# Patient Record
Sex: FEMALE | Race: WHITE | NOT HISPANIC OR LATINO
[De-identification: names, ages, dates, MRNs, and addresses within clinical notes are randomized per-mention and may not be internally consistent; named-entity substitution may affect disease eponyms.]

---

## 2020-02-20 ENCOUNTER — TRANSCRIPTION ENCOUNTER (OUTPATIENT)
Age: 65
End: 2020-02-20

## 2020-09-10 ENCOUNTER — APPOINTMENT (OUTPATIENT)
Dept: ORTHOPEDIC SURGERY | Facility: CLINIC | Age: 65
End: 2020-09-10
Payer: COMMERCIAL

## 2020-09-10 VITALS
WEIGHT: 118 LBS | TEMPERATURE: 97.9 F | SYSTOLIC BLOOD PRESSURE: 166 MMHG | DIASTOLIC BLOOD PRESSURE: 90 MMHG | HEART RATE: 85 BPM | BODY MASS INDEX: 21.99 KG/M2 | HEIGHT: 61.5 IN

## 2020-09-10 DIAGNOSIS — Z87.898 PERSONAL HISTORY OF OTHER SPECIFIED CONDITIONS: ICD-10-CM

## 2020-09-10 DIAGNOSIS — Z87.09 PERSONAL HISTORY OF OTHER DISEASES OF THE RESPIRATORY SYSTEM: ICD-10-CM

## 2020-09-10 DIAGNOSIS — Z82.62 FAMILY HISTORY OF OSTEOPOROSIS: ICD-10-CM

## 2020-09-10 DIAGNOSIS — Z78.9 OTHER SPECIFIED HEALTH STATUS: ICD-10-CM

## 2020-09-10 DIAGNOSIS — M75.41 IMPINGEMENT SYNDROME OF RIGHT SHOULDER: ICD-10-CM

## 2020-09-10 DIAGNOSIS — Z80.9 FAMILY HISTORY OF MALIGNANT NEOPLASM, UNSPECIFIED: ICD-10-CM

## 2020-09-10 PROCEDURE — 73030 X-RAY EXAM OF SHOULDER: CPT | Mod: RT

## 2020-09-10 PROCEDURE — 99204 OFFICE O/P NEW MOD 45 MIN: CPT

## 2020-09-10 RX ORDER — OXCARBAZEPINE 600 MG/1
TABLET, FILM COATED ORAL
Refills: 0 | Status: ACTIVE | COMMUNITY

## 2020-09-10 NOTE — HISTORY OF PRESENT ILLNESS
[de-identified] : Patient is here for right shoulder pain that began about 4 months ago. She hit her arm on an object but does not remember the exact injury. She did not seek medical attention as it was during the height of the COVID-19 crisis. The pain is intermittent. She states that there is some radiation towards her hands. She is also experiencing tingling in her 4th and 5th digits. She works on a computer.  Pain is worse in the morning. Denies prior injury. \par \par The patient's past medical history, past surgical history, medications and allergies were reviewed by me today and documented accordingly. In addition, the patient's family and social history, which were noncontributory to this visit, were reviewed also. Intake form was reviewed. The patient has no family history of arthritis.

## 2020-09-10 NOTE — PHYSICAL EXAM
[de-identified] : Constitutional: Well-nourished, well-developed, No acute distress\par Respiratory:  Good respiratory effort, no SOB\par Lymphatic: No regional lymphadenopathy, no lymphedema\par Psychiatric: Pleasant and normal affect, alert and oriented x3\par Skin: Clean dry and intact B/L UE/LE\par Musculoskeletal: normal except where as noted in regional exam\par \par \par Left Shoulder:\par APPEARANCE: no marked deformities, no swelling or malalignment\par POSITIVE TENDERNESS: none\par NONTENDER: supraspinatus, infraspinatus, teres minor, LH biceps, anterior and posterior capsule, AC joint\par ROM: full & painless, no scapular winging or dyskinesia present\par RESISTIVE TESTING: painless 5/5 resisted flex/ext, empty can/ER/IR, horizontal abd/add \par SPECIAL TESTS: neg Drop Arm, neg Empty Can, neg Lizama/Neers, neg Venegas's, neg Speeds, neg Apprehension, neg cross arm adduction, neg apley's scratch test\par Vasc: 2+ radial pulse\par Neuro: AIN, PIN, Ulnar nerve intact to motor, DTRs 2+/4 biceps, triceps, brachioradialis\par Sensation: Intact to light touch throughout\par B/L Elbows:  No asymmetry, malalignment, or swelling, Full ROM, 5/5 strength in flexion/ext, pronation/supination, Joints stable\par B/L Wrist and Hand:  No asymmetry, malalignment, or swelling, Full ROM, 5/5 strength in wrist and long finger flexion/ext, radial/ulnar deviation, Joints stable\par \par Right Shoulder:\par APPEARANCE: no marked deformities, no swelling or malalignment\par POSITIVE TENDERNESS: supraspinatus, long head biceps tendon\par NONTENDER:  infraspinatus, teres minor. biceps. anterior and posterior capsule. AC joint. \par ROM: full with mild painful arc past 60 degrees, no scapular winging or dyskinesia present\par RESISTIVE TESTING: MMT 4+/5 ER, Flexion and Empty can, 5/5 IR. painless 5/5 resisted ext, horizontal abd/add \par SPECIAL TESTS: + Lizama and Neers, mildly + cross arm adduction, + Speeds, neg Venegas's, neg Drop Arm, neg Apprehension. neg apley's scratch test\par Vasc: 2+ radial pulse\par Neuro: AIN, PIN, Ulnar nerve intact to motor, DTRs 2+/4 biceps, triceps, brachioradialis\par Sensation: Intact to light touch throughout  [de-identified] : The following radiographs were ordered and read by me during this patient's visit. I reviewed each radiograph in detail with the patient and discussed the findings as highlighted below. \par \par 3 views of the right shoulder were obtained today that show no fracture, or dislocation. There are no degenerative changes seen. There is no malalignment. No obvious osseous abnormality. Otherwise unremarkable.\par

## 2020-09-10 NOTE — DISCUSSION/SUMMARY
[de-identified] : Discussed findings of today's exam and possible causes of patient's pain.  Educated patient on their most probable diagnosis of right shoulder impingement.  Reviewed possible courses of treatment, and we collaboratively decided best course of treatment at this time will include conservative management.  Patient will be started on a course of physical therapy to restore normal range of motion and strength as tolerated.  Patient may continue take oral NSAIDs as needed for pain.  We discussed the role of a cortisone injection, patient would like to defer at this time.  Follow up as needed.  Patient appreciates and agrees with current plan.\par \par This note was generated using dragon medical dictation software.  A reasonable effort has been made for proofreading its contents, but typos may still remain.  If there are any questions or points of clarification needed please notify my office.

## 2022-06-23 ENCOUNTER — APPOINTMENT (OUTPATIENT)
Dept: ORTHOPEDIC SURGERY | Facility: CLINIC | Age: 67
End: 2022-06-23
Payer: COMMERCIAL

## 2022-06-23 VITALS — BODY MASS INDEX: 21.52 KG/M2 | WEIGHT: 114 LBS | HEIGHT: 61 IN

## 2022-06-23 VITALS — DIASTOLIC BLOOD PRESSURE: 64 MMHG | SYSTOLIC BLOOD PRESSURE: 158 MMHG | HEART RATE: 114 BPM

## 2022-06-23 DIAGNOSIS — M47.26 OTHER SPONDYLOSIS WITH RADICULOPATHY, LUMBAR REGION: ICD-10-CM

## 2022-06-23 DIAGNOSIS — M54.50 LOW BACK PAIN, UNSPECIFIED: ICD-10-CM

## 2022-06-23 PROCEDURE — 99214 OFFICE O/P EST MOD 30 MIN: CPT

## 2022-06-23 PROCEDURE — 72100 X-RAY EXAM L-S SPINE 2/3 VWS: CPT

## 2022-06-23 RX ORDER — ONDANSETRON 8 MG/1
8 TABLET ORAL
Qty: 30 | Refills: 0 | Status: ACTIVE | COMMUNITY
Start: 2022-01-20

## 2022-06-23 RX ORDER — PANCRELIPASE LIPASE, PANCRELIPASE PROTEASE, PANCRELIPASE AMYLASE 10000; 32000; 42000 [USP'U]/1; [USP'U]/1; [USP'U]/1
10000-32000 CAPSULE, DELAYED RELEASE ORAL
Qty: 90 | Refills: 0 | Status: ACTIVE | COMMUNITY
Start: 2022-05-15

## 2022-06-23 RX ORDER — ESCITALOPRAM OXALATE 20 MG/1
20 TABLET ORAL
Qty: 90 | Refills: 0 | Status: ACTIVE | COMMUNITY
Start: 2022-03-10

## 2022-06-23 NOTE — PHYSICAL EXAM
[de-identified] : Constitutional: Well-nourished, well-developed, No acute distress\par Respiratory:  Good respiratory effort, no SOB\par Lymphatic: No regional lymphadenopathy, no lymphedema\par Psychiatric: Pleasant and normal affect, alert and oriented x3\par Musculoskeletal: normal except where as noted in regional exam\par \par Lumbar Spine Exam\par \par APPEARANCE: Mild levoscoliosis malalignment, + loss of lordotic curvature of the lumbosacral spine. + poor posture\par POSITIVE TENDERNESS: + IL/SI/ST ligs, + TTP of b/l SI joints, + b/l lower lumbar tenderness and spasm noted in erector spinae and quadratus lumborum\par NONTENDER: no bony midline tenderness.\par ROM: Mildly limited in all directions, mildly painful at end range of flexion and extension\par RESISTIVE TESTING: painful 4/5 resisted flex/ext, sidebending b/l, and rotation\par SPECIAL TESTS: neg SLR b/l, neg TOMA b/l, neg Trendelenburg b/l \par PULSES: 2+ DP/PT pulses\par NEURO:  L1 - S2 intact to sensation and motor, DTRs 2+/4 patella and achilles\par \par \par  [de-identified] : The following radiographs were ordered and read by me during this patient's visit. I reviewed each radiograph in detail with the patient and discussed the findings as highlighted below. \par \par 2 views of the lumbar spine were obtained today that show degenerative changes and evidence of moderate to severe multilevel osteoarthritis .  No fracture, or dislocation seen at this time. There is mild levoscoliosis malalignment. No other obvious osseous abnormality. Otherwise unremarkable.

## 2022-06-23 NOTE — HISTORY OF PRESENT ILLNESS
[de-identified] : Patient is here for LBP. this has been a chronic issue but she feels it has worsened recently. There was no injury. She feels the pain radiate down her legs.  She used to work out frequently but had to stop due to cancer treatments. She recently started exercising recently doing a Pilates type exercise. She has used ice and heat to treat it. She did some Pt with her instructor. She takes Aleve/Advil for pain relief. Denies N/T/Prior injury/bowel or bladder dysfunction/saddle anesthesia.

## 2022-06-23 NOTE — DISCUSSION/SUMMARY
[de-identified] : Discussed findings of today's exam and possible causes of patient's pain.  Educated patient on their most probable diagnosis of chronic intermittent low back pain with intermittent radiculopathy with recent atraumatic exacerbation due to underlying moderate to early severe osteoarthritis and levoscoliosis.  Reviewed possible courses of treatment, and we collaboratively decided best course of treatment at this time will include conservative management.  Patient was educated that she has longstanding arthritis and scoliosis, it has recently become symptomatic.  Patient was highly active and physically fit prior to her recent diagnosis of pancreatic cancer.  She was undergoing extensive medical treatments for this issue and had a significant decline in her ability to perform her preferred physical activities, it is this change in her medical condition and lifestyle that caused her chronic lumbar osteoarthritis and scoliosis to become symptomatic.  She is having mild intermittent bilateral lower extremity radiculopathy, however she does not have any significant motor weakness or sensory deficits, not yet a candidate for epidural steroid injections, recommend deferral of MRI at this time.  Patient will be started on a course of physical therapy to restore normal range of motion and strength as tolerated.  Patient started on a course of oral NSAIDs, prescription given for Naprosyn (We discussed all possible side effects of this medication).  If patient does not have improvement with these measures then we may consider MRI and subsequent physiatry consultation at that time.  I had a lengthy discussion today regarding the patient's activity modification and how we need to rebuild her core strength and exercise tolerance over time, she needs to be patient with her ability to regain core strength as it takes much longer to regain strength, whereas it does not take much time at all to lose muscle strength and have it atrophied due to inactivity from her medical condition.  Follow up as needed.  Patient appreciates and agrees with current plan.\par \par \par This note was generated using dragon medical dictation software.  A reasonable effort has been made for proofreading its contents, but typos may still remain.  If there are any questions or points of clarification needed please notify my office.\par

## 2022-07-11 ENCOUNTER — INPATIENT (INPATIENT)
Facility: HOSPITAL | Age: 67
LOS: 2 days | Discharge: HOME | DRG: 379 | End: 2022-07-14
Attending: STUDENT IN AN ORGANIZED HEALTH CARE EDUCATION/TRAINING PROGRAM | Admitting: HOSPITALIST
Payer: COMMERCIAL

## 2022-07-11 VITALS
RESPIRATION RATE: 18 BRPM | SYSTOLIC BLOOD PRESSURE: 127 MMHG | HEIGHT: 61 IN | TEMPERATURE: 98 F | WEIGHT: 111.99 LBS | OXYGEN SATURATION: 97 % | HEART RATE: 86 BPM | DIASTOLIC BLOOD PRESSURE: 67 MMHG

## 2022-07-11 LAB
ALBUMIN SERPL ELPH-MCNC: 4.1 G/DL — SIGNIFICANT CHANGE UP (ref 3.3–5)
ALP SERPL-CCNC: 181 U/L — HIGH (ref 40–120)
ALT FLD-CCNC: 29 U/L — SIGNIFICANT CHANGE UP (ref 10–45)
ANION GAP SERPL CALC-SCNC: 12 MMOL/L — SIGNIFICANT CHANGE UP (ref 5–17)
APTT BLD: 25.3 SEC — LOW (ref 27.5–35.5)
AST SERPL-CCNC: 24 U/L — SIGNIFICANT CHANGE UP (ref 10–40)
BASOPHILS # BLD AUTO: 0.09 K/UL — SIGNIFICANT CHANGE UP (ref 0–0.2)
BASOPHILS NFR BLD AUTO: 1 % — SIGNIFICANT CHANGE UP (ref 0–2)
BILIRUB SERPL-MCNC: 0.1 MG/DL — LOW (ref 0.2–1.2)
BLD GP AB SCN SERPL QL: NEGATIVE — SIGNIFICANT CHANGE UP
BUN SERPL-MCNC: 16 MG/DL — SIGNIFICANT CHANGE UP (ref 7–23)
CALCIUM SERPL-MCNC: 8.9 MG/DL — SIGNIFICANT CHANGE UP (ref 8.4–10.5)
CHLORIDE SERPL-SCNC: 102 MMOL/L — SIGNIFICANT CHANGE UP (ref 96–108)
CO2 SERPL-SCNC: 20 MMOL/L — LOW (ref 22–31)
CREAT SERPL-MCNC: 0.72 MG/DL — SIGNIFICANT CHANGE UP (ref 0.5–1.3)
EGFR: 92 ML/MIN/1.73M2 — SIGNIFICANT CHANGE UP
EOSINOPHIL # BLD AUTO: 0.26 K/UL — SIGNIFICANT CHANGE UP (ref 0–0.5)
EOSINOPHIL NFR BLD AUTO: 3 % — SIGNIFICANT CHANGE UP (ref 0–6)
GLUCOSE SERPL-MCNC: 109 MG/DL — HIGH (ref 70–99)
HCT VFR BLD CALC: 15.2 % — CRITICAL LOW (ref 34.5–45)
HGB BLD-MCNC: 4.8 G/DL — CRITICAL LOW (ref 11.5–15.5)
INR BLD: 1.02 RATIO — SIGNIFICANT CHANGE UP (ref 0.88–1.16)
LYMPHOCYTES # BLD AUTO: 1.63 K/UL — SIGNIFICANT CHANGE UP (ref 1–3.3)
LYMPHOCYTES # BLD AUTO: 19 % — SIGNIFICANT CHANGE UP (ref 13–44)
MCHC RBC-ENTMCNC: 25 PG — LOW (ref 27–34)
MCHC RBC-ENTMCNC: 31.6 GM/DL — LOW (ref 32–36)
MCV RBC AUTO: 79.2 FL — LOW (ref 80–100)
MONOCYTES # BLD AUTO: 1.03 K/UL — HIGH (ref 0–0.9)
MONOCYTES NFR BLD AUTO: 12 % — SIGNIFICANT CHANGE UP (ref 2–14)
NEUTROPHILS # BLD AUTO: 5.59 K/UL — SIGNIFICANT CHANGE UP (ref 1.8–7.4)
NEUTROPHILS NFR BLD AUTO: 65 % — SIGNIFICANT CHANGE UP (ref 43–77)
PLATELET # BLD AUTO: 390 K/UL — SIGNIFICANT CHANGE UP (ref 150–400)
POTASSIUM SERPL-MCNC: 4.7 MMOL/L — SIGNIFICANT CHANGE UP (ref 3.5–5.3)
POTASSIUM SERPL-SCNC: 4.7 MMOL/L — SIGNIFICANT CHANGE UP (ref 3.5–5.3)
PROT SERPL-MCNC: 7 G/DL — SIGNIFICANT CHANGE UP (ref 6–8.3)
PROTHROM AB SERPL-ACNC: 11.8 SEC — SIGNIFICANT CHANGE UP (ref 10.5–13.4)
RBC # BLD: 1.92 M/UL — LOW (ref 3.8–5.2)
RBC # FLD: 18.6 % — HIGH (ref 10.3–14.5)
RH IG SCN BLD-IMP: POSITIVE — SIGNIFICANT CHANGE UP
SODIUM SERPL-SCNC: 134 MMOL/L — LOW (ref 135–145)
WBC # BLD: 8.6 K/UL — SIGNIFICANT CHANGE UP (ref 3.8–10.5)
WBC # FLD AUTO: 8.6 K/UL — SIGNIFICANT CHANGE UP (ref 3.8–10.5)

## 2022-07-11 PROCEDURE — 99291 CRITICAL CARE FIRST HOUR: CPT

## 2022-07-11 NOTE — ED PROVIDER NOTE - RAPID ASSESSMENT
67 y/o F w/ PMHx pancreatic CA, presents to the ED c/o anemia. Pt had outside blood work done showing low hemoglobin of 4 and presents to ED for further evaluation. Denies any vomiting, blood in stool, blood in urine, vaginal bleeding, bleeding, bruising, fever, chills, difficulty breathing, SOB, lightheadedness, dizziness, LOC or any other acute complaints. Nontoxic. Appears somewhat pale.     Reina LI ([place]) have documented this rapid assessment note under the dictation of Oleksandr Kendrick) which has been reviewed and affirmed to be accurate. Patient was seen as a QDOC patient. The patient will be seen and further worked up in the main emergency department and their care will be completed by the main emergency department team along with a thorough physical exam. Receiving team will follow up on labs, analgesia, any clinical imaging, reassess and disposition as clinically indicated, all decisions regarding the progression of care will be made at their discretion. 65 y/o F w/ PMHx pancreatic CA, presents to the ED c/o anemia. Pt had outside blood work done showing low hemoglobin of 4 and presents to ED for further evaluation. Denies any vomiting, blood in stool, blood in urine, vaginal bleeding, bleeding, bruising, fever, chills, difficulty breathing, SOB, lightheadedness, dizziness, LOC or any other acute complaints. Nontoxic. Appears somewhat pale.     Attending Note --     I saw the patient waiting area via televideo connection; a brief history was taken and a thorough physical exam was not performed as there is no physical exam room available.  The patient will be seen and further worked up in the main emergency department and their care will be completed by the main emergency department team.  I was not involved in this patient's care during the QDOC process, and unless otherwise noted in the ED provider note, was not involved in their care during their ED course.    The scribe's documentation has been prepared under my direction and personally reviewed by me in its entirety. I confirm that the note above accurately reflects all work, treatment, procedures, and medical decision making performed by me (Dr. Kendrick).  --[facility]     Reina LI (Scribe) have documented this rapid assessment note under the dictation of Oleksandr Kendrick) which has been reviewed and affirmed to be accurate. Patient was seen as a QDOC patient. The patient will be seen and further worked up in the main emergency department and their care will be completed by the main emergency department team along with a thorough physical exam. Receiving team will follow up on labs, analgesia, any clinical imaging, reassess and disposition as clinically indicated, all decisions regarding the progression of care will be made at their discretion.

## 2022-07-11 NOTE — ED PROVIDER NOTE - OBJECTIVE STATEMENT
65yo F pmh Pancreatic Ca presenting to [facility] ED for anemia on outpatient labs showing hgb of ~4. Pt reports over past couple of months has been having worsening lower/mid back pain which is constant, but improves with NSAIDs which pt says has been using heavily. Denies abd pain, back pain with no LE weakness or numbness, no incontinence. Denies any bleeding in stool, urine or from coughing/vomiting. ROS otherwise negative.

## 2022-07-11 NOTE — ED PROVIDER NOTE - ATTENDING CONTRIBUTION TO CARE
Pt w panc CA here w low HB on routine outpt labs - pt endorses some fatigue and sob only w significant exertion otherwise asymptomatic, particularly denies abd pain, n/v, bloody or dark stools. no hx of GIB or anemia prior to this. pt does admit to taking lots of nsaids recently for chronic back pain. on exam, pt with normal vitals, NAD, HD stable, pale, but otherwise bening exam, soft nontender abd. labs today confirm Hb 4.8 - will get blood transfusion, get hemoccult, admit for further w/u of anemia of unclear etiology.

## 2022-07-11 NOTE — ED PROVIDER NOTE - CLINICAL SUMMARY MEDICAL DECISION MAKING FREE TEXT BOX
65yo F w 65yo F w pancreatic cancer, some fatigue and SOB over past couple of weeks, low h/h on outpatient labs. Well appearing with unremarkable physical exam, hemoglobin 4.4 from q.doc. No melena on exam. Will need transfusion, likely to be admitted for anemia work up

## 2022-07-11 NOTE — ED PROVIDER NOTE - PHYSICAL EXAMINATION
GENERAL: non-toxic appearing, alert, in NAD  HEENT: atraumatic, normocephalic, Vision grossly intact, no conjunctivitis or discharge, hearing grossly intact,  no nasal discharge, epistaxis   CARDIAC: RRR, normal S1S2,  no appreciable murmurs, no cyanosis, cap refill < 2 seconds  PULM: normal work of breathing, oxygen saturation on RA wnl, CTAB, no crackles, rales, rhonchi, or wheezing  GI: abdomen nondistended, soft, nontender, no guarding or rebound tenderness, no palpable masses : no gross melena, normal rectal tone   NEURO: awake and alert, follows commands, normal speech, PERRLA, EOMI, no focal motor or sensory deficits  MSK: spine appears normal, no midline tenderness or step offs, no joint swelling or erythema, ranging all extremities with no appreciable loss of ROM  EXT: no peripheral edema, calf tenderness, redness or swelling  SKIN: warm, dry, and intact, no rashes  PSYCH: anxious, appropriate mood and affect

## 2022-07-12 DIAGNOSIS — Z90.410 ACQUIRED TOTAL ABSENCE OF PANCREAS: Chronic | ICD-10-CM

## 2022-07-12 DIAGNOSIS — D64.9 ANEMIA, UNSPECIFIED: ICD-10-CM

## 2022-07-12 DIAGNOSIS — E03.9 HYPOTHYROIDISM, UNSPECIFIED: ICD-10-CM

## 2022-07-12 DIAGNOSIS — I10 ESSENTIAL (PRIMARY) HYPERTENSION: ICD-10-CM

## 2022-07-12 DIAGNOSIS — C25.9 MALIGNANT NEOPLASM OF PANCREAS, UNSPECIFIED: ICD-10-CM

## 2022-07-12 DIAGNOSIS — J45.998 OTHER ASTHMA: ICD-10-CM

## 2022-07-12 LAB
ALBUMIN SERPL ELPH-MCNC: 3.8 G/DL — SIGNIFICANT CHANGE UP (ref 3.3–5)
ALP SERPL-CCNC: 173 U/L — HIGH (ref 40–120)
ALT FLD-CCNC: 23 U/L — SIGNIFICANT CHANGE UP (ref 10–45)
ANION GAP SERPL CALC-SCNC: 13 MMOL/L — SIGNIFICANT CHANGE UP (ref 5–17)
AST SERPL-CCNC: 33 U/L — SIGNIFICANT CHANGE UP (ref 10–40)
BASE EXCESS BLDV CALC-SCNC: -4.2 MMOL/L — LOW (ref -2–2)
BILIRUB SERPL-MCNC: 1 MG/DL — SIGNIFICANT CHANGE UP (ref 0.2–1.2)
BUN SERPL-MCNC: 12 MG/DL — SIGNIFICANT CHANGE UP (ref 7–23)
CA-I SERPL-SCNC: 1.26 MMOL/L — SIGNIFICANT CHANGE UP (ref 1.15–1.33)
CALCIUM SERPL-MCNC: 8.7 MG/DL — SIGNIFICANT CHANGE UP (ref 8.4–10.5)
CHLORIDE BLDV-SCNC: 104 MMOL/L — SIGNIFICANT CHANGE UP (ref 96–108)
CHLORIDE SERPL-SCNC: 103 MMOL/L — SIGNIFICANT CHANGE UP (ref 96–108)
CO2 BLDV-SCNC: 22 MMOL/L — SIGNIFICANT CHANGE UP (ref 22–26)
CO2 SERPL-SCNC: 19 MMOL/L — LOW (ref 22–31)
CREAT SERPL-MCNC: 0.63 MG/DL — SIGNIFICANT CHANGE UP (ref 0.5–1.3)
EGFR: 98 ML/MIN/1.73M2 — SIGNIFICANT CHANGE UP
FERRITIN SERPL-MCNC: 7 NG/ML — LOW (ref 15–150)
FOLATE SERPL-MCNC: 12.5 NG/ML — SIGNIFICANT CHANGE UP
GAS PNL BLDV: 133 MMOL/L — LOW (ref 136–145)
GAS PNL BLDV: SIGNIFICANT CHANGE UP
GAS PNL BLDV: SIGNIFICANT CHANGE UP
GLUCOSE BLDV-MCNC: 123 MG/DL — HIGH (ref 70–99)
GLUCOSE SERPL-MCNC: 101 MG/DL — HIGH (ref 70–99)
HCO3 BLDV-SCNC: 21 MMOL/L — LOW (ref 22–29)
HCT VFR BLD CALC: 15.7 % — CRITICAL LOW (ref 34.5–45)
HCT VFR BLD CALC: 23.1 % — LOW (ref 34.5–45)
HCT VFR BLDA CALC: 15 % — CRITICAL LOW (ref 34.5–46.5)
HGB BLD CALC-MCNC: 5 G/DL — CRITICAL LOW (ref 11.7–16.1)
HGB BLD-MCNC: 4.8 G/DL — CRITICAL LOW (ref 11.5–15.5)
HGB BLD-MCNC: 7.6 G/DL — LOW (ref 11.5–15.5)
HOROWITZ INDEX BLDV+IHG-RTO: SIGNIFICANT CHANGE UP
IGA FLD-MCNC: 288 MG/DL — SIGNIFICANT CHANGE UP (ref 84–499)
IGG FLD-MCNC: 928 MG/DL — SIGNIFICANT CHANGE UP (ref 610–1660)
IGM SERPL-MCNC: 24 MG/DL — LOW (ref 35–242)
IRON SATN MFR SERPL: 142 UG/DL — SIGNIFICANT CHANGE UP (ref 30–160)
IRON SATN MFR SERPL: 31 % — SIGNIFICANT CHANGE UP (ref 14–50)
KAPPA LC SER QL IFE: 4.04 MG/DL — HIGH (ref 0.33–1.94)
KAPPA LC SER QL IFE: 4.14 MG/DL — HIGH (ref 0.33–1.94)
KAPPA/LAMBDA FREE LIGHT CHAIN RATIO, SERUM: 1.69 RATIO — HIGH (ref 0.26–1.65)
KAPPA/LAMBDA FREE LIGHT CHAIN RATIO, SERUM: 1.74 RATIO — HIGH (ref 0.26–1.65)
LACTATE BLDV-MCNC: 1.4 MMOL/L — SIGNIFICANT CHANGE UP (ref 0.7–2)
LAMBDA LC SER QL IFE: 2.38 MG/DL — SIGNIFICANT CHANGE UP (ref 0.57–2.63)
LAMBDA LC SER QL IFE: 2.39 MG/DL — SIGNIFICANT CHANGE UP (ref 0.57–2.63)
LDH SERPL L TO P-CCNC: 239 U/L — SIGNIFICANT CHANGE UP (ref 50–242)
MAGNESIUM SERPL-MCNC: 2.1 MG/DL — SIGNIFICANT CHANGE UP (ref 1.6–2.6)
MCHC RBC-ENTMCNC: 27.3 PG — SIGNIFICANT CHANGE UP (ref 27–34)
MCHC RBC-ENTMCNC: 32.9 GM/DL — SIGNIFICANT CHANGE UP (ref 32–36)
MCV RBC AUTO: 83.1 FL — SIGNIFICANT CHANGE UP (ref 80–100)
NRBC # BLD: 1 /100 WBCS — HIGH (ref 0–0)
OTHER CELLS CSF MANUAL: 3.7 ML/DL — LOW (ref 18–22)
PCO2 BLDV: 38 MMHG — LOW (ref 39–42)
PH BLDV: 7.35 — SIGNIFICANT CHANGE UP (ref 7.32–7.43)
PHOSPHATE SERPL-MCNC: 3.3 MG/DL — SIGNIFICANT CHANGE UP (ref 2.5–4.5)
PLATELET # BLD AUTO: 322 K/UL — SIGNIFICANT CHANGE UP (ref 150–400)
PO2 BLDV: 33 MMHG — SIGNIFICANT CHANGE UP (ref 25–45)
POTASSIUM BLDV-SCNC: 4.2 MMOL/L — SIGNIFICANT CHANGE UP (ref 3.5–5.1)
POTASSIUM SERPL-MCNC: 4.3 MMOL/L — SIGNIFICANT CHANGE UP (ref 3.5–5.3)
POTASSIUM SERPL-SCNC: 4.3 MMOL/L — SIGNIFICANT CHANGE UP (ref 3.5–5.3)
PROT SERPL-MCNC: 6.5 G/DL — SIGNIFICANT CHANGE UP (ref 6–8.3)
PROT SERPL-MCNC: 6.5 G/DL — SIGNIFICANT CHANGE UP (ref 6–8.3)
PROT SERPL-MCNC: 6.8 G/DL — SIGNIFICANT CHANGE UP (ref 6–8.3)
RBC # BLD: 2.78 M/UL — LOW (ref 3.8–5.2)
RBC # FLD: 17 % — HIGH (ref 10.3–14.5)
SAO2 % BLDV: 48.9 % — LOW (ref 67–88)
SARS-COV-2 RNA SPEC QL NAA+PROBE: SIGNIFICANT CHANGE UP
SODIUM SERPL-SCNC: 135 MMOL/L — SIGNIFICANT CHANGE UP (ref 135–145)
TIBC SERPL-MCNC: 458 UG/DL — HIGH (ref 220–430)
UIBC SERPL-MCNC: 317 UG/DL — SIGNIFICANT CHANGE UP (ref 110–370)
VIT B12 SERPL-MCNC: 548 PG/ML — SIGNIFICANT CHANGE UP (ref 232–1245)
WBC # BLD: 7.37 K/UL — SIGNIFICANT CHANGE UP (ref 3.8–10.5)
WBC # FLD AUTO: 7.37 K/UL — SIGNIFICANT CHANGE UP (ref 3.8–10.5)

## 2022-07-12 PROCEDURE — 99254 IP/OBS CNSLTJ NEW/EST MOD 60: CPT

## 2022-07-12 PROCEDURE — 99223 1ST HOSP IP/OBS HIGH 75: CPT | Mod: GC

## 2022-07-12 PROCEDURE — 74177 CT ABD & PELVIS W/CONTRAST: CPT | Mod: 26

## 2022-07-12 RX ORDER — PANTOPRAZOLE SODIUM 20 MG/1
40 TABLET, DELAYED RELEASE ORAL
Refills: 0 | Status: DISCONTINUED | OUTPATIENT
Start: 2022-07-12 | End: 2022-07-14

## 2022-07-12 RX ORDER — MONTELUKAST 4 MG/1
10 TABLET, CHEWABLE ORAL DAILY
Refills: 0 | Status: DISCONTINUED | OUTPATIENT
Start: 2022-07-12 | End: 2022-07-14

## 2022-07-12 RX ORDER — LOSARTAN POTASSIUM 100 MG/1
100 TABLET, FILM COATED ORAL DAILY
Refills: 0 | Status: DISCONTINUED | OUTPATIENT
Start: 2022-07-12 | End: 2022-07-12

## 2022-07-12 RX ORDER — LOSARTAN POTASSIUM 100 MG/1
100 TABLET, FILM COATED ORAL DAILY
Refills: 0 | Status: DISCONTINUED | OUTPATIENT
Start: 2022-07-12 | End: 2022-07-14

## 2022-07-12 RX ORDER — ESCITALOPRAM OXALATE 10 MG/1
20 TABLET, FILM COATED ORAL DAILY
Refills: 0 | Status: DISCONTINUED | OUTPATIENT
Start: 2022-07-12 | End: 2022-07-14

## 2022-07-12 RX ORDER — OXCARBAZEPINE 300 MG/1
300 TABLET, FILM COATED ORAL
Refills: 0 | Status: DISCONTINUED | OUTPATIENT
Start: 2022-07-12 | End: 2022-07-14

## 2022-07-12 RX ORDER — PANTOPRAZOLE SODIUM 20 MG/1
80 TABLET, DELAYED RELEASE ORAL ONCE
Refills: 0 | Status: COMPLETED | OUTPATIENT
Start: 2022-07-12 | End: 2022-07-12

## 2022-07-12 RX ORDER — ACETAMINOPHEN 500 MG
650 TABLET ORAL ONCE
Refills: 0 | Status: COMPLETED | OUTPATIENT
Start: 2022-07-12 | End: 2022-07-12

## 2022-07-12 RX ORDER — DIPHENHYDRAMINE HCL 50 MG
50 CAPSULE ORAL ONCE
Refills: 0 | Status: COMPLETED | OUTPATIENT
Start: 2022-07-12 | End: 2022-07-12

## 2022-07-12 RX ORDER — DULOXETINE HYDROCHLORIDE 30 MG/1
30 CAPSULE, DELAYED RELEASE ORAL DAILY
Refills: 0 | Status: DISCONTINUED | OUTPATIENT
Start: 2022-07-12 | End: 2022-07-14

## 2022-07-12 RX ORDER — SODIUM CHLORIDE 9 MG/ML
1000 INJECTION INTRAMUSCULAR; INTRAVENOUS; SUBCUTANEOUS
Refills: 0 | Status: DISCONTINUED | OUTPATIENT
Start: 2022-07-12 | End: 2022-07-13

## 2022-07-12 RX ORDER — LEVOTHYROXINE SODIUM 125 MCG
100 TABLET ORAL DAILY
Refills: 0 | Status: DISCONTINUED | OUTPATIENT
Start: 2022-07-12 | End: 2022-07-14

## 2022-07-12 RX ADMIN — Medication 650 MILLIGRAM(S): at 03:30

## 2022-07-12 RX ADMIN — Medication 50 MILLIGRAM(S): at 03:31

## 2022-07-12 RX ADMIN — Medication 650 MILLIGRAM(S): at 07:05

## 2022-07-12 RX ADMIN — PANTOPRAZOLE SODIUM 80 MILLIGRAM(S): 20 TABLET, DELAYED RELEASE ORAL at 12:56

## 2022-07-12 RX ADMIN — PANTOPRAZOLE SODIUM 40 MILLIGRAM(S): 20 TABLET, DELAYED RELEASE ORAL at 21:59

## 2022-07-12 RX ADMIN — MONTELUKAST 10 MILLIGRAM(S): 4 TABLET, CHEWABLE ORAL at 18:44

## 2022-07-12 RX ADMIN — LOSARTAN POTASSIUM 100 MILLIGRAM(S): 100 TABLET, FILM COATED ORAL at 18:44

## 2022-07-12 RX ADMIN — ESCITALOPRAM OXALATE 20 MILLIGRAM(S): 10 TABLET, FILM COATED ORAL at 18:44

## 2022-07-12 RX ADMIN — SODIUM CHLORIDE 60 MILLILITER(S): 9 INJECTION INTRAMUSCULAR; INTRAVENOUS; SUBCUTANEOUS at 12:56

## 2022-07-12 RX ADMIN — DULOXETINE HYDROCHLORIDE 30 MILLIGRAM(S): 30 CAPSULE, DELAYED RELEASE ORAL at 18:44

## 2022-07-12 NOTE — CONSULT NOTE ADULT - SUBJECTIVE AND OBJECTIVE BOX
Reason for consult: Ca Pancreas    HPI:  Pt w panc CA here w low HB on routine outpt labs - pt endorses some fatigue and sob only w significant exertion otherwise asymptomatic, particularly denies abd pain, n/v, bloody or dark stools. no hx of GIB or anemia prior to this. pt does admit to taking lots of nsaids recently for chronic back pain. on exam, pt with normal vitals, NAD, HD stable, pale, but otherwise bening exam, soft nontender abd. labs today confirm Hb 4.8 - will get blood transfusion, get hemoccult, admit for further w/u of anemia of unclear etiology.    Hematology/Oncology called to see patient who is under care by Dr. 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 Last CBC noted at [facility] [place] showed a normal H/H.      PAST MEDICAL & SURGICAL HISTORY:  Pancreatic cancer          FAMILY HISTORY:      Alochol: Denied  Smoking: Nonsmoker  Drug Use: Denied  Marital Status:         Allergies    Dilantin (Unknown)    Intolerances        MEDICATIONS  (STANDING):    MEDICATIONS  (PRN):      ROS  No fever, sweats, chills  Fatigue  No epistaxis, HA, sore throat  No CP, SOB, cough, sputum  No n/v/d, abd pain, melena, hematochezia  No edema  No rash  No anxiety  No back pain, joint pain  No bleeding, bruising  No dysuria, hematuria    T(C): 36.2 (22 @ 04:00), Max: 36.9 (22 @ 19:14)  HR: 99 (22 @ 04:00) (86 - 99)  BP: 146/69 (22 @ 04:00) (127/67 - 172/88)  RR: 16 (22 @ 04:00) (16 - 19)  SpO2: 98% (22 @ 04:00) (97% - 100%)  Wt(kg): --    PE  NAD  Awake, alert  Anicteric, MMM  RRR  CTAB  Abd soft, NT, ND  No c/c/e  No rash grossly                            4.8    x     )-----------( x        ( 2022 01:20 )             15.7       07-11    134<L>  |  102  |  16  ----------------------------<  109<H>  4.7   |  20<L>  |  0.72    Ca    8.9      2022 20:18    TPro  7.0  /  Alb  4.1  /  TBili  0.1<L>  /  DBili  x   /  AST  24  /  ALT  29  /  AlkPhos  181<H>      [facility]   DEPARTMENT OF RADIOLOGY   [facility] [address] [address] [phone]     COMPUTED TOMOGRAPHY   Report of Consultation     Name: DANUTA CALDERON Acc No: [id number]   MRN: [id number] Date of Service: 2022   : 1955 Sex: F Pt Loc: [facility]   Ordered by: FUAD LOO MD Date of Report: 2022 09:14 AM   Procedure: CT CH/ABD/PEL W/ CON Account: [id number]   PRID #: [id number]     FINAL REPORT   3/18/2022 CT of chest, abdomen, and pelvis   CLINICAL STATEMENT: Pancreatic cancer   TECHNIQUE:   * IV contrast: with contrast   * Oral Contrast: None   * Acquisition: Axial CT images of chest, abdomen and pelvis   * Postprocessing: routine     RADIATION DOSE (DLP): 759 mGy-cm   COMPARISON: December 10, 2021.   CORRELATION: None.   FINDINGS:   LUNGS/AIRWAYS: Slightly increased right   upper lobe subpleural fissural groundglass subsegmental infiltrate   measuring 2.5 x 2.2 compared to 1.3 x 1.1 cm. Slightly change right middle   lobe subpleural infiltrate and clustered nodules with probable decreased   attenuation and unchanged size. Unchanged bibasilar atelectasis or   scarring. Unchanged punctate left lower lobe nodule.     PLEURA/PERICARDIUM: No effusion.   MEDIASTINUM/THORACIC NODES: No adenopathy.   HEPATOBILIARY: Increased heterogeneous attenuation of the liver with focal   wedge-shaped areas of low attenuation; for example, wedge-shaped   low-attenuation area in the third segment of the left hepatic lobe is new.   In the right hepatic lobe segment 6 focal wedge-shaped low-attenuation is   present. A more focal area of low-attenuation in the right hepatic lobe is   new measuring 1.2 x 0.9 cm. Findings likely represent focal fat deposition   or perfusion abnormality. Additional unchanged few subcentimeter   low-attenuation lesions, too small to characterize. No biliary ductal   dilatation. Gallbladder is unremarkable.     SPLEEN: Splenectomy.   PANCREAS: Distal pancreatectomy. Unremarkable remnant   pancreas. No main duct dilation.     ADRENAL GLANDS: Unremarkable.   KIDNEYS: Multiple subcentimeter bilateral   cortical hypodense lesions, too small to characterize. No hydronephrosis.     ABDOMINOPELVIC   NODES: Unchanged few mildly prominent   retroperitoneal nodes, for example 0.9 x 0.6 cm lower aortocaval node.     PELVIC ORGANS: Unremarkable.   PERITONEUM/   MESENTERY/BOWEL: Unremarkable.     BONES/SOFT TISSUES: No suspicious osseous lesion.   OTHER: Right chest wall power Port tip at   the superior cavoatrial junction.     IMPRESSION:   Since December 10, 2021,   1. Increased right upper lung subsegmental infiltrate and slightly   decreased attenuation and unchanged size of right middle lobe subsegmental   infiltrate probably infectious/inflammatory. Follow-up exam is recommended.   2. Increased heterogeneous attenuation of the liver with wedge-shaped   areas of low attenuation and more focal areas of low attenuation probably   focal fat deposition or perfusion abnormality or less likely malignancy.   MRI liver recommended for further evaluation.     FINAL REPORT    Reason for consult: Ca Pancreas    HPI:  Pt w panc CA here w low HB on routine outpt labs - pt endorses some fatigue and sob only w significant exertion otherwise asymptomatic, particularly denies abd pain, n/v, bloody or dark stools. no hx of GIB or anemia prior to this. pt does admit to taking lots of nsaids recently for chronic back pain. on exam, pt with normal vitals, NAD, HD stable, pale, but otherwise bening exam, soft nontender abd. labs today confirm Hb 4.8 - will get blood transfusion, get hemoccult, admit for further w/u of anemia of unclear etiology.    Hematology/Oncology called to see patient who is under care by Dr. 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 Last CBC noted at [facility] [place] showed a normal H/H.      PAST MEDICAL & SURGICAL HISTORY:  Pancreatic cancer          FAMILY HISTORY:      Alochol: Denied  Smoking: Nonsmoker  Drug Use: Denied  Marital Status:         Allergies    Dilantin (Unknown)    Intolerances        MEDICATIONS  (STANDING):    MEDICATIONS  (PRN):      ROS  No fever, sweats, chills  Fatigue  No epistaxis, HA, sore throat  No CP, SOB, cough, sputum  No n/v/d, abd pain, melena, hematochezia  No edema  No rash  No anxiety  No back pain, joint pain  No bleeding, bruising  No dysuria, hematuria    T(C): 36.2 (22 @ 04:00), Max: 36.9 (22 @ 19:14)  HR: 99 (22 @ 04:00) (86 - 99)  BP: 146/69 (22 @ 04:00) (127/67 - 172/88)  RR: 16 (22 @ 04:00) (16 - 19)  SpO2: 98% (22 @ 04:00) (97% - 100%)  Wt(kg): --    PE  NAD  Awake, alert  Anicteric, MMM  RRR  CTAB  Abd soft, NT, ND  No c/c/e  No rash grossly                            4.8    x     )-----------( x        ( 2022 01:20 )             15.7       07-11    134<L>  |  102  |  16  ----------------------------<  109<H>  4.7   |  20<L>  |  0.72    Ca    8.9      2022 20:18    TPro  7.0  /  Alb  4.1  /  TBili  0.1<L>  /  DBili  x   /  AST  24  /  ALT  29  /  AlkPhos  181<H>      [facility]   DEPARTMENT OF RADIOLOGY   [facility] [address] [address] [phone]     COMPUTED TOMOGRAPHY   Report of Consultation     Name: DANUTA CALDERON Acc No: [id number]   MRN: [id number] Date of Service: 2022   : 1955 Sex: F Pt Loc: [facility]   Ordered by: FUAD LOO MD Date of Report: 2022 09:14 AM   Procedure: CT CH/ABD/PEL W/ CON Account: [id number]   PRID #: [id number]     FINAL REPORT   3/18/2022 CT of chest, abdomen, and pelvis   CLINICAL STATEMENT: Pancreatic cancer   TECHNIQUE:   * IV contrast: with contrast   * Oral Contrast: None   * Acquisition: Axial CT images of chest, abdomen and pelvis   * Postprocessing: routine     RADIATION DOSE (DLP): 759 mGy-cm   COMPARISON: December 10, 2021.   CORRELATION: None.   FINDINGS:   LUNGS/AIRWAYS: Slightly increased right   upper lobe subpleural fissural groundglass subsegmental infiltrate   measuring 2.5 x 2.2 compared to 1.3 x 1.1 cm. Slightly change right middle   lobe subpleural infiltrate and clustered nodules with probable decreased   attenuation and unchanged size. Unchanged bibasilar atelectasis or   scarring. Unchanged punctate left lower lobe nodule.     PLEURA/PERICARDIUM: No effusion.   MEDIASTINUM/THORACIC NODES: No adenopathy.   HEPATOBILIARY: Increased heterogeneous attenuation of the liver with focal   wedge-shaped areas of low attenuation; for example, wedge-shaped   low-attenuation area in the third segment of the left hepatic lobe is new.   In the right hepatic lobe segment 6 focal wedge-shaped low-attenuation is   present. A more focal area of low-attenuation in the right hepatic lobe is   new measuring 1.2 x 0.9 cm. Findings likely represent focal fat deposition   or perfusion abnormality. Additional unchanged few subcentimeter   low-attenuation lesions, too small to characterize. No biliary ductal   dilatation. Gallbladder is unremarkable.     SPLEEN: Splenectomy.   PANCREAS: Distal pancreatectomy. Unremarkable remnant   pancreas. No main duct dilation.     ADRENAL GLANDS: Unremarkable.   KIDNEYS: Multiple subcentimeter bilateral   cortical hypodense lesions, too small to characterize. No hydronephrosis.     ABDOMINOPELVIC   NODES: Unchanged few mildly prominent   retroperitoneal nodes, for example 0.9 x 0.6 cm lower aortocaval node.     PELVIC ORGANS: Unremarkable.   PERITONEUM/   MESENTERY/BOWEL: Unremarkable.     BONES/SOFT TISSUES: No suspicious osseous lesion.   OTHER: Right chest wall power Port tip at   the superior cavoatrial junction.     IMPRESSION:   Since December 10, 2021,   1. Increased right upper lung subsegmental infiltrate and slightly   decreased attenuation and unchanged size of right middle lobe subsegmental   infiltrate probably infectious/inflammatory. Follow-up exam is recommended.   2. Increased heterogeneous attenuation of the liver with wedge-shaped   areas of low attenuation and more focal areas of low attenuation probably   focal fat deposition or perfusion abnormality or less likely malignancy.   MRI liver recommended for further evaluation.     FINAL REPORT       ACC: [id number] EXAM:  CT ABDOMEN AND PELVIS IC                          PROCEDURE DATE:  2022          INTERPRETATION:  CLINICAL INFORMATION: Pancreatic adenocarcinoma status   post distal pancreatectomy, splenectomy and chemotherapy/radiation   therapy presenting with low hemoglobin on outpatient labs and worsening   back pain    COMPARISON: None.    CONTRAST/COMPLICATIONS:  IV Contrast: Omnipaque 350  90 cc administered   0 cc discarded  Oral Contrast: NONE  Complications: None reported at time of study completion    PROCEDURE:  CT of the Abdomen and Pelvis was performed.  Arterial and Portal Venous phases were acquired.  Sagittal and coronal reformats were performed.    FINDINGS:  LOWER CHEST: Aortic and coronary aortic calcifications. Hypoattenuating   blood in the left ventricle consistent with anemia.    LIVER: Within normal limits.  BILE DUCTS: Normal caliber.  GALLBLADDER: Cholelithiasis.  SPLEEN: Splenectomy.  PANCREAS: Distal pancreatectomy.  ADRENALS: Within normal limits.  KIDNEYS/URETERS: Within normal limits.    BLADDER: Within normal limits.  REPRODUCTIVE ORGANS: Uterus and adnexa within normal limits.    BOWEL: No bowel obstruction. Appendectomy.  PERITONEUM: No ascites.  VESSELS: Atherosclerotic changes.  RETROPERITONEUM/LYMPH NODES: No lymphadenopathy. No hematoma.  ABDOMINAL WALL: Within normal limits.  BONES: Degenerative changes. L2 on L3 retrolisthesis with severe L2-L3   disc space narrowing.    IMPRESSION:  Status post distal pancreatectomy and splenectomy.    No acute intra-abdominal or pelvic pathology.    --- End of Report ---

## 2022-07-12 NOTE — ED ADULT NURSE REASSESSMENT NOTE - NS ED NURSE REASSESS COMMENT FT1
report received from DELL Baker in pink. Pt remains in the ED. Resting comfortably in bed. A&Ox3. Breathing spontaneously and unlabored. NAD. VSS. 2nd unit of blood started. Pt safety maintained. Will continue to monitor. Awaiting dispo.

## 2022-07-12 NOTE — H&P ADULT - NSHPPHYSICALEXAM_GEN_ALL_CORE
Physical Exam:    General: well-nourished; NAD  Skin: warm and dry, no rashes  Head: NC/AT  Eyes: PERRLA; EOM intact; conjunctiva clear  Respiratory: no chest wall deformity, CTAB w/good aeration  Cardiovascular: RRR, S1/S2 normal; No m/r/g  Abdominal: soft; normoactive bowel sounds; no HSM; no masses  Extremities: full ROM, no tenderness, no edema  Vascular: UE/LE pulses 2+ bilat  Neurological: alert, oriented, no gross deficits Vital Signs Last 24 Hrs  T(C): 37 (12 Jul 2022 11:24), Max: 37.1 (12 Jul 2022 07:28)  T(F): 98.6 (12 Jul 2022 11:24), Max: 98.8 (12 Jul 2022 07:28)  HR: 87 (12 Jul 2022 11:24) (82 - 99)  BP: 144/88 (12 Jul 2022 11:24) (127/67 - 172/88)  BP(mean): 105 (12 Jul 2022 00:54) (105 - 105)  RR: 18 (12 Jul 2022 11:24) (16 - 20)  SpO2: 99% (12 Jul 2022 11:24) (97% - 100%)    Parameters below as of 12 Jul 2022 10:05  Patient On (Oxygen Delivery Method): room air        Physical Exam:    General: well-nourished; NAD  Skin: warm and dry, no rashes  Head: NC/AT  Eyes: PERRLA; EOM intact; conjunctiva clear  Respiratory: no chest wall deformity, CTAB w/good aeration  Cardiovascular: RRR, S1/S2 normal; No m/r/g  Abdominal: soft; normoactive bowel sounds; no HSM; no masses  Extremities: full ROM, no tenderness, no edema  Vascular: UE/LE pulses 2+ bilat  Neurological: alert, oriented, no gross deficits

## 2022-07-12 NOTE — H&P ADULT - ASSESSMENT
65 yo female hx of pancreatic cancer (s/p distal pancreatectomy and splenectomy 6/2021), HTN, hypothyroidism, seizure disorder, presenting for anemia w/ hgb of 4.8 in the setting of chronic NSAID use concerning for occult GI bleed.

## 2022-07-12 NOTE — H&P ADULT - PROBLEM SELECTOR PLAN 3
Hx of hypothyroidism on synthroid  - C/w synthroid 100 mg qd Hx of pancreatic cancer s/p pancreatectomy and splenectomy 6/2021 followed by adjuvant FOLFIRINOX 8/2021-1/2022 and adjuvant capecitabine with RT completed 3/2022.  -Ongoing care after discharge per heme/onc

## 2022-07-12 NOTE — CONSULT NOTE ADULT - ATTENDING COMMENTS
Agree with above. Patient with recent heavy NSAID use,  has distal pancreatectomy and presented with severe anemia without overt bleeding. Discussed with patient endoscopic evaluation which would to be complete, including EGD and colonoscopy. She is hesitant about colonoscopy as she was told her colon was "twisty." Explained to her the risks/benefits of endoscopic evaluation which including but are not limited to inherent small risks of bleeding, infection, perforation. Offered EGD and colonoscopy at the same time, vs EGD first and then colonoscopy if no lesions were found in the UGI tract. She is agreeable to the latter. Will plan for EGD tomorrow. Keep NPO after midnight, stop NSAIDs, start PPI. Monitor H/H, transfuse as needed.

## 2022-07-12 NOTE — H&P ADULT - NSHPSOCIALHISTORY_GEN_ALL_CORE
, lives with . Drinks 1-2 glass of wine every few months. Denies hx of tobacco use. Reports use of occasional marijuana via vaporizer.

## 2022-07-12 NOTE — H&P ADULT - HISTORY OF PRESENT ILLNESS
HPI:    65 yo female hx of pancreatic cancer (s/p distal pancreatectomy and splenectomy 6/2021), HTN, hypothyroidism, seizure disorder, presenting for anemia. States that she underwent routine lab screenings for prior pancreatic cancer which revealed hgb of 4.8. Outpt providers recommended pt come to ED for anemia. Since finishing pancreatic cancer treatment pt indorsing increase in exercise frequency and intensity. Reports injuring her back and has experience 1-2 months of mid lower back pain that radiates down her b/l hips and thighs. Endorses taking increasing quantity of NSAIDs due to pain. Over past month pt endorses experiencing minor fatigue and SOB with excersion but is only able to walk a few steps 2/2 to back/leg pain so is unsure how symptoms are effecting exercise tolerance. Also endorses palpitations with ambulation. Denies any dizziness, light handedness hematemesis hemoptysis, hematuria, hematochezia, melena, or abdominal pain. Denies chronic alcohol use and any dietary restrictions. Also denies any fevers/chills, cough, dysuria, or chest pain.    ED course: Vitals on admission: temp 36.9, HR 86, /67, RR 18, SpO2 97% on room air. Labs confirmed Hgb 4.8. Received 2 units PRBCs with response; repeat hgb 7.6

## 2022-07-12 NOTE — CONSULT NOTE ADULT - SUBJECTIVE AND OBJECTIVE BOX
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     Endorsing SOB, fatigue. Denies melena, hematochezia, hematemesis, abd pain, n/v/d/f/c. (+) NSAID use for back pain.     Allergies:  Dilantin (Unknown)      Home Medications:    Hospital Medications:  pantoprazole  Injectable 80 milliGRAM(s) IV Push once  pantoprazole  Injectable 40 milliGRAM(s) IV Push two times a day  sodium chloride 0.9%. 1000 milliLiter(s) IV Continuous <Continuous>      PMHX/PSHX:  Pancreatic cancer        Family history:      Denies family history of colon cancer/polyps, stomach cancer/polyps, pancreatic cancer/masses, liver cancer/disease, ovarian cancer and endometrial cancer.    Social History:     Tob: Denies  EtOH: Denies  Illicit Drugs: Denies    ROS:     General:  No wt loss, fevers, chills, night sweats, fatigue  Eyes:  Good vision, no reported pain  ENT:  No sore throat, pain, runny nose, dysphagia  CV:  No pain, palpitations, hypo/hypertension  Pulm:  No dyspnea, cough, tachypnea, wheezing  GI:  No pain, No nausea, No vomiting, No diarrhea, No constipation, No weight loss, No fever, No pruritis, No rectal bleeding, No tarry stools, No dysphagia,  :  No pain, bleeding, incontinence, nocturia  Muscle:  No pain, weakness  Neuro:  No weakness, tingling, memory problems  Psych:  No fatigue, insomnia, mood problems, depression  Endocrine:  No polyuria, polydipsia, cold/heat intolerance  Heme:  No petechiae, ecchymosis, easy bruisability  Skin:  No rash, tattoos, scars, edema    PHYSICAL EXAM:     GENERAL:  No acute distress  HEENT:  Normocephalic/atraumatic, no scleral icterus  CHEST:  Clear to auscultation bilaterally, no wheezes/rales/ronchi, no accessory muscle use  HEART:  Regular rate and rhythm, no murmurs/rubs/gallops  ABDOMEN:  Soft, non-tender, non-distended, normoactive bowel sounds,  no masses, no hepato-splenomegaly, no signs of chronic liver disease  EXTREMITIES: No cyanosis, clubbing, or edema  SKIN:  No rash/erythema/ecchymoses/petechiae/wounds/abscess/warm/dry  NEURO:  Alert and oriented x 3, no asterixis    Vital Signs:  Vital Signs Last 24 Hrs  T(C): 37 (12 Jul 2022 11:24), Max: 37.1 (12 Jul 2022 07:28)  T(F): 98.6 (12 Jul 2022 11:24), Max: 98.8 (12 Jul 2022 07:28)  HR: 87 (12 Jul 2022 11:24) (82 - 99)  BP: 144/88 (12 Jul 2022 11:24) (127/67 - 172/88)  BP(mean): 105 (12 Jul 2022 00:54) (105 - 105)  RR: 18 (12 Jul 2022 11:24) (16 - 20)  SpO2: 99% (12 Jul 2022 11:24) (97% - 100%)    Parameters below as of 12 Jul 2022 10:05  Patient On (Oxygen Delivery Method): room air      Daily Height in cm: 154.94 (11 Jul 2022 19:14)    Daily     LABS:                        7.6    7.37  )-----------( 322      ( 12 Jul 2022 12:08 )             23.1     Mean Cell Volume: 83.1 fl (07-12-22 @ 12:08)    07-11    134<L>  |  102  |  16  ----------------------------<  109<H>  4.7   |  20<L>  |  0.72    Ca    8.9      11 Jul 2022 20:18    TPro  7.0  /  Alb  4.1  /  TBili  0.1<L>  /  DBili  x   /  AST  24  /  ALT  29  /  AlkPhos  181<H>  07-11    LIVER FUNCTIONS - ( 11 Jul 2022 20:18 )  Alb: 4.1 g/dL / Pro: 7.0 g/dL / ALK PHOS: 181 U/L / ALT: 29 U/L / AST: 24 U/L / GGT: x           PT/INR - ( 11 Jul 2022 20:18 )   PT: 11.8 sec;   INR: 1.02 ratio         PTT - ( 11 Jul 2022 20:18 )  PTT:25.3 sec                            7.6    7.37  )-----------( 322      ( 12 Jul 2022 12:08 )             23.1                         4.8    x     )-----------( x        ( 12 Jul 2022 01:20 )             15.7                         4.8    8.60  )-----------( 390      ( 11 Jul 2022 20:18 )             15.2       Imaging:           Chief Complaint:  Patient is a 66y old  Female who presents with a chief complaint of     HPI: 66F Hx stage IIB PDA C S/P distal pancreatectomy and splenectomy on 6/24/2021 followed by adjuvant FOLFIRINOX 8/2021-1/2022 and adjuvant capecitabine with RT completed 3/2022 now presenting with anemia w/ Hb 4.8.     Endorsing SOB, fatigue. Denies melena, hematochezia, hematemesis, abd pain, n/v/d/f/c. (+) NSAID use for back pain (reporting taking ibuprofen/aleve 3 tabs daily x 1 month).     Pt reports having multiple colonoscopies in the past (2/2 father w/ hx of CRC) -- was told on her last colon she had a "twisty" colon; thus her internist ordered her for a virtual CT colonography on as her last screening.     Allergies:  Dilantin (Unknown)      Home Medications:    Hospital Medications:  pantoprazole  Injectable 80 milliGRAM(s) IV Push once  pantoprazole  Injectable 40 milliGRAM(s) IV Push two times a day  sodium chloride 0.9%. 1000 milliLiter(s) IV Continuous <Continuous>      PMHX/PSHX:  Pancreatic cancer        Family history:      Denies family history of colon cancer/polyps, stomach cancer/polyps, pancreatic cancer/masses, liver cancer/disease, ovarian cancer and endometrial cancer.    Social History:     Tob: Denies  EtOH: Denies  Illicit Drugs: Denies    ROS:     General:  No wt loss, fevers, chills, night sweats, fatigue  Eyes:  Good vision, no reported pain  ENT:  No sore throat, pain, runny nose, dysphagia  CV:  No pain, palpitations, hypo/hypertension  Pulm:  No dyspnea, cough, tachypnea, wheezing  GI:  see above  :  No pain, bleeding, incontinence, nocturia  Muscle:  No pain, weakness  Neuro:  No weakness, tingling, memory problems  Psych:  No fatigue, insomnia, mood problems, depression  Endocrine:  No polyuria, polydipsia, cold/heat intolerance  Heme:  No petechiae, ecchymosis, easy bruisability  Skin:  No rash, tattoos, scars, edema    PHYSICAL EXAM:     GENERAL:  No acute distress  HEENT:  Normocephalic/atraumatic, no scleral icterus  CHEST:  no accessory muscle use  HEART:  Regular rate and rhythm, no murmurs/rubs/gallops  ABDOMEN:  Soft, non-tender, non-distended  EXTREMITIES: No cyanosis, clubbing, or edema  SKIN:  No rash/warm/dry  NEURO:  Alert and oriented x 3    Vital Signs:  Vital Signs Last 24 Hrs  T(C): 37 (12 Jul 2022 11:24), Max: 37.1 (12 Jul 2022 07:28)  T(F): 98.6 (12 Jul 2022 11:24), Max: 98.8 (12 Jul 2022 07:28)  HR: 87 (12 Jul 2022 11:24) (82 - 99)  BP: 144/88 (12 Jul 2022 11:24) (127/67 - 172/88)  BP(mean): 105 (12 Jul 2022 00:54) (105 - 105)  RR: 18 (12 Jul 2022 11:24) (16 - 20)  SpO2: 99% (12 Jul 2022 11:24) (97% - 100%)    Parameters below as of 12 Jul 2022 10:05  Patient On (Oxygen Delivery Method): room air      Daily Height in cm: 154.94 (11 Jul 2022 19:14)    Daily     LABS:                        7.6    7.37  )-----------( 322      ( 12 Jul 2022 12:08 )             23.1     Mean Cell Volume: 83.1 fl (07-12-22 @ 12:08)    07-11    134<L>  |  102  |  16  ----------------------------<  109<H>  4.7   |  20<L>  |  0.72    Ca    8.9      11 Jul 2022 20:18    TPro  7.0  /  Alb  4.1  /  TBili  0.1<L>  /  DBili  x   /  AST  24  /  ALT  29  /  AlkPhos  181<H>  07-11    LIVER FUNCTIONS - ( 11 Jul 2022 20:18 )  Alb: 4.1 g/dL / Pro: 7.0 g/dL / ALK PHOS: 181 U/L / ALT: 29 U/L / AST: 24 U/L / GGT: x           PT/INR - ( 11 Jul 2022 20:18 )   PT: 11.8 sec;   INR: 1.02 ratio         PTT - ( 11 Jul 2022 20:18 )  PTT:25.3 sec                            7.6    7.37  )-----------( 322      ( 12 Jul 2022 12:08 )             23.1                         4.8    x     )-----------( x        ( 12 Jul 2022 01:20 )             15.7                         4.8    8.60  )-----------( 390      ( 11 Jul 2022 20:18 )             15.2       Imaging:           Chief Complaint:  Patient is a 66y old  Female who presents with a chief complaint of     HPI: 66F Hx stage IIB PDA C S/P distal pancreatectomy and splenectomy on 6/24/2021 followed by adjuvant FOLFIRINOX 8/2021-1/2022 and adjuvant capecitabine with RT completed 3/2022 now presenting with anemia w/ Hb 4.8.     Endorsing SOB, fatigue. Denies melena, hematochezia, hematemesis, abd pain, n/v/d/f/c. (+) NSAID use for back pain (reporting taking ibuprofen/aleve 3 tabs daily x 1 month).     Pt reports having multiple colonoscopies in the past (2/2 father w/ hx of CRC) -- was told on her last colon she had a "twisty" colon; thus her internist ordered her for a virtual CT colonography on as her last screening 1 year ago, no colonic lesions were seen.      Allergies:  Dilantin (Unknown)      Home Medications:    Hospital Medications:  pantoprazole  Injectable 80 milliGRAM(s) IV Push once  pantoprazole  Injectable 40 milliGRAM(s) IV Push two times a day  sodium chloride 0.9%. 1000 milliLiter(s) IV Continuous <Continuous>      PMHX/PSHX:  Pancreatic cancer        Family history:      Denies family history of colon cancer/polyps, stomach cancer/polyps, pancreatic cancer/masses, liver cancer/disease, ovarian cancer and endometrial cancer.    Social History:     Tob: Denies  EtOH: Denies  Illicit Drugs: Denies    ROS:     General:  No wt loss, fevers, chills, night sweats, fatigue  Eyes:  Good vision, no reported pain  ENT:  No sore throat, pain, runny nose, dysphagia  CV:  No pain, palpitations, hypo/hypertension  Pulm:  No dyspnea, cough, tachypnea, wheezing  GI:  see above  :  No pain, bleeding, incontinence, nocturia  Muscle:  No pain, weakness  Neuro:  No weakness, tingling, memory problems  Psych:  No fatigue, insomnia, mood problems, depression  Endocrine:  No polyuria, polydipsia, cold/heat intolerance  Heme:  No petechiae, ecchymosis, easy bruisability  Skin:  No rash, tattoos, scars, edema    PHYSICAL EXAM:     GENERAL:  No acute distress  HEENT:  Normocephalic/atraumatic, no scleral icterus  CHEST:  no accessory muscle use  HEART:  Regular rate and rhythm, no murmurs/rubs/gallops  ABDOMEN:  Soft, non-tender, non-distended  EXTREMITIES: No cyanosis, clubbing, or edema  SKIN:  No rash/warm/dry  NEURO:  Alert and oriented x 3  PSYCH: normal affect    Vital Signs:  Vital Signs Last 24 Hrs  T(C): 37 (12 Jul 2022 11:24), Max: 37.1 (12 Jul 2022 07:28)  T(F): 98.6 (12 Jul 2022 11:24), Max: 98.8 (12 Jul 2022 07:28)  HR: 87 (12 Jul 2022 11:24) (82 - 99)  BP: 144/88 (12 Jul 2022 11:24) (127/67 - 172/88)  BP(mean): 105 (12 Jul 2022 00:54) (105 - 105)  RR: 18 (12 Jul 2022 11:24) (16 - 20)  SpO2: 99% (12 Jul 2022 11:24) (97% - 100%)    Parameters below as of 12 Jul 2022 10:05  Patient On (Oxygen Delivery Method): room air      Daily Height in cm: 154.94 (11 Jul 2022 19:14)    Daily     LABS:                        7.6    7.37  )-----------( 322      ( 12 Jul 2022 12:08 )             23.1     Mean Cell Volume: 83.1 fl (07-12-22 @ 12:08)    07-11    134<L>  |  102  |  16  ----------------------------<  109<H>  4.7   |  20<L>  |  0.72    Ca    8.9      11 Jul 2022 20:18    TPro  7.0  /  Alb  4.1  /  TBili  0.1<L>  /  DBili  x   /  AST  24  /  ALT  29  /  AlkPhos  181<H>  07-11    LIVER FUNCTIONS - ( 11 Jul 2022 20:18 )  Alb: 4.1 g/dL / Pro: 7.0 g/dL / ALK PHOS: 181 U/L / ALT: 29 U/L / AST: 24 U/L / GGT: x           PT/INR - ( 11 Jul 2022 20:18 )   PT: 11.8 sec;   INR: 1.02 ratio         PTT - ( 11 Jul 2022 20:18 )  PTT:25.3 sec                            7.6    7.37  )-----------( 322      ( 12 Jul 2022 12:08 )             23.1                         4.8    x     )-----------( x        ( 12 Jul 2022 01:20 )             15.7                         4.8    8.60  )-----------( 390      ( 11 Jul 2022 20:18 )             15.2       Imaging:    < from: CT Abdomen and Pelvis w/ IV Cont (07.12.22 @ 09:55) >    FINDINGS:  LOWER CHEST: Aortic and coronary aortic calcifications. Hypoattenuating   blood in the left ventricle consistent with anemia.    LIVER: Within normal limits.  BILE DUCTS: Normal caliber.  GALLBLADDER: Cholelithiasis.  SPLEEN: Splenectomy.  PANCREAS: Distal pancreatectomy.  ADRENALS: Within normal limits.  KIDNEYS/URETERS: Within normal limits.    BLADDER: Within normal limits.  REPRODUCTIVE ORGANS: Uterus and adnexa within normal limits.    BOWEL: No bowel obstruction. Appendectomy.  PERITONEUM: No ascites.  VESSELS: Atherosclerotic changes.  RETROPERITONEUM/LYMPH NODES: No lymphadenopathy. No hematoma.  ABDOMINAL WALL: Within normal limits.  BONES: Degenerative changes. L2 on L3 retrolisthesis with severe L2-L3   disc space narrowing.    IMPRESSION:  Status post distal pancreatectomy and splenectomy.    No acute intra-abdominal or pelvic pathology.    < end of copied text >

## 2022-07-12 NOTE — CONSULT NOTE ADULT - ASSESSMENT
Pt w panc CA here w low HB on routine outpt labs - pt endorses some fatigue and sob only w significant exertion otherwise asymptomatic, particularly denies abd pain, n/v, bloody or dark stools. no hx of GIB or anemia prior to this. pt does admit to taking lots of nsaids recently for chronic back pain. on exam, pt with normal vitals, NAD, HD stable, pale, but otherwise bening exam, soft nontender abd. labs today confirm Hb 4.8 - will get blood transfusion, get hemoccult, admit for further w/u of anemia of unclear etiology.    Hematology/Oncology called to see patient who is under care by Dr. 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 Last CBC noted at [facility] showed a normal H/H.    Ca Pancreas  --Under care by Dr. Selena Bess of [facility]  --Completed adjuvant therapy 3/2022  --Ongoing care after discharge    Anemia  --Acute onset  --Will order anemia labs - iron, folate, B12, haptoglobin, LDH, FOBT, myeloma studies,  --CT abdomen/pelvis ordered  --Please transfuse for Hgb <7.0 gram    We will continue to follow patient and coordinate with [facility]    Tito Enciso PA-C  Hematology/Oncology  [facility]   [phone] (office)  [phone] (alt office)  Evenings and weekends please call MD on call or office   Pt w panc CA here w low HB on routine outpt labs - pt endorses some fatigue and sob only w significant exertion otherwise asymptomatic, particularly denies abd pain, n/v, bloody or dark stools. no hx of GIB or anemia prior to this. pt does admit to taking lots of nsaids recently for chronic back pain. on exam, pt with normal vitals, NAD, HD stable, pale, but otherwise bening exam, soft nontender abd. labs today confirm Hb 4.8 - will get blood transfusion, get hemoccult, admit for further w/u of anemia of unclear etiology.    Hematology/Oncology called to see patient who is under care by Dr. 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 Last CBC noted at [facility] showed a normal H/H.    Ca Pancreas  --Under care by Dr. Selena Bess of [facility]  --Completed adjuvant therapy 3/2022  --Ongoing care after discharge    Anemia  --Acute onset  --Patient has been taking large amounts of NSAIDS secondary to myalgias  --Will order anemia labs - iron, folate, B12, haptoglobin, LDH, FOBT, myeloma studies,  --CT abdomen/pelvis - no recurrence  --GI has been consulted  --Please transfuse for Hgb <7.0 gram    After discharge we recommend patient follow with benign Hematology - may see Dr. Michi Cruz of [facility] or any of his colleagues who work out of the [facility] office    We will continue to follow patient and coordinate with [facility]    Tiot Enciso PA-C  Hematology/Oncology  [facility]   [phone] (office)  [phone] (alt office)  Evenings and weekends please call MD on call or office

## 2022-07-12 NOTE — H&P ADULT - ATTENDING COMMENTS
Patient with pancreatic cancer is presumed remission presenting with low hemoglobin of 4s. Apparently has been taking NSAIDS for one month for back pain daily. Last Hg in 4/2022 was 10-11. Patient denies abdominal pain, nausea, vomiting and GI alarm symptoms such as early satiety. Stool color and consistently unchanged and brown. Denies BRBPR. Denies other blood loss complaint. Mildly symptomatic from this possibly, but per patient she figured she was out of shape.    Oncology consulted and recommended hemolytic work up, iron studies, SPEP, light chains and LENA--though clinically concerned for acute blood loss anemia. Likely has been going on for 2-4 weeks.    Labs personally reviewed     House GI consulted. Maintain CLD. Possible EGD/Colonoscopy. Can do CBC daily for now.

## 2022-07-12 NOTE — H&P ADULT - NSHPLABSRESULTS_GEN_ALL_CORE
LABS:  cret                        7.6    7.37  )-----------( 322      ( 12 Jul 2022 12:08 )             23.1     07-12    135  |  103  |  12  ----------------------------<  101<H>  4.3   |  19<L>  |  0.63    Ca    8.7      12 Jul 2022 12:08  Phos  3.3     07-12  Mg     2.1     07-12    TPro  6.8  /  Alb  3.8  /  TBili  1.0  /  DBili  x   /  AST  33  /  ALT  23  /  AlkPhos  173<H>  07-12    PT/INR - ( 11 Jul 2022 20:18 )   PT: 11.8 sec;   INR: 1.02 ratio      Imaging:    ACC: [id number] EXAM:  CT ABDOMEN AND PELVIS IC                          PROCEDURE DATE:  07/12/2022      IMPRESSION:  Status post distal pancreatectomy and splenectomy.    No acute intra-abdominal or pelvic pathology.    --- End of Report ---       PTT - ( 11 Jul 2022 20:18 )  PTT:25.3 sec

## 2022-07-12 NOTE — H&P ADULT - PROBLEM SELECTOR PLAN 4
Pt w/ Hx of seasonal athma  - C/w montelukast 10 mg qd Hx of hypothyroidism on synthroid  - C/w synthroid 100 mg qd

## 2022-07-12 NOTE — H&P ADULT - NSHPREVIEWOFSYSTEMS_GEN_ALL_CORE
REVIEW OF SYSTEMS:    Constitutional: no fevers, no appetite change; fatigue  Skin: no rashes  Head: no trauma  Eyes: no change in vision, no discharge  ENT: no ear pain, no nasal congestion, no sore throat  Neck: no pain or stiffness  Respiratory: no cough, wheezing; exertional SOB  Cardiovascular: no CP or edema; palpitations with ambulation  Gastrointestinal: no abd pain, no N/V, no diarrhea/constipation, no bloody stools  Genitourinary: no dysuria, hematuria  Neurological: no numbness or weakness, no HA  Musculoskeletal: no myalgias or arthralgias  Heme: no bruising

## 2022-07-12 NOTE — ED ADULT NURSE REASSESSMENT NOTE - NS ED NURSE REASSESS COMMENT FT1
MD Whiteside to RN premedicate patient prior to PRBC transfusion. RN awaiting medication orders, then to release blood from blood bank.

## 2022-07-12 NOTE — H&P ADULT - PROBLEM SELECTOR PLAN 1
Pt found to have microcytic anemia at outpatient clinic with Hgb of 4.8. Denies any hx of significant alcohol use or dietary restrictions. Most likely JERSON 2/2 chronic GI bleed in setting of chronic NSAID use for MSK pain. Less likely anemia of chronic diease given pts hx of malignancy.  - Received 2 units PRBCs in ED with increase in hgb to 7.6.   - Iron, Folate, b12, haptoglobin, LDH, and FOBT per Heme/onc recs  - GI onboard; f/u recs Pt found to have microcytic anemia at outpatient clinic with Hgb of 4.8. Denies any hx of significant alcohol use or dietary restrictions. Most likely JERSON 2/2 chronic GI bleed in setting of chronic NSAID use for MSK pain. Less likely anemia of chronic diease given pts hx of malignancy.  - Received 2 units PRBCs in ED with increase in hgb to 7.6.   - Iron, Folate, b12, haptoglobin, LDH, FOBT, SPEP per Heme/onc recs  - Maintain active type and screen; transfuse Hgb < 7.0   - Montor Hgb with q24h CBC  - Start pantoprazol 40mg qd for suspected UGIB   - GI onboard; f/u recs Pt found to have microcytic anemia at outpatient clinic with Hgb of 4.8. Denies any hx of significant alcohol use or dietary restrictions. Most likely JERSON 2/2 chronic GI bleed in setting of chronic NSAID use for MSK pain. Less likely anemia of chronic diease given pts hx of malignancy.  - Received 2 units PRBCs in ED with increase in hgb to 7.6.   - Iron, Folate, b12, haptoglobin, LDH, FOBT, SPEP per Heme/onc recs  - Maintain active type and screen; transfuse Hgb < 7.0   - Montor Hgb with q24h CBC  - Start pantoprazole 40mg BID for suspected UGIB   - GI onboard; f/u recs

## 2022-07-12 NOTE — ED ADULT NURSE NOTE - OBJECTIVE STATEMENT
65 y/o female PMHx Pancreatic cancer arrives to [facility] ED by car from home with  with c/o abnormal labs. Pt states had anemia and hgb 4 outpatient blood work, sent to ED for further eval. Pt endorses worsening constant lower/mid back pain which improves with NSAIDs, patient heavily using ibuprofen for pain management. Patient Qdoc-ed in triage, hgb 4.8. Patient denies blood in stool or vaginal bleeding. Patient is A&Ox4. Respirations spontaneous and unlabored. Denies SOB, dyspnea, cough, chest pain, palpitations. No abdominal pain, soft NT/ND. No n/v/d. Denies urinary symptoms. Denies fever/chills. No sick contacts. Skin is warm/dry and pale. Ambulates independently at baseline.

## 2022-07-12 NOTE — CONSULT NOTE ADULT - ASSESSMENT
Impression:    66F Hx stage IIB PDA C S/P distal pancreatectomy and splenectomy on 6/24/2021 followed by adjuvant FOLFIRINOX 8/2021-1/2022 and adjuvant capecitabine with RT completed 3/2022 now presenting with anemia w/ Hb 4.8.     Impression:   #Anemia: pw Hb 4.8 (last CBC wnl in May 2022) without overt GI bleeding now s/p 2U pRBCs --> Hb in 7s. (+) Recent heavy NSAID use. HD stable. Will r/o UGI bleeding sources; PUD, erosive esophagitis, erosive gastropathy, angioectasias, malignancy, unlikely EV/GV. Pt hesitant to undergo colonoscopy at this time.     Recommendations:   - PPI gtt  - NPO after MN  - Plan for EGD 7/13  - Trend CBC, transfuse Hb < 7  - Active T&S  - Avoid NSAIDs      Thank you for involving us in the care of this patient, please reach out if any further questions.     Donovan Olsen MD  Gastroenterology/Hepatology Fellow, PGY5    Available on [employer] Teams  [phone] ([facility])  [id number] ([facility])  Please contact on call fellow weekdays after 5pm-7am and weekends: [phone]

## 2022-07-13 ENCOUNTER — TRANSCRIPTION ENCOUNTER (OUTPATIENT)
Age: 67
End: 2022-07-13

## 2022-07-13 DIAGNOSIS — Z29.9 ENCOUNTER FOR PROPHYLACTIC MEASURES, UNSPECIFIED: ICD-10-CM

## 2022-07-13 LAB
ALBUMIN SERPL ELPH-MCNC: 3.6 G/DL — SIGNIFICANT CHANGE UP (ref 3.3–5)
ALP SERPL-CCNC: 165 U/L — HIGH (ref 40–120)
ALT FLD-CCNC: 21 U/L — SIGNIFICANT CHANGE UP (ref 10–45)
ANION GAP SERPL CALC-SCNC: 9 MMOL/L — SIGNIFICANT CHANGE UP (ref 5–17)
AST SERPL-CCNC: 24 U/L — SIGNIFICANT CHANGE UP (ref 10–40)
BILIRUB SERPL-MCNC: 0.4 MG/DL — SIGNIFICANT CHANGE UP (ref 0.2–1.2)
BUN SERPL-MCNC: 9 MG/DL — SIGNIFICANT CHANGE UP (ref 7–23)
CALCIUM SERPL-MCNC: 8.6 MG/DL — SIGNIFICANT CHANGE UP (ref 8.4–10.5)
CHLORIDE SERPL-SCNC: 103 MMOL/L — SIGNIFICANT CHANGE UP (ref 96–108)
CO2 SERPL-SCNC: 22 MMOL/L — SIGNIFICANT CHANGE UP (ref 22–31)
CREAT SERPL-MCNC: 0.55 MG/DL — SIGNIFICANT CHANGE UP (ref 0.5–1.3)
EGFR: 101 ML/MIN/1.73M2 — SIGNIFICANT CHANGE UP
GLUCOSE SERPL-MCNC: 108 MG/DL — HIGH (ref 70–99)
HAPTOGLOB SERPL-MCNC: 145 MG/DL — SIGNIFICANT CHANGE UP (ref 34–200)
HCT VFR BLD CALC: 23.8 % — LOW (ref 34.5–45)
HCV AB S/CO SERPL IA: 0.22 S/CO — SIGNIFICANT CHANGE UP (ref 0–0.99)
HCV AB SERPL-IMP: SIGNIFICANT CHANGE UP
HGB BLD-MCNC: 7.8 G/DL — LOW (ref 11.5–15.5)
MAGNESIUM SERPL-MCNC: 2.2 MG/DL — SIGNIFICANT CHANGE UP (ref 1.6–2.6)
MCHC RBC-ENTMCNC: 27.7 PG — SIGNIFICANT CHANGE UP (ref 27–34)
MCHC RBC-ENTMCNC: 32.8 GM/DL — SIGNIFICANT CHANGE UP (ref 32–36)
MCV RBC AUTO: 84.4 FL — SIGNIFICANT CHANGE UP (ref 80–100)
NRBC # BLD: 2 /100 WBCS — HIGH (ref 0–0)
PHOSPHATE SERPL-MCNC: 2.8 MG/DL — SIGNIFICANT CHANGE UP (ref 2.5–4.5)
PLATELET # BLD AUTO: 350 K/UL — SIGNIFICANT CHANGE UP (ref 150–400)
POTASSIUM SERPL-MCNC: 3.7 MMOL/L — SIGNIFICANT CHANGE UP (ref 3.5–5.3)
POTASSIUM SERPL-SCNC: 3.7 MMOL/L — SIGNIFICANT CHANGE UP (ref 3.5–5.3)
PROT SERPL-MCNC: 6.7 G/DL — SIGNIFICANT CHANGE UP (ref 6–8.3)
RBC # BLD: 2.82 M/UL — LOW (ref 3.8–5.2)
RBC # FLD: 17 % — HIGH (ref 10.3–14.5)
SODIUM SERPL-SCNC: 134 MMOL/L — LOW (ref 135–145)
WBC # BLD: 7.76 K/UL — SIGNIFICANT CHANGE UP (ref 3.8–10.5)
WBC # FLD AUTO: 7.76 K/UL — SIGNIFICANT CHANGE UP (ref 3.8–10.5)

## 2022-07-13 PROCEDURE — 43255 EGD CONTROL BLEEDING ANY: CPT | Mod: GC

## 2022-07-13 PROCEDURE — 99233 SBSQ HOSP IP/OBS HIGH 50: CPT | Mod: GC,25

## 2022-07-13 DEVICE — NET RETRV ROT ROTH 2.5MMX230CM: Type: IMPLANTABLE DEVICE | Status: FUNCTIONAL

## 2022-07-13 DEVICE — CATH CHANNEL RFA ENDOSCOPSIC: Type: IMPLANTABLE DEVICE | Status: FUNCTIONAL

## 2022-07-13 DEVICE — CATH ABLATION HALO 90DGR: Type: IMPLANTABLE DEVICE | Status: FUNCTIONAL

## 2022-07-13 RX ORDER — IRON SUCROSE 20 MG/ML
200 INJECTION, SOLUTION INTRAVENOUS EVERY 24 HOURS
Refills: 0 | Status: DISCONTINUED | OUTPATIENT
Start: 2022-07-13 | End: 2022-07-14

## 2022-07-13 RX ORDER — SODIUM CHLORIDE 9 MG/ML
500 INJECTION INTRAMUSCULAR; INTRAVENOUS; SUBCUTANEOUS
Refills: 0 | Status: COMPLETED | OUTPATIENT
Start: 2022-07-13 | End: 2022-07-13

## 2022-07-13 RX ADMIN — PANTOPRAZOLE SODIUM 40 MILLIGRAM(S): 20 TABLET, DELAYED RELEASE ORAL at 05:24

## 2022-07-13 RX ADMIN — SODIUM CHLORIDE 30 MILLILITER(S): 9 INJECTION INTRAMUSCULAR; INTRAVENOUS; SUBCUTANEOUS at 14:22

## 2022-07-13 RX ADMIN — LOSARTAN POTASSIUM 100 MILLIGRAM(S): 100 TABLET, FILM COATED ORAL at 05:25

## 2022-07-13 RX ADMIN — PANTOPRAZOLE SODIUM 40 MILLIGRAM(S): 20 TABLET, DELAYED RELEASE ORAL at 19:38

## 2022-07-13 RX ADMIN — OXCARBAZEPINE 300 MILLIGRAM(S): 300 TABLET, FILM COATED ORAL at 01:56

## 2022-07-13 RX ADMIN — SODIUM CHLORIDE 60 MILLILITER(S): 9 INJECTION INTRAMUSCULAR; INTRAVENOUS; SUBCUTANEOUS at 04:30

## 2022-07-13 RX ADMIN — OXCARBAZEPINE 300 MILLIGRAM(S): 300 TABLET, FILM COATED ORAL at 20:51

## 2022-07-13 RX ADMIN — Medication 100 MICROGRAM(S): at 05:24

## 2022-07-13 RX ADMIN — DULOXETINE HYDROCHLORIDE 30 MILLIGRAM(S): 30 CAPSULE, DELAYED RELEASE ORAL at 19:24

## 2022-07-13 RX ADMIN — ESCITALOPRAM OXALATE 20 MILLIGRAM(S): 10 TABLET, FILM COATED ORAL at 19:24

## 2022-07-13 RX ADMIN — MONTELUKAST 10 MILLIGRAM(S): 4 TABLET, CHEWABLE ORAL at 17:03

## 2022-07-13 RX ADMIN — IRON SUCROSE 110 MILLIGRAM(S): 20 INJECTION, SOLUTION INTRAVENOUS at 17:03

## 2022-07-13 NOTE — DISCHARGE NOTE PROVIDER - NSDCCAREPROVSEEN_GEN_ALL_CORE_FT
[facility] Team 2 Medicine  Dr. Shanks, Moises Jerome, Bryanna Mack Dr.

## 2022-07-13 NOTE — DISCHARGE NOTE PROVIDER - NSDCMRMEDTOKEN_GEN_ALL_CORE_FT
DULoxetine: 30 milligram(s) orally once a day  escitalopram 20 mg oral tablet: 1 tab(s) orally once a day  Fosamax:   losartan 100 mg oral tablet: 1 tab(s) orally once a day  montelukast 10 mg oral tablet: 1 tab(s) orally once a day  Synthroid 100 mcg (0.1 mg) oral tablet: 1 tab(s) orally once a day  Trileptal 300 mg oral tablet: 1 tab(s) orally 2 times a day   DULoxetine: 30 milligram(s) orally once a day  escitalopram 20 mg oral tablet: 1 tab(s) orally once a day  ferrous sulfate 325 mg (65 mg elemental iron) oral delayed release tablet: 1 tab(s) orally every other day   Fosamax:   losartan 100 mg oral tablet: 1 tab(s) orally once a day  montelukast 10 mg oral tablet: 1 tab(s) orally once a day  pantoprazole 40 mg oral delayed release tablet: 1 tab(s) orally once a day  sucralfate 1 g oral tablet: 1 tab(s) orally every 6 hours  Synthroid 100 mcg (0.1 mg) oral tablet: 1 tab(s) orally once a day  Trileptal 300 mg oral tablet: 1 tab(s) orally 2 times a day

## 2022-07-13 NOTE — PROGRESS NOTE ADULT - ASSESSMENT
67 yo female hx of pancreatic cancer (s/p distal pancreatectomy and splenectomy 6/2021), HTN, hypothyroidism, seizure disorder, presenting for anemia w/ hgb of 4.8 in the setting of chronic NSAID use concerning for occult GI bleed. 65 yo female hx of pancreatic cancer (s/p distal pancreatectomy and splenectomy 6/2021), HTN, hypothyroidism, seizure disorder, presenting for anemia w/ hgb of 4.8 in the setting of chronic NSAID use w/ labs consistent with JERSON most likely 2/2 occult GI bleed

## 2022-07-13 NOTE — PRE-ANESTHESIA EVALUATION ADULT - NSANTHPMHFT_GEN_ALL_CORE
65 yo female hx of pancreatic cancer (s/p distal pancreatectomy and splenectomy 6/2021), HTN, hypothyroidism, seizure disorder, presenting for anemia w/ hgb of 4.8 in the setting of chronic NSAID use w/ labs consistent with JERSON most likely 2/2 occult GI bleed  h/o sz 23 yrs ago

## 2022-07-13 NOTE — PROGRESS NOTE ADULT - SUBJECTIVE AND OBJECTIVE BOX
Bryanna Hernández MD   Internal Medicine, PGY 1  Contact via TEAMS.     SUBJECTIVE / OVERNIGHT EVENTS:  - Pt seen and examined at bedside  - KRYSTAL    MEDICATIONS  (STANDING):  DULoxetine 30 milliGRAM(s) Oral daily  escitalopram 20 milliGRAM(s) Oral daily  levothyroxine 100 MICROGram(s) Oral daily  losartan 100 milliGRAM(s) Oral daily  montelukast 10 milliGRAM(s) Oral daily  OXcarbazepine 300 milliGRAM(s) Oral two times a day  pantoprazole  Injectable 40 milliGRAM(s) IV Push two times a day  sodium chloride 0.9%. 1000 milliLiter(s) (60 mL/Hr) IV Continuous <Continuous>    MEDICATIONS  (PRN):          PHYSICAL EXAM:  Vital Signs Last 24 Hrs  T(C): 36.7 (13 Jul 2022 05:14), Max: 37.1 (12 Jul 2022 07:28)  T(F): 98.1 (13 Jul 2022 05:14), Max: 98.8 (12 Jul 2022 07:28)  HR: 98 (13 Jul 2022 05:14) (74 - 98)  BP: 110/67 (13 Jul 2022 05:14) (110/67 - 152/82)  BP(mean): --  RR: 20 (13 Jul 2022 05:14) (18 - 20)  SpO2: 99% (13 Jul 2022 05:14) (99% - 100%)    Parameters below as of 13 Jul 2022 05:14  Patient On (Oxygen Delivery Method): room air        CAPILLARY BLOOD GLUCOSE        I&O's Summary      CONSTITUTIONAL: NAD, well-developed  RESPIRATORY: Normal respiratory effort; lungs are clear to auscultation bilaterally  CARDIOVASCULAR: Regular rate and rhythm, normal S1 and S2, no murmur/rub/gallop; No lower extremity edema; Peripheral pulses are 2+ bilaterally  ABDOMEN: Nontender to palpation, normoactive bowel sounds, no rebound/guarding; No hepatosplenomegaly  MUSCLOSKELETAL: no clubbing or cyanosis of digits; no joint swelling or tenderness to palpation  PSYCH: A+O to person, place, and time; affect appropriate    LABS:                        7.6    7.37  )-----------( 322      ( 12 Jul 2022 12:08 )             23.1     07-12    135  |  103  |  12  ----------------------------<  101<H>  4.3   |  19<L>  |  0.63    Ca    8.7      12 Jul 2022 12:08  Phos  3.3     07-12  Mg     2.1     07-12    TPro  6.5  /  Alb  x   /  TBili  x   /  DBili  x   /  AST  x   /  ALT  x   /  AlkPhos  x   07-12    PT/INR - ( 11 Jul 2022 20:18 )   PT: 11.8 sec;   INR: 1.02 ratio         PTT - ( 11 Jul 2022 20:18 )  PTT:25.3 sec            IMAGING:    [X] All pertinent imaging reviewed by me Bryanna Hernández MD   Internal Medicine, PGY 1  Contact via TEAMS.     SUBJECTIVE / OVERNIGHT EVENTS:  - Pt seen and examined at bedside. Remains fatigued with little improvement post transfusions Denies any SOB, palpitations, dizziness, light handedness epigastric pain, hematemesis hematuria, hematochezia, or melena. No acute events overnight     MEDICATIONS  (STANDING):  DULoxetine 30 milliGRAM(s) Oral daily  escitalopram 20 milliGRAM(s) Oral daily  levothyroxine 100 MICROGram(s) Oral daily  losartan 100 milliGRAM(s) Oral daily  montelukast 10 milliGRAM(s) Oral daily  OXcarbazepine 300 milliGRAM(s) Oral two times a day  pantoprazole  Injectable 40 milliGRAM(s) IV Push two times a day  sodium chloride 0.9%. 1000 milliLiter(s) (60 mL/Hr) IV Continuous <Continuous>    MEDICATIONS  (PRN):          PHYSICAL EXAM:  Vital Signs Last 24 Hrs  T(C): 36.7 (13 Jul 2022 05:14), Max: 37.1 (12 Jul 2022 07:28)  T(F): 98.1 (13 Jul 2022 05:14), Max: 98.8 (12 Jul 2022 07:28)  HR: 98 (13 Jul 2022 05:14) (74 - 98)  BP: 110/67 (13 Jul 2022 05:14) (110/67 - 152/82)  BP(mean): --  RR: 20 (13 Jul 2022 05:14) (18 - 20)  SpO2: 99% (13 Jul 2022 05:14) (99% - 100%)    Parameters below as of 13 Jul 2022 05:14  Patient On (Oxygen Delivery Method): room air        CAPILLARY BLOOD GLUCOSE        I&O's Summary      CONSTITUTIONAL: NAD, well-developed  RESPIRATORY: Normal respiratory effort; lungs are clear to auscultation bilaterally  CARDIOVASCULAR: Regular rate and rhythm, normal S1 and S2, no murmur/rub/gallop; No lower extremity edema; Peripheral pulses are 2+ bilaterally  ABDOMEN: Nontender to palpation, normoactive bowel sounds, no rebound/guarding; No hepatosplenomegaly  MUSCLOSKELETAL: no clubbing or cyanosis of digits; no joint swelling or tenderness to palpation  PSYCH: A+O to person, place, and time; affect appropriate    LABS:                        7.6    7.37  )-----------( 322      ( 12 Jul 2022 12:08 )             23.1     07-12    135  |  103  |  12  ----------------------------<  101<H>  4.3   |  19<L>  |  0.63    Ca    8.7      12 Jul 2022 12:08  Phos  3.3     07-12  Mg     2.1     07-12    TPro  6.5  /  Alb  x   /  TBili  x   /  DBili  x   /  AST  x   /  ALT  x   /  AlkPhos  x   07-12    PT/INR - ( 11 Jul 2022 20:18 )   PT: 11.8 sec;   INR: 1.02 ratio         PTT - ( 11 Jul 2022 20:18 )  PTT:25.3 sec            IMAGING:    [X] All pertinent imaging reviewed by me

## 2022-07-13 NOTE — DISCHARGE NOTE PROVIDER - PROVIDER TOKENS
PROVIDER:[TOKEN:[[id number]],FOLLOWUP:[1 week],ESTABLISHEDPATIENT:[T]],PROVIDER:[TOKEN:[[id number]],FOLLOWUP:[1 week]]

## 2022-07-13 NOTE — DISCHARGE NOTE PROVIDER - HOSPITAL COURSE
Patient is a 66 year-old female with history of pancreatic cancer (s/p distal pancreatectomy and splenectomy 6/2021), HTN, hypothyroidism, and seizure disorder presenting for anemia. Patient was undergoing routine lab-work screening for her prior pancreatic cancer when she was found to have a Hgb 4.8. Outpatient provider recommended patient to come to the ED for anemia. Patient endorses increased exercise frequency and intensity as well as increased NSAID usage for mid lower back pain that radiated down her b/l hips and thighs for the past 1-2 months. Over the past month, patient endorses minor fatigue and SOB with exercises as well as palpitations with ambulations.     Patient received 2 units of pRBCs during her hospitalization with improvement of her Hgb stabilized in the range of 7s. Patient also found to be severely iron deficient and was started on iron sucrose per the hematology/oncology team. Patient underwent EGD which showed __________. After her procedure, patient's diet was advanced as tolerated and she was able to return to her regular diet.     Patient to be discharged on iron tablets and expressed to follow-up with her PCP, the benign hematology team at the [facility] as well as her primary outpatient oncologist.     Patient is stable and medically cleared for discharge. Patient is a 66 year-old female with history of pancreatic cancer (s/p distal pancreatectomy and splenectomy 6/2021), HTN, hypothyroidism, and seizure disorder presenting for anemia. Patient was undergoing routine lab-work screening for her prior pancreatic cancer when she was found to have a Hgb 4.8. Outpatient provider recommended patient to come to the ED for anemia. Patient endorses increased exercise frequency and intensity as well as increased NSAID usage for mid lower back pain that radiated down her b/l hips and thighs for the past 1-2 months. Over the past month, patient endorses minor fatigue and SOB with exercises as well as palpitations with ambulations.     Patient received 2 units of pRBCs during her hospitalization with improvement of her Hgb stabilized in the range of 7s. Patient also found to be severely iron deficient and was started on iron sucrose per the hematology/oncology team. Patient underwent EGD which showed radiation gastritis and duodenitis. After her procedure, patient's diet was advanced as tolerated and she was able to return to her regular diet.     Patient to be discharged on iron tablets and expressed to follow-up with her PCP, the benign hematology team at the [facility] as well as her primary outpatient oncologist.     Patient is stable and medically cleared for discharge.

## 2022-07-13 NOTE — PROGRESS NOTE ADULT - PROBLEM SELECTOR PLAN 2
History of HTN managed with losartan  - /88 (7/12)  - Trend BP  - C/w Losartan 100 mg BID History of HTN managed with losartan  - /75 (7/13)  - Trend BP  - C/w Losartan 100 mg BID

## 2022-07-13 NOTE — PROGRESS NOTE ADULT - ASSESSMENT
Pt w panc CA here w low HB on routine outpt labs - pt endorses some fatigue and sob only w significant exertion otherwise asymptomatic, particularly denies abd pain, n/v, bloody or dark stools. no hx of GIB or anemia prior to this. pt does admit to taking lots of nsaids recently for chronic back pain. on exam, pt with normal vitals, NAD, HD stable, pale, but otherwise bening exam, soft nontender abd. labs today confirm Hb 4.8 - will get blood transfusion, get hemoccult, admit for further w/u of anemia of unclear etiology.    Hematology/Oncology called to see patient who is under care by Dr. 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 Last CBC noted at [facility] showed a normal H/H.    Ca Pancreas  --Under care by Dr. Selena Bess of [facility]  --Completed adjuvant therapy 3/2022  --No evidence of recurrent disease on CT abdomen/pelvis  --Ongoing care after discharge    Anemia  --Acute onset  --Patient has been taking large amounts of NSAIDS secondary to myalgias  --Anemia labs - iron, folate, B12, haptoglobin, LDH, FOBT, myeloma studies, done  --Found to be profoundly iron deficient  --GI has been consulted - pending EGD today 7/13/2022  --Have ordered Venofer 200 mg IV daily X 3 days  --Please transfuse for Hgb <7.0 gram    After discharge we recommend patient follow with benign Hematology - may see Dr[facility] Mich[facility] of [facility] or any of his colleagues who work out of the [facility] office    We will continue to follow patient and coordinate with [facility]    Tito Enciso PA-C  Hematology/Oncology  [facility]   [phone] (office)  [phone] (alt office)  Evenings and weekends please call MD on call or office

## 2022-07-13 NOTE — DISCHARGE NOTE PROVIDER - NSDCFUADDAPPT_GEN_ALL_CORE_FT
Please follow-up with your primary care provider within 1 week of hospital discharge for further lab-work, monitoring, medication-adjustments, and management as needed for your chronic health conditions.     Please also follow-up with the benign hematology team at the [facility] within 1-2 weeks after hospital discharge for further management of your anemia. You may call the office for Dr. Michi Cruz or set up an appointment with any of his associates who work out of the [place] office. His number has been provided in the packet.     Please continue to follow-up routinely with your oncologist, Dr. Selena Bess, for management of your pancreatic cancer. Please follow-up with your primary care provider within 1 week of hospital discharge for further lab-work, monitoring, medication-adjustments, and management as needed for your chronic health conditions.     Please also follow-up with the benign hematology team at the [facility] within 1-2 weeks after hospital discharge for further management of your anemia. They should be calling you to help schedule an appointment. However, if you do not receive a call after a week, you may call the office for Dr. Michi Cruz or set up an appointment with any of his associates who work out of the [place] office. His number has been provided in the packet.     Please continue to follow-up routinely with your oncologist, Dr. Selena Bess, for management of your pancreatic cancer.

## 2022-07-13 NOTE — PROGRESS NOTE ADULT - PROBLEM SELECTOR PLAN 1
Pt found to have microcytic anemia at outpatient clinic with Hgb of 4.8. Denies any hx of significant alcohol use or dietary restrictions. Most likely JERSON 2/2 chronic GI bleed in setting of chronic NSAID use for MSK pain. Less likely anemia of chronic diease given pts hx of malignancy.  - Received 2 units PRBCs in ED with increase in hgb to 7.6.   - Iron, Folate, b12, haptoglobin, LDH, FOBT, SPEP per Heme/onc recs  - Maintain active type and screen; transfuse Hgb < 7.0   - Montor Hgb with q24h CBC  - Start pantoprazole 40mg BID for suspected UGIB   - GI onboard; endoscopy today; f/u findings and recs Pt found to have microcytic anemia at outpatient clinic with Hgb of 4.8. Denies any hx of significant alcohol use or dietary restrictions. Most likely JERSON 2/2 chronic GI bleed in setting of chronic NSAID use for MSK pain. Less likely anemia of chronic diease given pts hx of malignancy.  - Received 2 units PRBCs in ED with increase in hgb to 7.6.   - Iron (142), Folate (12.5), b12 (548), haptoglobin (147), LDH (239) wnl  - TIBC elevated (458), ferritin low (7) consistent with JERSON  - start iron 200mg qday  - Maintain active type and screen; transfuse Hgb < 7.0   - Montor Hgb with q24h CBC  - Start pantoprazole 40mg BID for suspected UGIB   - Aviod NSAIDs  - GI onboard; endoscopy today; f/u findings and recs

## 2022-07-13 NOTE — PATIENT PROFILE ADULT - FALL HARM RISK - UNIVERSAL INTERVENTIONS
Bed in lowest position, wheels locked, appropriate side rails in place/Call bell, personal items and telephone in reach/Instruct patient to call for assistance before getting out of bed or chair/Non-slip footwear when patient is out of bed/[place] to call system/Physically safe environment - no spills, clutter or unnecessary equipment/Purposeful Proactive Rounding/Room/bathroom lighting operational, light cord in reach

## 2022-07-13 NOTE — DISCHARGE NOTE PROVIDER - NSDCFUSCHEDAPPT_GEN_ALL_CORE_FT
Gonzalo Mcguire  [facility]  ORTHOSURG [address] [facility]  Scheduled Appointment: 07/14/2022    Nay Gomes  [facility]  INTMED [address] [facility]  Scheduled Appointment: 09/14/2022     [facility]  INTMED [address] [place] [address]  Scheduled Appointment: 09/14/2022

## 2022-07-13 NOTE — PRE-OP CHECKLIST - PATIENT PROBLEMS/NEEDS
[facility] Medicine  Progress Note    Patient Name: Howard Segura  MRN: [id number]  Patient Class: IP- Inpatient   Admission Date: 12/22/2019  Length of Stay: 1 days  Attending Physician: Brooklyn Holguin MD  Primary Care Provider: Bassam Lopez MD        Subjective:     Principal Problem:Closed fracture of neck of right femur        HPI:  Mr. Segura is a 58 y/o male, PMH of GERD, HTN, HLD, prior renal stone, who slipped and fell from the top of a flight of steps from a standing position striking his back several times and landing on his buttocks and right leg. There was deformity of the leg after the fall and severe pain. EMS transported the patient to [place] ER, where imaging showed a closed right hip surgical neck fracture. EKG showed A. Fib. without RVR, which is a new finding in this patient. Has been on metoprolol x age 30 2/2 PACs. Not anticoagulated. Upon arrival to [facility] the patient reports right hip pain and spasms of the right leg. He describes the hip pain as constant and dull and rates it 3/10. The patient states rest improves the pain and movement exacerbates it. He reports the leg spasms have started since his leg was placed in a traction splint. He states they are intermittent but progressively becoming more painful. He rates the spasms 8/10. He reports no aggravating or alleviating factors. He denies hitting his head or any loss of consciousness. He denies any SOB, chest pain, numbness, tingling, headache, fever, chills, dizziness, or headache. His workup at [place] ED revealed mild anemia, otherwise no lab abnormalities. Case d/w Ortho, who will consult. Patient transferred to [facility] and will be admitted under hospital medicine.    Overview/Hospital Course:  No notes on file    Interval History:  Status post right hip open reduction internal fixation surgery- POD # 1.  Patient reports adequate pain control.  Patient denies any chest pain or  shortness of breath.  T-max 100.1 degree F. Atrial fibrillation is rate controlled.    Review of Systems   Constitutional: Positive for activity change. Negative for appetite change, chills and fever.   Respiratory: Negative for cough, shortness of breath and wheezing.    Cardiovascular: Negative for chest pain and palpitations.   Gastrointestinal: Negative for abdominal distention, abdominal pain, constipation, diarrhea, nausea and vomiting.   Genitourinary: Negative for difficulty urinating, dysuria and urgency.   Musculoskeletal: Positive for arthralgias, back pain and myalgias. Negative for joint swelling and neck pain.        C/o muscle spasms in right leg   Skin: Negative for rash and wound.   Neurological: Negative for dizziness, syncope, weakness, numbness and headaches.   Hematological: Does not bruise/bleed easily.   Psychiatric/Behavioral: Negative for agitation, confusion and decreased concentration.   All other systems reviewed and are negative.    Objective:     Vital Signs (Most Recent):  Temp: 97.6 °F (36.4 °C) (12/23/19 0743)  Pulse: 90 (12/23/19 0743)  Resp: 16 (12/23/19 0743)  BP: 111/79 (12/23/19 0743)  SpO2: 96 % (12/23/19 0743) Vital Signs (24h Range):  Temp:  [97.6 °F (36.4 °C)-100.1 °F (37.8 °C)] 97.6 °F (36.4 °C)  Pulse:  [] 90  Resp:  [11-22] 16  SpO2:  [90 %-99 %] 96 %  BP: (102-170)/() 111/79     Weight: 113.4 kg (250 lb)  Body mass index is 35.87 kg/m².    Intake/Output Summary (Last 24 hours) at 12/23/2019 0750  Last data filed at 12/23/2019 0606  Gross per 24 hour   Intake 2654.58 ml   Output 1050 ml   Net 1604.58 ml      Physical Exam   Constitutional: He is oriented to person, place, and time. Vital signs are normal. He appears well-developed and well-nourished.  Non-toxic appearance. He does not have a sickly appearance. He does not appear ill. He appears distressed.   grimacing   HENT:   Head: Normocephalic and atraumatic.   Right Ear: External ear normal.   Left Ear:  External ear normal.   Eyes: Pupils are equal, round, and reactive to light. Conjunctivae and EOM are normal. No scleral icterus.   Neck: Normal range of motion. Neck supple. No JVD present. No tracheal deviation present.   Cardiovascular: Normal heart sounds and intact distal pulses. An irregularly irregular rhythm present. Tachycardia present. Exam reveals no gallop and no friction rub.   No murmur heard.  Irregular   Pulmonary/Chest: Effort normal and breath sounds normal. No stridor. No respiratory distress. He has no wheezes. He has no rales. He exhibits no tenderness.   Abdominal: Soft. Bowel sounds are normal. He exhibits no distension and no mass. There is no tenderness. There is no guarding.   Musculoskeletal: Normal range of motion. He exhibits edema and tenderness. He exhibits no deformity.   Right hip surgical dressing clean dry and intact.   Neurological: He is alert and oriented to person, place, and time. No cranial nerve deficit or sensory deficit. He exhibits normal muscle tone. Coordination normal.   Skin: Skin is warm and dry. Capillary refill takes less than 2 seconds. DP and PT pulses intact bilaterally. He is not diaphoretic.   Psychiatric: He has a normal mood and affect. His behavior is normal. Judgment and thought content normal.   Nursing note and vitals reviewed.      Significant Labs:   BMP:   Recent Labs   Lab 12/23/19  0517   *      K 3.6      CO2 23   BUN 14   CREATININE 0.9   CALCIUM 8.0*   MG 1.7     CBC:   Recent Labs   Lab 12/22/19  0005 12/22/19  0540 12/23/19  0517   WBC 11.70 9.37 6.98   HGB 13.8* 12.5* 11.0*   HCT 38.6* 35.7* 31.8*    146* 128*       Significant Imaging:  Right hip x-ray:External brace is in place.  Redemonstration of a mildly displaced, comminuted proximal right femur intertrochanteric fracture with subtrochanteric extension and involvement of the greater and lesser trochanters, similar as compared to prior.  No femoroacetabular  dislocation.    ECHO: Pending.      Assessment/Plan:      * Closed fracture of neck of right femur  Status post right hip open reduction internal fixation - POD # 1.  Continue to follow Orthopedic recommendations.  Needs aggressive incentive spirometry.  Follow hemoglobin and hematocrit closely.  Pain control with PO narcotics and antiemetics as needed.  Physical therapy as per Orthopedics protocol with fall precautions.        Hypophosphatemia  Replete phosphorus.  Follow daily phosphorus level.      Fracture of femoral neck, right  As above      Paroxysmal atrial fibrillation  Reviewed EKG from [place] showing atrial fibrillation w/o RVR, rate in the 80s.  Follow ECHO and cardiology recommendations.  [id number] Score: 1  Start Xarelto 10 mg daily as per recommendations by Dr. Segal once approved by Dr. Andrews.  I had a long discussion with patient and his wife, we discussed risks and benefits related to use of long-term anticoagulation including high risk for bleeding tendency.  We reviewed fall precautions.  Patient is in agreement with use of Xarelto.      GERD without esophagitis  Chronic. Continue PPI.    Mixed dyslipidemia  Chronic. Continue home statin.   Reviewed previous lipid panel:   Ref. Range 10/30/2019 07:02   Cholesterol Latest Ref Range: 120 - 199 mg/dL 134   HDL Latest Ref Range: 40 - 75 mg/dL 32 (L)   Hdl/Cholesterol Ratio Latest Ref Range: 20.0 - 50.0 % 23.9   LDL Cholesterol External Latest Ref Range: 63.0 - 159.0 mg/dL 57.0 (L)   Non-HDL Cholesterol Latest Units: mg/dL 102   Total Cholesterol/HDL Ratio Latest Ref Range: 2.0 - 5.0  4.2   Triglycerides Latest Ref Range: 30 - 150 mg/dL 225 (H)       Former smoker  Encouraged continued cessation.      Obesity with body mass index 30 or greater  Body mass index is 35.87 kg/m². Morbid obesity complicates all aspects of disease management from diagnostic modalities to treatment. Weight loss encouraged and health benefits explained to  patient.            Benign essential hypertension  Chronic, controlled.  Latest blood pressure and vitals reviewed-   Temp:  [97.6 °F (36.4 °C)-100.1 °F (37.8 °C)]   Pulse:  []   Resp:  [11-22]   BP: (102-170)/()   SpO2:  [90 %-99 %] .   Home meds for hypertension were reviewed and noted below. Will hold anti-hypertensives for now and monitor bp closely.  Hypertension Medications             amlodipine-atorvastatin (CADUET) 5-20 mg per tablet Take 1 tablet by mouth once daily.    metoprolol tartrate (LOPRESSOR) 100 MG tablet Take 1 tablet (100 mg total) by mouth 2 (two) times daily.    triamterene-hydrochlorothiazide 37.5-25 mg (MAXZIDE-25) 37.5-25 mg per tablet Take 1 tablet by mouth once daily.        Will utilize p.r.n. blood pressure medication only if patient's blood pressure greater than  180/110 and he develops symptoms such as worsening chest pain or shortness of breath.      Time spent in care of the patient, counseling and coordination of care (Greater than 50% spent in direct face to face contact): 35 min.      VTE Risk Mitigation (From admission, onward)         Ordered     rivaroxaban tablet 10 mg  With dinner      12/23/19 1033     enoxaparin injection 40 mg  Daily      12/22/19 1943     IP VTE HIGH RISK PATIENT  Once      12/22/19 1943     Place sequential compression device  Until discontinued      12/22/19 1943     Place sequential compression device  Until discontinued      12/22/19 0311     Reason for no Mechanical VTE Prophylaxis  Once     Question:  Reasons:  Answer:  Physician Provided (leave comment)  Comment:  pending surgery in the am    12/22/19 0311                      Brooklyn Holguin MD  Department of Hospital Medicine   [facility]   Patient expressed no known problems or needs

## 2022-07-13 NOTE — DISCHARGE NOTE PROVIDER - DISCHARGE SERVICE FOR PATIENT
accepted on the discharge service for the patient. I have reviewed and made amendments to the documentation where necessary. accepted

## 2022-07-13 NOTE — DISCHARGE NOTE PROVIDER - CARE PROVIDER_API CALL
Nay Gomes)  Medicine  [facility]  [address], [address]  [place] [address]  Phone: [phone]  Fax: [phone]  Established Patient  Follow Up Time: 1 week    Michi Cruz)  Hematology; Internal Medicine; Medical Oncology  [address], [address]  [place] [address]  Phone: [phone]  Fax: [phone]  Follow Up Time: 1 week

## 2022-07-13 NOTE — PRE PROCEDURE NOTE - PRE PROCEDURE EVALUATION
Attending Physician:     Dr. Rob                       Procedure:   EGD    Indication for Procedure:    Anemia  ________________________________________________________  PAST MEDICAL & SURGICAL HISTORY:    Pancreatic cancer  Hypertension  Hypothyroid  Seasonal asthma    History of pancreatectomy    ALLERGIES:  Dilantin (Unknown)    HOME MEDICATIONS:  DULoxetine: 30 milligram(s) orally once a day  escitalopram 20 mg oral tablet: 1 tab(s) orally once a day  losartan 100 mg oral tablet: 1 tab(s) orally once a day  montelukast 10 mg oral tablet: 1 tab(s) orally once a day  Synthroid 100 mcg (0.1 mg) oral tablet: 1 tab(s) orally once a day  Trileptal 300 mg oral tablet: 1 tab(s) orally 2 times a day    AICD/PPM: [ ] yes   [X ] no    PERTINENT LAB DATA:                        7.8    7.76  )-----------( 350      ( 13 Jul 2022 07:42 )             23.8     07-13    134<L>  |  103  |  9   ----------------------------<  108<H>  3.7   |  22  |  0.55    Ca    8.6      13 Jul 2022 07:42  Phos  2.8     07-13  Mg     2.2     07-13  TPro  6.7  /  Alb  3.6  /  TBili  0.4  /  DBili  x   /  AST  24  /  ALT  21  /  AlkPhos  165<H>  07-13  PT/INR - ( 11 Jul 2022 20:18 )   PT: 11.8 sec;   INR: 1.02 ratio    PTT - ( 11 Jul 2022 20:18 )  PTT:25.3 sec    PHYSICAL EXAMINATION:    Height (cm): 154.9  Weight (kg): 50.8  BMI (kg/m2): 21.2  BSA (m2): 1.48T(C): 36.7  HR: 91  BP: 163/79  RR: 18  SpO2: 98%    Constitutional: NAD    Neck:  No JVD  Respiratory: CTAB/L  Cardiovascular: S1 and S2  Gastrointestinal: BS+, soft, NT/ND  Extremities: No peripheral edema  Neurological: A/O x 4      COMMENTS:    The patient is a suitable candidate for the planned procedure unless box checked [ ]  No, explain:

## 2022-07-13 NOTE — PRE-ANESTHESIA EVALUATION ADULT - NSANTHOSAYNRD_GEN_A_CORE
No. JORY screening performed.  STOP BANG Legend: 0-2 = LOW Risk; 3-4 = INTERMEDIATE Risk; 5-8 = HIGH Risk

## 2022-07-13 NOTE — PROGRESS NOTE ADULT - SUBJECTIVE AND OBJECTIVE BOX
Patient is a 66y old  Female who presents with a chief complaint of Anemia (13 Jul 2022 07:17)    Patient seen this morning. Feeling better S/P PRBC transfusions. Pending EGD today. Found to be profoundly iron deficient.    MEDICATIONS  (STANDING):  DULoxetine 30 milliGRAM(s) Oral daily  escitalopram 20 milliGRAM(s) Oral daily  iron sucrose IVPB 200 milliGRAM(s) IV Intermittent every 24 hours  levothyroxine 100 MICROGram(s) Oral daily  losartan 100 milliGRAM(s) Oral daily  montelukast 10 milliGRAM(s) Oral daily  OXcarbazepine 300 milliGRAM(s) Oral two times a day  pantoprazole  Injectable 40 milliGRAM(s) IV Push two times a day  sodium chloride 0.9%. 1000 milliLiter(s) (60 mL/Hr) IV Continuous <Continuous>    MEDICATIONS  (PRN):      Vital Signs Last 24 Hrs  T(C): 36.7 (13 Jul 2022 13:38), Max: 36.8 (13 Jul 2022 09:38)  T(F): 98.1 (13 Jul 2022 11:25), Max: 98.2 (13 Jul 2022 09:38)  HR: 91 (13 Jul 2022 13:38) (74 - 98)  BP: 163/79 (13 Jul 2022 13:38) (110/67 - 163/79)  BP(mean): --  RR: 18 (13 Jul 2022 13:38) (18 - 20)  SpO2: 98% (13 Jul 2022 13:38) (97% - 100%)    Parameters below as of 13 Jul 2022 11:25  Patient On (Oxygen Delivery Method): room air        PE  NAD  Awake, alert  Anicteric, MMM  No c/c/e  No rash grossly                            7.8    7.76  )-----------( 350      ( 13 Jul 2022 07:42 )             23.8       07-13    134<L>  |  103  |  9   ----------------------------<  108<H>  3.7   |  22  |  0.55    Ca    8.6      13 Jul 2022 07:42  Phos  2.8     07-13  Mg     2.2     07-13    TPro  6.7  /  Alb  3.6  /  TBili  0.4  /  DBili  x   /  AST  24  /  ALT  21  /  AlkPhos  165<H>  07-13

## 2022-07-13 NOTE — DISCHARGE NOTE PROVIDER - NSDCCPCAREPLAN_GEN_ALL_CORE_FT
PRINCIPAL DISCHARGE DIAGNOSIS  Diagnosis: Symptomatic anemia  Assessment and Plan of Treatment:        PRINCIPAL DISCHARGE DIAGNOSIS  Diagnosis: Symptomatic anemia  Assessment and Plan of Treatment: You presented to the hospital with symptomatic anemia, found to have a Hgb level of around 4. This was likely due to a GI Bleed that may have been exacerbated by NSAIDs usage. Our gastroenterology specialists performed an upper endoscopy which found radiation-induced gastritis which likely was the site of the bleed. They performed a radiofrequency ablation procedure to help cauterize and prevent further bleeds. You were also found to have iron deficiency anemia and given a course of IV iron during your hospitalization. Please continue taking your medications as prescribed. You will be prescribed sulcrafate and pantoprazole to help prevent further gastric bleeds and ferrous sulfate to help replete your iron stores. This area will likely continue to have some slow oozing bleeding. As such, please follow closely with your primary care provider as well as the hematologists to monitor your Hgb levels and iron levels as well as give transfusions as needed.   If you continue to have symptoms or notice blood in your stools or have further blood loss anemia, please see your doctor or go to the nearest ED for further evaluation. You may need a repeat endoscopy if found to have significant acute anemia again.   Please follow-up with your primary care provider within 1 week of hospital discharge for further lab-work, monitoring, medication-adjustments, and management as needed for your chronic health conditions.   Please also follow-up with the benign hematology [facility] team.      SECONDARY DISCHARGE DIAGNOSES  Diagnosis: IV infiltration  Assessment and Plan of Treatment: You had some swelling and areas of bruising where IVs were placed. You can apply warm compresses to help reduce the symptoms. If you have any fevers or chills or notice significant pain/warmth in those areas, please go to the nearest ED for further evaluation as this may signal an infection.

## 2022-07-13 NOTE — DISCHARGE NOTE PROVIDER - NSDCCPTREATMENT_GEN_ALL_CORE_FT
PRINCIPAL PROCEDURE  Procedure: Upper endoscopy  Findings and Treatment: [facility]  ______________________________________________________________________  Patient Name: Luisa Arce                      MRN: [id number]  Room: Endoscopy Room 4                           Gender: Female  Attending MD: Pavel Rob MD   Time: 7/13/2022  ______________________________________________________________________     Procedure:           Upper GI endoscopy  Indications:         Unexplained iron deficiency anemia  Providers:           Pavel Rob MD, Ferdinand Mir (Fellow), Donovan Olsen MD (Fellow)  Medicines:           Monitored Anesthesia Care  Complications:       No immediate complications.  Findings:  A small hiatal hernia was present.  The examined esophagus was normal.  Moderate inflammation characterized by erythema consistent with angioectasia from radiation induced injury was found in the gastric antrum. Focal radiofrequency ablation of the stomach antrum was performed. Endoscopic visualization identified an ablation site. Gastric antrum mucosa was targeted. The endoscope with the channel ablation catheter was positioned under direct visualization so that the catheter was placed in contact with the surface of the gastric mucosa. The ablation zone was cleaned of coagulative debris. The ablation catheter was removed through the endoscope working channel. Diffuse moderate inflammation characterized by erythema was found in the duodenal bulb, first and second portion of the duodenum, also consistent with radiation induced injury. No active bleeding was seen.                                                                                                        Impression:           - Small hiatal hernia.   - Normal esophagus.   - Radiation induced gastritis. Treated with radiofrequency ablation.   - Radiation induced duodenitis.   - No specimens collected.   - Inflammation and erythema in the stomach and duodenum most likely secondary to radiation induced injury. This likely the cause of patient's anemia.

## 2022-07-14 ENCOUNTER — TRANSCRIPTION ENCOUNTER (OUTPATIENT)
Age: 67
End: 2022-07-14

## 2022-07-14 ENCOUNTER — APPOINTMENT (OUTPATIENT)
Dept: ORTHOPEDIC SURGERY | Facility: CLINIC | Age: 67
End: 2022-07-14

## 2022-07-14 VITALS
RESPIRATION RATE: 16 BRPM | TEMPERATURE: 97 F | OXYGEN SATURATION: 99 % | SYSTOLIC BLOOD PRESSURE: 114 MMHG | DIASTOLIC BLOOD PRESSURE: 68 MMHG | HEART RATE: 69 BPM

## 2022-07-14 LAB
% ALBUMIN: 51.8 % — SIGNIFICANT CHANGE UP
% ALPHA 1: 5.4 % — SIGNIFICANT CHANGE UP
% ALPHA 2: 15.6 % — SIGNIFICANT CHANGE UP
% BETA: 13.5 % — SIGNIFICANT CHANGE UP
% GAMMA: 13.7 % — SIGNIFICANT CHANGE UP
A1C WITH ESTIMATED AVERAGE GLUCOSE RESULT: 6 % — HIGH (ref 4–5.6)
ALBUMIN SERPL ELPH-MCNC: 3.4 G/DL — LOW (ref 3.6–5.5)
ALBUMIN SERPL ELPH-MCNC: 4.1 G/DL — SIGNIFICANT CHANGE UP (ref 3.3–5)
ALBUMIN/GLOB SERPL ELPH: 1.1 RATIO — SIGNIFICANT CHANGE UP
ALP SERPL-CCNC: 181 U/L — HIGH (ref 40–120)
ALPHA1 GLOB SERPL ELPH-MCNC: 0.4 G/DL — SIGNIFICANT CHANGE UP (ref 0.1–0.4)
ALPHA2 GLOB SERPL ELPH-MCNC: 1 G/DL — SIGNIFICANT CHANGE UP (ref 0.5–1)
ALT FLD-CCNC: 22 U/L — SIGNIFICANT CHANGE UP (ref 10–45)
ANION GAP SERPL CALC-SCNC: 16 MMOL/L — SIGNIFICANT CHANGE UP (ref 5–17)
AST SERPL-CCNC: 25 U/L — SIGNIFICANT CHANGE UP (ref 10–40)
B-GLOBULIN SERPL ELPH-MCNC: 0.9 G/DL — SIGNIFICANT CHANGE UP (ref 0.5–1)
BILIRUB SERPL-MCNC: 0.2 MG/DL — SIGNIFICANT CHANGE UP (ref 0.2–1.2)
BUN SERPL-MCNC: 7 MG/DL — SIGNIFICANT CHANGE UP (ref 7–23)
CALCIUM SERPL-MCNC: 8.8 MG/DL — SIGNIFICANT CHANGE UP (ref 8.4–10.5)
CHLORIDE SERPL-SCNC: 98 MMOL/L — SIGNIFICANT CHANGE UP (ref 96–108)
CO2 SERPL-SCNC: 20 MMOL/L — LOW (ref 22–31)
CREAT SERPL-MCNC: 0.49 MG/DL — LOW (ref 0.5–1.3)
EGFR: 104 ML/MIN/1.73M2 — SIGNIFICANT CHANGE UP
ESTIMATED AVERAGE GLUCOSE: 126 MG/DL — HIGH (ref 68–114)
GAMMA GLOBULIN: 0.9 G/DL — SIGNIFICANT CHANGE UP (ref 0.6–1.6)
GLUCOSE SERPL-MCNC: 140 MG/DL — HIGH (ref 70–99)
HCT VFR BLD CALC: 27.2 % — LOW (ref 34.5–45)
HGB BLD-MCNC: 8.7 G/DL — LOW (ref 11.5–15.5)
INTERPRETATION SERPL IFE-IMP: SIGNIFICANT CHANGE UP
MAGNESIUM SERPL-MCNC: 2.1 MG/DL — SIGNIFICANT CHANGE UP (ref 1.6–2.6)
MCHC RBC-ENTMCNC: 27.7 PG — SIGNIFICANT CHANGE UP (ref 27–34)
MCHC RBC-ENTMCNC: 32 GM/DL — SIGNIFICANT CHANGE UP (ref 32–36)
MCV RBC AUTO: 86.6 FL — SIGNIFICANT CHANGE UP (ref 80–100)
NRBC # BLD: 1 /100 WBCS — HIGH (ref 0–0)
PHOSPHATE SERPL-MCNC: 2.9 MG/DL — SIGNIFICANT CHANGE UP (ref 2.5–4.5)
PLATELET # BLD AUTO: 375 K/UL — SIGNIFICANT CHANGE UP (ref 150–400)
POTASSIUM SERPL-MCNC: 3.5 MMOL/L — SIGNIFICANT CHANGE UP (ref 3.5–5.3)
POTASSIUM SERPL-SCNC: 3.5 MMOL/L — SIGNIFICANT CHANGE UP (ref 3.5–5.3)
PROT PATTERN SERPL ELPH-IMP: SIGNIFICANT CHANGE UP
PROT SERPL-MCNC: 7.3 G/DL — SIGNIFICANT CHANGE UP (ref 6–8.3)
RBC # BLD: 3.14 M/UL — LOW (ref 3.8–5.2)
RBC # FLD: 17.8 % — HIGH (ref 10.3–14.5)
SODIUM SERPL-SCNC: 134 MMOL/L — LOW (ref 135–145)
WBC # BLD: 14.14 K/UL — HIGH (ref 3.8–10.5)
WBC # FLD AUTO: 14.14 K/UL — HIGH (ref 3.8–10.5)

## 2022-07-14 PROCEDURE — 83036 HEMOGLOBIN GLYCOSYLATED A1C: CPT

## 2022-07-14 PROCEDURE — 83615 LACTATE (LD) (LDH) ENZYME: CPT

## 2022-07-14 PROCEDURE — C1886: CPT

## 2022-07-14 PROCEDURE — U0005: CPT

## 2022-07-14 PROCEDURE — U0003: CPT

## 2022-07-14 PROCEDURE — 86850 RBC ANTIBODY SCREEN: CPT

## 2022-07-14 PROCEDURE — 84100 ASSAY OF PHOSPHORUS: CPT

## 2022-07-14 PROCEDURE — 86923 COMPATIBILITY TEST ELECTRIC: CPT

## 2022-07-14 PROCEDURE — 84132 ASSAY OF SERUM POTASSIUM: CPT

## 2022-07-14 PROCEDURE — 82728 ASSAY OF FERRITIN: CPT

## 2022-07-14 PROCEDURE — 74177 CT ABD & PELVIS W/CONTRAST: CPT | Mod: MA

## 2022-07-14 PROCEDURE — 85027 COMPLETE CBC AUTOMATED: CPT

## 2022-07-14 PROCEDURE — 36415 COLL VENOUS BLD VENIPUNCTURE: CPT

## 2022-07-14 PROCEDURE — 83550 IRON BINDING TEST: CPT

## 2022-07-14 PROCEDURE — 82435 ASSAY OF BLOOD CHLORIDE: CPT

## 2022-07-14 PROCEDURE — 85014 HEMATOCRIT: CPT

## 2022-07-14 PROCEDURE — 36430 TRANSFUSION BLD/BLD COMPNT: CPT

## 2022-07-14 PROCEDURE — 82803 BLOOD GASES ANY COMBINATION: CPT

## 2022-07-14 PROCEDURE — 83540 ASSAY OF IRON: CPT

## 2022-07-14 PROCEDURE — 84295 ASSAY OF SERUM SODIUM: CPT

## 2022-07-14 PROCEDURE — 83735 ASSAY OF MAGNESIUM: CPT

## 2022-07-14 PROCEDURE — 82784 ASSAY IGA/IGD/IGG/IGM EACH: CPT

## 2022-07-14 PROCEDURE — 82947 ASSAY GLUCOSE BLOOD QUANT: CPT

## 2022-07-14 PROCEDURE — 82746 ASSAY OF FOLIC ACID SERUM: CPT

## 2022-07-14 PROCEDURE — 83521 IG LIGHT CHAINS FREE EACH: CPT

## 2022-07-14 PROCEDURE — 86901 BLOOD TYPING SEROLOGIC RH(D): CPT

## 2022-07-14 PROCEDURE — P9016: CPT

## 2022-07-14 PROCEDURE — 96372 THER/PROPH/DIAG INJ SC/IM: CPT

## 2022-07-14 PROCEDURE — 99239 HOSP IP/OBS DSCHRG MGMT >30: CPT | Mod: GC

## 2022-07-14 PROCEDURE — 86900 BLOOD TYPING SEROLOGIC ABO: CPT

## 2022-07-14 PROCEDURE — 84165 PROTEIN E-PHORESIS SERUM: CPT

## 2022-07-14 PROCEDURE — 80053 COMPREHEN METABOLIC PANEL: CPT

## 2022-07-14 PROCEDURE — 99285 EMERGENCY DEPT VISIT HI MDM: CPT | Mod: 25

## 2022-07-14 PROCEDURE — 82330 ASSAY OF CALCIUM: CPT

## 2022-07-14 PROCEDURE — 82607 VITAMIN B-12: CPT

## 2022-07-14 PROCEDURE — 86334 IMMUNOFIX E-PHORESIS SERUM: CPT

## 2022-07-14 PROCEDURE — 99232 SBSQ HOSP IP/OBS MODERATE 35: CPT | Mod: GC

## 2022-07-14 PROCEDURE — 83010 ASSAY OF HAPTOGLOBIN QUANT: CPT

## 2022-07-14 PROCEDURE — 84155 ASSAY OF PROTEIN SERUM: CPT

## 2022-07-14 PROCEDURE — 83605 ASSAY OF LACTIC ACID: CPT

## 2022-07-14 PROCEDURE — 86803 HEPATITIS C AB TEST: CPT

## 2022-07-14 PROCEDURE — 85018 HEMOGLOBIN: CPT

## 2022-07-14 RX ORDER — ONDANSETRON 8 MG/1
4 TABLET, FILM COATED ORAL EVERY 6 HOURS
Refills: 0 | Status: DISCONTINUED | OUTPATIENT
Start: 2022-07-14 | End: 2022-07-14

## 2022-07-14 RX ORDER — ONDANSETRON 8 MG/1
4 TABLET, FILM COATED ORAL ONCE
Refills: 0 | Status: COMPLETED | OUTPATIENT
Start: 2022-07-14 | End: 2022-07-14

## 2022-07-14 RX ORDER — PANTOPRAZOLE SODIUM 20 MG/1
40 TABLET, DELAYED RELEASE ORAL
Refills: 0 | Status: DISCONTINUED | OUTPATIENT
Start: 2022-07-14 | End: 2022-07-14

## 2022-07-14 RX ORDER — FERROUS SULFATE 325(65) MG
1 TABLET ORAL
Qty: 15 | Refills: 0
Start: 2022-07-14 | End: 2022-08-12

## 2022-07-14 RX ORDER — PANTOPRAZOLE SODIUM 20 MG/1
1 TABLET, DELAYED RELEASE ORAL
Qty: 30 | Refills: 0
Start: 2022-07-14 | End: 2022-08-12

## 2022-07-14 RX ORDER — SUCRALFATE 1 G
1 TABLET ORAL EVERY 6 HOURS
Refills: 0 | Status: DISCONTINUED | OUTPATIENT
Start: 2022-07-14 | End: 2022-07-14

## 2022-07-14 RX ORDER — SUCRALFATE 1 G
1 TABLET ORAL
Qty: 120 | Refills: 0
Start: 2022-07-14 | End: 2022-08-12

## 2022-07-14 RX ORDER — FAMOTIDINE 10 MG/ML
20 INJECTION INTRAVENOUS ONCE
Refills: 0 | Status: COMPLETED | OUTPATIENT
Start: 2022-07-14 | End: 2022-07-14

## 2022-07-14 RX ADMIN — IRON SUCROSE 110 MILLIGRAM(S): 20 INJECTION, SOLUTION INTRAVENOUS at 15:38

## 2022-07-14 RX ADMIN — LOSARTAN POTASSIUM 100 MILLIGRAM(S): 100 TABLET, FILM COATED ORAL at 05:20

## 2022-07-14 RX ADMIN — DULOXETINE HYDROCHLORIDE 30 MILLIGRAM(S): 30 CAPSULE, DELAYED RELEASE ORAL at 12:55

## 2022-07-14 RX ADMIN — FAMOTIDINE 20 MILLIGRAM(S): 10 INJECTION INTRAVENOUS at 08:40

## 2022-07-14 RX ADMIN — MONTELUKAST 10 MILLIGRAM(S): 4 TABLET, CHEWABLE ORAL at 12:55

## 2022-07-14 RX ADMIN — ESCITALOPRAM OXALATE 20 MILLIGRAM(S): 10 TABLET, FILM COATED ORAL at 15:38

## 2022-07-14 RX ADMIN — PANTOPRAZOLE SODIUM 40 MILLIGRAM(S): 20 TABLET, DELAYED RELEASE ORAL at 05:20

## 2022-07-14 RX ADMIN — ONDANSETRON 4 MILLIGRAM(S): 8 TABLET, FILM COATED ORAL at 11:45

## 2022-07-14 RX ADMIN — Medication 100 MICROGRAM(S): at 05:20

## 2022-07-14 RX ADMIN — ONDANSETRON 4 MILLIGRAM(S): 8 TABLET, FILM COATED ORAL at 02:12

## 2022-07-14 RX ADMIN — PANTOPRAZOLE SODIUM 40 MILLIGRAM(S): 20 TABLET, DELAYED RELEASE ORAL at 08:40

## 2022-07-14 RX ADMIN — OXCARBAZEPINE 300 MILLIGRAM(S): 300 TABLET, FILM COATED ORAL at 05:20

## 2022-07-14 NOTE — PROGRESS NOTE ADULT - PROBLEM SELECTOR PLAN 3
Hx of pancreatic cancer s/p pancreatectomy and splenectomy 6/2021 followed by adjuvant FOLFIRINOX 8/2021-1/2022 and adjuvant capecitabine with RT completed 3/2022.  -Ongoing care after discharge per heme/onc
Hx of pancreatic cancer s/p pancreatectomy and splenectomy 6/2021 followed by adjuvant FOLFIRINOX 8/2021-1/2022 and adjuvant capecitabine with RT completed 3/2022.  -Ongoing care after discharge per heme/onc

## 2022-07-14 NOTE — PROGRESS NOTE ADULT - PROBLEM SELECTOR PROBLEM 2
-EKG with chronic abnormalities in inferior leads that have been present since initial admission  -No acute changes  
Hypertension
Hypertension

## 2022-07-14 NOTE — PROGRESS NOTE ADULT - SUBJECTIVE AND OBJECTIVE BOX
Chief Complaint:  Patient is a 66y old  Female who presents with a chief complaint of Anemia (14 Jul 2022 07:36)      Interval Events:   NAEON, afebrile HD stable  Having heartburn  Mild throat soreness yesterday which improved  Denies melena, hematochezia, abd pain, n/v/d/f/c  EGD (7/13/22) showed radiation gastritis/duodenitis to bulb s/p RFA    Allergies:  Dilantin (Unknown)        Hospital Medications:  DULoxetine 30 milliGRAM(s) Oral daily  escitalopram 20 milliGRAM(s) Oral daily  iron sucrose IVPB 200 milliGRAM(s) IV Intermittent every 24 hours  levothyroxine 100 MICROGram(s) Oral daily  losartan 100 milliGRAM(s) Oral daily  montelukast 10 milliGRAM(s) Oral daily  OXcarbazepine 300 milliGRAM(s) Oral two times a day      PMHX/PSHX:  Pancreatic cancer    Hypertension    Hypothyroid    Seasonal asthma    History of pancreatectomy        Family history:      ROS:     General:  No wt loss, fevers, chills, night sweats, fatigue,   Eyes:  Good vision, no reported pain  ENT:  No sore throat, pain, runny nose, dysphagia  CV:  No pain, palpitations, hypo/hypertension  Pulm:  No dyspnea, cough, tachypnea, wheezing  GI:  see above  :  No pain, bleeding, incontinence, nocturia  Muscle:  No pain, weakness  Neuro:  No weakness, tingling, memory problems  Psych:  No fatigue, insomnia, mood problems, depression  Endocrine:  No polyuria, polydipsia, cold/heat intolerance  Heme:  No petechiae, ecchymosis, easy bruisability  Skin:  No rash, tattoos, scars, edema      PHYSICAL EXAM:   Vital Signs:  Vital Signs Last 24 Hrs  T(C): 36.8 (14 Jul 2022 00:13), Max: 36.8 (13 Jul 2022 09:38)  T(F): 98.3 (14 Jul 2022 00:13), Max: 98.3 (14 Jul 2022 00:13)  HR: 90 (14 Jul 2022 00:13) (83 - 91)  BP: 166/86 (14 Jul 2022 00:13) (129/75 - 166/86)  BP(mean): --  RR: 18 (14 Jul 2022 00:13) (13 - 19)  SpO2: 98% (14 Jul 2022 00:13) (96% - 98%)    Parameters below as of 14 Jul 2022 00:13  Patient On (Oxygen Delivery Method): room air      Daily Height in cm: 154.94 (13 Jul 2022 13:38)    Daily     GENERAL:  No acute distress  HEENT:  Normocephalic/atraumtic,  no scleral icterus  CHEST:  Clear to auscultation bilaterally, no wheezes/rales/ronchi, no accessory muscle use  HEART:  Regular rate and rhythm, no murmurs/rubs/gallops  ABDOMEN:  Soft, non-tender, non-distended  EXTREMITIES:  No cyanosis, clubbing, or edema  SKIN:  No rash/warm/dry  NEURO:  Alert and oriented x 3        LABS:                        7.8    7.76  )-----------( 350      ( 13 Jul 2022 07:42 )             23.8       07-13    134<L>  |  103  |  9   ----------------------------<  108<H>  3.7   |  22  |  0.55    Ca    8.6      13 Jul 2022 07:42  Phos  2.8     07-13  Mg     2.2     07-13    TPro  6.7  /  Alb  3.6  /  TBili  0.4  /  DBili  x   /  AST  24  /  ALT  21  /  AlkPhos  165<H>  07-13    LIVER FUNCTIONS - ( 13 Jul 2022 07:42 )  Alb: 3.6 g/dL / Pro: 6.7 g/dL / ALK PHOS: 165 U/L / ALT: 21 U/L / AST: 24 U/L / GGT: x                                       7.8    7.76  )-----------( 350      ( 13 Jul 2022 07:42 )             23.8                         7.6    7.37  )-----------( 322      ( 12 Jul 2022 12:08 )             23.1                         4.8    x     )-----------( x        ( 12 Jul 2022 01:20 )             15.7                         4.8    8.60  )-----------( 390      ( 11 Jul 2022 20:18 )             15.2       Imaging:

## 2022-07-14 NOTE — PROGRESS NOTE ADULT - PROBLEM SELECTOR PLAN 1
Pt found to have microcytic anemia at outpatient clinic with Hgb of 4.8. Denies any hx of significant alcohol use or dietary restrictions. Most likely JERSON 2/2 chronic GI bleed in setting of chronic NSAID use for MSK pain. Less likely anemia of chronic diease given pts hx of malignancy.  - Received 2 units PRBCs in ED with increase in hgb to 7.6.   - Iron (142), Folate (12.5), b12 (548), haptoglobin (147), LDH (239) wnl  - TIBC elevated (458), ferritin low (7) consistent with JERSON  - start iron 200mg qday  - Maintain active type and screen; transfuse Hgb < 7.0   - Montor Hgb with q24h CBC  - Start pantoprazole 40mg BID for suspected UGIB   - Aviod NSAIDs  - GI onboard; endoscopy today; f/u findings and recs Pt found to have microcytic anemia at outpatient clinic with Hgb of 4.8. Denies any hx of significant alcohol use or dietary restrictions. Most likely JERSON 2/2 chronic GI bleed in setting of chronic NSAID use for MSK pain. Less likely anemia of chronic diease given pts hx of malignancy.  - Received 2 units PRBCs in ED with increase in hgb to 7.6.   - Iron (142), Folate (12.5), b12 (548), haptoglobin (147), LDH (239) wnl  - TIBC elevated (458), ferritin low (7) consistent with JERSON  - start iron 200mg qday  - Maintain active type and screen; transfuse Hgb < 7.0   - Montor Hgb with q24h CBC  - Start pantoprazole 40mg BID for suspected UGIB   - Aviod NSAIDs  - GI onboard; endoscopy today; f/u findings and recs  - EGD (7/13) showed radiation gastritis/duodenitis (to 1st portion of duodenum, normal 2nd portion) s/p RFA tx  - Will be discharged on oral iron supplmentation  - Pt will f/u w/ outpt heme to monitor iron status and provide IV iron supplementation if needed Pt found to have microcytic anemia at outpatient clinic with Hgb of 4.8. Denies any hx of significant alcohol use or dietary restrictions. Most likely JERSON 2/2 chronic GI bleed in setting of chronic NSAID use for MSK pain. Less likely anemia of chronic diease given pts hx of malignancy, found to have radiation gastritis during upper endoscopy.  - Received 2 units PRBCs in ED with increase in hgb to 7.6.   - Iron (142), Folate (12.5), b12 (548), haptoglobin (147), LDH (239) wnl  - TIBC elevated (458), ferritin low (7) consistent with JERSON  - start iron 200mg qday  - Maintain active type and screen; transfuse Hgb < 7.0   - Montor Hgb with q24h CBC  - Start pantoprazole 40mg BID for suspected UGIB   - Aviod NSAIDs  - GI recs appreciated  - EGD (7/13) showed radiation gastritis/duodenitis (to 1st portion of duodenum, normal 2nd portion) s/p RFA tx  - Will be discharged on oral iron supplementation  - Pt will f/u w/ outpt heme to monitor iron status and provide IV iron supplementation if needed

## 2022-07-14 NOTE — PROGRESS NOTE ADULT - ATTENDING COMMENTS
Agree with above. Patient's anemia is likely slow blood loss from radiation induced injury to stomach and duodenum, as seen on EGD. No active bleeding seen, the gastric radiation gastritis was treated with RFA. Would continue PPI and Carafate. If h/H stable, would monitor for now, hopefully the areas injured mucosa will heal overtime. If anemia worsens/recurs, then would have to consider repeat treatment endoscopically.
Patient seen and examined at bedside. No acute events overnight. Hemoglobin stable and today was 8.7. No signs of blood loss in stool. EGD performed yesterday showing radiation gastritis/duodenitis s/p RFA. Patient likely anemic from this and this process has likely been going on for >1 month. Patient s/p Venofer 200 mg x2 as well as pRBC x2 which itself has iron. Will discharge on PO iron, but wonder if there will be absorption issues. She will follow up with [facility] for monitoring and possibly future iron infusions. She can consider outpatient colonoscopy for screening purposes.     Discharge Time: 30 minutes
Patient seen and examined at bedside. No acute events overnight. No n/v/epigastric pain/melena/BRPBR. Hg stable and responded appropriately to 2 units pRBC. Patient with elevated TIBC and low ferritin consistent with JERSON. Patient ordered for Venofer 300 mg daily x3 days and two units of pRBC from yesterday also with iron. If patient ready for discharge will d/c on oral iron with outpatient follow up. Patient for EGD today. Depending on results may require colonoscopy inpatient. If she does not require colonoscopy inpatient I have impressed upon her the importance of screening colonoscopy given JERSON as an outpatient.

## 2022-07-14 NOTE — PROGRESS NOTE ADULT - PROBLEM SELECTOR PLAN 5
Pt w/ Hx of seasonal athma  - C/w montelukast 10 mg qd
Pt w/ Hx of seasonal athma  - C/w montelukast 10 mg qd

## 2022-07-14 NOTE — PROGRESS NOTE ADULT - SUBJECTIVE AND OBJECTIVE BOX
--------------------------------------------------------------  Ludin Jerome MD  PGY-2, Internal Medicine  Pager #: [phone], [phone]  After 7 PM: Night Float Pager  --------------------------------------------------------------    Patient is a 66y old  Female who presents with a chief complaint of Anemia (13 Jul 2022 18:18)    OVERNIGHT / INTERVAL EVENTS:    SUBJECTIVE: Patient seen and examined bedside.     Denies fevers/chills, headaches/dizziness, nausea/vomiting, chest pain, SOB, abdominal pain.     MEDICATIONS  (STANDING):  DULoxetine 30 milliGRAM(s) Oral daily  escitalopram 20 milliGRAM(s) Oral daily  iron sucrose IVPB 200 milliGRAM(s) IV Intermittent every 24 hours  levothyroxine 100 MICROGram(s) Oral daily  losartan 100 milliGRAM(s) Oral daily  montelukast 10 milliGRAM(s) Oral daily  OXcarbazepine 300 milliGRAM(s) Oral two times a day  pantoprazole  Injectable 40 milliGRAM(s) IV Push two times a day    MEDICATIONS  (PRN):      CAPILLARY BLOOD GLUCOSE        I&O's Summary    13 Jul 2022 07:01  -  14 Jul 2022 07:00  --------------------------------------------------------  IN: 100 mL / OUT: 0 mL / NET: 100 mL        PHYSICAL EXAM:  Vital Signs Last 24 Hrs  T(C): 36.8 (14 Jul 2022 00:13), Max: 36.8 (13 Jul 2022 09:38)  T(F): 98.3 (14 Jul 2022 00:13), Max: 98.3 (14 Jul 2022 00:13)  HR: 90 (14 Jul 2022 00:13) (83 - 91)  BP: 166/86 (14 Jul 2022 00:13) (129/75 - 166/86)  BP(mean): --  RR: 18 (14 Jul 2022 00:13) (13 - 19)  SpO2: 98% (14 Jul 2022 00:13) (96% - 98%)    Parameters below as of 14 Jul 2022 00:13  Patient On (Oxygen Delivery Method): room air        GENERAL: No acute distress  EYES: EOMI, PERRLA; conjunctiva and sclera clear  ENMT: Moist oral mucosa  NECK: Supple, no palpable masses  RESPIRATORY: Lungs clear to ascultation b/l; unlabored respirations  CARDIOVASCULAR: Regular rate and rhythm, normal S1 and S2, no murmurs/rubs/gallops  ABDOMEN: Soft, normal bowel sounds, nondistended, nontender  MUSCULOSKELETAL: No joint swelling or tenderness to palpation  PSYCH: Affect appropriate  NEUROLOGY: AAOx3, CNs grossly intact  SKIN: No rashes; no palpable lesions    LABS:                        7.8    7.76  )-----------( 350      ( 13 Jul 2022 07:42 )             23.8     07-13    134<L>  |  103  |  9   ----------------------------<  108<H>  3.7   |  22  |  0.55    Ca    8.6      13 Jul 2022 07:42  Phos  2.8     07-13  Mg     2.2     07-13    TPro  6.7  /  Alb  3.6  /  TBili  0.4  /  DBili  x   /  AST  24  /  ALT  21  /  AlkPhos  165<H>  07-13                INTERVAL RADIOLOGY/IMAGING STUDIES:     --------------------------------------------------------------  Ludin Jerome MD  PGY-2, Internal Medicine  Pager #: [phone], [phone]  After 7 PM: Night Float Pager  --------------------------------------------------------------    Patient is a 66y old  Female who presents with a chief complaint of Anemia (13 Jul 2022 18:18)    OVERNIGHT / INTERVAL EVENTS:    SUBJECTIVE: Patient seen and examined bedside.     Endorsing minor fatigue. Denies fevers/chills, headaches/dizziness, nausea/vomiting, chest pain, SOB, abdominal pain. No acute events overnight.    MEDICATIONS  (STANDING):  DULoxetine 30 milliGRAM(s) Oral daily  escitalopram 20 milliGRAM(s) Oral daily  iron sucrose IVPB 200 milliGRAM(s) IV Intermittent every 24 hours  levothyroxine 100 MICROGram(s) Oral daily  losartan 100 milliGRAM(s) Oral daily  montelukast 10 milliGRAM(s) Oral daily  OXcarbazepine 300 milliGRAM(s) Oral two times a day  pantoprazole  Injectable 40 milliGRAM(s) IV Push two times a day    MEDICATIONS  (PRN):      CAPILLARY BLOOD GLUCOSE        I&O's Summary    13 Jul 2022 07:01  -  14 Jul 2022 07:00  --------------------------------------------------------  IN: 100 mL / OUT: 0 mL / NET: 100 mL        PHYSICAL EXAM:  Vital Signs Last 24 Hrs  T(C): 36.8 (14 Jul 2022 00:13), Max: 36.8 (13 Jul 2022 09:38)  T(F): 98.3 (14 Jul 2022 00:13), Max: 98.3 (14 Jul 2022 00:13)  HR: 90 (14 Jul 2022 00:13) (83 - 91)  BP: 166/86 (14 Jul 2022 00:13) (129/75 - 166/86)  BP(mean): --  RR: 18 (14 Jul 2022 00:13) (13 - 19)  SpO2: 98% (14 Jul 2022 00:13) (96% - 98%)    Parameters below as of 14 Jul 2022 00:13  Patient On (Oxygen Delivery Method): room air        GENERAL: No acute distress  EYES: EOMI, PERRLA; conjunctiva and sclera clear  ENMT: Moist oral mucosa  RESPIRATORY: Lungs clear to ascultation b/l; unlabored respirations  CARDIOVASCULAR: Regular rate and rhythm, normal S1 and S2, no murmurs/rubs/gallops  ABDOMEN: Soft, normal bowel sounds, nondistended, nontender  MUSCULOSKELETAL: No joint swelling or tenderness to palpation  PSYCH: Affect appropriate  NEUROLOGY: AAOx3, CNs grossly intact    LABS:                        7.8    7.76  )-----------( 350      ( 13 Jul 2022 07:42 )             23.8     07-13    134<L>  |  103  |  9   ----------------------------<  108<H>  3.7   |  22  |  0.55    Ca    8.6      13 Jul 2022 07:42  Phos  2.8     07-13  Mg     2.2     07-13    TPro  6.7  /  Alb  3.6  /  TBili  0.4  /  DBili  x   /  AST  24  /  ALT  21  /  AlkPhos  165<H>  07-13                INTERVAL RADIOLOGY/IMAGING STUDIES:    EGD (7/13) showed radiation gastritis/duodenitis (to 1st portion of duodenum, normal 2nd portion) s/p RFA tx --------------------------------------------------------------  Ludin Jerome MD  PGY-2, Internal Medicine  Pager #: [phone], [phone]  After 7 PM: Night Float Pager  --------------------------------------------------------------    Patient is a 66y old  Female who presents with a chief complaint of Anemia (13 Jul 2022 18:18)    OVERNIGHT / INTERVAL EVENTS: No acute overnight events.     SUBJECTIVE: Patient seen and examined bedside. Patient endorses some minor fatigue and some mild nausea overnight but otherwise without acute new complaints.     Denies fevers/chills, headaches/dizziness, nausea/vomiting, chest pain, SOB, abdominal pain.     MEDICATIONS  (STANDING):  DULoxetine 30 milliGRAM(s) Oral daily  escitalopram 20 milliGRAM(s) Oral daily  iron sucrose IVPB 200 milliGRAM(s) IV Intermittent every 24 hours  levothyroxine 100 MICROGram(s) Oral daily  losartan 100 milliGRAM(s) Oral daily  montelukast 10 milliGRAM(s) Oral daily  OXcarbazepine 300 milliGRAM(s) Oral two times a day  pantoprazole  Injectable 40 milliGRAM(s) IV Push two times a day    MEDICATIONS  (PRN):      CAPILLARY BLOOD GLUCOSE        I&O's Summary    13 Jul 2022 07:01  -  14 Jul 2022 07:00  --------------------------------------------------------  IN: 100 mL / OUT: 0 mL / NET: 100 mL        PHYSICAL EXAM:  Vital Signs Last 24 Hrs  T(C): 36.8 (14 Jul 2022 00:13), Max: 36.8 (13 Jul 2022 09:38)  T(F): 98.3 (14 Jul 2022 00:13), Max: 98.3 (14 Jul 2022 00:13)  HR: 90 (14 Jul 2022 00:13) (83 - 91)  BP: 166/86 (14 Jul 2022 00:13) (129/75 - 166/86)  RR: 18 (14 Jul 2022 00:13) (13 - 19)  SpO2: 98% (14 Jul 2022 00:13) (96% - 98%)    Parameters below as of 14 Jul 2022 00:13  Patient On (Oxygen Delivery Method): room air        GENERAL: No acute distress  EYES: EOMI, PERRLA; conjunctiva and sclera clear  ENMT: Moist oral mucosa  RESPIRATORY: Lungs clear to ascultation b/l; unlabored respirations  CARDIOVASCULAR: Regular rate and rhythm, normal S1 and S2, no murmurs/rubs/gallops  ABDOMEN: Soft, normal bowel sounds, nondistended, nontender  MUSCULOSKELETAL: No joint swelling or tenderness to palpation  PSYCH: Affect appropriate  NEUROLOGY: AAOx3, CNs grossly intact    LABS:                        7.8    7.76  )-----------( 350      ( 13 Jul 2022 07:42 )             23.8     07-13    134<L>  |  103  |  9   ----------------------------<  108<H>  3.7   |  22  |  0.55    Ca    8.6      13 Jul 2022 07:42  Phos  2.8     07-13  Mg     2.2     07-13    TPro  6.7  /  Alb  3.6  /  TBili  0.4  /  DBili  x   /  AST  24  /  ALT  21  /  AlkPhos  165<H>  07-13                INTERVAL RADIOLOGY/IMAGING STUDIES:    EGD (7/13) showed radiation gastritis/duodenitis (to 1st portion of duodenum, normal 2nd portion) s/p RFA tx

## 2022-07-14 NOTE — PROGRESS NOTE ADULT - PROBLEM SELECTOR PLAN 2
History of HTN managed with losartan  - /75 (7/13)  - Trend BP  - C/w Losartan 100 mg BID History of HTN managed with losartan  - /76 (7/14)  - Trend BP  - C/w Losartan 100 mg BID

## 2022-07-14 NOTE — PROGRESS NOTE ADULT - ASSESSMENT
67 yo female hx of pancreatic cancer (s/p distal pancreatectomy and splenectomy 6/2021), HTN, hypothyroidism, seizure disorder, presenting for anemia w/ hgb of 4.8 in the setting of chronic NSAID use w/ labs consistent with JERSON most likely 2/2 occult GI bleed 65 yo female hx of pancreatic cancer (s/p distal pancreatectomy and splenectomy 6/2021), HTN, hypothyroidism, seizure disorder, presenting for anemia w/ hgb of 4.8 in the setting of chronic NSAID use w/ labs consistent with JERSON most likely 2/2 occult GI bleed. Found to have radiation gastritis/duodenitis on EGD.

## 2022-07-14 NOTE — DISCHARGE NOTE NURSING/CASE MANAGEMENT/SOCIAL WORK - NSDCPEFALRISK_GEN_ALL_CORE
For information on Fall & Injury Prevention, visit: [email or web address] OR  [email or web address] OR  https://www.cdc.gov/steadi/patient.html

## 2022-07-14 NOTE — PROGRESS NOTE ADULT - SUBJECTIVE AND OBJECTIVE BOX
Patient is a 66y old  Female who presents with a chief complaint of Anemia (14 Jul 2022 07:59)    Patient seen this morning. Feeling better. S/P Venofer #1 yesterday - awaiting dose #2 today    MEDICATIONS  (STANDING):  DULoxetine 30 milliGRAM(s) Oral daily  escitalopram 20 milliGRAM(s) Oral daily  iron sucrose IVPB 200 milliGRAM(s) IV Intermittent every 24 hours  levothyroxine 100 MICROGram(s) Oral daily  losartan 100 milliGRAM(s) Oral daily  montelukast 10 milliGRAM(s) Oral daily  OXcarbazepine 300 milliGRAM(s) Oral two times a day  pantoprazole    Tablet 40 milliGRAM(s) Oral before breakfast    MEDICATIONS  (PRN):  ondansetron    Tablet 4 milliGRAM(s) Oral every 6 hours PRN Nausea      Vital Signs Last 24 Hrs  T(C): 36.8 (14 Jul 2022 08:49), Max: 36.8 (14 Jul 2022 00:13)  T(F): 98.2 (14 Jul 2022 08:49), Max: 98.3 (14 Jul 2022 00:13)  HR: 91 (14 Jul 2022 08:49) (83 - 91)  BP: 159/96 (14 Jul 2022 08:49) (135/80 - 166/86)  BP(mean): --  RR: 18 (14 Jul 2022 08:49) (13 - 19)  SpO2: 98% (14 Jul 2022 08:49) (96% - 98%)    Parameters below as of 14 Jul 2022 08:49  Patient On (Oxygen Delivery Method): room air        PE  NAD  Awake, alert  Anicteric, MMM  No c/c/e  No rash grossly                            8.7    14.14 )-----------( 375      ( 14 Jul 2022 07:42 )             27.2       07-14    134<L>  |  98  |  7   ----------------------------<  140<H>  3.5   |  20<L>  |  0.49<L>    Ca    8.8      14 Jul 2022 07:44  Phos  2.9     07-14  Mg     2.1     07-14    TPro  7.3  /  Alb  4.1  /  TBili  0.2  /  DBili  x   /  AST  25  /  ALT  22  /  AlkPhos  181<H>  07-14

## 2022-07-14 NOTE — DISCHARGE NOTE NURSING/CASE MANAGEMENT/SOCIAL WORK - NSDCFUADDAPPT_GEN_ALL_CORE_FT
Please follow-up with your primary care provider within 1 week of hospital discharge for further lab-work, monitoring, medication-adjustments, and management as needed for your chronic health conditions.     Please also follow-up with the benign hematology team at the [facility] within 1-2 weeks after hospital discharge for further management of your anemia. They should be calling you to help schedule an appointment. However, if you do not receive a call after a week, you may call the office for Dr. Michi Cruz or set up an appointment with any of his associates who work out of the [place] office. His number has been provided in the packet.     Please continue to follow-up routinely with your oncologist, Dr. Selena Bess, for management of your pancreatic cancer.

## 2022-07-14 NOTE — PROGRESS NOTE ADULT - ASSESSMENT
Pt w panc CA here w low HB on routine outpt labs - pt endorses some fatigue and sob only w significant exertion otherwise asymptomatic, particularly denies abd pain, n/v, bloody or dark stools. no hx of GIB or anemia prior to this. pt does admit to taking lots of nsaids recently for chronic back pain. on exam, pt with normal vitals, NAD, HD stable, pale, but otherwise bening exam, soft nontender abd. labs today confirm Hb 4.8 - will get blood transfusion, get hemoccult, admit for further w/u of anemia of unclear etiology.    Hematology/Oncology called to see patient who is under care by Dr. 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 Last CBC noted at [facility] showed a normal H/H.    Ca Pancreas  --Under care by Dr. Selena Bess of [facility]  --Completed adjuvant therapy 3/2022  --No evidence of recurrent disease on CT abdomen/pelvis  --Ongoing care after discharge    Anemia  --Acute onset  --Patient has been taking large amounts of NSAIDS secondary to myalgias  --Anemia labs - iron, folate, B12, haptoglobin, LDH, FOBT, myeloma studies, done  --Found to be profoundly iron deficient  --S/P EGD - radiation gastritis found  --Have ordered Venofer 200 mg IV daily X 3 days - if patient is otherwise stable for discharge prior to completing all 3 days of iron will complete IV iron as outpatient  --Please transfuse for Hgb <7.0 gram    After discharge we recommend patient follow with benign Hematology - [facility] or any of his colleagues who work out of the [facility] office    We will continue to follow patient and coordinate with [facility]    Tito Enciso PA-C  Hematology/Oncology  [facility]   [phone] (office)  [phone] (alt office)  Evenings and weekends please call MD on call or office

## 2022-07-14 NOTE — DISCHARGE NOTE NURSING/CASE MANAGEMENT/SOCIAL WORK - PATIENT PORTAL LINK FT
You can access the FollowMyHealth Patient Portal offered by [facility] by registering at the following website: [email or web address]. By joining [employer]’s FollowMyHealth portal, you will also be able to view your health information using other applications (apps) compatible with our system.

## 2022-07-14 NOTE — PROGRESS NOTE ADULT - PROBLEM SELECTOR PLAN 4
Hx of hypothyroidism on synthroid  - C/w synthroid 100 mg qd
Hx of hypothyroidism on synthroid  - C/w synthroid 100 mg qd

## 2022-07-14 NOTE — PROGRESS NOTE ADULT - NS ATTEND AMEND GEN_ALL_CORE FT
65 y/o female with history of pancreatic cancer admitted with anemia.    #Pancreatic Cancer, s/p adjuvant treatment  #Iron Deficiency Anemia  #Radiation Gastritis    Continue IV iron. Can further replete outpatient.  Other anemia workup unremarkable.  Monitor CBC and transfuse as needed.    [facility] [place].

## 2022-07-14 NOTE — PROGRESS NOTE ADULT - ASSESSMENT
66F Hx stage IIB PDA C S/P distal pancreatectomy and splenectomy on 6/24/2021 followed by adjuvant FOLFIRINOX 8/2021-1/2022 and adjuvant capecitabine with RT completed 3/2022, GERD now presenting with anemia w/ Hb 4.8, found to have radiation gastritis now s/p RFA    Impression:   #Acute blood loss anemia likely 2/2 radiation gastritis: pw Hb 4.8 (last CBC wnl in May 2022) without overt GI bleeding s/p 2U pRBCs --> Hb in 7s. EGD (7/13) showed radiation gastritis/duodenitis (to 1st portion of duodenum, normal 2nd portion) s/p RFA tx.     #GERD: on daily PPI    Recommendations:   - Please discuss with heme/onc re: IV iron considering pt will continue to have radiation gastritis w/ occult blood loss   - Restart PPI daily  - Can give pepcid 20mg x 1 dose for relief of reflux  - Trend CBC, transfuse Hb < 7  - Active T&S  - Avoid NSAIDs  - If on-going acute blood loss anemia in the future, at that time would repeat EGD; no further endoscopic intervention at this time      Thank you for involving us in the care of this patient, please reach out if any further questions.     Donovan Olsen MD  Gastroenterology/Hepatology Fellow, PGY5    Available on [employer] Teams  [phone] ([facility])  [id number] ([facility])  Please contact on call fellow weekdays after 5pm-7am and weekends: [phone] 66F Hx stage IIB PDA C S/P distal pancreatectomy and splenectomy on 6/24/2021 followed by adjuvant FOLFIRINOX 8/2021-1/2022 and adjuvant capecitabine with RT completed 3/2022, GERD now presenting with anemia w/ Hb 4.8, found to have radiation gastritis now s/p RFA    Impression:   #Blood loss anemia likely 2/2 radiation gastritis: pw Hb 4.8 (last CBC wnl in May 2022) without overt GI bleeding s/p 2U pRBCs --> Hb in 7s. EGD (7/13) showed radiation gastritis/duodenitis (to 1st portion of duodenum, normal 2nd portion) s/p RFA tx.     #GERD: on daily PPI    Recommendations:   - Please discuss with heme/onc re: IV iron considering pt will continue to have radiation gastritis w/ occult blood loss   - Restart PPI daily  - Carafate 1g liquid q6 hours  - Can give pepcid 20mg x 1 dose for relief of reflux  - Trend CBC, transfuse Hb < 7  - Active T&S  - Avoid NSAIDs  - If on-going acute blood loss anemia in the future, at that time would repeat EGD; no further endoscopic intervention at this time      Thank you for involving us in the care of this patient, please reach out if any further questions.     Donovan Olsen MD  Gastroenterology/Hepatology Fellow, PGY5    Available on [employer] Teams  [phone] ([facility])  [id number] ([facility])  Please contact on call fellow weekdays after 5pm-7am and weekends: [phone]

## 2022-07-26 ENCOUNTER — INPATIENT (INPATIENT)
Facility: HOSPITAL | Age: 67
LOS: 3 days | Discharge: HOME | DRG: 378 | End: 2022-07-30
Attending: STUDENT IN AN ORGANIZED HEALTH CARE EDUCATION/TRAINING PROGRAM | Admitting: INTERNAL MEDICINE
Payer: COMMERCIAL

## 2022-07-26 VITALS
SYSTOLIC BLOOD PRESSURE: 98 MMHG | DIASTOLIC BLOOD PRESSURE: 61 MMHG | WEIGHT: 156.09 LBS | HEIGHT: 61 IN | HEART RATE: 85 BPM | TEMPERATURE: 98 F | OXYGEN SATURATION: 95 %

## 2022-07-26 DIAGNOSIS — J45.998 OTHER ASTHMA: ICD-10-CM

## 2022-07-26 DIAGNOSIS — E03.9 HYPOTHYROIDISM, UNSPECIFIED: ICD-10-CM

## 2022-07-26 DIAGNOSIS — L03.90 CELLULITIS, UNSPECIFIED: ICD-10-CM

## 2022-07-26 DIAGNOSIS — G40.909 EPILEPSY, UNSPECIFIED, NOT INTRACTABLE, WITHOUT STATUS EPILEPTICUS: ICD-10-CM

## 2022-07-26 DIAGNOSIS — K92.2 GASTROINTESTINAL HEMORRHAGE, UNSPECIFIED: ICD-10-CM

## 2022-07-26 DIAGNOSIS — R79.1 ABNORMAL COAGULATION PROFILE: ICD-10-CM

## 2022-07-26 DIAGNOSIS — Z29.9 ENCOUNTER FOR PROPHYLACTIC MEASURES, UNSPECIFIED: ICD-10-CM

## 2022-07-26 DIAGNOSIS — C25.9 MALIGNANT NEOPLASM OF PANCREAS, UNSPECIFIED: ICD-10-CM

## 2022-07-26 DIAGNOSIS — Z90.410 ACQUIRED TOTAL ABSENCE OF PANCREAS: Chronic | ICD-10-CM

## 2022-07-26 DIAGNOSIS — I10 ESSENTIAL (PRIMARY) HYPERTENSION: ICD-10-CM

## 2022-07-26 DIAGNOSIS — D64.9 ANEMIA, UNSPECIFIED: ICD-10-CM

## 2022-07-26 LAB
ALBUMIN SERPL ELPH-MCNC: 4.1 G/DL — SIGNIFICANT CHANGE UP (ref 3.3–5)
ALP SERPL-CCNC: 144 U/L — HIGH (ref 40–120)
ALT FLD-CCNC: 30 U/L — SIGNIFICANT CHANGE UP (ref 10–45)
ANION GAP SERPL CALC-SCNC: 13 MMOL/L — SIGNIFICANT CHANGE UP (ref 5–17)
APTT BLD: 25.9 SEC — LOW (ref 27.5–35.5)
APTT BLD: >200 SEC — CRITICAL HIGH (ref 27.5–35.5)
AST SERPL-CCNC: 30 U/L — SIGNIFICANT CHANGE UP (ref 10–40)
BASE EXCESS BLDV CALC-SCNC: -5.2 MMOL/L — LOW (ref -2–2)
BILIRUB SERPL-MCNC: <0.1 MG/DL — LOW (ref 0.2–1.2)
BLD GP AB SCN SERPL QL: NEGATIVE — SIGNIFICANT CHANGE UP
BLOOD GAS VENOUS - CREATININE: SIGNIFICANT CHANGE UP MG/DL (ref 0.5–1.3)
BUN SERPL-MCNC: 29 MG/DL — HIGH (ref 7–23)
CA-I SERPL-SCNC: 1.28 MMOL/L — SIGNIFICANT CHANGE UP (ref 1.15–1.33)
CALCIUM SERPL-MCNC: 9.1 MG/DL — SIGNIFICANT CHANGE UP (ref 8.4–10.5)
CHLORIDE BLDV-SCNC: 102 MMOL/L — SIGNIFICANT CHANGE UP (ref 96–108)
CHLORIDE SERPL-SCNC: 101 MMOL/L — SIGNIFICANT CHANGE UP (ref 96–108)
CO2 BLDV-SCNC: 22 MMOL/L — SIGNIFICANT CHANGE UP (ref 22–26)
CO2 SERPL-SCNC: 18 MMOL/L — LOW (ref 22–31)
CREAT SERPL-MCNC: 0.89 MG/DL — SIGNIFICANT CHANGE UP (ref 0.5–1.3)
EGFR: 71 ML/MIN/1.73M2 — SIGNIFICANT CHANGE UP
GAS PNL BLDV: 133 MMOL/L — LOW (ref 136–145)
GAS PNL BLDV: SIGNIFICANT CHANGE UP
GAS PNL BLDV: SIGNIFICANT CHANGE UP
GLUCOSE BLDV-MCNC: 117 MG/DL — HIGH (ref 70–99)
GLUCOSE SERPL-MCNC: 121 MG/DL — HIGH (ref 70–99)
HCO3 BLDV-SCNC: 21 MMOL/L — LOW (ref 22–29)
HCT VFR BLD CALC: 22 % — LOW (ref 34.5–45)
HCT VFR BLD CALC: 24.3 % — LOW (ref 34.5–45)
HCT VFR BLDA CALC: 21 % — CRITICAL LOW (ref 34.5–46.5)
HGB BLD CALC-MCNC: 7 G/DL — CRITICAL LOW (ref 11.7–16.1)
HGB BLD-MCNC: 6.9 G/DL — CRITICAL LOW (ref 11.5–15.5)
HGB BLD-MCNC: 7.9 G/DL — LOW (ref 11.5–15.5)
INR BLD: 0.97 RATIO — SIGNIFICANT CHANGE UP (ref 0.88–1.16)
INR BLD: 8.11 RATIO — CRITICAL HIGH (ref 0.88–1.16)
LACTATE BLDV-MCNC: 2.1 MMOL/L — HIGH (ref 0.7–2)
MCHC RBC-ENTMCNC: 28.3 PG — SIGNIFICANT CHANGE UP (ref 27–34)
MCHC RBC-ENTMCNC: 28.6 PG — SIGNIFICANT CHANGE UP (ref 27–34)
MCHC RBC-ENTMCNC: 31.4 GM/DL — LOW (ref 32–36)
MCHC RBC-ENTMCNC: 32.5 GM/DL — SIGNIFICANT CHANGE UP (ref 32–36)
MCV RBC AUTO: 87.1 FL — SIGNIFICANT CHANGE UP (ref 80–100)
MCV RBC AUTO: 91.3 FL — SIGNIFICANT CHANGE UP (ref 80–100)
NRBC # BLD: 0 /100 WBCS — SIGNIFICANT CHANGE UP (ref 0–0)
NRBC # BLD: 0 /100 WBCS — SIGNIFICANT CHANGE UP (ref 0–0)
OB PNL STL: POSITIVE
PCO2 BLDV: 41 MMHG — SIGNIFICANT CHANGE UP (ref 39–42)
PH BLDV: 7.31 — LOW (ref 7.32–7.43)
PLATELET # BLD AUTO: 336 K/UL — SIGNIFICANT CHANGE UP (ref 150–400)
PLATELET # BLD AUTO: 386 K/UL — SIGNIFICANT CHANGE UP (ref 150–400)
PO2 BLDV: 26 MMHG — SIGNIFICANT CHANGE UP (ref 25–45)
POTASSIUM BLDV-SCNC: 4.7 MMOL/L — SIGNIFICANT CHANGE UP (ref 3.5–5.1)
POTASSIUM SERPL-MCNC: 4.9 MMOL/L — SIGNIFICANT CHANGE UP (ref 3.5–5.3)
POTASSIUM SERPL-SCNC: 4.9 MMOL/L — SIGNIFICANT CHANGE UP (ref 3.5–5.3)
PROT SERPL-MCNC: 6.9 G/DL — SIGNIFICANT CHANGE UP (ref 6–8.3)
PROTHROM AB SERPL-ACNC: 11.1 SEC — SIGNIFICANT CHANGE UP (ref 10.5–13.4)
PROTHROM AB SERPL-ACNC: 95.2 SEC — HIGH (ref 10.5–13.4)
RBC # BLD: 2.41 M/UL — LOW (ref 3.8–5.2)
RBC # BLD: 2.79 M/UL — LOW (ref 3.8–5.2)
RBC # FLD: 22.1 % — HIGH (ref 10.3–14.5)
RBC # FLD: 23.1 % — HIGH (ref 10.3–14.5)
RH IG SCN BLD-IMP: POSITIVE — SIGNIFICANT CHANGE UP
SAO2 % BLDV: 29.7 % — LOW (ref 67–88)
SARS-COV-2 RNA SPEC QL NAA+PROBE: SIGNIFICANT CHANGE UP
SODIUM SERPL-SCNC: 132 MMOL/L — LOW (ref 135–145)
WBC # BLD: 6.72 K/UL — SIGNIFICANT CHANGE UP (ref 3.8–10.5)
WBC # BLD: 7.72 K/UL — SIGNIFICANT CHANGE UP (ref 3.8–10.5)
WBC # FLD AUTO: 6.72 K/UL — SIGNIFICANT CHANGE UP (ref 3.8–10.5)
WBC # FLD AUTO: 7.72 K/UL — SIGNIFICANT CHANGE UP (ref 3.8–10.5)

## 2022-07-26 PROCEDURE — 93010 ELECTROCARDIOGRAM REPORT: CPT

## 2022-07-26 PROCEDURE — 99232 SBSQ HOSP IP/OBS MODERATE 35: CPT | Mod: GC

## 2022-07-26 PROCEDURE — 99223 1ST HOSP IP/OBS HIGH 75: CPT

## 2022-07-26 PROCEDURE — 99285 EMERGENCY DEPT VISIT HI MDM: CPT | Mod: 25

## 2022-07-26 RX ORDER — OXCARBAZEPINE 300 MG/1
300 TABLET, FILM COATED ORAL
Refills: 0 | Status: DISCONTINUED | OUTPATIENT
Start: 2022-07-26 | End: 2022-07-30

## 2022-07-26 RX ORDER — ESCITALOPRAM OXALATE 10 MG/1
20 TABLET, FILM COATED ORAL DAILY
Refills: 0 | Status: DISCONTINUED | OUTPATIENT
Start: 2022-07-26 | End: 2022-07-30

## 2022-07-26 RX ORDER — MONTELUKAST 4 MG/1
10 TABLET, CHEWABLE ORAL DAILY
Refills: 0 | Status: DISCONTINUED | OUTPATIENT
Start: 2022-07-26 | End: 2022-07-30

## 2022-07-26 RX ORDER — DULOXETINE HYDROCHLORIDE 30 MG/1
30 CAPSULE, DELAYED RELEASE ORAL DAILY
Refills: 0 | Status: DISCONTINUED | OUTPATIENT
Start: 2022-07-26 | End: 2022-07-30

## 2022-07-26 RX ORDER — PANTOPRAZOLE SODIUM 20 MG/1
40 TABLET, DELAYED RELEASE ORAL
Refills: 0 | Status: DISCONTINUED | OUTPATIENT
Start: 2022-07-26 | End: 2022-07-26

## 2022-07-26 RX ORDER — SOD SULF/SODIUM/NAHCO3/KCL/PEG
2000 SOLUTION, RECONSTITUTED, ORAL ORAL ONCE
Refills: 0 | Status: COMPLETED | OUTPATIENT
Start: 2022-07-26 | End: 2022-07-27

## 2022-07-26 RX ORDER — PANTOPRAZOLE SODIUM 20 MG/1
40 TABLET, DELAYED RELEASE ORAL
Refills: 0 | Status: DISCONTINUED | OUTPATIENT
Start: 2022-07-26 | End: 2022-07-30

## 2022-07-26 RX ORDER — LEVOTHYROXINE SODIUM 125 MCG
100 TABLET ORAL DAILY
Refills: 0 | Status: DISCONTINUED | OUTPATIENT
Start: 2022-07-26 | End: 2022-07-30

## 2022-07-26 RX ORDER — PHYTONADIONE (VIT K1) 5 MG
10 TABLET ORAL ONCE
Refills: 0 | Status: DISCONTINUED | OUTPATIENT
Start: 2022-07-26 | End: 2022-07-26

## 2022-07-26 RX ORDER — FERROUS SULFATE 325(65) MG
325 TABLET ORAL DAILY
Refills: 0 | Status: DISCONTINUED | OUTPATIENT
Start: 2022-07-26 | End: 2022-07-30

## 2022-07-26 RX ORDER — SODIUM CHLORIDE 9 MG/ML
1000 INJECTION INTRAMUSCULAR; INTRAVENOUS; SUBCUTANEOUS
Refills: 0 | Status: DISCONTINUED | OUTPATIENT
Start: 2022-07-26 | End: 2022-07-28

## 2022-07-26 RX ORDER — AMOXICILLIN 250 MG/5ML
875 SUSPENSION, RECONSTITUTED, ORAL (ML) ORAL
Refills: 0 | Status: COMPLETED | OUTPATIENT
Start: 2022-07-26 | End: 2022-07-30

## 2022-07-26 RX ORDER — LOSARTAN POTASSIUM 100 MG/1
100 TABLET, FILM COATED ORAL DAILY
Refills: 0 | Status: DISCONTINUED | OUTPATIENT
Start: 2022-07-26 | End: 2022-07-30

## 2022-07-26 RX ORDER — ALENDRONATE SODIUM 70 MG/1
0 TABLET ORAL
Qty: 0 | Refills: 0 | DISCHARGE

## 2022-07-26 RX ORDER — SUCRALFATE 1 G
1 TABLET ORAL EVERY 6 HOURS
Refills: 0 | Status: DISCONTINUED | OUTPATIENT
Start: 2022-07-26 | End: 2022-07-30

## 2022-07-26 RX ADMIN — Medication 875 MILLIGRAM(S): at 18:12

## 2022-07-26 RX ADMIN — Medication 1 GRAM(S): at 18:11

## 2022-07-26 RX ADMIN — ESCITALOPRAM OXALATE 20 MILLIGRAM(S): 10 TABLET, FILM COATED ORAL at 22:45

## 2022-07-26 RX ADMIN — OXCARBAZEPINE 300 MILLIGRAM(S): 300 TABLET, FILM COATED ORAL at 20:57

## 2022-07-26 RX ADMIN — Medication 1 GRAM(S): at 22:45

## 2022-07-26 RX ADMIN — PANTOPRAZOLE SODIUM 40 MILLIGRAM(S): 20 TABLET, DELAYED RELEASE ORAL at 18:12

## 2022-07-26 RX ADMIN — SODIUM CHLORIDE 60 MILLILITER(S): 9 INJECTION INTRAMUSCULAR; INTRAVENOUS; SUBCUTANEOUS at 22:51

## 2022-07-26 NOTE — H&P ADULT - PROBLEM SELECTOR PLAN 2
Unclear if lab error vs real. Also with abnormal elevated PTT  -if repeat elevated, concern decreased vit K iso recent abx which exacerbate underlying propensity for GIB, and will give IV vitamin k *1  -if repeat wnl, will ctm repeat wnl, 2/2 to lab vs collection error

## 2022-07-26 NOTE — H&P ADULT - PROBLEM SELECTOR PLAN 3
s/p distal pancreatectomy + splenectomy with chemo and RT last 3/2022. Appears in remission  -f/u with oncology outpatient  -currently not taking pancreatic enzymes since last admission, readdress in am if would like to restart (currently overwhelmed by number of medications)

## 2022-07-26 NOTE — ED PROVIDER NOTE - ATTENDING CONTRIBUTION TO CARE
RGUJRAL 65yo f hx listed presents with anemia. States she weas recently admitted for GI bleed and had blood transfusion. Since discharge pt w recurrent fatigue and SOB w exertion. Denies any abdominal pain, BRBPR, melena. No chest pain, palp. No AC.   On exam, Patient is awake,alert,oriented x 3. Patient is well appearing and in no acute distress. Patient's chest is clear to ausculation, +s1s2. Abdomen is soft nd/nt +BS. Extremity with no swelling or calf tenderness.   Check labs, type and screen, hemeoccult to eval for symptoms.

## 2022-07-26 NOTE — ED ADULT NURSE NOTE - OBJECTIVE STATEMENT
Pt presented to ed sent from PMD for abnormal lab result. pt reports that she was told to come to ED by her doctor due to decrease in hemoglobin level from 7.9 to 7.1. pt reports SOB on exertion, but denies nausea, vomiting, fever, chills, chest pain, headache, dizziness. Will continue to monitor.

## 2022-07-26 NOTE — ED ADULT NURSE REASSESSMENT NOTE - NS ED NURSE REASSESS COMMENT FT1
1510 (Product) given. Consent in chart. Risks and benefits explained to patient. Patient verbalized understanding of risks and benefits. Patient aware of possible side effects. Vital signs stable. Second RN at bedside for confirmation.

## 2022-07-26 NOTE — ED PROVIDER NOTE - CLINICAL SUMMARY MEDICAL DECISION MAKING FREE TEXT BOX
66 year old female with history of pancreatic cancer (s/p distal pancreatectomy and splenectomy 6/2021), s/p RT and chemo, HTN, hypothyroidism, and seizure disorder, recent admission for anemia requiring 2u PRBC and upper endoscopy showing radiation duodenitis/gastritis, sent in by hematologist for low hgb. Well appearing here with reassuring vitals, exam benign. Will rpt labs and transfuse pRBC as indicated, dispo pending but possible admission for GI eval

## 2022-07-26 NOTE — CONSULT NOTE ADULT - ATTENDING COMMENTS
Patient known to GI service, admitted previously for JERSON and now readmitted for same.   EGD on recent admission significant for gastritis/duodenitis likely 2/2 radiation and s/p RFA.   No current overt signs of bleeding.     agree w prbc transfusion   trend h/h, piv x 2  ok to start clear liquid diet   plan for egd/colonoscopy tomorrow   GI to follow, please call with questions.

## 2022-07-26 NOTE — CONSULT NOTE ADULT - SUBJECTIVE AND OBJECTIVE BOX
Reason for consult: Anemia, history of pancreatic cancer    CC: Symptomatic anemia    HPI: 65 y/o F with h/o pancreatic cancer s/p distal pancreatectomy and splenectomy followed by adjuvant FOLFIRINOX, Capecitabine and RT completed 3/2022 with recent presentation for anemia 2/2 to radiation gastritis/duodenitis s/p RFA presenting with anemia. After last presentation, patient felt well for a week after leaving the hospital. 1 week later began noticing SOB with a few steps as well as increasing fatigue. No new dark stools/brbpr. Presented to outpatient hematologists office who took new labs, found to have hgb 7.1, told to present to ED. Denies new medications/vitamins other than what she received from this hospital. Denies recent NSAID use. Denies etoh use. Of note, since last presentation, found to have LUE cellulitis at previous IV site, started on doxy + augmentin 1 week ago.     In ED afebrile. Labs notable for Hgb 6.9, wbc wnl. FOBT +.  INR 8.11, PTT>200. Ordered for 1 unit prbc, GI consulted.    (26 Jul 2022 14:32)    PAST MEDICAL & SURGICAL HISTORY:  Pancreatic cancer  Hypertension  Hypothyroid  Seasonal asthma  History of pancreatectomy    FAMILY HISTORY:  No pertinent family history in first degree relatives    Alcohol Denied  Smoking: Nonsmoker  Drug Use: Denied  Marital Status:     Allergies  Dilantin (Unknown)    Intolerances    MEDICATIONS  (STANDING):  DULoxetine 30 milliGRAM(s) Oral daily  escitalopram 20 milliGRAM(s) Oral daily  ferrous    sulfate 325 milliGRAM(s) Oral daily  levothyroxine 100 MICROGram(s) Oral daily  losartan 100 milliGRAM(s) Oral daily  montelukast 10 milliGRAM(s) Oral daily  OXcarbazepine 300 milliGRAM(s) Oral two times a day  pantoprazole    Tablet 40 milliGRAM(s) Oral two times a day  sucralfate 1 Gram(s) Oral every 6 hours    MEDICATIONS  (PRN):    ROS:   Negative other than noted    PHYSICAL EXAM:   No acute distress  Alert, oriented  Not dyspneic at rest  Appears comfortable                          6.9    7.72  )-----------( 386      ( 26 Jul 2022 12:20 )             22.0       07-26    132<L>  |  101  |  29<H>  ----------------------------<  121<H>  4.9   |  18<L>  |  0.89    Ca    9.1      26 Jul 2022 12:20    TPro  6.9  /  Alb  4.1  /  TBili  <0.1<L>  /  DBili  x   /  AST  30  /  ALT  30  /  AlkPhos  144<H>  07-26  
Chief Complaint:  Patient is a 66y old  Female who presents with a chief complaint of     HPI: 66F Hx stage IIB PDA C S/P distal pancreatectomy and splenectomy on 6/24/2021 followed by adjuvant FOLFIRINOX 8/2021-1/2022 and adjuvant capecitabine with RT completed 3/2022, GERD recent admission for anemia 7/2022 found to have radiation gastritis/duodenitis s/p RFA. Now presenting again for anemia w/ Hb 6.9.     Denies melena, hematochezia, hematemesis, abd pain, n/v/d/f/c. Prior NSAID use, currently denies NSAID use.    During last admission (7/12-7/14) patient p/w Hb 4.8 found to have JERSON. Declined colonoscopy at the time. Underwent EGD (7/13) showing radiation gastritis/duodenitis; radiation gastritis treated with RFA.      Pt reports having multiple colonoscopies in the past (2/2 father w/ hx of CRC) -- was told on her last colon she had a "twisty" colon; thus her internist ordered her for a virtual CT colonography on as her last screening 1 year ago, no colonic lesions were seen.    Allergies:  Dilantin (Unknown)      Home Medications:    Hospital Medications:      PMHX/PSHX:  Pancreatic cancer    Hypertension    Hypothyroid    Seasonal asthma    History of pancreatectomy        Family history:      Denies family history of colon cancer/polyps, stomach cancer/polyps, pancreatic cancer/masses, liver cancer/disease, ovarian cancer and endometrial cancer.    Social History:     Tob: Denies  EtOH: Denies  Illicit Drugs: Denies    ROS:     General:  No wt loss, fevers, chills, night sweats, fatigue  Eyes:  Good vision, no reported pain  ENT:  No sore throat, pain, runny nose, dysphagia  CV:  No pain, palpitations, hypo/hypertension  Pulm:  No dyspnea, cough, tachypnea, wheezing  GI: see above  :  No pain, bleeding, incontinence, nocturia  Muscle:  No pain, weakness  Neuro:  No weakness, tingling, memory problems  Psych:  No fatigue, insomnia, mood problems, depression  Endocrine:  No polyuria, polydipsia, cold/heat intolerance  Heme:  No petechiae, ecchymosis, easy bruisability  Skin:  No rash, tattoos, scars, edema    PHYSICAL EXAM:     GENERAL:  No acute distress  HEENT:  Normocephalic/atraumatic, no scleral icterus  CHEST:  no accessory muscle use  HEART:  Regular rate and rhythm, no murmurs/rubs/gallops  ABDOMEN:  Soft, non-tender, non-distended  EXTREMITIES: No cyanosis, clubbing, or edema  SKIN:  No rash/warm/dry  NEURO:  Alert and oriented x 3    Vital Signs:  Vital Signs Last 24 Hrs  T(C): 36.8 (26 Jul 2022 12:36), Max: 36.8 (26 Jul 2022 12:36)  T(F): 98.2 (26 Jul 2022 12:36), Max: 98.2 (26 Jul 2022 12:36)  HR: 97 (26 Jul 2022 12:36) (85 - 97)  BP: 116/66 (26 Jul 2022 12:36) (98/61 - 116/66)  BP(mean): --  RR: 18 (26 Jul 2022 12:36) (18 - 18)  SpO2: 96% (26 Jul 2022 12:36) (95% - 96%)    Parameters below as of 26 Jul 2022 12:36  Patient On (Oxygen Delivery Method): room air      Daily Height in cm: 154.94 (26 Jul 2022 10:46)    Daily     LABS:                        6.9    7.72  )-----------( 386      ( 26 Jul 2022 12:20 )             22.0     Mean Cell Volume: 91.3 fl (07-26-22 @ 12:20)    07-26    132<L>  |  101  |  29<H>  ----------------------------<  121<H>  4.9   |  18<L>  |  0.89    Ca    9.1      26 Jul 2022 12:20    TPro  6.9  /  Alb  4.1  /  TBili  <0.1<L>  /  DBili  x   /  AST  30  /  ALT  30  /  AlkPhos  144<H>  07-26    LIVER FUNCTIONS - ( 26 Jul 2022 12:20 )  Alb: 4.1 g/dL / Pro: 6.9 g/dL / ALK PHOS: 144 U/L / ALT: 30 U/L / AST: 30 U/L / GGT: x           PT/INR - ( 26 Jul 2022 12:20 )   PT: 95.2 sec;   INR: 8.11 ratio         PTT - ( 26 Jul 2022 12:20 )  PTT:>200.0 sec                            6.9    7.72  )-----------( 386      ( 26 Jul 2022 12:20 )             22.0       Imaging:

## 2022-07-26 NOTE — CONSULT NOTE ADULT - ASSESSMENT
Ms. Moran is a 66 year old woman with history of pancreatic cancer managed at [facility] referred to the ED for symptomatic anemia.    # Anemia  ­ was recently hospitalized and underwent EGD  ­ acute anemia thought to be due to radiation induced gastritis/duodenitis s/p radiofrequency ablation  ­ seen outpatient, but found to have hemoglobin decrease from 7.9 to 7.1 (7/25/22) over 1 week  ­ developed symptomatic anemia, HGB 6.9 here and FOBT positive  - recommend transfusion  ­ suspect persistent GI bleeding, would benefit from repeat EGD, possibly video capsule endoscopy and colonoscopy    # Cellulitis of R forearm  ­ precipitated by IV placement while inpatient  - improving, continue management per primary  ­ may need I&D if abscess develops    # History of pancreatic cancer  - low suspicion for recurrence, but will continue to monitor  - continue regular follow up with [facility] upon discharge

## 2022-07-26 NOTE — H&P ADULT - HISTORY OF PRESENT ILLNESS
CC: 65 y/o F presenting with symptomatic anemia    HPI: 65 y/o F with h/o pancreatic cancer s/p distal pancreatectomy and splenectomy followed by adjuvant FOLFIRINOX, Capecitabine and RT completed 3/2022 with recent presentation for anemia 2/2 to radiation gastritis/duodenitis s/p RFA presenting with anemia. After last presentation, patient felt well for a week after leaving the hospital. 1 week later began noticing SOB with a few steps as well as increasing fatigue. No new dark stools/brbpr. Presented to outpatient hematologists office who took new labs, found to have hgb 7.1, told to present to ED. Denies new medications/vitamins other than what she received from this hospital. Denies recent NSAID use. Denies etoh use. Of note, since last presentation, found to have LUE cellulitis at previous IV site, started on doxy + augmentin 1 week ago.     In ED afeb hds. Labs notable for Hgb 6.9, wbc wnl. FOBT +.  INR 8.11, PTT>200. Ordered for 1 unit prbc, GI consulted.

## 2022-07-26 NOTE — H&P ADULT - ASSESSMENT
65 y/o F with h/o HTN, HLD, remote seizure disorder, pancreatic cancer s/p distal pancreatectomy and splenectomy followed by adjuvant FOLFIRINOX, Capecitabine and RT completed 3/2022 with recent presentation for anemia 2/2 to radiation gastritis/duodenitis s/p RFA presenting with symptomatic anemia c/f GIB.

## 2022-07-26 NOTE — ED PROVIDER NOTE - OBJECTIVE STATEMENT
66 year old female with history of pancreatic cancer (s/p distal pancreatectomy and splenectomy 6/2021), s/p RT and chemo, HTN, hypothyroidism, and seizure disorder, recent admission for anemia requiring 2u PRBC and upper endoscopy showing radiation duodenitis/gastritis, sent in by hematologist for low hgb. Patient states having SOB and calf pain yesterday, had blood drawn then told today to come to ED for eval as hgb 7.1, denies chest pain, syncope, N/V, melena/hematochezia, hematuria, fever, chills.

## 2022-07-26 NOTE — ED ADULT NURSE NOTE - NSIMPLEMENTINTERV_GEN_ALL_ED
Implemented All Fall Risk Interventions:  [place] to call system. Call bell, personal items and telephone within reach. Instruct patient to call for assistance. Room bathroom lighting operational. Non-slip footwear when patient is off stretcher. Physically safe environment: no spills, clutter or unnecessary equipment. Stretcher in lowest position, wheels locked, appropriate side rails in place. Provide visual cue, wrist band, yellow gown, etc. Monitor gait and stability. Monitor for mental status changes and reorient to person, place, and time. Review medications for side effects contributing to fall risk. Reinforce activity limits and safety measures with patient and family.

## 2022-07-26 NOTE — CONSULT NOTE ADULT - ASSESSMENT
66F Hx stage IIB PDA C S/P distal pancreatectomy and splenectomy on 6/24/2021 followed by adjuvant FOLFIRINOX 8/2021-1/2022 and adjuvant capecitabine with RT completed 3/2022, GERD recent admission for anemia 7/2022 found to have radiation gastritis/duodenitis s/p RFA. Now presenting again for anemia w/ Hb 6.9.     Impression:   #JERSON, Hx radiation gastritis: presenting for second admission for anemia w/ Hb 6.9 (Hb last 8.7 on d/c). Anemia labs c/w JERSON. Recent EGD (7/13/22) showed radiation gastritis/duodenitis s/p RFA + declined colonoscopy last admission. Ddx likely acute blood loss anemia 2/2 radiation gastritis/duodenitis vs may be LGI sources of acute blood loss anemia (bleeding polyps, diverticulosis, hemorrhoids, angioectasias, malignancy).     #GERD: on daily PPI      Recommendations:   - Trend CBC, transfuse Hb < 7  - Active T&S  - PPI BID  - Plan for EGD/colonoscopy for JERSON 7/27  - Clear liquid diet 7/26  - Prep: 4L golytely + dulcolax  - NPO after MN (except for prep)  - Avoid NSAIDs  - IV iron per heme/onc          Thank you for involving us in the care of this patient, please reach out if any further questions.     Donovan Olsen MD  Gastroenterology/Hepatology Fellow, PGY5    Available on Microsoft Teams  [phone] ([facility])  [id number] ([facility])  Please contact on call fellow weekdays after 5pm-7am and weekends: [phone]   66F Hx stage IIB PDA C S/P distal pancreatectomy and splenectomy on 6/24/2021 followed by adjuvant FOLFIRINOX 8/2021-1/2022 and adjuvant capecitabine with RT completed 3/2022, GERD recent admission for anemia 7/2022 found to have radiation gastritis/duodenitis s/p RFA. Now presenting again for anemia w/ Hb 6.9.     Impression:   #JERSON, Hx radiation gastritis: presenting for second admission for anemia w/ Hb 6.9 (Hb last 8.7 on d/c). Anemia labs c/w JERSON. Recent EGD (7/13/22) showed radiation gastritis/duodenitis s/p RFA + declined colonoscopy last admission. Ddx likely acute blood loss anemia 2/2 radiation gastritis/duodenitis vs may be LGI sources of acute blood loss anemia (bleeding polyps, diverticulosis, hemorrhoids, angioectasias, malignancy).     #GERD: on daily PPI      Recommendations:   - Trend CBC, transfuse Hb < 7  - Active T&S  - PPI BID  - Plan for EGD/colonoscopy for JERSON 7/27  - Clear liquid diet 7/26  - Prep: moviprep + dulcolax  - NPO after MN (except for prep)  - Avoid NSAIDs  - IV iron per heme/onc          Thank you for involving us in the care of this patient, please reach out if any further questions.     Donovan Olsen MD  Gastroenterology/Hepatology Fellow, PGY5    Available on Microsoft Teams  [phone] ([facility])  [id number] ([facility]  Please contact on call fellow weekdays after 5pm-7am and weekends: [phone]

## 2022-07-26 NOTE — ED ADULT NURSE NOTE - COVID-19 RESULT
SUBJECTIVE:  Dennys Escudero is 39 year old  who is being treated for ADHD. No side effects with Vyvanse    Are your symptoms controlled?   YES  Are you satisfied with your current medication dosage? YES  Are you taking your medication regularly?YES-sometimes takes weekends off  Are you experiencing any insomnia? No  Are you experiencing any jitteriness? No  Are you experiencing any loss of appetite or weight loss? No  Are you experiencing any other side effects? No        ASSESSMENT:  ADHD- control is good    PLAN:  Continue  Current dose- refills for six months   .  Patient has been informed that I will refill her medications again in three months and then she will need to transfer care  to a new provider.     More than 50% of visit spent in counseling.  15 minute visit  
NEGATIVE

## 2022-07-26 NOTE — H&P ADULT - PROBLEM SELECTOR PLAN 1
recent admission for GIB 2/2 to radiation gastritis/duodenitis s/p RFA. Concern similar etiology. Currently HDS  -transfuse 1 unit prbc  -GI consulted for possible scope (EGD vs [place] vs both), appreciate recs  -continue ppi and sucralfate  -If repeat INR elevated, will give vitamin K  -active T&S, maintain PIVs   -trend CBC recent admission for GIB 2/2 to radiation gastritis/duodenitis s/p RFA. Concern similar etiology. Currently HDS  -transfuse 1 unit prbc  -GI consulted for possible scope (EGD vs [place] vs both), appreciate recs  -continue sucralfate, PPI BID  -If repeat INR elevated, will give vitamin K  -active T&S, maintain PIVs   -trend CBC recent admission for GIB 2/2 to radiation gastritis/duodenitis s/p RFA. Concern similar etiology. Currently HDS  -transfuse 1 unit prbc  -GI consulted for possible scope (EGD vs [place] vs both), appreciate recs  -continue sucralfate, PPI BID  -active T&S, maintain PIVs   -trend CBC

## 2022-07-26 NOTE — H&P ADULT - PROBLEM SELECTOR PLAN 4
-Losartan 100mg of HERMINIO, much improved on doxy + augmentin for 8 days  -appears thrombophlebitis at this time, warm compresses  -holding doxy as concern increased gastric irritation  -holding augmentin as has received 7 day course of HERMINIO, much improved on doxy + amoxicillin for 8 days  -appears thrombophlebitis at this time, warm compresses  -holding doxy as concern increased gastric irritation  -continue amoxicillin for 4 more days, consider deescalation if continues to improve

## 2022-07-26 NOTE — ED PROVIDER NOTE - PHYSICAL EXAMINATION
Gen - NAD; well-appearing; A+Ox3   HEENT - NCAT, EOMI  Neck - supple  Resp - CTAB, no increased WOB  CV -  RRR, no m/r/g  Abd - soft, NT, ND; no guarding or rebound  Back - no midline, paraspinous, or CVA tenderness  Extrem - no LE edema/erythema/tenderness  Neuro - no focal motor or sensation deficits  Skin - warm, well perfused  Rectal (chaperone RN [place]) - no external/internal hemorrhoids, no gross blood, +brown stool

## 2022-07-27 LAB
ALBUMIN SERPL ELPH-MCNC: 3.8 G/DL — SIGNIFICANT CHANGE UP (ref 3.3–5)
ALP SERPL-CCNC: 130 U/L — HIGH (ref 40–120)
ALT FLD-CCNC: 24 U/L — SIGNIFICANT CHANGE UP (ref 10–45)
ANION GAP SERPL CALC-SCNC: 12 MMOL/L — SIGNIFICANT CHANGE UP (ref 5–17)
APTT BLD: 24.5 SEC — LOW (ref 27.5–35.5)
AST SERPL-CCNC: 32 U/L — SIGNIFICANT CHANGE UP (ref 10–40)
BASOPHILS # BLD AUTO: 0 K/UL — SIGNIFICANT CHANGE UP (ref 0–0.2)
BASOPHILS NFR BLD AUTO: 0 % — SIGNIFICANT CHANGE UP (ref 0–2)
BILIRUB SERPL-MCNC: 0.3 MG/DL — SIGNIFICANT CHANGE UP (ref 0.2–1.2)
BUN SERPL-MCNC: 19 MG/DL — SIGNIFICANT CHANGE UP (ref 7–23)
BURR CELLS BLD QL SMEAR: SLIGHT — SIGNIFICANT CHANGE UP
CALCIUM SERPL-MCNC: 8.9 MG/DL — SIGNIFICANT CHANGE UP (ref 8.4–10.5)
CHLORIDE SERPL-SCNC: 101 MMOL/L — SIGNIFICANT CHANGE UP (ref 96–108)
CO2 SERPL-SCNC: 21 MMOL/L — LOW (ref 22–31)
CREAT SERPL-MCNC: 0.72 MG/DL — SIGNIFICANT CHANGE UP (ref 0.5–1.3)
EGFR: 92 ML/MIN/1.73M2 — SIGNIFICANT CHANGE UP
ELLIPTOCYTES BLD QL SMEAR: SLIGHT — SIGNIFICANT CHANGE UP
EOSINOPHIL # BLD AUTO: 0.06 K/UL — SIGNIFICANT CHANGE UP (ref 0–0.5)
EOSINOPHIL NFR BLD AUTO: 0.9 % — SIGNIFICANT CHANGE UP (ref 0–6)
FERRITIN SERPL-MCNC: 171 NG/ML — HIGH (ref 15–150)
GLUCOSE SERPL-MCNC: 100 MG/DL — HIGH (ref 70–99)
HCT VFR BLD CALC: 24.3 % — LOW (ref 34.5–45)
HCT VFR BLD CALC: 24.4 % — LOW (ref 34.5–45)
HGB BLD-MCNC: 7.9 G/DL — LOW (ref 11.5–15.5)
HGB BLD-MCNC: 8.1 G/DL — LOW (ref 11.5–15.5)
HOWELL-JOLLY BOD BLD QL SMEAR: PRESENT — SIGNIFICANT CHANGE UP
HYPOCHROMIA BLD QL: SLIGHT — SIGNIFICANT CHANGE UP
INR BLD: 1 RATIO — SIGNIFICANT CHANGE UP (ref 0.88–1.16)
IRON SATN MFR SERPL: 145 UG/DL — SIGNIFICANT CHANGE UP (ref 30–160)
IRON SATN MFR SERPL: 40 % — SIGNIFICANT CHANGE UP (ref 14–50)
LYMPHOCYTES # BLD AUTO: 1.13 K/UL — SIGNIFICANT CHANGE UP (ref 1–3.3)
LYMPHOCYTES # BLD AUTO: 17.5 % — SIGNIFICANT CHANGE UP (ref 13–44)
MACROCYTES BLD QL: SLIGHT — SIGNIFICANT CHANGE UP
MANUAL SMEAR VERIFICATION: SIGNIFICANT CHANGE UP
MCHC RBC-ENTMCNC: 28.7 PG — SIGNIFICANT CHANGE UP (ref 27–34)
MCHC RBC-ENTMCNC: 29.7 PG — SIGNIFICANT CHANGE UP (ref 27–34)
MCHC RBC-ENTMCNC: 32.5 GM/DL — SIGNIFICANT CHANGE UP (ref 32–36)
MCHC RBC-ENTMCNC: 33.2 GM/DL — SIGNIFICANT CHANGE UP (ref 32–36)
MCV RBC AUTO: 88.4 FL — SIGNIFICANT CHANGE UP (ref 80–100)
MCV RBC AUTO: 89.4 FL — SIGNIFICANT CHANGE UP (ref 80–100)
MONOCYTES # BLD AUTO: 0.73 K/UL — SIGNIFICANT CHANGE UP (ref 0–0.9)
MONOCYTES NFR BLD AUTO: 11.4 % — SIGNIFICANT CHANGE UP (ref 2–14)
NEUTROPHILS # BLD AUTO: 4.52 K/UL — SIGNIFICANT CHANGE UP (ref 1.8–7.4)
NEUTROPHILS NFR BLD AUTO: 69.3 % — SIGNIFICANT CHANGE UP (ref 43–77)
NEUTS BAND # BLD: 0.9 % — SIGNIFICANT CHANGE UP (ref 0–8)
NRBC # BLD: 0 /100 WBCS — SIGNIFICANT CHANGE UP (ref 0–0)
PAPPENHEIMER BOD BLD QL SMEAR: PRESENT — SIGNIFICANT CHANGE UP
PLAT MORPH BLD: NORMAL — SIGNIFICANT CHANGE UP
PLATELET # BLD AUTO: 326 K/UL — SIGNIFICANT CHANGE UP (ref 150–400)
PLATELET # BLD AUTO: 345 K/UL — SIGNIFICANT CHANGE UP (ref 150–400)
POIKILOCYTOSIS BLD QL AUTO: SIGNIFICANT CHANGE UP
POLYCHROMASIA BLD QL SMEAR: SLIGHT — SIGNIFICANT CHANGE UP
POTASSIUM SERPL-MCNC: 5 MMOL/L — SIGNIFICANT CHANGE UP (ref 3.5–5.3)
POTASSIUM SERPL-SCNC: 5 MMOL/L — SIGNIFICANT CHANGE UP (ref 3.5–5.3)
PROT SERPL-MCNC: 6.4 G/DL — SIGNIFICANT CHANGE UP (ref 6–8.3)
PROTHROM AB SERPL-ACNC: 11.6 SEC — SIGNIFICANT CHANGE UP (ref 10.5–13.4)
RBC # BLD: 2.73 M/UL — LOW (ref 3.8–5.2)
RBC # BLD: 2.75 M/UL — LOW (ref 3.8–5.2)
RBC # FLD: 21.8 % — HIGH (ref 10.3–14.5)
RBC # FLD: 22.7 % — HIGH (ref 10.3–14.5)
RBC BLD AUTO: ABNORMAL
SCHISTOCYTES BLD QL AUTO: SIGNIFICANT CHANGE UP
SODIUM SERPL-SCNC: 134 MMOL/L — LOW (ref 135–145)
TIBC SERPL-MCNC: 365 UG/DL — SIGNIFICANT CHANGE UP (ref 220–430)
UIBC SERPL-MCNC: 219 UG/DL — SIGNIFICANT CHANGE UP (ref 110–370)
WBC # BLD: 6.44 K/UL — SIGNIFICANT CHANGE UP (ref 3.8–10.5)
WBC # BLD: 6.51 K/UL — SIGNIFICANT CHANGE UP (ref 3.8–10.5)
WBC # FLD AUTO: 6.44 K/UL — SIGNIFICANT CHANGE UP (ref 3.8–10.5)
WBC # FLD AUTO: 6.51 K/UL — SIGNIFICANT CHANGE UP (ref 3.8–10.5)

## 2022-07-27 PROCEDURE — 99232 SBSQ HOSP IP/OBS MODERATE 35: CPT

## 2022-07-27 PROCEDURE — 99232 SBSQ HOSP IP/OBS MODERATE 35: CPT | Mod: GC

## 2022-07-27 RX ORDER — SOD SULF/SODIUM/NAHCO3/KCL/PEG
4000 SOLUTION, RECONSTITUTED, ORAL ORAL ONCE
Refills: 0 | Status: COMPLETED | OUTPATIENT
Start: 2022-07-27 | End: 2022-07-27

## 2022-07-27 RX ADMIN — Medication 4000 MILLILITER(S): at 21:00

## 2022-07-27 RX ADMIN — Medication 875 MILLIGRAM(S): at 19:53

## 2022-07-27 RX ADMIN — LOSARTAN POTASSIUM 100 MILLIGRAM(S): 100 TABLET, FILM COATED ORAL at 05:54

## 2022-07-27 RX ADMIN — OXCARBAZEPINE 300 MILLIGRAM(S): 300 TABLET, FILM COATED ORAL at 05:54

## 2022-07-27 RX ADMIN — OXCARBAZEPINE 300 MILLIGRAM(S): 300 TABLET, FILM COATED ORAL at 19:53

## 2022-07-27 RX ADMIN — Medication 2000 MILLILITER(S): at 16:33

## 2022-07-27 RX ADMIN — Medication 325 MILLIGRAM(S): at 12:00

## 2022-07-27 RX ADMIN — Medication 100 MICROGRAM(S): at 05:54

## 2022-07-27 RX ADMIN — MONTELUKAST 10 MILLIGRAM(S): 4 TABLET, CHEWABLE ORAL at 12:32

## 2022-07-27 RX ADMIN — Medication 10 MILLIGRAM(S): at 22:05

## 2022-07-27 RX ADMIN — PANTOPRAZOLE SODIUM 40 MILLIGRAM(S): 20 TABLET, DELAYED RELEASE ORAL at 05:54

## 2022-07-27 RX ADMIN — DULOXETINE HYDROCHLORIDE 30 MILLIGRAM(S): 30 CAPSULE, DELAYED RELEASE ORAL at 11:59

## 2022-07-27 RX ADMIN — Medication 1 GRAM(S): at 05:55

## 2022-07-27 RX ADMIN — Medication 1 GRAM(S): at 12:32

## 2022-07-27 RX ADMIN — Medication 1 GRAM(S): at 19:52

## 2022-07-27 RX ADMIN — Medication 875 MILLIGRAM(S): at 06:04

## 2022-07-27 RX ADMIN — PANTOPRAZOLE SODIUM 40 MILLIGRAM(S): 20 TABLET, DELAYED RELEASE ORAL at 19:53

## 2022-07-27 RX ADMIN — ESCITALOPRAM OXALATE 20 MILLIGRAM(S): 10 TABLET, FILM COATED ORAL at 11:59

## 2022-07-27 NOTE — PROGRESS NOTE ADULT - ATTENDING COMMENTS
Patient known to GI service, admitted previously for JERSON and now readmitted for same.   EGD on recent admission significant for gastritis/duodenitis likely 2/2 radiation and s/p RFA.   No current overt signs of bleeding.     Was planned for egd/colonoscopy today but patient deferred prep until she is able to prep in a room.   Will plan for tomorrow.   trend h/h, piv x 2  monitor bowel movements  GI to follow, please call with questions.

## 2022-07-27 NOTE — PROGRESS NOTE ADULT - ASSESSMENT
66F Hx stage IIB PDA C S/P distal pancreatectomy and splenectomy on 6/24/2021 followed by adjuvant FOLFIRINOX 8/2021-1/2022 and adjuvant capecitabine with RT completed 3/2022, GERD recent admission for anemia 7/2022 found to have radiation gastritis/duodenitis s/p RFA. Now presenting again for anemia w/ Hb 6.9.     Impression:   #JERSON, Hx radiation gastritis: presenting for second admission for anemia w/ Hb 6.9 (Hb last 8.7 on d/c). Anemia labs c/w JERSON. Recent EGD (7/13/22) showed radiation gastritis/duodenitis s/p RFA + declined colonoscopy last admission. Ddx likely acute blood loss anemia 2/2 radiation gastritis/duodenitis vs may be LGI sources of acute blood loss anemia (bleeding polyps, diverticulosis, hemorrhoids, angioectasias, malignancy).     #GERD: on daily PPI      Recommendations:   - Trend CBC, transfuse Hb < 7  - Active T&S  - PPI BID  - Plan for EGD/colonoscopy for JERSON 7/28  - Clear liquid diet 7/27  - Prep: moviprep 2L + dulcolax  - NPO after MN (except for prep)  - Avoid NSAIDs  - IV iron per heme/onc          Thank you for involving us in the care of this patient, please reach out if any further questions.     Donovan Olsen MD  Gastroenterology/Hepatology Fellow, PGY6    Available on Microsoft Teams  [phone] ([facility])  [phone] ([facility])  Please contact on call fellow weekdays after 5pm-7am and weekends: [phone]   66F Hx stage IIB PDA C S/P distal pancreatectomy and splenectomy on 6/24/2021 followed by adjuvant FOLFIRINOX 8/2021-1/2022 and adjuvant capecitabine with RT completed 3/2022, GERD recent admission for anemia 7/2022 found to have radiation gastritis/duodenitis s/p RFA. Now presenting again for anemia w/ Hb 6.9.     Impression:   #JERSON, Hx radiation gastritis: presenting for second admission for anemia w/ Hb 6.9 (Hb last 8.7 on d/c) now s/p 1U --> Hb 8s. Anemia labs c/w JERSON. Recent EGD (7/13/22) showed radiation gastritis/duodenitis s/p RFA + declined colonoscopy last admission. Ddx likely acute blood loss anemia 2/2 radiation gastritis/duodenitis vs may be LGI sources of acute blood loss anemia (bleeding polyps, diverticulosis, hemorrhoids, angioectasias, malignancy).     #GERD: on daily PPI      Recommendations:   - Trend CBC, transfuse Hb < 7  - Active T&S  - PPI BID  - Plan for EGD/colonoscopy for JERSON 7/28 (if patient able to obtain room as opposed to remaining in ER)  - Clear liquid diet 7/27  - Prep: moviprep 2L + dulcolax 7/27   - NPO after MN (except for prep)  - Avoid NSAIDs  - IV iron per heme/onc (patient likely to have on-going occult blood loss 2/2 radiation gastritis)        Thank you for involving us in the care of this patient, please reach out if any further questions.     Donovan Olsne MD  Gastroenterology/Hepatology Fellow, PGY6    Available on Microsoft Teams  [phone] ([facility])  [id number] ([facility])  Please contact on call fellow weekdays after 5pm-7am and weekends: [phone]

## 2022-07-27 NOTE — PATIENT PROFILE ADULT - DO YOU NEED ADDITIONAL SERVICES TO MANAGE ANY OF THESE MEDICAL CONDITIONS AT HOME?
Subjective:      Burton Montenegro is a 61 y.o. female who presents today for   Chief Complaint   Patient presents with    Hypertension     Hypertension- compliant with meds and denies side effects  Needs refills on antihypertensives, states she is compliant and denies side effects  She ran out of her meds about a week ago    Vit d def- takes daily    Hyperlipidemia- not taking crestor    Chronic bronchitis- no recent flares    Tobacco abuse- not currently interested in quitting    Pulmonary nodules needs referral to plmonary    Wants to wait on labs until she has a physical    Patient Active Problem List    Diagnosis Date Noted    Essential hypertension 09/24/2017    Bronchitis 09/24/2017    Tobacco abuse 09/24/2017    Depression 09/24/2017    Thyromegaly 09/24/2017     Current Outpatient Medications   Medication Sig Dispense Refill    amLODIPine-benazepril (LOTREL) 10-40 mg per capsule TAKE 1 CAPSULE BY MOUTH EVERY DAY 90 Cap 0    hydroCHLOROthiazide (HYDRODIURIL) 25 mg tablet TAKE 1 TABLET BY MOUTH EVERY DAY 90 Tab 0    cholecalciferol, vitamin D3, 50,000 unit tab Take 50,000 Units by mouth every seven (7) days. Indications: Vitamin D Deficiency 8 Tab 0    rosuvastatin (CRESTOR) 10 mg tablet Take 1 Tab by mouth nightly. Indications: hyperlipidemia 30 Tab 3     No Known Allergies  Past Medical History:   Diagnosis Date    Hypertension      History reviewed. No pertinent surgical history. Family History   Problem Relation Age of Onset    Diabetes Mother      Social History     Tobacco Use    Smoking status: Current Every Day Smoker     Packs/day: 1.00    Smokeless tobacco: Never Used   Substance Use Topics    Alcohol use: Yes        Review of Systems    A comprehensive review of systems was negative except for that written in the HPI.      Objective:     Visit Vitals  /85   Pulse 62   Temp 98 °F (36.7 °C) (Oral)   Resp 18   Ht 5' 7\" (1.702 m)   Wt 181 lb (82.1 kg)   SpO2 100%   BMI 28.35 kg/m² General:  Alert, cooperative, no distress, appears stated age. Head:  Normocephalic, without obvious abnormality, atraumatic. Eyes:  Conjunctivae/corneas clear. PERRL, EOMs intact. Fundi benign. Ears:  Normal TMs and external ear canals both ears. Nose: Nares normal. Septum midline. Mucosa normal. No drainage or sinus tenderness. Throat: Lips, mucosa, and tongue normal. Teeth and gums normal.   Neck: Supple, symmetrical, trachea midline, no adenopathy, thyroid: no enlargement/tenderness/nodules, no carotid bruit and no JVD. Back:   Symmetric, no curvature. ROM normal. No CVA tenderness. Lungs:   Clear to auscultation bilaterally. Chest wall:  No tenderness or deformity. Heart:  Regular rate and rhythm, S1, S2 normal, no murmur, click, rub or gallop. Abdomen:   Soft, non-tender. Bowel sounds normal. No masses,  No organomegaly. Extremities: Extremities normal, atraumatic, no cyanosis or edema. Pulses: 2+ and symmetric all extremities. Skin: Skin color, texture, turgor normal. No rashes or lesions. Lymph nodes: Cervical, supraclavicular, and axillary nodes normal.   Neurologic: CNII-XII intact. Normal strength, sensation and reflexes throughout. Assessment/Plan:       ICD-10-CM ICD-9-CM    1. Essential hypertension  I10 401.9 Refill lotrel and hctz   2. Tobacco abuse counseling  Z71.6 V65.42 Patient counseled again today. Not interested in quitting at this time     305.1    3. Hyperlipidemia- start crestor 10 mg po daily  Low fat, low cholesterol diet       Advised her to call back or return to office if symptoms worsen/change/persist.  Discussed expected course/resolution/complications of diagnosis in detail with patient. Medication risks/benefits/costs/interactions/alternatives discussed with patient. She was given an after visit summary which includes diagnoses, current medications, & vitals. She expressed understanding with the diagnosis and plan. no

## 2022-07-27 NOTE — PROGRESS NOTE ADULT - ASSESSMENT
67 y/o F with h/o HTN, HLD, remote seizure disorder, pancreatic cancer s/p distal pancreatectomy and splenectomy followed by adjuvant FOLFIRINOX, Capecitabine and RT completed 3/2022 with recent presentation for anemia 2/2 to radiation gastritis/duodenitis s/p RFA presenting with symptomatic anemia c/f GIB.

## 2022-07-27 NOTE — PROVIDER CONTACT NOTE (MEDICATION) - ASSESSMENT
Pt A&Ox4 states does not want to take bowel prep while in ED, would prefer to wait till she gets a room. Pt is aware that this may delay colonoscopy, and accepts the risks.

## 2022-07-27 NOTE — PROGRESS NOTE ADULT - ASSESSMENT
Ms. Moran is a 66 year old woman with history of pancreatic cancer managed at [facility] referred to the ED for symptomatic anemia.    # Anemia  ­ was recently hospitalized and underwent EGD  ­ acute anemia thought to be due to radiation induced gastritis/duodenitis s/p radiofrequency ablation  ­ seen outpatient, but found to have hemoglobin decrease from 7.9 to 7.1 (7/25/22) over 1 week  ­ developed symptomatic anemia, HGB 6.9 here and FOBT positive  - S/P 1 unit PRBCs  - Started on FeSO4 325 mg PO daily. Iron studies have improved  ­ Patient seen by GI - pending EGD, colonoscopy    # Cellulitis of R forearm  ­ precipitated by IV placement while inpatient  - improving, continue management per primary  ­ may need I&D if abscess develops    # History of pancreatic cancer  - low suspicion for recurrence, but will continue to monitor  - continue regular follow up with [facility] Dr. Selena Bess upon discharge    After discharge patient will F/U with Dr. Bess at [facility] for Ca Pancreas and Dr. Michi Cruz of [facility] for iron deficiency anemia    We will continue to follow patient and coordinate with [facility].    Tito Enciso PA-C  Hematology/Oncology  [facility]   [phone] (office)  [phone] (alt office)  Evenings and weekends please call MD on call or office

## 2022-07-28 ENCOUNTER — RESULT REVIEW (OUTPATIENT)
Age: 67
End: 2022-07-28

## 2022-07-28 LAB
ALBUMIN SERPL ELPH-MCNC: 3.4 G/DL — SIGNIFICANT CHANGE UP (ref 3.3–5)
ALP SERPL-CCNC: 122 U/L — HIGH (ref 40–120)
ALT FLD-CCNC: 21 U/L — SIGNIFICANT CHANGE UP (ref 10–45)
ANION GAP SERPL CALC-SCNC: 12 MMOL/L — SIGNIFICANT CHANGE UP (ref 5–17)
AST SERPL-CCNC: 25 U/L — SIGNIFICANT CHANGE UP (ref 10–40)
BASOPHILS # BLD AUTO: 0.05 K/UL — SIGNIFICANT CHANGE UP (ref 0–0.2)
BASOPHILS NFR BLD AUTO: 0.9 % — SIGNIFICANT CHANGE UP (ref 0–2)
BILIRUB SERPL-MCNC: 0.2 MG/DL — SIGNIFICANT CHANGE UP (ref 0.2–1.2)
BUN SERPL-MCNC: 12 MG/DL — SIGNIFICANT CHANGE UP (ref 7–23)
CALCIUM SERPL-MCNC: 8.4 MG/DL — SIGNIFICANT CHANGE UP (ref 8.4–10.5)
CHLORIDE SERPL-SCNC: 103 MMOL/L — SIGNIFICANT CHANGE UP (ref 96–108)
CO2 SERPL-SCNC: 19 MMOL/L — LOW (ref 22–31)
CREAT SERPL-MCNC: 0.54 MG/DL — SIGNIFICANT CHANGE UP (ref 0.5–1.3)
EGFR: 101 ML/MIN/1.73M2 — SIGNIFICANT CHANGE UP
EOSINOPHIL # BLD AUTO: 0.11 K/UL — SIGNIFICANT CHANGE UP (ref 0–0.5)
EOSINOPHIL NFR BLD AUTO: 2 % — SIGNIFICANT CHANGE UP (ref 0–6)
GLUCOSE SERPL-MCNC: 91 MG/DL — SIGNIFICANT CHANGE UP (ref 70–99)
HCT VFR BLD CALC: 25.3 % — LOW (ref 34.5–45)
HGB BLD-MCNC: 8.1 G/DL — LOW (ref 11.5–15.5)
IMM GRANULOCYTES NFR BLD AUTO: 0.7 % — SIGNIFICANT CHANGE UP (ref 0–1.5)
LYMPHOCYTES # BLD AUTO: 1.28 K/UL — SIGNIFICANT CHANGE UP (ref 1–3.3)
LYMPHOCYTES # BLD AUTO: 23.6 % — SIGNIFICANT CHANGE UP (ref 13–44)
MCHC RBC-ENTMCNC: 30 PG — SIGNIFICANT CHANGE UP (ref 27–34)
MCHC RBC-ENTMCNC: 32 GM/DL — SIGNIFICANT CHANGE UP (ref 32–36)
MCV RBC AUTO: 93.7 FL — SIGNIFICANT CHANGE UP (ref 80–100)
MONOCYTES # BLD AUTO: 0.75 K/UL — SIGNIFICANT CHANGE UP (ref 0–0.9)
MONOCYTES NFR BLD AUTO: 13.8 % — SIGNIFICANT CHANGE UP (ref 2–14)
NEUTROPHILS # BLD AUTO: 3.19 K/UL — SIGNIFICANT CHANGE UP (ref 1.8–7.4)
NEUTROPHILS NFR BLD AUTO: 59 % — SIGNIFICANT CHANGE UP (ref 43–77)
NRBC # BLD: 0 /100 WBCS — SIGNIFICANT CHANGE UP (ref 0–0)
PLATELET # BLD AUTO: 260 K/UL — SIGNIFICANT CHANGE UP (ref 150–400)
POTASSIUM SERPL-MCNC: 3.9 MMOL/L — SIGNIFICANT CHANGE UP (ref 3.5–5.3)
POTASSIUM SERPL-SCNC: 3.9 MMOL/L — SIGNIFICANT CHANGE UP (ref 3.5–5.3)
PROT SERPL-MCNC: 6.1 G/DL — SIGNIFICANT CHANGE UP (ref 6–8.3)
RBC # BLD: 2.7 M/UL — LOW (ref 3.8–5.2)
RBC # FLD: 23.5 % — HIGH (ref 10.3–14.5)
SODIUM SERPL-SCNC: 134 MMOL/L — LOW (ref 135–145)
WBC # BLD: 5.42 K/UL — SIGNIFICANT CHANGE UP (ref 3.8–10.5)
WBC # FLD AUTO: 5.42 K/UL — SIGNIFICANT CHANGE UP (ref 3.8–10.5)

## 2022-07-28 PROCEDURE — 88305 TISSUE EXAM BY PATHOLOGIST: CPT | Mod: 26

## 2022-07-28 PROCEDURE — 45378 DIAGNOSTIC COLONOSCOPY: CPT | Mod: GC

## 2022-07-28 PROCEDURE — 43255 EGD CONTROL BLEEDING ANY: CPT | Mod: GC

## 2022-07-28 PROCEDURE — 99232 SBSQ HOSP IP/OBS MODERATE 35: CPT | Mod: 25

## 2022-07-28 PROCEDURE — 88312 SPECIAL STAINS GROUP 1: CPT | Mod: 26

## 2022-07-28 DEVICE — CATH CHANNEL RFA ENDOSCOPSIC: Type: IMPLANTABLE DEVICE | Status: FUNCTIONAL

## 2022-07-28 DEVICE — NET RETRV ROT ROTH 2.5MMX230CM: Type: IMPLANTABLE DEVICE | Status: FUNCTIONAL

## 2022-07-28 RX ORDER — SOD SULF/SODIUM/NAHCO3/KCL/PEG
1 SOLUTION, RECONSTITUTED, ORAL ORAL ONCE
Refills: 0 | Status: COMPLETED | OUTPATIENT
Start: 2022-07-28 | End: 2022-07-28

## 2022-07-28 RX ORDER — IPRATROPIUM/ALBUTEROL SULFATE 18-103MCG
3 AEROSOL WITH ADAPTER (GRAM) INHALATION ONCE
Refills: 0 | Status: DISCONTINUED | OUTPATIENT
Start: 2022-07-28 | End: 2022-07-30

## 2022-07-28 RX ORDER — SODIUM CHLORIDE 9 MG/ML
500 INJECTION INTRAMUSCULAR; INTRAVENOUS; SUBCUTANEOUS
Refills: 0 | Status: DISCONTINUED | OUTPATIENT
Start: 2022-07-28 | End: 2022-07-30

## 2022-07-28 RX ORDER — LIDOCAINE HCL 20 MG/ML
4 VIAL (ML) INJECTION ONCE
Refills: 0 | Status: DISCONTINUED | OUTPATIENT
Start: 2022-07-28 | End: 2022-07-30

## 2022-07-28 RX ADMIN — PANTOPRAZOLE SODIUM 40 MILLIGRAM(S): 20 TABLET, DELAYED RELEASE ORAL at 17:24

## 2022-07-28 RX ADMIN — Medication 1 LITER(S): at 09:14

## 2022-07-28 RX ADMIN — Medication 1 GRAM(S): at 12:32

## 2022-07-28 RX ADMIN — Medication 875 MILLIGRAM(S): at 05:43

## 2022-07-28 RX ADMIN — OXCARBAZEPINE 300 MILLIGRAM(S): 300 TABLET, FILM COATED ORAL at 17:24

## 2022-07-28 RX ADMIN — Medication 325 MILLIGRAM(S): at 17:24

## 2022-07-28 RX ADMIN — LOSARTAN POTASSIUM 100 MILLIGRAM(S): 100 TABLET, FILM COATED ORAL at 05:44

## 2022-07-28 RX ADMIN — DULOXETINE HYDROCHLORIDE 30 MILLIGRAM(S): 30 CAPSULE, DELAYED RELEASE ORAL at 12:32

## 2022-07-28 RX ADMIN — PANTOPRAZOLE SODIUM 40 MILLIGRAM(S): 20 TABLET, DELAYED RELEASE ORAL at 05:44

## 2022-07-28 RX ADMIN — Medication 1 GRAM(S): at 17:25

## 2022-07-28 RX ADMIN — Medication 1 GRAM(S): at 05:43

## 2022-07-28 RX ADMIN — MONTELUKAST 10 MILLIGRAM(S): 4 TABLET, CHEWABLE ORAL at 12:32

## 2022-07-28 RX ADMIN — SODIUM CHLORIDE 60 MILLILITER(S): 9 INJECTION INTRAMUSCULAR; INTRAVENOUS; SUBCUTANEOUS at 06:39

## 2022-07-28 RX ADMIN — OXCARBAZEPINE 300 MILLIGRAM(S): 300 TABLET, FILM COATED ORAL at 05:44

## 2022-07-28 RX ADMIN — ESCITALOPRAM OXALATE 20 MILLIGRAM(S): 10 TABLET, FILM COATED ORAL at 12:32

## 2022-07-28 RX ADMIN — Medication 875 MILLIGRAM(S): at 17:24

## 2022-07-28 RX ADMIN — Medication 100 MICROGRAM(S): at 05:44

## 2022-07-28 NOTE — PROGRESS NOTE ADULT - ASSESSMENT
Ms. Moran is a 66 year old woman with history of pancreatic cancer managed at [facility] referred to the ED for symptomatic anemia.    # Anemia  ­ was recently hospitalized and underwent EGD  ­ acute anemia thought to be due to radiation induced gastritis/duodenitis s/p radiofrequency ablation  ­ seen outpatient, but found to have hemoglobin decrease from 7.9 to 7.1 (7/25/22) over 1 week  ­ developed symptomatic anemia, HGB 6.9 here and FOBT positive  - S/P 1 unit PRBCs  - Started on FeSO4 325 mg PO daily. Iron studies have improved  ­ Patient seen by GI - pending EGD, colonoscopy today 7/28/2022    # Cellulitis of R forearm  ­ precipitated by IV placement while inpatient  - improving, continue management per primary  ­ may need  I& D if abscess develops    # History of pancreatic cancer  - low suspicion for recurrence, but will continue to monitor  - continue regular [place] [facility] [place] Selena Bess upon discharge    After discharge patient will F/U [place]. Bess [place] [facility] [place] and Dr. Michi Cruz of [facility] for iron deficiency anemia    We will continue to follow patient and coordinate with [facility].    Tito Enciso PA-C  Hematology/Oncology  [facility]   [phone] (office)  [phone] (alt office)  Evenings and weekends please call MD on call or office

## 2022-07-28 NOTE — PRE PROCEDURE NOTE - PRE PROCEDURE EVALUATION
Attending Physician:    Dr. Meyers                        Procedure:   EGD-Colonoscopy    Indication for Procedure:   Anemia  ________________________________________________________  PAST MEDICAL & SURGICAL HISTORY:    Pancreatic cancer  Hypertension  Hypothyroid  Seasonal asthma    History of pancreatectomy    ALLERGIES:  Dilantin (Unknown)    HOME MEDICATIONS:  Breo Ellipta:   DULoxetine: 30 milligram(s) orally once a day  escitalopram 20 mg oral tablet: 1 tab(s) orally once a day  Fosamax 70 mg oral tablet: 1 tab(s) orally once a week SUNDAY  losartan 100 mg oral tablet: 1 tab(s) orally once a day  montelukast 10 mg oral tablet: 1 tab(s) orally once a day  Synthroid 100 mcg (0.1 mg) oral tablet: 1 tab(s) orally once a day  Trileptal 300 mg oral tablet: 1 tab(s) orally 2 times a day    AICD/PPM: [ ] yes   [X ] no    PERTINENT LAB DATA:                        8.1    5.42  )-----------( 260      ( 28 Jul 2022 07:10 )             25.3     07-28    134<L>  |  103  |  12  ----------------------------<  91  3.9   |  19<L>  |  0.54    Ca    8.4      28 Jul 2022 07:09  TPro  6.1  /  Alb  3.4  /  TBili  0.2  /  DBili  x   /  AST  25  /  ALT  21  /  AlkPhos  122<H>  07-28  PT/INR - ( 27 Jul 2022 07:33 )   PT: 11.6 sec;   INR: 1.00 ratio    PTT - ( 27 Jul 2022 07:33 )  PTT:24.5 sec    PHYSICAL EXAMINATION:    Height (cm): 156.2  Weight (kg): 48.7  BMI (kg/m2): 20  BSA (m2): 1.46T(C): 36.7  HR: 91  BP: 112/70  RR: 16  SpO2: 97%    Constitutional: NAD    Neck:  No JVD  Respiratory: CTAB/L  Cardiovascular: S1 and S2  Gastrointestinal: BS+, soft, NT/ND  Extremities: No peripheral edema  Neurological: A/O x 4    COMMENTS:    The patient is a suitable candidate for the planned procedure unless box checked [ ]  No, explain:

## 2022-07-28 NOTE — PROGRESS NOTE ADULT - ASSESSMENT
67 y/o F with h/o HTN, HLD, remote seizure disorder, pancreatic cancer s/p distal pancreatectomy and splenectomy followed by adjuvant FOLFIRINOX, Capecitabine and RT completed 3/2022 with recent presentation for anemia 2/2 to radiation gastritis/duodenitis s/p RFA presenting with symptomatic anemia c/f GIB, s/p 1u PRBC with improved and stable Hb, now pending EGD/colonoscopy with GI.

## 2022-07-29 LAB
ANION GAP SERPL CALC-SCNC: 10 MMOL/L — SIGNIFICANT CHANGE UP (ref 5–17)
BLD GP AB SCN SERPL QL: NEGATIVE — SIGNIFICANT CHANGE UP
BUN SERPL-MCNC: 8 MG/DL — SIGNIFICANT CHANGE UP (ref 7–23)
CALCIUM SERPL-MCNC: 8.3 MG/DL — LOW (ref 8.4–10.5)
CHLORIDE SERPL-SCNC: 103 MMOL/L — SIGNIFICANT CHANGE UP (ref 96–108)
CO2 SERPL-SCNC: 22 MMOL/L — SIGNIFICANT CHANGE UP (ref 22–31)
CREAT SERPL-MCNC: 0.59 MG/DL — SIGNIFICANT CHANGE UP (ref 0.5–1.3)
EGFR: 99 ML/MIN/1.73M2 — SIGNIFICANT CHANGE UP
GLUCOSE SERPL-MCNC: 142 MG/DL — HIGH (ref 70–99)
HCT VFR BLD CALC: 22.6 % — LOW (ref 34.5–45)
HCT VFR BLD CALC: 23.3 % — LOW (ref 34.5–45)
HCT VFR BLD CALC: 26 % — LOW (ref 34.5–45)
HGB BLD-MCNC: 7.3 G/DL — LOW (ref 11.5–15.5)
HGB BLD-MCNC: 7.5 G/DL — LOW (ref 11.5–15.5)
HGB BLD-MCNC: 8.5 G/DL — LOW (ref 11.5–15.5)
MCHC RBC-ENTMCNC: 29.1 PG — SIGNIFICANT CHANGE UP (ref 27–34)
MCHC RBC-ENTMCNC: 29.2 PG — SIGNIFICANT CHANGE UP (ref 27–34)
MCHC RBC-ENTMCNC: 29.3 PG — SIGNIFICANT CHANGE UP (ref 27–34)
MCHC RBC-ENTMCNC: 32.2 GM/DL — SIGNIFICANT CHANGE UP (ref 32–36)
MCHC RBC-ENTMCNC: 32.3 GM/DL — SIGNIFICANT CHANGE UP (ref 32–36)
MCHC RBC-ENTMCNC: 32.7 GM/DL — SIGNIFICANT CHANGE UP (ref 32–36)
MCV RBC AUTO: 89.7 FL — SIGNIFICANT CHANGE UP (ref 80–100)
MCV RBC AUTO: 90 FL — SIGNIFICANT CHANGE UP (ref 80–100)
MCV RBC AUTO: 90.7 FL — SIGNIFICANT CHANGE UP (ref 80–100)
NRBC # BLD: 0 /100 WBCS — SIGNIFICANT CHANGE UP (ref 0–0)
PLATELET # BLD AUTO: 272 K/UL — SIGNIFICANT CHANGE UP (ref 150–400)
PLATELET # BLD AUTO: 302 K/UL — SIGNIFICANT CHANGE UP (ref 150–400)
PLATELET # BLD AUTO: 320 K/UL — SIGNIFICANT CHANGE UP (ref 150–400)
POTASSIUM SERPL-MCNC: 3.6 MMOL/L — SIGNIFICANT CHANGE UP (ref 3.5–5.3)
POTASSIUM SERPL-SCNC: 3.6 MMOL/L — SIGNIFICANT CHANGE UP (ref 3.5–5.3)
RBC # BLD: 2.51 M/UL — LOW (ref 3.8–5.2)
RBC # BLD: 2.57 M/UL — LOW (ref 3.8–5.2)
RBC # BLD: 2.9 M/UL — LOW (ref 3.8–5.2)
RBC # FLD: 20.7 % — HIGH (ref 10.3–14.5)
RBC # FLD: 22.2 % — HIGH (ref 10.3–14.5)
RBC # FLD: 22.2 % — HIGH (ref 10.3–14.5)
RH IG SCN BLD-IMP: POSITIVE — SIGNIFICANT CHANGE UP
SODIUM SERPL-SCNC: 135 MMOL/L — SIGNIFICANT CHANGE UP (ref 135–145)
WBC # BLD: 10.23 K/UL — SIGNIFICANT CHANGE UP (ref 3.8–10.5)
WBC # BLD: 8.2 K/UL — SIGNIFICANT CHANGE UP (ref 3.8–10.5)
WBC # BLD: 8.84 K/UL — SIGNIFICANT CHANGE UP (ref 3.8–10.5)
WBC # FLD AUTO: 10.23 K/UL — SIGNIFICANT CHANGE UP (ref 3.8–10.5)
WBC # FLD AUTO: 8.2 K/UL — SIGNIFICANT CHANGE UP (ref 3.8–10.5)
WBC # FLD AUTO: 8.84 K/UL — SIGNIFICANT CHANGE UP (ref 3.8–10.5)

## 2022-07-29 PROCEDURE — 99232 SBSQ HOSP IP/OBS MODERATE 35: CPT | Mod: GC

## 2022-07-29 PROCEDURE — 99232 SBSQ HOSP IP/OBS MODERATE 35: CPT

## 2022-07-29 RX ADMIN — Medication 1 GRAM(S): at 18:18

## 2022-07-29 RX ADMIN — Medication 875 MILLIGRAM(S): at 06:11

## 2022-07-29 RX ADMIN — Medication 325 MILLIGRAM(S): at 11:51

## 2022-07-29 RX ADMIN — OXCARBAZEPINE 300 MILLIGRAM(S): 300 TABLET, FILM COATED ORAL at 06:11

## 2022-07-29 RX ADMIN — LOSARTAN POTASSIUM 100 MILLIGRAM(S): 100 TABLET, FILM COATED ORAL at 06:14

## 2022-07-29 RX ADMIN — PANTOPRAZOLE SODIUM 40 MILLIGRAM(S): 20 TABLET, DELAYED RELEASE ORAL at 06:11

## 2022-07-29 RX ADMIN — DULOXETINE HYDROCHLORIDE 30 MILLIGRAM(S): 30 CAPSULE, DELAYED RELEASE ORAL at 11:51

## 2022-07-29 RX ADMIN — Medication 100 MICROGRAM(S): at 06:12

## 2022-07-29 RX ADMIN — PANTOPRAZOLE SODIUM 40 MILLIGRAM(S): 20 TABLET, DELAYED RELEASE ORAL at 17:42

## 2022-07-29 RX ADMIN — Medication 1 GRAM(S): at 11:52

## 2022-07-29 RX ADMIN — Medication 1 GRAM(S): at 06:12

## 2022-07-29 RX ADMIN — ESCITALOPRAM OXALATE 20 MILLIGRAM(S): 10 TABLET, FILM COATED ORAL at 11:51

## 2022-07-29 RX ADMIN — OXCARBAZEPINE 300 MILLIGRAM(S): 300 TABLET, FILM COATED ORAL at 17:42

## 2022-07-29 RX ADMIN — Medication 875 MILLIGRAM(S): at 17:42

## 2022-07-29 RX ADMIN — MONTELUKAST 10 MILLIGRAM(S): 4 TABLET, CHEWABLE ORAL at 11:51

## 2022-07-29 RX ADMIN — Medication 1 GRAM(S): at 00:19

## 2022-07-29 NOTE — PROGRESS NOTE ADULT - ATTENDING COMMENTS
Patient known to GI service, admitted previously for JERSON and now readmitted for same.   EGD on recent admission significant for gastritis/duodenitis likely 2/2 radiation and s/p RFA.     s/p EGD yesterday again showing gastritis/duodenitis 2/2 gastritis, s/p RFA.   s/p colonoscopy, exam aborted due to poor prep.   No signs of overt GI bleeding currently.   Hgb 7.5.     - montior bowel movmeents  - trend h/h   - consider prbc transfusion today for hgb 7.5   - will likely have some level of continued slow ooze/bleeding from gastritis/duodenitis and may require further treatment in the future   - continue iron supplementation  - continue ppi   - diet as tolerated   - outpatient GI followup    GI To sign off, please call with questions

## 2022-07-29 NOTE — PROGRESS NOTE ADULT - ASSESSMENT
67 y/o F with h/o HTN, HLD, remote seizure disorder, pancreatic cancer s/p distal pancreatectomy and splenectomy followed by adjuvant FOLFIRINOX, Capecitabine and RT completed 3/2022 with recent presentation for anemia 2/2 to radiation gastritis/duodenitis s/p RFA presenting with symptomatic anemia c/f GIB, s/p 1u PRBC with improved and stable Hb, s/p EGD noted radiation gastritis/duodenitis, insufficient  prep colonoscopy aborted and need outpatient repeat colonoscopy

## 2022-07-29 NOTE — PROGRESS NOTE ADULT - ASSESSMENT
Ms. Moran is a 66 year old woman with history of pancreatic cancer managed at [facility] referred to the ED for symptomatic anemia.    # Anemia  ­ was recently hospitalized and underwent EGD  ­ acute anemia thought to be due to radiation induced gastritis/duodenitis s/p radiofrequency ablation  ­ seen outpatient, but found to have hemoglobin decrease from 7.9 to 7.1 (7/25/22) over 1 week  ­ developed symptomatic anemia, HGB 6.9 here and FOBT positive  - S/P 1 unit PRBCs  - Started on FeSO4 325 mg PO daily. Iron studies have improved  - Please transfuse PRBC's for Hgb <7.5 grams    UGI bleed  ­ S/P EGD which showed white plaques (concern for  candidiasis) and RT gastritis  - S/P RFA  - Colonoscopy was a poor prep - patient will have it repeated after discharge    # Cellulitis of R forearm  ­ precipitated by IV placement while inpatient  - On Amoxicillin  - improving, continue management per primary    # History of pancreatic cancer  - low suspicion for recurrence, but will continue to monitor  - continue regular follow [place] [facility] [place] Selena Bess upon discharge    After discharge patient will F/U [place]. Bess [place] [facility] [place] (has appt 8/1)  and Dr. Michi Cruz of [facility] for iron deficiency anemia    We will continue to follow patient and coordinate with [facility].    Tito Enciso PA-C  Hematology/Oncology  [facility]   [phone] (office)  [phone] (alt office)  Evenings and weekends please call MD on call or office

## 2022-07-29 NOTE — PROGRESS NOTE ADULT - ASSESSMENT
66F Hx stage IIB PDA C S/P distal pancreatectomy and splenectomy on 6/24/2021 followed by adjuvant FOLFIRINOX 8/2021-1/2022 and adjuvant capecitabine with RT completed 3/2022, GERD recent admission for anemia 7/2022 found to have radiation gastritis/duodenitis s/p RFA. Now presenting again for anemia w/ Hb 6.9, repeat EGD with radiation gastritis/duodenitis s/p RFA.     Impression:   #JERSON, Hx radiation gastritis: presenting for second admission for anemia w/ Hb 6.9 (Hb last 8.7 on d/c) now s/p 1U --> Hb 8s. Anemia labs c/w JERSON. Recent EGD (7/13/22) showed radiation gastritis/duodenitis s/p RFA + declined colonoscopy last admission. EGD (7/28/22) showing radiation gastritis/duodenitis s/p RFA. Colon (7/28/22): aborted 2/2 poor prep    #GERD: on daily PPI      Recommendations:   - Repeat CBC --> if stable today no GI contraindications to d/c  - Consider 1U pRBCs today for Hb 7.5  - Trend CBC, transfuse Hb < 7  - Active T&S  - D/c on daily PPI (currently BID)  - IV iron per heme/onc (patient likely to have on-going occult blood loss 2/2 radiation gastritis)  - Plan for colonoscopy for JERSON as an outpatient (f/u GI faculty practice -- [phone])      Thank you for involving us in the care of this patient, please reach out if any further questions.     Donovan Olsen MD  Gastroenterology/Hepatology Fellow, PGY6    Available on Microsoft Teams  [phone] ([facility])  [phone] ([facility])  Please contact on call fellow weekdays after 5pm-7am and weekends: [phone]

## 2022-07-29 NOTE — PROGRESS NOTE ADULT - NS ATTEND AMEND GEN_ALL_CORE FT
67 y/o female with history of pancreatic cancer, admitted with anemia.    - Pending EGD/colonoscopy today.   - Monitor CBC and transfuse to maintain hemoglobin above 7.  - Noted prior EGD finding of radiation-induced gastritis and duodenitis.   - On oral iron.
agree with above
65 y/o female with history of pancreatic cancer, admitted with anemia.    - Pending EGD/colonoscopy today.   - Monitor CBC and transfuse to maintain hemoglobin above 7.  - Noted prior EGD finding of radiation-induced gastritis and duodenitis.   - On oral iron.

## 2022-07-30 ENCOUNTER — TRANSCRIPTION ENCOUNTER (OUTPATIENT)
Age: 67
End: 2022-07-30

## 2022-07-30 VITALS
RESPIRATION RATE: 18 BRPM | DIASTOLIC BLOOD PRESSURE: 73 MMHG | SYSTOLIC BLOOD PRESSURE: 135 MMHG | HEART RATE: 83 BPM | OXYGEN SATURATION: 98 % | TEMPERATURE: 99 F

## 2022-07-30 LAB
HCT VFR BLD CALC: 27.5 % — LOW (ref 34.5–45)
HGB BLD-MCNC: 8.8 G/DL — LOW (ref 11.5–15.5)
MCHC RBC-ENTMCNC: 28.6 PG — SIGNIFICANT CHANGE UP (ref 27–34)
MCHC RBC-ENTMCNC: 32 GM/DL — SIGNIFICANT CHANGE UP (ref 32–36)
MCV RBC AUTO: 89.3 FL — SIGNIFICANT CHANGE UP (ref 80–100)
NRBC # BLD: 0 /100 WBCS — SIGNIFICANT CHANGE UP (ref 0–0)
PLATELET # BLD AUTO: 288 K/UL — SIGNIFICANT CHANGE UP (ref 150–400)
RBC # BLD: 3.08 M/UL — LOW (ref 3.8–5.2)
RBC # FLD: 21.1 % — HIGH (ref 10.3–14.5)
WBC # BLD: 6.59 K/UL — SIGNIFICANT CHANGE UP (ref 3.8–10.5)
WBC # FLD AUTO: 6.59 K/UL — SIGNIFICANT CHANGE UP (ref 3.8–10.5)

## 2022-07-30 PROCEDURE — 82330 ASSAY OF CALCIUM: CPT

## 2022-07-30 PROCEDURE — 88305 TISSUE EXAM BY PATHOLOGIST: CPT

## 2022-07-30 PROCEDURE — 86901 BLOOD TYPING SEROLOGIC RH(D): CPT

## 2022-07-30 PROCEDURE — 86850 RBC ANTIBODY SCREEN: CPT

## 2022-07-30 PROCEDURE — 99285 EMERGENCY DEPT VISIT HI MDM: CPT | Mod: 25

## 2022-07-30 PROCEDURE — 83540 ASSAY OF IRON: CPT

## 2022-07-30 PROCEDURE — 85018 HEMOGLOBIN: CPT

## 2022-07-30 PROCEDURE — 83605 ASSAY OF LACTIC ACID: CPT

## 2022-07-30 PROCEDURE — 82728 ASSAY OF FERRITIN: CPT

## 2022-07-30 PROCEDURE — 82947 ASSAY GLUCOSE BLOOD QUANT: CPT

## 2022-07-30 PROCEDURE — 85027 COMPLETE CBC AUTOMATED: CPT

## 2022-07-30 PROCEDURE — 80048 BASIC METABOLIC PNL TOTAL CA: CPT

## 2022-07-30 PROCEDURE — 82565 ASSAY OF CREATININE: CPT

## 2022-07-30 PROCEDURE — U0003: CPT

## 2022-07-30 PROCEDURE — 84295 ASSAY OF SERUM SODIUM: CPT

## 2022-07-30 PROCEDURE — 86900 BLOOD TYPING SEROLOGIC ABO: CPT

## 2022-07-30 PROCEDURE — 85610 PROTHROMBIN TIME: CPT

## 2022-07-30 PROCEDURE — U0005: CPT

## 2022-07-30 PROCEDURE — 83550 IRON BINDING TEST: CPT

## 2022-07-30 PROCEDURE — 85014 HEMATOCRIT: CPT

## 2022-07-30 PROCEDURE — 86923 COMPATIBILITY TEST ELECTRIC: CPT

## 2022-07-30 PROCEDURE — 36430 TRANSFUSION BLD/BLD COMPNT: CPT

## 2022-07-30 PROCEDURE — C1886: CPT

## 2022-07-30 PROCEDURE — 85025 COMPLETE CBC W/AUTO DIFF WBC: CPT

## 2022-07-30 PROCEDURE — 99239 HOSP IP/OBS DSCHRG MGMT >30: CPT

## 2022-07-30 PROCEDURE — 82435 ASSAY OF BLOOD CHLORIDE: CPT

## 2022-07-30 PROCEDURE — 82803 BLOOD GASES ANY COMBINATION: CPT

## 2022-07-30 PROCEDURE — P9016: CPT

## 2022-07-30 PROCEDURE — 82272 OCCULT BLD FECES 1-3 TESTS: CPT

## 2022-07-30 PROCEDURE — 85730 THROMBOPLASTIN TIME PARTIAL: CPT

## 2022-07-30 PROCEDURE — 84132 ASSAY OF SERUM POTASSIUM: CPT

## 2022-07-30 PROCEDURE — 88312 SPECIAL STAINS GROUP 1: CPT

## 2022-07-30 PROCEDURE — 36415 COLL VENOUS BLD VENIPUNCTURE: CPT

## 2022-07-30 PROCEDURE — 80053 COMPREHEN METABOLIC PANEL: CPT

## 2022-07-30 RX ORDER — AMOXICILLIN 250 MG/5ML
1 SUSPENSION, RECONSTITUTED, ORAL (ML) ORAL
Qty: 7 | Refills: 0
Start: 2022-07-30

## 2022-07-30 RX ADMIN — LOSARTAN POTASSIUM 100 MILLIGRAM(S): 100 TABLET, FILM COATED ORAL at 05:51

## 2022-07-30 RX ADMIN — Medication 875 MILLIGRAM(S): at 05:50

## 2022-07-30 RX ADMIN — MONTELUKAST 10 MILLIGRAM(S): 4 TABLET, CHEWABLE ORAL at 11:59

## 2022-07-30 RX ADMIN — ESCITALOPRAM OXALATE 20 MILLIGRAM(S): 10 TABLET, FILM COATED ORAL at 11:59

## 2022-07-30 RX ADMIN — Medication 1 GRAM(S): at 00:48

## 2022-07-30 RX ADMIN — DULOXETINE HYDROCHLORIDE 30 MILLIGRAM(S): 30 CAPSULE, DELAYED RELEASE ORAL at 11:58

## 2022-07-30 RX ADMIN — Medication 100 MICROGRAM(S): at 05:54

## 2022-07-30 RX ADMIN — OXCARBAZEPINE 300 MILLIGRAM(S): 300 TABLET, FILM COATED ORAL at 05:51

## 2022-07-30 RX ADMIN — Medication 1 GRAM(S): at 11:58

## 2022-07-30 RX ADMIN — PANTOPRAZOLE SODIUM 40 MILLIGRAM(S): 20 TABLET, DELAYED RELEASE ORAL at 05:51

## 2022-07-30 RX ADMIN — Medication 1 GRAM(S): at 05:50

## 2022-07-30 RX ADMIN — Medication 325 MILLIGRAM(S): at 11:59

## 2022-07-30 NOTE — PROGRESS NOTE ADULT - PROBLEM SELECTOR PLAN 1
recent admission for GIB 2/2 to radiation gastritis/duodenitis s/p RFA. Concern similar etiology. Currently HDS  -s/p 1unit PRBC with appropriate response, Hb remains stable since yesterday   -GI following - plan for EGD/colon today, will f/u results and further recs after   -continue sucralfate, PPI BID  -active T&S, maintain PIVs   -trend CBC
recent admission for GIB 2/2 to radiation gastritis/duodenitis s/p RFA. Concern similar etiology. Currently HDS  -s/p 1 pack RBC 7/26; Hgb 7.5--> 7.3 7/29, per Gi rec, transfuse 1 pRBC , will f/u post transfusion CBC   -GI following  -continue sucralfate, PPI BID  - s/p EGD: - Multiple white plaques in the middle third of the esophagus and in the                        lower third of the esophagus. Biopsied to r/o candidiasis                       - Radiation gastritis s/p RFA                       - Radiation duodenitis seen up to 2nd portion of duodenum               - Radiation gastritis/duodenitis likely source of acute blood loss anemia  - due to insufficient  prep colonoscopy aborted and need outpatient repeat colonoscopy   -trend CBC
recent admission for GIB 2/2 to radiation gastritis/duodenitis s/p RFA. Concern similar etiology. Currently HDS  -s/p 1unit PRBC with appropriate response, Hb 8.1 this AM   -GI following - pt did not prep overnight, so EGD/colon deferred today; will plan for EGD/colon tmrw 7/27; c/w CLD today, NPO at [facility]   -continue sucralfate, PPI BID  -active T&S, maintain PIVs   -trend CBC
recent admission for GIB 2/2 to radiation gastritis/duodenitis s/p RFA. Concern similar etiology. Currently HDS  -s/p 1 pack RBC 7/26; Hgb 7.5--> 7.3 7/29, per Gi rec, transfuse 1 pRBC  > 8.5 > 8.8  -GI following  -continue sucralfate, PPI BID  - s/p EGD: - Multiple white plaques in the middle third of the esophagus and in the                        lower third of the esophagus. Biopsied to r/o candidiasis                       - Radiation gastritis s/p RFA                       - Radiation duodenitis seen up to 2nd portion of duodenum               - Radiation gastritis/duodenitis likely source of acute blood loss anemia  - due to insufficient  prep colonoscopy aborted and need outpatient repeat colonoscopy   -H&H stable

## 2022-07-30 NOTE — DISCHARGE NOTE PROVIDER - HOSPITAL COURSE
65 y/o F with h/o HTN, HLD, remote seizure disorder, pancreatic cancer s/p distal pancreatectomy and splenectomy followed by adjuvant FOLFIRINOX, Capecitabine and RT completed 3/2022 with recent presentation for anemia 2/2 to radiation gastritis/duodenitis s/p RFA presenting with symptomatic anemia c/f GIB, s/p 1u PRBC with improved and stable Hb, s/p EGD noted radiation gastritis/duodenitis, insufficient  prep colonoscopy aborted and need outpatient repeat colonoscopy      Problem/Plan - 1:  ·  Problem: GIB (gastrointestinal bleeding).   ·  Plan: recent admission for GIB 2/2 to radiation gastritis/duodenitis s/p RFA. Concern similar etiology. Currently HDS  -s/p 1 pack RBC 7/26; Hgb 7.5--> 7.3 7/29, per Gi rec, transfuse 1 pRBC , will f/u post transfusion CBC   -GI following  -continue sucralfate, PPI BID  - s/p EGD: - Multiple white plaques in the middle third of the esophagus and in the                        lower third of the esophagus. Biopsied to r/o candidiasis                       - Radiation gastritis s/p RFA                       - Radiation duodenitis seen up to 2nd portion of duodenum               - Radiation gastritis/duodenitis likely source of acute blood loss anemia  - due to insufficient  prep colonoscopy aborted and need outpatient repeat colonoscopy   -CBC stable and dcp w/ outpatient follow up      Problem/Plan - 2:  ·  Problem: Pancreatic cancer.   ·  Plan: s/p distal pancreatectomy + splenectomy with chemo and RT last 3/2022. Appears in remission  -f/u with oncology outpatient  -currently not taking pancreatic enzymes since last admission.     Problem/Plan - 3:  ·  Problem: Cellulitis.   ·  Plan: of LUE, much improved on doxy + amoxicillin;   -appears thrombophlebitis at this time, warm compresses  -holding doxy as concern increased gastric irritation  -continue amoxicillin to complete course (7/26-   8/2) for 8 days.     Problem/Plan - 4:  ·  Problem: Hypertension.   ·  Plan: -Losartan 100mg.     Problem/Plan - 5:  ·  Problem: Seizure disorder.   ·  Plan: -continue oxcabazepine.     Problem/Plan - 6:  ·  Problem: Hypothyroid.   ·  Plan: -Synthroid 100mcg.     Problem/Plan - 7:  ·  Problem: Seasonal asthma.   ·  Plan: -continue montelukast.    Pt is medically cleared by attending to [facility] home w/ outpatient follow up.

## 2022-07-30 NOTE — DISCHARGE NOTE PROVIDER - NSDCFUSCHEDAPPT_GEN_ALL_CORE_FT
[facility]  INTMED [address] [place] [address]  Scheduled Appointment: 09/14/2022

## 2022-07-30 NOTE — DISCHARGE NOTE PROVIDER - CARE PROVIDER_API CALL
Martín Meyers)  Internal Medicine  [address] [place] [address]  Phone: [phone]  Fax: [phone]  Follow Up Time: 1 week    Michi Cruz)  Hematology; Internal Medicine; Medical Oncology  [address] [facility], [address]  [place] [address]  Phone: [phone]  Fax: [phone]  Follow Up Time: Routine    O'Hernandez,   Phone: (   )    -  Fax: (   )    -  Scheduled Appointment: 08/01/2022    [facility])  Medicine  Gen [address], [address]  [place] [address]  Phone: [phone]  Fax: [phone]  Follow Up Time: 2 weeks

## 2022-07-30 NOTE — PROGRESS NOTE ADULT - PROBLEM SELECTOR PLAN 8
SCDs      Dispo:  Pt largely clinically stable for d/c. Pt to f/u PCP, GI, Onco, outpatient  Continue amoxicillin to complete course (7/26- 8/2) for 8 days SCDs      Dispo:  Pt largely clinically stable for d/c. Pt to f/u PCP, GI, Onco, outpatient  Continue amoxicillin to complete course (7/26- 8/2) for 8 days    >30min spent on d/c planning.

## 2022-07-30 NOTE — DISCHARGE NOTE PROVIDER - PROVIDER TOKENS
PROVIDER:[TOKEN:[[id number]],FOLLOWUP:[1 week]],PROVIDER:[TOKEN:[[id number]],FOLLOWUP:[Routine]],FREE:[LAST:[O'Hernandez],PHONE:[(   )    -],FAX:[(   )    -],SCHEDULEDAPPT:[08/01/2022]],PROVIDER:[TOKEN:[[id number]],FOLLOWUP:[2 weeks]]

## 2022-07-30 NOTE — PROGRESS NOTE ADULT - PROVIDER SPECIALTY LIST ADULT
Gastroenterology
Heme/Onc
Heme/Onc
Gastroenterology
Heme/Onc
Hospitalist

## 2022-07-30 NOTE — PROGRESS NOTE ADULT - PROBLEM SELECTOR PLAN 2
s/p distal pancreatectomy + splenectomy with chemo and RT last 3/2022. Appears in remission  -f/u with oncology outpatient  -currently not taking pancreatic enzymes since last admission
s/p distal pancreatectomy + splenectomy with chemo and RT last 3/2022. Appears in remission  -f/u with oncology outpatient  -currently not taking pancreatic enzymes since last admission, readdress in am if would like to restart (currently overwhelmed by number of medications)

## 2022-07-30 NOTE — DISCHARGE NOTE PROVIDER - NSDCCPCAREPLAN_GEN_ALL_CORE_FT
PRINCIPAL DISCHARGE DIAGNOSIS  Diagnosis: GIB (gastrointestinal bleeding)  Assessment and Plan of Treatment: - EGD on recent admission significant for gastritis/duodenitis likely 2/2 radiation and s/p Radiofrequency ablation  - Had EGD on 7/28 again showing gastritis/duodenitis 2/2 gastritis, s/p RFA.   - Had colonoscopy, exam aborted due to poor prep.   - No signs of overt GI bleeding currently.   - Received transfusion   - Hgb stable today with 8.8   - montior bowel movmeents  - will likely have some level of continued slow ooze/bleeding from gastritis/duodenitis and may require further treatment in the future   - continue iron supplementation  - continue ppi   - diet as tolerated   - outpatient GI followup and heme/onc follow up         SECONDARY DISCHARGE DIAGNOSES  Diagnosis: Pancreatic cancer  Assessment and Plan of Treatment: -  oncology follow up as outpatient    Diagnosis: Hypothyroid  Assessment and Plan of Treatment: -Synthroid 100mcg.      Diagnosis: Cellulitis  Assessment and Plan of Treatment: -continue amoxicillin to complete course (7/26- 8/2) for 8 days.    Diagnosis: Hypertension  Assessment and Plan of Treatment: -Losartan 100mg  Low salt diet  Activity as tolerated.  Take all medication as prescribed.  Follow up with your medical doctor for routine blood pressure monitoring at your next visit.  Notify your doctor if you have any of the following symptoms:   Dizziness, Lightheadedness, Blurry vision, Headache, Chest pain, Shortness of breath      Diagnosis: Seizure disorder  Assessment and Plan of Treatment: -continue oxcabazepine.

## 2022-07-30 NOTE — DISCHARGE NOTE NURSING/CASE MANAGEMENT/SOCIAL WORK - PATIENT PORTAL LINK FT
You can access the FollowMyHealth Patient Portal offered by [facility] by registering at the following website: [email or web address]. By joining [facility]’s FollowMyHealth portal, you will also be able to view your health information using other applications (apps) compatible with our system.

## 2022-07-30 NOTE — PROGRESS NOTE ADULT - PROBLEM SELECTOR PLAN 3
of RUBENE, much improved on doxy + amoxicillin;   -appears thrombophlebitis at this time, warm compresses  -holding doxy as concern increased gastric irritation  -continue amoxicillin to complete course (7/26-   8/2) for 8 days
of HERMINIO, much improved on doxy + amoxicillin for 8 days  -appears thrombophlebitis at this time, warm compresses  -holding doxy as concern increased gastric irritation  -continue amoxicillin to complete course
of HERMINIO, much improved on doxy + amoxicillin for 8 days  -appears thrombophlebitis at this time, warm compresses  -holding doxy as concern increased gastric irritation  -continue amoxicillin for 4 more days, consider deescalation if continues to improve
of RUBENE, much improved on doxy + amoxicillin;   -appears thrombophlebitis at this time, warm compresses  -holding doxy as concern increased gastric irritation  -continue amoxicillin to complete course (7/26-   8/2) for 8 days

## 2022-07-30 NOTE — DISCHARGE NOTE PROVIDER - NSDCMRMEDTOKEN_GEN_ALL_CORE_FT
amoxicillin 875 mg oral tablet: 1 tab(s) orally 2 times a day till 8/2  Breo Ellipta:   DULoxetine: 30 milligram(s) orally once a day  escitalopram 20 mg oral tablet: 1 tab(s) orally once a day  ferrous sulfate 325 mg (65 mg elemental iron) oral delayed release tablet: 1 tab(s) orally every other day   Fosamax 70 mg oral tablet: 1 tab(s) orally once a week SUNDAY  losartan 100 mg oral tablet: 1 tab(s) orally once a day  montelukast 10 mg oral tablet: 1 tab(s) orally once a day  pantoprazole 40 mg oral delayed release tablet: 1 tab(s) orally once a day  sucralfate 1 g oral tablet: 1 tab(s) orally every 6 hours  Synthroid 100 mcg (0.1 mg) oral tablet: 1 tab(s) orally once a day  Trileptal 300 mg oral tablet: 1 tab(s) orally 2 times a day

## 2022-07-30 NOTE — DISCHARGE NOTE NURSING/CASE MANAGEMENT/SOCIAL WORK - NSDCPEFALRISK_GEN_ALL_CORE
For information on Fall & Injury Prevention, visit: [email or web address] OR  [email or web address] OR  https://www.cdc.gov/steadi/patient.html

## 2022-07-30 NOTE — PROGRESS NOTE ADULT - SUBJECTIVE AND OBJECTIVE BOX
PROGRESS NOTE:     Patient is a 66y old  Female who presents with a chief complaint of Anemia (29 Jul 2022 13:37)      SUBJECTIVE / OVERNIGHT EVENTS:  No acute events overnight. Patient seen and evaluated at bedside. Patient is anxious and tearful, denies chest pain/shortness of breath/abdominal pain, she denies any overt bleeding though is very worried about her clinical status. Emotional support provided.     ADDITIONAL REVIEW OF SYSTEMS:    MEDICATIONS  (STANDING):  albuterol/ipratropium for Nebulization. 3 milliLiter(s) Nebulizer once  amoxicillin 875 milliGRAM(s) Oral two times a day  DULoxetine 30 milliGRAM(s) Oral daily  escitalopram 20 milliGRAM(s) Oral daily  ferrous    sulfate 325 milliGRAM(s) Oral daily  levothyroxine 100 MICROGram(s) Oral daily  lidocaine 4% Injection for Nebulization 4 milliLiter(s) Nebulizer once  losartan 100 milliGRAM(s) Oral daily  montelukast 10 milliGRAM(s) Oral daily  OXcarbazepine 300 milliGRAM(s) Oral two times a day  pantoprazole    Tablet 40 milliGRAM(s) Oral two times a day  sodium chloride 0.9%. 500 milliLiter(s) (30 mL/Hr) IV Continuous <Continuous>  sucralfate 1 Gram(s) Oral every 6 hours    MEDICATIONS  (PRN):      CAPILLARY BLOOD GLUCOSE        I&O's Summary    29 Jul 2022 07:01  -  29 Jul 2022 15:41  --------------------------------------------------------  IN: 480 mL / OUT: 0 mL / NET: 480 mL        PHYSICAL EXAM:  Vital Signs Last 24 Hrs  T(C): 36.9 (29 Jul 2022 15:00), Max: 36.9 (29 Jul 2022 15:00)  T(F): 98.4 (29 Jul 2022 15:00), Max: 98.4 (29 Jul 2022 15:00)  HR: 90 (29 Jul 2022 15:00) (82 - 90)  BP: 131/74 (29 Jul 2022 15:00) (115/65 - 146/77)  BP(mean): --  RR: 18 (29 Jul 2022 15:00) (14 - 18)  SpO2: 100% (29 Jul 2022 15:00) (95% - 100%)    Parameters below as of 29 Jul 2022 15:00  Patient On (Oxygen Delivery Method): room air        CONSTITUTIONAL: NAD, well-developed  RESPIRATORY: Normal respiratory effort; lungs are clear to auscultation bilaterally  CARDIOVASCULAR: Regular rate and rhythm, normal S1 and S2, no murmur/rub/gallop; No lower extremity edema  ABDOMEN: Nontender to palpation, normoactive bowel sounds, no rebound/guarding.  MUSCLOSKELETAL: no clubbing or cyanosis of digits; no joint swelling or tenderness to palpation  PSYCH: A+O to person, place, and time; sad anxious affect   LABS:                        7.3    8.84  )-----------( 302      ( 29 Jul 2022 13:10 )             22.6     07-29    135  |  103  |  8   ----------------------------<  142<H>  3.6   |  22  |  0.59    Ca    8.3<L>      29 Jul 2022 06:53    TPro  6.1  /  Alb  3.4  /  TBili  0.2  /  DBili  x   /  AST  25  /  ALT  21  /  AlkPhos  122<H>  07-28                RADIOLOGY & ADDITIONAL TESTS:    < from: Upper Endoscopy (07.28.22 @ 14:38) >  Impression:          - Multiple white plaques in the middle third of the esophagus and in the                        lower third of the esophagus. Biopsied to r/o candidiasis                       - Radiation gastritis s/p RFA                       - Radiation duodenitis seen up to 2nd portion of duodenum               - Radiation gastritis/duodenitis likely source of acute blood loss anemia  Recommendation:      - IV iron therapy for radiation gastritis/duodenitis (to f/u with heme/onc)                       - Trend CBC, transfuse hb < 7          - Active T&S                       - Follow up path from esophageal biopsies --> treat if (+) candidasis                       - Perform colonoscopy today               < end of copied text >  < from: Colonoscopy (07.28.22 @ 14:29) >  Impression:          - Preparation of the colon was inadequate.                       - Stool in the recto-sigmoid colon.                       - No specimens collected.    < end of copied text >    Results Reviewed: Yes  Imaging Personally Reviewed:Yes  Electrocardiogram Personally Reviewed:    COORDINATION OF CARE:  Care Discussed with Consultants/Other Providers [Y]: Gastroenterology   Prior or Outpatient Records Reviewed [Y/N]:  
Patient is a 66y old  Female who presents with a chief complaint of Anemia (26 Jul 2022 15:43)    Patient seen this morning. No new complaints. Deferred taking prep for colonoscopy as she is in hallway which is not conducive to the requirements of prepping (no close bathroom). S/P 1 unit PRBC's yesterday.    MEDICATIONS  (STANDING):  amoxicillin 875 milliGRAM(s) Oral two times a day  bisacodyl 10 milliGRAM(s) Oral at bedtime  DULoxetine 30 milliGRAM(s) Oral daily  escitalopram 20 milliGRAM(s) Oral daily  ferrous    sulfate 325 milliGRAM(s) Oral daily  levothyroxine 100 MICROGram(s) Oral daily  losartan 100 milliGRAM(s) Oral daily  montelukast 10 milliGRAM(s) Oral daily  OXcarbazepine 300 milliGRAM(s) Oral two times a day  pantoprazole    Tablet 40 milliGRAM(s) Oral two times a day  polyethylene glycol/electrolyte Solution 2000 milliLiter(s) Oral once  sodium chloride 0.9%. 1000 milliLiter(s) (60 mL/Hr) IV Continuous <Continuous>  sucralfate 1 Gram(s) Oral every 6 hours    MEDICATIONS  (PRN):      Vital Signs Last 24 Hrs  T(C): 36.7 (27 Jul 2022 05:00), Max: 36.9 (26 Jul 2022 15:20)  T(F): 98.1 (27 Jul 2022 05:00), Max: 98.4 (26 Jul 2022 15:20)  HR: 78 (27 Jul 2022 05:00) (78 - 97)  BP: 125/68 (27 Jul 2022 05:00) (116/66 - 155/75)  BP(mean): 82 (26 Jul 2022 14:32) (82 - 82)  RR: 18 (27 Jul 2022 05:00) (17 - 18)  SpO2: 97% (27 Jul 2022 05:00) (96% - 97%)    Parameters below as of 27 Jul 2022 05:00  Patient On (Oxygen Delivery Method): room air        PE  NAD  Awake, alert  Anicteric, MMM  No c/c/e  No rash grossly                            8.1    6.44  )-----------( 345      ( 27 Jul 2022 07:33 )             24.4       07-27    134<L>  |  101  |  19  ----------------------------<  100<H>  5.0   |  21<L>  |  0.72    Ca    8.9      27 Jul 2022 07:14    TPro  6.4  /  Alb  3.8  /  TBili  0.3  /  DBili  x   /  AST  32  /  ALT  24  /  AlkPhos  130<H>  07-27      
  Chief Complaint:  Patient is a 66y old  Female who presents with a chief complaint of Anemia (27 Jul 2022 11:29)      Interval Events:   NAEON, afebrile, HD stable  Hb 8.1 / 7.9 <-- 1U <-- 6.9   Denies melena, hematochezia, hematemesis, abd pain, n/v/d/f/c   Prefers to do EGD/colon once she's in a room and can prep there    Allergies:  Dilantin (Unknown)        Hospital Medications:  amoxicillin 875 milliGRAM(s) Oral two times a day  bisacodyl 10 milliGRAM(s) Oral at bedtime  DULoxetine 30 milliGRAM(s) Oral daily  escitalopram 20 milliGRAM(s) Oral daily  ferrous    sulfate 325 milliGRAM(s) Oral daily  levothyroxine 100 MICROGram(s) Oral daily  losartan 100 milliGRAM(s) Oral daily  montelukast 10 milliGRAM(s) Oral daily  OXcarbazepine 300 milliGRAM(s) Oral two times a day  pantoprazole    Tablet 40 milliGRAM(s) Oral two times a day  polyethylene glycol/electrolyte Solution 2000 milliLiter(s) Oral once  sodium chloride 0.9%. 1000 milliLiter(s) IV Continuous <Continuous>  sucralfate 1 Gram(s) Oral every 6 hours      PMHX/PSHX:  Pancreatic cancer    Hypertension    Hypothyroid    Seasonal asthma    History of pancreatectomy        Family history:  No pertinent family history in first degree relatives        ROS:     General:  No wt loss, fevers, chills, night sweats, fatigue,   Eyes:  Good vision, no reported pain  ENT:  No sore throat, pain, runny nose, dysphagia  CV:  No pain, palpitations, hypo/hypertension  Pulm:  No dyspnea, cough, tachypnea, wheezing  GI: see above  :  No pain, bleeding, incontinence, nocturia  Muscle:  No pain, weakness  Neuro:  No weakness, tingling, memory problems  Psych:  No fatigue, insomnia, mood problems, depression  Endocrine:  No polyuria, polydipsia, cold/heat intolerance  Heme:  No petechiae, ecchymosis, easy bruisability  Skin:  No rash, tattoos, scars, edema      PHYSICAL EXAM:   Vital Signs:  Vital Signs Last 24 Hrs  T(C): 36.7 (27 Jul 2022 05:00), Max: 36.9 (26 Jul 2022 15:20)  T(F): 98.1 (27 Jul 2022 05:00), Max: 98.4 (26 Jul 2022 15:20)  HR: 78 (27 Jul 2022 05:00) (78 - 97)  BP: 125/68 (27 Jul 2022 05:00) (116/66 - 155/75)  BP(mean): 82 (26 Jul 2022 14:32) (82 - 82)  RR: 18 (27 Jul 2022 05:00) (17 - 18)  SpO2: 97% (27 Jul 2022 05:00) (96% - 97%)    Parameters below as of 27 Jul 2022 05:00  Patient On (Oxygen Delivery Method): room air      Daily     Daily     GENERAL:  No acute distress  HEENT:  Normocephalic/atraumatic, no scleral icterus  CHEST:  no accessory muscle use  HEART:  Regular rate and rhythm, no murmurs/rubs/gallops  ABDOMEN:  Soft, non-tender, non-distended  EXTREMITIES: No cyanosis, clubbing, or edema  SKIN:  No rash/warm/dry  NEURO:  Alert and oriented x 3      LABS:                        8.1    6.44  )-----------( 345      ( 27 Jul 2022 07:33 )             24.4     Mean Cell Volume: 89.4 fl (07-27-22 @ 07:33)    07-27    134<L>  |  101  |  19  ----------------------------<  100<H>  5.0   |  21<L>  |  0.72    Ca    8.9      27 Jul 2022 07:14    TPro  6.4  /  Alb  3.8  /  TBili  0.3  /  DBili  x   /  AST  32  /  ALT  24  /  AlkPhos  130<H>  07-27    LIVER FUNCTIONS - ( 27 Jul 2022 07:14 )  Alb: 3.8 g/dL / Pro: 6.4 g/dL / ALK PHOS: 130 U/L / ALT: 24 U/L / AST: 32 U/L / GGT: x           PT/INR - ( 27 Jul 2022 07:33 )   PT: 11.6 sec;   INR: 1.00 ratio         PTT - ( 27 Jul 2022 07:33 )  PTT:24.5 sec                            8.1    6.44  )-----------( 345      ( 27 Jul 2022 07:33 )             24.4                         7.9    6.72  )-----------( 336      ( 26 Jul 2022 21:41 )             24.3                         6.9    7.72  )-----------( 386      ( 26 Jul 2022 12:20 )             22.0       Imaging:          
[facility] Division of Hospital Medicine  Heidy Mccarthy MD  Available via MS Teams      SUBJECTIVE / OVERNIGHT EVENTS:  Admitted yesterday for anemia c/f GIB, Hb 6.9, s/p 1u PRB, Hb improved 7.9-->8.1; FOBT +. No acute events overnight. States HEMPHILL is improved denies any CP, lightheadedness, palpitations; endorsing mild b/l LE cramping/shooting pain.     ADDITIONAL REVIEW OF SYSTEMS:  14 point ROS negative except as noted above     MEDICATIONS  (STANDING):  amoxicillin 875 milliGRAM(s) Oral two times a day  bisacodyl 10 milliGRAM(s) Oral at bedtime  DULoxetine 30 milliGRAM(s) Oral daily  escitalopram 20 milliGRAM(s) Oral daily  ferrous    sulfate 325 milliGRAM(s) Oral daily  levothyroxine 100 MICROGram(s) Oral daily  losartan 100 milliGRAM(s) Oral daily  montelukast 10 milliGRAM(s) Oral daily  OXcarbazepine 300 milliGRAM(s) Oral two times a day  pantoprazole    Tablet 40 milliGRAM(s) Oral two times a day  polyethylene glycol/electrolyte Solution 2000 milliLiter(s) Oral once  sodium chloride 0.9%. 1000 milliLiter(s) (60 mL/Hr) IV Continuous <Continuous>  sucralfate 1 Gram(s) Oral every 6 hours    MEDICATIONS  (PRN):      I&O's Summary      PHYSICAL EXAM:  Vital Signs Last 24 Hrs  T(C): 36.7 (27 Jul 2022 05:00), Max: 36.9 (26 Jul 2022 15:20)  T(F): 98.1 (27 Jul 2022 05:00), Max: 98.4 (26 Jul 2022 15:20)  HR: 78 (27 Jul 2022 05:00) (78 - 97)  BP: 125/68 (27 Jul 2022 05:00) (116/66 - 155/75)  BP(mean): 82 (26 Jul 2022 14:32) (82 - 82)  RR: 18 (27 Jul 2022 05:00) (17 - 18)  SpO2: 97% (27 Jul 2022 05:00) (96% - 97%)    Parameters below as of 27 Jul 2022 05:00  Patient On (Oxygen Delivery Method): room air      PHYSICAL EXAM:  GENERAL: NAD, well-developed  HEAD:  Atraumatic, normocephalic  EYES: EOMI, conjunctiva and sclera clear  NECK: Supple, no JVD  CHEST/LUNG: Clear to auscultation bilaterally; no wheezing or rales  HEART: Regular rate and rhythm; no murmurs, no LE edema   ABDOMEN: Soft, nontender, nondistended; bowel sounds present  EXTREMITIES:  2+ Peripheral Pulses, no clubbing, cyanosis; LUE with minimal erythema at prior IV site   PSYCH: AAOx3, calm affect, not anxious  NEUROLOGY: non-focal, moving all extremities equally, sensation grossly intact   SKIN: No rashes or lesions  MUSCULOSKELETAL: no back pain, no joint swelling     LABS:                        8.1    6.44  )-----------( 345      ( 27 Jul 2022 07:33 )             24.4     07-27    134<L>  |  101  |  19  ----------------------------<  100<H>  5.0   |  21<L>  |  0.72    Ca    8.9      27 Jul 2022 07:14    TPro  6.4  /  Alb  3.8  /  TBili  0.3  /  DBili  x   /  AST  32  /  ALT  24  /  AlkPhos  130<H>  07-27    PT/INR - ( 27 Jul 2022 07:33 )   PT: 11.6 sec;   INR: 1.00 ratio         PTT - ( 27 Jul 2022 07:33 )  PTT:24.5 sec          COVID-19 PCR: NotDetec (26 Jul 2022 13:55)  COVID-19 PCR: NotDetec (12 Jul 2022 08:28)      RADIOLOGY & ADDITIONAL TESTS:  New Imaging Personally Reviewed Today:  New Electrocardiogram Personally Reviewed Today:  Other Results Reviewed Today:   Prior or Outpatient Records Reviewed Today with Summary:    COORDINATION OF CARE:  Consultant Communication and Details of Discussion (where applicable):  d/w GI fellow Dr Man: will defer EGD today, plan for EGD/colon tomorrow 7/28    
  Chief Complaint:  Patient is a 66y old  Female who presents with a chief complaint of Anemia (27 Jul 2022 11:29)      Interval Events:   NAEON, afebrile, HD stable  Hb 7.5  No overt GI bleeding, denies melena, hematochezia, hematemesis, abd pain, n/v/d/f/c     EGD (7/29/22): radiation gastritis/duodenitis s/p RFA     Colonoscopy (7/29/22): aborted 2/2 poor prep        Allergies:  Dilantin (Unknown)        Hospital Medications:  amoxicillin 875 milliGRAM(s) Oral two times a day  bisacodyl 10 milliGRAM(s) Oral at bedtime  DULoxetine 30 milliGRAM(s) Oral daily  escitalopram 20 milliGRAM(s) Oral daily  ferrous    sulfate 325 milliGRAM(s) Oral daily  levothyroxine 100 MICROGram(s) Oral daily  losartan 100 milliGRAM(s) Oral daily  montelukast 10 milliGRAM(s) Oral daily  OXcarbazepine 300 milliGRAM(s) Oral two times a day  pantoprazole    Tablet 40 milliGRAM(s) Oral two times a day  polyethylene glycol/electrolyte Solution 2000 milliLiter(s) Oral once  sodium chloride 0.9%. 1000 milliLiter(s) IV Continuous <Continuous>  sucralfate 1 Gram(s) Oral every 6 hours      PMHX/PSHX:  Pancreatic cancer    Hypertension    Hypothyroid    Seasonal asthma    History of pancreatectomy        Family history:  No pertinent family history in first degree relatives        ROS:     General:  No wt loss, fevers, chills, night sweats, fatigue,   Eyes:  Good vision, no reported pain  ENT:  No sore throat, pain, runny nose, dysphagia  CV:  No pain, palpitations, hypo/hypertension  Pulm:  No dyspnea, cough, tachypnea, wheezing  GI: see above  :  No pain, bleeding, incontinence, nocturia  Muscle:  No pain, weakness  Neuro:  No weakness, tingling, memory problems  Psych:  No fatigue, insomnia, mood problems, depression  Endocrine:  No polyuria, polydipsia, cold/heat intolerance  Heme:  No petechiae, ecchymosis, easy bruisability  Skin:  No rash, tattoos, scars, edema      PHYSICAL EXAM:   Vital Signs Last 24 Hrs  T(C): 36.6 (29 Jul 2022 11:10), Max: 36.8 (28 Jul 2022 14:00)  T(F): 97.8 (29 Jul 2022 11:10), Max: 98.2 (28 Jul 2022 14:00)  HR: 86 (29 Jul 2022 11:10) (70 - 90)  BP: 145/80 (29 Jul 2022 11:10) (115/65 - 146/77)  BP(mean): --  RR: 16 (29 Jul 2022 11:10) (11 - 18)  SpO2: 98% (29 Jul 2022 11:10) (95% - 98%)    Parameters below as of 29 Jul 2022 11:10  Patient On (Oxygen Delivery Method): room air      GENERAL:  No acute distress  HEENT:  Normocephalic/atraumatic, no scleral icterus  CHEST:  no accessory muscle use  HEART:  Regular rate and rhythm, no murmurs/rubs/gallops  ABDOMEN:  Soft, non-tender, non-distended  EXTREMITIES: No cyanosis, clubbing, or edema  SKIN:  No rash/warm/dry  NEURO:  Alert and oriented x 3    LABS:                        7.5    10.23 )-----------( 320      ( 29 Jul 2022 06:54 )             23.3     07-29    135  |  103  |  8   ----------------------------<  142<H>  3.6   |  22  |  0.59    Ca    8.3<L>      29 Jul 2022 06:53    TPro  6.1  /  Alb  3.4  /  TBili  0.2  /  DBili  x   /  AST  25  /  ALT  21  /  AlkPhos  122<H>  07-28    LIVER FUNCTIONS - ( 28 Jul 2022 07:09 )  Alb: 3.4 g/dL / Pro: 6.1 g/dL / ALK PHOS: 122 U/L / ALT: 21 U/L / AST: 25 U/L / GGT: x                             Imaging:          
Patient is a 66y old  Female who presents with a chief complaint of Anemia (27 Jul 2022 12:14)      MEDICATIONS  (STANDING):  amoxicillin 875 milliGRAM(s) Oral two times a day  DULoxetine 30 milliGRAM(s) Oral daily  escitalopram 20 milliGRAM(s) Oral daily  ferrous    sulfate 325 milliGRAM(s) Oral daily  levothyroxine 100 MICROGram(s) Oral daily  losartan 100 milliGRAM(s) Oral daily  montelukast 10 milliGRAM(s) Oral daily  OXcarbazepine 300 milliGRAM(s) Oral two times a day  pantoprazole    Tablet 40 milliGRAM(s) Oral two times a day  sucralfate 1 Gram(s) Oral every 6 hours    MEDICATIONS  (PRN):      ROS  No fever, sweats, chills  No epistaxis, HA, sore throat  No CP, SOB, cough, sputum  No n/v/d, abd pain, melena, hematochezia  No edema  No rash  No anxiety  No back pain, joint pain  No bleeding, bruising  No dysuria, hematuria    Vital Signs Last 24 Hrs  T(C): 36.7 (28 Jul 2022 11:06), Max: 37.1 (27 Jul 2022 12:26)  T(F): 98.1 (28 Jul 2022 11:06), Max: 98.8 (27 Jul 2022 12:26)  HR: 91 (28 Jul 2022 11:06) (85 - 100)  BP: 112/70 (28 Jul 2022 11:06) (112/70 - 155/71)  BP(mean): --  RR: 16 (28 Jul 2022 11:06) (16 - 18)  SpO2: 97% (28 Jul 2022 11:06) (97% - 98%)    Parameters below as of 28 Jul 2022 11:06  Patient On (Oxygen Delivery Method): room air        PE  NAD  Awake, alert  Anicteric, MMM  RRR  CTAB  Abd soft, NT, ND  No c/c/e  No rash grossly                            8.1    5.42  )-----------( 260      ( 28 Jul 2022 07:10 )             25.3       07-28    134<L>  |  103  |  12  ----------------------------<  91  3.9   |  19<L>  |  0.54    Ca    8.4      28 Jul 2022 07:09    TPro  6.1  /  Alb  3.4  /  TBili  0.2  /  DBili  x   /  AST  25  /  ALT  21  /  AlkPhos  122<H>  07-28      
Patient is a 66y old  Female who presents with a chief complaint of Anemia (29 Jul 2022 12:22)    Patient seen this morning. No new complaints. Hoping for discharge after receiving a PRBC transfusion today.    MEDICATIONS  (STANDING):  albuterol/ipratropium for Nebulization. 3 milliLiter(s) Nebulizer once  amoxicillin 875 milliGRAM(s) Oral two times a day  DULoxetine 30 milliGRAM(s) Oral daily  escitalopram 20 milliGRAM(s) Oral daily  ferrous    sulfate 325 milliGRAM(s) Oral daily  levothyroxine 100 MICROGram(s) Oral daily  lidocaine 4% Injection for Nebulization 4 milliLiter(s) Nebulizer once  losartan 100 milliGRAM(s) Oral daily  montelukast 10 milliGRAM(s) Oral daily  OXcarbazepine 300 milliGRAM(s) Oral two times a day  pantoprazole    Tablet 40 milliGRAM(s) Oral two times a day  sodium chloride 0.9%. 500 milliLiter(s) (30 mL/Hr) IV Continuous <Continuous>  sucralfate 1 Gram(s) Oral every 6 hours    MEDICATIONS  (PRN):    Vital Signs Last 24 Hrs  T(C): 36.6 (29 Jul 2022 11:10), Max: 36.8 (28 Jul 2022 14:00)  T(F): 97.8 (29 Jul 2022 11:10), Max: 98.2 (28 Jul 2022 14:00)  HR: 86 (29 Jul 2022 11:10) (70 - 90)  BP: 145/80 (29 Jul 2022 11:10) (115/65 - 146/77)  BP(mean): --  RR: 16 (29 Jul 2022 11:10) (11 - 18)  SpO2: 98% (29 Jul 2022 11:10) (95% - 98%)    Parameters below as of 29 Jul 2022 11:10  Patient On (Oxygen Delivery Method): room air        PE  NAD  Awake, alert  Anicteric, MMM  No c/c/e  No rash grossly                            7.3    8.84  )-----------( 302      ( 29 Jul 2022 13:10 )             22.6       07-29    135  |  103  |  8   ----------------------------<  142<H>  3.6   |  22  |  0.59    Ca    8.3<L>      29 Jul 2022 06:53    TPro  6.1  /  Alb  3.4  /  TBili  0.2  /  DBili  x   /  AST  25  /  ALT  21  /  AlkPhos  122<H>  07-28      [facility]  ____________________________________________________________________________________________________  Patient Name: Luisa Arce                      MRN: [id number]  Account Number: [id number]                     YOB: 1955  Room: Endoscopy Room 2                           Gender: Female  Attending MD: Martín Meyers ,                      Procedure Date No Time: 7/28/2022  ____________________________________________________________________________________________________     Procedure:           Upper GI endoscopy  Indications:         Iron deficiency anemia  Providers:           Donovan Turpin MD (Fellow)  Medicines:           Monitored Anesthesia Care  Complications:       No immediate complications.  ____________________________________________________________________________________________________  Procedure:           Pre-Anesthesia Assessment:                       - Prior to the procedure, a History and Physical was performed, and patient                        medications and allergies were reviewed. The patient is competent. The risks                        and benefits of the procedure and the sedation options and risks were                        discussed with the patient. All questions were answered and informed consent                        was obtained. Patient identification and proposed procedure were verified by                        the physician, the nurse, the anesthesiologist and the anesthetist in the                        pre-procedure area in the procedure room. Mental Status Examination: alert                        and oriented. Airway Examination: normal oropharyngeal airway and neck                        mobility. Respiratory Examination: clear to auscultation. CV Examination:                        normal. Prophylactic Antibiotics: The patient does not require prophylactic                        antibiotics. Prior Anticoagulants: The patient has taken no previous                        anticoagulant or antiplatelet agents. ASA Grade Assessment: III - A patient                        with severe systemic disease. After reviewing the risks and benefits, the                        patient was deemed in satisfactory condition to undergo the procedure. The                        anesthesia plan was to use monitored anesthesia care (MAC). Immediately prior                        to administration of medications, the patient was re-assessed for adequacy to                        receive sedatives. The heart rate, respiratory rate, oxygen saturations,                        blood pressure, adequacy of pulmonary ventilation, and response to care were                        monitored throughout the procedure. The physical status of the patient was                        re-assessed after the procedure.                       After obtaining informed consent, the endoscope was passed under direct                        vision. Throughout the procedure, the patient's blood pressure, pulse, and                        oxygen saturations were monitored continuously. The Endoscope was introduced                        through the mouth, and advanced to the third part of duodenum. The upper GI                        endoscopy was accomplished without difficulty. The patient tolerated the                        procedure well.                                                                                                        Findings:       Multiple white plaques (could be displaced with irrigation) were found in the middle third of        the esophagus and in the lower third of the esophagus. Biopsies were taken with a cold        forceps for histology to evaluate for candidiasis.       The exam of the esophagus was otherwise normal.       Patchy moderate inflammation characterized by erythema concerning for radiation gastritis was        found at the incisura and greater curve of body (previous RFA treated area in antrum        improved). Coagulation for bleeding prevention using RFA x 16 ablations was successful.       The exam of the stomach was otherwise normal.       Patchy mild inflammation characterized by erythema and friability was found in the duodenal        bulb and second portion of duodenum, normal third portion of duodenum.       The exam of the duodenum was otherwise normal.                                                                                                        Impression:          - Multiple white plaques in the middle third of the esophagus and in the                        lower third of the esophagus. Biopsied to r/o candidiasis                       - Radiation gastritis s/p RFA                       - Radiation duodenitis seen up to 2nd portion of duodenum                       - Radiation gastritis/duodenitis likely source of acute blood loss anemia  Recommendation:      - IV iron therapy for radiation gastritis/duodenitis (to f/u with heme/onc)                       - Trend CBC, transfuse hb < 7                       - Active T&S      [facility]  ____________________________________________________________________________________________________  Patient Name: Luisa Arce                      MRN: [id number]  Account Number: [id number]                     YOB: 1955  Room: Endoscopy Room 2                           Gender: Female  Attending MD: Martín Meyers ,                      Procedure Date No Time: 7/28/2022  ____________________________________________________________________________________________________     Procedure:           Colonoscopy  Indications:         Iron deficiency anemia  Providers:           Donovan Turpin MD (Fellow)  Medicines:           Monitored Anesthesia Care  Complications:       No immediate complications.  ____________________________________________________________________________________________________  Procedure:           Pre-Anesthesia Assessment:                       - Prior to the procedure, a History and Physical was performed, and patient                        medications and allergies were reviewed. The patient is competent. The risks                        and benefits of the procedure and the sedation options and risks were                        discussed with the patient. All questions were answered and informed consent                        was obtained. Patient identification and proposed procedure were verified by                        the physician, the nurse, the anesthesiologist and the anesthetist in the                        pre-procedure area in the procedure room in the endoscopy suite. Mental                        Status Examination: alert and oriented. Airway Examination: normal                        oropharyngeal airway and neck mobility. Respiratory Examination: clear to                        auscultation. CV Examination: normal. Prophylactic Antibiotics: The patient                        does not require prophylactic antibiotics. Prior Anticoagulants: The patient                        has taken no previous anticoagulant or antiplatelet agents. ASA Grade                        Assessment: III - A patient with severe systemic disease. After reviewing the                        risks and benefits, the patient was deemed in satisfactory condition to                        undergo the procedure. The anesthesia plan was to use monitored anesthesia                        care (MAC). Immediately prior to administration of medications, the patient                        was re-assessed for adequacy to receive sedatives. The heart rate,                        respiratory rate, oxygen saturations, blood pressure, adequacy of pulmonary                        ventilation, and response to care were monitored throughout the procedure.                        The physical status of the patient was re-assessed after the procedure.                       After I obtained informed consent, the scope was passed under direct vision.                        Throughout the procedure, the patient's blood pressure, pulse, and oxygen                        saturations were monitored continuously. The Colonoscope was introduced                        through the anus with the intention of advancing to the splenic flexure. The                        scope was advanced to the sigmoid colon before the procedure was aborted.                        Medications were given. The colonoscopy was performed with ease. The patient                        tolerated the procedure well. The quality of the bowel preparation was                        evaluated using the BBPS ([place] Bowel Preparation Scale) with scores of:                        Left Colon = 0 (unprepared, mucosa not seen due to solid stool that cannot be                        cleared or unseen proximal colon segment in a colonoscopy aborted due to                        inadequate bowel prep). The total BBPS score equals 0. The quality of the                        bowel preparation was inadequate.                                                                                                        Findings:       The perianal and digital rectal examinations were normal.       Copious quantities of brown liquid semi-liquid stool was found in the recto-sigmoid colon,        interfering with visualization.       The mucosa was normal in the rectum/limited exam of sigmoid colon. Procedure intentionally        aborted 2/2 limited visualization.                                                                                                        Impression:          - Preparation of the colon was inadequate.                       - Stool in the recto-sigmoid colon.                       - No specimens collected.  Recommendation:      - Return patient to hospital [facility] for ongoing care.                       - Prefers to pursue repeat colonoscopy as an outpatient (this colonoscopy was                        not adequate to visualize sources of anemia)                       - F/u endoscopy recs                                                                                                        Attending Participation:       I was present and participated during the entire procedure, including non-key portions.                                                                                                            
[facility] Division of Hospital Medicine  Heidy Mccarthy MD  Available via MS Teams    SUBJECTIVE / OVERNIGHT EVENTS:  Pt felt nauseous last night with the prep so did not complete at that time; currently taking prep this AM. States she slept well and feels overall well. Denies any lightheadedness, CP, SOB, n/v, +abd cramping with prep and BMs; no urinary symptoms, no LE pain. Rest of ROS negative.     ADDITIONAL REVIEW OF SYSTEMS:  14 point ROS negative except as noted above     MEDICATIONS  (STANDING):  amoxicillin 875 milliGRAM(s) Oral two times a day  DULoxetine 30 milliGRAM(s) Oral daily  escitalopram 20 milliGRAM(s) Oral daily  ferrous    sulfate 325 milliGRAM(s) Oral daily  levothyroxine 100 MICROGram(s) Oral daily  losartan 100 milliGRAM(s) Oral daily  montelukast 10 milliGRAM(s) Oral daily  OXcarbazepine 300 milliGRAM(s) Oral two times a day  pantoprazole    Tablet 40 milliGRAM(s) Oral two times a day  sucralfate 1 Gram(s) Oral every 6 hours    MEDICATIONS  (PRN):      I&O's Summary      PHYSICAL EXAM:  Vital Signs Last 24 Hrs  T(C): 36.7 (28 Jul 2022 11:06), Max: 36.7 (28 Jul 2022 11:06)  T(F): 98.1 (28 Jul 2022 11:06), Max: 98.1 (28 Jul 2022 11:06)  HR: 91 (28 Jul 2022 11:06) (87 - 100)  BP: 112/70 (28 Jul 2022 11:06) (112/70 - 155/71)  BP(mean): --  RR: 16 (28 Jul 2022 11:06) (16 - 18)  SpO2: 97% (28 Jul 2022 11:06) (97% - 98%)    Parameters below as of 28 Jul 2022 11:06  Patient On (Oxygen Delivery Method): room air      PHYSICAL EXAM:  GENERAL: NAD, well-developed  HEAD:  Atraumatic, normocephalic  EYES: EOMI, conjunctiva and sclera clear  NECK: Supple, no JVD  CHEST/LUNG: Clear to auscultation bilaterally; no wheezing or rales  HEART: Regular rate and rhythm; no murmurs, no LE edema   ABDOMEN: Soft, nontender, nondistended; +hyperactive bowel sounds present  EXTREMITIES:  2+ Peripheral Pulses, no clubbing, cyanosis, mild LUE erythema   PSYCH: AAOx3, calm affect, not anxious  NEUROLOGY: non-focal, moving all extremities equally, sensation grossly intact   SKIN: No rashes or lesions  MUSCULOSKELETAL: no back pain, moving all extremities    LABS:                        8.1    5.42  )-----------( 260      ( 28 Jul 2022 07:10 )             25.3     07-28    134<L>  |  103  |  12  ----------------------------<  91  3.9   |  19<L>  |  0.54    Ca    8.4      28 Jul 2022 07:09    TPro  6.1  /  Alb  3.4  /  TBili  0.2  /  DBili  x   /  AST  25  /  ALT  21  /  AlkPhos  122<H>  07-28    PT/INR - ( 27 Jul 2022 07:33 )   PT: 11.6 sec;   INR: 1.00 ratio         PTT - ( 27 Jul 2022 07:33 )  PTT:24.5 sec          COVID-19 PCR: NotDetec (26 Jul 2022 13:55)  COVID-19 PCR: NotDetec (12 Jul 2022 08:28)      RADIOLOGY & ADDITIONAL TESTS:  New Imaging Personally Reviewed Today:  New Electrocardiogram Personally Reviewed Today:  Other Results Reviewed Today:   Prior or Outpatient Records Reviewed Today with Summary:    COORDINATION OF CARE:  Consultant Communication and Details of Discussion (where applicable):    
PROGRESS NOTE:     Patient is a 66y old  Female who presents with a chief complaint of Anemia (29 Jul 2022 13:37)      SUBJECTIVE / OVERNIGHT EVENTS:  Examined at bedside NAD. No events reported. Feels fine and reports she's eager to go home       ADDITIONAL REVIEW OF SYSTEMS:    MEDICATIONS  (STANDING):  albuterol/ipratropium for Nebulization. 3 milliLiter(s) Nebulizer once  DULoxetine 30 milliGRAM(s) Oral daily  escitalopram 20 milliGRAM(s) Oral daily  ferrous    sulfate 325 milliGRAM(s) Oral daily  levothyroxine 100 MICROGram(s) Oral daily  lidocaine 4% Injection for Nebulization 4 milliLiter(s) Nebulizer once  losartan 100 milliGRAM(s) Oral daily  montelukast 10 milliGRAM(s) Oral daily  OXcarbazepine 300 milliGRAM(s) Oral two times a day  pantoprazole    Tablet 40 milliGRAM(s) Oral two times a day  sodium chloride 0.9%. 500 milliLiter(s) (30 mL/Hr) IV Continuous <Continuous>  sucralfate 1 Gram(s) Oral every 6 hours    MEDICATIONS  (PRN):        CAPILLARY BLOOD GLUCOSE    I&O's Summary    29 Jul 2022 07:01  -  30 Jul 2022 07:00  --------------------------------------------------------  IN: 480 mL / OUT: 0 mL / NET: 480 mL        PHYSICAL EXAM:  Vital Signs Last 24 Hrs  T(C): 37 (30 Jul 2022 15:54), Max: 37.2 (29 Jul 2022 18:18)  T(F): 98.6 (30 Jul 2022 15:54), Max: 98.9 (29 Jul 2022 18:18)  HR: 83 (30 Jul 2022 15:54) (80 - 95)  BP: 135/73 (30 Jul 2022 15:54) (102/66 - 145/79)  BP(mean): --  RR: 18 (30 Jul 2022 15:54) (18 - 18)  SpO2: 98% (30 Jul 2022 15:54) (97% - 99%)    Parameters below as of 30 Jul 2022 15:54  Patient On (Oxygen Delivery Method): room air        CONSTITUTIONAL: NAD, well-developed  RESPIRATORY: Normal respiratory effort; lungs are clear to auscultation bilaterally  CARDIOVASCULAR: Regular rate and rhythm, normal S1 and S2, no murmur/rub/gallop; No lower extremity edema  ABDOMEN: Nontender to palpation, normoactive bowel sounds, no rebound/guarding.  MUSCLOSKELETAL: no clubbing or cyanosis of digits; no joint swelling or tenderness to palpation  PSYCH: A+O to person, place, and time; sad anxious affect       LABS:                                              8.8    6.59  )-----------( 288      ( 30 Jul 2022 06:35 )             27.5     07-29    135  |  103  |  8   ----------------------------<  142<H>  3.6   |  22  |  0.59    Ca    8.3<L>      29 Jul 2022 06:53              RADIOLOGY & ADDITIONAL TESTS:    < from: Upper Endoscopy (07.28.22 @ 14:38) >  Impression:          - Multiple white plaques in the middle third of the esophagus and in the                        lower third of the esophagus. Biopsied to r/o candidiasis                       - Radiation gastritis s/p RFA                       - Radiation duodenitis seen up to 2nd portion of duodenum               - Radiation gastritis/duodenitis likely source of acute blood loss anemia  Recommendation:      - IV iron therapy for radiation gastritis/duodenitis (to f/u with heme/onc)                       - Trend CBC, transfuse hb < 7          - Active T&S                       - Follow up path from esophageal biopsies --> treat if (+) candidasis                       - Perform colonoscopy today               < end of copied text >  < from: Colonoscopy (07.28.22 @ 14:29) >  Impression:          - Preparation of the colon was inadequate.                       - Stool in the recto-sigmoid colon.                       - No specimens collected.    < end of copied text >    Results Reviewed: Yes  Imaging Personally Reviewed:Yes  Electrocardiogram Personally Reviewed:    COORDINATION OF CARE:  Care Discussed with Consultants/Other Providers [Y]: Gastroenterology   Prior or Outpatient Records Reviewed [Y/N]:

## 2022-08-02 LAB — SURGICAL PATHOLOGY STUDY: SIGNIFICANT CHANGE UP

## 2022-09-06 ENCOUNTER — TRANSCRIPTION ENCOUNTER (OUTPATIENT)
Age: 67
End: 2022-09-06

## 2022-09-07 ENCOUNTER — EMERGENCY (EMERGENCY)
Facility: HOSPITAL | Age: 67
LOS: 1 days | Discharge: HOME | End: 2022-09-07
Attending: EMERGENCY MEDICINE | Admitting: EMERGENCY MEDICINE
Payer: COMMERCIAL

## 2022-09-07 VITALS
SYSTOLIC BLOOD PRESSURE: 138 MMHG | WEIGHT: 110.01 LBS | HEART RATE: 87 BPM | OXYGEN SATURATION: 99 % | HEIGHT: 61.5 IN | DIASTOLIC BLOOD PRESSURE: 80 MMHG | TEMPERATURE: 99 F | RESPIRATION RATE: 15 BRPM

## 2022-09-07 VITALS
RESPIRATION RATE: 16 BRPM | DIASTOLIC BLOOD PRESSURE: 84 MMHG | HEART RATE: 84 BPM | OXYGEN SATURATION: 99 % | SYSTOLIC BLOOD PRESSURE: 166 MMHG

## 2022-09-07 DIAGNOSIS — Z90.410 ACQUIRED TOTAL ABSENCE OF PANCREAS: Chronic | ICD-10-CM

## 2022-09-07 PROCEDURE — 71100 X-RAY EXAM RIBS UNI 2 VIEWS: CPT

## 2022-09-07 PROCEDURE — 13132 CMPLX RPR F/C/C/M/N/AX/G/H/F: CPT

## 2022-09-07 PROCEDURE — 99285 EMERGENCY DEPT VISIT HI MDM: CPT | Mod: 25

## 2022-09-07 PROCEDURE — 71100 X-RAY EXAM RIBS UNI 2 VIEWS: CPT | Mod: 26

## 2022-09-07 PROCEDURE — 71046 X-RAY EXAM CHEST 2 VIEWS: CPT

## 2022-09-07 PROCEDURE — 71046 X-RAY EXAM CHEST 2 VIEWS: CPT | Mod: 26

## 2022-09-07 PROCEDURE — 99284 EMERGENCY DEPT VISIT MOD MDM: CPT

## 2022-09-07 RX ORDER — TETANUS TOXOID, REDUCED DIPHTHERIA TOXOID AND ACELLULAR PERTUSSIS VACCINE, ADSORBED 5; 2.5; 8; 8; 2.5 [IU]/.5ML; [IU]/.5ML; UG/.5ML; UG/.5ML; UG/.5ML
0.5 SUSPENSION INTRAMUSCULAR ONCE
Refills: 0 | Status: COMPLETED | OUTPATIENT
Start: 2022-09-07 | End: 2022-09-07

## 2022-09-07 NOTE — ED PROVIDER NOTE - CLINICAL SUMMARY MEDICAL DECISION MAKING FREE TEXT BOX
Patient is a 66-year-old female status post trip and full striking chin on the ground patient denies any blood thinners, easy bleeding and is denying loss of consciousness, neck pain, or numbness and weakness of the arms and legs.  Patient is complaining of right lower rib pain and chin laceration which she called Dr. Fontaine to meet her in the ER for.  Patient on exam with C-spine nontender, head normocephalic/atraumatic.  Laceration on chin already sutured by Dr. Fontaine with hemostasis achieved.  Abdomen is nontender chest x-ray reveals no pneumothorax unknown rib fracture.  Will discharge the patient to follow-up with Dr. Fontaine in 1 week, wound care as per Dr. Fontaine.  Use incentive spirometer for chest wall pain and return for fever chills lethargy nausea vomiting numbness or weakness

## 2022-09-07 NOTE — ED PROVIDER NOTE - CARE PROVIDER_API CALL
Ludin Fontaine)  Plastic Surgery  [address] [place] [address]  Phone: [phone]  Fax: [phone]  Follow Up Time: 4-6 Days    Mario Alberto Robledo)  Kehinde Carrera  Physicians  [address] [place] [address]  Phone: [phone]  Fax: [phone]  Follow Up Time: 1-3 Days

## 2022-09-07 NOTE — ED PROVIDER NOTE - CARE PLAN
Principal Discharge DX:	Fall  Secondary Diagnosis:	Chin laceration  Secondary Diagnosis:	Rib contusion   1

## 2022-09-07 NOTE — ED PROVIDER NOTE - CROS ED CONS ALL NEG
Ericka Wasserman is a 66 year old female presenting for   Chief Complaint   Patient presents with   • Follow-up     6 month f/u     Denies Latex allergy or sensitivity.    Medication verified and med list updated  Refills needed today: No    Health Maintenance Due   Topic Date Due   • Pneumococcal Vaccine 65+ (1 - PCV) Never done   • Shingles Vaccine (1 of 2) Never done   • Medicare Advantage- Medicare Wellness Visit  01/01/2022   • COVID-19 Vaccine (3 - Booster for Moderna series) 04/27/2022   • Depression Screening  11/15/2022       Patient is due for the topics as listed above and wishes to proceed with them. Orders placed for Immunization(s) COVID-19.      Unaddressed Risk Adjusted HCC Categories and Diagnoses  HCC 22 - Morbid Obesity  - Unaddressed Dx:Obesity, Morbid (Cms/Hcc)  HCC 84 - Cardio-Respiratory Failure and Shock  - Unaddressed Dx:Acute Hypoxemic Respiratory Failure Due To Covid-19 (Cms/Hcc)   - Fibrosis of Lung and Other Chronic Lung Disorders  - Unaddressed Dx:Pulmonary Fibrosis, Postinflammatory (Cms/Hcc)      Last lab results:   No results found for: HGBA1C  Cholesterol (mg/dL)   Date Value   11/23/2021 224 (H)     HDL (mg/dL)   Date Value   11/23/2021 54     Triglycerides (mg/dL)   Date Value   11/23/2021 82     LDL (mg/dL)   Date Value   11/23/2021 154 (H)     No results found for: URMIC, UCR, MALBCR  No results found for: IFOB              Depression Screening:  Recent Review Flowsheet Data     Date 5/16/2022    Adult PHQ 2 Score 0    Adult PHQ 2 Interpretation No further screening needed    Little interest or pleasure in activity? Not at all    Feeling down, depressed or hopeless? Not at all           Advance Directives:  on file     negative...

## 2022-09-07 NOTE — ED PROVIDER NOTE - PATIENT PORTAL LINK FT
You can access the FollowMyHealth Patient Portal offered by [facility] by registering at the following website: [email or web address]. By joining [facility]’s FollowMyHealth portal, you will also be able to view your health information using other applications (apps) compatible with our system.

## 2022-09-07 NOTE — ED PROVIDER NOTE - PROVIDER TOKENS
PROVIDER:[TOKEN:[[id number]],FOLLOWUP:[4-6 Days]],PROVIDER:[TOKEN:[[id number]],FOLLOWUP:[1-3 Days]]

## 2022-09-07 NOTE — ED ADULT NURSE NOTE - NSIMPLEMENTINTERV_GEN_ALL_ED
Implemented All Universal Safety Interventions:  [place] to call system. Call bell, personal items and telephone within reach. Instruct patient to call for assistance. Room bathroom lighting operational. Non-slip footwear when patient is off stretcher. Physically safe environment: no spills, clutter or unnecessary equipment. Stretcher in lowest position, wheels locked, appropriate side rails in place.

## 2022-09-07 NOTE — ED ADULT NURSE NOTE - OBJECTIVE STATEMENT
Patient states she tripped and fell hitting her chin on a wooden floor, c/o laceration, Dr. Fontaine is here to suture wound Patient states she tripped and fell hitting her chin on a wooden floor, c/o laceration, Dr. Fontaine is here to suture wound.  Patient states she did not feel dizzy, it was a mechanical fall, and she denies LOC.

## 2022-09-07 NOTE — ED PROVIDER NOTE - NS ED ATTENDING STATEMENT MOD
This was a shared visit with the ANA MARIA. I reviewed and verified the documentation and independently performed the documented:

## 2022-09-07 NOTE — ED PROVIDER NOTE - OBJECTIVE STATEMENT
[[id number]] 65 y/o female without reported PMHX presents today due to trip and fall. pt reports laceration to chin and expecting to see plastics [facility]. Pt reports mild pain to right ribs, aching, non-radiating, and currently 5/10. pt denies headache, LOC, blood thinning medication, numbness/weakness, cp, sob, or any other complaints.

## 2022-09-07 NOTE — ED PROVIDER NOTE - NSFOLLOWUPINSTRUCTIONS_ED_ALL_ED_FT
1) Follow-up with Orthopedics, See referred doctor. Call today/next business day for close, prompt follow-up.  2) Return to Emergency room for any worsening or persistent pain, weakness, numbness, fever, color change to extremity, or any other concerning symptoms.  3) Take Tylenol as needed for pain   4) You can consider discussing with your doctor if physical therapy or further imaging as an MRI may be beneficial.   6) Follow up with your plastics doctor for suture removal.     Laceration    WHAT YOU NEED TO KNOW:    A laceration is an injury to the skin and the soft tissue underneath it. Lacerations can happen anywhere on the body.    DISCHARGE INSTRUCTIONS:    Seek care immediately if:   •You have heavy bleeding or bleeding that does not stop after 10 minutes of holding firm, direct pressure over the wound.      •Your wound opens up.      Call your doctor if:   •You have a fever or chills.      •Your laceration is red, warm, or swollen.      •You have red streaks on your skin coming from your wound.      •You have white or yellow drainage from the wound that smells bad.      •You have pain that gets worse, even after treatment.      •You have questions or concerns about your condition or care.      Medicines: You may need any of the following:   •Prescription pain medicine may be given. Ask your healthcare provider how to take this medicine safely. Some prescription pain medicines contain acetaminophen. Do not take other medicines that contain acetaminophen without talking to your healthcare provider. Too much acetaminophen may cause liver damage. Prescription pain medicine may cause constipation. Ask your healthcare provider how to prevent or treat constipation.       •Antibiotics help treat or prevent a bacterial infection.      •Take your medicine as directed. Contact your healthcare provider if you think your medicine is not helping or if you have side effects. Tell your provider if you are allergic to any medicine. Keep a list of the medicines, vitamins, and herbs you take. Include the amounts, and when and why you take them. Bring the list or the pill bottles to follow-up visits. Carry your medicine list with you in case of an emergency.      Care for your wound as directed:   •Do not get your wound wet until your healthcare provider says it is okay. Do not soak your wound in water. Do not go swimming until your healthcare provider says it is okay. Carefully wash the wound with soap and water. Gently pat the area dry or allow it to air dry.      •Change your bandages when they get wet, dirty, or after washing. Apply new, clean bandages as directed. Do not apply elastic bandages or tape too tight. Do not put powders or lotions over your incision.      •Apply antibiotic ointment as directed. Your healthcare provider may give you antibiotic ointment to put over your wound if you have stitches. If you have strips of tape over your incision, let them dry up and fall off on their own. If they do not fall off within 14 days, gently remove them. If you have glue over your wound, do not remove or pick at it. If your glue comes off, do not replace it with glue that you have at home.      •Check your wound every day for signs of infection, such as swelling, redness, or pus.      Self-care:   •Apply ice on your wound for 15 to 20 minutes every hour or as directed. Use an ice pack, or put crushed ice in a plastic bag. Cover it with a towel. Ice helps prevent tissue damage and decreases swelling and pain.      •Use a splint as directed. A splint will decrease movement and stress on your wound. It may help it heal faster. A splint may be used for lacerations over joints or areas of your body that bend. Ask your healthcare provider how to apply and remove a splint.      •Decrease scarring of your wound by applying ointments as directed. Do not apply ointments until your healthcare provider says it is okay. You may need to wait until your wound is healed. Ask which ointment to buy and how often to use it. After your wound is healed, use sunscreen over the area when you are out in the sun. You should do this for at least 6 months to 1 year after your injury.      Follow up with your doctor as directed: You will need to return in 3 to 14 days to have stitches or staples removed. Write down your questions so you remember to ask them during your visits.       © Copyright [employer] 2022

## 2022-09-14 ENCOUNTER — APPOINTMENT (OUTPATIENT)
Dept: INTERNAL MEDICINE | Facility: CLINIC | Age: 67
End: 2022-09-14

## 2022-09-14 VITALS
OXYGEN SATURATION: 99 % | SYSTOLIC BLOOD PRESSURE: 152 MMHG | HEIGHT: 61 IN | HEART RATE: 94 BPM | DIASTOLIC BLOOD PRESSURE: 80 MMHG | BODY MASS INDEX: 20.58 KG/M2 | WEIGHT: 109 LBS

## 2022-09-14 DIAGNOSIS — I10 ESSENTIAL (PRIMARY) HYPERTENSION: ICD-10-CM

## 2022-09-14 DIAGNOSIS — T66.XXXA OTHER ESOPHAGITIS WITHOUT BLEEDING: ICD-10-CM

## 2022-09-14 DIAGNOSIS — Z00.00 ENCOUNTER FOR GENERAL ADULT MEDICAL EXAMINATION W/OUT ABNORMAL FINDINGS: ICD-10-CM

## 2022-09-14 DIAGNOSIS — Z80.0 FAMILY HISTORY OF MALIGNANT NEOPLASM OF DIGESTIVE ORGANS: ICD-10-CM

## 2022-09-14 DIAGNOSIS — E03.9 HYPOTHYROIDISM, UNSPECIFIED: ICD-10-CM

## 2022-09-14 DIAGNOSIS — M81.0 AGE-RELATED OSTEOPOROSIS W/OUT CURRENT PATHOLOGICAL FRACTURE: ICD-10-CM

## 2022-09-14 DIAGNOSIS — Z86.59 PERSONAL HISTORY OF OTHER MENTAL AND BEHAVIORAL DISORDERS: ICD-10-CM

## 2022-09-14 DIAGNOSIS — D62 ACUTE POSTHEMORRHAGIC ANEMIA: ICD-10-CM

## 2022-09-14 DIAGNOSIS — K20.80 OTHER ESOPHAGITIS WITHOUT BLEEDING: ICD-10-CM

## 2022-09-14 PROCEDURE — 99387 INIT PM E/M NEW PAT 65+ YRS: CPT

## 2022-09-14 PROCEDURE — G0444 DEPRESSION SCREEN ANNUAL: CPT | Mod: 59

## 2022-09-14 NOTE — HEALTH RISK ASSESSMENT
[Good] : ~his/her~  mood as  good [Never] : Never [2 - 4 times a month (2 pts)] : 2-4 times a month (2 points) [1 or 2 (0 pts)] : 1 or 2 (0 points) [Never (0 pts)] : Never (0 points) [Yes] : In the past 12 months have you used drugs other than those required for medical reasons? Yes [0] : 1) Little interest or pleasure doing things: Not at all (0) [2] : 2) Feeling down, depressed, or hopeless for more than half of the days (2) [With Family] : lives with family [Retired] : retired [] :  [Sexually Active] : sexually active [Feels Safe at Home] : Feels safe at home [Fully functional (bathing, dressing, toileting, transferring, walking, feeding)] : Fully functional (bathing, dressing, toileting, transferring, walking, feeding) [Fully functional (using the telephone, shopping, preparing meals, housekeeping, doing laundry, using] : Fully functional and needs no help or supervision to perform IADLs (using the telephone, shopping, preparing meals, housekeeping, doing laundry, using transportation, managing medications and managing finances) [1/2 of Days or More (2)] : 4.) Feeling tired or having little energy? Half the days or more [Several Days (1)] : 6.) Feeling bad about yourself, or that you are a failure, or have let yourself or your family down? Several days [Not at All (0)] : 8.) Moving or speaking so slowly that other people could have noticed, or the opposite, moving or speaking faster than usual? Not at all [Mild] : severity of depression is mild [Not at all] : How difficult have these problems made it for you to do your work, take care of things at home, or get along with people? Not at all [PHQ-9 Positive] : PHQ-9 Positive [I have developed a follow-up plan documented below in the note.] : I have developed a follow-up plan documented below in the note. [Patient reported mammogram was normal] : Patient reported mammogram was normal [Patient reported colonoscopy was abnormal] : Patient reported colonoscopy was abnormal [HIV Test offered] : HIV Test offered [Hepatitis C test offered] : Hepatitis C test offered [None] : None [Audit-CScore] : 2 [de-identified] : tries to eat healthy foods [[id number]] : 2 [[id number]] : 5 [High Risk Behavior] : no high risk behavior [Reports changes in hearing] : Reports no changes in hearing [Reports changes in vision] : Reports no changes in vision [Reports changes in dental health] : Reports no changes in dental health [MammogramDate] : 07/2022 [ColonoscopyDate] : 05/2021 [de-identified] :  [FreeTextEntry2] :   [de-identified] : using reading glasses.  [de-identified] : in need of dental implants, has appt next week.

## 2022-09-14 NOTE — PHYSICAL EXAM
[No Acute Distress] : no acute distress [Well Nourished] : well nourished [Well Developed] : well developed [Well-Appearing] : well-appearing [Normal Sclera/Conjunctiva] : normal sclera/conjunctiva [PERRL] : pupils equal round and reactive to light [EOMI] : extraocular movements intact [Normal Outer Ear/Nose] : the outer ears and nose were normal in appearance [Normal Oropharynx] : the oropharynx was normal [Normal TMs] : both tympanic membranes were normal [No JVD] : no jugular venous distention [No Lymphadenopathy] : no lymphadenopathy [Supple] : supple [Thyroid Normal, No Nodules] : the thyroid was normal and there were no nodules present [No Respiratory Distress] : no respiratory distress  [No Accessory Muscle Use] : no accessory muscle use [Clear to Auscultation] : lungs were clear to auscultation bilaterally [Normal Rate] : normal rate  [Regular Rhythm] : with a regular rhythm [Normal S1, S2] : normal S1 and S2 [No Murmur] : no murmur heard [No Varicosities] : no varicosities [Pedal Pulses Present] : the pedal pulses are present [No Edema] : there was no peripheral edema [No Palpable Aorta] : no palpable aorta [No Extremity Clubbing/Cyanosis] : no extremity clubbing/cyanosis [Normal Appearance] : normal in appearance [No Nipple Discharge] : no nipple discharge [No Axillary Lymphadenopathy] : no axillary lymphadenopathy [Soft] : abdomen soft [Non Tender] : non-tender [Non-distended] : non-distended [No Masses] : no abdominal mass palpated [No HSM] : no HSM [Normal Posterior Cervical Nodes] : no posterior cervical lymphadenopathy [Normal Anterior Cervical Nodes] : no anterior cervical lymphadenopathy [No CVA Tenderness] : no CVA  tenderness [No Spinal Tenderness] : no spinal tenderness [No Joint Swelling] : no joint swelling [Grossly Normal Strength/Tone] : grossly normal strength/tone [No Rash] : no rash [Coordination Grossly Intact] : coordination grossly intact [No Focal Deficits] : no focal deficits [Normal Gait] : normal gait [Normal Affect] : the affect was normal [Normal Insight/Judgement] : insight and judgment were intact [de-identified] : port right side of chest [de-identified] : no evidence cellulitis

## 2022-09-14 NOTE — ASSESSMENT
[FreeTextEntry1] : 66F with hypothyroidism, hypertension, history of pancreatic cancer s/p surgery/chemo/RT, recently hospitalized for acute blood loss anemia 2/2 UGIB 2/2 radiation gastritis treated with pRBC, PPI and cellulitis/thrombophlebitis tx with amoxicillin and doxycycline presents for new patient visit. \par \par 1. Anemia 2/2 GIB\par -needs outpatient colonoscopy with Dr. Resendiz but she is following with dr at [facility] so will f/u there for repeat endoscopy as well\par -repeating CBC today\par \par 2. Pancreatic cancer - following with [facility] completed treatment now just surveillance \par \par 3. Hypertension\par -losartan 100 mg daily  - BP acceptable\par has cuff at home, encouraged her to check, gets white coat HTN. \par \par 3. Hypothyroidism\par -synthroid 100 mcg daily repeat TFT today\par \par 4. HCM\par -mammogram - [facility] 07/2022 normal\par -dexa - over 2 years ago, new referral today\par -HIV/HCV screening\par -colonoscopy - CT colonography 05/2021. dx pancreatic cancer then. \par -tdap - 09/13/2022\par -shingrix - had first shot with Dr. Eliot Taylor. \par Address: [address], [place] [address]\par Phone: [phone]\par -pneumovax - completed both with Dr. Sanchez. \par -flu vaccine - going to wait bc feeling unwell. \par -COVID vaccine - completed. and booster x 2. \par

## 2022-09-14 NOTE — HISTORY OF PRESENT ILLNESS
[FreeTextEntry1] : Visit to establish care with new primary care doctor\par  [de-identified] : 66F presents for new patient visit\par \par PMH: asthma, seizure, hx of pancreatic cancer s/p pancreatectomy and splenectomy, FOLIFRINOX, RT. chronic low back pain. Recent hospitalization at [facility] - for anemia c/b GIB, Tx with 1 unit pRBC, EGD showed radiation gastritis/duodenitis. Colonoscopy aborted due to insufficient prep and plan to schedule outpatient. Started on sucralfate, PPI BID. Pt also note to have LUE cellulitis tx with doxy and amoxicillin seemed c/w thrombophlebitis and abx were continued for 8 days. HTN on losartan 100 mg daily. On oxcarbazepine for seizure d/o. On synthroid 100 mcg for hypothyroidism. Seasonal asthma on monteleukast .\par \par Discharge Medications	\par amoxicillin 875 mg oral tablet: 1 tab(s) orally 2 times a day till  \par Breo Ellipta: \par DULoxetine: 30 milligram(s) orally once a day \par escitalopram 20 mg oral tablet: 1 tab(s) orally once a day \par ferrous sulfate 325 mg (65 mg elemental iron) oral delayed release tablet: 1 \par tab(s) orally every other day \par Fosamax 70 mg oral tablet: 1 tab(s) orally once a week  \par losartan 100 mg oral tablet: 1 tab(s) orally once a day \par montelukast 10 mg oral tablet: 1 tab(s) orally once a day \par pantoprazole 40 mg oral delayed release tablet: 1 tab(s) orally once a day \par sucralfate 1 g oral tablet: 1 tab(s) orally every 6 hours \par Synthroid 100 mcg (0.1 mg) oral tablet: 1 tab(s) orally once a day \par Trileptal 300 mg oral tablet: 1 tab(s) orally 2 times a day \par , \par Oncologist:\par Michi Cruz)  - [place] [facility] \par Hematology; Internal Medicine; Medical Oncology \par [address], [address] \[place] [address] \par Phone: [phone] \par Fax: [phone] \par \par GI:\par Martín Meyers) \par [address] [place] [address] \[place] [address] \par Phone: [phone] \par Fax: [phone] \par Follow Up Time: 1 week \par \par PMH: repots was stage 2 and has not spread. going for 3 month ct scans. most recent hgb 8.3 was 4.6 when was hospitalized. pt fell last week, has stitches on chin, bruised some ribs. she had called Dr. Lee - stone for wound care. She met a doctor at [facility] and he did the stitches. He works with [facility]. Had XR ribs at [facility]. Reports felt funny on Saturday when she woke up then room was spinning and was dx with vertigo. she had a headache as well, vomited a few times. Then she felt better and stayed home over the weekend. HA resolved yesterday. Still feeling tired. She called her oncologist and they gave her IV fluids yesterday and they they called and told her her sodium was 124 and the recommended she return. The repeated blood test and it improved to 129. Going tomorrow to have blood drawn again. Appetite improving and gained 10 lbs over the past year. Feels she does not drink enough water. Now has [place] onc[place] [facility] [place] might repeat endoscopy and colonoscopy. mild asthma only in humidity. \par PSH: appendectomy, pancreatectomy and splenectomy. had gyn surgery - fertility surgery on fallopian tubes\par FH: mother  of pancreatic cancer. stage 4. mother's cousin also had it. her siblings had screening that was negative. pt is negative for BRCA gene. Dr. Elizondo had recommended clinical trial ([facility] - they told her she is not eligible because she does not have specific genetic requirement so she has a lower chance of recurrence. \par SH: see below\par \par Medications: losartan 100 mg daily, synthroid 100 mcg. lexapro 20, duloxetine 30 mg once daily. foxamax 70 mg daily. breo ellipta/monteleukast. ferrous sulfate 325 mg daily. pantoprazole 40 mg daily, sucralfate. trileptal 300 mg BID. Following with Dr. Moriah Harley ([facility]) was going to start weaning off but when dx with cancer on hold. \par duloxetine - was started for muscle pain. \par Allergies: dilantin \par

## 2022-09-15 DIAGNOSIS — R74.01 ELEVATION OF LEVELS OF LIVER TRANSAMINASE LEVELS: ICD-10-CM

## 2022-09-15 LAB
25(OH)D3 SERPL-MCNC: 26.6 NG/ML
ALBUMIN SERPL ELPH-MCNC: 4.6 G/DL
ALP BLD-CCNC: 160 U/L
ALT SERPL-CCNC: 49 U/L
ANION GAP SERPL CALC-SCNC: 11 MMOL/L
AST SERPL-CCNC: 42 U/L
BASOPHILS # BLD AUTO: 0.06 K/UL
BASOPHILS NFR BLD AUTO: 1 %
BILIRUB SERPL-MCNC: <0.2 MG/DL
BUN SERPL-MCNC: 12 MG/DL
CALCIUM SERPL-MCNC: 9.7 MG/DL
CHLORIDE SERPL-SCNC: 94 MMOL/L
CHOLEST SERPL-MCNC: 224 MG/DL
CO2 SERPL-SCNC: 25 MMOL/L
CREAT SERPL-MCNC: 0.65 MG/DL
EGFR: 97 ML/MIN/1.73M2
EOSINOPHIL # BLD AUTO: 0.08 K/UL
EOSINOPHIL NFR BLD AUTO: 1.3 %
ESTIMATED AVERAGE GLUCOSE: 91 MG/DL
GLUCOSE SERPL-MCNC: 94 MG/DL
HBA1C MFR BLD HPLC: 4.8 %
HCT VFR BLD CALC: 26 %
HCV AB SER QL: NONREACTIVE
HCV S/CO RATIO: 0.11 S/CO
HDLC SERPL-MCNC: 72 MG/DL
HGB BLD-MCNC: 8.3 G/DL
HIV1+2 AB SPEC QL IA.RAPID: NONREACTIVE
IMM GRANULOCYTES NFR BLD AUTO: 0.3 %
LDLC SERPL CALC-MCNC: 136 MG/DL
LYMPHOCYTES # BLD AUTO: 1.3 K/UL
LYMPHOCYTES NFR BLD AUTO: 21.5 %
MAN DIFF?: NORMAL
MCHC RBC-ENTMCNC: 30.7 PG
MCHC RBC-ENTMCNC: 31.9 GM/DL
MCV RBC AUTO: 96.3 FL
MONOCYTES # BLD AUTO: 0.73 K/UL
MONOCYTES NFR BLD AUTO: 12.1 %
NEUTROPHILS # BLD AUTO: 3.86 K/UL
NEUTROPHILS NFR BLD AUTO: 63.8 %
NONHDLC SERPL-MCNC: 152 MG/DL
PLATELET # BLD AUTO: 350 K/UL
POTASSIUM SERPL-SCNC: 4.3 MMOL/L
PROT SERPL-MCNC: 6.6 G/DL
RBC # BLD: 2.7 M/UL
RBC # FLD: 16.8 %
SODIUM SERPL-SCNC: 129 MMOL/L
T4 FREE SERPL-MCNC: 1.1 NG/DL
TRIGL SERPL-MCNC: 78 MG/DL
TSH SERPL-ACNC: 1.94 UIU/ML
WBC # FLD AUTO: 6.05 K/UL

## 2022-09-15 RX ORDER — PROCHLORPERAZINE MALEATE 10 MG/1
10 TABLET ORAL
Qty: 30 | Refills: 0 | Status: COMPLETED | COMMUNITY
Start: 2022-01-20 | End: 2022-09-15

## 2022-09-15 RX ORDER — CAPECITABINE 500 MG/1
500 TABLET ORAL
Qty: 20 | Refills: 0 | Status: COMPLETED | COMMUNITY
Start: 2022-03-04 | End: 2022-09-15

## 2022-09-15 RX ORDER — DULOXETINE HYDROCHLORIDE 30 MG/1
30 CAPSULE, DELAYED RELEASE PELLETS ORAL
Qty: 90 | Refills: 0 | Status: COMPLETED | COMMUNITY
Start: 2022-06-14 | End: 2022-09-15

## 2022-09-15 RX ORDER — PROCHLORPERAZINE 25 MG/1
25 SUPPOSITORY RECTAL
Qty: 10 | Refills: 0 | Status: COMPLETED | COMMUNITY
Start: 2022-01-25 | End: 2022-09-15

## 2022-09-15 RX ORDER — GRANISETRON 3.1 MG/24H
3.1 PATCH TRANSDERMAL
Qty: 4 | Refills: 0 | Status: COMPLETED | COMMUNITY
Start: 2021-12-27 | End: 2022-09-15

## 2022-09-15 RX ORDER — ESCITALOPRAM OXALATE 10 MG/1
10 TABLET ORAL
Qty: 30 | Refills: 0 | Status: COMPLETED | COMMUNITY
Start: 2022-02-17 | End: 2022-09-15

## 2022-10-25 ENCOUNTER — NON-APPOINTMENT (OUTPATIENT)
Age: 67
End: 2022-10-25

## 2022-10-26 ENCOUNTER — TRANSCRIPTION ENCOUNTER (OUTPATIENT)
Age: 67
End: 2022-10-26

## 2022-10-27 ENCOUNTER — OUTPATIENT (OUTPATIENT)
Dept: OUTPATIENT SERVICES | Facility: HOSPITAL | Age: 67
LOS: 1 days | End: 2022-10-27

## 2022-10-27 ENCOUNTER — APPOINTMENT (OUTPATIENT)
Dept: DISASTER EMERGENCY | Facility: HOSPITAL | Age: 67
End: 2022-10-27

## 2022-10-27 VITALS
DIASTOLIC BLOOD PRESSURE: 72 MMHG | RESPIRATION RATE: 15 BRPM | SYSTOLIC BLOOD PRESSURE: 111 MMHG | OXYGEN SATURATION: 97 % | HEART RATE: 98 BPM

## 2022-10-27 VITALS
TEMPERATURE: 98 F | WEIGHT: 110.01 LBS | HEIGHT: 61 IN | DIASTOLIC BLOOD PRESSURE: 78 MMHG | OXYGEN SATURATION: 98 % | SYSTOLIC BLOOD PRESSURE: 159 MMHG | HEART RATE: 99 BPM | RESPIRATION RATE: 16 BRPM

## 2022-10-27 DIAGNOSIS — Z90.410 ACQUIRED TOTAL ABSENCE OF PANCREAS: Chronic | ICD-10-CM

## 2022-10-27 DIAGNOSIS — U07.1 COVID-19: ICD-10-CM

## 2022-10-27 RX ORDER — BEBTELOVIMAB 87.5 MG/ML
175 INJECTION, SOLUTION INTRAVENOUS ONCE
Refills: 0 | Status: COMPLETED | OUTPATIENT
Start: 2022-10-27 | End: 2022-10-27

## 2022-10-27 RX ADMIN — BEBTELOVIMAB 175 MILLIGRAM(S): 87.5 INJECTION, SOLUTION INTRAVENOUS at 11:30

## 2022-10-27 NOTE — CHART NOTE - NSCHARTNOTEFT_GEN_A_CORE
CC: Monoclonal Antibody Administration/COVID 19 Positive    History: Patient presents for administration of monoclonal antibody  Patient has been screened and was deemed to be a candidate.    Exposure: at a Wedding    Symptoms/ Criteria: Sore throat, weakness, Headache, runny nose, cough    Inclusion Criteria: age>65, hypothyroidism, Pancreatic cancer s/p resection, HTN, Remote hx of Seizures, seasonal Asthma    Date of Positive Test: verified by clinical call center     Date of symptom onset: 10/25    Vaccine status: Fully vaccinated with Moderna    PMHx:  No pertinent family history in first degree relatives    Infection due to severe acute respiratory syndrome coronavirus 2 (SARS-CoV-2)    Pancreatic cancer    Hypertension    Hypothyroid    Seasonal asthma    History of pancreatectomy    T(C): 36.8 (10-27-22 @ 11:16), Max: 36.8 (10-27-22 @ 11:16)  HR: 98 (10-27-22 @ 11:45) (98 - 99)  BP: 111/72 (10-27-22 @ 11:45) (111/72 - 159/78)  RR: 15 (10-27-22 @ 11:45) (15 - 16)  SpO2: 97% (10-27-22 @ 11:45) (97% - 98%)      PE:   Appearance: NAD	  HEENT:   Normal oral mucosa.   Cardiovascular: +s1, s2  Respiratory: Lungs clear to auscultation	  Gastrointestinal:  Soft, Non-tender. No guarding   Skin: warm and dry  Neurologic: Non-focal  Extremities: Normal range of motion.     ASSESSMENT:  Pt is a 67y year old Female with PMHx of hypothyroidism, Pancreatic cancer s/p resection, HTN, Remote hx of Seizures, seasonal Asthma,  Covid +  referred to the infusion center for Monoclonal antibody administration  (Bebtelovimab).      PLAN:  - Administration procedure explained to patient   - Consent for monoclonal antibody administration obtained   - Risk & benefits discussed/all questions answered  - Administer Bebtelovimab per protocol  - will observe patient for one hour post administration  and then if stable discharge home with outpt follow up as planned by PMD.    - Patient tolerated treatment well denies complaints of chest pain/SOB/dizziness/ palpitations  - VSS for discharge home  - D/C instructions given/ fact sheet included.  - Patient to follow-up with PCP as needed.

## 2022-12-02 RX ORDER — ALENDRONATE SODIUM 70 MG/1
70 TABLET ORAL
Qty: 1 | Refills: 3 | Status: ACTIVE | COMMUNITY
Start: 2022-04-21 | End: 1900-01-01

## 2023-02-01 ENCOUNTER — NON-APPOINTMENT (OUTPATIENT)
Age: 68
End: 2023-02-01

## 2023-02-24 ENCOUNTER — RX RENEWAL (OUTPATIENT)
Age: 68
End: 2023-02-24

## 2023-02-24 RX ORDER — DULOXETINE HYDROCHLORIDE 20 MG/1
20 CAPSULE, DELAYED RELEASE PELLETS ORAL
Qty: 90 | Refills: 1 | Status: ACTIVE | COMMUNITY
Start: 2022-09-14 | End: 1900-01-01

## 2023-03-12 ENCOUNTER — RX RENEWAL (OUTPATIENT)
Age: 68
End: 2023-03-12

## 2023-03-20 ENCOUNTER — APPOINTMENT (OUTPATIENT)
Dept: INTERNAL MEDICINE | Facility: CLINIC | Age: 68
End: 2023-03-20
Payer: COMMERCIAL

## 2023-03-20 VITALS
WEIGHT: 110 LBS | OXYGEN SATURATION: 94 % | HEIGHT: 61 IN | HEART RATE: 88 BPM | DIASTOLIC BLOOD PRESSURE: 70 MMHG | SYSTOLIC BLOOD PRESSURE: 120 MMHG | BODY MASS INDEX: 20.77 KG/M2

## 2023-03-20 DIAGNOSIS — B37.81 CANDIDAL ESOPHAGITIS: ICD-10-CM

## 2023-03-20 DIAGNOSIS — E78.00 PURE HYPERCHOLESTEROLEMIA, UNSPECIFIED: ICD-10-CM

## 2023-03-20 PROCEDURE — 99213 OFFICE O/P EST LOW 20 MIN: CPT

## 2023-03-20 RX ORDER — FLUTICASONE FUROATE AND VILANTEROL TRIFENATATE 100; 25 UG/1; UG/1
100-25 POWDER RESPIRATORY (INHALATION)
Qty: 60 | Refills: 0 | Status: COMPLETED | COMMUNITY
Start: 2022-05-30 | End: 2023-03-20

## 2023-03-20 RX ORDER — SUCRALFATE 1 G/1
1 TABLET ORAL
Qty: 360 | Refills: 0 | Status: COMPLETED | COMMUNITY
Start: 2022-10-10

## 2023-03-20 RX ORDER — OXYCODONE 5 MG/1
5 TABLET ORAL
Qty: 12 | Refills: 0 | Status: COMPLETED | COMMUNITY
Start: 2023-01-31

## 2023-03-20 RX ORDER — TRAMADOL HYDROCHLORIDE 50 MG/1
50 TABLET, COATED ORAL
Qty: 9 | Refills: 0 | Status: COMPLETED | COMMUNITY
Start: 2023-01-31

## 2023-03-20 RX ORDER — ACETAMINOPHEN AND CODEINE PHOSPHATE 300; 15 MG/1; MG/1
300-15 TABLET ORAL
Qty: 10 | Refills: 0 | Status: COMPLETED | COMMUNITY
Start: 2022-11-14

## 2023-03-20 RX ORDER — FOLIC ACID 1 MG/1
1 TABLET ORAL
Qty: 90 | Refills: 0 | Status: ACTIVE | COMMUNITY
Start: 2023-03-02

## 2023-03-20 RX ORDER — FLUCONAZOLE 100 MG/1
100 TABLET ORAL
Qty: 15 | Refills: 0 | Status: COMPLETED | COMMUNITY
Start: 2022-10-13 | End: 2023-03-20

## 2023-03-20 RX ORDER — MIRTAZAPINE 30 MG/1
30 TABLET, FILM COATED ORAL
Qty: 90 | Refills: 0 | Status: COMPLETED | COMMUNITY
Start: 2021-11-08 | End: 2023-03-20

## 2023-03-20 RX ORDER — OXYCODONE HYDROCHLORIDE 5 MG/1
5 CAPSULE ORAL
Qty: 10 | Refills: 0 | Status: COMPLETED | COMMUNITY
Start: 2022-11-10

## 2023-03-20 RX ORDER — MONTELUKAST 10 MG/1
10 TABLET, FILM COATED ORAL
Qty: 90 | Refills: 0 | Status: COMPLETED | COMMUNITY
Start: 2022-01-03 | End: 2023-03-20

## 2023-03-20 RX ORDER — CHLORHEXIDINE GLUCONATE, 0.12% ORAL RINSE 1.2 MG/ML
0.12 SOLUTION DENTAL
Qty: 473 | Refills: 0 | Status: COMPLETED | COMMUNITY
Start: 2022-12-30

## 2023-03-20 RX ORDER — METOCLOPRAMIDE 10 MG/1
10 TABLET ORAL
Qty: 42 | Refills: 0 | Status: COMPLETED | COMMUNITY
Start: 2022-03-02 | End: 2023-03-20

## 2023-03-20 RX ORDER — LEVOTHYROXINE SODIUM 137 UG/1
TABLET ORAL
Refills: 0 | Status: COMPLETED | COMMUNITY
End: 2023-03-20

## 2023-03-20 RX ORDER — ROSUVASTATIN CALCIUM 5 MG/1
5 TABLET, FILM COATED ORAL
Qty: 90 | Refills: 3 | Status: COMPLETED | COMMUNITY
Start: 2022-09-15 | End: 2023-03-20

## 2023-03-20 RX ORDER — DIAZEPAM 5 MG/1
5 TABLET ORAL
Qty: 30 | Refills: 0 | Status: COMPLETED | COMMUNITY
Start: 2023-03-15

## 2023-03-20 RX ORDER — AMOXICILLIN 500 MG/1
500 CAPSULE ORAL
Qty: 24 | Refills: 0 | Status: COMPLETED | COMMUNITY
Start: 2022-12-30

## 2023-03-20 RX ORDER — LOSARTAN POTASSIUM 100 MG/1
TABLET, FILM COATED ORAL
Refills: 0 | Status: COMPLETED | COMMUNITY
End: 2023-03-20

## 2023-03-20 RX ORDER — PANTOPRAZOLE 40 MG/1
40 TABLET, DELAYED RELEASE ORAL
Qty: 180 | Refills: 0 | Status: ACTIVE | COMMUNITY
Start: 2023-01-23

## 2023-03-20 NOTE — ASSESSMENT
[FreeTextEntry1] : 67F with hypothyroidism, hypertension, history of pancreatic cancer s/p surgery/chemo/RT, radiation gastritis (still on PPI) presents for f/u\par \par 1. Pancreatic cancer - [facility] treatment now just surveillance \par \par 3. Hypertension\par -losartan 100 mg daily - BP controlled\par -aha/acc recs for statin discussed, pt has medication at home and is thinking about it\par \par 3. Hypothyroidism\par -synthroid 100 mcg daily \par \par 4. HCM\par -mamm[facility] - [facility] 07/2022 normal\par -dexa - pt is on alendronate \par -colonoscopy - CT colonography 05/2021. dx pancreatic cancer then. \par -tdap - 09/13/2022\par -shingrix - reports completed with pmd\par -pneumovax - completed both with Dr. Sanchez. \par -COVID vaccine - completed. and booster x 2. \par  \par

## 2023-03-20 NOTE — HISTORY OF PRESENT ILLNESS
[FreeTextEntry1] : follow up [de-identified] : 67F with hypothyroidism, hypertension, history of pancreatic cancer s/p surgery/chemo/RT, recently hospitalized for acute blood loss anemia 2/2 UGIB 2/2 radiation gastritis treated with pRBC, PPI  presents for f/u after recent hospitalization patient reports that she had additional work-up for thickening that was nonspecific and pancreatic tissue.  Biopsy results are pending.  She is either going to be enrolled in a new clinical trial where she would be treated with 3 medications or she is going to restart chemotherapy with 2 medications.  She is following with [facility].  She otherwise feels well and follows with a psychiatrist at [facility] as well.  She also reports a good support system of family and friends.  Denies having any pain or physical symptoms currently.  Reports that she did not start the cholesterol medication but is still thinking about it only because she did not want to add another medication but will continue to consider it as pancreatic work-up continues

## 2023-11-02 ENCOUNTER — INPATIENT (INPATIENT)
Facility: HOSPITAL | Age: 68
LOS: 8 days | Discharge: HOME | DRG: 871 | End: 2023-11-11
Attending: INTERNAL MEDICINE | Admitting: INTERNAL MEDICINE
Payer: COMMERCIAL

## 2023-11-02 VITALS
SYSTOLIC BLOOD PRESSURE: 118 MMHG | WEIGHT: 102.96 LBS | OXYGEN SATURATION: 98 % | HEIGHT: 61 IN | HEART RATE: 99 BPM | TEMPERATURE: 98 F | DIASTOLIC BLOOD PRESSURE: 74 MMHG | RESPIRATION RATE: 20 BRPM

## 2023-11-02 DIAGNOSIS — N17.9 ACUTE KIDNEY FAILURE, UNSPECIFIED: ICD-10-CM

## 2023-11-02 DIAGNOSIS — Z90.410 ACQUIRED TOTAL ABSENCE OF PANCREAS: Chronic | ICD-10-CM

## 2023-11-02 DIAGNOSIS — Z29.9 ENCOUNTER FOR PROPHYLACTIC MEASURES, UNSPECIFIED: ICD-10-CM

## 2023-11-02 DIAGNOSIS — A41.9 SEPSIS, UNSPECIFIED ORGANISM: ICD-10-CM

## 2023-11-02 DIAGNOSIS — C25.9 MALIGNANT NEOPLASM OF PANCREAS, UNSPECIFIED: ICD-10-CM

## 2023-11-02 DIAGNOSIS — E87.1 HYPO-OSMOLALITY AND HYPONATREMIA: ICD-10-CM

## 2023-11-02 DIAGNOSIS — N12 TUBULO-INTERSTITIAL NEPHRITIS, NOT SPECIFIED AS ACUTE OR CHRONIC: ICD-10-CM

## 2023-11-02 DIAGNOSIS — E03.9 HYPOTHYROIDISM, UNSPECIFIED: ICD-10-CM

## 2023-11-02 DIAGNOSIS — I10 ESSENTIAL (PRIMARY) HYPERTENSION: ICD-10-CM

## 2023-11-02 LAB
ALBUMIN SERPL ELPH-MCNC: 2.9 G/DL — LOW (ref 3.3–5)
ALBUMIN SERPL ELPH-MCNC: 2.9 G/DL — LOW (ref 3.3–5)
ALP SERPL-CCNC: 129 U/L — HIGH (ref 40–120)
ALP SERPL-CCNC: 129 U/L — HIGH (ref 40–120)
ALT FLD-CCNC: 63 U/L — HIGH (ref 10–45)
ALT FLD-CCNC: 63 U/L — HIGH (ref 10–45)
ANION GAP SERPL CALC-SCNC: 15 MMOL/L — SIGNIFICANT CHANGE UP (ref 5–17)
ANION GAP SERPL CALC-SCNC: 15 MMOL/L — SIGNIFICANT CHANGE UP (ref 5–17)
APPEARANCE UR: ABNORMAL
APPEARANCE UR: ABNORMAL
AST SERPL-CCNC: 36 U/L — SIGNIFICANT CHANGE UP (ref 10–40)
AST SERPL-CCNC: 36 U/L — SIGNIFICANT CHANGE UP (ref 10–40)
BACTERIA # UR AUTO: ABNORMAL /HPF
BACTERIA # UR AUTO: ABNORMAL /HPF
BASE EXCESS BLDV CALC-SCNC: -7.1 MMOL/L — LOW (ref -2–3)
BASE EXCESS BLDV CALC-SCNC: -7.1 MMOL/L — LOW (ref -2–3)
BILIRUB SERPL-MCNC: 1 MG/DL — SIGNIFICANT CHANGE UP (ref 0.2–1.2)
BILIRUB SERPL-MCNC: 1 MG/DL — SIGNIFICANT CHANGE UP (ref 0.2–1.2)
BILIRUB UR-MCNC: NEGATIVE — SIGNIFICANT CHANGE UP
BILIRUB UR-MCNC: NEGATIVE — SIGNIFICANT CHANGE UP
BUN SERPL-MCNC: 35 MG/DL — HIGH (ref 7–23)
BUN SERPL-MCNC: 35 MG/DL — HIGH (ref 7–23)
CALCIUM SERPL-MCNC: 7.5 MG/DL — LOW (ref 8.4–10.5)
CALCIUM SERPL-MCNC: 7.5 MG/DL — LOW (ref 8.4–10.5)
CAST: 2 /LPF — SIGNIFICANT CHANGE UP (ref 0–4)
CAST: 2 /LPF — SIGNIFICANT CHANGE UP (ref 0–4)
CHLORIDE SERPL-SCNC: 97 MMOL/L — SIGNIFICANT CHANGE UP (ref 96–108)
CHLORIDE SERPL-SCNC: 97 MMOL/L — SIGNIFICANT CHANGE UP (ref 96–108)
CO2 BLDV-SCNC: 18 MMOL/L — LOW (ref 22–26)
CO2 BLDV-SCNC: 18 MMOL/L — LOW (ref 22–26)
CO2 SERPL-SCNC: 13 MMOL/L — LOW (ref 22–31)
CO2 SERPL-SCNC: 13 MMOL/L — LOW (ref 22–31)
COLOR SPEC: YELLOW — SIGNIFICANT CHANGE UP
COLOR SPEC: YELLOW — SIGNIFICANT CHANGE UP
CREAT ?TM UR-MCNC: 41 MG/DL — SIGNIFICANT CHANGE UP
CREAT ?TM UR-MCNC: 41 MG/DL — SIGNIFICANT CHANGE UP
CREAT SERPL-MCNC: 1.66 MG/DL — HIGH (ref 0.5–1.3)
CREAT SERPL-MCNC: 1.66 MG/DL — HIGH (ref 0.5–1.3)
DIFF PNL FLD: ABNORMAL
DIFF PNL FLD: ABNORMAL
EGFR: 33 ML/MIN/1.73M2 — LOW
EGFR: 33 ML/MIN/1.73M2 — LOW
FLUAV AG NPH QL: SIGNIFICANT CHANGE UP
FLUAV AG NPH QL: SIGNIFICANT CHANGE UP
FLUBV AG NPH QL: SIGNIFICANT CHANGE UP
FLUBV AG NPH QL: SIGNIFICANT CHANGE UP
GLUCOSE SERPL-MCNC: 130 MG/DL — HIGH (ref 70–99)
GLUCOSE SERPL-MCNC: 130 MG/DL — HIGH (ref 70–99)
GLUCOSE UR QL: NEGATIVE MG/DL — SIGNIFICANT CHANGE UP
GLUCOSE UR QL: NEGATIVE MG/DL — SIGNIFICANT CHANGE UP
HCO3 BLDV-SCNC: 17 MMOL/L — LOW (ref 22–29)
HCO3 BLDV-SCNC: 17 MMOL/L — LOW (ref 22–29)
HCT VFR BLD CALC: 21.5 % — LOW (ref 34.5–45)
HCT VFR BLD CALC: 21.5 % — LOW (ref 34.5–45)
HGB BLD-MCNC: 7.7 G/DL — LOW (ref 11.5–15.5)
HGB BLD-MCNC: 7.7 G/DL — LOW (ref 11.5–15.5)
KETONES UR-MCNC: NEGATIVE MG/DL — SIGNIFICANT CHANGE UP
KETONES UR-MCNC: NEGATIVE MG/DL — SIGNIFICANT CHANGE UP
LEUKOCYTE ESTERASE UR-ACNC: ABNORMAL
LEUKOCYTE ESTERASE UR-ACNC: ABNORMAL
MAGNESIUM SERPL-MCNC: 1.5 MG/DL — LOW (ref 1.6–2.6)
MAGNESIUM SERPL-MCNC: 1.5 MG/DL — LOW (ref 1.6–2.6)
MAGNESIUM SERPL-MCNC: 1.6 MG/DL — SIGNIFICANT CHANGE UP (ref 1.6–2.6)
MAGNESIUM SERPL-MCNC: 1.6 MG/DL — SIGNIFICANT CHANGE UP (ref 1.6–2.6)
MCHC RBC-ENTMCNC: 33.6 PG — SIGNIFICANT CHANGE UP (ref 27–34)
MCHC RBC-ENTMCNC: 33.6 PG — SIGNIFICANT CHANGE UP (ref 27–34)
MCHC RBC-ENTMCNC: 35.8 GM/DL — SIGNIFICANT CHANGE UP (ref 32–36)
MCHC RBC-ENTMCNC: 35.8 GM/DL — SIGNIFICANT CHANGE UP (ref 32–36)
MCV RBC AUTO: 93.9 FL — SIGNIFICANT CHANGE UP (ref 80–100)
MCV RBC AUTO: 93.9 FL — SIGNIFICANT CHANGE UP (ref 80–100)
NITRITE UR-MCNC: NEGATIVE — SIGNIFICANT CHANGE UP
NITRITE UR-MCNC: NEGATIVE — SIGNIFICANT CHANGE UP
NRBC # BLD: 0 /100 WBCS — SIGNIFICANT CHANGE UP (ref 0–0)
NRBC # BLD: 0 /100 WBCS — SIGNIFICANT CHANGE UP (ref 0–0)
OSMOLALITY SERPL: 276 MOSMOL/KG — LOW (ref 280–301)
OSMOLALITY SERPL: 276 MOSMOL/KG — LOW (ref 280–301)
OSMOLALITY UR: 295 MOS/KG — LOW (ref 300–900)
OSMOLALITY UR: 295 MOS/KG — LOW (ref 300–900)
PCO2 BLDV: 28 MMHG — LOW (ref 39–42)
PCO2 BLDV: 28 MMHG — LOW (ref 39–42)
PH BLDV: 7.38 — SIGNIFICANT CHANGE UP (ref 7.32–7.43)
PH BLDV: 7.38 — SIGNIFICANT CHANGE UP (ref 7.32–7.43)
PH UR: 5.5 — SIGNIFICANT CHANGE UP (ref 5–8)
PH UR: 5.5 — SIGNIFICANT CHANGE UP (ref 5–8)
PHOSPHATE SERPL-MCNC: 2.8 MG/DL — SIGNIFICANT CHANGE UP (ref 2.5–4.5)
PHOSPHATE SERPL-MCNC: 2.8 MG/DL — SIGNIFICANT CHANGE UP (ref 2.5–4.5)
PHOSPHATE SERPL-MCNC: 4.2 MG/DL — SIGNIFICANT CHANGE UP (ref 2.5–4.5)
PHOSPHATE SERPL-MCNC: 4.2 MG/DL — SIGNIFICANT CHANGE UP (ref 2.5–4.5)
PLATELET # BLD AUTO: 91 K/UL — LOW (ref 150–400)
PLATELET # BLD AUTO: 91 K/UL — LOW (ref 150–400)
PO2 BLDV: 46 MMHG — HIGH (ref 25–45)
PO2 BLDV: 46 MMHG — HIGH (ref 25–45)
POTASSIUM SERPL-MCNC: 4.1 MMOL/L — SIGNIFICANT CHANGE UP (ref 3.5–5.3)
POTASSIUM SERPL-MCNC: 4.1 MMOL/L — SIGNIFICANT CHANGE UP (ref 3.5–5.3)
POTASSIUM SERPL-SCNC: 4.1 MMOL/L — SIGNIFICANT CHANGE UP (ref 3.5–5.3)
POTASSIUM SERPL-SCNC: 4.1 MMOL/L — SIGNIFICANT CHANGE UP (ref 3.5–5.3)
POTASSIUM UR-SCNC: 22 MMOL/L — SIGNIFICANT CHANGE UP
POTASSIUM UR-SCNC: 22 MMOL/L — SIGNIFICANT CHANGE UP
PROT ?TM UR-MCNC: 94 MG/DL — HIGH (ref 0–12)
PROT ?TM UR-MCNC: 94 MG/DL — HIGH (ref 0–12)
PROT SERPL-MCNC: 5.8 G/DL — LOW (ref 6–8.3)
PROT SERPL-MCNC: 5.8 G/DL — LOW (ref 6–8.3)
PROT UR-MCNC: 100 MG/DL
PROT UR-MCNC: 100 MG/DL
PROT/CREAT UR-RTO: 2.3 RATIO — HIGH (ref 0–0.2)
PROT/CREAT UR-RTO: 2.3 RATIO — HIGH (ref 0–0.2)
RBC # BLD: 2.29 M/UL — LOW (ref 3.8–5.2)
RBC # BLD: 2.29 M/UL — LOW (ref 3.8–5.2)
RBC # FLD: 16 % — HIGH (ref 10.3–14.5)
RBC # FLD: 16 % — HIGH (ref 10.3–14.5)
RBC CASTS # UR COMP ASSIST: 3 /HPF — SIGNIFICANT CHANGE UP (ref 0–4)
RBC CASTS # UR COMP ASSIST: 3 /HPF — SIGNIFICANT CHANGE UP (ref 0–4)
REVIEW: SIGNIFICANT CHANGE UP
REVIEW: SIGNIFICANT CHANGE UP
RSV RNA NPH QL NAA+NON-PROBE: SIGNIFICANT CHANGE UP
RSV RNA NPH QL NAA+NON-PROBE: SIGNIFICANT CHANGE UP
SAO2 % BLDV: 73.1 % — SIGNIFICANT CHANGE UP (ref 67–88)
SAO2 % BLDV: 73.1 % — SIGNIFICANT CHANGE UP (ref 67–88)
SARS-COV-2 RNA SPEC QL NAA+PROBE: SIGNIFICANT CHANGE UP
SARS-COV-2 RNA SPEC QL NAA+PROBE: SIGNIFICANT CHANGE UP
SODIUM SERPL-SCNC: 125 MMOL/L — LOW (ref 135–145)
SODIUM SERPL-SCNC: 125 MMOL/L — LOW (ref 135–145)
SODIUM UR-SCNC: 38 MMOL/L — SIGNIFICANT CHANGE UP
SODIUM UR-SCNC: 38 MMOL/L — SIGNIFICANT CHANGE UP
SP GR SPEC: 1.03 — SIGNIFICANT CHANGE UP (ref 1–1.03)
SP GR SPEC: 1.03 — SIGNIFICANT CHANGE UP (ref 1–1.03)
SQUAMOUS # UR AUTO: 1 /HPF — SIGNIFICANT CHANGE UP (ref 0–5)
SQUAMOUS # UR AUTO: 1 /HPF — SIGNIFICANT CHANGE UP (ref 0–5)
UROBILINOGEN FLD QL: 1 MG/DL — SIGNIFICANT CHANGE UP (ref 0.2–1)
UROBILINOGEN FLD QL: 1 MG/DL — SIGNIFICANT CHANGE UP (ref 0.2–1)
WBC # BLD: 8.71 K/UL — SIGNIFICANT CHANGE UP (ref 3.8–10.5)
WBC # BLD: 8.71 K/UL — SIGNIFICANT CHANGE UP (ref 3.8–10.5)
WBC # FLD AUTO: 8.71 K/UL — SIGNIFICANT CHANGE UP (ref 3.8–10.5)
WBC # FLD AUTO: 8.71 K/UL — SIGNIFICANT CHANGE UP (ref 3.8–10.5)
WBC UR QL: 278 /HPF — HIGH (ref 0–5)
WBC UR QL: 278 /HPF — HIGH (ref 0–5)

## 2023-11-02 PROCEDURE — 99223 1ST HOSP IP/OBS HIGH 75: CPT | Mod: GC

## 2023-11-02 PROCEDURE — 99285 EMERGENCY DEPT VISIT HI MDM: CPT

## 2023-11-02 RX ORDER — PIPERACILLIN AND TAZOBACTAM 4; .5 G/20ML; G/20ML
3.38 INJECTION, POWDER, LYOPHILIZED, FOR SOLUTION INTRAVENOUS EVERY 8 HOURS
Refills: 0 | Status: DISCONTINUED | OUTPATIENT
Start: 2023-11-02 | End: 2023-11-02

## 2023-11-02 RX ORDER — POTASSIUM CHLORIDE 20 MEQ
40 PACKET (EA) ORAL ONCE
Refills: 0 | Status: COMPLETED | OUTPATIENT
Start: 2023-11-02 | End: 2023-11-02

## 2023-11-02 RX ORDER — ESCITALOPRAM OXALATE 10 MG/1
20 TABLET, FILM COATED ORAL DAILY
Refills: 0 | Status: DISCONTINUED | OUTPATIENT
Start: 2023-11-02 | End: 2023-11-11

## 2023-11-02 RX ORDER — SODIUM CHLORIDE 9 MG/ML
1000 INJECTION INTRAMUSCULAR; INTRAVENOUS; SUBCUTANEOUS ONCE
Refills: 0 | Status: COMPLETED | OUTPATIENT
Start: 2023-11-02 | End: 2023-11-02

## 2023-11-02 RX ORDER — LIPASE/PROTEASE/AMYLASE 16-48-48K
1 CAPSULE,DELAYED RELEASE (ENTERIC COATED) ORAL
Refills: 0 | Status: DISCONTINUED | OUTPATIENT
Start: 2023-11-02 | End: 2023-11-11

## 2023-11-02 RX ORDER — ACETAMINOPHEN 500 MG
700 TABLET ORAL ONCE
Refills: 0 | Status: COMPLETED | OUTPATIENT
Start: 2023-11-02 | End: 2023-11-02

## 2023-11-02 RX ORDER — HEPARIN SODIUM 5000 [USP'U]/ML
5000 INJECTION INTRAVENOUS; SUBCUTANEOUS EVERY 8 HOURS
Refills: 0 | Status: DISCONTINUED | OUTPATIENT
Start: 2023-11-02 | End: 2023-11-05

## 2023-11-02 RX ORDER — MAGNESIUM SULFATE 500 MG/ML
2 VIAL (ML) INJECTION ONCE
Refills: 0 | Status: COMPLETED | OUTPATIENT
Start: 2023-11-02 | End: 2023-11-03

## 2023-11-02 RX ORDER — PIPERACILLIN AND TAZOBACTAM 4; .5 G/20ML; G/20ML
3.38 INJECTION, POWDER, LYOPHILIZED, FOR SOLUTION INTRAVENOUS ONCE
Refills: 0 | Status: COMPLETED | OUTPATIENT
Start: 2023-11-02 | End: 2023-11-02

## 2023-11-02 RX ORDER — DULOXETINE HYDROCHLORIDE 30 MG/1
30 CAPSULE, DELAYED RELEASE ORAL
Qty: 0 | Refills: 0 | DISCHARGE

## 2023-11-02 RX ORDER — MONTELUKAST 4 MG/1
1 TABLET, CHEWABLE ORAL
Qty: 0 | Refills: 0 | DISCHARGE

## 2023-11-02 RX ORDER — FOLIC ACID 0.8 MG
1 TABLET ORAL DAILY
Refills: 0 | Status: DISCONTINUED | OUTPATIENT
Start: 2023-11-02 | End: 2023-11-11

## 2023-11-02 RX ORDER — PIPERACILLIN AND TAZOBACTAM 4; .5 G/20ML; G/20ML
3.38 INJECTION, POWDER, LYOPHILIZED, FOR SOLUTION INTRAVENOUS ONCE
Refills: 0 | Status: COMPLETED | OUTPATIENT
Start: 2023-11-02 | End: 2023-11-03

## 2023-11-02 RX ORDER — PANTOPRAZOLE SODIUM 20 MG/1
40 TABLET, DELAYED RELEASE ORAL
Refills: 0 | Status: DISCONTINUED | OUTPATIENT
Start: 2023-11-02 | End: 2023-11-07

## 2023-11-02 RX ORDER — FLUTICASONE FUROATE AND VILANTEROL TRIFENATATE 100; 25 UG/1; UG/1
0 POWDER RESPIRATORY (INHALATION)
Qty: 0 | Refills: 0 | DISCHARGE

## 2023-11-02 RX ORDER — ACETAMINOPHEN 500 MG
650 TABLET ORAL EVERY 6 HOURS
Refills: 0 | Status: DISCONTINUED | OUTPATIENT
Start: 2023-11-02 | End: 2023-11-11

## 2023-11-02 RX ORDER — SODIUM CHLORIDE 9 MG/ML
1000 INJECTION, SOLUTION INTRAVENOUS
Refills: 0 | Status: DISCONTINUED | OUTPATIENT
Start: 2023-11-02 | End: 2023-11-03

## 2023-11-02 RX ORDER — PIPERACILLIN AND TAZOBACTAM 4; .5 G/20ML; G/20ML
3.38 INJECTION, POWDER, LYOPHILIZED, FOR SOLUTION INTRAVENOUS EVERY 8 HOURS
Refills: 0 | Status: DISCONTINUED | OUTPATIENT
Start: 2023-11-03 | End: 2023-11-03

## 2023-11-02 RX ORDER — POTASSIUM CHLORIDE 20 MEQ
10 PACKET (EA) ORAL
Refills: 0 | Status: COMPLETED | OUTPATIENT
Start: 2023-11-02 | End: 2023-11-03

## 2023-11-02 RX ORDER — OXCARBAZEPINE 300 MG/1
300 TABLET, FILM COATED ORAL
Refills: 0 | Status: DISCONTINUED | OUTPATIENT
Start: 2023-11-02 | End: 2023-11-11

## 2023-11-02 RX ORDER — LEVOTHYROXINE SODIUM 125 MCG
100 TABLET ORAL DAILY
Refills: 0 | Status: DISCONTINUED | OUTPATIENT
Start: 2023-11-02 | End: 2023-11-08

## 2023-11-02 RX ORDER — VANCOMYCIN HCL 1 G
1000 VIAL (EA) INTRAVENOUS ONCE
Refills: 0 | Status: COMPLETED | OUTPATIENT
Start: 2023-11-02 | End: 2023-11-02

## 2023-11-02 RX ORDER — FERROUS SULFATE 325(65) MG
325 TABLET ORAL DAILY
Refills: 0 | Status: DISCONTINUED | OUTPATIENT
Start: 2023-11-02 | End: 2023-11-11

## 2023-11-02 RX ADMIN — SODIUM CHLORIDE 1000 MILLILITER(S): 9 INJECTION INTRAMUSCULAR; INTRAVENOUS; SUBCUTANEOUS at 14:44

## 2023-11-02 RX ADMIN — Medication 280 MILLIGRAM(S): at 20:02

## 2023-11-02 RX ADMIN — Medication 40 MILLIEQUIVALENT(S): at 20:09

## 2023-11-02 RX ADMIN — Medication 100 MILLIEQUIVALENT(S): at 20:09

## 2023-11-02 RX ADMIN — PIPERACILLIN AND TAZOBACTAM 200 GRAM(S): 4; .5 INJECTION, POWDER, LYOPHILIZED, FOR SOLUTION INTRAVENOUS at 20:02

## 2023-11-02 RX ADMIN — SODIUM CHLORIDE 50 MILLILITER(S): 9 INJECTION, SOLUTION INTRAVENOUS at 20:04

## 2023-11-02 RX ADMIN — Medication 250 MILLIGRAM(S): at 14:44

## 2023-11-02 RX ADMIN — Medication 100 MILLIEQUIVALENT(S): at 22:23

## 2023-11-02 NOTE — ED PROVIDER NOTE - OBJECTIVE STATEMENT
68-year-old female with past medical history of pancreatic cancer currently on chemotherapy, last chemo on Monday to emergency department with fever, weakness, fatigue, body aches, low back pain 2 days.  Patient was seen at [facility] earlier today, had blood work and a CT chest abdomen pelvis performed.  CT concerning for pyelonephritis.  Normal white blood cell count.  Hyponatremic to 125.  Given 1 L IV fluid, started on Zosyn first dose estimated to be between 10 and 11 AM.  Transferred to [facility] for further management and continued IV antibiotics.  Denies headache, vision change, chest pain, shortness of breath, abdominal pain, nausea, vomiting, diarrhea, extremity edema, rash.

## 2023-11-02 NOTE — H&P ADULT - PROBLEM SELECTOR PLAN 2
Pyelo with CVA tenderness, recent urinary urgency, likely ascending infection iso immunosuppression from chemo.  - on zosyn for pyelonephritis. Continue at this time

## 2023-11-02 NOTE — H&P ADULT - NSHPLABSRESULTS_GEN_ALL_CORE
Personally reviewed labs, imaging, ekg         11-02    125<L>  |  97  |  35<H>  ----------------------------<  130<H>  4.1   |  13<L>  |  1.66<H>    Ca    7.5<L>      02 Nov 2023 14:54  Phos  4.2     11-02  Mg     1.6     11-02    TPro  5.8<L>  /  Alb  2.9<L>  /  TBili  1.0  /  DBili  x   /  AST  36  /  ALT  63<H>  /  AlkPhos  129<H>  11-02              Urinalysis Basic - ( 02 Nov 2023 14:54 )    Color: x / Appearance: x / SG: x / pH: x  Gluc: 130 mg/dL / Ketone: x  / Bili: x / Urobili: x   Blood: x / Protein: x / Nitrite: x   Leuk Esterase: x / RBC: x / WBC x   Sq Epi: x / Non Sq Epi: x / Bacteria: x            Lactate Trend            CAPILLARY BLOOD GLUCOSE                Personal interpretation EKG:

## 2023-11-02 NOTE — ED ADULT NURSE NOTE - OBJECTIVE STATEMENT
Pt is a 68y F, AxO3, PMH pancreatic cancer on chemotherapy last chemo Monday, presents to ED with fevers. Pt states she has had fevers for 2 days, max 102 at home. Went to [facility] today for fluids and eval, CT showed possible pyelonephritis, was referred to ED to be admitted. Endorsing general myalgia, mild headache, and chills. Pt has accessed port from [facility] this morning 11/2. On assessment, breathing is spontaneous and unlabored, skin is warm dry and intact, abd is nontender. Denies SOB, chest pain, diarrhea,  symptoms, dizziness. Speaking full sentences, maintained on cardia monitor and pulse ox, VSS, family at bedside, no acute distress noted at this time.

## 2023-11-02 NOTE — H&P ADULT - ASSESSMENT
68F hx of HTN pancreatic cancer on chemo (Gemcitabine/abraxane last dose on 10/30) with port in R chest wall here for sepsis 2/2 pyelonephritis complicated by MARY ELLEN and hyponatremia.

## 2023-11-02 NOTE — H&P ADULT - NSHPPHYSICALEXAM_GEN_ALL_CORE
Vital Signs Last 24 Hrs  T(C): 37.8 (02 Nov 2023 15:20), Max: 37.8 (02 Nov 2023 15:20)  T(F): 100 (02 Nov 2023 15:20), Max: 100 (02 Nov 2023 15:20)  HR: 110 (02 Nov 2023 15:20) (99 - 114)  BP: 155/97 (02 Nov 2023 15:20) (118/74 - 163/93)  BP(mean): --  RR: 18 (02 Nov 2023 15:20) (18 - 20)  SpO2: 97% (02 Nov 2023 15:20) (95% - 98%)    Parameters below as of 02 Nov 2023 15:20  Patient On (Oxygen Delivery Method): room air    CONSTITUTIONAL: Well groomed, no apparent distress  EYES: EOMI, No conjunctival or scleral injection, non-icteric  ENMT: Oral mucosa with moist membranes.   RESP: No respiratory distress, no use of accessory muscles; CTA b/l, no WRR  CV: RRR, +S1S2, no MRG; no peripheral edema  GI: Soft, NT, ND  SKIN: No rashes or ulcers noted  BACK: L CVA tenderness, no R CVA tenderness  NEURO: awake and alert

## 2023-11-02 NOTE — H&P ADULT - PROBLEM SELECTOR PLAN 4
Likely iso sepsis and hypotension as seen at OSH. Likely prerenal etiology leading to ATN, now increased from baseline (0,6)  - Now s/p 2L total. Trend BMP  - With NAGMA. Unclear etiology of NAGMA, no diarrhea, possible RTA iso low potassium at OSH (3). F/u urine pH

## 2023-11-02 NOTE — H&P ADULT - HISTORY OF PRESENT ILLNESS
Patient is a 68 year old F with hypertension, hypothyroidism, and  pancreatic cancer, currently on chemotherapy (last session 10/30) who presents to the emergency department for fever, weakness, fatigue, body aches, and lower back pain for 2 days. [place] [facility] [place]ology and was found to be febrile to Tmax 102F, reportedly was hypotensive and tachycardic, and was found to have evidence of pyelonephritis on CT abdomen/pelvis. The patient received one dose of Zosyn around 10-11 AM on 11/2 and transferred to [facility] for further management.    In the NS ED, VS notable for , /74 (though reportedly hypotensive per ED note), afebrile, SpO2 100% on room air . Patient received 1L NS, given vancomycin given presence of chemo port. Labs notable for Na 125, HCO3 13, BUN 35, Cr 1.66, , AST 63.  Patient is a 68 year old F with hypertension, hypothyroidism, and  pancreatic cancer, currently on chemotherapy (last session 10/30) who presents to the emergency department for fever, weakness, fatigue, body aches, and lower back pain for 2 days after 2 weeks of urinary urgency prior to symptom onset. These symptoms began on Mon 10/30 after receiving last chemo dose. [place] [facility] [place] by  with this symptomatology and was found to be febrile to Tmax 102F, reportedly was hypotensive and tachycardic, and was found to have evidence of pyelonephritis on CT abdomen/pelvis. The patient received one dose of Zosyn around 10-11 AM on 11/2 and transferred to [facility] for further management.    Denies nausea/vomiting, diarrhea, SOB, chest pain. Denies dysuria, polyuria. Has had poor PO intake and fatigue.    In the NS ED, VS notable for , /74 (though reportedly hypotensive per ED note), afebrile, SpO2 100% on room air . Patient received 1L NS, given vancomycin given presence of chemo port. Labs notable for Na 125, HCO3 13, BUN 35, Cr 1.66, , AST 63.

## 2023-11-02 NOTE — CHART NOTE - NSCHARTNOTEFT_GEN_A_CORE
PDI	Current Rx	Drug Type	Rx Written	Rx Dispensed	Drug	Quantity	Days Supply	Prescriber Name	Prescriber JUAN M #	Payment Method	Dispenser  A	N	B	03/15/2023	03/16/2023	diazepam 5 mg tablet	30	30	Nathan Loyd	[id number]	Insurance	[facility] #[id number]  A	N	O	01/31/2023	01/31/2023	oxycodone hcl (ir) 5 mg tablet	12	3	Kasandra Arisa RN MS ANP	[id number]	[facility]  A	N	O	01/31/2023	01/31/2023	tramadol hcl 50 mg tablet	9	3	Kasandra Arias RN MS ANP	[id number]	[facility]  A	N	O	11/14/2022	11/14/2022	acetaminophen-cod #2 tablet	10	2	Leslee Rogel DDS	[id number]	[facility] #[id number]  A	N	O	11/10/2022	11/11/2022	oxycodone hcl (ir) 5 mg cap	10	10	Aaliyah Bowser	[id number]	[facility] #[id number]

## 2023-11-02 NOTE — ED PROVIDER NOTE - CLINICAL SUMMARY MEDICAL DECISION MAKING FREE TEXT BOX
Richard 68-year-old female history of pancreatic CA on chemo presents status post chemoinfusion Monday followed by persistent nausea vomiting fever chills Tmax 102 seen at [facility] this a.m. with hypotension labs with no neutropenia but worsening anemia worsening MARY ELLEN CT concerning for Rodney given liter of fluid with resolution of hypotension and dose of IV Zosyn and sent for admission patient has port so will increase coverage to add vancomycin additional fluid resuscitation although normotensive and this time mild tachycardia abdomen soft nontender no indication for repeat imaging at this time

## 2023-11-02 NOTE — ED PROVIDER NOTE - PHYSICAL EXAMINATION
Gen: cachectic appearing   HEENT: mucous membranes dry  CV: tachycardic +S1/S2, no M/R/G, 2+ radial pulses b/l  Resp: CTAB, no W/R/R, no accessory muscle use, no increased work of breathing  GI: Abdomen soft non-distended, NTTP  MSK:  no LE edema. Generalized lower back pain para spinally b/l.   Neuro: A&Ox4, following commands, moving all four extremities spontaneously  Psych: appropriate mood

## 2023-11-02 NOTE — H&P ADULT - PROBLEM SELECTOR PLAN 3
Will f/u serum osm, urine studies. Likely hypovolemic hyponatremia given poor PO intake. Receiving 2L NS at this time; however unclear if there is superimposed SIADH iso pain/illness.  - F/u urine studies/serum osm. Hold on maintenance fluids. Repeat BMP after 1L to assess response.

## 2023-11-02 NOTE — ED ADULT NURSE NOTE - CAS TRG GEN SKIN CONDITION
-- [facility] #[id number] [facility] [address] AT [facility] [address]    Order pended, routed to clinician for review.     Warm/Dry

## 2023-11-02 NOTE — H&P ADULT - PROBLEM SELECTOR PLAN 1
Fever, tachycardia, with MARY ELLEN/hypotension. CT A/P outside records showing evidence of pyelonephritis in the upper poles. Still intermittently febrile. S/p 1 dose of zosyn, vanc. Immunosuppression from active chemo.  - Continue zosyn for pyelonephritis  - Received 2L NS, however given hyponatremia will need serum osm, urine lytes prior to continuing with maintenance fluids. Possible SIADH iso pain and illness, but possibly appropriate adh release iso poor PO intake. Will need to f/u labs

## 2023-11-02 NOTE — H&P ADULT - ATTENDING COMMENTS
Seen, examined the patient in ED yesterday around 5p with house staff    This is s 68F with h/o pancreatic cancer since 2021, on chemotherapy (last session 10/30) presented to [facility] [place] with fever, weakness, fatigue, body aches, and lower back pain for 2 days after 2 weeks of urinary urgency. Her SBP was in 80s and she was given 1L IVF with NS, IV zosyn and send to ED at  [facility]. Outpatient CT abd/pelvis showed evidence of Pyelonephritis.    In ED patient was found to have sepsis, 1L NS, 1gm IV vancomycin was given. CXR- clear, RVP neg, Na 125, Scr 1.66, WBC outpatient was normal.    1. Sepsis due to acute pyelonephritis  2. Hyponatremia, likely 2/2 prerenal ATN from dehydration  3. MARY ELLEN due to ATN  4. Pancreatic cancer since 2021, on chemotherapy  5. Anemia and chronic thrombocytopenia due to chemo and Ca pancreas   6. HTN    - Blood and urine c/s taken in ED, Will repeat BMP, CBC w diff. BP has been stable after IVF 2L NS  - c/w IV zosyn, vancomycin by level, will f/u c/s, antipyretic, analgesics prn  - IV hydration w NS depending on BP & Na  - Hold Losartan, HCTZ, Amlodipine  - c/w PPI and other home meds  - DVT ppx

## 2023-11-03 LAB
ACANTHOCYTES BLD QL SMEAR: SLIGHT — SIGNIFICANT CHANGE UP
ACANTHOCYTES BLD QL SMEAR: SLIGHT — SIGNIFICANT CHANGE UP
ALBUMIN SERPL ELPH-MCNC: 2.6 G/DL — LOW (ref 3.3–5)
ALBUMIN SERPL ELPH-MCNC: 2.6 G/DL — LOW (ref 3.3–5)
ALP SERPL-CCNC: 115 U/L — SIGNIFICANT CHANGE UP (ref 40–120)
ALP SERPL-CCNC: 115 U/L — SIGNIFICANT CHANGE UP (ref 40–120)
ALT FLD-CCNC: 45 U/L — SIGNIFICANT CHANGE UP (ref 10–45)
ALT FLD-CCNC: 45 U/L — SIGNIFICANT CHANGE UP (ref 10–45)
ANION GAP SERPL CALC-SCNC: 15 MMOL/L — SIGNIFICANT CHANGE UP (ref 5–17)
ANION GAP SERPL CALC-SCNC: 15 MMOL/L — SIGNIFICANT CHANGE UP (ref 5–17)
ANISOCYTOSIS BLD QL: SLIGHT — SIGNIFICANT CHANGE UP
ANISOCYTOSIS BLD QL: SLIGHT — SIGNIFICANT CHANGE UP
AST SERPL-CCNC: 21 U/L — SIGNIFICANT CHANGE UP (ref 10–40)
AST SERPL-CCNC: 21 U/L — SIGNIFICANT CHANGE UP (ref 10–40)
BASOPHILS # BLD AUTO: 0 K/UL — SIGNIFICANT CHANGE UP (ref 0–0.2)
BASOPHILS # BLD AUTO: 0 K/UL — SIGNIFICANT CHANGE UP (ref 0–0.2)
BASOPHILS NFR BLD AUTO: 0 % — SIGNIFICANT CHANGE UP (ref 0–2)
BASOPHILS NFR BLD AUTO: 0 % — SIGNIFICANT CHANGE UP (ref 0–2)
BILIRUB SERPL-MCNC: 0.9 MG/DL — SIGNIFICANT CHANGE UP (ref 0.2–1.2)
BILIRUB SERPL-MCNC: 0.9 MG/DL — SIGNIFICANT CHANGE UP (ref 0.2–1.2)
BUN SERPL-MCNC: 32 MG/DL — HIGH (ref 7–23)
BUN SERPL-MCNC: 32 MG/DL — HIGH (ref 7–23)
BURR CELLS BLD QL SMEAR: PRESENT — SIGNIFICANT CHANGE UP
BURR CELLS BLD QL SMEAR: PRESENT — SIGNIFICANT CHANGE UP
BURR CELLS BLD QL SMEAR: SLIGHT — SIGNIFICANT CHANGE UP
BURR CELLS BLD QL SMEAR: SLIGHT — SIGNIFICANT CHANGE UP
CALCIUM SERPL-MCNC: 7.1 MG/DL — LOW (ref 8.4–10.5)
CALCIUM SERPL-MCNC: 7.1 MG/DL — LOW (ref 8.4–10.5)
CHLORIDE SERPL-SCNC: 100 MMOL/L — SIGNIFICANT CHANGE UP (ref 96–108)
CHLORIDE SERPL-SCNC: 100 MMOL/L — SIGNIFICANT CHANGE UP (ref 96–108)
CO2 SERPL-SCNC: 14 MMOL/L — LOW (ref 22–31)
CO2 SERPL-SCNC: 14 MMOL/L — LOW (ref 22–31)
CREAT SERPL-MCNC: 1.37 MG/DL — HIGH (ref 0.5–1.3)
CREAT SERPL-MCNC: 1.37 MG/DL — HIGH (ref 0.5–1.3)
E COLI DNA BLD POS QL NAA+NON-PROBE: SIGNIFICANT CHANGE UP
E COLI DNA BLD POS QL NAA+NON-PROBE: SIGNIFICANT CHANGE UP
EGFR: 42 ML/MIN/1.73M2 — LOW
EGFR: 42 ML/MIN/1.73M2 — LOW
EOSINOPHIL # BLD AUTO: 0 K/UL — SIGNIFICANT CHANGE UP (ref 0–0.5)
EOSINOPHIL # BLD AUTO: 0 K/UL — SIGNIFICANT CHANGE UP (ref 0–0.5)
EOSINOPHIL NFR BLD AUTO: 0 % — SIGNIFICANT CHANGE UP (ref 0–6)
EOSINOPHIL NFR BLD AUTO: 0 % — SIGNIFICANT CHANGE UP (ref 0–6)
GIANT PLATELETS BLD QL SMEAR: PRESENT — SIGNIFICANT CHANGE UP
GIANT PLATELETS BLD QL SMEAR: PRESENT — SIGNIFICANT CHANGE UP
GLUCOSE SERPL-MCNC: 119 MG/DL — HIGH (ref 70–99)
GLUCOSE SERPL-MCNC: 119 MG/DL — HIGH (ref 70–99)
GRAM STN FLD: ABNORMAL
HCT VFR BLD CALC: 21 % — CRITICAL LOW (ref 34.5–45)
HCT VFR BLD CALC: 21 % — CRITICAL LOW (ref 34.5–45)
HGB BLD-MCNC: 7.4 G/DL — LOW (ref 11.5–15.5)
HGB BLD-MCNC: 7.4 G/DL — LOW (ref 11.5–15.5)
HOWELL-JOLLY BOD BLD QL SMEAR: PRESENT — SIGNIFICANT CHANGE UP
HOWELL-JOLLY BOD BLD QL SMEAR: PRESENT — SIGNIFICANT CHANGE UP
HYPOCHROMIA BLD QL: SLIGHT — SIGNIFICANT CHANGE UP
HYPOCHROMIA BLD QL: SLIGHT — SIGNIFICANT CHANGE UP
LYMPHOCYTES # BLD AUTO: 0.42 K/UL — LOW (ref 1–3.3)
LYMPHOCYTES # BLD AUTO: 0.42 K/UL — LOW (ref 1–3.3)
LYMPHOCYTES # BLD AUTO: 4.4 % — LOW (ref 13–44)
LYMPHOCYTES # BLD AUTO: 4.4 % — LOW (ref 13–44)
MACROCYTES BLD QL: SLIGHT — SIGNIFICANT CHANGE UP
MACROCYTES BLD QL: SLIGHT — SIGNIFICANT CHANGE UP
MAGNESIUM SERPL-MCNC: 2.4 MG/DL — SIGNIFICANT CHANGE UP (ref 1.6–2.6)
MAGNESIUM SERPL-MCNC: 2.4 MG/DL — SIGNIFICANT CHANGE UP (ref 1.6–2.6)
MANUAL SMEAR VERIFICATION: SIGNIFICANT CHANGE UP
MANUAL SMEAR VERIFICATION: SIGNIFICANT CHANGE UP
MCHC RBC-ENTMCNC: 32.9 PG — SIGNIFICANT CHANGE UP (ref 27–34)
MCHC RBC-ENTMCNC: 32.9 PG — SIGNIFICANT CHANGE UP (ref 27–34)
MCHC RBC-ENTMCNC: 35.2 GM/DL — SIGNIFICANT CHANGE UP (ref 32–36)
MCHC RBC-ENTMCNC: 35.2 GM/DL — SIGNIFICANT CHANGE UP (ref 32–36)
MCV RBC AUTO: 93.3 FL — SIGNIFICANT CHANGE UP (ref 80–100)
MCV RBC AUTO: 93.3 FL — SIGNIFICANT CHANGE UP (ref 80–100)
METHOD TYPE: SIGNIFICANT CHANGE UP
METHOD TYPE: SIGNIFICANT CHANGE UP
MICROCYTES BLD QL: SLIGHT — SIGNIFICANT CHANGE UP
MICROCYTES BLD QL: SLIGHT — SIGNIFICANT CHANGE UP
MONOCYTES # BLD AUTO: 0 K/UL — SIGNIFICANT CHANGE UP (ref 0–0.9)
MONOCYTES # BLD AUTO: 0 K/UL — SIGNIFICANT CHANGE UP (ref 0–0.9)
MONOCYTES NFR BLD AUTO: 0 % — LOW (ref 2–14)
MONOCYTES NFR BLD AUTO: 0 % — LOW (ref 2–14)
NEUTROPHILS # BLD AUTO: 9.22 K/UL — HIGH (ref 1.8–7.4)
NEUTROPHILS # BLD AUTO: 9.22 K/UL — HIGH (ref 1.8–7.4)
NEUTROPHILS NFR BLD AUTO: 95.6 % — HIGH (ref 43–77)
NEUTROPHILS NFR BLD AUTO: 95.6 % — HIGH (ref 43–77)
PHOSPHATE SERPL-MCNC: 2.6 MG/DL — SIGNIFICANT CHANGE UP (ref 2.5–4.5)
PHOSPHATE SERPL-MCNC: 2.6 MG/DL — SIGNIFICANT CHANGE UP (ref 2.5–4.5)
PLAT MORPH BLD: NORMAL — SIGNIFICANT CHANGE UP
PLAT MORPH BLD: NORMAL — SIGNIFICANT CHANGE UP
PLATELET # BLD AUTO: 76 K/UL — LOW (ref 150–400)
PLATELET # BLD AUTO: 76 K/UL — LOW (ref 150–400)
POIKILOCYTOSIS BLD QL AUTO: SIGNIFICANT CHANGE UP
POIKILOCYTOSIS BLD QL AUTO: SIGNIFICANT CHANGE UP
POTASSIUM SERPL-MCNC: 3.9 MMOL/L — SIGNIFICANT CHANGE UP (ref 3.5–5.3)
POTASSIUM SERPL-MCNC: 3.9 MMOL/L — SIGNIFICANT CHANGE UP (ref 3.5–5.3)
POTASSIUM SERPL-SCNC: 3.9 MMOL/L — SIGNIFICANT CHANGE UP (ref 3.5–5.3)
POTASSIUM SERPL-SCNC: 3.9 MMOL/L — SIGNIFICANT CHANGE UP (ref 3.5–5.3)
PROT SERPL-MCNC: 5.4 G/DL — LOW (ref 6–8.3)
PROT SERPL-MCNC: 5.4 G/DL — LOW (ref 6–8.3)
RBC # BLD: 2.25 M/UL — LOW (ref 3.8–5.2)
RBC # BLD: 2.25 M/UL — LOW (ref 3.8–5.2)
RBC # FLD: 15.9 % — HIGH (ref 10.3–14.5)
RBC # FLD: 15.9 % — HIGH (ref 10.3–14.5)
RBC BLD AUTO: ABNORMAL
RBC BLD AUTO: ABNORMAL
SCHISTOCYTES BLD QL AUTO: SLIGHT — SIGNIFICANT CHANGE UP
SCHISTOCYTES BLD QL AUTO: SLIGHT — SIGNIFICANT CHANGE UP
SODIUM SERPL-SCNC: 129 MMOL/L — LOW (ref 135–145)
SODIUM SERPL-SCNC: 129 MMOL/L — LOW (ref 135–145)
SPECIMEN SOURCE: SIGNIFICANT CHANGE UP
SPHEROCYTES BLD QL SMEAR: SLIGHT — SIGNIFICANT CHANGE UP
SPHEROCYTES BLD QL SMEAR: SLIGHT — SIGNIFICANT CHANGE UP
TARGETS BLD QL SMEAR: SLIGHT — SIGNIFICANT CHANGE UP
TARGETS BLD QL SMEAR: SLIGHT — SIGNIFICANT CHANGE UP
TOXIC GRANULES BLD QL SMEAR: PRESENT — SIGNIFICANT CHANGE UP
TOXIC GRANULES BLD QL SMEAR: PRESENT — SIGNIFICANT CHANGE UP
UUN UR-MCNC: 330 MG/DL — SIGNIFICANT CHANGE UP
UUN UR-MCNC: 330 MG/DL — SIGNIFICANT CHANGE UP
WBC # BLD: 9.64 K/UL — SIGNIFICANT CHANGE UP (ref 3.8–10.5)
WBC # BLD: 9.64 K/UL — SIGNIFICANT CHANGE UP (ref 3.8–10.5)
WBC # FLD AUTO: 9.64 K/UL — SIGNIFICANT CHANGE UP (ref 3.8–10.5)
WBC # FLD AUTO: 9.64 K/UL — SIGNIFICANT CHANGE UP (ref 3.8–10.5)

## 2023-11-03 PROCEDURE — 99233 SBSQ HOSP IP/OBS HIGH 50: CPT | Mod: GC

## 2023-11-03 PROCEDURE — 71045 X-RAY EXAM CHEST 1 VIEW: CPT | Mod: 26

## 2023-11-03 PROCEDURE — 99254 IP/OBS CNSLTJ NEW/EST MOD 60: CPT

## 2023-11-03 RX ORDER — SODIUM CHLORIDE 9 MG/ML
1000 INJECTION, SOLUTION INTRAVENOUS
Refills: 0 | Status: DISCONTINUED | OUTPATIENT
Start: 2023-11-03 | End: 2023-11-04

## 2023-11-03 RX ORDER — CEFTRIAXONE 500 MG/1
1000 INJECTION, POWDER, FOR SOLUTION INTRAMUSCULAR; INTRAVENOUS EVERY 24 HOURS
Refills: 0 | Status: DISCONTINUED | OUTPATIENT
Start: 2023-11-03 | End: 2023-11-06

## 2023-11-03 RX ORDER — CHLORHEXIDINE GLUCONATE 213 G/1000ML
1 SOLUTION TOPICAL DAILY
Refills: 0 | Status: DISCONTINUED | OUTPATIENT
Start: 2023-11-03 | End: 2023-11-11

## 2023-11-03 RX ADMIN — Medication 1 CAPSULE(S): at 17:39

## 2023-11-03 RX ADMIN — Medication 1 CAPSULE(S): at 12:52

## 2023-11-03 RX ADMIN — PIPERACILLIN AND TAZOBACTAM 25 GRAM(S): 4; .5 INJECTION, POWDER, LYOPHILIZED, FOR SOLUTION INTRAVENOUS at 03:14

## 2023-11-03 RX ADMIN — Medication 325 MILLIGRAM(S): at 12:52

## 2023-11-03 RX ADMIN — SODIUM CHLORIDE 70 MILLILITER(S): 9 INJECTION, SOLUTION INTRAVENOUS at 13:33

## 2023-11-03 RX ADMIN — Medication 100 MILLIEQUIVALENT(S): at 00:52

## 2023-11-03 RX ADMIN — PIPERACILLIN AND TAZOBACTAM 25 GRAM(S): 4; .5 INJECTION, POWDER, LYOPHILIZED, FOR SOLUTION INTRAVENOUS at 13:33

## 2023-11-03 RX ADMIN — Medication 25 GRAM(S): at 03:15

## 2023-11-03 RX ADMIN — OXCARBAZEPINE 300 MILLIGRAM(S): 300 TABLET, FILM COATED ORAL at 06:13

## 2023-11-03 RX ADMIN — Medication 1 MILLIGRAM(S): at 12:52

## 2023-11-03 RX ADMIN — Medication 650 MILLIGRAM(S): at 16:57

## 2023-11-03 RX ADMIN — CEFTRIAXONE 100 MILLIGRAM(S): 500 INJECTION, POWDER, FOR SOLUTION INTRAMUSCULAR; INTRAVENOUS at 16:57

## 2023-11-03 RX ADMIN — Medication 1 CAPSULE(S): at 09:39

## 2023-11-03 RX ADMIN — CHLORHEXIDINE GLUCONATE 1 APPLICATION(S): 213 SOLUTION TOPICAL at 17:00

## 2023-11-03 RX ADMIN — HEPARIN SODIUM 5000 UNIT(S): 5000 INJECTION INTRAVENOUS; SUBCUTANEOUS at 13:33

## 2023-11-03 RX ADMIN — HEPARIN SODIUM 5000 UNIT(S): 5000 INJECTION INTRAVENOUS; SUBCUTANEOUS at 21:42

## 2023-11-03 RX ADMIN — ESCITALOPRAM OXALATE 20 MILLIGRAM(S): 10 TABLET, FILM COATED ORAL at 12:52

## 2023-11-03 RX ADMIN — Medication 100 MICROGRAM(S): at 06:13

## 2023-11-03 RX ADMIN — OXCARBAZEPINE 300 MILLIGRAM(S): 300 TABLET, FILM COATED ORAL at 17:39

## 2023-11-03 RX ADMIN — PIPERACILLIN AND TAZOBACTAM 25 GRAM(S): 4; .5 INJECTION, POWDER, LYOPHILIZED, FOR SOLUTION INTRAVENOUS at 06:14

## 2023-11-03 RX ADMIN — HEPARIN SODIUM 5000 UNIT(S): 5000 INJECTION INTRAVENOUS; SUBCUTANEOUS at 06:14

## 2023-11-03 RX ADMIN — Medication 650 MILLIGRAM(S): at 17:00

## 2023-11-03 RX ADMIN — PANTOPRAZOLE SODIUM 40 MILLIGRAM(S): 20 TABLET, DELAYED RELEASE ORAL at 06:14

## 2023-11-03 NOTE — CONSULT NOTE ADULT - NS ATTEND AMEND GEN_ALL_CORE FT
Patient with recurrent pancreatic cancer on treatment with gem/abraxane, followed by Dr. Bess.  She is admitted with pyelonephritis/urosepsis.   Her blood cultures are positive for E. coli.  Continue treatment with rocephin, ID following.

## 2023-11-03 NOTE — PROGRESS NOTE ADULT - ATTENDING COMMENTS
This is a 68F with h/o pancreatic cancer since 2021, on chemotherapy (last session 10/30) [place] [facility] [place], weakness, fatigue, body aches, and lower back pain for 2 days after 2 weeks of urinary urgency. Her SBP was in 80s and she was given 1L IVF with NS, IV zosyn and send to ED at  [facility]. Outpatient CT abd/pelvis showed evidence of Pyelonephritis.    In ED patient was found to have sepsis, 1L NS, 1gm IV vancomycin was given. CXR- clear, RVP neg, Na 125, Scr 1.66, WBC outpatient was normal.    1. Gm neg septicemia due to acute pyelonephritis  2. Acute on chronic hyponatremia, likely 2/2 prerenal/dehydration  3. MARY ELLEN due to ATN  4. Pancreatic cancer since 2021, on chemotherapy  5. Anemia and chronic thrombocytopenia due to chemo/Sepsis   6. HTN    - Tmax 101-103F, afebrile since am, feels weeks, c/o some dysuria. -124, mild tachy, normal O2 sat >95% RA  - Labs reviewed- WBC wnl, blood c/s gram neg rods, Na 129, Scr 1.3, Hb 7.4, Plt 76  - c/w IV zosyn, d/c IV vancomycin. will repeat blood c/s tomorrow, Chemo port look healthy  - IV hydration, Ensure, regular diet as tolerated, antipyretic, analgesics prn  - ID consult called  - Appreciated Onc consult. 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 may resume Amlodipine/Losartan at low dose if BP remains stable tomorrow  - c/w PPI and other home meds  - OOB to chair, PT eval, DVT ppx .  ** spoke to sister at bedside

## 2023-11-03 NOTE — PHYSICAL THERAPY INITIAL EVALUATION ADULT - NSPTDMEREC_GEN_A_CORE
Pt will require rolling walker for discharge due to diagnosis of pancreatic cancer & pyelonephritis, to help complete mobility related ADLs./rolling walker

## 2023-11-03 NOTE — CONSULT NOTE ADULT - ASSESSMENT
Patient is a 68 year old F with hypertension, hypothyroidism, and  pancreatic cancer, currently on chemotherapy (last session 10/30) who presents to the emergency department for fever, weakness, fatigue, body aches, and lower back pain for 2 days after 2 weeks of urinary urgency prior to symptom onset.    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 (resected 1/20223)  --s/p RT 3/2022  --currently on systemic treatment w/ Gemcitabine/Nab-Paclitaxel LD 10/30  --no plan for treatment while inpatient and/or rehab  --ongoing care after discharge    sepsis  --2/2 pyelonephritis, noted on outpatient CT a/p done 11/2 (see above)  --cultures pending  --on IV abx, ID consulted    Hyponatremia  --chronic, Na 128 at baseline  --may benefit from renal consult    anemia/thrombocytopenia  --2/2 malignancy and treatment  --Hgb 8-9, platelets 170s at baseline  --will check for nutritional deficiencies  --check daily CBC      will follow,  Bailey Herrera NP  Hematology/ Oncology  [facility]  [phone] (office)  [phone] (alt office)  Evenings and weekends please call MD on call or office   Patient is a 68 year old F with hypertension, hypothyroidism, and  pancreatic cancer, currently on chemotherapy (last session 10/30) who presents to the emergency department for fever, weakness, fatigue, body aches, and lower back pain for 2 days after 2 weeks of urinary urgency prior to symptom onset.    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 (resected 1/20223)  --s/p RT 3/2022  --currently on systemic treatment w/ Gemcitabine/Nab-Paclitaxel LD 10/30  --no plan for treatment while inpatient and/or rehab  --ongoing care after discharge    sepsis  --2/2 pyelonephritis, noted on outpatient CT a/p done 11/2 (see above)  --outpatient peripheral cultures collected 11/2 noted w/ GNR  --cultures pending  --on IV abx, ID consulted    Hyponatremia  --chronic, Na 128 at baseline  --may benefit from renal consult    anemia/thrombocytopenia  --2/2 malignancy and treatment  --Hgb 8-9, platelets 170s at baseline  --will check for nutritional deficiencies  --check daily CBC      will follow,  Bailey Herrera NP  Hematology/ Oncology  [facility]  [phone] (office)  [phone] (alt office)  Evenings and weekends please call MD on call or office   Patient is a 68 year old F with hypertension, hypothyroidism, and  pancreatic cancer, currently on chemotherapy (last session 10/30) who presents to the emergency department for fever, weakness, fatigue, body aches, and lower back pain for 2 days after 2 weeks of urinary urgency prior to symptom onset.    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 (resected 1/20223)  --s/p RT 3/2022  --currently on systemic treatment w/ Gemcitabine/Nab-Paclitaxel LD 10/30  --no plan for treatment while inpatient and/or rehab  --ongoing care after discharge    sepsis  --2/2 pyelonephritis, noted on outpatient CT a/p done 11/2 (see above)  --outpatient peripheral cultures collected 11/2 noted w/ GNR  --cultures collected inpatient pending  --on IV abx, ID consulted    Hyponatremia  --chronic, Na 128 at baseline  --may benefit from renal consult    anemia/thrombocytopenia  --2/2 malignancy and treatment  --Hgb 8-9, platelets 170s at baseline  --on PO folic acid, and iron  --check daily CBC    will follow,    Bailey Herrera NP  Hematology/ Oncology  [facility]  [phone] (office)  [phone] (alt office)  Evenings and weekends please call MD on call or office

## 2023-11-03 NOTE — SBIRT NOTE ADULT - NSSBIRTSIXOCC_GEN_A_CORE
For prostate enlargement symptoms  - can use OTC saw palmetto daily to slow/reverse enlargement.  
Never

## 2023-11-03 NOTE — PROGRESS NOTE ADULT - PROBLEM SELECTOR PLAN 4
Likely iso sepsis and hypotension as seen at OSH. Likely prerenal etiology leading to ATN, now increased from baseline (0,6)  - Now s/p 2L total. Trend BMP  - With NAGMA. Unclear etiology of NAGMA, no diarrhea, possible RTA iso low potassium at OSH (3). F/u urine pH Likely iso sepsis and hypotension as seen at OSH. Likely prerenal etiology leading to ATN, now increased from baseline (0,6)  - Now s/p 2L total. Slowly improving Cr  - With NAGMA. Unclear etiology of NAGMA, no diarrhea, possible RTA iso low potassium at OSH (3) and high urine pH

## 2023-11-03 NOTE — PATIENT PROFILE ADULT - FALL HARM RISK - HARM RISK INTERVENTIONS

## 2023-11-03 NOTE — PHYSICAL THERAPY INITIAL EVALUATION ADULT - PLANNED THERAPY INTERVENTIONS, PT EVAL
stairs: GOAL: Pt will negotiate 5 steps of stairs with or without appropriate assistive device and handrail via reciprocal/step-to technique indep in 2 weeks./balance training/bed mobility training/gait training/transfer training

## 2023-11-03 NOTE — CONSULT NOTE ADULT - NS ATTEND AMEND GEN_ALL_CORE FT
68 year old F with hypertension, hypothyroidism, and  pancreatic cancer, currently on chemotherapy (last session 10/30) who presents to the emergency department for fever, weakness, fatigue, body aches, and lower back pain for 2 days after 2 weeks of urinary urgency prior to symptom onset 68 year old F with hypertension, hypothyroidism, and  pancreatic cancer, currently on chemotherapy (last session 10/30) who presents to the emergency department for fever, weakness, fatigue, body aches, and lower back pain for 2 days after 2 weeks of urinary urgency prior to symptom onset  Fever, no leukocytosis  Dysuria  CT R stranding, suspected pyelonephritis  UCX GNR  BCX GNR  UA+  CXR clear  Overall, GNR bacteremia, Pyelonephritis, UCX+  - Zosyn 3.375g q 8  - F/U BCX  - F/U UCX  - F/U CT A/P read--not available in sunrise   - Final antibiotic plan pending culture results    Ludin Gleason MD  Contact on TEAMS messaging from 9am - 5pm  From 5pm-9am, on weekends, or if no response call [phone]    I was physically present for the key portions of the evaluation and management service provided. I saw and examined the patient. I agree with the above history, physical, and plan except for any discrepancies which I have documented in “Attending Statements.” Please refer to “Attending Statements” for final plan. 68 year old F with hypertension, hypothyroidism, and  pancreatic cancer, currently on chemotherapy (last session 10/30) who presents to the emergency department for fever, weakness, fatigue, body aches, and lower back pain for 2 days after 2 weeks of urinary urgency prior to symptom onset  Fever, no leukocytosis  Dysuria  CT R stranding, suspected pyelonephritis  UCX GNR  BCX GNR  UA+  CXR clear  Overall, GNR bacteremia, Pyelonephritis, UCX+  - Ceftriaxone 1g q 24  - F/U BCX  - F/U UCX  - F/U CT A/P read--not available in sunrise   - Final antibiotic plan pending culture results    Ludin Gleason MD  Contact on TEAMS messaging from 9am - 5pm  From 5pm-9am, on weekends, or if no response call [phone]    I was physically present for the key portions of the evaluation and management service provided. I saw and examined the patient. I agree with the above history, physical, and plan except for any discrepancies which I have documented in “Attending Statements.” Please refer to “Attending Statements” for final plan.

## 2023-11-03 NOTE — CONSULT NOTE ADULT - SUBJECTIVE AND OBJECTIVE BOX
Reason for consult: pancreatic ca    HPI:  Patient is a 68 year old F with hypertension, hypothyroidism, and  pancreatic cancer, currently on chemotherapy (last session 10/30) who presents to the emergency department for fever, weakness, fatigue, body aches, and lower back pain for 2 days after 2 weeks of urinary urgency prior to symptom onset. These symptoms began on Mon 10/30 after receiving last chemo dose. [place] [facility] [place]  with this symptomatology and was found to be febrile to Tmax 102F, reportedly was hypotensive and tachycardic, and was found to have evidence of pyelonephritis on CT abdomen/pelvis. The patient received one dose of Zosyn around 10-11 AM on  and transferred to [facility] for further management.    Denies nausea/vomiting, diarrhea, SOB, chest pain. Denies dysuria, polyuria. Has had poor PO intake and fatigue.    In the NS ED, VS notable for , /74 (though reportedly hypotensive per ED note), afebrile, SpO2 100% on room air . Patient received 1L NS, given vancomycin given presence of chemo port. Labs notable for Na 125, HCO3 13, BUN 35, Cr 1.66, , AST 63.  (2023 16:05)    Hematology/Oncology called to see patient who [place] with [place] [facility] [place] ca.    PAST MEDICAL & SURGICAL HISTORY:  Pancreatic cancer      Hypertension      Hypothyroid      Seasonal asthma      History of pancreatectomy          FAMILY HISTORY:      Alochol: Denied  Smoking: Nonsmoker  Drug Use: Denied  Marital Status:         Allergies    phenytoin (Rash)  ibuprofen (Other)  Dilantin (Unknown)    Intolerances        MEDICATIONS  (STANDING):  escitalopram 20 milliGRAM(s) Oral daily  ferrous    sulfate 325 milliGRAM(s) Oral daily  folic acid 1 milliGRAM(s) Oral daily  heparin   Injectable 5000 Unit(s) SubCutaneous every 8 hours  lactated ringers. 1000 milliLiter(s) (50 mL/Hr) IV Continuous <Continuous>  levothyroxine 100 MICROGram(s) Oral daily  OXcarbazepine 300 milliGRAM(s) Oral two times a day  pancrelipase  (CREON 36,000 Lipase Units) 1 Capsule(s) Oral three times a day with meals  pantoprazole    Tablet 40 milliGRAM(s) Oral before breakfast  piperacillin/tazobactam IVPB.. 3.375 Gram(s) IV Intermittent every 8 hours    MEDICATIONS  (PRN):  acetaminophen     Tablet .. 650 milliGRAM(s) Oral every 6 hours PRN Temp greater or equal to 38C (100.4F), Mild Pain (1 - 3)      ROS  No fever, sweats, chills  No epistaxis, HA, sore throat  No CP, SOB, cough, sputum  No n/v/d, abd pain, melena, hematochezia  No edema  No rash  No anxiety  No back pain, joint pain  No bleeding, bruising  No dysuria, hematuria    T(C): 36.7 (23 @ 06:01), Max: 39.4 (23 @ 20:00)  HR: 106 (23 @ 06:01) (90 - 124)  BP: 124/76 (23 @ 06:01) (100/68 - 163/93)  RR: 18 (23 @ 06:01) (17 - 20)  SpO2: 94% (23 @ 06:01) (94% - 98%)  Wt(kg): --    PE  NAD  Awake, alert  Anicteric, MMM  RRR  CTAB  Abd soft, NT, ND  No c/c/e  No rash grossly  FROM                          7.7    8.71  )-----------( 91       ( 2023 18:21 )             21.5           129<L>  |  100  |  32<H>  ----------------------------<  119<H>  3.9   |  14<L>  |  1.37<H>    Ca    7.1<L>      2023 06:50  Phos  2.8       Mg     1.5         TPro  5.4<L>  /  Alb  2.6<L>  /  TBili  0.9  /  DBili  x   /  AST  21  /  ALT  45  /  AlkPhos  115        [facility]   DEPARTMENT OF RADIOLOGY   [address] [address] [phone]     COMPUTED TOMOGRAPHY   Report of Consultation   Name: MAGDY CALDERONSEYMOUR MITCHELL Acc No: [id number]  MRN: [id number] Date of Service: 2023  : 1955 Sex: F Pt Loc: [facility]  Ordered by: JERRY CHOU MD Date of Report: 2023 11:55 AM  Procedure: CT CH/ABD/PEL W/ CON Account: [id number]  PRID #: [id number]     FINAL REPORT   2023 CT of chest, abdomen, and pelvis   CLINICAL STATEMENT: Pancreatic cancer. Fever.   TECHNIQUE:   * IV contrast: with contrast   * Oral Contrast: None   * Acquisition: Axial CT images of chest, abdomen and pelvis   * Postprocessing: routine     RADIATION DOSE (DLP): 519 mGy-cm   COMPARISON: September 15, 2023.   CORRELATION: None.   FINDINGS:   LUNGS/AIRWAYS: Increased mild dependent  atelectasis at lung bases. Unchanged mixed linear and ground glass opacity  in the right upper lobe, probable scarring related to prior inflammation  or infection. Increased branching tree-in-bud nodular opacities in the  right middle lobe probably reflecting mucoid impaction. No new  consolidation. No new suspicious pulmonary nodule.     PLEURA/PERICARDIUM: No effusion.   MEDIASTINUM/THORACIC NODES: No adenopathy. Right chest port  catheter persists with tip in the cavoatrial junction.   HEPATOBILIARY: Patent hepatic and portal veins. No biliary  dilatation. Mildly distended gallbladder without gallbladder wall  thickening or pericholecystic inflammation.   SPLEEN: Splenectomy.   PANCREAS: Distal pancreatectomy. Probably unchanged  mild soft tissue thickening in the root of the mesentery exerting mild  mass effect on the SMV.   ADRENAL GLANDS: Unremarkable.   Diag.  Rad.  ** Continued on next page **   [facility]   COMPUTED TOMOGRAPHY Acc No: [id number]   Patient: DANUTA CALDERON   MRN: [id number] Date of Service: 2023  Account: [id number]   Physician: JERRY CHOU     -------------------------------------- Page 2 of 2 ------   KIDNEYS: No hydronephrosis. New mild urothelial  hyperenhancement in the right renal pelvis and the right ureter and mild  stranding in the periureteral fat suspicious for ascending urinary tract  infection. Subtle foci of cortical hypoenhancement in the upper poles  suspicious for pyelonephritis.     ABDOMINOPELVIC   NODES: No adenopathy.   PELVIC ORGANS: Unremarkable.   PERITONEUM/   MESENTERY/BOWEL: Unremarkable.   BONES/SOFT TISSUES: No suspicious osseous lesion.   OTHER: None.   IMPRESSION:   1. Since September 15, 2023, unchanged infiltrative tissue in the root of  the mesentery.   2. New suspected urinary tract infection and pyelonephritis.

## 2023-11-03 NOTE — PHYSICAL THERAPY INITIAL EVALUATION ADULT - ADDITIONAL COMMENTS
Pt lives in a townhouse, 5 steps to enter, 4 steps inside to the bedroom. Pt does not own any DMEs; spouse works from home Pt lives in a townhouse, 5 steps to enter, 4 steps inside to the bedroom. Pt does not own any DMEs; spouse works from home and is able to assist as needed

## 2023-11-03 NOTE — CONSULT NOTE ADULT - SUBJECTIVE AND OBJECTIVE BOX
Patient is a 68y old  Female who presents with a chief complaint of Pyelonephritis (03 Nov 2023 08:08)    HPI:  Patient is a 68 year old F with hypertension, hypothyroidism, and  pancreatic cancer, currently on chemotherapy (last session 10/30) who presents to the emergency department for fever, weakness, fatigue, body aches, and lower back pain for 2 days after 2 weeks of urinary urgency prior to symptom onset. These symptoms began on Mon 10/30 after receiving last chemo dose. Patient initially presented to [facility] after being referred by  with this symptomatology and was found to be febrile to Tmax 102F, reportedly was hypotensive and tachycardic, and was found to have evidence of pyelonephritis on CT abdomen/pelvis. The patient received one dose of Zosyn around 10-11 AM on 11/2 and transferred to [facility] for further management.    Denies nausea/vomiting, diarrhea, SOB, chest pain. Denies dysuria, polyuria. Has had poor PO intake and fatigue.    In the [facility] ED, VS notable for , /74 (though reportedly hypotensive per ED note), afebrile, SpO2 100% on room air . Patient received 1L NS, given vancomycin given presence of chemo port. Labs notable for Na 125, HCO3 13, BUN 35, Cr 1.66, , AST 63.  (02 Nov 2023 16:05)     REVIEW OF SYSTEMS  [  ] ROS unobtainable because:    [ x ] All other systems negative except as noted below  Constitutional:  [ ] fever [ ] chills  [ ] weight loss  [ ]night sweat  [ ]poor appetite/PO intake [ ]fatigue   Skin:  [ ] rash [ ] phlebitis	  Eyes: [ ] icterus [ ] pain  [ ] discharge	  ENMT: [ ] sore throat  [ ] thrush [ ] ulcers [ ] exudates [ ]anosmia  Respiratory: [ ] dyspnea [ ] hemoptysis [ ] cough [ ] sputum	  Cardiovascular:  [ ] chest pain [ ] palpitations [ ] edema	  Gastrointestinal:  [ ] nausea [ ] vomiting [ ] diarrhea [ ] constipation [ ] pain	  Genitourinary:  [ ] dysuria [ ] frequency [ ] hematuria [ ] discharge [ ] flank pain  [ ] incontinence  Musculoskeletal:  [ ] myalgias [ ] arthralgias [ ] arthritis  [ ] back pain  Neurological:  [ ] headache [ ] weakness [ ] seizures  [ ] confusion/altered mental status  prior hospital charts reviewed [V]  primary team notes reviewed [V]  other consultant notes reviewed [V]    PAST MEDICAL & SURGICAL HISTORY:  Pancreatic cancer      Hypertension      Hypothyroid      Seasonal asthma      History of pancreatectomy        SOCIAL HISTORY:  - Denied smoking/vaping/alcohol/recreational drug use    FAMILY HISTORY:      Allergies  phenytoin (Rash)  ibuprofen (Other)  Dilantin (Unknown)        ANTIMICROBIALS:  piperacillin/tazobactam IVPB.. 3.375 every 8 hours      ANTIMICROBIALS (past 90 days):  MEDICATIONS  (STANDING):  piperacillin/tazobactam IVPB.   200 mL/Hr IV Intermittent (11-02-23 @ 20:02)    piperacillin/tazobactam IVPB.-   25 mL/Hr IV Intermittent (11-03-23 @ 03:14)    piperacillin/tazobactam IVPB..   25 mL/Hr IV Intermittent (11-03-23 @ 06:14)    vancomycin  IVPB.   250 mL/Hr IV Intermittent (11-02-23 @ 14:44)    OTHER MEDS:   MEDICATIONS  (STANDING):  acetaminophen     Tablet .. 650 every 6 hours PRN  escitalopram 20 daily  heparin   Injectable 5000 every 8 hours  levothyroxine 100 daily  OXcarbazepine 300 two times a day  pancrelipase  (CREON 36,000 Lipase Units) 1 three times a day with meals  pantoprazole    Tablet 40 before breakfast    VITALS:  Vital Signs Last 24 Hrs  T(F): 98.1 (11-03-23 @ 06:01), Max: 103 (11-02-23 @ 20:00)    Vital Signs Last 24 Hrs  HR: 106 (11-03-23 @ 06:01) (90 - 124)  BP: 124/76 (11-03-23 @ 06:01) (100/68 - 163/93)  RR: 18 (11-03-23 @ 06:01)  SpO2: 94% (11-03-23 @ 06:01) (94% - 98%)  Wt(kg): --    EXAM:    GA: NAD, AOx3  HEENT: oral cavity no lesion  CV: nl S1/S2, no RMG  Lungs: CTAB, No distress  Abd: BS+, soft, nontender, no rebounding pain  Ext: no edema  Neuro: No focal deficits  Skin: Intact  IV: no phlebitis  Labs:                        7.4    9.64  )-----------( 76       ( 03 Nov 2023 06:50 )             21.0     11-03    129<L>  |  100  |  32<H>  ----------------------------<  119<H>  3.9   |  14<L>  |  1.37<H>    Ca    7.1<L>      03 Nov 2023 06:50  Phos  2.8     11-02  Mg     1.5     11-02    TPro  5.4<L>  /  Alb  2.6<L>  /  TBili  0.9  /  DBili  x   /  AST  21  /  ALT  45  /  AlkPhos  115  11-03    WBC Trend:  WBC Count: 9.64 (11-03-23 @ 06:50)  WBC Count: 8.71 (11-02-23 @ 18:21)    Auto Neutrophil #: 9.22 K/uL (11-03-23 @ 06:50)    Creatine Trend:  Creatinine: 1.37 (11-03)  Creatinine: 1.66 (11-02)  Creatinine: 1.66 (11-02)    Liver Biochemical Testing Trend:  Alanine Aminotransferase (ALT/SGPT): 45 (11-03)  Alanine Aminotransferase (ALT/SGPT): 63 *H* (11-02)  Aspartate Aminotransferase (AST/SGOT): 21 (11-03-23 @ 06:50)  Aspartate Aminotransferase (AST/SGOT): 36 (11-02-23 @ 14:54)  Bilirubin Total: 0.9 (11-03)  Bilirubin Total: 1.0 (11-02)    Trend LDH  07-12-22 @ 12:08  239    Auto Eosinophil %: 0.0 % (11-03-23 @ 06:50)    Urinalysis Basic - ( 03 Nov 2023 06:50 )    Color: x / Appearance: x / SG: x / pH: x  Gluc: 119 mg/dL / Ketone: x  / Bili: x / Urobili: x   Blood: x / Protein: x / Nitrite: x   Leuk Esterase: x / RBC: x / WBC x   Sq Epi: x / Non Sq Epi: x / Bacteria: x    MICROBIOLOGY:    CSF:  RADIOLOGY:  CT c/a/p 11/2     FINDINGS:     LUNGS/AIRWAYS: Increased mild dependent   atelectasis at lung bases. Unchanged mixed linear and ground glass opacity   in the right upper lobe, probable scarring related to prior inflammation   or infection. Increased branching tree-in-bud nodular opacities in the   right middle lobe probably reflecting mucoid impaction. No new   consolidation. No new suspicious pulmonary nodule.     PLEURA/PERICARDIUM: No effusion.     MEDIASTINUM/THORACIC NODES:         No adenopathy. Right chest port   catheter persists with tip in the cavoatrial junction.     HEPATOBILIARY:              Patent hepatic and portal veins. No biliary   dilatation. Mildly distended gallbladder without gallbladder wall   thickening or pericholecystic inflammation.     SPLEEN:                            Splenectomy.     PANCREAS:                      Distal pancreatectomy. Probably unchanged   mild soft tissue thickening in the root of the mesentery exerting mild   mass effect on the SMV.     ADRENAL GLANDS:        Unremarkable.     KIDNEYS: No hydronephrosis. New mild urothelial   hyperenhancement in the right renal pelvis and the right ureter and mild   stranding in the periureteral fat suspicious for ascending urinary tract   infection. Subtle foci of cortical hypoenhancement in the upper poles   suspicious for pyelonephritis.

## 2023-11-03 NOTE — PROGRESS NOTE ADULT - PROBLEM SELECTOR PLAN 3
Will f/u serum osm, urine studies. Likely hypovolemic hyponatremia given poor PO intake. Receiving 2L NS at this time; however unclear if there is superimposed SIADH iso pain/illness.  - F/u urine studies/serum osm. Hold on maintenance fluids. Repeat BMP after 1L to assess response. Likely hypovolemic hyponatremia given poor PO intake. Initially with low serum osm, and indeterminate urine sodium. However, after receiving 2L, now back at baseline Na.  - Maintenance fluids as needed

## 2023-11-03 NOTE — PHYSICAL THERAPY INITIAL EVALUATION ADULT - PERTINENT HX OF CURRENT PROBLEM, REHAB EVAL
68 year old F with hypertension, hypothyroidism, and  pancreatic cancer, currently on chemotherapy (last session 10/30) who presents to the emergency department for fever, weakness, fatigue, body aches, and lower back pain for 2 days after 2 weeks of urinary urgency prior to symptom onset. These symptoms began on Mon 10/30 after receiving last chemo dose. Patient initially presented to [facility] after being referred by  with this symptomatology and was found to be febrile to Tmax 102F, reportedly was hypotensive and tachycardic, and was found to have evidence of pyelonephritis on CT abdomen/pelvis. The patient received one dose of Zosyn around 10-11 AM on 11/2 and transferred to [facility] for further management. Denies nausea/vomiting, diarrhea, SOB, chest pain. Denies dysuria, polyuria. Has had poor PO intake and fatigue.

## 2023-11-03 NOTE — CONSULT NOTE ADULT - ASSESSMENT
68 year old F with hypertension, hypothyroidism, and  pancreatic cancer, currently on chemotherapy (last session 10/30) who presents to the emergency department for fever, weakness, fatigue, body aches, and lower back pain for 2 days after 2 weeks of urinary urgency prior to symptom onset. Symptoms began on Mon 10/30 after receiving last chemo dose. febrile to Tmax 102F, reportedly was hypotensive and tachycardic, and was found to have evidence of pyelonephritis on CT abdomen/pelvis. The patient received one dose of Zosyn around 10-11 AM on 11/2 and transferred to [facility] for further management.  ID consulted.    [id number] on admission-10/2  wbc -wnl  10/2 -ua wbc 242, neg nitrate Ucx with GNR in both aerobic and anaerobic  Imaging: CT Abdomen-: No hydronephrosis. New mild urothelial hyperenhancement in the right renal pelvis and the right ureter and mild stranding in the periureteral fat suspicious for ascending urinary tract infection. Subtle foci of cortical hypoenhancement in the upper poles suspicious for pyelonephritis.  Received VAnco 1g x1 Currently on zosyn    Plan 68 year old F with hypertension, hypothyroidism, and  pancreatic cancer, currently on chemotherapy (last session 10/30) who presents to the emergency department for fever, weakness, fatigue, body aches, and lower back pain for 2 days after 2 weeks of urinary urgency prior to symptom onset. Symptoms began on Mon 10/30 after receiving last chemo dose. febrile to Tmax 102F, reportedly was hypotensive and tachycardic, and was found to have evidence of pyelonephritis on CT abdomen/pelvis. The patient received one dose of Zosyn around 10-11 AM on 11/2 and transferred to [facility] for further management.  ID consulted.    [id number] on admission-10/2  wbc -wnl  11/2 Bld cx GNR x 2  11/2 -ua wbc 242, neg nitrate Ucx with GNR in both aerobic and anaerobic  Imaging: CT Abdomen-: No hydronephrosis. New mild urothelial hyperenhancement in the right renal pelvis and the right ureter and mild stranding in the periureteral fat suspicious for ascending urinary tract infection. Subtle foci of cortical hypoenhancement in the upper poles suspicious for pyelonephritis.  Received VAnco 1g x1 Currently on zosyn 11/2-->    Plan 68 year old F with hypertension, hypothyroidism, and  pancreatic cancer, currently on chemotherapy (last session 10/30) who presents to the emergency department for fever, weakness, fatigue, body aches, and lower back pain for 2 days after 2 weeks of urinary urgency prior to symptom onset. Symptoms began on Mon 10/30 after receiving last chemo dose. febrile to Tmax 102F, reportedly was hypotensive and tachycardic, and was found to have evidence of pyelonephritis on CT abdomen/pelvis. The patient received one dose of Zosyn around 10-11 AM on 11/2 and transferred to [facility] for further management.  ID consulted.    [id number] on admission-10/2  wbc -wnl  11/2 Bld cx GNR x 2  11/2 -ua wbc 242, neg nitrate Ucx with GNR in both aerobic and anaerobic  Imaging: CT Abdomen-: No hydronephrosis. New mild urothelial hyperenhancement in the right renal pelvis and the right ureter and mild stranding in the periureteral fat suspicious for ascending urinary tract infection. Subtle foci of cortical hypoenhancement in the upper poles suspicious for pyelonephritis.  Received VAnco 1g x1 Currently on zosyn 11/2-->  + Chest port placed on 8/12/2021 with occasional discomfort without manipulation per patient.    Plan 68 year old F with hypertension, hypothyroidism, and  pancreatic cancer, currently on chemotherapy (last session 10/30) who presents to the emergency department for fever, weakness, fatigue, body aches, and lower back pain for 2 days after 2 weeks of urinary urgency prior to symptom onset. Symptoms began on Mon 10/30 after receiving last chemo dose. febrile to Tmax 102F, reportedly was hypotensive and tachycardic, and was found to have evidence of pyelonephritis on CT abdomen/pelvis. The patient received one dose of Zosyn around 10-11 AM on 11/2 and transferred to [facility] for further management.  ID consulted.    [id number] on admission-10/2  wbc -wnl  11/2 Bld cx GNR x 2 PCR ecoli  11/2 -ua wbc 242, neg nitrate Ucx with GNR in both aerobic and anaerobic  Imaging: CT Abdomen-: No hydronephrosis. New mild urothelial hyperenhancement in the right renal pelvis and the right ureter and mild stranding in the periureteral fat suspicious for ascending urinary tract infection. Subtle foci of cortical hypoenhancement in the upper poles suspicious for pyelonephritis.  Received VAnco 1g x1 Currently on zosyn 11/2-->  + Chest port placed on 8/12/2021 site non infectious    Plan  Bacteremia in the setting of + uti with pyelo   PCR ecoli-Can narrow to ceftriaxone 1 gram q 24--->D/w Dr. Gleason  Follow up identification of bacteremia/ucx  Repeat blood cx q 48h for clearance  Continue to monitor fever curve  Trend CBC daily  Plan d/w dr. Gleason and floor provider

## 2023-11-03 NOTE — PROGRESS NOTE ADULT - SUBJECTIVE AND OBJECTIVE BOX
PATIENT: DANUTA CALDERON, MRN: [id number]    CHIEF COMPLAINT: Patient is a 68y old  Female who presents with a chief complaint of Pyelonephritis (02 Nov 2023 16:05)      INTERVAL HISTORY/OVERNIGHT EVENTS: No overnight events.       MEDICATIONS:  MEDICATIONS  (STANDING):  escitalopram 20 milliGRAM(s) Oral daily  ferrous    sulfate 325 milliGRAM(s) Oral daily  folic acid 1 milliGRAM(s) Oral daily  heparin   Injectable 5000 Unit(s) SubCutaneous every 8 hours  lactated ringers. 1000 milliLiter(s) (50 mL/Hr) IV Continuous <Continuous>  levothyroxine 100 MICROGram(s) Oral daily  OXcarbazepine 300 milliGRAM(s) Oral two times a day  pancrelipase  (CREON 36,000 Lipase Units) 1 Capsule(s) Oral three times a day with meals  pantoprazole    Tablet 40 milliGRAM(s) Oral before breakfast  piperacillin/tazobactam IVPB.. 3.375 Gram(s) IV Intermittent every 8 hours    MEDICATIONS  (PRN):  acetaminophen     Tablet .. 650 milliGRAM(s) Oral every 6 hours PRN Temp greater or equal to 38C (100.4F), Mild Pain (1 - 3)      ALLERGIES: Allergies    phenytoin (Rash)  ibuprofen (Other)  Dilantin (Unknown)    Intolerances        OBJECTIVE:  ICU Vital Signs Last 24 Hrs  T(C): 36.7 (03 Nov 2023 06:01), Max: 39.4 (02 Nov 2023 20:00)  T(F): 98.1 (03 Nov 2023 06:01), Max: 103 (02 Nov 2023 20:00)  HR: 106 (03 Nov 2023 06:01) (90 - 124)  BP: 124/76 (03 Nov 2023 06:01) (100/68 - 163/93)  BP(mean): 89 (02 Nov 2023 20:00) (89 - 89)  ABP: --  ABP(mean): --  RR: 18 (03 Nov 2023 06:01) (17 - 20)  SpO2: 94% (03 Nov 2023 06:01) (94% - 98%)    O2 Parameters below as of 03 Nov 2023 06:01  Patient On (Oxygen Delivery Method): room air            CAPILLARY BLOOD GLUCOSE        CAPILLARY BLOOD GLUCOSE        I&O's Summary    Daily Height in cm: 154.94 (02 Nov 2023 22:00)    Daily     PHYSICAL EXAMINATION:  General: Comfortable, no acute distress, cooperative with exam.  HEENT: PERRLA  Respiratory: CTAB, normal respiratory effort, no coughing, wheezes, crackles, or rales.  CV: RRR, S1S2, no murmurs, rubs or gallops. No JVD. Distal pulses intact.  Abdominal: Soft, nontender, nondistended, no rebound or guarding, normal bowel sounds.  Neurology: AOx3, no focal neuro defects, RUVALCABA x 4.  Extremities: No pitting edema, + Peripheral pulses.      LABS:                          7.7    8.71  )-----------( 91       ( 02 Nov 2023 18:21 )             21.5     11-03    129<L>  |  100  |  32<H>  ----------------------------<  119<H>  3.9   |  14<L>  |  1.37<H>    Ca    7.1<L>      03 Nov 2023 06:50  Phos  2.8     11-02  Mg     1.5     11-02    TPro  5.4<L>  /  Alb  2.6<L>  /  TBili  0.9  /  DBili  x   /  AST  21  /  ALT  45  /  AlkPhos  115  11-03    LIVER FUNCTIONS - ( 03 Nov 2023 06:50 )  Alb: 2.6 g/dL / Pro: 5.4 g/dL / ALK PHOS: 115 U/L / ALT: 45 U/L / AST: 21 U/L / GGT: x                   Urinalysis Basic - ( 03 Nov 2023 06:50 )    Color: x / Appearance: x / SG: x / pH: x  Gluc: 119 mg/dL / Ketone: x  / Bili: x / Urobili: x   Blood: x / Protein: x / Nitrite: x   Leuk Esterase: x / RBC: x / WBC x   Sq Epi: x / Non Sq Epi: x / Bacteria: x       PATIENT: DANUTA CALDERON, MRN: [id number]    CHIEF COMPLAINT: Patient is a 68y old  Female who presents with a chief complaint of Pyelonephritis (02 Nov 2023 16:05)      INTERVAL HISTORY/OVERNIGHT EVENTS: No overnight events. No rigors, no fevers since being admitted to floor. Back pain is about the same, some tenderness on right side. NO N/V. Can walk to bathroom on own      MEDICATIONS:  MEDICATIONS  (STANDING):  escitalopram 20 milliGRAM(s) Oral daily  ferrous    sulfate 325 milliGRAM(s) Oral daily  folic acid 1 milliGRAM(s) Oral daily  heparin   Injectable 5000 Unit(s) SubCutaneous every 8 hours  lactated ringers. 1000 milliLiter(s) (50 mL/Hr) IV Continuous <Continuous>  levothyroxine 100 MICROGram(s) Oral daily  OXcarbazepine 300 milliGRAM(s) Oral two times a day  pancrelipase  (CREON 36,000 Lipase Units) 1 Capsule(s) Oral three times a day with meals  pantoprazole    Tablet 40 milliGRAM(s) Oral before breakfast  piperacillin/tazobactam IVPB.. 3.375 Gram(s) IV Intermittent every 8 hours    MEDICATIONS  (PRN):  acetaminophen     Tablet .. 650 milliGRAM(s) Oral every 6 hours PRN Temp greater or equal to 38C (100.4F), Mild Pain (1 - 3)      ALLERGIES: Allergies    phenytoin (Rash)  ibuprofen (Other)  Dilantin (Unknown)    Intolerances        OBJECTIVE:  ICU Vital Signs Last 24 Hrs  T(C): 36.7 (03 Nov 2023 06:01), Max: 39.4 (02 Nov 2023 20:00)  T(F): 98.1 (03 Nov 2023 06:01), Max: 103 (02 Nov 2023 20:00)  HR: 106 (03 Nov 2023 06:01) (90 - 124)  BP: 124/76 (03 Nov 2023 06:01) (100/68 - 163/93)  BP(mean): 89 (02 Nov 2023 20:00) (89 - 89)  ABP: --  ABP(mean): --  RR: 18 (03 Nov 2023 06:01) (17 - 20)  SpO2: 94% (03 Nov 2023 06:01) (94% - 98%)    O2 Parameters below as of 03 Nov 2023 06:01  Patient On (Oxygen Delivery Method): room air            CAPILLARY BLOOD GLUCOSE        CAPILLARY BLOOD GLUCOSE        I&O's Summary    Daily Height in cm: 154.94 (02 Nov 2023 22:00)    Daily     PHYSICAL EXAMINATION:  General: Comfortable, no acute distress, cooperative with exam.  Respiratory: CTAB, normal respiratory effort, no coughing, wheezes, crackles, or rales.  CV: RRR, S1S2, no murmurs, rubs or gallops.   Abdominal: Soft, nontender, nondistended, no rebound or guarding  Neurology: no focal neuro defects, RUVALCABA x 4.  Extremities: No pitting edema. Capillary refill >2 seconds (~4 seconds)  Back: Mild R sided CVA tenderness  Skin: R wall chest port intact, CDI dressing, no discharge, no erythema      LABS:                          7.7    8.71  )-----------( 91       ( 02 Nov 2023 18:21 )             21.5     11-03    129<L>  |  100  |  32<H>  ----------------------------<  119<H>  3.9   |  14<L>  |  1.37<H>    Ca    7.1<L>      03 Nov 2023 06:50  Phos  2.8     11-02  Mg     1.5     11-02    TPro  5.4<L>  /  Alb  2.6<L>  /  TBili  0.9  /  DBili  x   /  AST  21  /  ALT  45  /  AlkPhos  115  11-03    LIVER FUNCTIONS - ( 03 Nov 2023 06:50 )  Alb: 2.6 g/dL / Pro: 5.4 g/dL / ALK PHOS: 115 U/L / ALT: 45 U/L / AST: 21 U/L / GGT: x                   Urinalysis Basic - ( 03 Nov 2023 06:50 )    Color: x / Appearance: x / SG: x / pH: x  Gluc: 119 mg/dL / Ketone: x  / Bili: x / Urobili: x   Blood: x / Protein: x / Nitrite: x   Leuk Esterase: x / RBC: x / WBC x   Sq Epi: x / Non Sq Epi: x / Bacteria: x

## 2023-11-03 NOTE — PROGRESS NOTE ADULT - PROBLEM SELECTOR PLAN 2
Pyelo with CVA tenderness, recent urinary urgency, likely ascending infection iso immunosuppression from chemo.  - on zosyn for pyelonephritis. Continue at this time Pyelo with CVA tenderness, recent urinary urgency, likely ascending infection iso immunosuppression from chemo.  - CTX

## 2023-11-04 ENCOUNTER — TRANSCRIPTION ENCOUNTER (OUTPATIENT)
Age: 68
End: 2023-11-04

## 2023-11-04 LAB
ALBUMIN SERPL ELPH-MCNC: 2.6 G/DL — LOW (ref 3.3–5)
ALBUMIN SERPL ELPH-MCNC: 2.6 G/DL — LOW (ref 3.3–5)
ALP SERPL-CCNC: 136 U/L — HIGH (ref 40–120)
ALP SERPL-CCNC: 136 U/L — HIGH (ref 40–120)
ALT FLD-CCNC: 34 U/L — SIGNIFICANT CHANGE UP (ref 10–45)
ALT FLD-CCNC: 34 U/L — SIGNIFICANT CHANGE UP (ref 10–45)
ANION GAP SERPL CALC-SCNC: 10 MMOL/L — SIGNIFICANT CHANGE UP (ref 5–17)
ANION GAP SERPL CALC-SCNC: 10 MMOL/L — SIGNIFICANT CHANGE UP (ref 5–17)
ANION GAP SERPL CALC-SCNC: 12 MMOL/L — SIGNIFICANT CHANGE UP (ref 5–17)
ANION GAP SERPL CALC-SCNC: 12 MMOL/L — SIGNIFICANT CHANGE UP (ref 5–17)
APPEARANCE UR: CLEAR — SIGNIFICANT CHANGE UP
APPEARANCE UR: CLEAR — SIGNIFICANT CHANGE UP
AST SERPL-CCNC: 18 U/L — SIGNIFICANT CHANGE UP (ref 10–40)
AST SERPL-CCNC: 18 U/L — SIGNIFICANT CHANGE UP (ref 10–40)
BACTERIA # UR AUTO: NEGATIVE /HPF — SIGNIFICANT CHANGE UP
BACTERIA # UR AUTO: NEGATIVE /HPF — SIGNIFICANT CHANGE UP
BILIRUB SERPL-MCNC: 0.8 MG/DL — SIGNIFICANT CHANGE UP (ref 0.2–1.2)
BILIRUB SERPL-MCNC: 0.8 MG/DL — SIGNIFICANT CHANGE UP (ref 0.2–1.2)
BILIRUB UR-MCNC: NEGATIVE — SIGNIFICANT CHANGE UP
BILIRUB UR-MCNC: NEGATIVE — SIGNIFICANT CHANGE UP
BUN SERPL-MCNC: 25 MG/DL — HIGH (ref 7–23)
CALCIUM SERPL-MCNC: 7 MG/DL — LOW (ref 8.4–10.5)
CALCIUM SERPL-MCNC: 7 MG/DL — LOW (ref 8.4–10.5)
CALCIUM SERPL-MCNC: 7.7 MG/DL — LOW (ref 8.4–10.5)
CALCIUM SERPL-MCNC: 7.7 MG/DL — LOW (ref 8.4–10.5)
CAST: 5 /LPF — HIGH (ref 0–4)
CAST: 5 /LPF — HIGH (ref 0–4)
CHLORIDE SERPL-SCNC: 97 MMOL/L — SIGNIFICANT CHANGE UP (ref 96–108)
CHLORIDE SERPL-SCNC: 97 MMOL/L — SIGNIFICANT CHANGE UP (ref 96–108)
CHLORIDE SERPL-SCNC: 99 MMOL/L — SIGNIFICANT CHANGE UP (ref 96–108)
CHLORIDE SERPL-SCNC: 99 MMOL/L — SIGNIFICANT CHANGE UP (ref 96–108)
CO2 SERPL-SCNC: 18 MMOL/L — LOW (ref 22–31)
COLOR SPEC: YELLOW — SIGNIFICANT CHANGE UP
COLOR SPEC: YELLOW — SIGNIFICANT CHANGE UP
CREAT ?TM UR-MCNC: 33 MG/DL — SIGNIFICANT CHANGE UP
CREAT ?TM UR-MCNC: 33 MG/DL — SIGNIFICANT CHANGE UP
CREAT SERPL-MCNC: 0.97 MG/DL — SIGNIFICANT CHANGE UP (ref 0.5–1.3)
CREAT SERPL-MCNC: 0.97 MG/DL — SIGNIFICANT CHANGE UP (ref 0.5–1.3)
CREAT SERPL-MCNC: 1.04 MG/DL — SIGNIFICANT CHANGE UP (ref 0.5–1.3)
CREAT SERPL-MCNC: 1.04 MG/DL — SIGNIFICANT CHANGE UP (ref 0.5–1.3)
CULTURE RESULTS: SIGNIFICANT CHANGE UP
CULTURE RESULTS: SIGNIFICANT CHANGE UP
DIFF PNL FLD: ABNORMAL
DIFF PNL FLD: ABNORMAL
EGFR: 59 ML/MIN/1.73M2 — LOW
EGFR: 59 ML/MIN/1.73M2 — LOW
EGFR: 64 ML/MIN/1.73M2 — SIGNIFICANT CHANGE UP
EGFR: 64 ML/MIN/1.73M2 — SIGNIFICANT CHANGE UP
FERRITIN SERPL-MCNC: 1551 NG/ML — HIGH (ref 13–330)
FERRITIN SERPL-MCNC: 1551 NG/ML — HIGH (ref 13–330)
FOLATE SERPL-MCNC: 15.3 NG/ML — SIGNIFICANT CHANGE UP
FOLATE SERPL-MCNC: 15.3 NG/ML — SIGNIFICANT CHANGE UP
GLUCOSE SERPL-MCNC: 114 MG/DL — HIGH (ref 70–99)
GLUCOSE SERPL-MCNC: 114 MG/DL — HIGH (ref 70–99)
GLUCOSE SERPL-MCNC: 204 MG/DL — HIGH (ref 70–99)
GLUCOSE SERPL-MCNC: 204 MG/DL — HIGH (ref 70–99)
GLUCOSE UR QL: NEGATIVE MG/DL — SIGNIFICANT CHANGE UP
GLUCOSE UR QL: NEGATIVE MG/DL — SIGNIFICANT CHANGE UP
HCT VFR BLD CALC: 20.3 % — CRITICAL LOW (ref 34.5–45)
HCT VFR BLD CALC: 20.3 % — CRITICAL LOW (ref 34.5–45)
HGB BLD-MCNC: 7.3 G/DL — LOW (ref 11.5–15.5)
HGB BLD-MCNC: 7.3 G/DL — LOW (ref 11.5–15.5)
IRON SATN MFR SERPL: 26 % — SIGNIFICANT CHANGE UP (ref 14–50)
IRON SATN MFR SERPL: 26 % — SIGNIFICANT CHANGE UP (ref 14–50)
IRON SATN MFR SERPL: 30 UG/DL — SIGNIFICANT CHANGE UP (ref 30–160)
IRON SATN MFR SERPL: 30 UG/DL — SIGNIFICANT CHANGE UP (ref 30–160)
KETONES UR-MCNC: NEGATIVE MG/DL — SIGNIFICANT CHANGE UP
KETONES UR-MCNC: NEGATIVE MG/DL — SIGNIFICANT CHANGE UP
LEUKOCYTE ESTERASE UR-ACNC: ABNORMAL
LEUKOCYTE ESTERASE UR-ACNC: ABNORMAL
MAGNESIUM SERPL-MCNC: 2.2 MG/DL — SIGNIFICANT CHANGE UP (ref 1.6–2.6)
MAGNESIUM SERPL-MCNC: 2.2 MG/DL — SIGNIFICANT CHANGE UP (ref 1.6–2.6)
MCHC RBC-ENTMCNC: 33 PG — SIGNIFICANT CHANGE UP (ref 27–34)
MCHC RBC-ENTMCNC: 33 PG — SIGNIFICANT CHANGE UP (ref 27–34)
MCHC RBC-ENTMCNC: 36 GM/DL — SIGNIFICANT CHANGE UP (ref 32–36)
MCHC RBC-ENTMCNC: 36 GM/DL — SIGNIFICANT CHANGE UP (ref 32–36)
MCV RBC AUTO: 91.9 FL — SIGNIFICANT CHANGE UP (ref 80–100)
MCV RBC AUTO: 91.9 FL — SIGNIFICANT CHANGE UP (ref 80–100)
MRSA PCR RESULT.: SIGNIFICANT CHANGE UP
MRSA PCR RESULT.: SIGNIFICANT CHANGE UP
NITRITE UR-MCNC: NEGATIVE — SIGNIFICANT CHANGE UP
NITRITE UR-MCNC: NEGATIVE — SIGNIFICANT CHANGE UP
NRBC # BLD: 0 /100 WBCS — SIGNIFICANT CHANGE UP (ref 0–0)
NRBC # BLD: 0 /100 WBCS — SIGNIFICANT CHANGE UP (ref 0–0)
OSMOLALITY UR: 323 MOS/KG — SIGNIFICANT CHANGE UP (ref 300–900)
OSMOLALITY UR: 323 MOS/KG — SIGNIFICANT CHANGE UP (ref 300–900)
PH UR: 6 — SIGNIFICANT CHANGE UP (ref 5–8)
PH UR: 6 — SIGNIFICANT CHANGE UP (ref 5–8)
PHOSPHATE SERPL-MCNC: 1.7 MG/DL — LOW (ref 2.5–4.5)
PHOSPHATE SERPL-MCNC: 1.7 MG/DL — LOW (ref 2.5–4.5)
PLATELET # BLD AUTO: 48 K/UL — LOW (ref 150–400)
PLATELET # BLD AUTO: 48 K/UL — LOW (ref 150–400)
POTASSIUM SERPL-MCNC: 3.4 MMOL/L — LOW (ref 3.5–5.3)
POTASSIUM SERPL-MCNC: 3.4 MMOL/L — LOW (ref 3.5–5.3)
POTASSIUM SERPL-MCNC: 3.7 MMOL/L — SIGNIFICANT CHANGE UP (ref 3.5–5.3)
POTASSIUM SERPL-MCNC: 3.7 MMOL/L — SIGNIFICANT CHANGE UP (ref 3.5–5.3)
POTASSIUM SERPL-SCNC: 3.4 MMOL/L — LOW (ref 3.5–5.3)
POTASSIUM SERPL-SCNC: 3.4 MMOL/L — LOW (ref 3.5–5.3)
POTASSIUM SERPL-SCNC: 3.7 MMOL/L — SIGNIFICANT CHANGE UP (ref 3.5–5.3)
POTASSIUM SERPL-SCNC: 3.7 MMOL/L — SIGNIFICANT CHANGE UP (ref 3.5–5.3)
POTASSIUM UR-SCNC: 19 MMOL/L — SIGNIFICANT CHANGE UP
POTASSIUM UR-SCNC: 19 MMOL/L — SIGNIFICANT CHANGE UP
PROT ?TM UR-MCNC: 24 MG/DL — HIGH (ref 0–12)
PROT ?TM UR-MCNC: 24 MG/DL — HIGH (ref 0–12)
PROT SERPL-MCNC: 5.3 G/DL — LOW (ref 6–8.3)
PROT SERPL-MCNC: 5.3 G/DL — LOW (ref 6–8.3)
PROT UR-MCNC: SIGNIFICANT CHANGE UP MG/DL
PROT UR-MCNC: SIGNIFICANT CHANGE UP MG/DL
PROT/CREAT UR-RTO: 0.7 RATIO — HIGH (ref 0–0.2)
PROT/CREAT UR-RTO: 0.7 RATIO — HIGH (ref 0–0.2)
RBC # BLD: 2.21 M/UL — LOW (ref 3.8–5.2)
RBC # BLD: 2.21 M/UL — LOW (ref 3.8–5.2)
RBC # FLD: 15.9 % — HIGH (ref 10.3–14.5)
RBC # FLD: 15.9 % — HIGH (ref 10.3–14.5)
RBC CASTS # UR COMP ASSIST: 2 /HPF — SIGNIFICANT CHANGE UP (ref 0–4)
RBC CASTS # UR COMP ASSIST: 2 /HPF — SIGNIFICANT CHANGE UP (ref 0–4)
REVIEW: SIGNIFICANT CHANGE UP
REVIEW: SIGNIFICANT CHANGE UP
S AUREUS DNA NOSE QL NAA+PROBE: SIGNIFICANT CHANGE UP
S AUREUS DNA NOSE QL NAA+PROBE: SIGNIFICANT CHANGE UP
SODIUM SERPL-SCNC: 127 MMOL/L — LOW (ref 135–145)
SODIUM UR-SCNC: 57 MMOL/L — SIGNIFICANT CHANGE UP
SODIUM UR-SCNC: 57 MMOL/L — SIGNIFICANT CHANGE UP
SP GR SPEC: 1.01 — SIGNIFICANT CHANGE UP (ref 1–1.03)
SP GR SPEC: 1.01 — SIGNIFICANT CHANGE UP (ref 1–1.03)
SPECIMEN SOURCE: SIGNIFICANT CHANGE UP
SPECIMEN SOURCE: SIGNIFICANT CHANGE UP
SQUAMOUS # UR AUTO: 1 /HPF — SIGNIFICANT CHANGE UP (ref 0–5)
SQUAMOUS # UR AUTO: 1 /HPF — SIGNIFICANT CHANGE UP (ref 0–5)
TIBC SERPL-MCNC: 114 UG/DL — LOW (ref 220–430)
TIBC SERPL-MCNC: 114 UG/DL — LOW (ref 220–430)
UIBC SERPL-MCNC: 85 UG/DL — LOW (ref 110–370)
UIBC SERPL-MCNC: 85 UG/DL — LOW (ref 110–370)
UROBILINOGEN FLD QL: 1 MG/DL — SIGNIFICANT CHANGE UP (ref 0.2–1)
UROBILINOGEN FLD QL: 1 MG/DL — SIGNIFICANT CHANGE UP (ref 0.2–1)
VIT B12 SERPL-MCNC: 691 PG/ML — SIGNIFICANT CHANGE UP (ref 232–1245)
VIT B12 SERPL-MCNC: 691 PG/ML — SIGNIFICANT CHANGE UP (ref 232–1245)
WBC # BLD: 9.26 K/UL — SIGNIFICANT CHANGE UP (ref 3.8–10.5)
WBC # BLD: 9.26 K/UL — SIGNIFICANT CHANGE UP (ref 3.8–10.5)
WBC # FLD AUTO: 9.26 K/UL — SIGNIFICANT CHANGE UP (ref 3.8–10.5)
WBC # FLD AUTO: 9.26 K/UL — SIGNIFICANT CHANGE UP (ref 3.8–10.5)
WBC UR QL: 10 /HPF — HIGH (ref 0–5)
WBC UR QL: 10 /HPF — HIGH (ref 0–5)

## 2023-11-04 PROCEDURE — 99233 SBSQ HOSP IP/OBS HIGH 50: CPT

## 2023-11-04 RX ORDER — SODIUM CHLORIDE 9 MG/ML
1000 INJECTION, SOLUTION INTRAVENOUS
Refills: 0 | Status: DISCONTINUED | OUTPATIENT
Start: 2023-11-04 | End: 2023-11-04

## 2023-11-04 RX ORDER — CALCIUM GLUCONATE 100 MG/ML
1 VIAL (ML) INTRAVENOUS ONCE
Refills: 0 | Status: COMPLETED | OUTPATIENT
Start: 2023-11-04 | End: 2023-11-04

## 2023-11-04 RX ORDER — SODIUM,POTASSIUM PHOSPHATES 278-250MG
1 POWDER IN PACKET (EA) ORAL
Refills: 0 | Status: COMPLETED | OUTPATIENT
Start: 2023-11-04 | End: 2023-11-04

## 2023-11-04 RX ADMIN — CEFTRIAXONE 100 MILLIGRAM(S): 500 INJECTION, POWDER, FOR SOLUTION INTRAMUSCULAR; INTRAVENOUS at 16:58

## 2023-11-04 RX ADMIN — HEPARIN SODIUM 5000 UNIT(S): 5000 INJECTION INTRAVENOUS; SUBCUTANEOUS at 05:01

## 2023-11-04 RX ADMIN — HEPARIN SODIUM 5000 UNIT(S): 5000 INJECTION INTRAVENOUS; SUBCUTANEOUS at 13:46

## 2023-11-04 RX ADMIN — Medication 325 MILLIGRAM(S): at 11:49

## 2023-11-04 RX ADMIN — HEPARIN SODIUM 5000 UNIT(S): 5000 INJECTION INTRAVENOUS; SUBCUTANEOUS at 21:24

## 2023-11-04 RX ADMIN — PANTOPRAZOLE SODIUM 40 MILLIGRAM(S): 20 TABLET, DELAYED RELEASE ORAL at 05:01

## 2023-11-04 RX ADMIN — Medication 1 CAPSULE(S): at 08:47

## 2023-11-04 RX ADMIN — Medication 100 MICROGRAM(S): at 05:01

## 2023-11-04 RX ADMIN — Medication 1 CAPSULE(S): at 11:49

## 2023-11-04 RX ADMIN — SODIUM CHLORIDE 75 MILLILITER(S): 9 INJECTION, SOLUTION INTRAVENOUS at 17:17

## 2023-11-04 RX ADMIN — OXCARBAZEPINE 300 MILLIGRAM(S): 300 TABLET, FILM COATED ORAL at 05:02

## 2023-11-04 RX ADMIN — Medication 1 PACKET(S): at 10:36

## 2023-11-04 RX ADMIN — Medication 650 MILLIGRAM(S): at 05:01

## 2023-11-04 RX ADMIN — OXCARBAZEPINE 300 MILLIGRAM(S): 300 TABLET, FILM COATED ORAL at 17:18

## 2023-11-04 RX ADMIN — Medication 1 MILLIGRAM(S): at 11:49

## 2023-11-04 RX ADMIN — SODIUM CHLORIDE 70 MILLILITER(S): 9 INJECTION, SOLUTION INTRAVENOUS at 05:02

## 2023-11-04 RX ADMIN — Medication 1 CAPSULE(S): at 17:11

## 2023-11-04 RX ADMIN — CHLORHEXIDINE GLUCONATE 1 APPLICATION(S): 213 SOLUTION TOPICAL at 11:49

## 2023-11-04 RX ADMIN — Medication 1 PACKET(S): at 08:46

## 2023-11-04 RX ADMIN — Medication 100 GRAM(S): at 11:29

## 2023-11-04 RX ADMIN — Medication 650 MILLIGRAM(S): at 17:10

## 2023-11-04 RX ADMIN — ESCITALOPRAM OXALATE 20 MILLIGRAM(S): 10 TABLET, FILM COATED ORAL at 11:48

## 2023-11-04 NOTE — DISCHARGE NOTE PROVIDER - HOSPITAL COURSE
Patient is a 68 year old F with hypertension, hypothyroidism, and  pancreatic cancer, currently on chemotherapy (last session 10/30) who presents to the emergency department for fever, weakness, fatigue, body aches, and lower back pain for 2 days after 2 weeks of urinary urgency prior to symptom onset. These symptoms began on Mon 10/30 after receiving last chemo dose. Patient initially presented to [facility] [place] after being referred by  with this symptomatology and was found to be febrile to Tmax 102F, reportedly was hypotensive and tachycardic, and was found to have evidence of pyelonephritis on CT abdomen/pelvis. The patient received one dose of Zosyn around 10-11 AM on 11/2 and transferred to [facility] for further management. Denies nausea/vomiting, diarrhea, SOB, chest pain. Denies dysuria, polyuria. Has had poor PO intake and fatigue. In the NS ED, VS notable for , /74 (though reportedly hypotensive per ED note), afebrile, SpO2 100% on room air . Patient received 1L NS, given vancomycin given presence of chemo port. Labs notable for Na 125, HCO3 13, BUN 35, Cr 1.66, , AST 63.     Patient initially started on zosyn empirically but narrowed to ceftriaxone after blood cultures grew E Coli. Urine cultures reamined negative, but patient was goiven zosyn prior to collection. The patient was evaluated by hem/onc and ID who offerred recommendations. The patient was also found to have hyponatremia on top of her alreadry chronic hyponatremic state. Her urine studies were suggestive of a mixed etiology, though it initially improved with VI fluids. The patient was also found to have decreased po intake and evidence of dehydration and was given IV fluids. The patient continued to have high amounts of urine output possibly suggestive of SIADH from her underlying malignancy. HPI:  Patient is a 68 year old F with hypertension, hypothyroidism, and  pancreatic cancer, currently on chemotherapy (last session 10/30) who presents to the emergency department for fever, weakness, fatigue, body aches, and lower back pain for 2 days after 2 weeks of urinary urgency prior to symptom onset. These symptoms began on Mon 10/30 after receiving last chemo dose. Patient initially presented to [facility] [place] after being referred by  with this symptomatology and was found to be febrile to Tmax 102F, reportedly was hypotensive and tachycardic, and was found to have evidence of pyelonephritis on CT abdomen/pelvis. The patient received one dose of Zosyn around 10-11 AM on 11/2 and transferred to [facility] for further management.    Denies nausea/vomiting, diarrhea, SOB, chest pain. Denies dysuria, polyuria. Has had poor PO intake and fatigue.    In the NS ED, VS notable for , /74 (though reportedly hypotensive per ED note), afebrile, SpO2 100% on room air . Patient received 1L NS, given vancomycin given presence of chemo port. Labs notable for Na 125, HCO3 13, BUN 35, Cr 1.66, , AST 63.  (02 Nov 2023 16:05)    Hospital Course:    Patient initially started on zosyn empirically but narrowed to ceftriaxone after blood cultures grew pan-sensitive E Coli. Urine cultures remained negative, but patient was given zosyn prior to collection. The patient was evaluated by hem/onc and ID. The patient was also found to have hyponatremia on top of her chronic hyponatremic state. Her urine studies were suggestive of a mixed etiology, though it initially improved with IV fluids. Repeat blood cultures remained negative. The patient continued to have high amounts of urine output possibly suggestive of SIADH from her underlying malignancy versus medications, and patient's Na improved to baseline with fluid restriction. CTH was negative after patient was found to have Hgb 5.8, s/p 1U prBC. Patient had acute rise in LFT's, cholestatic pattern, so patient was switched to cipro then ertapenem given sludge seen on RUQUS. Follow up MRCP showed focal stricture in lower CBD. GI performed ERCP and placed stent, obtained samples for biopsy. Port was also deaccessed while inpatient. Thyroid levels suggested hypothyroidism so levothyroxine was increased to 112. Patient was then switched to ciprofloxacin and flagyl on discharge. Patient is medically stable for discharge with close outpatient follow up.      Important Medication Changes and Reason:  Ciprofloxacin  Flagyl  Levothyroxine    Active or Pending Issues Requiring Follow-up:  ERCP s/p stent and biopsy    Advanced Directives:   [ x] Full code  [ ] DNR  [ ] Hospice    Discharge Diagnoses:  Pyelonephritis  CBD stricture         HPI:  Patient is a 68 year old F with hypertension, hypothyroidism, and  pancreatic cancer, currently on chemotherapy (last session 10/30) who presents to the emergency department for fever, weakness, fatigue, body aches, and lower back pain for 2 days after 2 weeks of urinary urgency prior to symptom onset. These symptoms began on Mon 10/30 after receiving last chemo dose. Patient initially presented to [facility] [place] after being referred by  with this symptomatology and was found to be febrile to Tmax 102F, reportedly was hypotensive and tachycardic, and was found to have evidence of pyelonephritis on CT abdomen/pelvis. The patient received one dose of Zosyn around 10-11 AM on 11/2 and transferred to [facility] for further management.    Denies nausea/vomiting, diarrhea, SOB, chest pain. Denies dysuria, polyuria. Has had poor PO intake and fatigue.    In the NS ED, VS notable for , /74 (though reportedly hypotensive per ED note), afebrile, SpO2 100% on room air . Patient received 1L NS, given vancomycin given presence of chemo port. Labs notable for Na 125, HCO3 13, BUN 35, Cr 1.66, , AST 63.  (02 Nov 2023 16:05)    Hospital Course:    Patient initially started on zosyn empirically but narrowed to ceftriaxone after blood cultures grew pan-sensitive E Coli. Urine cultures remained negative, but patient was given zosyn prior to collection. The patient was evaluated by hem/onc and ID. The patient was also found to have hyponatremia on top of her chronic hyponatremic state. Her urine studies were suggestive of a mixed etiology, though it initially improved with IV fluids. Repeat blood cultures remained negative. The patient continued to have high amounts of urine output possibly suggestive of SIADH from her underlying malignancy versus medications, and patient's Na improved to baseline with fluid restriction. CTH was negative after patient was found to have Hgb 5.8, s/p 1U prBC. Patient had acute rise in LFT's, cholestatic pattern, so patient was switched to cipro then ertapenem given sludge seen on RUQUS. Follow up MRCP showed focal stricture in lower CBD. GI performed ERCP and placed stent, obtained samples for biopsy. Port was also deaccessed while inpatient. Thyroid levels suggested hypothyroidism so levothyroxine was increased to 112. Patient was then switched to ciprofloxacin and flagyl on discharge. Patient is medically stable for discharge with close outpatient follow up.    Important Medication Changes and Reason:  Ciprofloxacin  Flagyl  Levothyroxine    Active or Pending Issues Requiring Follow-up:  ERCP s/p stent and biopsy    Advanced Directives:   [ x] Full code  [ ] DNR  [ ] Hospice    Discharge Diagnoses:  Pyelonephritis  CBD stricture

## 2023-11-04 NOTE — PROGRESS NOTE ADULT - PROBLEM SELECTOR PLAN 2
Pyelo with CVA tenderness, recent urinary urgency, likely ascending infection iso immunosuppression from chemo.  - CTX for now, follow up sensitivities for po regimen for DC when able

## 2023-11-04 NOTE — PROGRESS NOTE ADULT - PROBLEM SELECTOR PLAN 4
Likely iso sepsis and hypotension as seen at OSH. Likely prerenal etiology leading to ATN, now increased from baseline (0,6)  - Now s/p 2L total. Slowly improving Cr  - With NAGMA. Unclear etiology of NAGMA, no diarrhea, possible RTA iso low potassium at OSH (3) and high urine pH  - Improving with continued IVF

## 2023-11-04 NOTE — PROGRESS NOTE ADULT - SUBJECTIVE AND OBJECTIVE BOX
Pt seen, feeling better, still tired though    MEDICATIONS  (STANDING):  cefTRIAXone   IVPB 1000 milliGRAM(s) IV Intermittent every 24 hours  chlorhexidine 4% Liquid 1 Application(s) Topical daily  escitalopram 20 milliGRAM(s) Oral daily  ferrous    sulfate 325 milliGRAM(s) Oral daily  folic acid 1 milliGRAM(s) Oral daily  heparin   Injectable 5000 Unit(s) SubCutaneous every 8 hours  lactated ringers. 1000 milliLiter(s) (75 mL/Hr) IV Continuous <Continuous>  levothyroxine 100 MICROGram(s) Oral daily  OXcarbazepine 300 milliGRAM(s) Oral two times a day  pancrelipase  (CREON 36,000 Lipase Units) 1 Capsule(s) Oral three times a day with meals  pantoprazole    Tablet 40 milliGRAM(s) Oral before breakfast    MEDICATIONS  (PRN):  acetaminophen     Tablet .. 650 milliGRAM(s) Oral every 6 hours PRN Temp greater or equal to 38C (100.4F), Mild Pain (1 - 3)      ROS  No fever, sweats, chills  No epistaxis, HA, sore throat  No CP, SOB, cough, sputum  No n/v/d, abd pain, melena, hematochezia  No edema  No rash  No anxiety  No back pain, joint pain  No bleeding, bruising  No dysuria, hematuria    Vital Signs Last 24 Hrs  T(C): 36.7 (04 Nov 2023 15:23), Max: 36.8 (04 Nov 2023 04:45)  T(F): 98 (04 Nov 2023 15:23), Max: 98.2 (04 Nov 2023 04:45)  HR: 103 (04 Nov 2023 15:23) (84 - 109)  BP: 134/75 (04 Nov 2023 15:23) (95/60 - 134/75)  BP(mean): --  RR: 18 (04 Nov 2023 15:23) (18 - 18)  SpO2: 94% (04 Nov 2023 15:23) (93% - 95%)    Parameters below as of 04 Nov 2023 15:23  Patient On (Oxygen Delivery Method): room air        PE  NAD  Awake, alert  Anicteric  tired appearing  full exam deferred today                          7.3    9.26  )-----------( 48       ( 04 Nov 2023 06:00 )             20.3       11-04    127<L>  |  97  |  25<H>  ----------------------------<  204<H>  3.7   |  18<L>  |  0.97    Ca    7.7<L>      04 Nov 2023 13:50  Phos  1.7     11-04  Mg     2.2     11-04    TPro  5.3<L>  /  Alb  2.6<L>  /  TBili  0.8  /  DBili  x   /  AST  18  /  ALT  34  /  AlkPhos  136<H>  11-04

## 2023-11-04 NOTE — DISCHARGE NOTE PROVIDER - NSDCMRMEDTOKEN_GEN_ALL_CORE_FT
acetaminophen 500 mg oral tablet: 2 tab(s) orally 3 times a day as needed for  mild pain  amLODIPine 5 mg oral tablet: 1 tab(s) orally once a day  escitalopram 20 mg oral tablet: 1 tab(s) orally once a day  ferrous sulfate 325 mg (65 mg elemental iron) oral delayed release tablet: 1 tab(s) orally every other day   folic acid 1 mg oral tablet: 1 tab(s) orally once a day  Fosamax 70 mg oral tablet: 1 tab(s) orally once a week SUNDAY  losartan 100 mg oral tablet: 1 tab(s) orally once a day  pancrelipase 10,000 units-32,000 units-42,000 units oral delayed release capsule: 1 cap(s) orally 3 times a day (with meals)  pantoprazole 40 mg oral delayed release tablet: 1 tab(s) orally once a day  Synthroid 100 mcg (0.1 mg) oral tablet: 1 tab(s) orally once a day  traMADol 50 mg oral tablet: 1 tab(s) orally every 8 hours as needed for  moderate pain  Trileptal 300 mg oral tablet: 1 tab(s) orally 2 times a day  Valium 5 mg oral tablet: 1 tab(s) orally once a day as needed for  anxiety   acetaminophen 500 mg oral tablet: 2 tab(s) orally 3 times a day as needed for  mild pain  amLODIPine 5 mg oral tablet: 1 tab(s) orally once a day  escitalopram 20 mg oral tablet: 1 tab(s) orally once a day  ferrous sulfate 325 mg (65 mg elemental iron) oral delayed release tablet: 1 tab(s) orally every other day   folic acid 1 mg oral tablet: 1 tab(s) orally once a day  Fosamax 70 mg oral tablet: 1 tab(s) orally once a week SUNDAY  losartan 100 mg oral tablet: 1 tab(s) orally once a day  pancrelipase 10,000 units-32,000 units-42,000 units oral delayed release capsule: 1 cap(s) orally 3 times a day (with meals)  pantoprazole 40 mg oral delayed release tablet: 1 tab(s) orally once a day  rolling walker: pancreatic cancer C25.3  Synthroid 100 mcg (0.1 mg) oral tablet: 1 tab(s) orally once a day  traMADol 50 mg oral tablet: 1 tab(s) orally every 8 hours as needed for  moderate pain  Trileptal 300 mg oral tablet: 1 tab(s) orally 2 times a day  Valium 5 mg oral tablet: 1 tab(s) orally once a day as needed for  anxiety   acetaminophen 500 mg oral tablet: 2 tab(s) orally 3 times a day as needed for  mild pain  amLODIPine 5 mg oral tablet: 1 tab(s) orally once a day  Cipro 500 mg oral tablet: 1 tab(s) orally every 12 hours Please take this antibiotic as directed through the end of 11/16/2023  escitalopram 20 mg oral tablet: 1 tab(s) orally once a day  ferrous sulfate 325 mg (65 mg elemental iron) oral delayed release tablet: 1 tab(s) orally every other day   folic acid 1 mg oral tablet: 1 tab(s) orally once a day  Fosamax 70 mg oral tablet: 1 tab(s) orally once a week SUNDAY  losartan 100 mg oral tablet: 1 tab(s) orally once a day  metroNIDAZOLE 500 mg oral tablet: 1 tab(s) orally 2 times a day Please take this antibiotic as directed for 5 more days until 11/16/2023.  pancrelipase 10,000 units-32,000 units-42,000 units oral delayed release capsule: 1 cap(s) orally 3 times a day (with meals)  pantoprazole 40 mg oral delayed release tablet: 1 tab(s) orally once a day  rolling walker: pancreatic cancer C25.3  Synthroid 112 mcg (0.112 mg) oral tablet: 1 tab(s) orally once a day  traMADol 50 mg oral tablet: 1 tab(s) orally every 8 hours as needed for  moderate pain  Trileptal 300 mg oral tablet: 1 tab(s) orally 2 times a day  Valium 5 mg oral tablet: 1 tab(s) orally once a day as needed for  anxiety   acetaminophen 500 mg oral tablet: 2 tab(s) orally 3 times a day as needed for  mild pain  amLODIPine 5 mg oral tablet: 1 tab(s) orally once a day  escitalopram 20 mg oral tablet: 1 tab(s) orally once a day  ferrous sulfate 325 mg (65 mg elemental iron) oral delayed release tablet: 1 tab(s) orally every other day   folic acid 1 mg oral tablet: 1 tab(s) orally once a day  Fosamax 70 mg oral tablet: 1 tab(s) orally once a week SUNDAY  losartan 100 mg oral tablet: 1 tab(s) orally once a day  pancrelipase 10,000 units-32,000 units-42,000 units oral delayed release capsule: 1 cap(s) orally 3 times a day (with meals)  pantoprazole 40 mg oral delayed release tablet: 1 tab(s) orally once a day  rolling walker: pancreatic cancer C25.3  Synthroid 112 mcg (0.112 mg) oral tablet: 1 tab(s) orally once a day  traMADol 50 mg oral tablet: 1 tab(s) orally every 8 hours as needed for  moderate pain  Trileptal 300 mg oral tablet: 1 tab(s) orally 2 times a day  Valium 5 mg oral tablet: 1 tab(s) orally once a day as needed for  anxiety

## 2023-11-04 NOTE — PROGRESS NOTE ADULT - ASSESSMENT
Patient is a 68 year old F with hypertension, hypothyroidism, and  pancreatic cancer, currently on chemotherapy (last session 10/30) who presents to the emergency department for fever, weakness, fatigue, body aches, and lower back pain for 2 days after 2 weeks of urinary urgency prior to symptom onset.    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 Pt states taht she had been having urinary frequency and urgency but did not recognize that as sx of UTI  --outpatient peripheral cultures collected 11/2 c/w ecoli, resistant to bactrim but otherwise sensitive  --cultures collected inpatient also showed Ecoli  --on IV abx, ID consulted, on CTX, improving    Hyponatremia  --chronic, Na 128 at baseline, lower today  --may benefit from renal consult    anemia/thrombocytopenia  --2/2 malignancy and treatment  --Hgb 8-9, platelets 170s at baseline  --on PO folic acid, and iron  --monitor daily CBC    will follow, d/w pt and sister

## 2023-11-04 NOTE — PROGRESS NOTE ADULT - SUBJECTIVE AND OBJECTIVE BOX
PROGRESS NOTE:     Patient is a 68y old  Female who presents with a chief complaint of Pyelonephritis (03 Nov 2023 08:55)      ---------------------------------------------------  Sherrill Ramirez  PGY-2, Internal Medicine  Available on [employer] Teams  Pager: [phone] (NEYDA), or [phone] (NS)  ---------------------------------------------------    INTERVAL / OVERNIGHT EVENTS:  No acute events overnight.     SUBJECTIVE:  Patient examined at bedside with no acute complaints.     BRIEF DAILY PLAN:  - Continue with CTX, follow up sensitivities of E coli bacteremia      MEDICATIONS  (STANDING):  cefTRIAXone   IVPB 1000 milliGRAM(s) IV Intermittent every 24 hours  chlorhexidine 4% Liquid 1 Application(s) Topical daily  escitalopram 20 milliGRAM(s) Oral daily  ferrous    sulfate 325 milliGRAM(s) Oral daily  folic acid 1 milliGRAM(s) Oral daily  heparin   Injectable 5000 Unit(s) SubCutaneous every 8 hours  lactated ringers. 1000 milliLiter(s) (70 mL/Hr) IV Continuous <Continuous>  levothyroxine 100 MICROGram(s) Oral daily  OXcarbazepine 300 milliGRAM(s) Oral two times a day  pancrelipase  (CREON 36,000 Lipase Units) 1 Capsule(s) Oral three times a day with meals  pantoprazole    Tablet 40 milliGRAM(s) Oral before breakfast  potassium phosphate / sodium phosphate Powder (PHOS-NaK) 1 Packet(s) Oral every 2 hours    MEDICATIONS  (PRN):  acetaminophen     Tablet .. 650 milliGRAM(s) Oral every 6 hours PRN Temp greater or equal to 38C (100.4F), Mild Pain (1 - 3)      CAPILLARY BLOOD GLUCOSE        I&O's Summary    03 Nov 2023 07:01  -  04 Nov 2023 07:00  --------------------------------------------------------  IN: 1690 mL / OUT: 3600 mL / NET: -1910 mL        VITALS:   T(C): 36.8 (11-04-23 @ 04:45), Max: 36.9 (11-03-23 @ 11:55)  HR: 109 (11-04-23 @ 04:45) (84 - 109)  BP: 130/74 (11-04-23 @ 04:45) (95/60 - 130/74)  RR: 18 (11-04-23 @ 04:45) (18 - 18)  SpO2: 95% (11-04-23 @ 04:45) (92% - 95%)    General: Comfortable, no acute distress, cooperative with exam.  Respiratory: CTAB, normal respiratory effort, no coughing, wheezes, crackles, or rales.  CV: RRR, S1S2, no murmurs, rubs or gallops.   Abdominal: Soft, nontender, nondistended, no rebound or guarding  Neurology: no focal neuro defects, RUVALCABA x 4.  Extremities: No pitting edema  Back: Mild R sided CVA tenderness  Skin: R wall chest port intact, CDI dressing, no discharge, no erythema      LABS:                        7.3    9.26  )-----------( 48       ( 04 Nov 2023 06:00 )             20.3     11-04    127<L>  |  99  |  25<H>  ----------------------------<  114<H>  3.4<L>   |  18<L>  |  1.04    Ca    7.0<L>      04 Nov 2023 05:56  Phos  1.7     11-04  Mg     2.2     11-04    TPro  5.3<L>  /  Alb  2.6<L>  /  TBili  0.8  /  DBili  x   /  AST  18  /  ALT  34  /  AlkPhos  136<H>  11-04          Urinalysis Basic - ( 04 Nov 2023 05:56 )    Color: x / Appearance: x / SG: x / pH: x  Gluc: 114 mg/dL / Ketone: x  / Bili: x / Urobili: x   Blood: x / Protein: x / Nitrite: x   Leuk Esterase: x / RBC: x / WBC x   Sq Epi: x / Non Sq Epi: x / Bacteria: x        Culture - Urine (collected 02 Nov 2023 17:59)  Source: Clean Catch Clean Catch (Midstream)  Final Report (04 Nov 2023 06:49):    <10,000 CFU/mL Normal Urogenital Mary    Culture - Blood (collected 02 Nov 2023 14:35)  Source: .Blood Blood-Peripheral  Gram Stain (03 Nov 2023 09:25):    Growth in aerobic and anaerobic bottles: Gram Negative Rods  Preliminary Report (03 Nov 2023 09:26):    Growth in aerobic and anaerobic bottles: Gram Negative Rods    Direct identification is available within approximately 3-5    hours either by Blood Panel Multiplexed PCR or Direct    MALDI-TOF. Details: [email or web address]  Organism: Blood Culture PCR (03 Nov 2023 13:06)  Organism: Blood Culture PCR (03 Nov 2023 13:06)    Culture - Blood (collected 02 Nov 2023 14:15)  Source: .Blood Blood-Peripheral  Gram Stain (03 Nov 2023 10:11):    Growth in aerobic and anaerobic bottles: Gram Negative Rods  Preliminary Report (03 Nov 2023 10:11):    Growth in aerobic and anaerobic bottles: Gram Negative Rods        RADIOLOGY & ADDITIONAL TESTS:  Results Reviewed:   Imaging Personally Reviewed:  Electrocardiogram Personally Reviewed:    COORDINATION OF CARE:  Care Discussed with Consultants/Other Providers [Y/N]:  Prior or Outpatient Records Reviewed [Y/N]:

## 2023-11-04 NOTE — DISCHARGE NOTE PROVIDER - NSFOLLOWUPCLINICSTOKEN_GEN_ALL_ED_FT
230189:2 weeks|| ||00\01||False;756326:2 weeks|| ||00\01||False; 560794:2 weeks|| ||00\01||False;665899:2 weeks|| ||00\01||False;

## 2023-11-04 NOTE — DISCHARGE NOTE PROVIDER - NSDCCPCAREPLAN_GEN_ALL_CORE_FT
PRINCIPAL DISCHARGE DIAGNOSIS  Diagnosis: Pyelonephritis  Assessment and Plan of Treatment: You were found to have an ascending urinary infection that ultimately spread to both of your kidneys, called pyelonephritis. You were also found to have evidence of this same bacteria in your blood. You were treated with IV antibiotics and eventually transitioned to oral anitbiotics to complete your course.      SECONDARY DISCHARGE DIAGNOSES  Diagnosis: MARY ELLEN (acute kidney injury)  Assessment and Plan of Treatment:      PRINCIPAL DISCHARGE DIAGNOSIS  Diagnosis: Pyelonephritis  Assessment and Plan of Treatment: You have a  kidney infectioncalled pyelonephritis. The infection can damage your kidneys and cause bacteria to enter your bloodstream. You were treated in the hospital with IV antibiotics including ceftriaxone and ertapenem, and your symptoms improved. You were discharged on oral antibiotics, ciprofloxacin and metronidazole.  Take all the medicine you were prescribed, even if you feel better. Not finishing the medicine can make the infection come back. It may also make a future infection harder to treat. Clear fluids, such as water, are best. To prevent future infection, always wipe the genital area from front to back and urinate frequently. Avoid holding urine in the bladder for a long time.   Seek medical attention immediately if you have any of the following:  Decreased urine output or trouble urinating  Severe pain in the lower back or flank  Fever of 100.4°F (38°C) or higher, or as directed by your healthcare provider  Shaking chills  Vomiting  Blood in your urine  Dark-colored or foul-smelling urine  Nausea or other problems that prevent you from taking your prescribed medicine        SECONDARY DISCHARGE DIAGNOSES  Diagnosis: Abnormal liver enzymes  Assessment and Plan of Treatment: You were found to have abnormal liver enzymes including alkaline phosphatase (ALP), ALT and AST. This was thought to be due to a combination of factors, including gallbladder sludge possibly from antibiotic ceftriaxone and a focal narrowing that was found on MRI imaging. Both of these things can cause bile produced by the liver to back up (obstruction) and cause signs of liver injury.   However, these levels improved after GI performed a procedure where they endoscopically placed a stent in the area of narrowing, and also took samples of the area of narrowing. You should follow up with your primary care physician and gastroenterologist for these results.    Diagnosis: Pancreatic cancer  Assessment and Plan of Treatment: You have been treated for pancreatic cancer. Our hematology/oncology team did not recommend chemotherapy or other cancer treatment while in the hospital. You were found to have low platelets and low hemoglobin, thought to be due to your recent chemotherapy.   We also obtained cancer marker levels such as CA 19-9 and CEA while you were in the hospital as the area of focal narrowing in the common bile duct was in the area of your cancer and prior surgery. These levels were relatively normal but please follow up with your oncologist regarding need for additional therapy and the cancer marker levels. Please follow up with your oncologist and see a hematologist regarding your low platelet counts.

## 2023-11-04 NOTE — PROGRESS NOTE ADULT - PROBLEM SELECTOR PLAN 5
On gemcitabine and abraxane outpatient  - Hold on chemo inpatient iso infection  - Onc recs appreciated

## 2023-11-04 NOTE — DISCHARGE NOTE PROVIDER - DISCHARGE SERVICE FOR PATIENT
on the discharge service for the patient. I have reviewed and made amendments to the documentation where necessary. normal...

## 2023-11-04 NOTE — DISCHARGE NOTE PROVIDER - DETAILS OF MALNUTRITION DIAGNOSIS/DIAGNOSES
This patient has been assessed with a concern for Malnutrition and was treated during this hospitalization for the following Nutrition diagnosis/diagnoses:     -  11/06/2023: Severe protein-calorie malnutrition

## 2023-11-04 NOTE — DISCHARGE NOTE PROVIDER - DID THE PATIENT PRESENT WITH OR WAS TREATED FOR MALNUTRITION DURING THIS ADMISSION
Transplant Surgery Consult Note     Medical record number: [id number]  YOB: 1950,   Consult requested by Dr. Justin for evaluation of kidney transplant candidacy.    Assessment and Recommendations:Mr. Polk appears to be a fair candidate for kidney transplantation and has a fair understanding of the risks and benefits of this approach to the management of renal failure. The following issues should be addressed prior to finalizing his transplant candidacy:     Son is potential donor  Needs Cards eval  Needs CT abd/pelvis   Hx of Gastric Bypass- reports loose stools, takes immodium PRN ( but requires it almost everyday)    The majority of our visit was spent in counselling, discussing the medical and surgical risks of kidney transplantation. We discussed approximate wait time and how that is influenced by issues such as blood type and sensitization (PRA) and access to a living donor. I contrasted potential waiting time for living vs  donor kidneys from  normal (0-85%) or higher (%) kidney donor profile index (KDPI) donors and their associated outcomes. I would not recommend this individual to consider kidneys from high KDPI donors. The reason for this decision is best summarized as: potential living donor. Potential surgical complications of kidney transplantation include bleeding, superficial or deep wound complications (infection, hernia, lymphocele), ureteral anastomotic failure (leak or stenosis), graft thrombosis, need for reoperation and other issues such as cardiac complications, pneumonia, deep venous thrombosis, pulmonary embolism, post transplant diabetes and death. The potential for recurrent disease or need for retransplantation was also addressed. We discussed the possible need for ureteral stent (and subsequent removal), and the utility of protocol biopsy and laboratory studies to evaluate for rejection or recurrent disease. We discussed the risk of graft rejection, our  center's average graft and patient survival rates, immunosuppression protocols, as well as the potential opportunity to participate in clinical trials.  We also discussed the average length of stay, recovery process, and posttransplant lab and monitoring protocol.  I emphasized the need for strict immunosuppression medication adherence and the potential for complications of immunosuppression such as skin cancer or lymphoma, as well as a very low but not zero risk of donor-derived disease transmission risks (infection, cancer). Mr. Polk asked good questions and his candidacy will be reviewed at our Multidisciplinary Selection Committee. Thank you for the opportunity to participate in Mr. Polk's care.      45 minutes spent on the date of the encounter in chart review, patient visit, review of tests, documentation and/or discussion with other providers about the issues documented above.    José Manuel Bocanegra MD [id number]    ---------------------------------------------------------------------------------------------------    HPI: Mr. 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 The patient is diabetic.       The patient is on dialysis.    Has potential kidney donors:  Yes . son  Interested in participation in paired exchange if a donor is willing: Doesn't know     The patient has the following pertinent history:       No    Yes  Dialysis:    []      [x] via:    catheter   Blood Transfusion                  [x]      []  Number of units:   Most recently:  Pregnancy:    [x]      [] Number:       Previous Transplant:  [x]      [] Details:    Cancer    [x]      [] Comment:   Kidney stones   []      [x] Comment:      Recurrent infections  [x]      []  Type:                  Bladder dysfunction  [x]      [] Cause:    Claudication   [x]      [] Distance:    Previous Amputation  [x]      [] Cause:     Chronic anticoagulation  []      [x] Indication: coumadin for a fib  Zoroastrian  [x]      []      No  past medical history on file.  No past surgical history on file.  Family History   Problem Relation Age of Onset     Emphysema Mother      Alcoholism Father      Social History     Socioeconomic History     Marital status:      Spouse name: Not on file     Number of children: Not on file     Years of education: Not on file     Highest education level: Not on file   Occupational History     Not on file   Tobacco Use     Smoking status: Never Smoker     Smokeless tobacco: Never Used   Substance and Sexual Activity     Alcohol use: Not Currently     Drug use: Never     Sexual activity: Not on file   Other Topics Concern     Not on file   Social History Narrative     Not on file     Social Determinants of Health     Financial Resource Strain: Not on file   Food Insecurity: Not on file   Transportation Needs: Not on file   Physical Activity: Not on file   Stress: Not on file   Social Connections: Not on file   Intimate Partner Violence: Not on file   Housing Stability: Not on file       ROS:   CONSTITUTIONAL:  No fevers or chills  EYES: negative for icterus  ENT:  negative for hearing loss, tinnitus and sore throat  RESPIRATORY:  negative for cough, sputum, dyspnea  CARDIOVASCULAR:  negative for chest pain None  GASTROINTESTINAL:  negative for nausea, vomiting, diarrhea or constipation  GENITOURINARY:  negative for incontinence, dysuria, bladder emptying problems  HEME:  No easy bruising  INTEGUMENT:  negative for rash and pruritus  NEURO:  Negative for headache, seizure disorder  Allergies:   Allergies   Allergen Reactions     Furosemide Hives     Lorazepam Rash     Penicillins Rash     Sulfadiazine Rash     Medications:  Prescription Medications as of 8/1/2022       Rx Number Disp Refills Start End Last Dispensed Date Next Fill Date Owning Pharmacy    atorvastatin (LIPITOR) 10 MG tablet    6/29/2022        Sig: TAKE 1 TABLET BY MOUTH ONCE DAILY AT BEDTIME    Class: Historical    augmented betamethasone  dipropionate (DIPROLENE AF) 0.05 % external cream    1/8/2021        Class: Historical    Route: Topical    B Complex-C-Folic Acid (TATY-BRIT RX) 1 mg TABS    6/6/2022        Sig: Take 1 tablet by mouth daily    Class: Historical    Route: Oral    calcium acetate (PHOSLO) 667 MG CAPS capsule    5/10/2022        Sig: TAKE 1 CAPSULE BY MOUTH THREE TIMES DAILY WITH FOOD . DO NOT TAKE ON AN EMPTY STOMACH. MUST BE TAKEN WITH FOOD TO BE EFFECTIVE    Class: Historical    cholecalciferol 125 MCG (5000 UT) CAPS    6/1/2022        Sig: Take 5,000 Units by mouth    Class: Historical    Route: Oral    cyanocobalamin (VITAMIN B-12) 1000 MCG SUBL sublingual tablet            Sig: Place 1,000 mcg under the tongue    Class: Historical    Route: Sublingual    digoxin (LANOXIN) 125 MCG tablet    11/29/2021        Sig: Take 1 tablet by mouth daily    Class: Historical    Route: Oral    diphenoxylate-atropine (LOMOTIL) 2.5-0.025 MG tablet    4/27/2022        Sig: Take 1 tablet by mouth    Class: Historical    Route: Oral    Lactobacillus Rhamnosus, GG, (RA PROBIOTIC DIGESTIVE CARE) CAPS    10/28/2021        Sig: Take 2 capsules by mouth    Class: Historical    Route: Oral    levothyroxine (EUTHYROX) 50 MCG tablet    9/2/2021        Sig: Take 1 tablet by mouth daily    Class: Historical    Route: Oral    loperamide (IMODIUM A-D) 2 MG tablet    8/11/2021        Sig: Take 2 mg by mouth    Class: Historical    Route: Oral    metolazone (ZAROXOLYN) 2.5 MG tablet    5/23/2022        Sig: Take 2.5 mg by mouth    Class: Historical    Route: Oral    metoprolol succinate ER (TOPROL XL) 50 MG 24 hr tablet    3/1/2022        Sig: Take 50 mg by mouth    Class: Historical    Route: Oral    ofloxacin (OCUFLOX) 0.3 % ophthalmic solution    6/28/2022        Sig: Place 5 drops in right ear once daily for 7 days    Class: Historical    omeprazole (PRILOSEC OTC) 20 MG EC tablet    12/7/2021        Sig: Take 20 mg by mouth    Class: Historical    Route:  Oral    potassium chloride ER (K-TAB) 20 MEQ CR tablet    6/24/2022        Sig: TAKE 1 TABLET BY MOUTH TWICE DAILY WITH MEALS . DO NOT CRUSH .    Class: Historical    sucralfate (CARAFATE) 1 GM tablet    1/20/2022        Sig: Take 1 g by mouth    Class: Historical    Route: Oral    torsemide (DEMADEX) 10 MG tablet    4/4/2022        Sig: Take 3 tablets by mouth in the morning and 1 tablet in the evening.    Class: Historical    triamcinolone (KENALOG) 0.025 % external ointment    9/25/2020        Sig: Apply thin layer twice daily for up to two weeks, then on weekends only, as needed, for up to six weeks. The course can be repeated as needed. For use on the head, neck, skin folds, or genitalia.    Class: Historical    Urea 40 % CREA    6/29/2022        Class: Historical    Route: Topical    vitamin D2 (ERGOCALCIFEROL) 78951 units (1250 mcg) capsule    10/28/2021        Sig: TAKE 1 CAPSULE BY MOUTH ON MONDAY WEDNESDAY AND FRIDAY    Class: Historical    warfarin ANTICOAGULANT (COUMADIN) 5 MG tablet    9/8/2021        Sig: Take 1 to 1 1/2 tabs po daily or as directed per [facility]    Class: Historical    Zinc 30 MG TABS    4/6/2021        Sig: Take 1 each by mouth    Class: Historical    Route: Oral        Exam:   Pulse:  [62] 62  BP: (95)/(56) 95/56  SpO2:  [98 %] 98 %  GEN:NAD  HEENT:NCAT, EOMI  RESP: breathing comfortably on room air  CARDS:RRR  ABD: soft, nontender, nondistended, scars consistent with surgical history  MSK:WWP, moving all extremities  NEURO: CN II-XII intact; A/Ox3      Diagnostics:   No results found for this or any previous visit (from the past 672 hour(s)).  No results found for: CPRA     No Yes...

## 2023-11-04 NOTE — DISCHARGE NOTE PROVIDER - NSDCFUADDAPPT_GEN_ALL_CORE_FT
APPTS ARE READY TO BE MADE: [ ] YES    Best Family or Patient Contact (if needed):    Additional Information about above appointments (if needed):    1: Gastroenterology  2:   3:     Other comments or requests:    APPTS ARE READY TO BE MADE: [ ] YES    Best Family or Patient Contact (if needed):    Additional Information about above appointments (if needed):    1: Gastroenterology: [phone]  2: ID  3:     Other comments or requests:

## 2023-11-04 NOTE — DISCHARGE NOTE PROVIDER - NSFOLLOWUPCLINICS_GEN_ALL_ED_FT
[facility] - Infectious Disease  Infectious Disease  [address] [facility], [place] [address]  Phone: [phone]  Fax:   Follow Up Time: 2 weeks    Gastroenterology at [facility]  Gastroenterology  [address] [place] [address]  Phone: [phone]  Fax:   Follow Up Time: 2 weeks     [facility] - Infectious Disease  Infectious Disease  [address] [place] [address]  Phone: [phone]  Fax:   Follow Up Time: 2 weeks    [facility]  Gastroenterology  [address] [place] [id number]  Phone: [phone]  Fax:   Follow Up Time: 2 weeks

## 2023-11-04 NOTE — DISCHARGE NOTE PROVIDER - NSDCCPTREATMENT_GEN_ALL_CORE_FT
PRINCIPAL PROCEDURE  Procedure: MR MRCP  Findings and Treatment: FINDINGS:  LOWER CHEST: Trace left pleural effusion.  LIVER: Increased signal on out of phase imaging, suggestive of iron   deposition. Several subcentimeter hepatic cysts.  BILE DUCTS: See below.  GALLBLADDER: Common bile duct dilatation, measuring up to 1.0 cm. There   is focal narrowing of the lower CBD in the region of the pancreatic head.   No discrete mass or calculus visualized. No gallstones.  SPLEEN: Splenectomy.  PANCREAS: Status post distal pancreatectomy. A few tiny cysts in the   pancreatic head.  ADRENALS: Within normal limits.  KIDNEYS/URETERS: Small renal cysts.  VISUALIZED PORTIONS:  BOWEL: Within normal limits.  PERITONEUM: No ascites.  VESSELS: Atherosclerotic changes.  RETROPERITONEUM/LYMPH NODES: No lymphadenopathy.  ABDOMINAL WALL: Within normal limits.  BONES: Degenerative changes. Thoracolumbar signal abnormality likely   represents post radiation changes.  IMPRESSION:  Focal stricture of the lower common bile ductfor which differential   includes benign and malignant etiologies.

## 2023-11-04 NOTE — DISCHARGE NOTE PROVIDER - NPI NUMBER (FOR SYSADMIN USE ONLY) :
From: Komal Burris  To: Nik Dhillon  
Please see patient question below regarding +protein and trace leukocyte esterase. Thank you!    \"Good morning, I just have a question regarding my recent urinalysis. I see that there’s traces of leukocytes esterase but there isn’t an amount. Does that mean any amount of that protein is not a good sign?\"      
Trace leukocyte esterase means you had a small amount of white blood cells found in your urine. In large quantities this can sometimes indicate an infection, but in very low quantities along with minimal bacteria, this is a rather insignificant finding.   
[[id number]]

## 2023-11-04 NOTE — PROGRESS NOTE ADULT - PROBLEM SELECTOR PLAN 1
Fever, tachycardia, with MARY ELLEN/hypotension. CT A/P outside records showing evidence of pyelonephritis in the upper poles. Still intermittently febrile. S/p 1 dose of zosyn, vanc. Immunosuppression from active chemo.  - bcx speciating with E coli. Switching from zosyn to CTX for pyelonephritis  - Ok to continue fluids. Hyponatremia likely iso hypovolemia.

## 2023-11-04 NOTE — DISCHARGE NOTE PROVIDER - CARE PROVIDER_API CALL
FUAD LOO  [address] [facility] [place] [address]  Phone: [phone]  Fax: [phone]  Established Patient  Follow Up Time: 1 week

## 2023-11-04 NOTE — DISCHARGE NOTE PROVIDER - NSDCCAREPROVSEEN_GEN_ALL_CORE_FT
[facility] TEAM 8 [facility] TEAM 8; Gastroenterology; Hematology/Oncology; Infectious Disease

## 2023-11-04 NOTE — PROGRESS NOTE ADULT - PROBLEM SELECTOR PLAN 3
Likely hypovolemic hyponatremia given poor PO intake. Initially with low serum osm, and indeterminate urine sodium. However, after receiving 2L, now back at baseline Na (128)  - Maintenance fluids as needed

## 2023-11-04 NOTE — PROGRESS NOTE ADULT - ATTENDING COMMENTS
This is a 68F with h/o pancreatic cancer since 2021, on chemotherapy (last session 10/30) presented to [facility] with fever, weakness, fatigue, body aches, and lower back pain for 2 days after 2 weeks of urinary urgency. Her SBP was in 80s and she was given 1L IVF with NS, IV zosyn and send to ED at  [facility]. Outpatient CT abd/pelvis showed evidence of Pyelonephritis.    In ED patient was found to have sepsis, 1L NS, 1gm IV vancomycin was given. CXR- clear, RVP neg, Na 125, Scr 1.66, WBC outpatient was normal.    1. Gm neg septicemia due to acute pyelonephritis  2. Acute on chronic hyponatremia, likely 2/2 prerenal/dehydration  3. MARY ELLEN due to ATN  4. Pancreatic cancer since 2021, on chemotherapy  5. Anemia and chronic thrombocytopenia due to chemo/Sepsis   6. HTN    - Ecoli bacteremia thought pyelonephritis as source - UCx returned negative - sent after abx given at [facility]. Still will continue IV zosyn. repeat bcx to assess for clearance.   -hyponatremia - baseline Na reported 128 - given increased urine output will continue IVF for now and repeat bmp later today if dropping will hold fluids. repeat urine studies. patient also with proteinuria 2.3g - ?due to UTI. should repeat in a few days.   -MARY ELLEN improving (baseline cr 0.6) - c/w fluids as above.   -ID f/u  - Appreciated Onc consult. 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 may resume Amlodipine/Losartan at low dose if BP elevated  - c/w PPI and other home meds  - OOB to chair, PT eval, DVT ppx .  spoke to  at bedside

## 2023-11-05 DIAGNOSIS — D75.89 OTHER SPECIFIED DISEASES OF BLOOD AND BLOOD-FORMING ORGANS: ICD-10-CM

## 2023-11-05 LAB
-  AMIKACIN: SIGNIFICANT CHANGE UP
-  AMIKACIN: SIGNIFICANT CHANGE UP
-  AMPICILLIN/SULBACTAM: SIGNIFICANT CHANGE UP
-  AMPICILLIN/SULBACTAM: SIGNIFICANT CHANGE UP
-  AMPICILLIN: SIGNIFICANT CHANGE UP
-  AMPICILLIN: SIGNIFICANT CHANGE UP
-  AZTREONAM: SIGNIFICANT CHANGE UP
-  AZTREONAM: SIGNIFICANT CHANGE UP
-  CEFAZOLIN: SIGNIFICANT CHANGE UP
-  CEFAZOLIN: SIGNIFICANT CHANGE UP
-  CEFEPIME: SIGNIFICANT CHANGE UP
-  CEFEPIME: SIGNIFICANT CHANGE UP
-  CEFOXITIN: SIGNIFICANT CHANGE UP
-  CEFOXITIN: SIGNIFICANT CHANGE UP
-  CEFTRIAXONE: SIGNIFICANT CHANGE UP
-  CEFTRIAXONE: SIGNIFICANT CHANGE UP
-  CIPROFLOXACIN: SIGNIFICANT CHANGE UP
-  CIPROFLOXACIN: SIGNIFICANT CHANGE UP
-  ERTAPENEM: SIGNIFICANT CHANGE UP
-  ERTAPENEM: SIGNIFICANT CHANGE UP
-  GENTAMICIN: SIGNIFICANT CHANGE UP
-  GENTAMICIN: SIGNIFICANT CHANGE UP
-  IMIPENEM: SIGNIFICANT CHANGE UP
-  IMIPENEM: SIGNIFICANT CHANGE UP
-  LEVOFLOXACIN: SIGNIFICANT CHANGE UP
-  LEVOFLOXACIN: SIGNIFICANT CHANGE UP
-  MEROPENEM: SIGNIFICANT CHANGE UP
-  MEROPENEM: SIGNIFICANT CHANGE UP
-  PIPERACILLIN/TAZOBACTAM: SIGNIFICANT CHANGE UP
-  PIPERACILLIN/TAZOBACTAM: SIGNIFICANT CHANGE UP
-  TOBRAMYCIN: SIGNIFICANT CHANGE UP
-  TOBRAMYCIN: SIGNIFICANT CHANGE UP
-  TRIMETHOPRIM/SULFAMETHOXAZOLE: SIGNIFICANT CHANGE UP
-  TRIMETHOPRIM/SULFAMETHOXAZOLE: SIGNIFICANT CHANGE UP
ALBUMIN SERPL ELPH-MCNC: 2.3 G/DL — LOW (ref 3.3–5)
ALBUMIN SERPL ELPH-MCNC: 2.3 G/DL — LOW (ref 3.3–5)
ALP SERPL-CCNC: 152 U/L — HIGH (ref 40–120)
ALP SERPL-CCNC: 152 U/L — HIGH (ref 40–120)
ALT FLD-CCNC: 37 U/L — SIGNIFICANT CHANGE UP (ref 10–45)
ALT FLD-CCNC: 37 U/L — SIGNIFICANT CHANGE UP (ref 10–45)
ANION GAP SERPL CALC-SCNC: 10 MMOL/L — SIGNIFICANT CHANGE UP (ref 5–17)
ANION GAP SERPL CALC-SCNC: 10 MMOL/L — SIGNIFICANT CHANGE UP (ref 5–17)
ANION GAP SERPL CALC-SCNC: 13 MMOL/L — SIGNIFICANT CHANGE UP (ref 5–17)
ANION GAP SERPL CALC-SCNC: 13 MMOL/L — SIGNIFICANT CHANGE UP (ref 5–17)
APTT BLD: 25.5 SEC — SIGNIFICANT CHANGE UP (ref 24.5–35.6)
APTT BLD: 25.5 SEC — SIGNIFICANT CHANGE UP (ref 24.5–35.6)
AST SERPL-CCNC: 46 U/L — HIGH (ref 10–40)
AST SERPL-CCNC: 46 U/L — HIGH (ref 10–40)
BILIRUB SERPL-MCNC: 0.6 MG/DL — SIGNIFICANT CHANGE UP (ref 0.2–1.2)
BILIRUB SERPL-MCNC: 0.6 MG/DL — SIGNIFICANT CHANGE UP (ref 0.2–1.2)
BLD GP AB SCN SERPL QL: NEGATIVE — SIGNIFICANT CHANGE UP
BLD GP AB SCN SERPL QL: NEGATIVE — SIGNIFICANT CHANGE UP
BUN SERPL-MCNC: 19 MG/DL — SIGNIFICANT CHANGE UP (ref 7–23)
BUN SERPL-MCNC: 19 MG/DL — SIGNIFICANT CHANGE UP (ref 7–23)
BUN SERPL-MCNC: 21 MG/DL — SIGNIFICANT CHANGE UP (ref 7–23)
BUN SERPL-MCNC: 21 MG/DL — SIGNIFICANT CHANGE UP (ref 7–23)
CALCIUM SERPL-MCNC: 7.1 MG/DL — LOW (ref 8.4–10.5)
CALCIUM SERPL-MCNC: 7.1 MG/DL — LOW (ref 8.4–10.5)
CALCIUM SERPL-MCNC: 7.6 MG/DL — LOW (ref 8.4–10.5)
CALCIUM SERPL-MCNC: 7.6 MG/DL — LOW (ref 8.4–10.5)
CHLORIDE SERPL-SCNC: 96 MMOL/L — SIGNIFICANT CHANGE UP (ref 96–108)
CHLORIDE SERPL-SCNC: 96 MMOL/L — SIGNIFICANT CHANGE UP (ref 96–108)
CHLORIDE SERPL-SCNC: 99 MMOL/L — SIGNIFICANT CHANGE UP (ref 96–108)
CHLORIDE SERPL-SCNC: 99 MMOL/L — SIGNIFICANT CHANGE UP (ref 96–108)
CO2 SERPL-SCNC: 20 MMOL/L — LOW (ref 22–31)
CREAT SERPL-MCNC: 0.81 MG/DL — SIGNIFICANT CHANGE UP (ref 0.5–1.3)
CREAT SERPL-MCNC: 0.81 MG/DL — SIGNIFICANT CHANGE UP (ref 0.5–1.3)
CREAT SERPL-MCNC: 0.83 MG/DL — SIGNIFICANT CHANGE UP (ref 0.5–1.3)
CREAT SERPL-MCNC: 0.83 MG/DL — SIGNIFICANT CHANGE UP (ref 0.5–1.3)
CULTURE RESULTS: ABNORMAL
D DIMER BLD IA.RAPID-MCNC: 1582 NG/ML DDU — HIGH
D DIMER BLD IA.RAPID-MCNC: 1582 NG/ML DDU — HIGH
EGFR: 77 ML/MIN/1.73M2 — SIGNIFICANT CHANGE UP
EGFR: 77 ML/MIN/1.73M2 — SIGNIFICANT CHANGE UP
EGFR: 79 ML/MIN/1.73M2 — SIGNIFICANT CHANGE UP
EGFR: 79 ML/MIN/1.73M2 — SIGNIFICANT CHANGE UP
FIBRINOGEN PPP-MCNC: 663 MG/DL — HIGH (ref 200–445)
FIBRINOGEN PPP-MCNC: 663 MG/DL — HIGH (ref 200–445)
GLUCOSE SERPL-MCNC: 147 MG/DL — HIGH (ref 70–99)
GLUCOSE SERPL-MCNC: 147 MG/DL — HIGH (ref 70–99)
GLUCOSE SERPL-MCNC: 98 MG/DL — SIGNIFICANT CHANGE UP (ref 70–99)
GLUCOSE SERPL-MCNC: 98 MG/DL — SIGNIFICANT CHANGE UP (ref 70–99)
HAPTOGLOB SERPL-MCNC: 332 MG/DL — HIGH (ref 34–200)
HAPTOGLOB SERPL-MCNC: 332 MG/DL — HIGH (ref 34–200)
HCT VFR BLD CALC: 15.8 % — CRITICAL LOW (ref 34.5–45)
HCT VFR BLD CALC: 15.8 % — CRITICAL LOW (ref 34.5–45)
HCT VFR BLD CALC: 23.1 % — LOW (ref 34.5–45)
HCT VFR BLD CALC: 23.1 % — LOW (ref 34.5–45)
HGB BLD-MCNC: 5.8 G/DL — CRITICAL LOW (ref 11.5–15.5)
HGB BLD-MCNC: 5.8 G/DL — CRITICAL LOW (ref 11.5–15.5)
HGB BLD-MCNC: 8.5 G/DL — LOW (ref 11.5–15.5)
HGB BLD-MCNC: 8.5 G/DL — LOW (ref 11.5–15.5)
INR BLD: 0.99 RATIO — SIGNIFICANT CHANGE UP (ref 0.85–1.18)
INR BLD: 0.99 RATIO — SIGNIFICANT CHANGE UP (ref 0.85–1.18)
MAGNESIUM SERPL-MCNC: 1.8 MG/DL — SIGNIFICANT CHANGE UP (ref 1.6–2.6)
MAGNESIUM SERPL-MCNC: 1.8 MG/DL — SIGNIFICANT CHANGE UP (ref 1.6–2.6)
MCHC RBC-ENTMCNC: 32.7 PG — SIGNIFICANT CHANGE UP (ref 27–34)
MCHC RBC-ENTMCNC: 32.7 PG — SIGNIFICANT CHANGE UP (ref 27–34)
MCHC RBC-ENTMCNC: 33.3 PG — SIGNIFICANT CHANGE UP (ref 27–34)
MCHC RBC-ENTMCNC: 33.3 PG — SIGNIFICANT CHANGE UP (ref 27–34)
MCHC RBC-ENTMCNC: 36.7 GM/DL — HIGH (ref 32–36)
MCHC RBC-ENTMCNC: 36.7 GM/DL — HIGH (ref 32–36)
MCHC RBC-ENTMCNC: 36.8 GM/DL — HIGH (ref 32–36)
MCHC RBC-ENTMCNC: 36.8 GM/DL — HIGH (ref 32–36)
MCV RBC AUTO: 88.8 FL — SIGNIFICANT CHANGE UP (ref 80–100)
MCV RBC AUTO: 88.8 FL — SIGNIFICANT CHANGE UP (ref 80–100)
MCV RBC AUTO: 90.8 FL — SIGNIFICANT CHANGE UP (ref 80–100)
MCV RBC AUTO: 90.8 FL — SIGNIFICANT CHANGE UP (ref 80–100)
METHOD TYPE: SIGNIFICANT CHANGE UP
METHOD TYPE: SIGNIFICANT CHANGE UP
NRBC # BLD: 0 /100 WBCS — SIGNIFICANT CHANGE UP (ref 0–0)
NRBC # BLD: 0 /100 WBCS — SIGNIFICANT CHANGE UP (ref 0–0)
NRBC # BLD: 2 /100 WBCS — HIGH (ref 0–0)
NRBC # BLD: 2 /100 WBCS — HIGH (ref 0–0)
ORGANISM # SPEC MICROSCOPIC CNT: ABNORMAL
PHOSPHATE SERPL-MCNC: 1.7 MG/DL — LOW (ref 2.5–4.5)
PHOSPHATE SERPL-MCNC: 1.7 MG/DL — LOW (ref 2.5–4.5)
PHOSPHATE SERPL-MCNC: 2.9 MG/DL — SIGNIFICANT CHANGE UP (ref 2.5–4.5)
PHOSPHATE SERPL-MCNC: 2.9 MG/DL — SIGNIFICANT CHANGE UP (ref 2.5–4.5)
PLATELET # BLD AUTO: 24 K/UL — LOW (ref 150–400)
PLATELET # BLD AUTO: 24 K/UL — LOW (ref 150–400)
PLATELET # BLD AUTO: 37 K/UL — LOW (ref 150–400)
PLATELET # BLD AUTO: 37 K/UL — LOW (ref 150–400)
POTASSIUM SERPL-MCNC: 3.1 MMOL/L — LOW (ref 3.5–5.3)
POTASSIUM SERPL-MCNC: 3.1 MMOL/L — LOW (ref 3.5–5.3)
POTASSIUM SERPL-MCNC: 4.2 MMOL/L — SIGNIFICANT CHANGE UP (ref 3.5–5.3)
POTASSIUM SERPL-MCNC: 4.2 MMOL/L — SIGNIFICANT CHANGE UP (ref 3.5–5.3)
POTASSIUM SERPL-SCNC: 3.1 MMOL/L — LOW (ref 3.5–5.3)
POTASSIUM SERPL-SCNC: 3.1 MMOL/L — LOW (ref 3.5–5.3)
POTASSIUM SERPL-SCNC: 4.2 MMOL/L — SIGNIFICANT CHANGE UP (ref 3.5–5.3)
POTASSIUM SERPL-SCNC: 4.2 MMOL/L — SIGNIFICANT CHANGE UP (ref 3.5–5.3)
PROT SERPL-MCNC: 5 G/DL — LOW (ref 6–8.3)
PROT SERPL-MCNC: 5 G/DL — LOW (ref 6–8.3)
PROTHROM AB SERPL-ACNC: 10.9 SEC — SIGNIFICANT CHANGE UP (ref 9.5–13)
PROTHROM AB SERPL-ACNC: 10.9 SEC — SIGNIFICANT CHANGE UP (ref 9.5–13)
RBC # BLD: 1.74 M/UL — LOW (ref 3.8–5.2)
RBC # BLD: 1.74 M/UL — LOW (ref 3.8–5.2)
RBC # BLD: 2.25 M/UL — LOW (ref 3.8–5.2)
RBC # BLD: 2.25 M/UL — LOW (ref 3.8–5.2)
RBC # BLD: 2.6 M/UL — LOW (ref 3.8–5.2)
RBC # BLD: 2.6 M/UL — LOW (ref 3.8–5.2)
RBC # FLD: 15.6 % — HIGH (ref 10.3–14.5)
RBC # FLD: 15.6 % — HIGH (ref 10.3–14.5)
RBC # FLD: 15.7 % — HIGH (ref 10.3–14.5)
RBC # FLD: 15.7 % — HIGH (ref 10.3–14.5)
RETICS #: 18 K/UL — LOW (ref 25–125)
RETICS #: 18 K/UL — LOW (ref 25–125)
RETICS/RBC NFR: 0.8 % — SIGNIFICANT CHANGE UP (ref 0.5–2.5)
RETICS/RBC NFR: 0.8 % — SIGNIFICANT CHANGE UP (ref 0.5–2.5)
RH IG SCN BLD-IMP: POSITIVE — SIGNIFICANT CHANGE UP
RH IG SCN BLD-IMP: POSITIVE — SIGNIFICANT CHANGE UP
SODIUM SERPL-SCNC: 129 MMOL/L — LOW (ref 135–145)
SPECIMEN SOURCE: SIGNIFICANT CHANGE UP
UUN UR-MCNC: 413 MG/DL — SIGNIFICANT CHANGE UP
UUN UR-MCNC: 413 MG/DL — SIGNIFICANT CHANGE UP
WBC # BLD: 11.21 K/UL — HIGH (ref 3.8–10.5)
WBC # BLD: 11.21 K/UL — HIGH (ref 3.8–10.5)
WBC # BLD: 9.67 K/UL — SIGNIFICANT CHANGE UP (ref 3.8–10.5)
WBC # BLD: 9.67 K/UL — SIGNIFICANT CHANGE UP (ref 3.8–10.5)
WBC # FLD AUTO: 11.21 K/UL — HIGH (ref 3.8–10.5)
WBC # FLD AUTO: 11.21 K/UL — HIGH (ref 3.8–10.5)
WBC # FLD AUTO: 9.67 K/UL — SIGNIFICANT CHANGE UP (ref 3.8–10.5)
WBC # FLD AUTO: 9.67 K/UL — SIGNIFICANT CHANGE UP (ref 3.8–10.5)

## 2023-11-05 PROCEDURE — 70450 CT HEAD/BRAIN W/O DYE: CPT | Mod: 26

## 2023-11-05 PROCEDURE — 99233 SBSQ HOSP IP/OBS HIGH 50: CPT | Mod: GC

## 2023-11-05 RX ORDER — POTASSIUM CHLORIDE 20 MEQ
40 PACKET (EA) ORAL ONCE
Refills: 0 | Status: COMPLETED | OUTPATIENT
Start: 2023-11-05 | End: 2023-11-05

## 2023-11-05 RX ORDER — POTASSIUM PHOSPHATE, MONOBASIC POTASSIUM PHOSPHATE, DIBASIC 236; 224 MG/ML; MG/ML
15 INJECTION, SOLUTION INTRAVENOUS ONCE
Refills: 0 | Status: COMPLETED | OUTPATIENT
Start: 2023-11-05 | End: 2023-11-05

## 2023-11-05 RX ADMIN — Medication 650 MILLIGRAM(S): at 13:17

## 2023-11-05 RX ADMIN — ESCITALOPRAM OXALATE 20 MILLIGRAM(S): 10 TABLET, FILM COATED ORAL at 12:10

## 2023-11-05 RX ADMIN — POTASSIUM PHOSPHATE, MONOBASIC POTASSIUM PHOSPHATE, DIBASIC 62.5 MILLIMOLE(S): 236; 224 INJECTION, SOLUTION INTRAVENOUS at 09:38

## 2023-11-05 RX ADMIN — Medication 1 CAPSULE(S): at 12:10

## 2023-11-05 RX ADMIN — OXCARBAZEPINE 300 MILLIGRAM(S): 300 TABLET, FILM COATED ORAL at 05:40

## 2023-11-05 RX ADMIN — Medication 1 CAPSULE(S): at 09:15

## 2023-11-05 RX ADMIN — Medication 650 MILLIGRAM(S): at 12:47

## 2023-11-05 RX ADMIN — CEFTRIAXONE 100 MILLIGRAM(S): 500 INJECTION, POWDER, FOR SOLUTION INTRAMUSCULAR; INTRAVENOUS at 17:06

## 2023-11-05 RX ADMIN — Medication 1 CAPSULE(S): at 17:07

## 2023-11-05 RX ADMIN — Medication 650 MILLIGRAM(S): at 04:25

## 2023-11-05 RX ADMIN — Medication 650 MILLIGRAM(S): at 06:01

## 2023-11-05 RX ADMIN — PANTOPRAZOLE SODIUM 40 MILLIGRAM(S): 20 TABLET, DELAYED RELEASE ORAL at 09:15

## 2023-11-05 RX ADMIN — Medication 40 MILLIEQUIVALENT(S): at 09:36

## 2023-11-05 RX ADMIN — Medication 325 MILLIGRAM(S): at 12:10

## 2023-11-05 RX ADMIN — Medication 1 MILLIGRAM(S): at 12:10

## 2023-11-05 RX ADMIN — Medication 100 MICROGRAM(S): at 05:40

## 2023-11-05 RX ADMIN — CHLORHEXIDINE GLUCONATE 1 APPLICATION(S): 213 SOLUTION TOPICAL at 12:11

## 2023-11-05 RX ADMIN — Medication 650 MILLIGRAM(S): at 03:17

## 2023-11-05 RX ADMIN — OXCARBAZEPINE 300 MILLIGRAM(S): 300 TABLET, FILM COATED ORAL at 17:07

## 2023-11-05 RX ADMIN — HEPARIN SODIUM 5000 UNIT(S): 5000 INJECTION INTRAVENOUS; SUBCUTANEOUS at 05:40

## 2023-11-05 NOTE — PROGRESS NOTE ADULT - SUBJECTIVE AND OBJECTIVE BOX
Patient seen and examined at bedside.   feeling better no active complaints     MEDICATIONS  (STANDING):  cefTRIAXone   IVPB 1000 milliGRAM(s) IV Intermittent every 24 hours  chlorhexidine 4% Liquid 1 Application(s) Topical daily  escitalopram 20 milliGRAM(s) Oral daily  ferrous    sulfate 325 milliGRAM(s) Oral daily  folic acid 1 milliGRAM(s) Oral daily  levothyroxine 100 MICROGram(s) Oral daily  OXcarbazepine 300 milliGRAM(s) Oral two times a day  pancrelipase  (CREON 36,000 Lipase Units) 1 Capsule(s) Oral three times a day with meals  pantoprazole    Tablet 40 milliGRAM(s) Oral before breakfast    MEDICATIONS  (PRN):  acetaminophen     Tablet .. 650 milliGRAM(s) Oral every 6 hours PRN Temp greater or equal to 38C (100.4F), Mild Pain (1 - 3)        Vital Signs Last 24 Hrs  T(C): 36.7 (05 Nov 2023 06:16), Max: 36.7 (04 Nov 2023 15:23)  T(F): 98 (05 Nov 2023 06:16), Max: 98 (04 Nov 2023 15:23)  HR: 91 (05 Nov 2023 06:16) (91 - 103)  BP: 119/71 (05 Nov 2023 06:16) (119/71 - 134/75)  BP(mean): --  RR: 18 (05 Nov 2023 06:16) (18 - 18)  SpO2: 96% (05 Nov 2023 06:16) (94% - 96%)    Parameters below as of 05 Nov 2023 06:16  Patient On (Oxygen Delivery Method): room air          PHYSICAL EXAM:     GENERAL:  Appears stated age, well-groomed  HEENT:  NC/AT,  conjunctivae clear and pink  CHEST:  CTA b/l  HEART:  S1 s2+   ABDOMEN:  Soft, non-tender, non-distended  EXTEREMITIES:  no cyanosis,clubbing or edema  SKIN:  No rash/erythema/ecchymoses  LN: No palpable Lymphadenopathy    NEURO:  Alert, oriented, no asterixis                            5.8    9.67  )-----------( 37       ( 05 Nov 2023 06:58 )             15.8       11-05    129<L>  |  99  |  21  ----------------------------<  98  3.1<L>   |  20<L>  |  0.83    Ca    7.1<L>      05 Nov 2023 06:55  Phos  1.7     11-05  Mg     1.8     11-05    TPro  5.0<L>  /  Alb  2.3<L>  /  TBili  0.6  /  DBili  x   /  AST  46<H>  /  ALT  37  /  AlkPhos  152<H>  11-05

## 2023-11-05 NOTE — PROGRESS NOTE ADULT - SUBJECTIVE AND OBJECTIVE BOX
PATIENT: DANUTA CALDERON, MRN: [id number]    CHIEF COMPLAINT: Patient is a 68y old  Female who presents with a chief complaint of Pyelonephritis (04 Nov 2023 20:48)      INTERVAL HISTORY/OVERNIGHT EVENTS: No overnight events.       MEDICATIONS:  MEDICATIONS  (STANDING):  cefTRIAXone   IVPB 1000 milliGRAM(s) IV Intermittent every 24 hours  chlorhexidine 4% Liquid 1 Application(s) Topical daily  escitalopram 20 milliGRAM(s) Oral daily  ferrous    sulfate 325 milliGRAM(s) Oral daily  folic acid 1 milliGRAM(s) Oral daily  heparin   Injectable 5000 Unit(s) SubCutaneous every 8 hours  levothyroxine 100 MICROGram(s) Oral daily  OXcarbazepine 300 milliGRAM(s) Oral two times a day  pancrelipase  (CREON 36,000 Lipase Units) 1 Capsule(s) Oral three times a day with meals  pantoprazole    Tablet 40 milliGRAM(s) Oral before breakfast    MEDICATIONS  (PRN):  acetaminophen     Tablet .. 650 milliGRAM(s) Oral every 6 hours PRN Temp greater or equal to 38C (100.4F), Mild Pain (1 - 3)      ALLERGIES: Allergies    phenytoin (Rash)  ibuprofen (Other)  Dilantin (Unknown)    Intolerances        OBJECTIVE:  ICU Vital Signs Last 24 Hrs  T(C): 36.7 (05 Nov 2023 06:16), Max: 36.7 (04 Nov 2023 15:23)  T(F): 98 (05 Nov 2023 06:16), Max: 98 (04 Nov 2023 15:23)  HR: 91 (05 Nov 2023 06:16) (91 - 103)  BP: 119/71 (05 Nov 2023 06:16) (119/71 - 134/75)  BP(mean): --  ABP: --  ABP(mean): --  RR: 18 (05 Nov 2023 06:16) (18 - 18)  SpO2: 96% (05 Nov 2023 06:16) (94% - 96%)    O2 Parameters below as of 05 Nov 2023 06:16  Patient On (Oxygen Delivery Method): room air            CAPILLARY BLOOD GLUCOSE        CAPILLARY BLOOD GLUCOSE        I&O's Summary    04 Nov 2023 08:01  -  05 Nov 2023 07:00  --------------------------------------------------------  IN: 325 mL / OUT: 1000 mL / NET: -675 mL      Daily     Daily     PHYSICAL EXAMINATION:  General: Comfortable, no acute distress, cooperative with exam.  HEENT: PERRLA  Respiratory: CTAB, normal respiratory effort, no coughing, wheezes, crackles, or rales.  CV: RRR, S1S2, no murmurs, rubs or gallops. No JVD. Distal pulses intact.  Abdominal: Soft, nontender, nondistended, no rebound or guarding, normal bowel sounds.  Neurology: AOx3, no focal neuro defects, RUVALCABA x 4.  Extremities: No pitting edema, + Peripheral pulses.      LABS:                          5.8    9.67  )-----------( 37       ( 05 Nov 2023 06:58 )             15.8     11-05    129<L>  |  99  |  21  ----------------------------<  98  3.1<L>   |  20<L>  |  0.83    Ca    7.1<L>      05 Nov 2023 06:55  Phos  1.7     11-05  Mg     1.8     11-05    TPro  5.0<L>  /  Alb  2.3<L>  /  TBili  0.6  /  DBili  x   /  AST  46<H>  /  ALT  37  /  AlkPhos  152<H>  11-05    LIVER FUNCTIONS - ( 05 Nov 2023 06:55 )  Alb: 2.3 g/dL / Pro: 5.0 g/dL / ALK PHOS: 152 U/L / ALT: 37 U/L / AST: 46 U/L / GGT: x                   Urinalysis Basic - ( 05 Nov 2023 06:55 )    Color: x / Appearance: x / SG: x / pH: x  Gluc: 98 mg/dL / Ketone: x  / Bili: x / Urobili: x   Blood: x / Protein: x / Nitrite: x   Leuk Esterase: x / RBC: x / WBC x   Sq Epi: x / Non Sq Epi: x / Bacteria: x       PATIENT: DANUTA CALDERON, MRN: [id number]    CHIEF COMPLAINT: Patient is a 68y old  Female who presents with a chief complaint of Pyelonephritis (04 Nov 2023 20:48)      INTERVAL HISTORY/OVERNIGHT EVENTS: Hgb low to 5.8 overnight, requiring 1 U pRBC. Feeling SOB when sitting in chair, not on exertion. Headache waking patient up at night on the left side, feeling like a spasm. Occurring over past few days. No fever/chills, no lightheaded/dizziness, no back pain. No BMs, no hematuria, no bleeding.      MEDICATIONS:  MEDICATIONS  (STANDING):  cefTRIAXone   IVPB 1000 milliGRAM(s) IV Intermittent every 24 hours  chlorhexidine 4% Liquid 1 Application(s) Topical daily  escitalopram 20 milliGRAM(s) Oral daily  ferrous    sulfate 325 milliGRAM(s) Oral daily  folic acid 1 milliGRAM(s) Oral daily  heparin   Injectable 5000 Unit(s) SubCutaneous every 8 hours  levothyroxine 100 MICROGram(s) Oral daily  OXcarbazepine 300 milliGRAM(s) Oral two times a day  pancrelipase  (CREON 36,000 Lipase Units) 1 Capsule(s) Oral three times a day with meals  pantoprazole    Tablet 40 milliGRAM(s) Oral before breakfast    MEDICATIONS  (PRN):  acetaminophen     Tablet .. 650 milliGRAM(s) Oral every 6 hours PRN Temp greater or equal to 38C (100.4F), Mild Pain (1 - 3)      ALLERGIES: Allergies    phenytoin (Rash)  ibuprofen (Other)  Dilantin (Unknown)    Intolerances        OBJECTIVE:  ICU Vital Signs Last 24 Hrs  T(C): 36.7 (05 Nov 2023 06:16), Max: 36.7 (04 Nov 2023 15:23)  T(F): 98 (05 Nov 2023 06:16), Max: 98 (04 Nov 2023 15:23)  HR: 91 (05 Nov 2023 06:16) (91 - 103)  BP: 119/71 (05 Nov 2023 06:16) (119/71 - 134/75)  BP(mean): --  ABP: --  ABP(mean): --  RR: 18 (05 Nov 2023 06:16) (18 - 18)  SpO2: 96% (05 Nov 2023 06:16) (94% - 96%)    O2 Parameters below as of 05 Nov 2023 06:16  Patient On (Oxygen Delivery Method): room air            CAPILLARY BLOOD GLUCOSE        CAPILLARY BLOOD GLUCOSE        I&O's Summary    04 Nov 2023 08:01  -  05 Nov 2023 07:00  --------------------------------------------------------  IN: 325 mL / OUT: 1000 mL / NET: -675 mL      Daily     Daily     PHYSICAL EXAMINATION:  General: Comfortable, no acute distress, cooperative with exam.  Respiratory: CTAB, normal respiratory effort, no coughing, wheezes, crackles, or rales.  CV: RRR, S1S2, no murmurs, rubs or gallops.  Abdominal: Soft, nontender, nondistended, no rebound or guarding  Neurology: strength equal in bilateral UE, bilateral LE, CN grossly intact  Extremities: No pitting edema      LABS:                          5.8    9.67  )-----------( 37       ( 05 Nov 2023 06:58 )             15.8     11-05    129<L>  |  99  |  21  ----------------------------<  98  3.1<L>   |  20<L>  |  0.83    Ca    7.1<L>      05 Nov 2023 06:55  Phos  1.7     11-05  Mg     1.8     11-05    TPro  5.0<L>  /  Alb  2.3<L>  /  TBili  0.6  /  DBili  x   /  AST  46<H>  /  ALT  37  /  AlkPhos  152<H>  11-05    LIVER FUNCTIONS - ( 05 Nov 2023 06:55 )  Alb: 2.3 g/dL / Pro: 5.0 g/dL / ALK PHOS: 152 U/L / ALT: 37 U/L / AST: 46 U/L / GGT: x                   Urinalysis Basic - ( 05 Nov 2023 06:55 )    Color: x / Appearance: x / SG: x / pH: x  Gluc: 98 mg/dL / Ketone: x  / Bili: x / Urobili: x   Blood: x / Protein: x / Nitrite: x   Leuk Esterase: x / RBC: x / WBC x   Sq Epi: x / Non Sq Epi: x / Bacteria: x

## 2023-11-05 NOTE — PROGRESS NOTE ADULT - PROBLEM SELECTOR PLAN 6
Holding on home HTN meds given hypotension from sepsis.  - Hold HCTZ upon dc given hypoNa On gemcitabine and abraxane outpatient  - Hold on chemo inpatient iso infection. Chemo and myelosuppression likely contributing to thrombocytopenia and anemia  - Onc recs appreciated  - ISO pancreatic cancer with headache that is awakening patient at night, CT Head.

## 2023-11-05 NOTE — PROGRESS NOTE ADULT - PROBLEM SELECTOR PLAN 7
On levothyroxine at home  - Continue with home levothyroxine Holding on home HTN meds given hypotension from sepsis.  - Hold HCTZ upon dc given hypoNa

## 2023-11-05 NOTE — PROVIDER CONTACT NOTE (CRITICAL VALUE NOTIFICATION) - ACTION/TREATMENT ORDERED:
no change in treatment
no change in treatment
None at this time
blood work ordered, type and screen ordered, PRBC x1

## 2023-11-05 NOTE — PROGRESS NOTE ADULT - PROBLEM SELECTOR PLAN 8
DVT: heparin  Dispo: home when able  Diet: regular + pancrelipase On levothyroxine at home  - Continue with home levothyroxine

## 2023-11-05 NOTE — PROGRESS NOTE ADULT - NSPROGADDITIONALINFOA_GEN_ALL_CORE
1.  Normocytic Anemia        F/up post transfusion CBC        f/up hemolysis profile        HOLD Hep for now          2.  Headache        MOnitor mentation        CT of head   3.  Hyponatremia         monitor mentation         repeat Na and u studies

## 2023-11-05 NOTE — PROVIDER CONTACT NOTE (CRITICAL VALUE NOTIFICATION) - BACKGROUND
Pt admitted with tubolinterstitial nephritis
Admitted `with Tubulointerstitial nephritis  Pancreatic Ca

## 2023-11-05 NOTE — PROVIDER CONTACT NOTE (CRITICAL VALUE NOTIFICATION) - TEST AND RESULT REPORTED:
HB=5.8, Hct=15.8
HCT 21.0
blood culture from 11/2 prelim growth in aerobic and anaerobic bottles gram neg rods
HCT 20.3

## 2023-11-05 NOTE — PROGRESS NOTE ADULT - PROBLEM SELECTOR PLAN 4
Likely iso sepsis and hypotension as seen at OSH. Likely prerenal etiology leading to ATN, now increased from baseline (0,6)  - Now s/p 2L total. Slowly improving Cr  - With NAGMA. Unclear etiology of NAGMA, no diarrhea, possible RTA iso low potassium at OSH (3) and high urine pH  - Improving with continued IVF Likely hypovolemic hyponatremia given poor PO intake. Initially with low serum osm, and indeterminate urine sodium. However, after receiving 2L, now back at baseline Na (128)  - Maintenance fluids as needed. Will receive 1U blood today

## 2023-11-05 NOTE — PROGRESS NOTE ADULT - PROBLEM SELECTOR PLAN 2
Pyelo with CVA tenderness, recent urinary urgency, likely ascending infection iso immunosuppression from chemo.  - CTX for now, follow up sensitivities for po regimen for DC when able Likely multifactorial: myelosuppression iso infection and sepsis. Also with recent chemo and decrease in multiple cell lines: anemia and thrombocytopenia.  - 1U pRBC for anemia, f/u CBC  - Thrombocytopenia: holding heparin given concern for bleed. Also with headache, on AC, CT Head

## 2023-11-05 NOTE — PROGRESS NOTE ADULT - PROBLEM SELECTOR PLAN 3
Likely hypovolemic hyponatremia given poor PO intake. Initially with low serum osm, and indeterminate urine sodium. However, after receiving 2L, now back at baseline Na (128)  - Maintenance fluids as needed Pyelo with CVA tenderness, recent urinary urgency, likely ascending infection iso immunosuppression from chemo.  - CTX, E Coli pansensitive

## 2023-11-05 NOTE — PROVIDER CONTACT NOTE (CRITICAL VALUE NOTIFICATION) - SITUATION
HCT 21.0
HCT 20.3
blood culture from 11/2 prelim growth in aerobic and anaerobic bottles gram neg rods
Pt Hb=5.8, hct=15.8

## 2023-11-05 NOTE — PROGRESS NOTE ADULT - ASSESSMENT
Patient is a 68 year old F with hypertension, hypothyroidism, and  pancreatic cancer, currently on chemotherapy (last session 10/30) who presents to the emergency department for fever, weakness, fatigue, body aches, and lower back pain for 2 days after 2 weeks of urinary urgency prior to symptom onset.    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 Pt states taht she had been having urinary frequency and urgency but did not recognize that as sx of UTI  --outpatient peripheral cultures collected 11/2 c/w ecoli, resistant to bactrim but otherwise sensitive  --cultures collected inpatient also showed Ecoli  --on IV abx, ID consulted, on CTX, improving    Hyponatremia  --chronic, Na 128 at baseline,   -- Na today is 129     anemia/thrombocytopenia  --2/2 malignancy and treatment  --Hgb 8-9, platelets 170s at baseline  --on PO folic acid, and iron  --hg today is 5.8. would repeat to confirm. guiac studies   - - prbc to keep hg >7     Michi Spain MD  HematologyOncology   O: [phone]

## 2023-11-05 NOTE — PROGRESS NOTE ADULT - ATTENDING COMMENTS
68F with h/o pancreatic cancer since 2021, on chemotherapy (last session 10/30) presented to [facility] with fever, weakness, fatigue, body aches, and lower back pain for 2 days after 2 weeks of urinary urgency. Her SBP was in 80s and she was given 1L IVF with NS, IV zosyn and send to ED at  [facility]. Outpatient CT abd/pelvis showed evidence of Pyelonephritis.  In ED patient was found to have sepsis, 1L NS, 1gm IV vancomycin was given. CXR- clear, RVP neg, Na 125, Scr 1.66, WBC outpatient was normal.    1.  Normocytic Anemia / Bicytopenia --> most likely sec to chem and malignancy        F/up post transfusion CBC        f/up hemolysis profile        HOLD Hep for now        Monitor platelets          2.  Headache        MOnitor mentation        CT of head   3.  Hyponatremia         monitor mentation         repeat Na and u studies  4.   E coli Bacteremia due to acute pyelonephritis        Zosyn changed to Ceftriaxone          repeat Blood CX (11/4 ) is neg so far    HA--> get CTA Of head to r/o bleed     Dominga Duran   Hospitalist   available on TEAMS

## 2023-11-05 NOTE — PROGRESS NOTE ADULT - PROBLEM SELECTOR PLAN 1
Fever, tachycardia, with MARY ELLEN/hypotension. CT A/P outside records showing evidence of pyelonephritis in the upper poles. Still intermittently febrile. S/p 1 dose of zosyn, vanc. Immunosuppression from active chemo.  - bcx speciating with E coli. Switching from zosyn to CTX for pyelonephritis  - Ok to continue fluids. Hyponatremia likely iso hypovolemia. Fever, tachycardia, with MARY ELLEN/hypotension. CT A/P outside records showing evidence of pyelonephritis in the upper poles. S/p 1 dose of zosyn, vanc. Immunosuppression from active chemo. Now on CTX  - pansensitive E Coli - on CTX.  - Hyponatremia likely iso hypovolemia, but possibly mixed with baseline SIADH

## 2023-11-05 NOTE — PROGRESS NOTE ADULT - PROBLEM SELECTOR PLAN 5
On gemcitabine and abraxane outpatient  - Hold on chemo inpatient iso infection  - Onc recs appreciated Likely iso sepsis and hypotension as seen at OSH. Likely prerenal etiology leading to ATN, now back to baseline  - With NAGMA. Unclear etiology of NAGMA, no diarrhea, possible RTA iso low potassium at OSH (3) and high urine pH  - Improving

## 2023-11-05 NOTE — PROVIDER CONTACT NOTE (CRITICAL VALUE NOTIFICATION) - ASSESSMENT
pt is on Zosyn IV
pt awake alert oriented x4 no active bleeding noted
Pt stable. No complaints noted

## 2023-11-06 DIAGNOSIS — R74.8 ABNORMAL LEVELS OF OTHER SERUM ENZYMES: ICD-10-CM

## 2023-11-06 LAB
ALBUMIN SERPL ELPH-MCNC: 2.6 G/DL — LOW (ref 3.3–5)
ALBUMIN SERPL ELPH-MCNC: 2.6 G/DL — LOW (ref 3.3–5)
ALP SERPL-CCNC: 259 U/L — HIGH (ref 40–120)
ALP SERPL-CCNC: 259 U/L — HIGH (ref 40–120)
ALT FLD-CCNC: 111 U/L — HIGH (ref 10–45)
ALT FLD-CCNC: 111 U/L — HIGH (ref 10–45)
ANION GAP SERPL CALC-SCNC: 12 MMOL/L — SIGNIFICANT CHANGE UP (ref 5–17)
ANION GAP SERPL CALC-SCNC: 12 MMOL/L — SIGNIFICANT CHANGE UP (ref 5–17)
ANISOCYTOSIS BLD QL: SIGNIFICANT CHANGE UP
ANISOCYTOSIS BLD QL: SIGNIFICANT CHANGE UP
APPEARANCE UR: CLEAR — SIGNIFICANT CHANGE UP
AST SERPL-CCNC: 164 U/L — HIGH (ref 10–40)
AST SERPL-CCNC: 164 U/L — HIGH (ref 10–40)
BACTERIA # UR AUTO: NEGATIVE /HPF — SIGNIFICANT CHANGE UP
BILIRUB SERPL-MCNC: 0.9 MG/DL — SIGNIFICANT CHANGE UP (ref 0.2–1.2)
BILIRUB SERPL-MCNC: 0.9 MG/DL — SIGNIFICANT CHANGE UP (ref 0.2–1.2)
BILIRUB UR-MCNC: NEGATIVE — SIGNIFICANT CHANGE UP
BUN SERPL-MCNC: 17 MG/DL — SIGNIFICANT CHANGE UP (ref 7–23)
BUN SERPL-MCNC: 17 MG/DL — SIGNIFICANT CHANGE UP (ref 7–23)
BURR CELLS BLD QL SMEAR: PRESENT — SIGNIFICANT CHANGE UP
BURR CELLS BLD QL SMEAR: PRESENT — SIGNIFICANT CHANGE UP
CA-I BLD-SCNC: 1.1 MMOL/L — LOW (ref 1.15–1.33)
CA-I BLD-SCNC: 1.1 MMOL/L — LOW (ref 1.15–1.33)
CALCIUM SERPL-MCNC: 8 MG/DL — LOW (ref 8.4–10.5)
CALCIUM SERPL-MCNC: 8 MG/DL — LOW (ref 8.4–10.5)
CAST: 0 /LPF — SIGNIFICANT CHANGE UP (ref 0–4)
CHLORIDE SERPL-SCNC: 94 MMOL/L — LOW (ref 96–108)
CHLORIDE SERPL-SCNC: 94 MMOL/L — LOW (ref 96–108)
CO2 SERPL-SCNC: 21 MMOL/L — LOW (ref 22–31)
CO2 SERPL-SCNC: 21 MMOL/L — LOW (ref 22–31)
COLOR SPEC: YELLOW — SIGNIFICANT CHANGE UP
CREAT ?TM UR-MCNC: 15 MG/DL — SIGNIFICANT CHANGE UP
CREAT ?TM UR-MCNC: 15 MG/DL — SIGNIFICANT CHANGE UP
CREAT SERPL-MCNC: 0.83 MG/DL — SIGNIFICANT CHANGE UP (ref 0.5–1.3)
CREAT SERPL-MCNC: 0.83 MG/DL — SIGNIFICANT CHANGE UP (ref 0.5–1.3)
DACRYOCYTES BLD QL SMEAR: SLIGHT — SIGNIFICANT CHANGE UP
DACRYOCYTES BLD QL SMEAR: SLIGHT — SIGNIFICANT CHANGE UP
DIFF PNL FLD: ABNORMAL
EGFR: 77 ML/MIN/1.73M2 — SIGNIFICANT CHANGE UP
EGFR: 77 ML/MIN/1.73M2 — SIGNIFICANT CHANGE UP
GLUCOSE SERPL-MCNC: 99 MG/DL — SIGNIFICANT CHANGE UP (ref 70–99)
GLUCOSE SERPL-MCNC: 99 MG/DL — SIGNIFICANT CHANGE UP (ref 70–99)
GLUCOSE UR QL: NEGATIVE MG/DL — SIGNIFICANT CHANGE UP
HCT VFR BLD CALC: 24.3 % — LOW (ref 34.5–45)
HCT VFR BLD CALC: 24.3 % — LOW (ref 34.5–45)
HGB BLD-MCNC: 9 G/DL — LOW (ref 11.5–15.5)
HGB BLD-MCNC: 9 G/DL — LOW (ref 11.5–15.5)
KETONES UR-MCNC: NEGATIVE MG/DL — SIGNIFICANT CHANGE UP
LEUKOCYTE ESTERASE UR-ACNC: ABNORMAL
MACROCYTES BLD QL: SIGNIFICANT CHANGE UP
MACROCYTES BLD QL: SIGNIFICANT CHANGE UP
MAGNESIUM SERPL-MCNC: 1.7 MG/DL — SIGNIFICANT CHANGE UP (ref 1.6–2.6)
MAGNESIUM SERPL-MCNC: 1.7 MG/DL — SIGNIFICANT CHANGE UP (ref 1.6–2.6)
MANUAL SMEAR VERIFICATION: SIGNIFICANT CHANGE UP
MANUAL SMEAR VERIFICATION: SIGNIFICANT CHANGE UP
MCHC RBC-ENTMCNC: 32.6 PG — SIGNIFICANT CHANGE UP (ref 27–34)
MCHC RBC-ENTMCNC: 32.6 PG — SIGNIFICANT CHANGE UP (ref 27–34)
MCHC RBC-ENTMCNC: 37 GM/DL — HIGH (ref 32–36)
MCHC RBC-ENTMCNC: 37 GM/DL — HIGH (ref 32–36)
MCV RBC AUTO: 88 FL — SIGNIFICANT CHANGE UP (ref 80–100)
MCV RBC AUTO: 88 FL — SIGNIFICANT CHANGE UP (ref 80–100)
MICROCYTES BLD QL: SLIGHT — SIGNIFICANT CHANGE UP
MICROCYTES BLD QL: SLIGHT — SIGNIFICANT CHANGE UP
MYELOCYTES NFR BLD: 1.7 % — HIGH (ref 0–0)
MYELOCYTES NFR BLD: 1.7 % — HIGH (ref 0–0)
NEUTS BAND # BLD: 0.9 % — SIGNIFICANT CHANGE UP (ref 0–8)
NEUTS BAND # BLD: 0.9 % — SIGNIFICANT CHANGE UP (ref 0–8)
NITRITE UR-MCNC: NEGATIVE — SIGNIFICANT CHANGE UP
NRBC # BLD: 2 /100 WBCS — HIGH (ref 0–0)
NRBC # BLD: 2 /100 WBCS — HIGH (ref 0–0)
NRBC # BLD: 2 /100 — HIGH (ref 0–0)
NRBC # BLD: 2 /100 — HIGH (ref 0–0)
OB PNL STL: NEGATIVE — SIGNIFICANT CHANGE UP
OB PNL STL: NEGATIVE — SIGNIFICANT CHANGE UP
OSMOLALITY SERPL: 268 MOSMOL/KG — LOW (ref 280–301)
OSMOLALITY SERPL: 268 MOSMOL/KG — LOW (ref 280–301)
OSMOLALITY UR: 278 MOS/KG — LOW (ref 300–900)
OSMOLALITY UR: 278 MOS/KG — LOW (ref 300–900)
OVALOCYTES BLD QL SMEAR: SLIGHT — SIGNIFICANT CHANGE UP
OVALOCYTES BLD QL SMEAR: SLIGHT — SIGNIFICANT CHANGE UP
PH UR: 8 — SIGNIFICANT CHANGE UP (ref 5–8)
PHOSPHATE SERPL-MCNC: 1.9 MG/DL — LOW (ref 2.5–4.5)
PHOSPHATE SERPL-MCNC: 1.9 MG/DL — LOW (ref 2.5–4.5)
PLAT MORPH BLD: NORMAL — SIGNIFICANT CHANGE UP
PLAT MORPH BLD: NORMAL — SIGNIFICANT CHANGE UP
PLATELET # BLD AUTO: 21 K/UL — LOW (ref 150–400)
PLATELET # BLD AUTO: 21 K/UL — LOW (ref 150–400)
POIKILOCYTOSIS BLD QL AUTO: SLIGHT — SIGNIFICANT CHANGE UP
POIKILOCYTOSIS BLD QL AUTO: SLIGHT — SIGNIFICANT CHANGE UP
POLYCHROMASIA BLD QL SMEAR: SLIGHT — SIGNIFICANT CHANGE UP
POLYCHROMASIA BLD QL SMEAR: SLIGHT — SIGNIFICANT CHANGE UP
POTASSIUM SERPL-MCNC: 3.9 MMOL/L — SIGNIFICANT CHANGE UP (ref 3.5–5.3)
POTASSIUM SERPL-MCNC: 3.9 MMOL/L — SIGNIFICANT CHANGE UP (ref 3.5–5.3)
POTASSIUM SERPL-SCNC: 3.9 MMOL/L — SIGNIFICANT CHANGE UP (ref 3.5–5.3)
POTASSIUM SERPL-SCNC: 3.9 MMOL/L — SIGNIFICANT CHANGE UP (ref 3.5–5.3)
POTASSIUM UR-SCNC: 13 MMOL/L — SIGNIFICANT CHANGE UP
POTASSIUM UR-SCNC: 13 MMOL/L — SIGNIFICANT CHANGE UP
PROT ?TM UR-MCNC: 18 MG/DL — HIGH (ref 0–12)
PROT ?TM UR-MCNC: 18 MG/DL — HIGH (ref 0–12)
PROT SERPL-MCNC: 5.8 G/DL — LOW (ref 6–8.3)
PROT SERPL-MCNC: 5.8 G/DL — LOW (ref 6–8.3)
PROT UR-MCNC: SIGNIFICANT CHANGE UP MG/DL
PROT/CREAT UR-RTO: 1.2 RATIO — HIGH (ref 0–0.2)
PROT/CREAT UR-RTO: 1.2 RATIO — HIGH (ref 0–0.2)
RBC # BLD: 2.76 M/UL — LOW (ref 3.8–5.2)
RBC # BLD: 2.76 M/UL — LOW (ref 3.8–5.2)
RBC # FLD: 16.1 % — HIGH (ref 10.3–14.5)
RBC # FLD: 16.1 % — HIGH (ref 10.3–14.5)
RBC BLD AUTO: ABNORMAL
RBC BLD AUTO: ABNORMAL
RBC CASTS # UR COMP ASSIST: 3 /HPF — SIGNIFICANT CHANGE UP (ref 0–4)
RBC CASTS # UR COMP ASSIST: 3 /HPF — SIGNIFICANT CHANGE UP (ref 0–4)
RBC CASTS # UR COMP ASSIST: 4 /HPF — SIGNIFICANT CHANGE UP (ref 0–4)
RBC CASTS # UR COMP ASSIST: 4 /HPF — SIGNIFICANT CHANGE UP (ref 0–4)
REVIEW: SIGNIFICANT CHANGE UP
REVIEW: SIGNIFICANT CHANGE UP
SODIUM SERPL-SCNC: 127 MMOL/L — LOW (ref 135–145)
SODIUM SERPL-SCNC: 127 MMOL/L — LOW (ref 135–145)
SODIUM UR-SCNC: 100 MMOL/L — SIGNIFICANT CHANGE UP
SODIUM UR-SCNC: 100 MMOL/L — SIGNIFICANT CHANGE UP
SP GR SPEC: 1.01 — SIGNIFICANT CHANGE UP (ref 1–1.03)
SQUAMOUS # UR AUTO: 0 /HPF — SIGNIFICANT CHANGE UP (ref 0–5)
TARGETS BLD QL SMEAR: SLIGHT — SIGNIFICANT CHANGE UP
TARGETS BLD QL SMEAR: SLIGHT — SIGNIFICANT CHANGE UP
UROBILINOGEN FLD QL: 1 MG/DL — SIGNIFICANT CHANGE UP (ref 0.2–1)
UUN UR-MCNC: 199 MG/DL — SIGNIFICANT CHANGE UP
UUN UR-MCNC: 199 MG/DL — SIGNIFICANT CHANGE UP
WBC # BLD: 12.01 K/UL — HIGH (ref 3.8–10.5)
WBC # BLD: 12.01 K/UL — HIGH (ref 3.8–10.5)
WBC # FLD AUTO: 12.01 K/UL — HIGH (ref 3.8–10.5)
WBC # FLD AUTO: 12.01 K/UL — HIGH (ref 3.8–10.5)
WBC UR QL: 2 /HPF — SIGNIFICANT CHANGE UP (ref 0–5)

## 2023-11-06 PROCEDURE — 93975 VASCULAR STUDY: CPT | Mod: 26

## 2023-11-06 PROCEDURE — 99233 SBSQ HOSP IP/OBS HIGH 50: CPT | Mod: GC

## 2023-11-06 PROCEDURE — 76705 ECHO EXAM OF ABDOMEN: CPT | Mod: 26,59

## 2023-11-06 PROCEDURE — 99232 SBSQ HOSP IP/OBS MODERATE 35: CPT

## 2023-11-06 RX ORDER — CIPROFLOXACIN LACTATE 400MG/40ML
500 VIAL (ML) INTRAVENOUS EVERY 12 HOURS
Refills: 0 | Status: DISCONTINUED | OUTPATIENT
Start: 2023-11-06 | End: 2023-11-07

## 2023-11-06 RX ORDER — ONDANSETRON 8 MG/1
4 TABLET, FILM COATED ORAL ONCE
Refills: 0 | Status: COMPLETED | OUTPATIENT
Start: 2023-11-06 | End: 2023-11-06

## 2023-11-06 RX ORDER — CALCIUM GLUCONATE 100 MG/ML
1 VIAL (ML) INTRAVENOUS ONCE
Refills: 0 | Status: COMPLETED | OUTPATIENT
Start: 2023-11-06 | End: 2023-11-06

## 2023-11-06 RX ADMIN — OXCARBAZEPINE 300 MILLIGRAM(S): 300 TABLET, FILM COATED ORAL at 20:20

## 2023-11-06 RX ADMIN — Medication 1 CAPSULE(S): at 17:21

## 2023-11-06 RX ADMIN — Medication 1 CAPSULE(S): at 10:08

## 2023-11-06 RX ADMIN — Medication 500 MILLIGRAM(S): at 19:56

## 2023-11-06 RX ADMIN — Medication 100 MICROGRAM(S): at 06:02

## 2023-11-06 RX ADMIN — ONDANSETRON 4 MILLIGRAM(S): 8 TABLET, FILM COATED ORAL at 07:51

## 2023-11-06 RX ADMIN — ESCITALOPRAM OXALATE 20 MILLIGRAM(S): 10 TABLET, FILM COATED ORAL at 17:26

## 2023-11-06 RX ADMIN — Medication 1 MILLIGRAM(S): at 17:21

## 2023-11-06 RX ADMIN — OXCARBAZEPINE 300 MILLIGRAM(S): 300 TABLET, FILM COATED ORAL at 06:03

## 2023-11-06 RX ADMIN — CHLORHEXIDINE GLUCONATE 1 APPLICATION(S): 213 SOLUTION TOPICAL at 17:20

## 2023-11-06 RX ADMIN — PANTOPRAZOLE SODIUM 40 MILLIGRAM(S): 20 TABLET, DELAYED RELEASE ORAL at 06:02

## 2023-11-06 RX ADMIN — Medication 325 MILLIGRAM(S): at 17:20

## 2023-11-06 RX ADMIN — Medication 50 GRAM(S): at 07:51

## 2023-11-06 NOTE — DIETITIAN INITIAL EVALUATION ADULT - ENERGY INTAKE
Fair (50-75%) Currently in-house, pt stated her appetite and PO intake has been "getting a little better." Pt stated she still struggles getting herself to eat, does not have much of an appetite. Pt stated she had 2 episodes of vomiting this morning. RD observed pt's breakfast tray at bedside, ~50% of breakfast was eaten. Per flow sheets, pt noted to be eating 51-75% of meals, occasionally % of meals. RD provided pt with a menu to assist with food preferences and optimize PO intake. Pt stated she has been drinking both Ensures sent up daily to optimize protein-energy intake. Pt amenable to receiving 1 [employer] Standard Supplement daily and 1 Chocolate Ensure Plus High Protein supplement daily to optimize protein-energy intake, pt requesting to try alternative supplements/flavors.  Currently in-house, pt stated her appetite and PO intake has been "getting a little better." Pt stated she still struggles getting herself to eat, does not have much of an appetite. Pt stated she had 2 episodes of vomiting this morning. RD observed pt's breakfast tray at bedside, ~50% of breakfast was eaten. Per flow sheets, pt noted to be eating 51-75% of meals, occasionally % of meals. RD provided pt with a menu to assist with food preferences and optimize PO intake. Pt stated she has been drinking both Ensures sent up daily to optimize protein-energy intake. Pt amenable to receiving 1 Vanilla [employer] Standard Supplement daily and 1 Chocolate Ensure Enlive supplement daily to optimize protein-energy intake, pt requesting to try alternative supplements/flavors.

## 2023-11-06 NOTE — PROGRESS NOTE ADULT - PROBLEM SELECTOR PLAN 6
On gemcitabine and abraxane outpatient  - Hold on chemo inpatient iso infection. Chemo and myelosuppression likely contributing to thrombocytopenia and anemia  - Onc recs appreciated  - ISO pancreatic cancer with headache that is awakening patient at night, CT Head - mild small vessel and atrophic changes. acute rise in transaminase and ALP 11/6  - may be iso pancreatic mass vs CTX causing sludge/obstruction  - RUQUS  - trend liver enzymes acute rise in transaminase and ALP 11/6  - may be iso pancreatic mass vs MOST likely from CTX causing sludge/obstruction  - RUQUS  - trend liver enzymes  - might need to change ABX

## 2023-11-06 NOTE — DIETITIAN NUTRITION RISK NOTIFICATION - OTHER RISK FACTORS
Alert transmission received from [place] for a recorded pause episode. Pt has a MDT ILR implanted for cryptogenic stroke. Transmission shows a 3.1 sec pause on 11/8/22 @ 1:56 pm. Called pt to inquire about symptoms and activity at the time the episode. Left  for pt call back.         
Not applicable

## 2023-11-06 NOTE — PROGRESS NOTE ADULT - ASSESSMENT
Patient is a 68 year old F with hypertension, hypothyroidism, and  pancreatic cancer, currently on chemotherapy (last session 10/30) who presents to the emergency department for fever, weakness, fatigue, body aches, and lower back pain for 2 days after 2 weeks of urinary urgency prior to symptom onset.    Pancreatic ca  --under the care of Selena Hanna of [facility]   --Stage II PDIC (6/2012) w/ recurrence to abdominal wall (resected 1/20223)  --s/p RT 3/2022  --currently on systemic treatment w/ Gemcitabine/Nab-Paclitaxel LD 10/30  --no plan for treatment while inpatient and/or rehab  --ongoing care after discharge    sepsis  --2/2 pyelonephritis, noted on outpatient CT a/p done 11/2 (see above). Pt states that she had been having urinary frequency and urgency but did not recognize that as sx of UTI  --outpatient peripheral cultures and MP cultures collected 11/2 c/w ecoli, resistant to bactrim but otherwise sensitive  --cultures collected inpatient also showed Ecoli  --on IV abx, ID consulted, on CTX, improving  --ID recs for Port removal appreciated     Hyponatremia  --chronic, Na 128 at baseline,   --Na today is 129     anemia/thrombocytopenia  --2/2 malignancy and treatment  --Hgb 8-9, platelets 170s at baseline  --on PO folic acid, and iron  --transfuse for Hgb<7     Bailey Herrera NP  Hematology/ Oncology  [facility]  [phone] (office)  [phone] (alt office)  Evenings and weekends please call MD on call or office

## 2023-11-06 NOTE — DIETITIAN INITIAL EVALUATION ADULT - NS FNS REASON FOR WEIGHT CHANG
Pt endorsed weight loss after pancreatic cancer diagnosis in 2021, stated her UBW was ~122 pounds before diagnosis. Pt endorsed weighing ~103 pounds just before this current hospital admission.

## 2023-11-06 NOTE — PROGRESS NOTE ADULT - ASSESSMENT
68 year old F with hypertension, hypothyroidism, and  pancreatic cancer, currently on chemotherapy (last session 10/30) who presents to the emergency department for fever, weakness, fatigue, body aches, and lower back pain for 2 days after 2 weeks of urinary urgency prior to symptom onset  Fever, no leukocytosis  Dysuria  CT R stranding, suspected pyelonephritis  UCX  neg  BCX E coli R bactrim  UA+  CXR clear  LFT rise--possibly due to Ceftriaxone?  Overall, GNR bacteremia, Pyelonephritis, UCX+  - Cipro 500mg q 12 to complete 10 days from negative BCX  - DC Ceftriaxone  - F/U BCX  - Trend LFTs to normal, if due to cephalosporin, would improve after discontinuation; alternate causes per team    Signing off. Please call with further questions or change in status.    Ludin Gleason MD  Contact on TEAMS messaging from 9am - 5pm  From 5pm-9am, on weekends, or if no response call [phone]

## 2023-11-06 NOTE — PROGRESS NOTE ADULT - SUBJECTIVE AND OBJECTIVE BOX
Patient is a 68y old  Female who presents with a chief complaint of Pyelonephritis (2023 10:01)      SUBJECTIVE / OVERNIGHT EVENTS:  Patient with emesis this morning, large BM overnight, collected stool studies. Patient at this time denies fevers, chills, CP, SOB, diarrhea, dysuria. Patient seen and examined at bedside.    MEDICATIONS  (STANDING):  calcium gluconate IVPB 1 Gram(s) IV Intermittent once  cefTRIAXone   IVPB 1000 milliGRAM(s) IV Intermittent every 24 hours  chlorhexidine 4% Liquid 1 Application(s) Topical daily  escitalopram 20 milliGRAM(s) Oral daily  ferrous    sulfate 325 milliGRAM(s) Oral daily  folic acid 1 milliGRAM(s) Oral daily  levothyroxine 100 MICROGram(s) Oral daily  OXcarbazepine 300 milliGRAM(s) Oral two times a day  pancrelipase  (CREON 36,000 Lipase Units) 1 Capsule(s) Oral three times a day with meals  pantoprazole    Tablet 40 milliGRAM(s) Oral before breakfast    MEDICATIONS  (PRN):  acetaminophen     Tablet .. 650 milliGRAM(s) Oral every 6 hours PRN Temp greater or equal to 38C (100.4F), Mild Pain (1 - 3)      CAPILLARY BLOOD GLUCOSE        I&O's Summary    2023 07:01  -  2023 07:00  --------------------------------------------------------  IN: 350 mL / OUT: 3000 mL / NET: -2650 mL        PHYSICAL EXAM:  Vital Signs Last 24 Hrs  T(C): 37.2 (2023 23:56), Max: 37.8 (2023 22:19)  T(F): 98.9 (2023 23:56), Max: 100 (2023 22:19)  HR: 86 (2023 22:19) (83 - 99)  BP: 160/91 (2023 07:00) (126/78 - 161/83)  BP(mean): --  RR: 18 (2023 07:00) (18 - 18)  SpO2: 98% (2023 07:00) (95% - 98%)    Parameters below as of 2023 07:00  Patient On (Oxygen Delivery Method): room air        GENERAL: No acute distress, well-developed  HEAD:  Atraumatic, Normocephalic  EYES: EOMI, PERRLA, conjunctiva and sclera clear  NECK: Supple, no lymphadenopathy, no JVD  CHEST/LUNG: CTAB; No wheezes, rales, or rhonchi  HEART: Regular rate and rhythm; No murmurs, rubs, or gallops  ABDOMEN: Soft, non-tender, non-distended; normal bowel sounds, no organomegaly  EXTREMITIES:  2+ peripheral pulses b/l, No clubbing, cyanosis, or edema  NEUROLOGY: A&O x 3, no focal deficits  SKIN: No rashes or lesions    LABS:                        9.0    12.01 )-----------( 21       ( 2023 06:35 )             24.3     11-05    129<L>  |  96  |  19  ----------------------------<  147<H>  4.2   |  20<L>  |  0.81    Ca    7.6<L>      2023 16:14  Phos  2.9     11-05  Mg     1.8     11-05    TPro  5.0<L>  /  Alb  2.3<L>  /  TBili  0.6  /  DBili  x   /  AST  46<H>  /  ALT  37  /  AlkPhos  152<H>  11-05    PT/INR - ( 2023 08:44 )   PT: 10.9 sec;   INR: 0.99 ratio         PTT - ( 2023 08:44 )  PTT:25.5 sec      Urinalysis Basic - ( 2023 04:14 )    Color: Yellow / Appearance: Clear / S.008 / pH: x  Gluc: x / Ketone: Negative mg/dL  / Bili: Negative / Urobili: 1.0 mg/dL   Blood: x / Protein: Trace mg/dL / Nitrite: Negative   Leuk Esterase: Trace / RBC: 3 /HPF / WBC 2 /HPF   Sq Epi: x / Non Sq Epi: 0 /HPF / Bacteria: Negative /HPF        Culture - Blood (collected 2023 10:25)  Source: .Blood Blood-Peripheral  Preliminary Report (2023 14:02):    No growth at 24 hours    Culture - Blood (collected 2023 06:18)  Source: .Blood Blood-Peripheral  Preliminary Report (2023 11:01):    No growth at 24 hours        RADIOLOGY & ADDITIONAL TESTS:  Results Reviewed:   Imaging Personally Reviewed:  Electrocardiogram Personally Reviewed:    COORDINATION OF CARE:  Care Discussed with Consultants/Other Providers [Y/N]:  Prior or Outpatient Records Reviewed [Y/N]:   Patient is a 68y old  Female who presents with a chief complaint of Pyelonephritis (2023 10:01)      SUBJECTIVE / OVERNIGHT EVENTS:  Patient with emesis this morning, large BM overnight, collected stool studies. Patient at this time denies fevers, chills, CP, SOB, diarrhea, dysuria. Patient seen and examined at bedside.    MEDICATIONS  (STANDING):  calcium gluconate IVPB 1 Gram(s) IV Intermittent once  cefTRIAXone   IVPB 1000 milliGRAM(s) IV Intermittent every 24 hours  chlorhexidine 4% Liquid 1 Application(s) Topical daily  escitalopram 20 milliGRAM(s) Oral daily  ferrous    sulfate 325 milliGRAM(s) Oral daily  folic acid 1 milliGRAM(s) Oral daily  levothyroxine 100 MICROGram(s) Oral daily  OXcarbazepine 300 milliGRAM(s) Oral two times a day  pancrelipase  (CREON 36,000 Lipase Units) 1 Capsule(s) Oral three times a day with meals  pantoprazole    Tablet 40 milliGRAM(s) Oral before breakfast    MEDICATIONS  (PRN):  acetaminophen     Tablet .. 650 milliGRAM(s) Oral every 6 hours PRN Temp greater or equal to 38C (100.4F), Mild Pain (1 - 3)      CAPILLARY BLOOD GLUCOSE        I&O's Summary    2023 07:01  -  2023 07:00  --------------------------------------------------------  IN: 350 mL / OUT: 3000 mL / NET: -2650 mL        PHYSICAL EXAM:  Vital Signs Last 24 Hrs  T(C): 37.2 (2023 23:56), Max: 37.8 (2023 22:19)  T(F): 98.9 (2023 23:56), Max: 100 (2023 22:19)  HR: 86 (2023 22:19) (83 - 99)  BP: 160/91 (2023 07:00) (126/78 - 161/83)  BP(mean): --  RR: 18 (2023 07:00) (18 - 18)  SpO2: 98% (2023 07:00) (95% - 98%)    Parameters below as of 2023 07:00  Patient On (Oxygen Delivery Method): room air        GENERAL: No acute distress, well-developed  HEAD:  Atraumatic, Normocephalic  EYES: EOMI, PERRLA, conjunctiva and sclera clear  NECK: Supple, no lymphadenopathy, no JVD  CHEST/LUNG: CTAB; No wheezes, rales, or rhonchi  HEART: Regular rate and rhythm; No murmurs, rubs, or gallops  ABDOMEN: Soft, non-tender, non-distended; normal bowel sounds, no organomegaly  EXTREMITIES:  2+ peripheral pulses b/l, No clubbing, cyanosis, or edema  NEUROLOGY: A&O x 3, no focal deficits      LABS:                        9.0    12.01 )-----------( 21       ( 2023 06:35 )             24.3     11-05    129<L>  |  96  |  19  ----------------------------<  147<H>  4.2   |  20<L>  |  0.81    Ca    7.6<L>      2023 16:14  Phos  2.9     11-05  Mg     1.8     11-05    TPro  5.0<L>  /  Alb  2.3<L>  /  TBili  0.6  /  DBili  x   /  AST  46<H>  /  ALT  37  /  AlkPhos  152<H>  11-05    PT/INR - ( 2023 08:44 )   PT: 10.9 sec;   INR: 0.99 ratio         PTT - ( 2023 08:44 )  PTT:25.5 sec      Urinalysis Basic - ( 2023 04:14 )    Color: Yellow / Appearance: Clear / S.008 / pH: x  Gluc: x / Ketone: Negative mg/dL  / Bili: Negative / Urobili: 1.0 mg/dL   Blood: x / Protein: Trace mg/dL / Nitrite: Negative   Leuk Esterase: Trace / RBC: 3 /HPF / WBC 2 /HPF   Sq Epi: x / Non Sq Epi: 0 /HPF / Bacteria: Negative /HPF        Culture - Blood (collected 2023 10:25)  Source: .Blood Blood-Peripheral  Preliminary Report (2023 14:02):    No growth at 24 hours    Culture - Blood (collected 2023 06:18)  Source: .Blood Blood-Peripheral  Preliminary Report (2023 11:01):    No growth at 24 hours        RADIOLOGY & ADDITIONAL TESTS:  Results Reviewed:   Imaging Personally Reviewed:  Electrocardiogram Personally Reviewed:    COORDINATION OF CARE:  Care Discussed with Consultants/Other Providers [Y/N]:  Prior or Outpatient Records Reviewed [Y/N]:

## 2023-11-06 NOTE — PROGRESS NOTE ADULT - PROBLEM SELECTOR PLAN 4
Likely hypovolemic hyponatremia given poor PO intake. Initially with low serum osm, and indeterminate urine sodium. However, after receiving 2L, now back at baseline Na (128)  - Maintenance fluids as needed  - receive 1U blood 11/5  -> repeat urine studies 11/6 - Sade 100, Uosm 278 Likely hypovolemic hyponatremia given poor PO intake. Initially with low serum osm, and indeterminate urine sodium. However, after receiving 2L, now back at baseline Na (128)  - Maintenance fluids as needed - judicious use iso  - Hyponatremia likely chronic and iso hypovolemia, but possibly mixed with baseline SIADH  - receive 1U blood 11/5  -> repeat urine studies 11/6 - Sade 100, Uosm 278, c/f SIADH  -> f/u AM cortisol, thyroid studies CHronic Hyponatremia ( Present for >48 hours with no acute mental status change   Initially with low serum osm, and indeterminate urine sodium. However, after receiving 2L, now back at baseline Na (128)  -  with high urine Na and low urine Osm --> likely SIADH with drop in Uosm from IVF ---> will repeat urine Studies , Restrict Fluid and monitor Na .  WIll also get TSH and AM cortisol levels   - Hyponatremia but possibly mixed with baseline SIADH and pt has low albumin which can be contributing to hyponatremia   - receive 1U blood 11/5  -> repeat urine studies 11/6 - Sade 100, Uosm 278, c/f SIADH  -> f/u AM cortisol, thyroid studies

## 2023-11-06 NOTE — DIETITIAN INITIAL EVALUATION ADULT - PERTINENT LABORATORY DATA
11-06    127<L>  |  94<L>  |  17  ----------------------------<  99  3.9   |  21<L>  |  0.83    Ca    8.0<L>      06 Nov 2023 06:35  Phos  1.9     11-06  Mg     1.7     11-06    TPro  5.8<L>  /  Alb  2.6<L>  /  TBili  0.9  /  DBili  x   /  AST  164<H>  /  ALT  111<H>  /  AlkPhos  259<H>  11-06

## 2023-11-06 NOTE — DIETITIAN INITIAL EVALUATION ADULT - PERSON TAUGHT/METHOD
Educated on the importance of meeting adequate protein-energy needs in setting of chronic illness. Encouraged consumption of HBV foods and prioritizing protein foods first at meal times. Encouraged consumption of oral nutrition supplement to optimize protein-energy intake. Encouraged small, frequent meals, taking breaks when experiencing nausea or fatigue. Emphasized importance of consuming nutrient dense foods and snacks to optimize energy and protein intake. Pt aware RD remains available./verbal instruction/patient instructed

## 2023-11-06 NOTE — PROGRESS NOTE ADULT - PROBLEM SELECTOR PLAN 9
DVT: holding heparin iso thrombocytopenia  Dispo: home when able  Diet: regular + pancrelipase On levothyroxine at home  - Continue with home levothyroxine

## 2023-11-06 NOTE — DIETITIAN INITIAL EVALUATION ADULT - NSFNSADHERENCEPTAFT_GEN_A_CORE
Prior to admission, pt stated her appetite and PO intake has been poor for "a couple of days" in setting of her recent fever. Pt stated prior to the past couple of days, she typically had a fair appetite/intake. Pt stated she typically eats 2-3 smaller meals per day, endorses sometimes skipping lunch. Pt stated her intake has declined since pancreatic cancer diagnosis in 2021, states she eats about 50% of what she used to eat at baseline. Pt stated she does not follow any type of therapeutic diets at home.

## 2023-11-06 NOTE — DIETITIAN INITIAL EVALUATION ADULT - NS FNS DIET ORDER
Diet, Regular:   Supplement Feeding Modality:  Oral  Ensure Enlive Cans or Servings Per Day:  2       Frequency:  Two Times a day (11-03-23 @ 16:09) [Active]

## 2023-11-06 NOTE — PROGRESS NOTE ADULT - PROBLEM SELECTOR PLAN 7
Holding on home HTN meds given hypotension from sepsis.  - Hold HCTZ upon dc given hypoNa On gemcitabine and abraxane outpatient  - Hold on chemo inpatient iso infection. Chemo and myelosuppression likely contributing to thrombocytopenia and anemia  - Onc recs appreciated  - ISO pancreatic cancer with headache that is awakening patient at night, CT Head - mild small vessel and atrophic changes.

## 2023-11-06 NOTE — DIETITIAN INITIAL EVALUATION ADULT - ORAL INTAKE PTA/DIET HISTORY
Nutrition assessment completed at bedside. Pt reported poor appetite and PO intake prior to admission for "days" in setting of recent fever and lack of appetite. Pt endorsed unintentional weight loss over the past year, unsure of amount. Stated she used to weigh ~122 pounds in 2021 before pancreatic cancer diagnosis, states she is currently ~103 pounds (recently before admission). Confirmed no known food allergies.

## 2023-11-06 NOTE — PROGRESS NOTE ADULT - ATTENDING COMMENTS
68F with h/o pancreatic cancer since 2021, on chemotherapy (last session 10/30) [place] to [facility] with fever, weakness, fatigue, body aches, and lower back pain for 2 days after 2 weeks of urinary urgency. Her SBP was in 80s and she was given 1L IVF with NS, IV zosyn and send to ED at  [facility]. Outpatient CT abd/pelvis showed evidence of Pyelonephritis ( done [facility] .  In ED patient was found to have sepsis, 1L NS, 1gm IV vancomycin was given. CXR- clear, RVP neg, Na 125, Scr 1.66, WBC outpatient was normal.     1.  Transaminitis         NEW and worsening with a wide differential including drug induced , infection, obstruction or ischemic         Likely from Ceftriaxone , will perform work up and will discuss with ID about changing ABX to a different agent and monitor         Cont to monitor LFT   2.  Hyponatremia --->Chronic Hypoosmolar HypoNa with high urine Na and Low UOsm---> Multifactorial with baseline SIADH and drop in Osmo             post IVF --> will repeat urine Na , fluid restriction, monitor mentation and monitor Na ( mentation is stable )     3.  Normocytic Anemia / Bicytopenia --> most likely sec to chem and malignancy and ABX         stable H/H post transfusion          Stable hemolysis profile           HOLD Hep for now and if no bleed will discuss with Hem about restarting once able as she is high risk for Thrombosis           Monitor platelets         4.   E coli Bacteremia due to acute pyelo--> now with increased LFT         repeat Blood CX (11/4 ) is neg so far    Discussed with team 7 , rest as per above       Dominga Duran   Hospitalist   available on TEAMS 68F with h/o pancreatic cancer since 2021, on chemotherapy (last session 10/30) presented to [facility] with fever, weakness, fatigue, body aches, and lower back pain for 2 days after 2 weeks of urinary urgency. Her SBP was in 80s and she was given 1L IVF with NS, IV zosyn and send to ED at  [facility]. Outpatient CT abd/pelvis showed evidence of Pyelonephritis ( done [facility] .  In ED patient was found to have sepsis, 1L NS, 1gm IV vancomycin was given. CXR- clear, RVP neg, Na 125, Scr 1.66, WBC outpatient was normal.     1.  Transaminitis / cholestasis pattern          s/p US of abd with no gross lesions in the liver , mild CBD dilatation and mild intrahepatic dilation and small Gallbladder sludge with no stone        and distended gallbladder with no pericholecystic fluid           S/P  Ceftriaxone--> was changed by ID to CIpro and now noted with decreased AST/ALT but ALk phos still high--> will get GGT ( again could be          sec to Ceftriaxone          Cont to monitor LFT , will get GGT and will get MRCP tonight and f/up results   2.   MARY ELLEN ( mild but present )         willl start IVF , repeat urine studies, hold Losartan , hold Protonix, bladder scan , change CIpro to Ertapenem and will get ID follow up in AM        repeat BMP in AM         f/up PO4 today   3.  Hyponatremia --->Chronic Hypoosmolar HypoNa with high urine Na and Low UOsm---> Multifactorial with baseline SIADH and drop in Osmo             post IVF --> will repeat urine Na , fluid restriction, monitor mentation and monitor Na ( mentation is stable )     4.  Normocytic Anemia / Bicytopenia --> most likely sec to chem and malignancy and ABX         stable H/H post transfusion          Stable hemolysis profile           HOLD Hep for now and if no bleed will discuss with Hem about restarting once able as she is high risk for Thrombosis           Monitor platelets         5.   E coli Bacteremia due to acute pyelo--> now with increased LFT         repeat Blood CX (11/4 ) is neg so far    Discussed with team 7 , rest as per above   Discuss with hem/onc team   Awaiting on hem onc about the need for Port removal       Dominga Duran   Hospitalist   available on TEAMS

## 2023-11-06 NOTE — PROGRESS NOTE ADULT - SUBJECTIVE AND OBJECTIVE BOX
CC: F/U for bacteremia    Saw/spoke to patient. No fevers, no chills. No new complaints.    Allergies  phenytoin (Rash)  ibuprofen (Other)  Dilantin (Unknown)    ANTIMICROBIALS:  cefTRIAXone   IVPB 1000 every 24 hours    PE:    Vital Signs Last 24 Hrs  T(C): 37.2 (2023 23:56), Max: 37.8 (2023 22:19)  T(F): 98.9 (2023 23:56), Max: 100 (2023 22:19)  HR: 91 (2023 09:55) (83 - 98)  BP: 129/85 (2023 09:55) (126/78 - 160/91)  RR: 18 (2023 07:35) (18 - 18)  SpO2: 94% (2023 07:35) (94% - 98%)    Gen: AOx3, NAD, non-toxic  CV: Nontachycardic  Resp: Breathing comfortably, RA  Abd: Soft, nontender  IV/Skin: No thrombophlebitis    LABS:                        9.0    12.01 )-----------( 21       ( 2023 06:35 )             24.3     11-06    127<L>  |  94<L>  |  17  ----------------------------<  99  3.9   |  21<L>  |  0.83    Ca    8.0<L>      2023 06:35  Phos  1.9     11-  Mg     1.7     -    TPro  5.8<L>  /  Alb  2.6<L>  /  TBili  0.9  /  DBili  x   /  AST  164<H>  /  ALT  111<H>  /  AlkPhos  259<H>  11-    Urinalysis Basic - ( 2023 10:28 )    Color: Yellow / Appearance: Clear / S.008 / pH: x  Gluc: x / Ketone: Negative mg/dL  / Bili: Negative / Urobili: 1.0 mg/dL   Blood: x / Protein: Trace mg/dL / Nitrite: Negative   Leuk Esterase: Small / RBC: 4 /HPF / WBC 2 /HPF   Sq Epi: x / Non Sq Epi: 0 /HPF / Bacteria: Negative /HPF    MICROBIOLOGY:    .Blood Blood-Peripheral  23   No growth at 24 hours  --  --    .Blood Blood-Peripheral  23   No growth at 48 Hours  --  --    Clean Catch Clean Catch (Midstream)  23   <10,000 CFU/mL Normal Urogenital Mary  --  --    .Blood Blood-Peripheral  23   Growth in aerobic and anaerobic bottles: Escherichia coli  Direct identification is available within approximately 3-5  hours either by Blood Panel Multiplexed PCR or Direct  MALDI-TOF. Details: [email or web address]  --  Blood Culture PCR  Escherichia coli    .Blood Blood-Peripheral  23   Growth in aerobic and anaerobic bottles: Escherichia coli  See previous culture  [id number]  --    Growth in aerobic and anaerobic bottles: Gram Negative Rods    RADIOLOGY:    11/3 XR:    IMPRESSION:  Right chest port with distal tip in the SVC.    Clear lungs.

## 2023-11-06 NOTE — PROGRESS NOTE ADULT - PROBLEM SELECTOR PLAN 2
Likely multifactorial: myelosuppression iso infection and sepsis. Also with recent chemo and decrease in multiple cell lines: anemia and thrombocytopenia.  - 1U pRBC for acute drop in Hgb 11/5 anemia, f/u CBC  - Thrombocytopenia: holding heparin given concern for bleed.   - Also with headache, on AC - CT Head  - Mild small vessel and atrophic changes. Likely multifactorial: myelosuppression iso infection and sepsis. Also with recent chemo and decrease in multiple cell lines: anemia and thrombocytopenia.  - 1U pRBC for acute drop in Hgb 11/5 anemia - Hgb wnl   -> Thrombocytopenia: holding heparin given concern for bleed.   -> Also with headache, on AC - CT Head  - Mild small vessel and atrophic changes. Likely multifactorial: myelosuppression iso infection and sepsis. Also with recent chemo and decrease in multiple cell lines: anemia and thrombocytopenia.  - 1U pRBC for acute drop in Hgb 11/5 anemia - Hgb wnl   -> Thrombocytopenia: holding heparin given concern for bleed, monitor for bleed   -> Also with headache, on AC - CT Head  - Mild small vessel and atrophic changes.

## 2023-11-06 NOTE — PROGRESS NOTE ADULT - PROBLEM SELECTOR PLAN 3
Pyelo with CVA tenderness, recent urinary urgency, likely ascending infection iso immunosuppression from chemo.  - CTX, E Coli pansensitive

## 2023-11-06 NOTE — DIETITIAN INITIAL EVALUATION ADULT - PERTINENT MEDS FT
MEDICATIONS  (STANDING):  calcium carbonate 1250 mG  + Vitamin D (OsCal 500 + D) 1 Tablet(s) Oral daily  chlorhexidine 4% Liquid 1 Application(s) Topical daily  ciprofloxacin     Tablet 500 milliGRAM(s) Oral every 12 hours  escitalopram 20 milliGRAM(s) Oral daily  ferrous    sulfate 325 milliGRAM(s) Oral daily  folic acid 1 milliGRAM(s) Oral daily  levothyroxine 100 MICROGram(s) Oral daily  OXcarbazepine 300 milliGRAM(s) Oral two times a day  pancrelipase  (CREON 36,000 Lipase Units) 1 Capsule(s) Oral three times a day with meals  pantoprazole    Tablet 40 milliGRAM(s) Oral before breakfast    MEDICATIONS  (PRN):  acetaminophen     Tablet .. 650 milliGRAM(s) Oral every 6 hours PRN Temp greater or equal to 38C (100.4F), Mild Pain (1 - 3)

## 2023-11-06 NOTE — PROGRESS NOTE ADULT - PROBLEM SELECTOR PLAN 1
Fever, tachycardia, with MARY ELLEN/hypotension. CT A/P outside records showing evidence of pyelonephritis in the upper poles. S/p 1 dose of zosyn, vanc. Immunosuppression from active chemo. Now on CTX  - pansensitive E Coli - on CTX.  - Hyponatremia likely chronic and iso hypovolemia, but possibly mixed with baseline SIADH Fever, tachycardia, with MARY ELLEN/hypotension. CT A/P outside records showing evidence of pyelonephritis in the upper poles. S/p 1 dose of zosyn, vanc. Immunosuppression from active chemo. Now on CTX  - pansensitive E Coli - on CTX. Resolving   Fever, tachycardia, with MARY ELLEN/hypotension. CT A/P outside records  ( done 11/2 --> in [facility]) showing evidence of Rt pyelonephritis in the upper poles. S/p 1 dose of zosyn, vanc. Immunosuppression from active chemo. Now on CTX  - pansensitive E Coli - on CTX ( goal of 7-10 days of treatment )

## 2023-11-06 NOTE — PROGRESS NOTE ADULT - PROBLEM SELECTOR PLAN 5
Likely iso sepsis and hypotension as seen at OSH. Likely prerenal etiology leading to ATN, now back to baseline  - With NAGMA. Unclear etiology of NAGMA, no diarrhea, possible RTA iso low potassium at OSH (3) and high urine pH  - Improving Likely iso sepsis and hypotension as seen at OSH. Likely prerenal etiology leading to ATN, now back to baseline  - With NAGMA. Unclear etiology of NAGMA, no diarrhea, possible RTA iso low potassium at OSH (3) and high urine pH  - IMPROVED

## 2023-11-06 NOTE — DIETITIAN INITIAL EVALUATION ADULT - ADD RECOMMEND
1) Recommend continue current diet as tolerated: Regular. Defer diet texture/consistency to Speech Language Pathologist/Medical Team.   2) Recommend adding [employer] Standard 1.0 Oral Nutrition Supplement 1x/day and Ensure Plus High Protein Chocolate 1x/day to optimize protein-energy intake and per patient preference.   3) Recommend continue current micronutrient supplementation unless contraindicated: Ferrous Sulfate and Folic Acid. Consider adding multivitamin daily for micronutrient coverage unless contraindicated.   4) Monitor and encourage adequate PO intake, obtain food preferences and honor as able.  5) Monitor electrolytes, replete as needed. Monitor potassium, magnesium and phosphorous.   6) Monitor nutritional intake, tolerance to diet prescription, weights, labs, and skin integrity.   7) Malnutrition alert placed in chart.  1) Recommend continue current diet as tolerated: Regular. Defer diet texture/consistency to Speech Language Pathologist/Medical Team.   2) Recommend adding [employer] Standard 1.0 Oral Nutrition Supplement 1x/day and Ensure Enlive Chocolate 1x/day to optimize protein-energy intake and per patient preference.   3) Recommend continue current micronutrient supplementation unless contraindicated: Ferrous Sulfate and Folic Acid. Consider adding multivitamin daily for micronutrient coverage unless contraindicated.   4) Monitor and encourage adequate PO intake, obtain food preferences and honor as able.  5) Monitor electrolytes, replete as needed. Monitor potassium, magnesium and phosphorous.   6) Monitor nutritional intake, tolerance to diet prescription, weights, labs, and skin integrity.   7) Malnutrition alert placed in chart.

## 2023-11-06 NOTE — DIETITIAN INITIAL EVALUATION ADULT - SIGNS/SYMPTOMS
as evidenced by pt meeting <75% EER > 1 month, moderate muscle/fat loss.  as evidenced by pt with pancreatic cancer.

## 2023-11-06 NOTE — PROGRESS NOTE ADULT - SUBJECTIVE AND OBJECTIVE BOX
Patient is a 68y old  Female who presents with a chief complaint of Tubulointerstitial nephritis.  Per chart, "68F hx of HTN pancreatic cancer on chemo (Gemcitabine/abraxane last dose on 10/30) with port in R chest wall here for sepsis 2/2 pyelonephritis complicated by MARY ELLEN and hyponatremia."  (06 Nov 2023 14:26)    Patient seen and examined.  Pt appears comfortable resting in bed.  No acute overnight events/changes      MEDICATIONS  (STANDING):  calcium carbonate 1250 mG  + Vitamin D (OsCal 500 + D) 1 Tablet(s) Oral daily  chlorhexidine 4% Liquid 1 Application(s) Topical daily  ciprofloxacin     Tablet 500 milliGRAM(s) Oral every 12 hours  escitalopram 20 milliGRAM(s) Oral daily  ferrous    sulfate 325 milliGRAM(s) Oral daily  folic acid 1 milliGRAM(s) Oral daily  levothyroxine 100 MICROGram(s) Oral daily  OXcarbazepine 300 milliGRAM(s) Oral two times a day  pancrelipase  (CREON 36,000 Lipase Units) 1 Capsule(s) Oral three times a day with meals  pantoprazole    Tablet 40 milliGRAM(s) Oral before breakfast    MEDICATIONS  (PRN):  acetaminophen     Tablet .. 650 milliGRAM(s) Oral every 6 hours PRN Temp greater or equal to 38C (100.4F), Mild Pain (1 - 3)      Vital Signs Last 24 Hrs  T(C): 36.9 (06 Nov 2023 13:54), Max: 37.8 (05 Nov 2023 22:19)  T(F): 98.4 (06 Nov 2023 13:54), Max: 100 (05 Nov 2023 22:19)  HR: 89 (06 Nov 2023 13:54) (86 - 98)  BP: 138/88 (06 Nov 2023 13:54) (129/85 - 160/91)  BP(mean): --  RR: 18 (06 Nov 2023 13:54) (18 - 18)  SpO2: 96% (06 Nov 2023 13:54) (94% - 98%)    Parameters below as of 06 Nov 2023 13:54  Patient On (Oxygen Delivery Method): room air        PE  Awake, alert  Anicteric, MMM  RRR  CTAB  Abd soft, NT, ND  No c/c/e                            9.0    12.01 )-----------( 21       ( 06 Nov 2023 06:35 )             24.3       11-06    127<L>  |  94<L>  |  17  ----------------------------<  99  3.9   |  21<L>  |  0.83    Ca    8.0<L>      06 Nov 2023 06:35  Phos  1.9     11-06  Mg     1.7     11-06    TPro  5.8<L>  /  Alb  2.6<L>  /  TBili  0.9  /  DBili  x   /  AST  164<H>  /  ALT  111<H>  /  AlkPhos  259<H>  11-06

## 2023-11-06 NOTE — DIETITIAN INITIAL EVALUATION ADULT - ETIOLOGY
related to increased physiological demand for nutrients  related to inability to meet sufficient protein-energy needs in setting of chronic illness

## 2023-11-06 NOTE — DIETITIAN INITIAL EVALUATION ADULT - REASON FOR ADMISSION
Tubulointerstitial nephritis.  Per chart, "68F hx of HTN pancreatic cancer on chemo (Gemcitabine/abraxane last dose on 10/30) with port in R chest wall here for sepsis 2/2 pyelonephritis complicated by MARY ELLEN and hyponatremia."    
Patient/Caregiver provided printed discharge information.

## 2023-11-06 NOTE — DIETITIAN INITIAL EVALUATION ADULT - NSFNSGIIOFT_GEN_A_CORE
Pt reported nausea and vomiting, stated she had 2 episodes of vomiting this morning.  - Note: Pt is ordered for Protonix (per orders).  Pt reported constipation at baseline, stated she will not go for 3 days at home and then have 2 BMs in one day.   Pt stated she had 2 bowel movements this morning. Pt stated last BM before today was the Wednesday before admission (11/1).  - Bowel Regimen: Pt is not currently ordered for a bowel regimen (per orders).   - Last Documented BM: 3x 11/6 (per flow sheets).   Continue to monitor bowel movements and bowel movement regularity.

## 2023-11-06 NOTE — DIETITIAN INITIAL EVALUATION ADULT - OTHER INFO
Weights:  - UBW (per patient): 103 pounds (recently before admission)  - Dosing Weight (per chart): 119 pounds (11/2) (bed) (question accuracy)  - Daily Weights (per flow sheets): No daily weights documented to assess.   - Bed Scale Weight (taken by RD): RD unable to obtain bed scale weight as pt was seated in a chair at the time of RD interview.   - Per [facility] HIE: 102.15 pounds (11/2), 129.6 pounds (08/15) (question accuracy), 103 pounds (08/03), 110 pounds (03/23), 115.12 pounds (01/31)  Note: Pt has possibly experienced a ~13 pound weight loss x 10 months, 11.2% wt loss. RD to continue to monitor weights and trends as able.     Pt with Bicytopenia (per chart).   - Per chart, "Likely multifactorial: myelosuppression iso infection and sepsis. Also with recent chemo and decrease in multiple cell lines: anemia and thrombocytopenia."  - Pt is s/p 1U pRBC on 11/5 (per chart).     Pt with hyponatremia (per chart).  - Sodium low today, 11/6, 127 mmol/L (per chart).    Pt with abnormal liver enzymes (per chart).  - AST/ALT elevated today, 11/6 (per chart).   - Per chart, "may be iso pancreatic mass vs MOST likely from CTX causing sludge/obstruction."    Pt with pancreatic cancer (per chart).   - Per chart, "On gemcitabine and abraxane outpatient."  - Ordered for pancrelipase 3x/day with meals (per orders).     Orders:  - Ordered for antibiotics in-house (per orders).  - Ordered for calcium carbonate 1250 mG + Vitamin D (per orders).  - Ordered for Folic Acid and Ferrous Sulfate (per orders).   - Ordered for Synthroid (per orders).    Labs:  Calcium low today, 11/6, 8.0 mg/dL (per chart).  Phosphorous low today, 1.9 mg/dL (11/6) (per chart).  Chloride low today, 11/6, 94 mmol/L (per chart).

## 2023-11-06 NOTE — DIETITIAN INITIAL EVALUATION ADULT - OTHER CALCULATIONS
Estimated protein-energy needs calculated using IBW of 105 pounds with consideration for weight discrepancies, increased needs secondary to pancreatic cancer, and malnutrition.   Defer fluid calculations to the medical team.

## 2023-11-07 LAB
ALBUMIN SERPL ELPH-MCNC: 3 G/DL — LOW (ref 3.3–5)
ALBUMIN SERPL ELPH-MCNC: 3 G/DL — LOW (ref 3.3–5)
ALP SERPL-CCNC: 307 U/L — HIGH (ref 40–120)
ALP SERPL-CCNC: 307 U/L — HIGH (ref 40–120)
ALT FLD-CCNC: 93 U/L — HIGH (ref 10–45)
ALT FLD-CCNC: 93 U/L — HIGH (ref 10–45)
ANION GAP SERPL CALC-SCNC: 13 MMOL/L — SIGNIFICANT CHANGE UP (ref 5–17)
ANION GAP SERPL CALC-SCNC: 13 MMOL/L — SIGNIFICANT CHANGE UP (ref 5–17)
APTT BLD: 27.2 SEC — SIGNIFICANT CHANGE UP (ref 24.5–35.6)
APTT BLD: 27.2 SEC — SIGNIFICANT CHANGE UP (ref 24.5–35.6)
AST SERPL-CCNC: 81 U/L — HIGH (ref 10–40)
AST SERPL-CCNC: 81 U/L — HIGH (ref 10–40)
BILIRUB SERPL-MCNC: 1.1 MG/DL — SIGNIFICANT CHANGE UP (ref 0.2–1.2)
BILIRUB SERPL-MCNC: 1.1 MG/DL — SIGNIFICANT CHANGE UP (ref 0.2–1.2)
BUN SERPL-MCNC: 22 MG/DL — SIGNIFICANT CHANGE UP (ref 7–23)
BUN SERPL-MCNC: 22 MG/DL — SIGNIFICANT CHANGE UP (ref 7–23)
CALCIUM SERPL-MCNC: 8.3 MG/DL — LOW (ref 8.4–10.5)
CALCIUM SERPL-MCNC: 8.3 MG/DL — LOW (ref 8.4–10.5)
CHLORIDE SERPL-SCNC: 95 MMOL/L — LOW (ref 96–108)
CHLORIDE SERPL-SCNC: 95 MMOL/L — LOW (ref 96–108)
CO2 SERPL-SCNC: 20 MMOL/L — LOW (ref 22–31)
CO2 SERPL-SCNC: 20 MMOL/L — LOW (ref 22–31)
CORTIS AM PEAK SERPL-MCNC: 24.3 UG/DL — HIGH (ref 6–18.4)
CORTIS AM PEAK SERPL-MCNC: 24.3 UG/DL — HIGH (ref 6–18.4)
CREAT SERPL-MCNC: 1.24 MG/DL — SIGNIFICANT CHANGE UP (ref 0.5–1.3)
CREAT SERPL-MCNC: 1.24 MG/DL — SIGNIFICANT CHANGE UP (ref 0.5–1.3)
EGFR: 47 ML/MIN/1.73M2 — LOW
EGFR: 47 ML/MIN/1.73M2 — LOW
GLUCOSE SERPL-MCNC: 123 MG/DL — HIGH (ref 70–99)
GLUCOSE SERPL-MCNC: 123 MG/DL — HIGH (ref 70–99)
HAV IGM SER-ACNC: SIGNIFICANT CHANGE UP
HAV IGM SER-ACNC: SIGNIFICANT CHANGE UP
HBV CORE AB SER-ACNC: SIGNIFICANT CHANGE UP
HBV CORE AB SER-ACNC: SIGNIFICANT CHANGE UP
HBV CORE IGM SER-ACNC: SIGNIFICANT CHANGE UP
HBV CORE IGM SER-ACNC: SIGNIFICANT CHANGE UP
HBV SURFACE AB SER-ACNC: SIGNIFICANT CHANGE UP
HBV SURFACE AB SER-ACNC: SIGNIFICANT CHANGE UP
HBV SURFACE AG SER-ACNC: SIGNIFICANT CHANGE UP
HBV SURFACE AG SER-ACNC: SIGNIFICANT CHANGE UP
HCT VFR BLD CALC: 25.9 % — LOW (ref 34.5–45)
HCT VFR BLD CALC: 25.9 % — LOW (ref 34.5–45)
HCV AB S/CO SERPL IA: 0.09 S/CO — SIGNIFICANT CHANGE UP (ref 0–0.99)
HCV AB S/CO SERPL IA: 0.09 S/CO — SIGNIFICANT CHANGE UP (ref 0–0.99)
HCV AB SERPL-IMP: SIGNIFICANT CHANGE UP
HCV AB SERPL-IMP: SIGNIFICANT CHANGE UP
HGB BLD-MCNC: 9.3 G/DL — LOW (ref 11.5–15.5)
HGB BLD-MCNC: 9.3 G/DL — LOW (ref 11.5–15.5)
INR BLD: 1.1 RATIO — SIGNIFICANT CHANGE UP (ref 0.85–1.18)
INR BLD: 1.1 RATIO — SIGNIFICANT CHANGE UP (ref 0.85–1.18)
MCHC RBC-ENTMCNC: 32.3 PG — SIGNIFICANT CHANGE UP (ref 27–34)
MCHC RBC-ENTMCNC: 32.3 PG — SIGNIFICANT CHANGE UP (ref 27–34)
MCHC RBC-ENTMCNC: 35.9 GM/DL — SIGNIFICANT CHANGE UP (ref 32–36)
MCHC RBC-ENTMCNC: 35.9 GM/DL — SIGNIFICANT CHANGE UP (ref 32–36)
MCV RBC AUTO: 89.9 FL — SIGNIFICANT CHANGE UP (ref 80–100)
MCV RBC AUTO: 89.9 FL — SIGNIFICANT CHANGE UP (ref 80–100)
NRBC # BLD: 2 /100 WBCS — HIGH (ref 0–0)
NRBC # BLD: 2 /100 WBCS — HIGH (ref 0–0)
OSMOLALITY UR: 292 MOS/KG — LOW (ref 300–900)
OSMOLALITY UR: 292 MOS/KG — LOW (ref 300–900)
PLATELET # BLD AUTO: 31 K/UL — LOW (ref 150–400)
PLATELET # BLD AUTO: 31 K/UL — LOW (ref 150–400)
POTASSIUM SERPL-MCNC: 4.4 MMOL/L — SIGNIFICANT CHANGE UP (ref 3.5–5.3)
POTASSIUM SERPL-MCNC: 4.4 MMOL/L — SIGNIFICANT CHANGE UP (ref 3.5–5.3)
POTASSIUM SERPL-SCNC: 4.4 MMOL/L — SIGNIFICANT CHANGE UP (ref 3.5–5.3)
POTASSIUM SERPL-SCNC: 4.4 MMOL/L — SIGNIFICANT CHANGE UP (ref 3.5–5.3)
PROT SERPL-MCNC: 6.2 G/DL — SIGNIFICANT CHANGE UP (ref 6–8.3)
PROT SERPL-MCNC: 6.2 G/DL — SIGNIFICANT CHANGE UP (ref 6–8.3)
PROTHROM AB SERPL-ACNC: 11.5 SEC — SIGNIFICANT CHANGE UP (ref 9.5–13)
PROTHROM AB SERPL-ACNC: 11.5 SEC — SIGNIFICANT CHANGE UP (ref 9.5–13)
RBC # BLD: 2.88 M/UL — LOW (ref 3.8–5.2)
RBC # BLD: 2.88 M/UL — LOW (ref 3.8–5.2)
RBC # FLD: 16.1 % — HIGH (ref 10.3–14.5)
RBC # FLD: 16.1 % — HIGH (ref 10.3–14.5)
SODIUM SERPL-SCNC: 128 MMOL/L — LOW (ref 135–145)
SODIUM SERPL-SCNC: 128 MMOL/L — LOW (ref 135–145)
SODIUM UR-SCNC: 91 MMOL/L — SIGNIFICANT CHANGE UP
SODIUM UR-SCNC: 91 MMOL/L — SIGNIFICANT CHANGE UP
T4 FREE SERPL-MCNC: 0.7 NG/DL — LOW (ref 0.9–1.8)
T4 FREE SERPL-MCNC: 0.7 NG/DL — LOW (ref 0.9–1.8)
TSH SERPL-MCNC: 7.78 UIU/ML — HIGH (ref 0.27–4.2)
TSH SERPL-MCNC: 7.78 UIU/ML — HIGH (ref 0.27–4.2)
WBC # BLD: 17.35 K/UL — HIGH (ref 3.8–10.5)
WBC # BLD: 17.35 K/UL — HIGH (ref 3.8–10.5)
WBC # FLD AUTO: 17.35 K/UL — HIGH (ref 3.8–10.5)
WBC # FLD AUTO: 17.35 K/UL — HIGH (ref 3.8–10.5)

## 2023-11-07 PROCEDURE — 74183 MRI ABD W/O CNTR FLWD CNTR: CPT | Mod: 26

## 2023-11-07 PROCEDURE — 99233 SBSQ HOSP IP/OBS HIGH 50: CPT | Mod: GC

## 2023-11-07 RX ORDER — SODIUM CHLORIDE 9 MG/ML
1000 INJECTION, SOLUTION INTRAVENOUS
Refills: 0 | Status: DISCONTINUED | OUTPATIENT
Start: 2023-11-07 | End: 2023-11-08

## 2023-11-07 RX ORDER — CIPROFLOXACIN LACTATE 400MG/40ML
400 VIAL (ML) INTRAVENOUS EVERY 12 HOURS
Refills: 0 | Status: DISCONTINUED | OUTPATIENT
Start: 2023-11-07 | End: 2023-11-07

## 2023-11-07 RX ORDER — FAMOTIDINE 10 MG/ML
20 INJECTION INTRAVENOUS
Refills: 0 | Status: DISCONTINUED | OUTPATIENT
Start: 2023-11-08 | End: 2023-11-11

## 2023-11-07 RX ORDER — LOSARTAN POTASSIUM 100 MG/1
50 TABLET, FILM COATED ORAL DAILY
Refills: 0 | Status: DISCONTINUED | OUTPATIENT
Start: 2023-11-07 | End: 2023-11-07

## 2023-11-07 RX ORDER — ERTAPENEM SODIUM 1 G/1
1000 INJECTION, POWDER, LYOPHILIZED, FOR SOLUTION INTRAMUSCULAR; INTRAVENOUS EVERY 24 HOURS
Refills: 0 | Status: DISCONTINUED | OUTPATIENT
Start: 2023-11-08 | End: 2023-11-10

## 2023-11-07 RX ORDER — AMLODIPINE BESYLATE 2.5 MG/1
5 TABLET ORAL DAILY
Refills: 0 | Status: DISCONTINUED | OUTPATIENT
Start: 2023-11-07 | End: 2023-11-11

## 2023-11-07 RX ADMIN — Medication 200 MILLIGRAM(S): at 17:10

## 2023-11-07 RX ADMIN — Medication 325 MILLIGRAM(S): at 12:07

## 2023-11-07 RX ADMIN — Medication 1 CAPSULE(S): at 09:00

## 2023-11-07 RX ADMIN — Medication 650 MILLIGRAM(S): at 09:00

## 2023-11-07 RX ADMIN — OXCARBAZEPINE 300 MILLIGRAM(S): 300 TABLET, FILM COATED ORAL at 05:59

## 2023-11-07 RX ADMIN — Medication 0.5 MILLIGRAM(S): at 19:42

## 2023-11-07 RX ADMIN — Medication 500 MILLIGRAM(S): at 05:59

## 2023-11-07 RX ADMIN — PANTOPRAZOLE SODIUM 40 MILLIGRAM(S): 20 TABLET, DELAYED RELEASE ORAL at 06:04

## 2023-11-07 RX ADMIN — CHLORHEXIDINE GLUCONATE 1 APPLICATION(S): 213 SOLUTION TOPICAL at 12:07

## 2023-11-07 RX ADMIN — Medication 1 CAPSULE(S): at 12:06

## 2023-11-07 RX ADMIN — Medication 1 TABLET(S): at 12:07

## 2023-11-07 RX ADMIN — OXCARBAZEPINE 300 MILLIGRAM(S): 300 TABLET, FILM COATED ORAL at 17:10

## 2023-11-07 RX ADMIN — ESCITALOPRAM OXALATE 20 MILLIGRAM(S): 10 TABLET, FILM COATED ORAL at 12:07

## 2023-11-07 RX ADMIN — Medication 1 MILLIGRAM(S): at 12:07

## 2023-11-07 RX ADMIN — Medication 100 MICROGRAM(S): at 06:00

## 2023-11-07 RX ADMIN — Medication 650 MILLIGRAM(S): at 09:30

## 2023-11-07 RX ADMIN — SODIUM CHLORIDE 80 MILLILITER(S): 9 INJECTION, SOLUTION INTRAVENOUS at 19:35

## 2023-11-07 NOTE — PROGRESS NOTE ADULT - SUBJECTIVE AND OBJECTIVE BOX
Patient is a 68y old  Female who presents with a chief complaint of Pyelonephritis (07 Nov 2023 07:23)    Patient blu and examined. Patient resting comfortably in recliner, no complaints reported    MEDICATIONS  (STANDING):  amLODIPine   Tablet 5 milliGRAM(s) Oral daily  calcium carbonate 1250 mG  + Vitamin D (OsCal 500 + D) 1 Tablet(s) Oral daily  chlorhexidine 4% Liquid 1 Application(s) Topical daily  ciprofloxacin     Tablet 500 milliGRAM(s) Oral every 12 hours  escitalopram 20 milliGRAM(s) Oral daily  ferrous    sulfate 325 milliGRAM(s) Oral daily  folic acid 1 milliGRAM(s) Oral daily  levothyroxine 100 MICROGram(s) Oral daily  losartan 50 milliGRAM(s) Oral daily  OXcarbazepine 300 milliGRAM(s) Oral two times a day  pancrelipase  (CREON 36,000 Lipase Units) 1 Capsule(s) Oral three times a day with meals  pantoprazole    Tablet 40 milliGRAM(s) Oral before breakfast    MEDICATIONS  (PRN):  acetaminophen     Tablet .. 650 milliGRAM(s) Oral every 6 hours PRN Temp greater or equal to 38C (100.4F), Mild Pain (1 - 3)      Vital Signs Last 24 Hrs  T(C): 36.7 (07 Nov 2023 12:22), Max: 37.5 (06 Nov 2023 21:03)  T(F): 98 (07 Nov 2023 12:22), Max: 99.5 (06 Nov 2023 21:03)  HR: 81 (07 Nov 2023 12:22) (81 - 91)  BP: 112/75 (07 Nov 2023 12:22) (112/75 - 158/89)  BP(mean): --  RR: 19 (07 Nov 2023 12:22) (18 - 19)  SpO2: 98% (07 Nov 2023 12:22) (96% - 98%)    Parameters below as of 07 Nov 2023 12:22  Patient On (Oxygen Delivery Method): room air        PE  Awake, alert  Anicteric, MMM  RRR  CTAB  Abd soft, NT, ND  No c/c/e  No rash grossly  FROM                          9.3    17.35 )-----------( 31       ( 07 Nov 2023 12:04 )             25.9       11-07    128<L>  |  95<L>  |  22  ----------------------------<  123<H>  4.4   |  20<L>  |  1.24    Ca    8.3<L>      07 Nov 2023 12:04  Phos  1.9     11-06  Mg     1.7     11-06    TPro  6.2  /  Alb  3.0<L>  /  TBili  1.1  /  DBili  x   /  AST  81<H>  /  ALT  93<H>  /  AlkPhos  307<H>  11-07

## 2023-11-07 NOTE — PROGRESS NOTE ADULT - PROBLEM SELECTOR PLAN 3
Pyelo with CVA tenderness, recent urinary urgency, likely ascending infection iso immunosuppression from chemo.  -> cipro, E Coli pansensitive

## 2023-11-07 NOTE — PROGRESS NOTE ADULT - PROBLEM SELECTOR PLAN 8
Holding on home HTN meds given hypotension from sepsis.  - Hold HCTZ upon dc given hypoNa Holding on home HTN meds given hypotension from sepsis.  - restarting amlodipine 5 and losartan 50 (1/2 dose) for elevated BP  - Hold HCTZ upon dc given hypoNa

## 2023-11-07 NOTE — PROGRESS NOTE ADULT - PROBLEM SELECTOR PLAN 1
Resolving   Fever, tachycardia, with MARY ELLEN/hypotension. CT A/P outside records  ( done 11/2 --> in [facility]) showing evidence of Rt pyelonephritis in the upper poles. S/p 1 dose of zosyn, vanc. Immunosuppression from active chemo. Now on CTX  - pansensitive E Coli - was on CTX ( goal of 7-10 days of treatment )  -> switched to ciproflaxacin per ID

## 2023-11-07 NOTE — PROGRESS NOTE ADULT - ASSESSMENT
Patient is a 68 year old F with hypertension, hypothyroidism, and  pancreatic cancer, currently on chemotherapy (last session 10/30) who presents to the emergency department for fever, weakness, fatigue, body aches, and lower back pain for 2 days after 2 weeks of urinary urgency prior to symptom onset.    Pancreatic ca  --under the care of Selena Hanna of [facility]   --Stage II PDIC (6/2012) w/ recurrence to abdominal wall (resected 1/20223)  --s/p RT 3/2022  --currently on systemic treatment w/ Gemcitabine/Nab-Paclitaxel LD 10/30  --no plan for treatment while inpatient and/or rehab  --ongoing care after discharge    sepsis  --2/2 pyelonephritis, noted on outpatient CT a/p done 11/2 (see above). Pt states that she had been having urinary frequency and urgency but did not recognize that as sx of UTI  --outpatient peripheral cultures and MP cultures collected 11/2 c/w ecoli, resistant to bactrim but otherwise sensitive  --cultures collected inpatient also showed Ecoli  --on IV abx, ID consulted, on CTX, improving  --ID recs for Port removal appreciated     Hyponatremia  --chronic, Na 128 at baseline,   --Na today is 129     anemia/thrombocytopenia  --2/2 malignancy and treatment  --Hgb 8-9, platelets 170s at baseline  --on PO folic acid, and iron  --transfuse for Hgb<7     transaminitis  --abd US w/   1. Mild intra and extra hepatic biliary dilatation, the common bile duct measuring up to 1.1 cm. The common bile duct is dilated to the level of   the pancreatic head. The pancreas is not well assessed on this examination. 2. The gallbladder is markedly distended. No stones are identified. Possible small amount of intraluminal sludge. No wall thickening,   pericholecystic fluid, or sonographic Pierre sign. No evidence for acute cholecystitis.  3. The hepatic vasculature is patent, as detailed above. The main portal vein, left portal vein and right portal vein are patent with hepatopedal flow although with diminished peak systolic velocities, the velocity within the main portal vein is 16.2 cm/s.   --MRCP pending  --possibly 2/2 abx     Bailey Herrera NP  Hematology/ Oncology  [facility]  [phone] (office)  [phone] (alt office)  Evenings and weekends please call MD on call or office     Patient is a 68 year old F with hypertension, hypothyroidism, and  pancreatic cancer, currently on chemotherapy (last session 10/30) who presents to the emergency department for fever, weakness, fatigue, body aches, and lower back pain for 2 days after 2 weeks of urinary urgency prior to symptom onset.    Pancreatic ca  --under the care of Selena Hanna of [facility]   --Stage II PDIC (6/2012) w/ recurrence to abdominal wall (resected 1/20223)  --s/p RT 3/2022  --currently on systemic treatment w/ Gemcitabine/Nab-Paclitaxel LD 10/30  --no plan for treatment while inpatient and/or rehab  --ongoing care after discharge    sepsis  --2/2 pyelonephritis, noted on outpatient CT a/p done 11/2 (see above). Pt states that she had been having urinary frequency and urgency but did not recognize that as sx of UTI  --outpatient peripheral cultures and MP cultures collected 11/2 c/w ecoli, resistant to bactrim but otherwise sensitive  --cultures collected inpatient also showed Ecoli  --on IV abx, ID consulted, on CTX, improving    Hyponatremia  --chronic, Na 128 at baseline,   --Na today is 129     anemia/thrombocytopenia  --2/2 malignancy and treatment  --Hgb 8-9, platelets 170s at baseline  --on PO folic acid, and iron  --transfuse for Hgb<7     transaminitis  --abd US w/   1. Mild intra and extra hepatic biliary dilatation, the common bile duct measuring up to 1.1 cm. The common bile duct is dilated to the level of   the pancreatic head. The pancreas is not well assessed on this examination. 2. The gallbladder is markedly distended. No stones are identified. Possible small amount of intraluminal sludge. No wall thickening,   pericholecystic fluid, or sonographic Pierre sign. No evidence for acute cholecystitis.  3. The hepatic vasculature is patent, as detailed above. The main portal vein, left portal vein and right portal vein are patent with hepatopedal flow although with diminished peak systolic velocities, the velocity within the main portal vein is 16.2 cm/s.   --MRCP pending  --possibly 2/2 abx     Bailey Herrera NP  Hematology/ Oncology  [facility]  [phone] (office)  [phone] (alt office)  Evenings and weekends please call MD on call or office

## 2023-11-07 NOTE — PROGRESS NOTE ADULT - PROBLEM SELECTOR PLAN 6
Conscious sedation used  Versed 2 mg  Fentanyl 100 mcg  Start time 1238 pm  End time 1326 pm acute rise in transaminase and ALP 11/6  - may be iso pancreatic mass vs MOST likely from CTX causing sludge/obstruction  - f/u hepatitis panel  -> RUQUS - showing sludge, 11mm ductal dilation - will need MRCP  -> trend liver enzymes  -> CTX changed to cipro due to rise in LFT

## 2023-11-07 NOTE — PROGRESS NOTE ADULT - ATTENDING COMMENTS
68F with h/o pancreatic cancer since 2021, on chemotherapy (last session 10/30) [place] [facility] [place], weakness, fatigue, body aches, and lower back pain for 2 days after 2 weeks of urinary urgency. Her SBP was in 80s and she was given 1L IVF with NS, IV zosyn and send to ED at  [facility]. Outpatient CT abd/pelvis showed evidence of Pyelonephritis ( done [facility] .  In ED patient was found to have sepsis, 1L NS, 1gm IV vancomycin was given. CXR- clear, RVP neg, Na 125, Scr 1.66, WBC outpatient was normal.     1.  Transaminitis / cholestasis pattern          s/p US of abd with no gross lesions in the liver , mild CBD dilatation and mild intrahepatic dilation and small Gallbladder sludge with no stone        and distended gallbladder with no pericholecystic fluid           S/P  Ceftriaxone--> was changed by ID to CIpro and now noted with decreased AST/ALT but ALk phos still high--> will get GGT ( again could be          sec to Ceftriaxone          Cont to monitor LFT , will get GGT and will get MRCP tonight and f/up results   2.   MARY ELLEN ( mild but present )         willl start IVF , repeat urine studies, hold Losartan , hold Protonix, bladder scan , change CIpro to Ertapenem and will get ID follow up in AM        repeat BMP in AM         f/up PO4 today   3.  Hyponatremia --->Chronic Hypoosmolar HypoNa with high urine Na and Low UOsm---> Multifactorial with baseline SIADH and drop in Osmo             post IVF --> will repeat urine Na , fluid restriction, monitor mentation and monitor Na ( mentation is stable )     4.  Normocytic Anemia / Bicytopenia --> most likely sec to chem and malignancy and ABX         stable H/H post transfusion          Stable hemolysis profile           HOLD Hep for now and if no bleed will discuss with Hem about restarting once able as she is high risk for Thrombosis           Monitor platelets         5.   E coli Bacteremia due to acute pyelo--> now with increased LFT         repeat Blood CX (11/4 ) is neg so far    Discussed with team 7 , rest as per above   Discuss with hem/onc team   Awaiting on hem onc about the need for Port removal       Dominga Duran   Hospitalist   available on TEAMS

## 2023-11-07 NOTE — PROGRESS NOTE ADULT - PROBLEM SELECTOR PLAN 2
Likely multifactorial: myelosuppression iso infection and sepsis. Also with recent chemo and decrease in multiple cell lines: anemia and thrombocytopenia.  - 1U pRBC for acute drop in Hgb 11/5 anemia - Hgb wnl   -> Thrombocytopenia: holding heparin given concern for bleed, monitor for bleed   - Also with headache, on AC - CT Head  - Mild small vessel and atrophic changes.

## 2023-11-07 NOTE — PROGRESS NOTE ADULT - PROBLEM SELECTOR PLAN 7
On gemcitabine and abraxane outpatient  - Hold on chemo inpatient iso infection. Chemo and myelosuppression likely contributing to thrombocytopenia and anemia  - Onc recs appreciated  - ISO pancreatic cancer with headache that is awakening patient at night, CT Head - mild small vessel and atrophic changes.  -> f/u ID regarding port removal On gemcitabine and abraxane outpatient  - Hold on chemo inpatient iso infection. Chemo and myelosuppression likely contributing to thrombocytopenia and anemia  - Onc recs appreciated  - ISO pancreatic cancer with headache that is awakening patient at night, CT Head - mild small vessel and atrophic changes.  -> f/u heme, ID regarding port removal

## 2023-11-07 NOTE — PROGRESS NOTE ADULT - SUBJECTIVE AND OBJECTIVE BOX
Patient is a 68y old  Female who presents with a chief complaint of Pyelonephritis (2023 15:24)      SUBJECTIVE / OVERNIGHT EVENTS:  No acute events overnight. Patient at this time denies fevers, chills, CP, SOB, nausea, vomiting, diarrhea, constipation, dysuria. Patient seen and examined at bedside.    MEDICATIONS  (STANDING):  calcium carbonate 1250 mG  + Vitamin D (OsCal 500 + D) 1 Tablet(s) Oral daily  chlorhexidine 4% Liquid 1 Application(s) Topical daily  ciprofloxacin     Tablet 500 milliGRAM(s) Oral every 12 hours  escitalopram 20 milliGRAM(s) Oral daily  ferrous    sulfate 325 milliGRAM(s) Oral daily  folic acid 1 milliGRAM(s) Oral daily  levothyroxine 100 MICROGram(s) Oral daily  OXcarbazepine 300 milliGRAM(s) Oral two times a day  pancrelipase  (CREON 36,000 Lipase Units) 1 Capsule(s) Oral three times a day with meals  pantoprazole    Tablet 40 milliGRAM(s) Oral before breakfast    MEDICATIONS  (PRN):  acetaminophen     Tablet .. 650 milliGRAM(s) Oral every 6 hours PRN Temp greater or equal to 38C (100.4F), Mild Pain (1 - 3)      CAPILLARY BLOOD GLUCOSE        I&O's Summary    2023 07:01  -  2023 07:00  --------------------------------------------------------  IN: 300 mL / OUT: 1503 mL / NET: -1203 mL        PHYSICAL EXAM:  Vital Signs Last 24 Hrs  T(C): 37.2 (2023 04:50), Max: 37.5 (2023 21:03)  T(F): 99 (2023 04:50), Max: 99.5 (2023 21:03)  HR: 88 (2023 04:50) (88 - 98)  BP: 141/89 (2023 04:50) (129/85 - 158/89)  BP(mean): --  RR: 18 (2023 04:50) (18 - 18)  SpO2: 96% (2023 04:50) (94% - 96%)    Parameters below as of 2023 04:50  Patient On (Oxygen Delivery Method): room air        GENERAL: No acute distress, well-developed  HEAD:  Atraumatic, Normocephalic  EYES: EOMI, PERRLA, conjunctiva and sclera clear  NECK: Supple, no lymphadenopathy, no JVD  CHEST/LUNG: CTAB; No wheezes, rales, or rhonchi  HEART: Regular rate and rhythm; No murmurs, rubs, or gallops  ABDOMEN: Soft, non-tender, non-distended; normal bowel sounds, no organomegaly  EXTREMITIES:  2+ peripheral pulses b/l, No clubbing, cyanosis, or edema  NEUROLOGY: A&O x 3, no focal deficits. General weakness.  SKIN: No rashes or lesions    LABS:                        9.0    12.01 )-----------( 21       ( 2023 06:35 )             24.3     11-06    127<L>  |  94<L>  |  17  ----------------------------<  99  3.9   |  21<L>  |  0.83    Ca    8.0<L>      2023 06:35  Phos  1.9     11-06  Mg     1.7     11-06    TPro  5.8<L>  /  Alb  2.6<L>  /  TBili  0.9  /  DBili  x   /  AST  164<H>  /  ALT  111<H>  /  AlkPhos  259<H>  11-06    PT/INR - ( 2023 06:18 )   PT: 11.5 sec;   INR: 1.10 ratio         PTT - ( 2023 06:18 )  PTT:27.2 sec      Urinalysis Basic - ( 2023 10:28 )    Color: Yellow / Appearance: Clear / S.008 / pH: x  Gluc: x / Ketone: Negative mg/dL  / Bili: Negative / Urobili: 1.0 mg/dL   Blood: x / Protein: Trace mg/dL / Nitrite: Negative   Leuk Esterase: Small / RBC: 4 /HPF / WBC 2 /HPF   Sq Epi: x / Non Sq Epi: 0 /HPF / Bacteria: Negative /HPF        Culture - Blood (collected 2023 10:25)  Source: .Blood Blood-Peripheral  Preliminary Report (2023 14:01):    No growth at 48 Hours        RADIOLOGY & ADDITIONAL TESTS:  Results Reviewed:   Imaging Personally Reviewed:  Electrocardiogram Personally Reviewed:    COORDINATION OF CARE:  Care Discussed with Consultants/Other Providers [Y/N]:  Prior or Outpatient Records Reviewed [Y/N]:   Patient is a 68y old  Female who presents with a chief complaint of Pyelonephritis (2023 15:24)      SUBJECTIVE / OVERNIGHT EVENTS:  No acute events overnight. Endorses some headache. Patient at this time denies fevers, chills, CP, SOB, nausea, vomiting, diarrhea, constipation, dysuria. Patient seen and examined at bedside.    MEDICATIONS  (STANDING):  calcium carbonate 1250 mG  + Vitamin D (OsCal 500 + D) 1 Tablet(s) Oral daily  chlorhexidine 4% Liquid 1 Application(s) Topical daily  ciprofloxacin     Tablet 500 milliGRAM(s) Oral every 12 hours  escitalopram 20 milliGRAM(s) Oral daily  ferrous    sulfate 325 milliGRAM(s) Oral daily  folic acid 1 milliGRAM(s) Oral daily  levothyroxine 100 MICROGram(s) Oral daily  OXcarbazepine 300 milliGRAM(s) Oral two times a day  pancrelipase  (CREON 36,000 Lipase Units) 1 Capsule(s) Oral three times a day with meals  pantoprazole    Tablet 40 milliGRAM(s) Oral before breakfast    MEDICATIONS  (PRN):  acetaminophen     Tablet .. 650 milliGRAM(s) Oral every 6 hours PRN Temp greater or equal to 38C (100.4F), Mild Pain (1 - 3)      CAPILLARY BLOOD GLUCOSE        I&O's Summary    2023 07:01  -  2023 07:00  --------------------------------------------------------  IN: 300 mL / OUT: 1503 mL / NET: -1203 mL        PHYSICAL EXAM:  Vital Signs Last 24 Hrs  T(C): 37.2 (2023 04:50), Max: 37.5 (2023 21:03)  T(F): 99 (2023 04:50), Max: 99.5 (2023 21:03)  HR: 88 (2023 04:50) (88 - 98)  BP: 141/89 (2023 04:50) (129/85 - 158/89)  BP(mean): --  RR: 18 (2023 04:50) (18 - 18)  SpO2: 96% (2023 04:50) (94% - 96%)    Parameters below as of 2023 04:50  Patient On (Oxygen Delivery Method): room air        GENERAL: No acute distress, well-developed  HEAD:  Atraumatic, Normocephalic  EYES: EOMI, PERRLA, conjunctiva and sclera clear  NECK: Supple, no lymphadenopathy, no JVD  CHEST/LUNG: CTAB; No wheezes, rales, or rhonchi  HEART: Regular rate and rhythm; No murmurs, rubs, or gallops  ABDOMEN: Soft, non-tender, non-distended; normal bowel sounds, no organomegaly  EXTREMITIES:  2+ peripheral pulses b/l, No clubbing, cyanosis, or edema  NEUROLOGY: A&O x 3, no focal deficits. General weakness.  SKIN: No rashes or lesions    LABS:                        9.0    12.01 )-----------( 21       ( 2023 06:35 )             24.3     11-06    127<L>  |  94<L>  |  17  ----------------------------<  99  3.9   |  21<L>  |  0.83    Ca    8.0<L>      2023 06:35  Phos  1.9     11-06  Mg     1.7     11-06    TPro  5.8<L>  /  Alb  2.6<L>  /  TBili  0.9  /  DBili  x   /  AST  164<H>  /  ALT  111<H>  /  AlkPhos  259<H>  11-06    PT/INR - ( 2023 06:18 )   PT: 11.5 sec;   INR: 1.10 ratio         PTT - ( 2023 06:18 )  PTT:27.2 sec      Urinalysis Basic - ( 2023 10:28 )    Color: Yellow / Appearance: Clear / S.008 / pH: x  Gluc: x / Ketone: Negative mg/dL  / Bili: Negative / Urobili: 1.0 mg/dL   Blood: x / Protein: Trace mg/dL / Nitrite: Negative   Leuk Esterase: Small / RBC: 4 /HPF / WBC 2 /HPF   Sq Epi: x / Non Sq Epi: 0 /HPF / Bacteria: Negative /HPF        Culture - Blood (collected 2023 10:25)  Source: .Blood Blood-Peripheral  Preliminary Report (2023 14:01):    No growth at 48 Hours        RADIOLOGY & ADDITIONAL TESTS:  Results Reviewed:   Imaging Personally Reviewed:  Electrocardiogram Personally Reviewed:    COORDINATION OF CARE:  Care Discussed with Consultants/Other Providers [Y/N]:  Prior or Outpatient Records Reviewed [Y/N]:

## 2023-11-07 NOTE — PROGRESS NOTE ADULT - PROBLEM SELECTOR PLAN 9
On levothyroxine at home  - Continue with home levothyroxine On levothyroxine at home  - Continue with home levothyroxine  - TSH elevated 7.78 above expected for age - may need adjustment to levothyroxine

## 2023-11-07 NOTE — PROGRESS NOTE ADULT - PROBLEM SELECTOR PLAN 5
Likely iso sepsis and hypotension as seen at OSH. Likely prerenal etiology leading to ATN, now back to baseline  - With NAGMA. Unclear etiology of NAGMA, no diarrhea, possible RTA iso low potassium at OSH (3) and high urine pH  - IMPROVED

## 2023-11-07 NOTE — PROGRESS NOTE ADULT - PROBLEM SELECTOR PLAN 4
CHronic Hyponatremia ( Present for >48 hours with no acute mental status change   Initially with low serum osm, and indeterminate urine sodium. However, after receiving 2L, now back at baseline Na (128)  - with high urine Na and low urine Osm --> likely SIADH with drop in Uosm from IVF ---> will repeat urine Studies   - Hyponatremia but possibly mixed with baseline SIADH and pt has low albumin which can be contributing to hyponatremia   - receive 1U blood 11/5  - repeat urine studies 11/6 - Sade 100, Uosm 278, c/f SIADH - restrict Fluid and monitor Na   -> f/u AM cortisol, thyroid studies CHronic Hyponatremia ( Present for >48 hours with no acute mental status change   Initially with low serum osm, and indeterminate urine sodium. However, after receiving 2L, now back at baseline Na (128)  - with high urine Na and low urine Osm --> likely SIADH with drop in Uosm from IVF ---> will repeat urine Studies   - Hyponatremia but possibly mixed with baseline SIADH and pt has low albumin which can be contributing to hyponatremia   - receive 1U blood 11/5  - repeat urine studies 11/6 - Sade 100, Uosm 278, c/f SIADH - restrict Fluid and monitor Na   -> AM cortisol (elevated), f/u thyroid studies CHronic Hyponatremia ( Present for >48 hours with no acute mental status change   Initially with low serum osm, and indeterminate urine sodium. However, after receiving 2L, now back at baseline Na (128)  - with high urine Na and low urine Osm --> likely SIADH with drop in Uosm from IVF ---> will repeat urine Studies   - Hyponatremia but possibly mixed with baseline SIADH and pt has low albumin which can be contributing to hyponatremia   - receive 1U blood 11/5  - repeat urine studies 11/6 - Sade 100, Uosm 278, c/f SIADH - restrict Fluid and monitor Na   -> AM cortisol (elevated)

## 2023-11-08 LAB
ALBUMIN SERPL ELPH-MCNC: 2.8 G/DL — LOW (ref 3.3–5)
ALBUMIN SERPL ELPH-MCNC: 2.8 G/DL — LOW (ref 3.3–5)
ALP SERPL-CCNC: 326 U/L — HIGH (ref 40–120)
ALP SERPL-CCNC: 326 U/L — HIGH (ref 40–120)
ALT FLD-CCNC: 72 U/L — HIGH (ref 10–45)
ALT FLD-CCNC: 72 U/L — HIGH (ref 10–45)
ANION GAP SERPL CALC-SCNC: 9 MMOL/L — SIGNIFICANT CHANGE UP (ref 5–17)
ANION GAP SERPL CALC-SCNC: 9 MMOL/L — SIGNIFICANT CHANGE UP (ref 5–17)
ANISOCYTOSIS BLD QL: SIGNIFICANT CHANGE UP
ANISOCYTOSIS BLD QL: SIGNIFICANT CHANGE UP
AST SERPL-CCNC: 55 U/L — HIGH (ref 10–40)
AST SERPL-CCNC: 55 U/L — HIGH (ref 10–40)
BASOPHILS # BLD AUTO: 0.15 K/UL — SIGNIFICANT CHANGE UP (ref 0–0.2)
BASOPHILS # BLD AUTO: 0.15 K/UL — SIGNIFICANT CHANGE UP (ref 0–0.2)
BASOPHILS NFR BLD AUTO: 0.9 % — SIGNIFICANT CHANGE UP (ref 0–2)
BASOPHILS NFR BLD AUTO: 0.9 % — SIGNIFICANT CHANGE UP (ref 0–2)
BILIRUB SERPL-MCNC: 1.2 MG/DL — SIGNIFICANT CHANGE UP (ref 0.2–1.2)
BILIRUB SERPL-MCNC: 1.2 MG/DL — SIGNIFICANT CHANGE UP (ref 0.2–1.2)
BLD GP AB SCN SERPL QL: NEGATIVE — SIGNIFICANT CHANGE UP
BLD GP AB SCN SERPL QL: NEGATIVE — SIGNIFICANT CHANGE UP
BUN SERPL-MCNC: 20 MG/DL — SIGNIFICANT CHANGE UP (ref 7–23)
BUN SERPL-MCNC: 20 MG/DL — SIGNIFICANT CHANGE UP (ref 7–23)
CALCIUM SERPL-MCNC: 8.4 MG/DL — SIGNIFICANT CHANGE UP (ref 8.4–10.5)
CALCIUM SERPL-MCNC: 8.4 MG/DL — SIGNIFICANT CHANGE UP (ref 8.4–10.5)
CHLORIDE SERPL-SCNC: 96 MMOL/L — SIGNIFICANT CHANGE UP (ref 96–108)
CHLORIDE SERPL-SCNC: 96 MMOL/L — SIGNIFICANT CHANGE UP (ref 96–108)
CO2 SERPL-SCNC: 22 MMOL/L — SIGNIFICANT CHANGE UP (ref 22–31)
CO2 SERPL-SCNC: 22 MMOL/L — SIGNIFICANT CHANGE UP (ref 22–31)
CREAT SERPL-MCNC: 0.95 MG/DL — SIGNIFICANT CHANGE UP (ref 0.5–1.3)
CREAT SERPL-MCNC: 0.95 MG/DL — SIGNIFICANT CHANGE UP (ref 0.5–1.3)
DACRYOCYTES BLD QL SMEAR: SLIGHT — SIGNIFICANT CHANGE UP
DACRYOCYTES BLD QL SMEAR: SLIGHT — SIGNIFICANT CHANGE UP
EGFR: 65 ML/MIN/1.73M2 — SIGNIFICANT CHANGE UP
EGFR: 65 ML/MIN/1.73M2 — SIGNIFICANT CHANGE UP
ELLIPTOCYTES BLD QL SMEAR: SLIGHT — SIGNIFICANT CHANGE UP
ELLIPTOCYTES BLD QL SMEAR: SLIGHT — SIGNIFICANT CHANGE UP
EOSINOPHIL # BLD AUTO: 0 K/UL — SIGNIFICANT CHANGE UP (ref 0–0.5)
EOSINOPHIL # BLD AUTO: 0 K/UL — SIGNIFICANT CHANGE UP (ref 0–0.5)
EOSINOPHIL NFR BLD AUTO: 0 % — SIGNIFICANT CHANGE UP (ref 0–6)
EOSINOPHIL NFR BLD AUTO: 0 % — SIGNIFICANT CHANGE UP (ref 0–6)
GGT SERPL-CCNC: 492 U/L — HIGH (ref 8–40)
GGT SERPL-CCNC: 492 U/L — HIGH (ref 8–40)
GIANT PLATELETS BLD QL SMEAR: PRESENT — SIGNIFICANT CHANGE UP
GIANT PLATELETS BLD QL SMEAR: PRESENT — SIGNIFICANT CHANGE UP
GLUCOSE SERPL-MCNC: 109 MG/DL — HIGH (ref 70–99)
GLUCOSE SERPL-MCNC: 109 MG/DL — HIGH (ref 70–99)
HCT VFR BLD CALC: 25.5 % — LOW (ref 34.5–45)
HCT VFR BLD CALC: 25.5 % — LOW (ref 34.5–45)
HGB BLD-MCNC: 9.1 G/DL — LOW (ref 11.5–15.5)
HGB BLD-MCNC: 9.1 G/DL — LOW (ref 11.5–15.5)
HOWELL-JOLLY BOD BLD QL SMEAR: PRESENT — SIGNIFICANT CHANGE UP
HOWELL-JOLLY BOD BLD QL SMEAR: PRESENT — SIGNIFICANT CHANGE UP
INR BLD: 1.07 RATIO — SIGNIFICANT CHANGE UP (ref 0.85–1.18)
INR BLD: 1.07 RATIO — SIGNIFICANT CHANGE UP (ref 0.85–1.18)
LYMPHOCYTES # BLD AUTO: 0.44 K/UL — LOW (ref 1–3.3)
LYMPHOCYTES # BLD AUTO: 0.44 K/UL — LOW (ref 1–3.3)
LYMPHOCYTES # BLD AUTO: 2.6 % — LOW (ref 13–44)
LYMPHOCYTES # BLD AUTO: 2.6 % — LOW (ref 13–44)
MACROCYTES BLD QL: SIGNIFICANT CHANGE UP
MACROCYTES BLD QL: SIGNIFICANT CHANGE UP
MAGNESIUM SERPL-MCNC: 1.8 MG/DL — SIGNIFICANT CHANGE UP (ref 1.6–2.6)
MAGNESIUM SERPL-MCNC: 1.8 MG/DL — SIGNIFICANT CHANGE UP (ref 1.6–2.6)
MANUAL SMEAR VERIFICATION: SIGNIFICANT CHANGE UP
MANUAL SMEAR VERIFICATION: SIGNIFICANT CHANGE UP
MCHC RBC-ENTMCNC: 32.5 PG — SIGNIFICANT CHANGE UP (ref 27–34)
MCHC RBC-ENTMCNC: 32.5 PG — SIGNIFICANT CHANGE UP (ref 27–34)
MCHC RBC-ENTMCNC: 35.7 GM/DL — SIGNIFICANT CHANGE UP (ref 32–36)
MCHC RBC-ENTMCNC: 35.7 GM/DL — SIGNIFICANT CHANGE UP (ref 32–36)
MCV RBC AUTO: 91.1 FL — SIGNIFICANT CHANGE UP (ref 80–100)
MCV RBC AUTO: 91.1 FL — SIGNIFICANT CHANGE UP (ref 80–100)
METAMYELOCYTES # FLD: 0.9 % — HIGH (ref 0–0)
METAMYELOCYTES # FLD: 0.9 % — HIGH (ref 0–0)
MONOCYTES # BLD AUTO: 0.6 K/UL — SIGNIFICANT CHANGE UP (ref 0–0.9)
MONOCYTES # BLD AUTO: 0.6 K/UL — SIGNIFICANT CHANGE UP (ref 0–0.9)
MONOCYTES NFR BLD AUTO: 3.5 % — SIGNIFICANT CHANGE UP (ref 2–14)
MONOCYTES NFR BLD AUTO: 3.5 % — SIGNIFICANT CHANGE UP (ref 2–14)
MYELOCYTES NFR BLD: 1.8 % — HIGH (ref 0–0)
MYELOCYTES NFR BLD: 1.8 % — HIGH (ref 0–0)
NEUTROPHILS # BLD AUTO: 15.26 K/UL — HIGH (ref 1.8–7.4)
NEUTROPHILS # BLD AUTO: 15.26 K/UL — HIGH (ref 1.8–7.4)
NEUTROPHILS NFR BLD AUTO: 85 % — HIGH (ref 43–77)
NEUTROPHILS NFR BLD AUTO: 85 % — HIGH (ref 43–77)
NEUTS BAND # BLD: 4.4 % — SIGNIFICANT CHANGE UP (ref 0–8)
NEUTS BAND # BLD: 4.4 % — SIGNIFICANT CHANGE UP (ref 0–8)
NRBC # BLD: 6 /100 — HIGH (ref 0–0)
NRBC # BLD: 6 /100 — HIGH (ref 0–0)
PHOSPHATE SERPL-MCNC: 2.8 MG/DL — SIGNIFICANT CHANGE UP (ref 2.5–4.5)
PHOSPHATE SERPL-MCNC: 2.8 MG/DL — SIGNIFICANT CHANGE UP (ref 2.5–4.5)
PLAT MORPH BLD: NORMAL — SIGNIFICANT CHANGE UP
PLAT MORPH BLD: NORMAL — SIGNIFICANT CHANGE UP
PLATELET # BLD AUTO: 39 K/UL — LOW (ref 150–400)
PLATELET # BLD AUTO: 39 K/UL — LOW (ref 150–400)
POIKILOCYTOSIS BLD QL AUTO: SLIGHT — SIGNIFICANT CHANGE UP
POIKILOCYTOSIS BLD QL AUTO: SLIGHT — SIGNIFICANT CHANGE UP
POLYCHROMASIA BLD QL SMEAR: SLIGHT — SIGNIFICANT CHANGE UP
POLYCHROMASIA BLD QL SMEAR: SLIGHT — SIGNIFICANT CHANGE UP
POTASSIUM SERPL-MCNC: 4 MMOL/L — SIGNIFICANT CHANGE UP (ref 3.5–5.3)
POTASSIUM SERPL-MCNC: 4 MMOL/L — SIGNIFICANT CHANGE UP (ref 3.5–5.3)
POTASSIUM SERPL-SCNC: 4 MMOL/L — SIGNIFICANT CHANGE UP (ref 3.5–5.3)
POTASSIUM SERPL-SCNC: 4 MMOL/L — SIGNIFICANT CHANGE UP (ref 3.5–5.3)
PROMYELOCYTES # FLD: 0.9 % — HIGH (ref 0–0)
PROMYELOCYTES # FLD: 0.9 % — HIGH (ref 0–0)
PROT SERPL-MCNC: 6.1 G/DL — SIGNIFICANT CHANGE UP (ref 6–8.3)
PROT SERPL-MCNC: 6.1 G/DL — SIGNIFICANT CHANGE UP (ref 6–8.3)
PROTHROM AB SERPL-ACNC: 11.7 SEC — SIGNIFICANT CHANGE UP (ref 9.5–13)
PROTHROM AB SERPL-ACNC: 11.7 SEC — SIGNIFICANT CHANGE UP (ref 9.5–13)
RBC # BLD: 2.8 M/UL — LOW (ref 3.8–5.2)
RBC # BLD: 2.8 M/UL — LOW (ref 3.8–5.2)
RBC # FLD: 15.9 % — HIGH (ref 10.3–14.5)
RBC # FLD: 15.9 % — HIGH (ref 10.3–14.5)
RBC BLD AUTO: ABNORMAL
RBC BLD AUTO: ABNORMAL
RH IG SCN BLD-IMP: POSITIVE — SIGNIFICANT CHANGE UP
RH IG SCN BLD-IMP: POSITIVE — SIGNIFICANT CHANGE UP
SCHISTOCYTES BLD QL AUTO: SLIGHT — SIGNIFICANT CHANGE UP
SCHISTOCYTES BLD QL AUTO: SLIGHT — SIGNIFICANT CHANGE UP
SODIUM SERPL-SCNC: 127 MMOL/L — LOW (ref 135–145)
SODIUM SERPL-SCNC: 127 MMOL/L — LOW (ref 135–145)
TARGETS BLD QL SMEAR: SIGNIFICANT CHANGE UP
TARGETS BLD QL SMEAR: SIGNIFICANT CHANGE UP
TOXIC GRANULES BLD QL SMEAR: PRESENT — SIGNIFICANT CHANGE UP
TOXIC GRANULES BLD QL SMEAR: PRESENT — SIGNIFICANT CHANGE UP
WBC # BLD: 17.07 K/UL — HIGH (ref 3.8–10.5)
WBC # BLD: 17.07 K/UL — HIGH (ref 3.8–10.5)
WBC # FLD AUTO: 17.07 K/UL — HIGH (ref 3.8–10.5)
WBC # FLD AUTO: 17.07 K/UL — HIGH (ref 3.8–10.5)

## 2023-11-08 PROCEDURE — 99232 SBSQ HOSP IP/OBS MODERATE 35: CPT

## 2023-11-08 PROCEDURE — 99233 SBSQ HOSP IP/OBS HIGH 50: CPT | Mod: GC

## 2023-11-08 RX ORDER — ALTEPLASE 100 MG
2 KIT INTRAVENOUS ONCE
Refills: 0 | Status: COMPLETED | OUTPATIENT
Start: 2023-11-08 | End: 2023-11-08

## 2023-11-08 RX ORDER — ONDANSETRON 8 MG/1
4 TABLET, FILM COATED ORAL ONCE
Refills: 0 | Status: COMPLETED | OUTPATIENT
Start: 2023-11-08 | End: 2023-11-08

## 2023-11-08 RX ORDER — LEVOTHYROXINE SODIUM 125 MCG
112 TABLET ORAL DAILY
Refills: 0 | Status: DISCONTINUED | OUTPATIENT
Start: 2023-11-08 | End: 2023-11-11

## 2023-11-08 RX ORDER — SODIUM CHLORIDE 9 MG/ML
3 INJECTION INTRAMUSCULAR; INTRAVENOUS; SUBCUTANEOUS EVERY 8 HOURS
Refills: 0 | Status: DISCONTINUED | OUTPATIENT
Start: 2023-11-08 | End: 2023-11-11

## 2023-11-08 RX ADMIN — ALTEPLASE 2 MILLIGRAM(S): KIT at 17:15

## 2023-11-08 RX ADMIN — AMLODIPINE BESYLATE 5 MILLIGRAM(S): 2.5 TABLET ORAL at 05:53

## 2023-11-08 RX ADMIN — ONDANSETRON 4 MILLIGRAM(S): 8 TABLET, FILM COATED ORAL at 10:18

## 2023-11-08 RX ADMIN — Medication 325 MILLIGRAM(S): at 11:20

## 2023-11-08 RX ADMIN — Medication 1 MILLIGRAM(S): at 11:20

## 2023-11-08 RX ADMIN — Medication 1 CAPSULE(S): at 09:08

## 2023-11-08 RX ADMIN — SODIUM CHLORIDE 3 MILLILITER(S): 9 INJECTION INTRAMUSCULAR; INTRAVENOUS; SUBCUTANEOUS at 21:39

## 2023-11-08 RX ADMIN — ESCITALOPRAM OXALATE 20 MILLIGRAM(S): 10 TABLET, FILM COATED ORAL at 11:20

## 2023-11-08 RX ADMIN — Medication 1 CAPSULE(S): at 17:14

## 2023-11-08 RX ADMIN — OXCARBAZEPINE 300 MILLIGRAM(S): 300 TABLET, FILM COATED ORAL at 17:15

## 2023-11-08 RX ADMIN — Medication 1 CAPSULE(S): at 13:24

## 2023-11-08 RX ADMIN — Medication 300 UNIT(S): at 23:34

## 2023-11-08 RX ADMIN — Medication 100 MICROGRAM(S): at 05:47

## 2023-11-08 RX ADMIN — ERTAPENEM SODIUM 120 MILLIGRAM(S): 1 INJECTION, POWDER, LYOPHILIZED, FOR SOLUTION INTRAMUSCULAR; INTRAVENOUS at 05:53

## 2023-11-08 RX ADMIN — CHLORHEXIDINE GLUCONATE 1 APPLICATION(S): 213 SOLUTION TOPICAL at 11:20

## 2023-11-08 RX ADMIN — FAMOTIDINE 20 MILLIGRAM(S): 10 INJECTION INTRAVENOUS at 17:14

## 2023-11-08 RX ADMIN — OXCARBAZEPINE 300 MILLIGRAM(S): 300 TABLET, FILM COATED ORAL at 05:47

## 2023-11-08 NOTE — PROGRESS NOTE ADULT - PROBLEM SELECTOR PLAN 6
acute rise in transaminase and ALP 11/6  - may be iso pancreatic mass vs MOST likely from CTX causing sludge/obstruction  - f/u hepatitis panel  -> RUQUS - showing sludge, 11mm ductal dilation - will need MRCP  -> trend liver enzymes  -> CTX changed to cipro due to rise in LFT - now on ertapenem  -> f/u MRCP results acute rise in transaminase and ALP 11/6  - may be iso pancreatic mass vs MOST likely from CTX causing sludge/obstruction  - f/u hepatitis panel  -> RUQUS - showing sludge, 11mm ductal dilation - will need MRCP  -> trend liver enzymes  -> CTX changed to cipro due to rise in LFT - now on ertapenem  -> f/u MRCP results - may need stent via ERCP acute rise in transaminase and ALP 11/6  - may be iso pancreatic mass vs MOST likely from CTX causing sludge/obstruction  - hepatitis panel negative  - RUQUS - showing sludge, 11mm ductal dilation - will need MRCP  -> trend liver enzymes - ALP rising, Tbili 1.2 and rising  -> GGT elevated 492  -> CTX changed to cipro due to rise in LFT - now on ertapenem  -> MRCP showing focal stricture lower CBD, appreciate heme/onc recs regarding need for ERCP/stent

## 2023-11-08 NOTE — PROGRESS NOTE ADULT - SUBJECTIVE AND OBJECTIVE BOX
Patient is a 68y old  Female who presents with a chief complaint of Pyelonephritis (07 Nov 2023 14:47)      SUBJECTIVE / OVERNIGHT EVENTS:  No acute events overnight. Patient at this time denies fevers, chills, CP, SOB, nausea, vomiting, diarrhea, constipation, dysuria. Patient seen and examined at bedside.    MEDICATIONS  (STANDING):  amLODIPine   Tablet 5 milliGRAM(s) Oral daily  chlorhexidine 4% Liquid 1 Application(s) Topical daily  ertapenem  IVPB 1000 milliGRAM(s) IV Intermittent every 24 hours  escitalopram 20 milliGRAM(s) Oral daily  famotidine    Tablet 20 milliGRAM(s) Oral <User Schedule>  ferrous    sulfate 325 milliGRAM(s) Oral daily  folic acid 1 milliGRAM(s) Oral daily  lactated ringers. 1000 milliLiter(s) (80 mL/Hr) IV Continuous <Continuous>  levothyroxine 100 MICROGram(s) Oral daily  OXcarbazepine 300 milliGRAM(s) Oral two times a day  pancrelipase  (CREON 36,000 Lipase Units) 1 Capsule(s) Oral three times a day with meals    MEDICATIONS  (PRN):  acetaminophen     Tablet .. 650 milliGRAM(s) Oral every 6 hours PRN Temp greater or equal to 38C (100.4F), Mild Pain (1 - 3)      CAPILLARY BLOOD GLUCOSE        I&O's Summary    07 Nov 2023 07:01  -  08 Nov 2023 07:00  --------------------------------------------------------  IN: 0 mL / OUT: 1300 mL / NET: -1300 mL        PHYSICAL EXAM:  Vital Signs Last 24 Hrs  T(C): 36.9 (08 Nov 2023 04:37), Max: 36.9 (07 Nov 2023 20:15)  T(F): 98.5 (08 Nov 2023 04:37), Max: 98.5 (07 Nov 2023 20:15)  HR: 84 (08 Nov 2023 04:37) (81 - 92)  BP: 139/89 (08 Nov 2023 04:37) (112/75 - 149/94)  BP(mean): --  RR: 18 (08 Nov 2023 04:37) (18 - 20)  SpO2: 97% (08 Nov 2023 04:37) (96% - 98%)    Parameters below as of 08 Nov 2023 04:37  Patient On (Oxygen Delivery Method): room air        GENERAL: No acute distress. Thin-appearing.  HEAD:  Atraumatic, Normocephalic  EYES: EOMI, PERRLA, conjunctiva and sclera clear  NECK: Supple, no lymphadenopathy, no JVD  CHEST/LUNG: CTAB; No wheezes, rales, or rhonchi  HEART: Regular rate and rhythm; No murmurs, rubs, or gallops  ABDOMEN: Soft, non-tender, non-distended; normal bowel sounds, no organomegaly  EXTREMITIES:  2+ peripheral pulses b/l, No clubbing, cyanosis, or edema  NEUROLOGY: A&O x 3, no focal deficits  SKIN: No rashes or lesions    LABS:                        9.1    17.07 )-----------( 39       ( 08 Nov 2023 06:46 )             25.5     11-08    127<L>  |  96  |  20  ----------------------------<  109<H>  4.0   |  22  |  0.95    Ca    8.4      08 Nov 2023 06:46  Phos  2.8     11-08  Mg     1.8     11-08    TPro  6.1  /  Alb  2.8<L>  /  TBili  1.2  /  DBili  x   /  AST  55<H>  /  ALT  72<H>  /  AlkPhos  326<H>  11-08    PT/INR - ( 08 Nov 2023 06:46 )   PT: 11.7 sec;   INR: 1.07 ratio         PTT - ( 07 Nov 2023 06:18 )  PTT:27.2 sec      Urinalysis Basic - ( 08 Nov 2023 06:46 )    Color: x / Appearance: x / SG: x / pH: x  Gluc: 109 mg/dL / Ketone: x  / Bili: x / Urobili: x   Blood: x / Protein: x / Nitrite: x   Leuk Esterase: x / RBC: x / WBC x   Sq Epi: x / Non Sq Epi: x / Bacteria: x          RADIOLOGY & ADDITIONAL TESTS:  Results Reviewed:   Imaging Personally Reviewed:  Electrocardiogram Personally Reviewed:    COORDINATION OF CARE:  Care Discussed with Consultants/Other Providers [Y/N]:  Prior or Outpatient Records Reviewed [Y/N]:

## 2023-11-08 NOTE — PROVIDER CONTACT NOTE (OTHER) - ASSESSMENT
pt stable
Pt was AOX4, VS within normal limits, port flushed with NS without difficulty, no redness or drainage
pt stable
Pt VSS

## 2023-11-08 NOTE — PROGRESS NOTE ADULT - PROBLEM SELECTOR PLAN 4
CHronic Hyponatremia ( Present for >48 hours with no acute mental status change   Initially with low serum osm, and indeterminate urine sodium. However, after receiving 2L, now back at baseline Na (128)  - with high urine Na and low urine Osm --> likely SIADH with drop in Uosm from IVF ---> will repeat urine Studies   - Hyponatremia but possibly mixed with baseline SIADH and pt has low albumin which can be contributing to hyponatremia   - receive 1U blood 11/5  - repeat urine studies 11/6 - Sade 100, Uosm 278, c/f SIADH - restrict Fluid and monitor Na   -> AM cortisol (elevated)

## 2023-11-08 NOTE — PROVIDER CONTACT NOTE (OTHER) - BACKGROUND
pt admitted with nephritis
tubulointerstitial nephritis
pt admitted with nephritis
Pt is s/p chemo and being treated for UTI and on abx

## 2023-11-08 NOTE — PROGRESS NOTE ADULT - ASSESSMENT
68 year old F with hypertension, hypothyroidism, and  pancreatic cancer, currently on chemotherapy (last session 10/30) who presents to the emergency department for fever, weakness, fatigue, body aches, and lower back pain for 2 days after 2 weeks of urinary urgency prior to symptom onset  Fever, no leukocytosis  Dysuria  CT R stranding, suspected pyelonephritis  UCX neg  BCX E coli R bactrim  UA+  CXR clear  LFT rise--possibly due to Ceftriaxone?  Rebroadened to Ertapenem in the interim  MRCP with structure of common bile duct  Low suspicion breakthrough infection given lack of symptoms/infectious signs; possible received Filgrastim with recent chemo? Possible reactive to LFT abnormal/liver toxicity?  Overall, GNR bacteremia, Pyelonephritis, UCX+  - Ertapenem 1g q 24, when discharge planning can send out on Cipro 500mg q 12 to complete 10 days total  - F/U BCX  - Trend LFTs to normal, trend WBC to normal  - MRCP findings per team    Ludin Gleason MD  Contact on TEAMS messaging from 9am - 5pm  From 5pm-9am, on weekends, or if no response call [phone]

## 2023-11-08 NOTE — PROGRESS NOTE ADULT - PROBLEM SELECTOR PLAN 3
Pyelo with CVA tenderness, recent urinary urgency, likely ascending infection iso immunosuppression from chemo.  -> ertapenem, E Coli pansensitive

## 2023-11-08 NOTE — PROGRESS NOTE ADULT - SUBJECTIVE AND OBJECTIVE BOX
Patient is a 68y old  Female who presents with a chief complaint of Pyelonephritis (08 Nov 2023 07:32)    Patient seen and examined at baseline.   Patient resting in bed, reports one episode of emesis following oral     MEDICATIONS  (STANDING):  amLODIPine   Tablet 5 milliGRAM(s) Oral daily  chlorhexidine 4% Liquid 1 Application(s) Topical daily  ertapenem  IVPB 1000 milliGRAM(s) IV Intermittent every 24 hours  escitalopram 20 milliGRAM(s) Oral daily  famotidine    Tablet 20 milliGRAM(s) Oral <User Schedule>  ferrous    sulfate 325 milliGRAM(s) Oral daily  folic acid 1 milliGRAM(s) Oral daily  levothyroxine 112 MICROGram(s) Oral daily  OXcarbazepine 300 milliGRAM(s) Oral two times a day  pancrelipase  (CREON 36,000 Lipase Units) 1 Capsule(s) Oral three times a day with meals    MEDICATIONS  (PRN):  acetaminophen     Tablet .. 650 milliGRAM(s) Oral every 6 hours PRN Temp greater or equal to 38C (100.4F), Mild Pain (1 - 3)        Vital Signs Last 24 Hrs  T(C): 36.9 (08 Nov 2023 04:37), Max: 36.9 (07 Nov 2023 20:15)  T(F): 98.5 (08 Nov 2023 04:37), Max: 98.5 (07 Nov 2023 20:15)  HR: 84 (08 Nov 2023 04:37) (81 - 92)  BP: 139/89 (08 Nov 2023 04:37) (112/75 - 149/94)  BP(mean): --  RR: 18 (08 Nov 2023 04:37) (18 - 20)  SpO2: 97% (08 Nov 2023 04:37) (96% - 98%)    Parameters below as of 08 Nov 2023 04:37  Patient On (Oxygen Delivery Method): room air        PE  Awake, alert  Anicteric, MMM  RRR  CTAB  Abd soft, NT, ND  No c/c/e  No rash grossly  FROM                          9.1    17.07 )-----------( 39       ( 08 Nov 2023 06:46 )             25.5       11-08    127<L>  |  96  |  20  ----------------------------<  109<H>  4.0   |  22  |  0.95    Ca    8.4      08 Nov 2023 06:46  Phos  2.8     11-08  Mg     1.8     11-08    TPro  6.1  /  Alb  2.8<L>  /  TBili  1.2  /  DBili  x   /  AST  55<H>  /  ALT  72<H>  /  AlkPhos  326<H>  11-08

## 2023-11-08 NOTE — PROGRESS NOTE ADULT - SUBJECTIVE AND OBJECTIVE BOX
CC: F/U for bacteremia    Saw/spoke to patient. called back for leukocytosis. No other new complaints.    Allergies  phenytoin (Rash)  ibuprofen (Other)  Dilantin (Unknown)    ANTIMICROBIALS:  ertapenem  IVPB 1000 every 24 hours    PE:    Vital Signs Last 24 Hrs  T(C): 36.3 (08 Nov 2023 12:41), Max: 36.9 (07 Nov 2023 20:15)  T(F): 97.4 (08 Nov 2023 12:41), Max: 98.5 (07 Nov 2023 20:15)  HR: 104 (08 Nov 2023 12:41) (84 - 104)  BP: 108/68 (08 Nov 2023 12:41) (108/68 - 149/94)  RR: 18 (08 Nov 2023 12:41) (18 - 20)  SpO2: 96% (08 Nov 2023 12:41) (96% - 97%)    Gen: AOx3, NAD, non-toxic  CV: Nontachycardic  Resp: Breathing comfortably, RA  Abd: Soft, nontender  IV/Skin: No thrombophlebitis    LABS:                        9.1    17.07 )-----------( 39       ( 08 Nov 2023 06:46 )             25.5     11-08    127<L>  |  96  |  20  ----------------------------<  109<H>  4.0   |  22  |  0.95    Ca    8.4      08 Nov 2023 06:46  Phos  2.8     11-08  Mg     1.8     11-08    TPro  6.1  /  Alb  2.8<L>  /  TBili  1.2  /  DBili  x   /  AST  55<H>  /  ALT  72<H>  /  AlkPhos  326<H>  11-08    Urinalysis Basic - ( 08 Nov 2023 06:46 )    Color: x / Appearance: x / SG: x / pH: x  Gluc: 109 mg/dL / Ketone: x  / Bili: x / Urobili: x   Blood: x / Protein: x / Nitrite: x   Leuk Esterase: x / RBC: x / WBC x   Sq Epi: x / Non Sq Epi: x / Bacteria: x    MICROBIOLOGY:    .Blood Blood-Peripheral  11-04-23   No growth at 72 Hours  --  --    .Blood Blood-Peripheral  11-04-23   No growth at 4 days  --  --    Clean Catch Clean Catch (Midstream)  11-02-23   <10,000 CFU/mL Normal Urogenital Mary  --  --    .Blood Blood-Peripheral  11-02-23   Growth in aerobic and anaerobic bottles: Escherichia coli  Direct identification is available within approximately 3-5  hours either by Blood Panel Multiplexed PCR or Direct  MALDI-TOF. Details: [email or web address]  --  Blood Culture PCR  Escherichia coli    .Blood Blood-Peripheral  11-02-23   Growth in aerobic and anaerobic bottles: Escherichia coli  See previous culture  [id number]  --    Growth in aerobic and anaerobic bottles: Gram Negative Rods    RADIOLOGY:    11/7 MR:    IMPRESSION:  Focal stricture of the lower common bile duct for which differential   includes benign and malignant etiologies.

## 2023-11-08 NOTE — PROGRESS NOTE ADULT - ATTENDING COMMENTS
68F with h/o pancreatic cancer since 2021, on chemotherapy (last session 10/30) presented to [facility] with fever, weakness, fatigue, body aches, and lower back pain for 2 days after 2 weeks of urinary urgency. Her SBP was in 80s and she was given 1L IVF with NS, IV zosyn and send to ED at  [facility]. Outpatient CT abd/pelvis showed evidence of Pyelonephritis ( [place] [facility] [place] and result is on [facility]) .  In ED patient was found to have sepsis, 1L NS, 1gm IV vancomycin was given. CXR- clear, RVP neg, Na 125, Scr 1.66, WBC outpatient was normal.     1.  Transaminitis / cholestasis pattern --> Biliary stricture          s/p US of abd with no gross lesions in the liver , mild CBD dilatation and mild intrahepatic dilation and small Gallbladder sludge with no stone        and distended gallbladder with no pericholecystic fluid           S/P  Ceftriaxone-->s/p CIpro  now on Ertapenem          Cont to monitor LFT , high  GGT -->  MRCP with biliary stricture --> Advance GI consult as per Hem/onc  2.   MARY ELLEN --> improved          S/P IVF with improvement --> cont to monitor   3.  Hyponatremia --->Chronic Hypoosmolar HypoNa with high urine Na and Low UOsm---> Multifactorial with baseline SIADH and drop in Osmo             post IVF --> cont to monitor    4.  Normocytic Anemia / Bicytopenia --> most likely sec to chem and malignancy and ABX         stable H/H post transfusion          Stable hemolysis profile           HOLD Hep for now and if no bleed will discuss with Hem about restarting once able as she is high risk for Thrombosis           Monitor platelets         5.   E coli Bacteremia due to acute pyelo--> will continue ABX         repeat Blood CX (11/4 ) is neg so far    Discussed with team 8 , rest as per above   Discuss with hem/onc team   Discuss with onc, ID and patient      Dominga Duran   Hospitalist   available on TEAMS

## 2023-11-08 NOTE — PROVIDER CONTACT NOTE (OTHER) - SITUATION
Pt had a critical called to night shift nurse
pt with no blood return from medi port
Call from an outside provider about patient's port to have found gram negative rods
Provider to RN order states " ok to access chest wall chemo port". Concerns made that order needed to specify "ok to draw blood from port".
pt with x1 daniella of emesis

## 2023-11-08 NOTE — PROGRESS NOTE ADULT - ASSESSMENT
Patient is a 68 year old F with hypertension, hypothyroidism, and  pancreatic cancer, currently on chemotherapy (last session 10/30) who presents to the emergency department for fever, weakness, fatigue, body aches, and lower back pain for 2 days after 2 weeks of urinary urgency prior to symptom onset.    Pancreatic ca  --under the care of Selena Hanna of [facility]   --Stage II PDIC (6/2012) w/ recurrence to abdominal wall (resected 1/20223)  --s/p RT 3/2022  --currently on systemic treatment w/ Gemcitabine/Nab-Paclitaxel LD 10/30  --no plan for treatment while inpatient and/or rehab  --ongoing care after discharge    sepsis  --2/2 pyelonephritis, noted on outpatient CT a/p done 11/2 (see above). Pt states that she had been having urinary frequency and urgency but did not recognize that as sx of UTI  --outpatient peripheral cultures and MP cultures collected 11/2 c/w ecoli, resistant to bactrim but otherwise sensitive  --cultures collected inpatient also showed Ecoli  --on IV abx, ID consulted, on CTX, improving    Hyponatremia  --chronic, Na 128 at baseline,   --Na today is 129     anemia/thrombocytopenia  --2/2 malignancy and treatment  --Hgb 8-9, platelets 170s at baseline  --on PO folic acid, and iron  --transfuse for Hgb<7     transaminitis  --abd US w/   1. Mild intra and extra hepatic biliary dilatation, the common bile duct measuring up to 1.1 cm. The common bile duct is dilated to the level of   the pancreatic head. The pancreas is not well assessed on this examination. 2. The gallbladder is markedly distended. No stones are identified. Possible small amount of intraluminal sludge. No wall thickening,   pericholecystic fluid, or sonographic Pierre sign. No evidence for acute cholecystitis.  3. The hepatic vasculature is patent, as detailed above. The main portal vein, left portal vein and right portal vein are patent with hepatopedal flow although with diminished peak systolic velocities, the velocity within the main portal vein is 16.2 cm/s.   --s/p MRCP - awaiting read  --possibly 2/2 abx     Bailey Herrera NP  Hematology/ Oncology  [facility]  [phone] (office)  [phone] (alt office)  Evenings and weekends please call MD on call or office

## 2023-11-08 NOTE — PROGRESS NOTE ADULT - PROBLEM SELECTOR PLAN 8
Holding on home HTN meds given hypotension from sepsis.  - restarting amlodipine 5 and losartan 50 (1/2 dose) for elevated BP  - Hold HCTZ upon dc given hypoNa Holding on home HTN meds given hypotension from sepsis.  - restarting amlodipine 5 and losartan 50 (1/2 dose) for elevated BP  - losartan held iso creatnine rise  - Hold HCTZ upon dc given hypoNa

## 2023-11-08 NOTE — PROVIDER CONTACT NOTE (OTHER) - ACTION/TREATMENT ORDERED:
MD notified.
Zofran to be given
No new order @ this time
MD made aware of concerns.
Pt just had chemo an Hgb expected to be low.    Other orders placed , for potassium phosphate and

## 2023-11-08 NOTE — PROGRESS NOTE ADULT - PROBLEM SELECTOR PLAN 7
On gemcitabine and abraxane outpatient  - Hold on chemo inpatient iso infection. Chemo and myelosuppression likely contributing to thrombocytopenia and anemia  - Onc recs appreciated  - ISO pancreatic cancer with headache that is awakening patient at night, CT Head - mild small vessel and atrophic changes. On gemcitabine and abraxane outpatient  - Hold on chemo inpatient iso infection. Chemo and myelosuppression likely contributing to thrombocytopenia and anemia  - Onc recs appreciated  - ISO pancreatic cancer with headache that is awakening patient at night, CT Head - mild small vessel and atrophic changes.  -> de-accessing the port for now

## 2023-11-08 NOTE — PROGRESS NOTE ADULT - PROBLEM SELECTOR PLAN 1
Resolving   Fever, tachycardia, with MARY ELLEN/hypotension. CT A/P outside records  ( done 11/2 --> in [facility]) showing evidence of Rt pyelonephritis in the upper poles. S/p 1 dose of zosyn, vanc. Immunosuppression from active chemo. Now on CTX  - pansensitive E Coli - was on CTX ( goal of 7-10 days of treatment )  -> switched to ciproflaxacin per ID, now on ertapenem Fever, tachycardia, with MARY ELLEN/hypotension. CT A/P outside records  ( done 11/2 --> in [facility]) showing evidence of Rt pyelonephritis in the upper poles. S/p 1 dose of zosyn, vanc. Immunosuppression from active chemo. Now on CTX  - pansensitive E Coli - was on CTX ( goal of 7-10 days of treatment )  -> switched to ciproflaxacin per ID, now on ertapenem  -> WBC acute increase to 17 Fever, tachycardia, with MARY ELLEN/hypotension. CT A/P outside records  ( done 11/2 --> in [facility]) showing evidence of Rt pyelonephritis in the upper poles. S/p 1 dose of zosyn, vanc. Immunosuppression from active chemo. Now on CTX  - pansensitive E Coli - was on CTX ( goal of 7-10 days of treatment )  -> switched to ciproflaxacin per ID, now on ertapenem  -> WBC acute increase to 17; per ID, will transition to cipro closer to discharge for

## 2023-11-08 NOTE — PROVIDER CONTACT NOTE (OTHER) - REASON
Call from an outside provider about patient's port to have found gram negative rods
Pt Hgb and HCT values of 7.3 and 20.3
pt with x1 emesis
pt with no blood return from [facility]
Confirmation order to access Pt chest wall port

## 2023-11-08 NOTE — PROGRESS NOTE ADULT - PROBLEM SELECTOR PLAN 9
On levothyroxine at home  - Continue with home levothyroxine  - TSH elevated 7.78 above expected for age - may need adjustment to levothyroxine On levothyroxine at home  - TSH elevated 7.78 above expected for age - increase levothyroxine to 112mcg

## 2023-11-09 ENCOUNTER — TRANSCRIPTION ENCOUNTER (OUTPATIENT)
Age: 68
End: 2023-11-09

## 2023-11-09 LAB
ALBUMIN SERPL ELPH-MCNC: 2.8 G/DL — LOW (ref 3.3–5)
ALBUMIN SERPL ELPH-MCNC: 2.8 G/DL — LOW (ref 3.3–5)
ALP SERPL-CCNC: 332 U/L — HIGH (ref 40–120)
ALP SERPL-CCNC: 332 U/L — HIGH (ref 40–120)
ALT FLD-CCNC: 66 U/L — HIGH (ref 10–45)
ALT FLD-CCNC: 66 U/L — HIGH (ref 10–45)
ANION GAP SERPL CALC-SCNC: 13 MMOL/L — SIGNIFICANT CHANGE UP (ref 5–17)
ANION GAP SERPL CALC-SCNC: 13 MMOL/L — SIGNIFICANT CHANGE UP (ref 5–17)
ANISOCYTOSIS BLD QL: SLIGHT — SIGNIFICANT CHANGE UP
ANISOCYTOSIS BLD QL: SLIGHT — SIGNIFICANT CHANGE UP
AST SERPL-CCNC: 49 U/L — HIGH (ref 10–40)
AST SERPL-CCNC: 49 U/L — HIGH (ref 10–40)
BASOPHILS # BLD AUTO: 0 K/UL — SIGNIFICANT CHANGE UP (ref 0–0.2)
BASOPHILS # BLD AUTO: 0 K/UL — SIGNIFICANT CHANGE UP (ref 0–0.2)
BASOPHILS NFR BLD AUTO: 0 % — SIGNIFICANT CHANGE UP (ref 0–2)
BASOPHILS NFR BLD AUTO: 0 % — SIGNIFICANT CHANGE UP (ref 0–2)
BILIRUB SERPL-MCNC: 0.7 MG/DL — SIGNIFICANT CHANGE UP (ref 0.2–1.2)
BILIRUB SERPL-MCNC: 0.7 MG/DL — SIGNIFICANT CHANGE UP (ref 0.2–1.2)
BUN SERPL-MCNC: 19 MG/DL — SIGNIFICANT CHANGE UP (ref 7–23)
BUN SERPL-MCNC: 19 MG/DL — SIGNIFICANT CHANGE UP (ref 7–23)
CALCIUM SERPL-MCNC: 8 MG/DL — LOW (ref 8.4–10.5)
CALCIUM SERPL-MCNC: 8 MG/DL — LOW (ref 8.4–10.5)
CANCER AG19-9 SERPL-ACNC: 24 U/ML — SIGNIFICANT CHANGE UP
CANCER AG19-9 SERPL-ACNC: 24 U/ML — SIGNIFICANT CHANGE UP
CHLORIDE SERPL-SCNC: 95 MMOL/L — LOW (ref 96–108)
CHLORIDE SERPL-SCNC: 95 MMOL/L — LOW (ref 96–108)
CO2 SERPL-SCNC: 20 MMOL/L — LOW (ref 22–31)
CO2 SERPL-SCNC: 20 MMOL/L — LOW (ref 22–31)
CREAT SERPL-MCNC: 0.92 MG/DL — SIGNIFICANT CHANGE UP (ref 0.5–1.3)
CREAT SERPL-MCNC: 0.92 MG/DL — SIGNIFICANT CHANGE UP (ref 0.5–1.3)
CULTURE RESULTS: SIGNIFICANT CHANGE UP
EGFR: 68 ML/MIN/1.73M2 — SIGNIFICANT CHANGE UP
EGFR: 68 ML/MIN/1.73M2 — SIGNIFICANT CHANGE UP
ELLIPTOCYTES BLD QL SMEAR: SLIGHT — SIGNIFICANT CHANGE UP
ELLIPTOCYTES BLD QL SMEAR: SLIGHT — SIGNIFICANT CHANGE UP
EOSINOPHIL # BLD AUTO: 0.28 K/UL — SIGNIFICANT CHANGE UP (ref 0–0.5)
EOSINOPHIL # BLD AUTO: 0.28 K/UL — SIGNIFICANT CHANGE UP (ref 0–0.5)
EOSINOPHIL NFR BLD AUTO: 1.7 % — SIGNIFICANT CHANGE UP (ref 0–6)
EOSINOPHIL NFR BLD AUTO: 1.7 % — SIGNIFICANT CHANGE UP (ref 0–6)
GLUCOSE SERPL-MCNC: 110 MG/DL — HIGH (ref 70–99)
GLUCOSE SERPL-MCNC: 110 MG/DL — HIGH (ref 70–99)
HCT VFR BLD CALC: 24.4 % — LOW (ref 34.5–45)
HCT VFR BLD CALC: 24.4 % — LOW (ref 34.5–45)
HGB BLD-MCNC: 8.5 G/DL — LOW (ref 11.5–15.5)
HGB BLD-MCNC: 8.5 G/DL — LOW (ref 11.5–15.5)
INR BLD: 1.08 RATIO — SIGNIFICANT CHANGE UP (ref 0.85–1.18)
INR BLD: 1.08 RATIO — SIGNIFICANT CHANGE UP (ref 0.85–1.18)
LYMPHOCYTES # BLD AUTO: 1.15 K/UL — SIGNIFICANT CHANGE UP (ref 1–3.3)
LYMPHOCYTES # BLD AUTO: 1.15 K/UL — SIGNIFICANT CHANGE UP (ref 1–3.3)
LYMPHOCYTES # BLD AUTO: 7 % — LOW (ref 13–44)
LYMPHOCYTES # BLD AUTO: 7 % — LOW (ref 13–44)
MACROCYTES BLD QL: SIGNIFICANT CHANGE UP
MACROCYTES BLD QL: SIGNIFICANT CHANGE UP
MAGNESIUM SERPL-MCNC: 1.9 MG/DL — SIGNIFICANT CHANGE UP (ref 1.6–2.6)
MAGNESIUM SERPL-MCNC: 1.9 MG/DL — SIGNIFICANT CHANGE UP (ref 1.6–2.6)
MANUAL SMEAR VERIFICATION: SIGNIFICANT CHANGE UP
MANUAL SMEAR VERIFICATION: SIGNIFICANT CHANGE UP
MCHC RBC-ENTMCNC: 32.4 PG — SIGNIFICANT CHANGE UP (ref 27–34)
MCHC RBC-ENTMCNC: 32.4 PG — SIGNIFICANT CHANGE UP (ref 27–34)
MCHC RBC-ENTMCNC: 34.8 GM/DL — SIGNIFICANT CHANGE UP (ref 32–36)
MCHC RBC-ENTMCNC: 34.8 GM/DL — SIGNIFICANT CHANGE UP (ref 32–36)
MCV RBC AUTO: 93.1 FL — SIGNIFICANT CHANGE UP (ref 80–100)
MCV RBC AUTO: 93.1 FL — SIGNIFICANT CHANGE UP (ref 80–100)
METAMYELOCYTES # FLD: 0.9 % — HIGH (ref 0–0)
METAMYELOCYTES # FLD: 0.9 % — HIGH (ref 0–0)
MONOCYTES # BLD AUTO: 1.87 K/UL — HIGH (ref 0–0.9)
MONOCYTES # BLD AUTO: 1.87 K/UL — HIGH (ref 0–0.9)
MONOCYTES NFR BLD AUTO: 11.4 % — SIGNIFICANT CHANGE UP (ref 2–14)
MONOCYTES NFR BLD AUTO: 11.4 % — SIGNIFICANT CHANGE UP (ref 2–14)
MYELOCYTES NFR BLD: 0.9 % — HIGH (ref 0–0)
MYELOCYTES NFR BLD: 0.9 % — HIGH (ref 0–0)
NEUTROPHILS # BLD AUTO: 12.66 K/UL — HIGH (ref 1.8–7.4)
NEUTROPHILS # BLD AUTO: 12.66 K/UL — HIGH (ref 1.8–7.4)
NEUTROPHILS NFR BLD AUTO: 76.3 % — SIGNIFICANT CHANGE UP (ref 43–77)
NEUTROPHILS NFR BLD AUTO: 76.3 % — SIGNIFICANT CHANGE UP (ref 43–77)
NEUTS BAND # BLD: 0.9 % — SIGNIFICANT CHANGE UP (ref 0–8)
NEUTS BAND # BLD: 0.9 % — SIGNIFICANT CHANGE UP (ref 0–8)
NRBC # BLD: 11 /100 — HIGH (ref 0–0)
NRBC # BLD: 11 /100 — HIGH (ref 0–0)
PHOSPHATE SERPL-MCNC: 2.6 MG/DL — SIGNIFICANT CHANGE UP (ref 2.5–4.5)
PHOSPHATE SERPL-MCNC: 2.6 MG/DL — SIGNIFICANT CHANGE UP (ref 2.5–4.5)
PLAT MORPH BLD: NORMAL — SIGNIFICANT CHANGE UP
PLAT MORPH BLD: NORMAL — SIGNIFICANT CHANGE UP
PLATELET # BLD AUTO: 70 K/UL — LOW (ref 150–400)
PLATELET # BLD AUTO: 70 K/UL — LOW (ref 150–400)
POIKILOCYTOSIS BLD QL AUTO: SLIGHT — SIGNIFICANT CHANGE UP
POIKILOCYTOSIS BLD QL AUTO: SLIGHT — SIGNIFICANT CHANGE UP
POTASSIUM SERPL-MCNC: 4.4 MMOL/L — SIGNIFICANT CHANGE UP (ref 3.5–5.3)
POTASSIUM SERPL-MCNC: 4.4 MMOL/L — SIGNIFICANT CHANGE UP (ref 3.5–5.3)
POTASSIUM SERPL-SCNC: 4.4 MMOL/L — SIGNIFICANT CHANGE UP (ref 3.5–5.3)
POTASSIUM SERPL-SCNC: 4.4 MMOL/L — SIGNIFICANT CHANGE UP (ref 3.5–5.3)
PROMYELOCYTES # FLD: 0.9 % — HIGH (ref 0–0)
PROMYELOCYTES # FLD: 0.9 % — HIGH (ref 0–0)
PROT SERPL-MCNC: 5.9 G/DL — LOW (ref 6–8.3)
PROT SERPL-MCNC: 5.9 G/DL — LOW (ref 6–8.3)
PROTHROM AB SERPL-ACNC: 11.3 SEC — SIGNIFICANT CHANGE UP (ref 9.5–13)
PROTHROM AB SERPL-ACNC: 11.3 SEC — SIGNIFICANT CHANGE UP (ref 9.5–13)
RBC # BLD: 2.62 M/UL — LOW (ref 3.8–5.2)
RBC # BLD: 2.62 M/UL — LOW (ref 3.8–5.2)
RBC # FLD: 15.9 % — HIGH (ref 10.3–14.5)
RBC # FLD: 15.9 % — HIGH (ref 10.3–14.5)
RBC BLD AUTO: ABNORMAL
RBC BLD AUTO: ABNORMAL
SCHISTOCYTES BLD QL AUTO: SLIGHT — SIGNIFICANT CHANGE UP
SCHISTOCYTES BLD QL AUTO: SLIGHT — SIGNIFICANT CHANGE UP
SODIUM SERPL-SCNC: 128 MMOL/L — LOW (ref 135–145)
SODIUM SERPL-SCNC: 128 MMOL/L — LOW (ref 135–145)
SPECIMEN SOURCE: SIGNIFICANT CHANGE UP
TARGETS BLD QL SMEAR: SIGNIFICANT CHANGE UP
TARGETS BLD QL SMEAR: SIGNIFICANT CHANGE UP
TOXIC GRANULES BLD QL SMEAR: PRESENT — SIGNIFICANT CHANGE UP
TOXIC GRANULES BLD QL SMEAR: PRESENT — SIGNIFICANT CHANGE UP
WBC # BLD: 16.4 K/UL — HIGH (ref 3.8–10.5)
WBC # BLD: 16.4 K/UL — HIGH (ref 3.8–10.5)
WBC # FLD AUTO: 16.4 K/UL — HIGH (ref 3.8–10.5)
WBC # FLD AUTO: 16.4 K/UL — HIGH (ref 3.8–10.5)

## 2023-11-09 PROCEDURE — 99232 SBSQ HOSP IP/OBS MODERATE 35: CPT

## 2023-11-09 PROCEDURE — 99233 SBSQ HOSP IP/OBS HIGH 50: CPT | Mod: GC

## 2023-11-09 PROCEDURE — 99254 IP/OBS CNSLTJ NEW/EST MOD 60: CPT | Mod: GC

## 2023-11-09 RX ORDER — SENNA PLUS 8.6 MG/1
2 TABLET ORAL AT BEDTIME
Refills: 0 | Status: DISCONTINUED | OUTPATIENT
Start: 2023-11-09 | End: 2023-11-11

## 2023-11-09 RX ORDER — POLYETHYLENE GLYCOL 3350 17 G/17G
17 POWDER, FOR SOLUTION ORAL
Refills: 0 | Status: DISCONTINUED | OUTPATIENT
Start: 2023-11-09 | End: 2023-11-11

## 2023-11-09 RX ADMIN — CHLORHEXIDINE GLUCONATE 1 APPLICATION(S): 213 SOLUTION TOPICAL at 12:51

## 2023-11-09 RX ADMIN — Medication 1 CAPSULE(S): at 17:08

## 2023-11-09 RX ADMIN — Medication 1 CAPSULE(S): at 12:50

## 2023-11-09 RX ADMIN — Medication 1 MILLIGRAM(S): at 12:50

## 2023-11-09 RX ADMIN — ESCITALOPRAM OXALATE 20 MILLIGRAM(S): 10 TABLET, FILM COATED ORAL at 12:50

## 2023-11-09 RX ADMIN — POLYETHYLENE GLYCOL 3350 17 GRAM(S): 17 POWDER, FOR SOLUTION ORAL at 17:09

## 2023-11-09 RX ADMIN — Medication 1 CAPSULE(S): at 09:15

## 2023-11-09 RX ADMIN — FAMOTIDINE 20 MILLIGRAM(S): 10 INJECTION INTRAVENOUS at 17:09

## 2023-11-09 RX ADMIN — AMLODIPINE BESYLATE 5 MILLIGRAM(S): 2.5 TABLET ORAL at 05:46

## 2023-11-09 RX ADMIN — SENNA PLUS 2 TABLET(S): 8.6 TABLET ORAL at 21:46

## 2023-11-09 RX ADMIN — POLYETHYLENE GLYCOL 3350 17 GRAM(S): 17 POWDER, FOR SOLUTION ORAL at 12:53

## 2023-11-09 RX ADMIN — Medication 325 MILLIGRAM(S): at 12:50

## 2023-11-09 RX ADMIN — OXCARBAZEPINE 300 MILLIGRAM(S): 300 TABLET, FILM COATED ORAL at 17:09

## 2023-11-09 RX ADMIN — ERTAPENEM SODIUM 120 MILLIGRAM(S): 1 INJECTION, POWDER, LYOPHILIZED, FOR SOLUTION INTRAMUSCULAR; INTRAVENOUS at 05:45

## 2023-11-09 RX ADMIN — SODIUM CHLORIDE 3 MILLILITER(S): 9 INJECTION INTRAMUSCULAR; INTRAVENOUS; SUBCUTANEOUS at 22:00

## 2023-11-09 RX ADMIN — Medication 112 MICROGRAM(S): at 05:46

## 2023-11-09 RX ADMIN — OXCARBAZEPINE 300 MILLIGRAM(S): 300 TABLET, FILM COATED ORAL at 05:47

## 2023-11-09 NOTE — CONSULT NOTE ADULT - SUBJECTIVE AND OBJECTIVE BOX
Chief Complaint:  Patient is a 68y old  Female who presents with a chief complaint of Pyelonephritis (2023 07:33)      HPI: 68 year old F with HTN, hypothyroidism, and  pancreatic cancer (s/p distal pancreatectomy , s/p RTX- last , currently on chemotherapy- last session 10/30) who presents to the emergency department for fever, weakness, fatigue, body aches, and lower back pain for 2 days after 2 weeks of urinary urgency and was found to have evidence of [place]el[place] CT [place] [facility] [place] was transferred to [facility] for further management. Hospital course c/b E Coli bacteremia and hyponatremia thought 2/2 SIADH. Also noted to have uptrending AST/ALT/ALP upon transfer but normal Tbili. MRCP -  liver showing increased signal on out of phase imaging, suggestive of iron deposition, several subcentimeter hepatic cysts; CBD dilatation, measuring up to 1.0 cm with focal narrowing of the lower CBD in the region of the pancreatic head; no discrete mass or calculus visualized, no gallstones; s/p distal pancreatectomy, few tiny cysts in the pancreatic head. Advanced GI consulted for abnormal MRCP findings.      Otherwise, patient denies fevers, chills, weight loss, dysphagia, odynophagia, early satiety, poor oral intake, abdominal pain, nausea, vomiting, diarrhea, melena, hematemesis, hematochezia, change in stool caliber, or family history of GI-related cancers.    Allergies:  phenytoin (Rash)  ibuprofen (Other)  Dilantin (Unknown)      Home Medications:    Hospital Medications:  acetaminophen     Tablet .. 650 milliGRAM(s) Oral every 6 hours PRN  amLODIPine   Tablet 5 milliGRAM(s) Oral daily  chlorhexidine 4% Liquid 1 Application(s) Topical daily  ertapenem  IVPB 1000 milliGRAM(s) IV Intermittent every 24 hours  escitalopram 20 milliGRAM(s) Oral daily  famotidine    Tablet 20 milliGRAM(s) Oral <User Schedule>  ferrous    sulfate 325 milliGRAM(s) Oral daily  folic acid 1 milliGRAM(s) Oral daily  levothyroxine 112 MICROGram(s) Oral daily  OXcarbazepine 300 milliGRAM(s) Oral two times a day  pancrelipase  (CREON 36,000 Lipase Units) 1 Capsule(s) Oral three times a day with meals  sodium chloride 0.9% lock flush 3 milliLiter(s) IV Push every 8 hours      PMHX/PSHX:  Pancreatic cancer    Hypertension    Hypothyroid    Seasonal asthma    History of pancreatectomy        Family history:  No pertinent family history in first degree relatives     Denies any family history of GI-related disease or cancers.    Social History:   ETOH: denies  Tobacco: denies  Illicit drug use: denies    ROS: 14 point ROS negative unless otherwise stated in HPI      Vital Signs:  Vital Signs Last 24 Hrs  T(C): 37.2 (2023 04:01), Max: 37.2 (2023 04:01)  T(F): 99 (2023 04:01), Max: 99 (2023 04:01)  HR: 83 (2023 04:) (83 - 104)  BP: 134/95 (2023 04:) (108/68 - 134/95)  BP(mean): --  RR: 20 (2023 04:) (18 - 20)  SpO2: 96% (2023 04:01) (96% - 98%)    Parameters below as of 2023 04:01  Patient On (Oxygen Delivery Method): room air      Daily     Daily Weight in k.7 (2023 10:36)    PHYSICAL EXAM:     GENERAL:  Appears stated age, well-groomed, well-nourished, no distress  HEENT:  NC/AT,  conjunctivae clear and pink  CHEST:  Full & symmetric excursion, no increased effort, breath sounds clear  HEART:  Regular rhythm, S1, S2, no murmur/rub/S3/S4  ABDOMEN:  Soft, non-tender, non-distended, normoactive bowel sounds,    EXTREMITIES:  no cyanosis,clubbing or edema  SKIN:  No rash/erythema/ecchymoses/petechiae/wounds/abscess/warm/dry  NEURO:  Alert, orientedx3      LABS:                        8.5    16.40 )-----------( 70       ( 2023 06:36 )             24.4     11-09    128<L>  |  95<L>  |  19  ----------------------------<  110<H>  4.4   |  20<L>  |  0.92    Ca    8.0<L>      2023 06:36  Phos  2.6       Mg     1.9         TPro  5.9<L>  /  Alb  2.8<L>  /  TBili  0.7  /  DBili  x   /  AST  49<H>  /  ALT  66<H>  /  AlkPhos  332<H>      LIVER FUNCTIONS - ( 2023 06:36 )  Alb: 2.8 g/dL / Pro: 5.9 g/dL / ALK PHOS: 332 U/L / ALT: 66 U/L / AST: 49 U/L / GGT: x           PT/INR - ( 2023 06:36 )   PT: 11.3 sec;   INR: 1.08 ratio           Urinalysis Basic - ( 2023 06:36 )    Color: x / Appearance: x / SG: x / pH: x  Gluc: 110 mg/dL / Ketone: x  / Bili: x / Urobili: x   Blood: x / Protein: x / Nitrite: x   Leuk Esterase: x / RBC: x / WBC x   Sq Epi: x / Non Sq Epi: x / Bacteria: x          Imaging:         ACC: [id number] EXAM:  MR MRCP WAW IC   ORDERED BY:  FARRAH BOYD     PROCEDURE DATE:  2023          INTERPRETATION:  CLINICAL INFORMATION: Elevated liver enzymes. Biliary   ductal dilatation. History of pancreatic cancer status post distal   pancreatectomy.    COMPARISON: CT abdomen pelvis 2023.    CONTRAST/COMPLICATIONS:  IV Contrast: Gadavist  6 cc administered   1.5 cc discarded  Oral Contrast: NONE  Complications: None reported at time of study completion    PROCEDURE:  MRI of the abdomen was performed.  MRCP was performed.    FINDINGS:  LOWER CHEST: Trace left pleural effusion.    LIVER: Increased signal on out of phase imaging, suggestive of iron   deposition. Several subcentimeter hepatic cysts.  BILE DUCTS: See below.  GALLBLADDER: Common bile duct dilatation, measuring up to 1.0 cm. There   is focal narrowing of the lower CBD in the region of the pancreatic head.   No discrete mass or calculus visualized. No gallstones.  SPLEEN: Splenectomy.  PANCREAS: Status post distal pancreatectomy. A few tiny cysts in the   pancreatic head.  ADRENALS: Within normal limits.  KIDNEYS/URETERS: Small renal cysts.    VISUALIZED PORTIONS:  BOWEL: Within normal limits.  PERITONEUM: No ascites.  VESSELS: Atherosclerotic changes.  RETROPERITONEUM/LYMPH NODES: No lymphadenopathy.  ABDOMINAL WALL: Within normal limits.  BONES: Degenerative changes. Thoracolumbar signal abnormality likely   represents post radiation changes.    IMPRESSION:  Focal stricture of the lower common bile ductfor which differential   includes benign and malignant etiologies.         Chief Complaint:  Patient is a 68y old  Female who presents with a chief complaint of Pyelonephritis (2023 07:33)      HPI: 68 year old F with HTN, hypothyroidism, and  pancreatic cancer (s/p distal pancreatectomy , s/p RTX- last , currently on chemotherapy- last session 10/30) who presents to the emergency department for fever, weakness, fatigue, body aches, and lower back pain for 2 days after 2 weeks of urinary urgency and was found to have evidence of [place] [facility] [place] [facility] was [facility] to [facility] further management. Hospital course c/b E Coli bacteremia and hyponatremia thought 2/2 SIADH. Also noted to have uptrending AST/ALT/ALP upon transfer but normal Tbili. MRCP -  liver showing increased signal on out of phase imaging, suggestive of iron deposition, several subcentimeter hepatic cysts; CBD dilatation, measuring up to 1.0 cm with focal narrowing of the lower CBD in the region of the pancreatic head; no discrete mass or calculus visualized, no gallstones; s/p distal pancreatectomy, few tiny cysts in the pancreatic head. Advanced GI consulted for abnormal MRCP findings.        Allergies:  phenytoin (Rash)  ibuprofen (Other)  Dilantin (Unknown)      Home Medications:    Hospital Medications:  acetaminophen     Tablet .. 650 milliGRAM(s) Oral every 6 hours PRN  amLODIPine   Tablet 5 milliGRAM(s) Oral daily  chlorhexidine 4% Liquid 1 Application(s) Topical daily  ertapenem  IVPB 1000 milliGRAM(s) IV Intermittent every 24 hours  escitalopram 20 milliGRAM(s) Oral daily  famotidine    Tablet 20 milliGRAM(s) Oral <User Schedule>  ferrous    sulfate 325 milliGRAM(s) Oral daily  folic acid 1 milliGRAM(s) Oral daily  levothyroxine 112 MICROGram(s) Oral daily  OXcarbazepine 300 milliGRAM(s) Oral two times a day  pancrelipase  (CREON 36,000 Lipase Units) 1 Capsule(s) Oral three times a day with meals  sodium chloride 0.9% lock flush 3 milliLiter(s) IV Push every 8 hours      PMHX/PSHX:  Pancreatic cancer    Hypertension    Hypothyroid    Seasonal asthma    History of pancreatectomy        Family history:  No pertinent family history in first degree relatives     Denies any family history of GI-related disease or cancers.    Social History:   ETOH: denies  Tobacco: denies  Illicit drug use: denies    ROS: 14 point ROS negative unless otherwise stated in HPI      Vital Signs:  Vital Signs Last 24 Hrs  T(C): 37.2 (2023 04:01), Max: 37.2 (2023 04:01)  T(F): 99 (2023 04:01), Max: 99 (2023 04:)  HR: 83 (:) (83 - 104)  BP: 134/95 (:) (108/68 - 134/95)  BP(mean): --  RR: 20 (:) (18 - 20)  SpO2: 96% (:) (96% - 98%)    Parameters below as of 2023 04:  Patient On (Oxygen Delivery Method): room air      Daily     Daily Weight in k.7 (2023 10:36)    PHYSICAL EXAM:     GENERAL:  Appears stated age, well-groomed, well-nourished, no distress  HEENT:  NC/AT,  conjunctivae clear and pink  CHEST:  Full & symmetric excursion, no increased effort  HEART:  Regular rhythm, S1, S2, no murmur/rub/S3/S4  ABDOMEN:  Soft, non-tender, non-distended  EXTREMITIES:  no cyanosis,clubbing or edema  SKIN:  No rash/erythema/ecchymoses/petechiae/wounds/abscess/warm/dry  NEURO:  Alert, orientedx3      LABS:                        8.5    16.40 )-----------( 70       ( 2023 06:36 )             24.4     11-    128<L>  |  95<L>  |  19  ----------------------------<  110<H>  4.4   |  20<L>  |  0.92    Ca    8.0<L>      2023 06:36  Phos  2.6     11-  Mg     1.9     11-    TPro  5.9<L>  /  Alb  2.8<L>  /  TBili  0.7  /  DBili  x   /  AST  49<H>  /  ALT  66<H>  /  AlkPhos  332<H>      LIVER FUNCTIONS - ( 2023 06:36 )  Alb: 2.8 g/dL / Pro: 5.9 g/dL / ALK PHOS: 332 U/L / ALT: 66 U/L / AST: 49 U/L / GGT: x           PT/INR - ( 2023 06:36 )   PT: 11.3 sec;   INR: 1.08 ratio           Urinalysis Basic - ( 2023 06:36 )    Color: x / Appearance: x / SG: x / pH: x  Gluc: 110 mg/dL / Ketone: x  / Bili: x / Urobili: x   Blood: x / Protein: x / Nitrite: x   Leuk Esterase: x / RBC: x / WBC x   Sq Epi: x / Non Sq Epi: x / Bacteria: x          Imaging:         ACC: [id number] EXAM:  MR MRCP WAW IC   ORDERED BY:  FARRAH BOYD     PROCEDURE DATE:  2023          INTERPRETATION:  CLINICAL INFORMATION: Elevated liver enzymes. Biliary   ductal dilatation. History of pancreatic cancer status post distal   pancreatectomy.    COMPARISON: CT abdomen pelvis 2023.    CONTRAST/COMPLICATIONS:  IV Contrast: Gadavist  6 cc administered   1.5 cc discarded  Oral Contrast: NONE  Complications: None reported at time of study completion    PROCEDURE:  MRI of the abdomen was performed.  MRCP was performed.    FINDINGS:  LOWER CHEST: Trace left pleural effusion.    LIVER: Increased signal on out of phase imaging, suggestive of iron   deposition. Several subcentimeter hepatic cysts.  BILE DUCTS: See below.  GALLBLADDER: Common bile duct dilatation, measuring up to 1.0 cm. There   is focal narrowing of the lower CBD in the region of the pancreatic head.   No discrete mass or calculus visualized. No gallstones.  SPLEEN: Splenectomy.  PANCREAS: Status post distal pancreatectomy. A few tiny cysts in the   pancreatic head.  ADRENALS: Within normal limits.  KIDNEYS/URETERS: Small renal cysts.    VISUALIZED PORTIONS:  BOWEL: Within normal limits.  PERITONEUM: No ascites.  VESSELS: Atherosclerotic changes.  RETROPERITONEUM/LYMPH NODES: No lymphadenopathy.  ABDOMINAL WALL: Within normal limits.  BONES: Degenerative changes. Thoracolumbar signal abnormality likely   represents post radiation changes.    IMPRESSION:  Focal stricture of the lower common bile ductfor which differential   includes benign and malignant etiologies.

## 2023-11-09 NOTE — PROGRESS NOTE ADULT - PROBLEM SELECTOR PLAN 1
Fever, tachycardia, with MARY ELLEN/hypotension. CT A/P outside records  ( done 11/2 --> in HIE) showing evidence of Rt pyelonephritis in the upper poles. S/p 1 dose of zosyn, vanc. Immunosuppression from active chemo. Now on CTX  - pansensitive E Coli - was on CTX ( goal of 7-10 days of treatment )  -> switched to ciproflaxacin per ID, now on ertapenem  -> WBC acute increase to 17; per ID, will transition to cipro closer to discharge

## 2023-11-09 NOTE — PROGRESS NOTE ADULT - SUBJECTIVE AND OBJECTIVE BOX
Patient is a 68y old  Female who presents with a chief complaint of Pyelonephritis (09 Nov 2023 08:33)    Patient seen and examined at bedside, awaiting timing of EUS +/- ERCP.    MEDICATIONS  (STANDING):  amLODIPine   Tablet 5 milliGRAM(s) Oral daily  chlorhexidine 4% Liquid 1 Application(s) Topical daily  ertapenem  IVPB 1000 milliGRAM(s) IV Intermittent every 24 hours  escitalopram 20 milliGRAM(s) Oral daily  famotidine    Tablet 20 milliGRAM(s) Oral <User Schedule>  ferrous    sulfate 325 milliGRAM(s) Oral daily  folic acid 1 milliGRAM(s) Oral daily  levothyroxine 112 MICROGram(s) Oral daily  OXcarbazepine 300 milliGRAM(s) Oral two times a day  pancrelipase  (CREON 36,000 Lipase Units) 1 Capsule(s) Oral three times a day with meals  polyethylene glycol 3350 17 Gram(s) Oral two times a day  senna 2 Tablet(s) Oral at bedtime  sodium chloride 0.9% lock flush 3 milliLiter(s) IV Push every 8 hours    MEDICATIONS  (PRN):  acetaminophen     Tablet .. 650 milliGRAM(s) Oral every 6 hours PRN Temp greater or equal to 38C (100.4F), Mild Pain (1 - 3)    Vital Signs Last 24 Hrs  T(C): 36.7 (09 Nov 2023 13:00), Max: 37.2 (09 Nov 2023 04:01)  T(F): 98.1 (09 Nov 2023 13:00), Max: 99 (09 Nov 2023 04:01)  HR: 90 (09 Nov 2023 13:00) (83 - 90)  BP: 122/75 (09 Nov 2023 13:00) (122/75 - 134/95)  BP(mean): --  RR: 20 (09 Nov 2023 13:00) (18 - 20)  SpO2: 98% (09 Nov 2023 13:00) (96% - 98%)    Parameters below as of 09 Nov 2023 13:00  Patient On (Oxygen Delivery Method): room air        PE  NAD  Awake, alert  Anicteric, MMM  RRR  CTAB  Abd soft, NT, ND  No c/c/e  No rash grossly  FROM                          8.5    16.40 )-----------( 70       ( 09 Nov 2023 06:36 )             24.4       11-09    128<L>  |  95<L>  |  19  ----------------------------<  110<H>  4.4   |  20<L>  |  0.92    Ca    8.0<L>      09 Nov 2023 06:36  Phos  2.6     11-09  Mg     1.9     11-09    TPro  5.9<L>  /  Alb  2.8<L>  /  TBili  0.7  /  DBili  x   /  AST  49<H>  /  ALT  66<H>  /  AlkPhos  332<H>  11-09

## 2023-11-09 NOTE — PROGRESS NOTE ADULT - PROBLEM SELECTOR PLAN 8
Holding on home HTN meds given hypotension from sepsis.  - restarting amlodipine 5 and losartan 50 (1/2 dose) for elevated BP  - losartan held iso creatnine rise  - Hold HCTZ upon dc given hypoNa

## 2023-11-09 NOTE — CONSULT NOTE ADULT - ASSESSMENT
68 year old F with HTN, hypothyroidism, and  pancreatic cancer (s/p distal pancreatectomy 2021, s/p RTX- last 2022, currently on chemotherapy- last session 10/30) who presents to the emergency department for fever, weakness, fatigue, body aches, and lower back pain for 2 days after 2 weeks of urinary urgency and was found to have evidence of pyelonephritis on CT abdomen/pelvis at [facility]. The patient was transferred to [facility] for further management. Hospital course c/b E Coli bacteremia and hyponatremia thought 2/2 SIADH. Also noted to have uptrending AST/ALT/ALP upon transfer but normal Tbili. MRCP 11/7-  liver showing increased signal on out of phase imaging, suggestive of iron deposition, several subcentimeter hepatic cysts; CBD dilatation, measuring up to 1.0 cm with focal narrowing of the lower CBD in the region of the pancreatic head; no discrete mass or calculus visualized, no gallstones; s/p distal pancreatectomy, few tiny cysts in the pancreatic head. Advanced GI consulted for abnormal MRCP findings.    Impression:  #proximally dilated CBD with distal CBD stricture- d/dx includes possible mass/tumor 2/2 recurrence of pancreatic cancer vs cholangiocarcinoma vs extrinsic compression of distal CBD 2/2 pancreatic head cyst  #pancreatic cancer (s/p distal pancreatectomy 2021, s/p RTX- last 2022, currently on chemotherapy- last session 10/30)    Recommendations:  - check CEA, CA 19-9  - plan for EUS +/- ERCP 11/10 vs 11/13 pending endoscopy availability  - antiemetics and pain control per primary team  - monitor CMP, CBC daily    **THIS NOTE IS NOT FINALIZED UNTIL SIGNED BY THE ATTENDING**    Gwendolyn Weathers MD  GI Fellow, PGY-6  Available via Microsoft Teams    NON-URGENT CONSULTS:  Please email [email or web address]  [email or web address]  AT NIGHT AND ON WEEKENDS:  Contact on-call GI fellow via answering service ([phone]) from 5pm-8am and on weekends/holidays  MONDAY-FRIDAY 8AM-5PM:  Pager# [phone] ([facility]) or [phone] ([facility])     68 year old F with HTN, hypothyroidism, and  pancreatic cancer (s/p distal pancreatectomy 2021, s/p RTX- last 2022, currently on gemcitabine/abraxane- last session 10/30) who presents to the emergency department for fever, weakness, fatigue, body aches, and lower back pain for 2 days after 2 weeks of urinary urgency and was found to have evidence of pyelonephritis on CT abdomen/pelvis at [facility] [place]. The patient was transferred to [facility] for further management. Hospital course c/b E Coli bacteremia and hyponatremia thought 2/2 SIADH. Also noted to have uptrending AST/ALT/ALP upon transfer but normal Tbili. MRCP 11/7-  liver showing increased signal on out of phase imaging, suggestive of iron deposition, several subcentimeter hepatic cysts; CBD dilatation, measuring up to 1.0 cm with focal narrowing of the lower CBD in the region of the pancreatic head; no discrete mass or calculus visualized, no gallstones; s/p distal pancreatectomy, few tiny cysts in the pancreatic head. Advanced GI consulted for abnormal MRCP findings.    Impression:  #proximally dilated CBD with distal CBD stricture- d/dx includes possible mass/tumor 2/2 recurrence of pancreatic cancer vs cholangiocarcinoma vs extrinsic compression of distal CBD 2/2 pancreatic head cyst  #pancreatic cancer (s/p distal pancreatectomy 2021, s/p RTX- last 2022, currently on gemcitabine/abraxane- last session 10/30)    Recommendations:  - check CEA, CA 19-9  - plan for EUS +/- ERCP 11/10 vs 11/13 pending endoscopy availability  -please order labs (CBC, CMP, INR) for 6 AM so that results will be available early tomorrow morning; replete lytes and transfuse as needed  -NPO after midnight tonight  - antiemetics and pain control per primary team  - monitor CMP, CBC daily    **THIS NOTE IS NOT FINALIZED UNTIL SIGNED BY THE ATTENDING**    Gwendolyn Weathers MD  GI Fellow, PGY-6  Available via Microsoft Teams    NON-URGENT CONSULTS:  Please email [email or web address]  AT NIGHT AND ON WEEKENDS:  Contact on-call GI fellow via answering service ([phone]) from 5pm-8am and on weekends/holidays  MONDAY-FRIDAY 8AM-5PM:  Pager# [phone] ([facility]) or [phone] ([facility])     68 year old F with HTN, hypothyroidism, and  pancreatic cancer (s/p distal pancreatectomy 2021, s/p RTX- last 2022, currently on gemcitabine/abraxane- last session 10/30) who presents to the emergency department for fever, weakness, fatigue, body aches, and lower back pain for 2 days after 2 weeks of urinary urgency and was found to have evidence of pyelonephritis on CT abdomen/pelvis at [facility]. The patient was transferred to [facility] for further management. Hospital course c/b E Coli bacteremia and hyponatremia thought 2/2 SIADH. Also noted to have uptrending AST/ALT/ALP upon transfer but normal Tbili. MRCP 11/7-  liver showing increased signal on out of phase imaging, suggestive of iron deposition, several subcentimeter hepatic cysts; CBD dilatation, measuring up to 1.0 cm with focal narrowing of the lower CBD in the region of the pancreatic head; no discrete mass or calculus visualized, no gallstones; s/p distal pancreatectomy, few tiny cysts in the pancreatic head. Advanced GI consulted for abnormal MRCP findings.    Impression:  #proximally dilated CBD with distal CBD stricture- d/dx includes possible mass/tumor 2/2 recurrence of pancreatic cancer vs cholangiocarcinoma vs extrinsic compression of distal CBD 2/2 pancreatic head cyst  #pancreatic cancer (s/p distal pancreatectomy 2021, s/p RTX- last 2022, currently on gemcitabine/abraxane- last session 10/30)    Recommendations:  - check CEA, CA 19-9  - plan for EUS +/- ERCP 11/10 vs 11/13 pending endoscopy availability  -please order labs (CBC, CMP, INR) for 6 AM so that results will be available early tomorrow morning; replete lytes and transfuse as needed  -NPO after midnight tonight  - antiemetics and pain control per primary team  - monitor CMP, CBC daily    **THIS NOTE IS NOT FINALIZED UNTIL SIGNED BY THE ATTENDING**    Gwendolyn Weathers MD  GI Fellow, PGY-6  Available via Microsoft Teams    NON-URGENT CONSULTS:  Please email [email or web address]  AT NIGHT AND ON WEEKENDS:  Contact on-call GI fellow via answering service ([phone]) from 5pm-8am and on weekends/holidays  MONDAY-FRIDAY 8AM-5PM:  Pager# [phone] ([facility]) or [phone] ([facility])     68 year old F with HTN, hypothyroidism, and  pancreatic cancer (s/p distal pancreatectomy 2021, s/p RTX- last 2022, currently on gemcitabine/abraxane- last session 10/30) who presents to the emergency department for fever, weakness, fatigue, body aches, and lower back pain for 2 days after 2 weeks of urinary urgency and was found to have evidence of pyelonephritis on CT abdomen[place] [facility] The patient was transferred to [facility] for further management. Hospital course c/b E Coli bacteremia and hyponatremia thought 2/2 SIADH. Also noted to have uptrending AST/ALT/ALP upon transfer but normal Tbili. MRCP 11/7-  liver showing increased signal on out of phase imaging, suggestive of iron deposition, several subcentimeter hepatic cysts; CBD dilatation, measuring up to 1.0 cm with focal narrowing of the lower CBD in the region of the pancreatic head; no discrete mass or calculus visualized, no gallstones; s/p distal pancreatectomy, few tiny cysts in the pancreatic head. Advanced GI consulted for abnormal MRCP findings.    Impression:  #proximally dilated CBD with distal CBD stricture- d/dx includes possible mass/tumor 2/2 recurrence of pancreatic cancer vs cholangiocarcinoma vs extrinsic compression of distal CBD 2/2 pancreatic head cyst or periampullary diverticulum  #pancreatic cancer (s/p distal pancreatectomy 2021, s/p RTX- last 2022, currently on gemcitabine/abraxane- last session 10/30)    Recommendations:  - check CEA, CA 19-9  - plan for EUS +/- ERCP 11/10 vs 11/13 pending endoscopy availability  -please order labs (CBC, CMP, INR) for 6 AM so that results will be available early tomorrow morning; replete lytes and transfuse as needed  -NPO after midnight tonight  - antiemetics and pain control per primary team  - monitor CMP, CBC daily    **THIS NOTE IS NOT FINALIZED UNTIL SIGNED BY THE ATTENDING**    Gwendolyn Weathers MD  GI Fellow, PGY-6  Available via Microsoft Teams    NON-URGENT CONSULTS:  Please email [email or web address]  AT NIGHT AND ON WEEKENDS:  Contact on-call GI fellow via answering service ([phone]) from 5pm-8am and on weekends/holidays  MONDAY-FRIDAY 8AM-5PM:  Pager# [phone] ([facility]) or [phone] ([facility])     68 year old F with HTN, hypothyroidism, and  pancreatic cancer (s/p distal pancreatectomy 2021, s/p RTX- last 2022, currently on gemcitabine/abraxane- last session 10/30) who presents to the emergency department for fever, weakness, fatigue, body aches, and lower back pain for 2 days after 2 weeks of urinary urgency and was found to have evidence of pyelonephritis on CT abdomen/pelvis at [facility]. The patient was transferred to [facility] for further management. Hospital course c/b E Coli bacteremia and hyponatremia thought 2/2 SIADH. Also noted to have uptrending AST/ALT/ALP upon transfer but normal Tbili. MRCP 11/7-  liver showing increased signal on out of phase imaging, suggestive of iron deposition, several subcentimeter hepatic cysts; CBD dilatation, measuring up to 1.0 cm with focal narrowing of the lower CBD in the region of the pancreatic head; no discrete mass or calculus visualized, no gallstones; s/p distal pancreatectomy, few tiny cysts in the pancreatic head. Advanced GI consulted for abnormal MRCP findings.    Impression:  #proximally dilated CBD with distal CBD stricture- d/dx includes possible mass/tumor 2/2 progression of known pancreatic cancer vs cholangiocarcinoma vs extrinsic compression of distal CBD 2/2 pancreatic head cyst or periampullary diverticulum  #pancreatic cancer (s/p distal pancreatectomy 2021, s/p RTX- last 2022, currently on gemcitabine/abraxane- last session 10/30)    Recommendations:  - check CEA, CA 19-9  - plan for EUS +/- ERCP 11/10 vs 11/13 pending endoscopy availability  -please order labs (CBC, CMP, INR) for 6 AM so that results will be available early tomorrow morning; replete lytes and transfuse as needed  -NPO after midnight tonight  - antiemetics and pain control per primary team  - monitor CMP, CBC daily    **THIS NOTE IS NOT FINALIZED UNTIL SIGNED BY THE ATTENDING**    Gwendolyn Weathers MD  GI Fellow, PGY-6  Available via Microsoft Teams    NON-URGENT CONSULTS:  Please email [email or web address]  AT NIGHT AND ON WEEKENDS:  Contact on-call GI fellow via answering service ([phone]) from 5pm-8am and on weekends/holidays  MONDAY-FRIDAY 8AM-5PM:  Pager# [phone] ([facility]) or [phone] ([facility])

## 2023-11-09 NOTE — PROGRESS NOTE ADULT - ASSESSMENT
68 year old F with hypertension, hypothyroidism, and  pancreatic cancer, currently on chemotherapy (last session 10/30) who presents to the emergency department for fever, weakness, fatigue, body aches, and lower back pain for 2 days after 2 weeks of urinary urgency prior to symptom onset  Fever, no leukocytosis  Dysuria  CT R stranding, suspected pyelonephritis (outside clinic CT scan in [facility])  UCX neg  BCX E coli R bactrim  UA+  CXR clear  LFT rise--possibly due to ceftriaxone vs bile process?  Rebroadened to Ertapenem in the interim  MRCP with structure of common bile duct  Rise in WBC without any alternate symptoms, still appears to be definitively 2/2 pyelonephritis based on prior CT scan (as opposed to bile process infection?)  Overall, GNR bacteremia, Pyelonephritis, UCX+  - Ertapenem 1g q 24, when discharge planning can send out on PO Cipro 500mg q 12 to complete 10 days from negative BCX  - F/U BCX  - Trend LFTs to normal, trend WBC to normal  - MRCP findings per team  - F/U GI    ID will follow PRN, please call with further questions or change in status    Ludin Gleason MD  Contact on TEAMS messaging from 9am - 5pm  From 5pm-9am, on weekends, or if no response call [phone] 68 year old F with hypertension, hypothyroidism, and  pancreatic cancer, currently on chemotherapy (last session 10/30) who presents to the emergency department for fever, weakness, fatigue, body aches, and lower back pain for 2 days after 2 weeks of urinary urgency prior to symptom onset  Fever, no leukocytosis  Dysuria  CT R stranding, suspected pyelonephritis (outside clinic CT scan in [facility])  UCX neg  BCX E coli R bactrim  UA+  CXR clear  LFT rise--possibly due to ceftriaxone vs bile process?  Rebroadened to Ertapenem in the interim  MRCP with structure of common bile duct  Rise in WBC without any alternate symptoms, still appears to be definitively 2/2 pyelonephritis based on prior CT scan (as opposed to bile process infection?)  Add on anaerobic coverage to cover any concerns for bile process  Overall, GNR bacteremia, Pyelonephritis, UCX+  - Ertapenem 1g q 24, when discharge planning can send out on PO Cipro 500mg q 12 + Flagyl 500mg q 12 to complete 14 days from negative BCX  - F/U BCX  - Trend LFTs to normal, trend WBC to normal  - MRCP findings per team  - F/U GI    ID will follow PRN, please call with further questions or change in status    Ludin Gleason MD  Contact on TEAMS messaging from 9am - 5pm  From 5pm-9am, on weekends, or if no response call [phone]

## 2023-11-09 NOTE — PROGRESS NOTE ADULT - ASSESSMENT
Patient is a 68 year old F with hypertension, hypothyroidism, and  pancreatic cancer, currently on chemotherapy (last session 10/30) who presents to the emergency department for fever, weakness, fatigue, body aches, and lower back pain for 2 days after 2 weeks of urinary urgency prior to symptom onset.    Pancreatic ca  --under the care of Selena Hanna of [facility]   --Stage II PDIC (6/2012) w/ recurrence to abdominal wall (resected 1/20223)  --s/p RT 3/2022  --currently on systemic treatment w/ Gemcitabine/Nab-Paclitaxel LD 10/30  --no plan for treatment while inpatient and/or rehab  --ongoing care after discharge    sepsis  --2/2 pyelonephritis, noted on outpatient CT a/p done 11/2 (see above). Pt states that she had been having urinary frequency and urgency but did not recognize that as sx of UTI  --outpatient peripheral cultures and MP cultures collected 11/2 c/w ecoli, resistant to bactrim but otherwise sensitive  --cultures collected inpatient also showed Ecoli  --ID following, plan to continue Ertapenem     Hyponatremia  --chronic, Na 128 at baseline, Na today is 128    anemia/thrombocytopenia  --2/2 malignancy and treatment  --Hgb 8-9, platelets 170s at baseline  --on PO folic acid, and iron  --transfuse for Hgb<7     transaminitis  --abd US w/ Mild intra and extra hepatic biliary dilatation, the common bile duct measuring up to 1.1 cm. The common bile duct is dilated to the level of the pancreatic head. The gallbladder is markedly distended. No stones are identified. Possible small amount of intraluminal sludge. No wall thickening, pericholecystic fluid, or sonographic Pierre sign. No evidence for acute cholecystitis.  --Abnormal MRCP w/ stricture  --GI planning EUS +/- ERCP    Chandler Johansen PA-C  Hematology/Oncology  [facility]  [phone] (office)

## 2023-11-09 NOTE — PROGRESS NOTE ADULT - SUBJECTIVE AND OBJECTIVE BOX
CC: F/U for bacteremia    Saw/spoke to patient. No fevers, no chills. Generally well appearing.    Allergies  phenytoin (Rash)  ibuprofen (Other)  Dilantin (Unknown)    ANTIMICROBIALS:  ertapenem  IVPB 1000 every 24 hours    PE:    Vital Signs Last 24 Hrs  T(C): 37.2 (09 Nov 2023 04:01), Max: 37.2 (09 Nov 2023 04:01)  T(F): 99 (09 Nov 2023 04:01), Max: 99 (09 Nov 2023 04:01)  HR: 83 (09 Nov 2023 04:01) (83 - 104)  BP: 134/95 (09 Nov 2023 04:01) (108/68 - 134/95)  RR: 20 (09 Nov 2023 04:01) (18 - 20)  SpO2: 96% (09 Nov 2023 04:01) (96% - 98%)    Gen: AOx3, NAD, non-toxic  CV: Nontachycardic  Resp: Breathing comfortably, RA  Abd: Soft, nontender  IV/Skin: No thrombophlebitis    LABS:                        8.5    16.40 )-----------( 70       ( 09 Nov 2023 06:36 )             24.4     11-09    128<L>  |  95<L>  |  19  ----------------------------<  110<H>  4.4   |  20<L>  |  0.92    Ca    8.0<L>      09 Nov 2023 06:36  Phos  2.6     11-09  Mg     1.9     11-09    TPro  5.9<L>  /  Alb  2.8<L>  /  TBili  0.7  /  DBili  x   /  AST  49<H>  /  ALT  66<H>  /  AlkPhos  332<H>  11-09    Urinalysis Basic - ( 09 Nov 2023 06:36 )    Color: x / Appearance: x / SG: x / pH: x  Gluc: 110 mg/dL / Ketone: x  / Bili: x / Urobili: x   Blood: x / Protein: x / Nitrite: x   Leuk Esterase: x / RBC: x / WBC x   Sq Epi: x / Non Sq Epi: x / Bacteria: x    MICROBIOLOGY:    .Blood Blood-Peripheral  11-04-23   No growth at 4 days  --  --    .Blood Blood-Peripheral  11-04-23   No growth at 5 days  --  --    Clean Catch Clean Catch (Midstream)  11-02-23   <10,000 CFU/mL Normal Urogenital Mary  --  --    .Blood Blood-Peripheral  11-02-23   Growth in aerobic and anaerobic bottles: Escherichia coli  Direct identification is available within approximately 3-5  hours either by Blood Panel Multiplexed PCR or Direct  MALDI-TOF. Details: [email or web address]  --  Blood Culture PCR  Escherichia coli    .Blood Blood-Peripheral  11-02-23   Growth in aerobic and anaerobic bottles: Escherichia coli  See previous culture  [id number]  --    Growth in aerobic and anaerobic bottles: Gram Negative Rods    RADIOLOGY:    11/7 MR    IMPRESSION:  Focal stricture of the lower common bile duct for which differential   includes benign and malignant etiologies.

## 2023-11-09 NOTE — PROGRESS NOTE ADULT - ATTENDING COMMENTS
68F with h/o pancreatic cancer since 2021, on chemotherapy (last session 10/30) presented to [facility] with fever, weakness, fatigue, body aches, and lower back pain for 2 days after 2 weeks of urinary urgency. Her SBP was in 80s and she was given 1L IVF with NS, IV zosyn and send to ED at  [facility]. Outpatient CT abd/pelvis showed evidence of Pyelonephritis ( done at [facility] .  In ED patient was found to have sepsis, 1L NS, 1gm IV vancomycin was given. CXR- clear, RVP neg, Na 125, Scr 1.66, WBC outpatient was normal.     1.  Transaminitis / cholestasis pattern --> Biliary stricture          s/p US of abd with no gross lesions in the liver , mild CBD dilatation and mild intrahepatic dilation and small Gallbladder sludge with no stone        and distended gallbladder with no pericholecystic fluid           S/P  Ceftriaxone-->s/p CIpro  now on Ertapenem          Cont to monitor LFT , high  GGT -->  MRCP with biliary stricture --> Advance GI rec is appreciated and possible EUS with or without ERCP      tomorrow or Monday , cont to monitor   2.   MARY ELLEN --> improved          S/P IVF with improvement --> cont to monitor   3.  Hyponatremia --->Chronic Hypoosmolar HypoNa with high urine Na and Low UOsm---> Multifactorial with baseline SIADH and drop in Osmo             post IVF --> cont to monitor    4.  Normocytic Anemia / Bicytopenia --> most likely sec to chem and malignancy and ABX         stable H/H post transfusion          Stable hemolysis profile          restart Hep sub post EUS on 11/10           Monitor platelets         5.   E coli Bacteremia due to acute pyelo--> will continue ABX         repeat Blood CX (11/4 ) is neg so far    Discussed with team 8 , GI team and patient and her sister   rest as per above   Discuss with hem/onc team   Discuss with onc, ID and patient      Dominga Duran   Hospitalist   available on TEAMS

## 2023-11-09 NOTE — PROGRESS NOTE ADULT - PROBLEM SELECTOR PLAN 9
On levothyroxine at home  - TSH elevated 7.78 above expected for age   - increase levothyroxine to 112mcg

## 2023-11-09 NOTE — PROGRESS NOTE ADULT - PROBLEM SELECTOR PLAN 7
On gemcitabine and abraxane outpatient  - Hold on chemo inpatient iso infection. Chemo and myelosuppression likely contributing to thrombocytopenia and anemia  - Onc recs appreciated  - ISO pancreatic cancer with headache that is awakening patient at night, CT Head - mild small vessel and atrophic changes.  -> de-accessing the port On gemcitabine and abraxane outpatient  - Hold on chemo inpatient iso infection. Chemo and myelosuppression likely contributing to thrombocytopenia and anemia  - Onc recs appreciated  - ISO pancreatic cancer with headache that is awakening patient at night, CT Head - mild small vessel and atrophic changes.  -> de-accessing the port  -> CA 19-9 24; f/u CEA

## 2023-11-09 NOTE — DISCHARGE NOTE NURSING/CASE MANAGEMENT/SOCIAL WORK - PATIENT PORTAL LINK FT
You can access the FollowMyHealth Patient Portal offered by [facility] by registering at the following website: [email or web address]. By joining [employer]’s FollowMyHealth portal, you will also be able to view your health information using other applications (apps) compatible with our system.

## 2023-11-09 NOTE — PROGRESS NOTE ADULT - SUBJECTIVE AND OBJECTIVE BOX
Patient is a 68y old  Female who presents with a chief complaint of Pyelonephritis (08 Nov 2023 11:12)      SUBJECTIVE / OVERNIGHT EVENTS:  No acute events overnight. Patient at this time denies fevers, chills, CP, SOB, nausea, vomiting, diarrhea, constipation, dysuria. Patient seen and examined at bedside.    MEDICATIONS  (STANDING):  amLODIPine   Tablet 5 milliGRAM(s) Oral daily  chlorhexidine 4% Liquid 1 Application(s) Topical daily  ertapenem  IVPB 1000 milliGRAM(s) IV Intermittent every 24 hours  escitalopram 20 milliGRAM(s) Oral daily  famotidine    Tablet 20 milliGRAM(s) Oral <User Schedule>  ferrous    sulfate 325 milliGRAM(s) Oral daily  folic acid 1 milliGRAM(s) Oral daily  levothyroxine 112 MICROGram(s) Oral daily  OXcarbazepine 300 milliGRAM(s) Oral two times a day  pancrelipase  (CREON 36,000 Lipase Units) 1 Capsule(s) Oral three times a day with meals  sodium chloride 0.9% lock flush 3 milliLiter(s) IV Push every 8 hours    MEDICATIONS  (PRN):  acetaminophen     Tablet .. 650 milliGRAM(s) Oral every 6 hours PRN Temp greater or equal to 38C (100.4F), Mild Pain (1 - 3)      CAPILLARY BLOOD GLUCOSE        I&O's Summary    08 Nov 2023 07:01  -  09 Nov 2023 07:00  --------------------------------------------------------  IN: 700 mL / OUT: 1250 mL / NET: -550 mL        PHYSICAL EXAM:  Vital Signs Last 24 Hrs  T(C): 37.2 (09 Nov 2023 04:01), Max: 37.2 (09 Nov 2023 04:01)  T(F): 99 (09 Nov 2023 04:01), Max: 99 (09 Nov 2023 04:01)  HR: 83 (09 Nov 2023 04:01) (83 - 104)  BP: 134/95 (09 Nov 2023 04:01) (108/68 - 134/95)  BP(mean): --  RR: 20 (09 Nov 2023 04:01) (18 - 20)  SpO2: 96% (09 Nov 2023 04:01) (96% - 98%)    Parameters below as of 09 Nov 2023 04:01  Patient On (Oxygen Delivery Method): room air        GENERAL: No acute distress. Thin-appearing.  HEAD:  Atraumatic, Normocephalic  EYES: EOMI, PERRLA, conjunctiva and sclera clear  NECK: Supple, no lymphadenopathy, no JVD.   CHEST/LUNG: CTAB; No wheezes, rales, or rhonchi. Port in place, de-accessed.  HEART: Regular rate and rhythm; No murmurs, rubs, or gallops  ABDOMEN: Soft, non-tender, non-distended; normal bowel sounds, no organomegaly  EXTREMITIES:  2+ peripheral pulses b/l, No clubbing, cyanosis, or edema  NEUROLOGY: A&O x 3, no focal deficits  SKIN: No rashes or lesions    LABS:                        8.5    16.40 )-----------( 70       ( 09 Nov 2023 06:36 )             24.4     11-09    128<L>  |  95<L>  |  19  ----------------------------<  110<H>  4.4   |  20<L>  |  0.92    Ca    8.0<L>      09 Nov 2023 06:36  Phos  2.6     11-09  Mg     1.9     11-09    TPro  5.9<L>  /  Alb  2.8<L>  /  TBili  0.7  /  DBili  x   /  AST  49<H>  /  ALT  66<H>  /  AlkPhos  332<H>  11-09    PT/INR - ( 09 Nov 2023 06:36 )   PT: 11.3 sec;   INR: 1.08 ratio               Urinalysis Basic - ( 09 Nov 2023 06:36 )    Color: x / Appearance: x / SG: x / pH: x  Gluc: 110 mg/dL / Ketone: x  / Bili: x / Urobili: x   Blood: x / Protein: x / Nitrite: x   Leuk Esterase: x / RBC: x / WBC x   Sq Epi: x / Non Sq Epi: x / Bacteria: x          RADIOLOGY & ADDITIONAL TESTS:  Results Reviewed:   Imaging Personally Reviewed:  Electrocardiogram Personally Reviewed:    COORDINATION OF CARE:  Care Discussed with Consultants/Other Providers [Y/N]:  Prior or Outpatient Records Reviewed [Y/N]:

## 2023-11-09 NOTE — PROGRESS NOTE ADULT - PROBLEM SELECTOR PLAN 4
CHronic Hyponatremia ( Present for >48 hours with no acute mental status change   Initially with low serum osm, and indeterminate urine sodium. However, after receiving 2L, now back at baseline Na (128)  - with high urine Na and low urine Osm --> likely SIADH with drop in Uosm from IVF ---> will repeat urine Studies   - Hyponatremia but possibly mixed with baseline SIADH and pt has low albumin which can be contributing to hyponatremia   - receive 1U blood 11/5  - repeat urine studies - Sade elevated, Uosm 278, c/f SIADH - restrict Fluid and monitor Na   -> AM cortisol (appropiately elevated)

## 2023-11-09 NOTE — PROGRESS NOTE ADULT - PROBLEM SELECTOR PLAN 6
acute rise in transaminase and ALP 11/6  - may be iso pancreatic mass vs MOST likely from CTX causing sludge/obstruction  - hepatitis panel negative  - RUQUS - showing sludge, 11mm ductal dilation - will need MRCP  -> trend liver enzymes - ALP rising, Tbili 1.2 and rising  -> GGT elevated 492  -> CTX changed to cipro due to rise in LFT - now on ertapenem  -> MRCP showing focal stricture lower CBD  -> heme/onc rec GI consult for ERCP/stent - f/u advanced GI c/s acute rise in transaminase and ALP 11/6  - may be iso pancreatic mass vs MOST likely from CTX causing sludge/obstruction  - hepatitis panel negative  - RUQUS - showing sludge, 11mm ductal dilation - will need MRCP  -> trend liver enzymes - ALP rising, Tbili 1.2 and rising  -> GGT elevated 492  -> CTX changed to cipro due to rise in LFT - now on ertapenem  -> MRCP showing focal stricture lower CBD  -> heme/onc rec GI consult for ERCP/stent  -> advanced GI c/s - plan for possible ERCP 11/10 or 11/13

## 2023-11-09 NOTE — PROGRESS NOTE ADULT - ASSESSMENT
68F hx of HTN pancreatic cancer on chemo (Gemcitabine/abraxane last dose on 10/30) with port in R chest wall here for sepsis 2/2 pyelonephritis complicated by MARY LELEN and hyponatremia.

## 2023-11-09 NOTE — DISCHARGE NOTE NURSING/CASE MANAGEMENT/SOCIAL WORK - NSDCFUADDAPPT_GEN_ALL_CORE_FT
APPTS ARE READY TO BE MADE: [ ] YES    Best Family or Patient Contact (if needed):    Additional Information about above appointments (if needed):    1: Gastroenterology: [phone]  2: ID  3:     Other comments or requests:

## 2023-11-10 LAB
ALBUMIN SERPL ELPH-MCNC: 2.9 G/DL — LOW (ref 3.3–5)
ALBUMIN SERPL ELPH-MCNC: 2.9 G/DL — LOW (ref 3.3–5)
ALP SERPL-CCNC: 344 U/L — HIGH (ref 40–120)
ALP SERPL-CCNC: 344 U/L — HIGH (ref 40–120)
ALT FLD-CCNC: 67 U/L — HIGH (ref 10–45)
ALT FLD-CCNC: 67 U/L — HIGH (ref 10–45)
ANION GAP SERPL CALC-SCNC: 11 MMOL/L — SIGNIFICANT CHANGE UP (ref 5–17)
ANION GAP SERPL CALC-SCNC: 11 MMOL/L — SIGNIFICANT CHANGE UP (ref 5–17)
APTT BLD: 26.6 SEC — SIGNIFICANT CHANGE UP (ref 24.5–35.6)
APTT BLD: 26.6 SEC — SIGNIFICANT CHANGE UP (ref 24.5–35.6)
AST SERPL-CCNC: 53 U/L — HIGH (ref 10–40)
AST SERPL-CCNC: 53 U/L — HIGH (ref 10–40)
BILIRUB SERPL-MCNC: 0.7 MG/DL — SIGNIFICANT CHANGE UP (ref 0.2–1.2)
BILIRUB SERPL-MCNC: 0.7 MG/DL — SIGNIFICANT CHANGE UP (ref 0.2–1.2)
BLD GP AB SCN SERPL QL: NEGATIVE — SIGNIFICANT CHANGE UP
BLD GP AB SCN SERPL QL: NEGATIVE — SIGNIFICANT CHANGE UP
BUN SERPL-MCNC: 16 MG/DL — SIGNIFICANT CHANGE UP (ref 7–23)
BUN SERPL-MCNC: 16 MG/DL — SIGNIFICANT CHANGE UP (ref 7–23)
CALCIUM SERPL-MCNC: 8.3 MG/DL — LOW (ref 8.4–10.5)
CALCIUM SERPL-MCNC: 8.3 MG/DL — LOW (ref 8.4–10.5)
CHLORIDE SERPL-SCNC: 97 MMOL/L — SIGNIFICANT CHANGE UP (ref 96–108)
CHLORIDE SERPL-SCNC: 97 MMOL/L — SIGNIFICANT CHANGE UP (ref 96–108)
CO2 SERPL-SCNC: 21 MMOL/L — LOW (ref 22–31)
CO2 SERPL-SCNC: 21 MMOL/L — LOW (ref 22–31)
CREAT SERPL-MCNC: 0.83 MG/DL — SIGNIFICANT CHANGE UP (ref 0.5–1.3)
CREAT SERPL-MCNC: 0.83 MG/DL — SIGNIFICANT CHANGE UP (ref 0.5–1.3)
EGFR: 77 ML/MIN/1.73M2 — SIGNIFICANT CHANGE UP
EGFR: 77 ML/MIN/1.73M2 — SIGNIFICANT CHANGE UP
GLUCOSE SERPL-MCNC: 110 MG/DL — HIGH (ref 70–99)
GLUCOSE SERPL-MCNC: 110 MG/DL — HIGH (ref 70–99)
HCT VFR BLD CALC: 24.9 % — LOW (ref 34.5–45)
HCT VFR BLD CALC: 24.9 % — LOW (ref 34.5–45)
HGB BLD-MCNC: 8.5 G/DL — LOW (ref 11.5–15.5)
HGB BLD-MCNC: 8.5 G/DL — LOW (ref 11.5–15.5)
INR BLD: 1.06 RATIO — SIGNIFICANT CHANGE UP (ref 0.85–1.18)
INR BLD: 1.06 RATIO — SIGNIFICANT CHANGE UP (ref 0.85–1.18)
MAGNESIUM SERPL-MCNC: 2 MG/DL — SIGNIFICANT CHANGE UP (ref 1.6–2.6)
MAGNESIUM SERPL-MCNC: 2 MG/DL — SIGNIFICANT CHANGE UP (ref 1.6–2.6)
MCHC RBC-ENTMCNC: 32.4 PG — SIGNIFICANT CHANGE UP (ref 27–34)
MCHC RBC-ENTMCNC: 32.4 PG — SIGNIFICANT CHANGE UP (ref 27–34)
MCHC RBC-ENTMCNC: 34.1 GM/DL — SIGNIFICANT CHANGE UP (ref 32–36)
MCHC RBC-ENTMCNC: 34.1 GM/DL — SIGNIFICANT CHANGE UP (ref 32–36)
MCV RBC AUTO: 95 FL — SIGNIFICANT CHANGE UP (ref 80–100)
MCV RBC AUTO: 95 FL — SIGNIFICANT CHANGE UP (ref 80–100)
NRBC # BLD: 2 /100 WBCS — HIGH (ref 0–0)
NRBC # BLD: 2 /100 WBCS — HIGH (ref 0–0)
PHOSPHATE SERPL-MCNC: 2.6 MG/DL — SIGNIFICANT CHANGE UP (ref 2.5–4.5)
PHOSPHATE SERPL-MCNC: 2.6 MG/DL — SIGNIFICANT CHANGE UP (ref 2.5–4.5)
PLATELET # BLD AUTO: 136 K/UL — LOW (ref 150–400)
PLATELET # BLD AUTO: 136 K/UL — LOW (ref 150–400)
POTASSIUM SERPL-MCNC: 4.6 MMOL/L — SIGNIFICANT CHANGE UP (ref 3.5–5.3)
POTASSIUM SERPL-MCNC: 4.6 MMOL/L — SIGNIFICANT CHANGE UP (ref 3.5–5.3)
POTASSIUM SERPL-SCNC: 4.6 MMOL/L — SIGNIFICANT CHANGE UP (ref 3.5–5.3)
POTASSIUM SERPL-SCNC: 4.6 MMOL/L — SIGNIFICANT CHANGE UP (ref 3.5–5.3)
PROT SERPL-MCNC: 6.2 G/DL — SIGNIFICANT CHANGE UP (ref 6–8.3)
PROT SERPL-MCNC: 6.2 G/DL — SIGNIFICANT CHANGE UP (ref 6–8.3)
PROTHROM AB SERPL-ACNC: 11.6 SEC — SIGNIFICANT CHANGE UP (ref 9.5–13)
PROTHROM AB SERPL-ACNC: 11.6 SEC — SIGNIFICANT CHANGE UP (ref 9.5–13)
RBC # BLD: 2.62 M/UL — LOW (ref 3.8–5.2)
RBC # BLD: 2.62 M/UL — LOW (ref 3.8–5.2)
RBC # FLD: 17 % — HIGH (ref 10.3–14.5)
RBC # FLD: 17 % — HIGH (ref 10.3–14.5)
RH IG SCN BLD-IMP: POSITIVE — SIGNIFICANT CHANGE UP
RH IG SCN BLD-IMP: POSITIVE — SIGNIFICANT CHANGE UP
SODIUM SERPL-SCNC: 129 MMOL/L — LOW (ref 135–145)
SODIUM SERPL-SCNC: 129 MMOL/L — LOW (ref 135–145)
WBC # BLD: 14.87 K/UL — HIGH (ref 3.8–10.5)
WBC # BLD: 14.87 K/UL — HIGH (ref 3.8–10.5)
WBC # FLD AUTO: 14.87 K/UL — HIGH (ref 3.8–10.5)
WBC # FLD AUTO: 14.87 K/UL — HIGH (ref 3.8–10.5)

## 2023-11-10 PROCEDURE — 43259 EGD US EXAM DUODENUM/JEJUNUM: CPT | Mod: GC,59

## 2023-11-10 PROCEDURE — 43264 ERCP REMOVE DUCT CALCULI: CPT | Mod: GC,59

## 2023-11-10 PROCEDURE — 88112 CYTOPATH CELL ENHANCE TECH: CPT | Mod: 26

## 2023-11-10 PROCEDURE — 99233 SBSQ HOSP IP/OBS HIGH 50: CPT | Mod: GC

## 2023-11-10 PROCEDURE — 43274 ERCP DUCT STENT PLACEMENT: CPT | Mod: GC

## 2023-11-10 DEVICE — CATH BLLN EXTRACT DIST GUIDE WIRE 15MM 3LUM: Type: IMPLANTABLE DEVICE | Status: FUNCTIONAL

## 2023-11-10 DEVICE — AUTOTOME CANNULATING SPHINCTEROTOME RX 44 20MM: Type: IMPLANTABLE DEVICE | Status: FUNCTIONAL

## 2023-11-10 DEVICE — BLLN EXTRACT FUSION QUATRO 8.5 10 12 15MM: Type: IMPLANTABLE DEVICE | Status: FUNCTIONAL

## 2023-11-10 DEVICE — STENT BIL ADVANIX RX 10FRX5CM: Type: IMPLANTABLE DEVICE | Status: FUNCTIONAL

## 2023-11-10 DEVICE — HYDRATOME 44: Type: IMPLANTABLE DEVICE | Status: FUNCTIONAL

## 2023-11-10 RX ORDER — METRONIDAZOLE 500 MG
500 TABLET ORAL
Refills: 0 | Status: DISCONTINUED | OUTPATIENT
Start: 2023-11-10 | End: 2023-11-11

## 2023-11-10 RX ORDER — ENOXAPARIN SODIUM 100 MG/ML
40 INJECTION SUBCUTANEOUS
Refills: 0 | Status: DISCONTINUED | OUTPATIENT
Start: 2023-11-11 | End: 2023-11-11

## 2023-11-10 RX ORDER — CIPROFLOXACIN LACTATE 400MG/40ML
500 VIAL (ML) INTRAVENOUS EVERY 12 HOURS
Refills: 0 | Status: DISCONTINUED | OUTPATIENT
Start: 2023-11-10 | End: 2023-11-11

## 2023-11-10 RX ORDER — SODIUM CHLORIDE 9 MG/ML
2000 INJECTION, SOLUTION INTRAVENOUS
Refills: 0 | Status: DISCONTINUED | OUTPATIENT
Start: 2023-11-10 | End: 2023-11-11

## 2023-11-10 RX ORDER — INDOMETHACIN 50 MG
100 CAPSULE ORAL ONCE
Refills: 0 | Status: DISCONTINUED | OUTPATIENT
Start: 2023-11-10 | End: 2023-11-11

## 2023-11-10 RX ADMIN — OXCARBAZEPINE 300 MILLIGRAM(S): 300 TABLET, FILM COATED ORAL at 17:03

## 2023-11-10 RX ADMIN — Medication 325 MILLIGRAM(S): at 15:31

## 2023-11-10 RX ADMIN — SODIUM CHLORIDE 3 MILLILITER(S): 9 INJECTION INTRAMUSCULAR; INTRAVENOUS; SUBCUTANEOUS at 06:00

## 2023-11-10 RX ADMIN — AMLODIPINE BESYLATE 5 MILLIGRAM(S): 2.5 TABLET ORAL at 05:35

## 2023-11-10 RX ADMIN — OXCARBAZEPINE 300 MILLIGRAM(S): 300 TABLET, FILM COATED ORAL at 05:34

## 2023-11-10 RX ADMIN — ESCITALOPRAM OXALATE 20 MILLIGRAM(S): 10 TABLET, FILM COATED ORAL at 15:32

## 2023-11-10 RX ADMIN — SODIUM CHLORIDE 150 MILLILITER(S): 9 INJECTION, SOLUTION INTRAVENOUS at 17:09

## 2023-11-10 RX ADMIN — CHLORHEXIDINE GLUCONATE 1 APPLICATION(S): 213 SOLUTION TOPICAL at 15:33

## 2023-11-10 RX ADMIN — Medication 112 MICROGRAM(S): at 05:34

## 2023-11-10 RX ADMIN — POLYETHYLENE GLYCOL 3350 17 GRAM(S): 17 POWDER, FOR SOLUTION ORAL at 17:02

## 2023-11-10 RX ADMIN — ERTAPENEM SODIUM 120 MILLIGRAM(S): 1 INJECTION, POWDER, LYOPHILIZED, FOR SOLUTION INTRAMUSCULAR; INTRAVENOUS at 05:34

## 2023-11-10 RX ADMIN — Medication 1 CAPSULE(S): at 15:31

## 2023-11-10 RX ADMIN — Medication 500 MILLIGRAM(S): at 17:02

## 2023-11-10 RX ADMIN — Medication 1 CAPSULE(S): at 17:02

## 2023-11-10 RX ADMIN — Medication 1 MILLIGRAM(S): at 15:33

## 2023-11-10 RX ADMIN — FAMOTIDINE 20 MILLIGRAM(S): 10 INJECTION INTRAVENOUS at 17:03

## 2023-11-10 NOTE — PROGRESS NOTE ADULT - ASSESSMENT
Patient is a 68 year old F with hypertension, hypothyroidism, and  pancreatic cancer, currently on chemotherapy (last session 10/30) who presents to the emergency department for fever, weakness, fatigue, body aches, and lower back pain for 2 days after 2 weeks of urinary urgency prior to symptom onset.    Pancreatic ca  --under the care of [facility]ena Hanna of [facility] [place]   --Stage II PDIC (6/2012) w/ recurrence to abdominal wall (resected 1/20223)  --s/p RT 3/2022  --currently on systemic treatment w/ Gemcitabine/Nab-Paclitaxel LD 10/30  --no plan for treatment while inpatient and/or rehab  --ongoing care after discharge    sepsis  --2/2 pyelonephritis, noted on outpatient CT a/p done 11/2 (see above). Pt states that she had been having urinary frequency and urgency but did not recognize that as sx of UTI  --outpatient peripheral cultures and MP cultures collected 11/2 c/w ecoli, resistant to bactrim but otherwise sensitive  --cultures 11/2 inpatient also showed Ecoli. 11/4 cultures negative  --ID following, plan to continue Ertapenem or cipro on discharge for 10 days.     Hyponatremia  --chronic, Na 128 at baseline, Na today is 129    anemia/thrombocytopenia  --2/2 malignancy and treatment  --Hgb 8-9, platelets 170s at baseline  --on PO folic acid, and iron  --transfuse for Hgb<7     transaminitis  --abd US w/ Mild intra and extra hepatic biliary dilatation, the common bile duct measuring up to 1.1 cm. The common bile duct is dilated to the level of the pancreatic head. The gallbladder is markedly distended. No stones are identified. Possible small amount of intraluminal sludge. No wall thickening, pericholecystic fluid, or sonographic Pierre sign. No evidence for acute cholecystitis.  --Abnormal MRCP w/ stricture  --GI planning EUS +/- ERCP today    Chandler Johansen PA-C  Hematology/Oncology  [facility]  [phone] (office)

## 2023-11-10 NOTE — PRE PROCEDURE NOTE - PRE PROCEDURE EVALUATION
Attending Physician:  Dr Noble                          Procedure: ERCP    Indication for Procedure: CBD stricture   ________________________________________________________  PAST MEDICAL & SURGICAL HISTORY:  Pancreatic cancer      Hypertension      Hypothyroid      Seasonal asthma      History of pancreatectomy        ALLERGIES:  phenytoin (Rash)  ibuprofen (Other)  Dilantin (Unknown)    HOME MEDICATIONS:  acetaminophen 500 mg oral tablet: 2 tab(s) orally 3 times a day as needed for  mild pain  amLODIPine 5 mg oral tablet: 1 tab(s) orally once a day  escitalopram 20 mg oral tablet: 1 tab(s) orally once a day  folic acid 1 mg oral tablet: 1 tab(s) orally once a day  Fosamax 70 mg oral tablet: 1 tab(s) orally once a week SUNDAY  losartan 100 mg oral tablet: 1 tab(s) orally once a day  pancrelipase 10,000 units-32,000 units-42,000 units oral delayed release capsule: 1 cap(s) orally 3 times a day (with meals)  Synthroid 100 mcg (0.1 mg) oral tablet: 1 tab(s) orally once a day  traMADol 50 mg oral tablet: 1 tab(s) orally every 8 hours as needed for  moderate pain  Trileptal 300 mg oral tablet: 1 tab(s) orally 2 times a day  Valium 5 mg oral tablet: 1 tab(s) orally once a day as needed for  anxiety    AICD/PPM: [ ] yes  [x] no    PERTINENT LAB DATA:                        8.5    14.87 )-----------( 136      ( 10 Nov 2023 06:16 )             24.9     11-10    129<L>  |  97  |  16  ----------------------------<  110<H>  4.6   |  21<L>  |  0.83    Ca    8.3<L>      10 Nov 2023 06:16  Phos  2.6     11-10  Mg     2.0     11-10    TPro  6.2  /  Alb  2.9<L>  /  TBili  0.7  /  DBili  x   /  AST  53<H>  /  ALT  67<H>  /  AlkPhos  344<H>  11-10    PT/INR - ( 10 Nov 2023 06:16 )   PT: 11.6 sec;   INR: 1.06 ratio         PTT - ( 10 Nov 2023 06:16 )  PTT:26.6 sec            PHYSICAL EXAMINATION:    T(C): 36.8  HR: 83  BP: 132/78  RR: 18  SpO2: 97%    Constitutional: NAD    Neck:  No JVD  Respiratory: no resp distress   Cardiovascular: rrr  Extremities: No peripheral edema        COMMENTS:    The patient is a suitable candidate for the planned procedure unless box checked [ ]  No, explain:

## 2023-11-10 NOTE — PROGRESS NOTE ADULT - PROBLEM SELECTOR PLAN 4
CHronic Hyponatremia ( Present for >48 hours with no acute mental status change   Initially with low serum osm, and indeterminate urine sodium. However, after receiving 2L, now back at baseline Na (128)  - with high urine Na and low urine Osm --> likely SIADH with drop in Uosm from IVF ---> will repeat urine Studies   - Hyponatremia but possibly mixed with baseline SIADH and pt has low albumin which can be contributing to hyponatremia   - receive 1U blood 11/5  - repeat urine studies - Sade elevated, Uosm 278, c/f SIADH - restrict Fluid and monitor Na   -> AM cortisol (appropiately elevated) Pyelo with CVA tenderness, recent urinary urgency, likely ascending infection iso immunosuppression from chemo.  -> ertapenem, E Coli pansensitive  --> pt will be DC on ABX for 14 days from 11/4 ( neg blood CX date)

## 2023-11-10 NOTE — PROGRESS NOTE ADULT - SUBJECTIVE AND OBJECTIVE BOX
Patient is a 68y old  Female who presents with a chief complaint of Pyelonephritis (09 Nov 2023 14:55)      SUBJECTIVE / OVERNIGHT EVENTS:  No acute events overnight. Plan for ERCP possibly today. Patient at this time denies fevers, chills, CP, SOB, nausea, vomiting, diarrhea, constipation, dysuria. Patient seen and examined at bedside.    MEDICATIONS  (STANDING):  amLODIPine   Tablet 5 milliGRAM(s) Oral daily  chlorhexidine 4% Liquid 1 Application(s) Topical daily  ertapenem  IVPB 1000 milliGRAM(s) IV Intermittent every 24 hours  escitalopram 20 milliGRAM(s) Oral daily  famotidine    Tablet 20 milliGRAM(s) Oral <User Schedule>  ferrous    sulfate 325 milliGRAM(s) Oral daily  folic acid 1 milliGRAM(s) Oral daily  levothyroxine 112 MICROGram(s) Oral daily  OXcarbazepine 300 milliGRAM(s) Oral two times a day  pancrelipase  (CREON 36,000 Lipase Units) 1 Capsule(s) Oral three times a day with meals  polyethylene glycol 3350 17 Gram(s) Oral two times a day  senna 2 Tablet(s) Oral at bedtime  sodium chloride 0.9% lock flush 3 milliLiter(s) IV Push every 8 hours    MEDICATIONS  (PRN):  acetaminophen     Tablet .. 650 milliGRAM(s) Oral every 6 hours PRN Temp greater or equal to 38C (100.4F), Mild Pain (1 - 3)      CAPILLARY BLOOD GLUCOSE        I&O's Summary    09 Nov 2023 07:01  -  10 Nov 2023 07:00  --------------------------------------------------------  IN: 240 mL / OUT: 0 mL / NET: 240 mL        PHYSICAL EXAM:  Vital Signs Last 24 Hrs  T(C): 37.2 (10 Nov 2023 06:00), Max: 37.3 (09 Nov 2023 19:58)  T(F): 99 (10 Nov 2023 06:00), Max: 99.1 (09 Nov 2023 19:58)  HR: 83 (10 Nov 2023 06:00) (83 - 91)  BP: 135/81 (10 Nov 2023 06:00) (118/73 - 135/81)  BP(mean): --  RR: 18 (10 Nov 2023 06:00) (18 - 20)  SpO2: 93% (10 Nov 2023 06:00) (93% - 98%)    Parameters below as of 10 Nov 2023 06:00  Patient On (Oxygen Delivery Method): room air        GENERAL: No acute distress. Thin-appearing.  HEAD:  Atraumatic, Normocephalic  EYES: EOMI, PERRLA, conjunctiva and sclera clear  NECK: Supple, no lymphadenopathy, no JVD  CHEST/LUNG: CTAB; No wheezes, rales, or rhonchi. Port de-accessed.  HEART: Regular rate and rhythm; No murmurs, rubs, or gallops  ABDOMEN: Soft, non-tender, non-distended; normal bowel sounds, no organomegaly  EXTREMITIES:  2+ peripheral pulses b/l, No clubbing, cyanosis, or edema  NEUROLOGY: A&O x 3, no focal deficits  SKIN: No rashes or lesions    LABS:                        8.5    14.87 )-----------( 136      ( 10 Nov 2023 06:16 )             24.9     11-10    129<L>  |  97  |  16  ----------------------------<  110<H>  4.6   |  21<L>  |  0.83    Ca    8.3<L>      10 Nov 2023 06:16  Phos  2.6     11-10  Mg     2.0     11-10    TPro  6.2  /  Alb  2.9<L>  /  TBili  0.7  /  DBili  x   /  AST  53<H>  /  ALT  67<H>  /  AlkPhos  344<H>  11-10    PT/INR - ( 10 Nov 2023 06:16 )   PT: 11.6 sec;   INR: 1.06 ratio         PTT - ( 10 Nov 2023 06:16 )  PTT:26.6 sec      Urinalysis Basic - ( 10 Nov 2023 06:16 )    Color: x / Appearance: x / SG: x / pH: x  Gluc: 110 mg/dL / Ketone: x  / Bili: x / Urobili: x   Blood: x / Protein: x / Nitrite: x   Leuk Esterase: x / RBC: x / WBC x   Sq Epi: x / Non Sq Epi: x / Bacteria: x          RADIOLOGY & ADDITIONAL TESTS:  Results Reviewed:   Imaging Personally Reviewed:  Electrocardiogram Personally Reviewed:    COORDINATION OF CARE:  Care Discussed with Consultants/Other Providers [Y/N]:  Prior or Outpatient Records Reviewed [Y/N]:   Patient is a 68y old  Female who presents with a chief complaint of Pyelonephritis (09 Nov 2023 14:55)      SUBJECTIVE / OVERNIGHT EVENTS:  No acute events overnight. Plan for ERCP possibly today. Patient at this time denies fevers, chills, CP, SOB, nausea, vomiting, diarrhea, constipation, dysuria. Patient seen and examined at bedside.    MEDICATIONS  (STANDING):  amLODIPine   Tablet 5 milliGRAM(s) Oral daily  chlorhexidine 4% Liquid 1 Application(s) Topical daily  ertapenem  IVPB 1000 milliGRAM(s) IV Intermittent every 24 hours  escitalopram 20 milliGRAM(s) Oral daily  famotidine    Tablet 20 milliGRAM(s) Oral <User Schedule>  ferrous    sulfate 325 milliGRAM(s) Oral daily  folic acid 1 milliGRAM(s) Oral daily  levothyroxine 112 MICROGram(s) Oral daily  OXcarbazepine 300 milliGRAM(s) Oral two times a day  pancrelipase  (CREON 36,000 Lipase Units) 1 Capsule(s) Oral three times a day with meals  polyethylene glycol 3350 17 Gram(s) Oral two times a day  senna 2 Tablet(s) Oral at bedtime  sodium chloride 0.9% lock flush 3 milliLiter(s) IV Push every 8 hours    MEDICATIONS  (PRN):  acetaminophen     Tablet .. 650 milliGRAM(s) Oral every 6 hours PRN Temp greater or equal to 38C (100.4F), Mild Pain (1 - 3)      CAPILLARY BLOOD GLUCOSE        I&O's Summary    09 Nov 2023 07:01  -  10 Nov 2023 07:00  --------------------------------------------------------  IN: 240 mL / OUT: 0 mL / NET: 240 mL        PHYSICAL EXAM:  Vital Signs Last 24 Hrs  T(C): 37.2 (10 Nov 2023 06:00), Max: 37.3 (09 Nov 2023 19:58)  T(F): 99 (10 Nov 2023 06:00), Max: 99.1 (09 Nov 2023 19:58)  HR: 83 (10 Nov 2023 06:00) (83 - 91)  BP: 135/81 (10 Nov 2023 06:00) (118/73 - 135/81)  BP(mean): --  RR: 18 (10 Nov 2023 06:00) (18 - 20)  SpO2: 93% (10 Nov 2023 06:00) (93% - 98%)    Parameters below as of 10 Nov 2023 06:00  Patient On (Oxygen Delivery Method): room air        GENERAL: No acute distress. Thin-appearing.  HEAD:  Atraumatic, Normocephalic  EYES: EOMI, PERRLA, conjunctiva and sclera clear  NECK: Supple, no lymphadenopathy, no JVD  CHEST/LUNG: CTAB; No wheezes, rales, or rhonchi. Port de-accessed.  HEART: Regular rate and rhythm; No murmurs, rubs, or gallops  ABDOMEN: Soft, non-tender, non-distended; normal bowel sounds, no organomegaly  EXTREMITIES:  2+ peripheral pulses b/l, No clubbing, cyanosis, or edema  NEUROLOGY: A&O x 3, no focal deficits      LABS:                        8.5    14.87 )-----------( 136      ( 10 Nov 2023 06:16 )             24.9     11-10    129<L>  |  97  |  16  ----------------------------<  110<H>  4.6   |  21<L>  |  0.83    Ca    8.3<L>      10 Nov 2023 06:16  Phos  2.6     11-10  Mg     2.0     11-10    TPro  6.2  /  Alb  2.9<L>  /  TBili  0.7  /  DBili  x   /  AST  53<H>  /  ALT  67<H>  /  AlkPhos  344<H>  11-10    PT/INR - ( 10 Nov 2023 06:16 )   PT: 11.6 sec;   INR: 1.06 ratio         PTT - ( 10 Nov 2023 06:16 )  PTT:26.6 sec      Urinalysis Basic - ( 10 Nov 2023 06:16 )    Color: x / Appearance: x / SG: x / pH: x  Gluc: 110 mg/dL / Ketone: x  / Bili: x / Urobili: x   Blood: x / Protein: x / Nitrite: x   Leuk Esterase: x / RBC: x / WBC x   Sq Epi: x / Non Sq Epi: x / Bacteria: x          RADIOLOGY & ADDITIONAL TESTS:  Results Reviewed:   Imaging Personally Reviewed:  Electrocardiogram Personally Reviewed:    COORDINATION OF CARE:  Care Discussed with Consultants/Other Providers [Y/N]:  Prior or Outpatient Records Reviewed [Y/N]:

## 2023-11-10 NOTE — PROGRESS NOTE ADULT - PROBLEM SELECTOR PLAN 6
acute rise in transaminase and ALP 11/6  - may be iso pancreatic mass vs MOST likely from CTX causing sludge/obstruction  - hepatitis panel negative  - RUQUS - showing sludge, 11mm ductal dilation - will need MRCP  -> trend liver enzymes - ALP rising, Tbili 1.2 and rising  -> GGT elevated 492  -> CTX changed to cipro due to rise in LFT - now on ertapenem  -> MRCP showing focal stricture lower CBD  -> heme/onc rec GI consult for ERCP/stent  -> advanced GI c/s - plan for possible ERCP 11/10 or 11/13 Likely iso sepsis and hypotension as seen at OSH. Likely prerenal etiology leading to ATN, now back to baseline  - With NAGMA. Unclear etiology of NAGMA, no diarrhea, possible RTA iso low potassium at OSH (3) and high urine pH  - IMPROVED

## 2023-11-10 NOTE — PROGRESS NOTE ADULT - PROBLEM SELECTOR PLAN 5
Likely iso sepsis and hypotension as seen at OSH. Likely prerenal etiology leading to ATN, now back to baseline  - With NAGMA. Unclear etiology of NAGMA, no diarrhea, possible RTA iso low potassium at OSH (3) and high urine pH  - IMPROVED CHronic Hyponatremia ( Present for >48 hours with no acute mental status change)    Initially with low serum osm, and indeterminate urine sodium. However, after receiving 2L, now back at baseline Na (128)  - with high urine Na and low urine Osm --> likely SIADH with drop in Uosm from IVF ---> will repeat urine Studies   - Hyponatremia but possibly mixed with baseline SIADH and pt has low albumin which can be contributing to hyponatremia   - received 1U blood 11/5  - repeat urine studies - Sade elevated, Uosm 278, c/f SIADH - restrict Fluid and monitor Na   -> AM cortisol (appropiately elevated)

## 2023-11-10 NOTE — PROGRESS NOTE ADULT - PROBLEM SELECTOR PLAN 1
Fever, tachycardia, with MARY ELLEN/hypotension. CT A/P outside records  ( done 11/2 --> in [facility]) showing evidence of Rt pyelonephritis in the upper poles. S/p 1 dose of zosyn, vanc. Immunosuppression from active chemo. Now on CTX  - pansensitive E Coli - was on CTX ( goal of 7-10 days of treatment )  -> switched to ciproflaxacin per ID, now on ertapenem  -> WBC acute increase to 17; per ID, will transition to cipro closer to discharge acute rise in transaminase and ALP 11/6--> CBD Stricture s/p ERCP   - may be iso pancreatic mass vs MOST likely from CTX causing sludge/obstruction--> CBD Stricture per Abd US and MRCP ---> ERCP on 11/10  - hepatitis panel negative  - RUQUS - showing sludge, 11mm ductal dilation - MRCP with CDB stricture  -> trend liver enzymes - ALP rising, Tbili 1.2 and rising  -> GGT elevated 492  -> CTX changed to cipro due to rise in LFT - now on ertapenem  -> MRCP showing focal stricture lower CBD  -> heme/onc rec GI consult for ERCP/stent--> rec is appreciated   -> advanced GI c/s - plan for  ERCP 11/10

## 2023-11-10 NOTE — PROGRESS NOTE ADULT - PROBLEM SELECTOR PLAN 2
Likely multifactorial: myelosuppression iso infection and sepsis. Also with recent chemo and decrease in multiple cell lines: anemia and thrombocytopenia.  - 1U pRBC for acute drop in Hgb 11/5 anemia - Hgb wnl   -> Thrombocytopenia: holding heparin given concern for bleed, monitor for bleed   - Also with headache, on AC - CT Head  - Mild small vessel and atrophic changes. Fever, tachycardia, with MARY ELLEN/hypotension. CT A/P outside records  ( done 11/2 --> in [facility]) showing evidence of Rt pyelonephritis in the upper poles. S/p 1 dose of zosyn, vanc. Immunosuppression from active chemo. Now on CTX  - pansensitive E Coli - was on CTX ( goal of 7-10 days of treatment )  -> switched to ciproflaxacin per ID, now on ertapenem  -> improving WBC and improved Platelets   - IMPROVED

## 2023-11-10 NOTE — PROGRESS NOTE ADULT - ASSESSMENT
68F hx of HTN pancreatic cancer on chemo (Gemcitabine/abraxane last dose on 10/30) with port in R chest wall here for sepsis 2/2 pyelonephritis complicated by MARY ELLEN and hyponatremia. 68F hx of HTN pancreatic cancer on chemo (Gemcitabine/abraxane last dose on 10/30) with port in R chest wall here for sepsis 2/2 pyelonephritis complicated by MARY ELLEN and hyponatremia.  Hospital course was complicated by CBD Stricture and is s/p ERCP done on 11/10

## 2023-11-10 NOTE — PRE-ANESTHESIA EVALUATION ADULT - NSANTHPMHFT_GEN_ALL_CORE
68F PMH pancreatic CA, HTN, hypothyroid p/w pyelonephritis/sepsis.  Labs significant for anemia and hyponatremia. 68F PMH pancreatic CA s/p distal pancreatectomy, HTN, hypothyroid p/w pyelonephritis/sepsis.  Labs significant for anemia and hyponatremia.

## 2023-11-10 NOTE — CHART NOTE - NSCHARTNOTEFT_GEN_A_CORE
Procedure name: Upper endoscopy, endoscopic ultrasound, ERCP, biliary sphincterotomy, removal of biliary sludge, brush cytology, 10 French by 5 cm biliary stent placement  Indication: Distal common bile duct stricture on MRCP, history of pancreatic cancer on treatment.  Medications: General anesthesia, indomethacin 100 mg suppository    Findings:  -Short distal common duct stricture.  No visible mass on endoscopic ultrasound.  ERCP maneuvers as above    Recommendations:  Clear liquid diet tonight, then solid food tomorrow if patient feeling well  Await cytology  LR IV fluids at 150 mL/hr x 2 liters then reassess  If patient develops fever, severe abdominal pain, recurrent vomiting, or melena tonight, obtain stat CBC, comprehensive metabolic panel, amylase, lipase, upright chest x-ray, abdominal x-ray and page GI team on call.  Outpatient GI followup in 2 weeks.

## 2023-11-10 NOTE — PROGRESS NOTE ADULT - PROBLEM SELECTOR PLAN 7
On gemcitabine and abraxane outpatient  - Hold on chemo inpatient iso infection. Chemo and myelosuppression likely contributing to thrombocytopenia and anemia  - Onc recs appreciated  - ISO pancreatic cancer with headache that is awakening patient at night, CT Head - mild small vessel and atrophic changes.  - de-accessing the port  -> CA 19-9 24  -> CEA 5.7 (7.9 outpatient)

## 2023-11-10 NOTE — PROGRESS NOTE ADULT - NS ATTEND AMEND GEN_ALL_CORE FT
Admitted with fever, pyelonephritis/bacteremia, her cultures are negative from 11/4, planned to continue ertapenem or cipro on discharge for 10 days.   Found to have proximally dilated CBD with distal CBD stricture, planned for EUS/ERCP tomorrow or Monday.
the is a biliary stricture. would recommend advance GI opinion for ERCP.
Admitted with fever, pyelonephritis/bacteremia, her cultures are negative from 11/4, planned to continue ertapenem or cipro on discharge for 10 days.   Found to have proximally dilated CBD with distal CBD stricture, s/p EUS/ERCP today showing distal common duct stricture, no visible mass. Biliary stent placed.
As above. 67 y/o female with pancreatic cancer, on systemic therapy, admitted with sepsis.    - No systemic therapy while inpatient/rehabilitation.  - Continue antibiotics per infectious disease.   - Continue to monitor sodium as well as cytopenia
pt with pancreatic ca on chemo  now with bacteremia due to presumably  source  on antibiotics, appreciate ID recs  she also has transaminitis and CBD 1.1 CM , pending MRCP  Will follow

## 2023-11-10 NOTE — PROGRESS NOTE ADULT - TIME BILLING
reviewing documentation, reviewing and interpreting labs/imaging, interviewing and examining patient, discussing plan of care with patient, documentation, coordinating care with resident.
55mns spent in the care of patient

## 2023-11-10 NOTE — PROGRESS NOTE ADULT - SUBJECTIVE AND OBJECTIVE BOX
Patient is a 68y old  Female who presents with a chief complaint of Pyelonephritis (10 Nov 2023 07:34)    Patient seen and examined, feeling well.     MEDICATIONS  (STANDING):  amLODIPine   Tablet 5 milliGRAM(s) Oral daily  chlorhexidine 4% Liquid 1 Application(s) Topical daily  ertapenem  IVPB 1000 milliGRAM(s) IV Intermittent every 24 hours  escitalopram 20 milliGRAM(s) Oral daily  famotidine    Tablet 20 milliGRAM(s) Oral <User Schedule>  ferrous    sulfate 325 milliGRAM(s) Oral daily  folic acid 1 milliGRAM(s) Oral daily  indomethacin Suppository 100 milliGRAM(s) Rectal once  levothyroxine 112 MICROGram(s) Oral daily  OXcarbazepine 300 milliGRAM(s) Oral two times a day  pancrelipase  (CREON 36,000 Lipase Units) 1 Capsule(s) Oral three times a day with meals  polyethylene glycol 3350 17 Gram(s) Oral two times a day  senna 2 Tablet(s) Oral at bedtime  sodium chloride 0.9% lock flush 3 milliLiter(s) IV Push every 8 hours    MEDICATIONS  (PRN):  acetaminophen     Tablet .. 650 milliGRAM(s) Oral every 6 hours PRN Temp greater or equal to 38C (100.4F), Mild Pain (1 - 3)      Vital Signs Last 24 Hrs  T(C): 36.7 (10 Nov 2023 10:23), Max: 37.3 (09 Nov 2023 19:58)  T(F): 98 (10 Nov 2023 10:09), Max: 99.1 (09 Nov 2023 19:58)  HR: 69 (10 Nov 2023 12:43) (69 - 91)  BP: 146/70 (10 Nov 2023 12:43) (118/73 - 146/70)  BP(mean): 89 (10 Nov 2023 10:23) (89 - 89)  RR: 11 (10 Nov 2023 12:43) (11 - 20)  SpO2: 98% (10 Nov 2023 12:43) (93% - 98%)    Parameters below as of 10 Nov 2023 12:43  Patient On (Oxygen Delivery Method): nasal cannula  O2 Flow (L/min): 2    PE  NAD  Awake, alert  Anicteric, MMM  No c/c/e  No rash grossly  FROM                          8.5    14.87 )-----------( 136      ( 10 Nov 2023 06:16 )             24.9       11-10    129<L>  |  97  |  16  ----------------------------<  110<H>  4.6   |  21<L>  |  0.83    Ca    8.3<L>      10 Nov 2023 06:16  Phos  2.6     11-10  Mg     2.0     11-10    TPro  6.2  /  Alb  2.9<L>  /  TBili  0.7  /  DBili  x   /  AST  53<H>  /  ALT  67<H>  /  AlkPhos  344<H>  11-10

## 2023-11-10 NOTE — PROGRESS NOTE ADULT - PROBLEM SELECTOR PLAN 3
Pyelo with CVA tenderness, recent urinary urgency, likely ascending infection iso immunosuppression from chemo.  -> ertapenem, E Coli pansensitive Likely multifactorial: myelosuppression iso infection and sepsis. Also with recent chemo and decrease in multiple cell lines: anemia and thrombocytopenia.  - 1U pRBC for acute drop in Hgb 11/5 anemia - Hgb wnl   -> Thrombocytopenia: holding heparin given concern for bleed, monitor for bleed --> restart in AM , post ERCP   - monitor for bleed

## 2023-11-10 NOTE — PROGRESS NOTE ADULT - ATTENDING COMMENTS
68F with h/o pancreatic cancer since 2021, on chemotherapy (last session 10/30) presented to [facility] with fever, weakness, fatigue, body aches, and lower back pain for 2 days after 2 weeks of urinary urgency. Her SBP was in 80s and she was given 1L IVF with NS, IV zosyn and send to ED at  [facility]. Outpatient CT abd/pelvis showed evidence of Pyelonephritis ( [place] [facility] [place] [facility] .  In ED patient was found to have sepsis, 1L NS, 1gm IV vancomycin was given. CXR- clear, RVP neg, Na 125, Scr 1.66, WBC outpatient was normal.     1.  Transaminitis / cholestasis pattern --> Biliary stricture          s/p US of abd with no gross lesions in the liver , mild CBD dilatation and mild intrahepatic dilation and small Gallbladder sludge with no stone        and distended gallbladder with no pericholecystic fluid           S/P  Ceftriaxone-->s/p CIpro  now on Ertapenem          Cont to monitor LFT , high  GGT -->  MRCP with biliary stricture --> Advance GI rec is appreciated and possible EUS with or without ERCP      tomorrow or Monday , cont to monitor   2.   MARY ELLEN --> improved          S/P IVF with improvement --> cont to monitor   3.  Hyponatremia --->Chronic Hypoosmolar HypoNa with high urine Na and Low UOsm---> Multifactorial with baseline SIADH and drop in Osmo             post IVF --> cont to monitor    4.  Normocytic Anemia / Bicytopenia --> most likely sec to chem and malignancy and ABX         stable H/H post transfusion          Stable hemolysis profile          restart Hep sub post EUS on 11/10           Monitor platelets         5.   E coli Bacteremia due to acute pyelo--> will continue ABX         repeat Blood CX (11/4 ) is neg so far    Discussed with team 8 , GI team and patient and her sister   rest as per above   Discuss with hem/onc team   Discuss with onc, ID and patient      Dominga Duran   Hospitalist   available on TEAMS 68F with h/o pancreatic cancer since 2021, on chemotherapy (last session 10/30) presented to [facility] with fever, weakness, fatigue, body aches, and lower back pain for 2 days after 2 weeks of urinary urgency. Her SBP was in 80s and she was given 1L IVF with NS, IV zosyn and send to ED at  [facility]. Outpatient CT abd/pelvis showed evidence of Pyelonephritis ( [place] [facility] [place] and result [facility]) .   Patient was admitted for further evaluation and treatment      1.  Transaminitis / cholestasis pattern --> Biliary stricture on MRCP and is S/P ERCP and a biliary Stent was placed by GI on 11/10          s/p US of abd with no gross lesions in the liver , mild CBD dilatation and mild intrahepatic dilation and small Gallbladder sludge with no stone            and distended gallbladder with no pericholecystic fluid          s/p MRCP with biliary stricture         Advance GI team has seen pt and performed ERCP on 11/10 with biliary stent placement         F/up CMP/CBC/Lipase in AM and transition from clear to regular diet if pt tolerates          if Stable , might DC on Weekend          WIll need GI f/up as recommended          S/P  Ceftriaxone-->s/p CIpro--> Ertapenem --> will be dc on Cipro and flagyl for 14 days from 11/4   2.   MARY ELLEN --> improved          S/P IVF with improvement --> cont to monitor   3.  Hyponatremia --->Chronic Hypoosmolar HypoNa with high urine Na and Low UOsm---> Multifactorial with baseline SIADH and drop in Osmo             post IVF --> cont to monitor       Patient is now at baseline with stable mentation        cont Fluid restriction   4.  Normocytic Anemia / Bicytopenia --> most likely sec to chem and malignancy and ABX  and infection --> now improved         stable H/H post transfusion          Stable hemolysis profile          restart CHEMICAL AC  post EUS on 11/11           Monitor platelets    5.   E coli Bacteremia due to acute pyelo--> will continue ABX --> 14 days of ABX as per ID from 11/4        repeat Blood CX (11/4 ) is neg so far    Discussed with team 8 , GI team and patient and her sister   rest as per above   Discuss with hem/onc team and GI team   Patient's cousin was updated as per her request       Dominga Duran   Hospitalist   available on TEAMS

## 2023-11-10 NOTE — PROGRESS NOTE ADULT - NS ATTEND OPT1 GEN_ALL_CORE
I attest my time as attending is greater than 50% of the total combined time spent on qualifying patient care activities by the PA/NP and attending.
I independently performed the documented:
I attest my time as attending is greater than 50% of the total combined time spent on qualifying patient care activities by the PA/NP and attending.
I independently performed the documented:
I attest my time as attending is greater than 50% of the total combined time spent on qualifying patient care activities by the PA/NP and attending.

## 2023-11-10 NOTE — PRE-OP CHECKLIST - AS TEMP SITE
Pleural effusion, bilateral 10/18/2018    Prostate carcinoma (HCC)     Dr Joy Banegas Pulmonary embolism [facility]) 4/15    S/P colonoscopic polypectomy 12/2018    Dr Meera Wiggins, recheck 3 yrs    Sensory peripheral neuropathy     dx'd at [facility] 9/2017- to see Dr Thompson Chao Coad Varicose veins          Current Outpatient Medications:     atorvastatin (LIPITOR) 20 MG tablet, TAKE 1 TABLET DAILY, Disp: 90 tablet, Rfl: 0    Ferrous Sulfate (IRON) 325 (65 Fe) MG TABS, TAKE 1 TABLET BY MOUTH EVERY MORNING WITH BREAKFAST, Disp: 90 tablet, Rfl: 1    DULERA 200-5 MCG/ACT inhaler, USE 2 INHALATIONS ORALLY   EVERY 12 HOURS; AFTER      INHALATION, THEN GARGLE., Disp: 39 g, Rfl: 3    KLOR-CON M10 10 MEQ extended release tablet, TAKE 1 TABLET DAILY, Disp: 90 tablet, Rfl: 1    pantoprazole (PROTONIX) 40 MG tablet, TAKE 1 TABLET DAILY, Disp: 90 tablet, Rfl: 1    furosemide (LASIX) 20 MG tablet, TAKE 1 TABLET BY MOUTH DAILY AS NEEDED (HIGH POTASSIUM AND SODIUM), Disp: 30 tablet, Rfl: 5    levothyroxine (SYNTHROID) 100 MCG tablet, TAKE ONE TABLET BY MOUTH DAILY, Disp: 90 tablet, Rfl: 1    aspirin EC 81 MG EC tablet, Take 1 tablet by mouth daily, Disp: 90 tablet, Rfl: 1    losartan (COZAAR) 25 MG tablet, Take 25 mg by mouth daily, Disp: , Rfl:     donepezil (ARICEPT) 5 MG tablet, Take 1 tablet by mouth nightly, Disp: 90 tablet, Rfl: 1    metoprolol succinate (TOPROL XL) 100 MG extended release tablet, Take 1 tablet by mouth 2 times daily (Patient taking differently: Take 150 mg by mouth daily ), Disp: 180 tablet, Rfl: 1    hydrALAZINE (APRESOLINE) 10 MG tablet, Take 1 tablet by mouth 2 times daily, Disp: 180 tablet, Rfl: 1    albuterol sulfate HFA (PROAIR HFA) 108 (90 BASE) MCG/ACT inhaler, Inhale 2 puffs into the lungs every 6 hours as needed for Wheezing, Disp: 3 Inhaler, Rfl: 3    Multiple Vitamins-Minerals (MULTIVITAMIN PO), Take 1 tablet by mouth daily. , Disp: , Rfl:     ROS:   Constitutional: Negative fever, no chills
oral

## 2023-11-11 VITALS
OXYGEN SATURATION: 99 % | DIASTOLIC BLOOD PRESSURE: 81 MMHG | HEART RATE: 83 BPM | RESPIRATION RATE: 18 BRPM | SYSTOLIC BLOOD PRESSURE: 143 MMHG | TEMPERATURE: 98 F

## 2023-11-11 LAB
ALBUMIN SERPL ELPH-MCNC: 2.9 G/DL — LOW (ref 3.3–5)
ALBUMIN SERPL ELPH-MCNC: 2.9 G/DL — LOW (ref 3.3–5)
ALP SERPL-CCNC: 314 U/L — HIGH (ref 40–120)
ALP SERPL-CCNC: 314 U/L — HIGH (ref 40–120)
ALT FLD-CCNC: 50 U/L — HIGH (ref 10–45)
ALT FLD-CCNC: 50 U/L — HIGH (ref 10–45)
ANION GAP SERPL CALC-SCNC: 11 MMOL/L — SIGNIFICANT CHANGE UP (ref 5–17)
ANION GAP SERPL CALC-SCNC: 11 MMOL/L — SIGNIFICANT CHANGE UP (ref 5–17)
AST SERPL-CCNC: 36 U/L — SIGNIFICANT CHANGE UP (ref 10–40)
AST SERPL-CCNC: 36 U/L — SIGNIFICANT CHANGE UP (ref 10–40)
BASOPHILS # BLD AUTO: 0.05 K/UL — SIGNIFICANT CHANGE UP (ref 0–0.2)
BASOPHILS # BLD AUTO: 0.05 K/UL — SIGNIFICANT CHANGE UP (ref 0–0.2)
BASOPHILS NFR BLD AUTO: 0.3 % — SIGNIFICANT CHANGE UP (ref 0–2)
BASOPHILS NFR BLD AUTO: 0.3 % — SIGNIFICANT CHANGE UP (ref 0–2)
BILIRUB SERPL-MCNC: 0.7 MG/DL — SIGNIFICANT CHANGE UP (ref 0.2–1.2)
BILIRUB SERPL-MCNC: 0.7 MG/DL — SIGNIFICANT CHANGE UP (ref 0.2–1.2)
BUN SERPL-MCNC: 16 MG/DL — SIGNIFICANT CHANGE UP (ref 7–23)
BUN SERPL-MCNC: 16 MG/DL — SIGNIFICANT CHANGE UP (ref 7–23)
CALCIUM SERPL-MCNC: 8.6 MG/DL — SIGNIFICANT CHANGE UP (ref 8.4–10.5)
CALCIUM SERPL-MCNC: 8.6 MG/DL — SIGNIFICANT CHANGE UP (ref 8.4–10.5)
CHLORIDE SERPL-SCNC: 98 MMOL/L — SIGNIFICANT CHANGE UP (ref 96–108)
CHLORIDE SERPL-SCNC: 98 MMOL/L — SIGNIFICANT CHANGE UP (ref 96–108)
CO2 SERPL-SCNC: 21 MMOL/L — LOW (ref 22–31)
CO2 SERPL-SCNC: 21 MMOL/L — LOW (ref 22–31)
CREAT SERPL-MCNC: 0.85 MG/DL — SIGNIFICANT CHANGE UP (ref 0.5–1.3)
CREAT SERPL-MCNC: 0.85 MG/DL — SIGNIFICANT CHANGE UP (ref 0.5–1.3)
EGFR: 75 ML/MIN/1.73M2 — SIGNIFICANT CHANGE UP
EGFR: 75 ML/MIN/1.73M2 — SIGNIFICANT CHANGE UP
EOSINOPHIL # BLD AUTO: 0.06 K/UL — SIGNIFICANT CHANGE UP (ref 0–0.5)
EOSINOPHIL # BLD AUTO: 0.06 K/UL — SIGNIFICANT CHANGE UP (ref 0–0.5)
EOSINOPHIL NFR BLD AUTO: 0.4 % — SIGNIFICANT CHANGE UP (ref 0–6)
EOSINOPHIL NFR BLD AUTO: 0.4 % — SIGNIFICANT CHANGE UP (ref 0–6)
GLUCOSE SERPL-MCNC: 96 MG/DL — SIGNIFICANT CHANGE UP (ref 70–99)
GLUCOSE SERPL-MCNC: 96 MG/DL — SIGNIFICANT CHANGE UP (ref 70–99)
HCT VFR BLD CALC: 24.3 % — LOW (ref 34.5–45)
HCT VFR BLD CALC: 24.3 % — LOW (ref 34.5–45)
HGB BLD-MCNC: 8.6 G/DL — LOW (ref 11.5–15.5)
HGB BLD-MCNC: 8.6 G/DL — LOW (ref 11.5–15.5)
IMM GRANULOCYTES NFR BLD AUTO: 1.4 % — HIGH (ref 0–0.9)
IMM GRANULOCYTES NFR BLD AUTO: 1.4 % — HIGH (ref 0–0.9)
INR BLD: 1.03 RATIO — SIGNIFICANT CHANGE UP (ref 0.85–1.18)
INR BLD: 1.03 RATIO — SIGNIFICANT CHANGE UP (ref 0.85–1.18)
LIDOCAIN IGE QN: 5 U/L — LOW (ref 7–60)
LIDOCAIN IGE QN: 5 U/L — LOW (ref 7–60)
LYMPHOCYTES # BLD AUTO: 1.16 K/UL — SIGNIFICANT CHANGE UP (ref 1–3.3)
LYMPHOCYTES # BLD AUTO: 1.16 K/UL — SIGNIFICANT CHANGE UP (ref 1–3.3)
LYMPHOCYTES # BLD AUTO: 6.9 % — LOW (ref 13–44)
LYMPHOCYTES # BLD AUTO: 6.9 % — LOW (ref 13–44)
MAGNESIUM SERPL-MCNC: 2 MG/DL — SIGNIFICANT CHANGE UP (ref 1.6–2.6)
MAGNESIUM SERPL-MCNC: 2 MG/DL — SIGNIFICANT CHANGE UP (ref 1.6–2.6)
MCHC RBC-ENTMCNC: 33.5 PG — SIGNIFICANT CHANGE UP (ref 27–34)
MCHC RBC-ENTMCNC: 33.5 PG — SIGNIFICANT CHANGE UP (ref 27–34)
MCHC RBC-ENTMCNC: 35.4 GM/DL — SIGNIFICANT CHANGE UP (ref 32–36)
MCHC RBC-ENTMCNC: 35.4 GM/DL — SIGNIFICANT CHANGE UP (ref 32–36)
MCV RBC AUTO: 94.6 FL — SIGNIFICANT CHANGE UP (ref 80–100)
MCV RBC AUTO: 94.6 FL — SIGNIFICANT CHANGE UP (ref 80–100)
MONOCYTES # BLD AUTO: 1.47 K/UL — HIGH (ref 0–0.9)
MONOCYTES # BLD AUTO: 1.47 K/UL — HIGH (ref 0–0.9)
MONOCYTES NFR BLD AUTO: 8.8 % — SIGNIFICANT CHANGE UP (ref 2–14)
MONOCYTES NFR BLD AUTO: 8.8 % — SIGNIFICANT CHANGE UP (ref 2–14)
NEUTROPHILS # BLD AUTO: 13.77 K/UL — HIGH (ref 1.8–7.4)
NEUTROPHILS # BLD AUTO: 13.77 K/UL — HIGH (ref 1.8–7.4)
NEUTROPHILS NFR BLD AUTO: 82.2 % — HIGH (ref 43–77)
NEUTROPHILS NFR BLD AUTO: 82.2 % — HIGH (ref 43–77)
NRBC # BLD: 0 /100 WBCS — SIGNIFICANT CHANGE UP (ref 0–0)
NRBC # BLD: 0 /100 WBCS — SIGNIFICANT CHANGE UP (ref 0–0)
PHOSPHATE SERPL-MCNC: 2.8 MG/DL — SIGNIFICANT CHANGE UP (ref 2.5–4.5)
PHOSPHATE SERPL-MCNC: 2.8 MG/DL — SIGNIFICANT CHANGE UP (ref 2.5–4.5)
PLATELET # BLD AUTO: 182 K/UL — SIGNIFICANT CHANGE UP (ref 150–400)
PLATELET # BLD AUTO: 182 K/UL — SIGNIFICANT CHANGE UP (ref 150–400)
POTASSIUM SERPL-MCNC: 4.6 MMOL/L — SIGNIFICANT CHANGE UP (ref 3.5–5.3)
POTASSIUM SERPL-MCNC: 4.6 MMOL/L — SIGNIFICANT CHANGE UP (ref 3.5–5.3)
POTASSIUM SERPL-SCNC: 4.6 MMOL/L — SIGNIFICANT CHANGE UP (ref 3.5–5.3)
POTASSIUM SERPL-SCNC: 4.6 MMOL/L — SIGNIFICANT CHANGE UP (ref 3.5–5.3)
PROT SERPL-MCNC: 6.2 G/DL — SIGNIFICANT CHANGE UP (ref 6–8.3)
PROT SERPL-MCNC: 6.2 G/DL — SIGNIFICANT CHANGE UP (ref 6–8.3)
PROTHROM AB SERPL-ACNC: 11.3 SEC — SIGNIFICANT CHANGE UP (ref 9.5–13)
PROTHROM AB SERPL-ACNC: 11.3 SEC — SIGNIFICANT CHANGE UP (ref 9.5–13)
RBC # BLD: 2.57 M/UL — LOW (ref 3.8–5.2)
RBC # BLD: 2.57 M/UL — LOW (ref 3.8–5.2)
RBC # FLD: 17 % — HIGH (ref 10.3–14.5)
RBC # FLD: 17 % — HIGH (ref 10.3–14.5)
SODIUM SERPL-SCNC: 130 MMOL/L — LOW (ref 135–145)
SODIUM SERPL-SCNC: 130 MMOL/L — LOW (ref 135–145)
WBC # BLD: 16.75 K/UL — HIGH (ref 3.8–10.5)
WBC # BLD: 16.75 K/UL — HIGH (ref 3.8–10.5)
WBC # FLD AUTO: 16.75 K/UL — HIGH (ref 3.8–10.5)
WBC # FLD AUTO: 16.75 K/UL — HIGH (ref 3.8–10.5)

## 2023-11-11 PROCEDURE — 82378 CARCINOEMBRYONIC ANTIGEN: CPT

## 2023-11-11 PROCEDURE — 86850 RBC ANTIBODY SCREEN: CPT

## 2023-11-11 PROCEDURE — 88112 CYTOPATH CELL ENHANCE TECH: CPT

## 2023-11-11 PROCEDURE — 86901 BLOOD TYPING SEROLOGIC RH(D): CPT

## 2023-11-11 PROCEDURE — 82533 TOTAL CORTISOL: CPT

## 2023-11-11 PROCEDURE — 82803 BLOOD GASES ANY COMBINATION: CPT

## 2023-11-11 PROCEDURE — 82570 ASSAY OF URINE CREATININE: CPT

## 2023-11-11 PROCEDURE — 96374 THER/PROPH/DIAG INJ IV PUSH: CPT

## 2023-11-11 PROCEDURE — 97530 THERAPEUTIC ACTIVITIES: CPT

## 2023-11-11 PROCEDURE — 83690 ASSAY OF LIPASE: CPT

## 2023-11-11 PROCEDURE — 85027 COMPLETE CBC AUTOMATED: CPT

## 2023-11-11 PROCEDURE — 85610 PROTHROMBIN TIME: CPT

## 2023-11-11 PROCEDURE — 84300 ASSAY OF URINE SODIUM: CPT

## 2023-11-11 PROCEDURE — P9016: CPT

## 2023-11-11 PROCEDURE — 86301 IMMUNOASSAY TUMOR CA 19-9: CPT

## 2023-11-11 PROCEDURE — 86706 HEP B SURFACE ANTIBODY: CPT

## 2023-11-11 PROCEDURE — 71045 X-RAY EXAM CHEST 1 VIEW: CPT

## 2023-11-11 PROCEDURE — 85384 FIBRINOGEN ACTIVITY: CPT

## 2023-11-11 PROCEDURE — 86923 COMPATIBILITY TEST ELECTRIC: CPT

## 2023-11-11 PROCEDURE — 83735 ASSAY OF MAGNESIUM: CPT

## 2023-11-11 PROCEDURE — 85025 COMPLETE CBC W/AUTO DIFF WBC: CPT

## 2023-11-11 PROCEDURE — 99285 EMERGENCY DEPT VISIT HI MDM: CPT

## 2023-11-11 PROCEDURE — 80074 ACUTE HEPATITIS PANEL: CPT

## 2023-11-11 PROCEDURE — 36415 COLL VENOUS BLD VENIPUNCTURE: CPT

## 2023-11-11 PROCEDURE — 87637 SARSCOV2&INF A&B&RSV AMP PRB: CPT

## 2023-11-11 PROCEDURE — 81001 URINALYSIS AUTO W/SCOPE: CPT

## 2023-11-11 PROCEDURE — 82728 ASSAY OF FERRITIN: CPT

## 2023-11-11 PROCEDURE — 87086 URINE CULTURE/COLONY COUNT: CPT

## 2023-11-11 PROCEDURE — C1769: CPT

## 2023-11-11 PROCEDURE — 82330 ASSAY OF CALCIUM: CPT

## 2023-11-11 PROCEDURE — 80048 BASIC METABOLIC PNL TOTAL CA: CPT

## 2023-11-11 PROCEDURE — 84443 ASSAY THYROID STIM HORMONE: CPT

## 2023-11-11 PROCEDURE — 82746 ASSAY OF FOLIC ACID SERUM: CPT

## 2023-11-11 PROCEDURE — 84439 ASSAY OF FREE THYROXINE: CPT

## 2023-11-11 PROCEDURE — 87040 BLOOD CULTURE FOR BACTERIA: CPT

## 2023-11-11 PROCEDURE — 87077 CULTURE AEROBIC IDENTIFY: CPT

## 2023-11-11 PROCEDURE — 86704 HEP B CORE ANTIBODY TOTAL: CPT

## 2023-11-11 PROCEDURE — 80053 COMPREHEN METABOLIC PANEL: CPT

## 2023-11-11 PROCEDURE — 84100 ASSAY OF PHOSPHORUS: CPT

## 2023-11-11 PROCEDURE — 74330 X-RAY BILE/PANC ENDOSCOPY: CPT

## 2023-11-11 PROCEDURE — 85045 AUTOMATED RETICULOCYTE COUNT: CPT

## 2023-11-11 PROCEDURE — 96376 TX/PRO/DX INJ SAME DRUG ADON: CPT

## 2023-11-11 PROCEDURE — 93975 VASCULAR STUDY: CPT

## 2023-11-11 PROCEDURE — C9399: CPT

## 2023-11-11 PROCEDURE — 76705 ECHO EXAM OF ABDOMEN: CPT

## 2023-11-11 PROCEDURE — 85379 FIBRIN DEGRADATION QUANT: CPT

## 2023-11-11 PROCEDURE — 83540 ASSAY OF IRON: CPT

## 2023-11-11 PROCEDURE — 74183 MRI ABD W/O CNTR FLWD CNTR: CPT

## 2023-11-11 PROCEDURE — 86900 BLOOD TYPING SEROLOGIC ABO: CPT

## 2023-11-11 PROCEDURE — 82607 VITAMIN B-12: CPT

## 2023-11-11 PROCEDURE — 82977 ASSAY OF GGT: CPT

## 2023-11-11 PROCEDURE — 84133 ASSAY OF URINE POTASSIUM: CPT

## 2023-11-11 PROCEDURE — 87641 MR-STAPH DNA AMP PROBE: CPT

## 2023-11-11 PROCEDURE — 87186 SC STD MICRODIL/AGAR DIL: CPT

## 2023-11-11 PROCEDURE — 84156 ASSAY OF PROTEIN URINE: CPT

## 2023-11-11 PROCEDURE — 36430 TRANSFUSION BLD/BLD COMPNT: CPT

## 2023-11-11 PROCEDURE — 85730 THROMBOPLASTIN TIME PARTIAL: CPT

## 2023-11-11 PROCEDURE — 96375 TX/PRO/DX INJ NEW DRUG ADDON: CPT

## 2023-11-11 PROCEDURE — C2625: CPT

## 2023-11-11 PROCEDURE — 83930 ASSAY OF BLOOD OSMOLALITY: CPT

## 2023-11-11 PROCEDURE — 87640 STAPH A DNA AMP PROBE: CPT

## 2023-11-11 PROCEDURE — 83550 IRON BINDING TEST: CPT

## 2023-11-11 PROCEDURE — 83935 ASSAY OF URINE OSMOLALITY: CPT

## 2023-11-11 PROCEDURE — 87150 DNA/RNA AMPLIFIED PROBE: CPT

## 2023-11-11 PROCEDURE — 70450 CT HEAD/BRAIN W/O DYE: CPT

## 2023-11-11 PROCEDURE — 84540 ASSAY OF URINE/UREA-N: CPT

## 2023-11-11 PROCEDURE — 82272 OCCULT BLD FECES 1-3 TESTS: CPT

## 2023-11-11 PROCEDURE — 99232 SBSQ HOSP IP/OBS MODERATE 35: CPT | Mod: GC

## 2023-11-11 PROCEDURE — 99239 HOSP IP/OBS DSCHRG MGMT >30: CPT | Mod: GC

## 2023-11-11 PROCEDURE — 97161 PT EVAL LOW COMPLEX 20 MIN: CPT

## 2023-11-11 PROCEDURE — 97116 GAIT TRAINING THERAPY: CPT

## 2023-11-11 PROCEDURE — A9585: CPT

## 2023-11-11 PROCEDURE — 83010 ASSAY OF HAPTOGLOBIN QUANT: CPT

## 2023-11-11 RX ORDER — METRONIDAZOLE 500 MG
500 TABLET ORAL ONCE
Refills: 0 | Status: COMPLETED | OUTPATIENT
Start: 2023-11-11 | End: 2023-11-11

## 2023-11-11 RX ORDER — DIAZEPAM 5 MG
5 TABLET ORAL ONCE
Refills: 0 | Status: DISCONTINUED | OUTPATIENT
Start: 2023-11-11 | End: 2023-11-11

## 2023-11-11 RX ORDER — LEVOTHYROXINE SODIUM 125 MCG
1 TABLET ORAL
Qty: 30 | Refills: 0
Start: 2023-11-11 | End: 2023-12-10

## 2023-11-11 RX ORDER — LEVOTHYROXINE SODIUM 125 MCG
1 TABLET ORAL
Qty: 0 | Refills: 0 | DISCHARGE

## 2023-11-11 RX ORDER — CIPROFLOXACIN LACTATE 400MG/40ML
500 VIAL (ML) INTRAVENOUS ONCE
Refills: 0 | Status: DISCONTINUED | OUTPATIENT
Start: 2023-11-11 | End: 2023-11-11

## 2023-11-11 RX ADMIN — Medication 1 CAPSULE(S): at 08:35

## 2023-11-11 RX ADMIN — ESCITALOPRAM OXALATE 20 MILLIGRAM(S): 10 TABLET, FILM COATED ORAL at 12:06

## 2023-11-11 RX ADMIN — OXCARBAZEPINE 300 MILLIGRAM(S): 300 TABLET, FILM COATED ORAL at 05:34

## 2023-11-11 RX ADMIN — Medication 650 MILLIGRAM(S): at 15:08

## 2023-11-11 RX ADMIN — Medication 500 MILLIGRAM(S): at 05:33

## 2023-11-11 RX ADMIN — Medication 325 MILLIGRAM(S): at 12:03

## 2023-11-11 RX ADMIN — Medication 112 MICROGRAM(S): at 05:34

## 2023-11-11 RX ADMIN — Medication 500 MILLIGRAM(S): at 15:21

## 2023-11-11 RX ADMIN — AMLODIPINE BESYLATE 5 MILLIGRAM(S): 2.5 TABLET ORAL at 05:34

## 2023-11-11 RX ADMIN — Medication 1 CAPSULE(S): at 15:12

## 2023-11-11 RX ADMIN — Medication 5 MILLIGRAM(S): at 15:22

## 2023-11-11 RX ADMIN — Medication 1 MILLIGRAM(S): at 12:04

## 2023-11-11 NOTE — PROGRESS NOTE ADULT - PROBLEM/PLAN-8
Yes
DISPLAY PLAN FREE TEXT

## 2023-11-11 NOTE — PROGRESS NOTE ADULT - SUBJECTIVE AND OBJECTIVE BOX
Patient is a 68y old  Female who presents with a chief complaint of Pyelonephritis (10 Nov 2023 07:34)    Subjective  Patient seen and examined, feeling well overall.  No overnight events.  No fevers.      MEDICATIONS  (STANDING):  amLODIPine   Tablet 5 milliGRAM(s) Oral daily  chlorhexidine 4% Liquid 1 Application(s) Topical daily  ertapenem  IVPB 1000 milliGRAM(s) IV Intermittent every 24 hours  escitalopram 20 milliGRAM(s) Oral daily  famotidine    Tablet 20 milliGRAM(s) Oral <User Schedule>  ferrous    sulfate 325 milliGRAM(s) Oral daily  folic acid 1 milliGRAM(s) Oral daily  indomethacin Suppository 100 milliGRAM(s) Rectal once  levothyroxine 112 MICROGram(s) Oral daily  OXcarbazepine 300 milliGRAM(s) Oral two times a day  pancrelipase  (CREON 36,000 Lipase Units) 1 Capsule(s) Oral three times a day with meals  polyethylene glycol 3350 17 Gram(s) Oral two times a day  senna 2 Tablet(s) Oral at bedtime  sodium chloride 0.9% lock flush 3 milliLiter(s) IV Push every 8 hours    MEDICATIONS  (PRN):  acetaminophen     Tablet .. 650 milliGRAM(s) Oral every 6 hours PRN Temp greater or equal to 38C (100.4F), Mild Pain (1 - 3)      Vital Signs Last 24 Hrs  T(C): 36.4 (11 Nov 2023 14:39), Max: 36.6 (10 Nov 2023 20:17)  T(F): 97.5 (11 Nov 2023 14:39), Max: 97.9 (10 Nov 2023 20:17)  HR: 83 (11 Nov 2023 14:39) (66 - 83)  BP: 143/81 (11 Nov 2023 14:39) (136/76 - 152/80)  BP(mean): --  RR: 18 (11 Nov 2023 14:39) (18 - 18)  SpO2: 99% (11 Nov 2023 14:39) (97% - 99%)    Parameters below as of 11 Nov 2023 14:39  Patient On (Oxygen Delivery Method): room air      PE  NAD  Awake, alert  Anicteric, MMM  No c/c/e  No rash grossly  FROM    Labs:                        8.6    16.75 )-----------( 182      ( 11 Nov 2023 07:12 )             24.3   11-11    130<L>  |  98  |  16  ----------------------------<  96  4.6   |  21<L>  |  0.85    Ca    8.6      11 Nov 2023 07:17  Phos  2.8     11-11  Mg     2.0     11-11    TPro  6.2  /  Alb  2.9<L>  /  TBili  0.7  /  DBili  x   /  AST  36  /  ALT  50<H>  /  AlkPhos  314<H>  11-11

## 2023-11-11 NOTE — PROGRESS NOTE ADULT - PROBLEM SELECTOR PLAN 3
Likely multifactorial: myelosuppression iso infection and sepsis. Also with recent chemo and decrease in multiple cell lines: anemia and thrombocytopenia.  - 1U pRBC for acute drop in Hgb 11/5 anemia - Hgb wnl   -> Thrombocytopenia: holding heparin given concern for bleed, monitor for bleed --> restart in AM , post ERCP   - monitor for bleed

## 2023-11-11 NOTE — PROGRESS NOTE ADULT - ASSESSMENT
Patient is a 68 year old F with hypertension, hypothyroidism, and  pancreatic cancer, currently on chemotherapy (last session 10/30) who presents to the emergency department for fever, weakness, fatigue, body aches, and lower back pain for 2 days after 2 weeks of urinary urgency prior to symptom onset.    Pancreatic ca  --under the care of Selena Hanna of [facility]   --Stage II PDIC (6/2012) w/ recurrence to abdominal wall (resected 1/20223)  --s/p RT 3/2022  --currently on systemic treatment w/ Gemcitabine/Nab-Paclitaxel LD 10/30  --no plan for treatment while inpatient and/or rehab  --ongoing care after discharge    sepsis  --2/2 pyelonephritis, noted on outpatient CT a/p done 11/2 (see above). Pt states that she had been having urinary frequency and urgency but did not recognize that as sx of UTI  --outpatient peripheral cultures and MP cultures collected 11/2 c/w ecoli, resistant to bactrim but otherwise sensitive  --cultures 11/2 inpatient also showed Ecoli. 11/4 cultures negative  --ID following, plan to continue Ertapenem or cipro on discharge for 10 days.     Hyponatremia  --chronic, Na 128 at baseline, Na today is 130    anemia/thrombocytopenia  --2/2 malignancy and treatment  --Hgb 8-9, platelets 170s at baseline  --on PO folic acid, and iron  --transfuse for Hgb<7   --daily cbc    transaminitis  --abd US w/ Mild intra and extra hepatic biliary dilatation, the common bile duct measuring up to 1.1 cm. The common bile duct is dilated to the level of the pancreatic head. The gallbladder is markedly distended. No stones are identified. Possible small amount of intraluminal sludge. No wall thickening, pericholecystic fluid, or sonographic Pierre sign. No evidence for acute cholecystitis.  --Abnormal MRCP w/ stricture  --GI s/p ERCP with stent and biopsy.  --Overall improving    Thank you for the opportunity to participate in the care of this patient.  Please don't hesitate to call for any question or concern.  Will continue to monitor.

## 2023-11-11 NOTE — PROGRESS NOTE ADULT - REASON FOR ADMISSION
Pyelonephritis

## 2023-11-11 NOTE — PROGRESS NOTE ADULT - ASSESSMENT
68 year old F with HTN, hypothyroidism, and  pancreatic cancer (s/p distal pancreatectomy 2021, s/p RTX- last 2022, currently on gemcitabine/abraxane- last session 10/30) who presents to the emergency department for fever, weakness, fatigue, body aches, and lower back pain for 2 days after 2 weeks of urinary urgency and was found to have evidence of pyelonephritis on CT abdomen/pelvis at [facility]. The patient was transferred to [facility] for further management. Hospital course c/b E Coli bacteremia and hyponatremia thought 2/2 SIADH. Also noted to have uptrending AST/ALT/ALP upon transfer but normal Tbili. MRCP 11/7-  liver showing increased signal on out of phase imaging, suggestive of iron deposition, several subcentimeter hepatic cysts; CBD dilatation, measuring up to 1.0 cm with focal narrowing of the lower CBD in the region of the pancreatic head; no discrete mass or calculus visualized, no gallstones; s/p distal pancreatectomy, few tiny cysts in the pancreatic head. Advanced GI consulted for abnormal MRCP findings.    Impression:  #proximally dilated CBD with distal CBD stricture- d/dx includes possible mass/tumor 2/2 progression of known pancreatic cancer vs cholangiocarcinoma vs extrinsic compression of distal CBD 2/2 pancreatic head cyst or periampullary diverticulum  #pancreatic cancer (s/p distal pancreatectomy 2021, s/p RTX- last 2022, currently on gemcitabine/abraxane- last session 10/30)  -s/p ERCP 10/10 Short distal common duct stricture.  No visible mass on endoscopic ultrasound. s/p biliary sphincterotomy, removal of biliary sludge, brush cytology, 10 French by 5 cm biliary stent placement  - CEA 5.7,  24    Recommendations:   - advance diet as tolerated   - if patient able to tolerate solid food, stable for discharge from GI perspective   - Please provide patient with Gastroenterology Clinic outpatient follow up number for an appointment [phone] (Faculty Practice in [facility])     All recommendations are tentative until note is attested by attending.     Magy Marcial, PGY-5  Gastroenterology/Hepatology Fellow  Available on [employer] Teams  [phone] ([facility] Short Range Pager)  [phone] ([facility] Long Range Pager)    After 5pm, please contact the on-call GI fellow. [phone]

## 2023-11-11 NOTE — PROGRESS NOTE ADULT - PROBLEM SELECTOR PLAN 4
Pyelo with CVA tenderness, recent urinary urgency, likely ascending infection iso immunosuppression from chemo.  -> s/p ertapenem, E Coli pansensitive  -> pt will be DC on ABX for 14 days from 11/4 ( neg blood CX date)

## 2023-11-11 NOTE — PROGRESS NOTE ADULT - ATTENDING COMMENTS
Patient seen and examined with the GI fellow. I agree with the above assessment and plan. Thank you for allowing us to care for your patient.    Post ERCP doing well

## 2023-11-11 NOTE — PROGRESS NOTE ADULT - NUTRITIONAL ASSESSMENT
This patient has been assessed with a concern for Malnutrition and has been determined to have a diagnosis/diagnoses of Severe protein-calorie malnutrition.    This patient is being managed with:   Diet DASH/TLC-  Sodium & Cholesterol Restricted  1500mL Fluid Restriction ([id number])  Entered: Nov 11 2023  7:19AM  
This patient has been assessed with a concern for Malnutrition and has been determined to have a diagnosis/diagnoses of Severe protein-calorie malnutrition.    This patient is being managed with:   Diet Regular-  1000mL Fluid Restriction ([id number])  Supplement Feeding Modality:  Oral  Ensure Enlive Cans or Servings Per Day:  2       Frequency:  Two Times a day  Entered: Nov 7 2023 10:34AM  
This patient has been assessed with a concern for Malnutrition and has been determined to have a diagnosis/diagnoses of Severe protein-calorie malnutrition.    This patient is being managed with:   Diet Regular-  Supplement Feeding Modality:  Oral  Ensure Enlive Cans or Servings Per Day:  2       Frequency:  Two Times a day  Entered: Nov  3 2023  4:09PM  
This patient has been assessed with a concern for Malnutrition and has been determined to have a diagnosis/diagnoses of Severe protein-calorie malnutrition.    This patient is being managed with:   Diet NPO after Midnight-     NPO Start Date: 09-Nov-2023   NPO Start Time: 23:59  Except Medications  With Ice Chips/Sips of Water  Entered: Nov 9 2023 10:31AM    Diet Regular-  1000mL Fluid Restriction ([id number])  Supplement Feeding Modality:  Oral  Ensure Enlive Cans or Servings Per Day:  2       Frequency:  Two Times a day  Entered: Nov 7 2023 10:34AM  
This patient has been assessed with a concern for Malnutrition and has been determined to have a diagnosis/diagnoses of Severe protein-calorie malnutrition.    This patient is being managed with:   Diet DASH/TLC-  Sodium & Cholesterol Restricted  1500mL Fluid Restriction ([id number])  Entered: Nov 11 2023  7:19AM  
This patient has been assessed with a concern for Malnutrition and has been determined to have a diagnosis/diagnoses of Severe protein-calorie malnutrition.    This patient is being managed with:   Diet Regular-  1000mL Fluid Restriction ([id number])  Supplement Feeding Modality:  Oral  Ensure Enlive Cans or Servings Per Day:  2       Frequency:  Two Times a day  Entered: Nov 7 2023 10:34AM

## 2023-11-11 NOTE — PROGRESS NOTE ADULT - PROBLEM SELECTOR PLAN 2
Fever, tachycardia, with MARY ELLEN/hypotension. CT A/P outside records  ( done 11/2 --> in [facility]) showing evidence of Rt pyelonephritis in the upper poles. S/p 1 dose of zosyn, vanc. Immunosuppression from active chemo. Now on CTX  - pansensitive E Coli - was on CTX ( goal of 7-10 days of treatment )  -> switched to ciproflaxacin per ID, now on ertapenem  -> improving WBC and improved Platelets   - IMPROVED

## 2023-11-11 NOTE — PROGRESS NOTE ADULT - PROBLEM SELECTOR PLAN 5
CHronic Hyponatremia ( Present for >48 hours with no acute mental status change)    Initially with low serum osm, and indeterminate urine sodium. However, after receiving 2L, now back at baseline Na (128)  - with high urine Na and low urine Osm --> likely SIADH with drop in Uosm from IVF ---> will repeat urine Studies   - Hyponatremia but possibly mixed with baseline SIADH and pt has low albumin which can be contributing to hyponatremia   - received 1U blood 11/5  - repeat urine studies - Sade elevated, Uosm 278, c/f SIADH - restrict Fluid and monitor Na   -> AM cortisol (appropiately elevated)

## 2023-11-11 NOTE — PROGRESS NOTE ADULT - SUBJECTIVE AND OBJECTIVE BOX
Patient is a 68y old  Female who presents with a chief complaint of Pyelonephritis (10 Nov 2023 13:06)      SUBJECTIVE / OVERNIGHT EVENTS:  No acute events overnight. Patient is s/p ERCP with stenting of focal stricture. Biopsy sent. Patient at this time denies fevers, chills, CP, SOB, nausea, vomiting, diarrhea, constipation, dysuria. Patient seen and examined at bedside.    MEDICATIONS  (STANDING):  amLODIPine   Tablet 5 milliGRAM(s) Oral daily  chlorhexidine 4% Liquid 1 Application(s) Topical daily  ciprofloxacin     Tablet 500 milliGRAM(s) Oral every 12 hours  enoxaparin Injectable 40 milliGRAM(s) SubCutaneous <User Schedule>  escitalopram 20 milliGRAM(s) Oral daily  famotidine    Tablet 20 milliGRAM(s) Oral <User Schedule>  ferrous    sulfate 325 milliGRAM(s) Oral daily  folic acid 1 milliGRAM(s) Oral daily  indomethacin Suppository 100 milliGRAM(s) Rectal once  lactated ringers. 2000 milliLiter(s) (150 mL/Hr) IV Continuous <Continuous>  levothyroxine 112 MICROGram(s) Oral daily  metroNIDAZOLE    Tablet 500 milliGRAM(s) Oral two times a day  OXcarbazepine 300 milliGRAM(s) Oral two times a day  pancrelipase  (CREON 36,000 Lipase Units) 1 Capsule(s) Oral three times a day with meals  polyethylene glycol 3350 17 Gram(s) Oral two times a day  senna 2 Tablet(s) Oral at bedtime  sodium chloride 0.9% lock flush 3 milliLiter(s) IV Push every 8 hours    MEDICATIONS  (PRN):  acetaminophen     Tablet .. 650 milliGRAM(s) Oral every 6 hours PRN Temp greater or equal to 38C (100.4F), Mild Pain (1 - 3)      CAPILLARY BLOOD GLUCOSE        I&O's Summary    10 Nov 2023 07:01  -  11 Nov 2023 07:00  --------------------------------------------------------  IN: 2000 mL / OUT: 800 mL / NET: 1200 mL        PHYSICAL EXAM:  Vital Signs Last 24 Hrs  T(C): 36.4 (11 Nov 2023 05:31), Max: 36.8 (10 Nov 2023 07:46)  T(F): 97.6 (11 Nov 2023 05:31), Max: 98.2 (10 Nov 2023 07:46)  HR: 66 (11 Nov 2023 05:31) (66 - 83)  BP: 152/80 (11 Nov 2023 05:31) (130/68 - 152/80)  BP(mean): 89 (10 Nov 2023 10:23) (89 - 89)  RR: 18 (11 Nov 2023 05:31) (10 - 21)  SpO2: 98% (11 Nov 2023 05:31) (94% - 98%)    Parameters below as of 11 Nov 2023 05:31  Patient On (Oxygen Delivery Method): room air        GENERAL: No acute distress. Thin-appearing.  HEAD:  Atraumatic, Normocephalic  EYES: EOMI, PERRLA, conjunctiva and sclera clear  NECK: Supple, no lymphadenopathy, no JVD  CHEST/LUNG: CTAB; No wheezes, rales, or rhonchi  HEART: Regular rate and rhythm; No murmurs, rubs, or gallops  ABDOMEN: Soft, non-tender, non-distended; normal bowel sounds, no organomegaly  EXTREMITIES:  2+ peripheral pulses b/l, No clubbing, cyanosis, or edema  NEUROLOGY: A&O x 3, no focal deficits  SKIN: No rashes or lesions    LABS:                        8.5    14.87 )-----------( 136      ( 10 Nov 2023 06:16 )             24.9     11-10    129<L>  |  97  |  16  ----------------------------<  110<H>  4.6   |  21<L>  |  0.83    Ca    8.3<L>      10 Nov 2023 06:16  Phos  2.6     11-10  Mg     2.0     11-10    TPro  6.2  /  Alb  2.9<L>  /  TBili  0.7  /  DBili  x   /  AST  53<H>  /  ALT  67<H>  /  AlkPhos  344<H>  11-10    PT/INR - ( 10 Nov 2023 06:16 )   PT: 11.6 sec;   INR: 1.06 ratio         PTT - ( 10 Nov 2023 06:16 )  PTT:26.6 sec      Urinalysis Basic - ( 10 Nov 2023 06:16 )    Color: x / Appearance: x / SG: x / pH: x  Gluc: 110 mg/dL / Ketone: x  / Bili: x / Urobili: x   Blood: x / Protein: x / Nitrite: x   Leuk Esterase: x / RBC: x / WBC x   Sq Epi: x / Non Sq Epi: x / Bacteria: x          RADIOLOGY & ADDITIONAL TESTS:  Results Reviewed:   Imaging Personally Reviewed:  Electrocardiogram Personally Reviewed:    COORDINATION OF CARE:  Care Discussed with Consultants/Other Providers [Y/N]:  Prior or Outpatient Records Reviewed [Y/N]:

## 2023-11-11 NOTE — PROGRESS NOTE ADULT - PROBLEM SELECTOR PLAN 1
acute rise in transaminase and ALP 11/6--> CBD Stricture s/p ERCP   - may be iso pancreatic mass vs MOST likely from CTX causing sludge/obstruction--> CBD Stricture per Abd US and MRCP ---> ERCP on 11/10  - hepatitis panel negative  - RUQUS - showing sludge, 11mm ductal dilation - MRCP with CDB stricture  -> trend liver enzymes - ALP rising, Tbili 1.2 and rising  - GGT elevated 492  -> s/p CTX, ertapenem - now on cipro/flagyl  - MRCP showing focal stricture lower CBD  -> heme/onc rec GI consult for ERCP/stent--> rec is appreciated   -> advanced GI c/s s/p ERCP with stent, biopsied

## 2023-11-11 NOTE — PROGRESS NOTE ADULT - ASSESSMENT
68F hx of HTN pancreatic cancer on chemo (Gemcitabine/abraxane last dose on 10/30) with port in R chest wall here for sepsis 2/2 pyelonephritis complicated by MARY ELLEN and hyponatremia.  Hospital course was complicated by CBD Stricture and is s/p ERCP done on 11/10

## 2023-11-11 NOTE — PROGRESS NOTE ADULT - PROVIDER SPECIALTY LIST ADULT
Heme/Onc
Infectious Disease
Heme/Onc
Infectious Disease
Heme/Onc
Heme/Onc
Infectious Disease
Gastroenterology
Heme/Onc
Internal Medicine

## 2023-11-11 NOTE — PROGRESS NOTE ADULT - SUBJECTIVE AND OBJECTIVE BOX
Gastroenterology/Hepatology Progress Note    Interval Events:   - patient feels well after ERCP   - tolerating clear diet well     Allergies:  phenytoin (Rash)  ibuprofen (Other)  Dilantin (Unknown)      Hospital Medications:  acetaminophen     Tablet .. 650 milliGRAM(s) Oral every 6 hours PRN  amLODIPine   Tablet 5 milliGRAM(s) Oral daily  chlorhexidine 4% Liquid 1 Application(s) Topical daily  ciprofloxacin     Tablet 500 milliGRAM(s) Oral every 12 hours  enoxaparin Injectable 40 milliGRAM(s) SubCutaneous <User Schedule>  escitalopram 20 milliGRAM(s) Oral daily  famotidine    Tablet 20 milliGRAM(s) Oral <User Schedule>  ferrous    sulfate 325 milliGRAM(s) Oral daily  folic acid 1 milliGRAM(s) Oral daily  indomethacin Suppository 100 milliGRAM(s) Rectal once  lactated ringers. 2000 milliLiter(s) IV Continuous <Continuous>  levothyroxine 112 MICROGram(s) Oral daily  metroNIDAZOLE    Tablet 500 milliGRAM(s) Oral two times a day  OXcarbazepine 300 milliGRAM(s) Oral two times a day  pancrelipase  (CREON 36,000 Lipase Units) 1 Capsule(s) Oral three times a day with meals  polyethylene glycol 3350 17 Gram(s) Oral two times a day  senna 2 Tablet(s) Oral at bedtime  sodium chloride 0.9% lock flush 3 milliLiter(s) IV Push every 8 hours      ROS: 14 point ROS negative unless otherwise state in subjective    PHYSICAL EXAM:   Vital Signs:  Vital Signs Last 24 Hrs  T(C): 36.4 (11 Nov 2023 05:31), Max: 36.7 (10 Nov 2023 10:09)  T(F): 97.6 (11 Nov 2023 05:31), Max: 98 (10 Nov 2023 10:09)  HR: 66 (11 Nov 2023 05:31) (66 - 80)  BP: 152/80 (11 Nov 2023 05:31) (130/68 - 152/80)  BP(mean): 89 (10 Nov 2023 10:23) (89 - 89)  RR: 18 (11 Nov 2023 05:31) (10 - 21)  SpO2: 98% (11 Nov 2023 05:31) (94% - 98%)    Parameters below as of 11 Nov 2023 05:31  Patient On (Oxygen Delivery Method): room air      Daily Height in cm: 154.94 (10 Nov 2023 10:23)    Daily     GENERAL:  No acute distress  HEENT:  NCAT, no scleral icterus  CHEST: no resp distress  HEART:  RRR  ABDOMEN:  Soft, non-tender, non-distended, no masses  EXTREMITIES:  No cyanosis, clubbing, or edema  SKIN:  No rash/erythema/ecchymoses/petechiae/wounds/abscess/warm/dry  NEURO:  Alert and oriented x 3, no asterixis, no tremor    LABS:                        8.6    16.75 )-----------( 182      ( 11 Nov 2023 07:12 )             24.3     Mean Cell Volume: 94.6 fl (11-11-23 @ 07:12)    11-11    130<L>  |  98  |  16  ----------------------------<  96  4.6   |  21<L>  |  0.85    Ca    8.6      11 Nov 2023 07:17  Phos  2.8     11-11  Mg     2.0     11-11    TPro  6.2  /  Alb  2.9<L>  /  TBili  0.7  /  DBili  x   /  AST  36  /  ALT  50<H>  /  AlkPhos  314<H>  11-11    LIVER FUNCTIONS - ( 11 Nov 2023 07:17 )  Alb: 2.9 g/dL / Pro: 6.2 g/dL / ALK PHOS: 314 U/L / ALT: 50 U/L / AST: 36 U/L / GGT: x           PT/INR - ( 11 Nov 2023 07:15 )   PT: 11.3 sec;   INR: 1.03 ratio         PTT - ( 10 Nov 2023 06:16 )  PTT:26.6 sec  Urinalysis Basic - ( 11 Nov 2023 07:17 )    Color: x / Appearance: x / SG: x / pH: x  Gluc: 96 mg/dL / Ketone: x  / Bili: x / Urobili: x   Blood: x / Protein: x / Nitrite: x   Leuk Esterase: x / RBC: x / WBC x   Sq Epi: x / Non Sq Epi: x / Bacteria: x      Amylase Serum--      Lipase serum5       Ammonia--        Imaging:

## 2023-11-11 NOTE — PROGRESS NOTE ADULT - PROBLEM SELECTOR PLAN 10
DVT: holding heparin iso thrombocytopenia; may resume today  Dispo: home when able  Diet: regular + pancrelipase
DVT: holding heparin iso thrombocytopenia  Dispo: home when able  Diet: regular + pancrelipase
DVT: restarting heparin  Dispo: home when able  Diet: regular + pancrelipase
DVT: holding heparin iso thrombocytopenia  Dispo: home when able  Diet: regular + pancrelipase

## 2023-11-12 RX ORDER — CIPROFLOXACIN LACTATE 400MG/40ML
1 VIAL (ML) INTRAVENOUS
Qty: 14 | Refills: 0
Start: 2023-11-12 | End: 2023-11-18

## 2023-11-12 RX ORDER — METRONIDAZOLE 500 MG
1 TABLET ORAL
Qty: 10 | Refills: 0
Start: 2023-11-12 | End: 2023-11-16

## 2023-11-12 RX ORDER — METRONIDAZOLE 500 MG
1 TABLET ORAL
Qty: 14 | Refills: 0
Start: 2023-11-12 | End: 2023-11-18

## 2023-11-12 RX ORDER — CIPROFLOXACIN LACTATE 400MG/40ML
1 VIAL (ML) INTRAVENOUS
Qty: 10 | Refills: 0
Start: 2023-11-12 | End: 2023-11-16

## 2023-11-28 ENCOUNTER — APPOINTMENT (OUTPATIENT)
Dept: GASTROENTEROLOGY | Facility: CLINIC | Age: 68
End: 2023-11-28
Payer: COMMERCIAL

## 2023-11-28 VITALS
HEART RATE: 100 BPM | DIASTOLIC BLOOD PRESSURE: 70 MMHG | HEIGHT: 61 IN | BODY MASS INDEX: 18.69 KG/M2 | WEIGHT: 99 LBS | OXYGEN SATURATION: 99 % | SYSTOLIC BLOOD PRESSURE: 102 MMHG

## 2023-11-28 DIAGNOSIS — K83.9 DISEASE OF BILIARY TRACT, UNSPECIFIED: ICD-10-CM

## 2023-11-28 DIAGNOSIS — R59.0 LOCALIZED ENLARGED LYMPH NODES: ICD-10-CM

## 2023-11-28 DIAGNOSIS — R63.4 ABNORMAL WEIGHT LOSS: ICD-10-CM

## 2023-11-28 DIAGNOSIS — Z85.07 PERSONAL HISTORY OF MALIGNANT NEOPLASM OF PANCREAS: ICD-10-CM

## 2023-11-28 DIAGNOSIS — R79.89 OTHER SPECIFIED ABNORMAL FINDINGS OF BLOOD CHEMISTRY: ICD-10-CM

## 2023-11-28 PROCEDURE — 99215 OFFICE O/P EST HI 40 MIN: CPT

## 2023-12-08 ENCOUNTER — RESULT REVIEW (OUTPATIENT)
Age: 68
End: 2023-12-08

## 2023-12-08 ENCOUNTER — OUTPATIENT (OUTPATIENT)
Dept: OUTPATIENT SERVICES | Facility: HOSPITAL | Age: 68
LOS: 1 days | End: 2023-12-08
Payer: COMMERCIAL

## 2023-12-08 ENCOUNTER — APPOINTMENT (OUTPATIENT)
Dept: GASTROENTEROLOGY | Facility: HOSPITAL | Age: 68
End: 2023-12-08

## 2023-12-08 ENCOUNTER — TRANSCRIPTION ENCOUNTER (OUTPATIENT)
Age: 68
End: 2023-12-08

## 2023-12-08 VITALS
SYSTOLIC BLOOD PRESSURE: 120 MMHG | DIASTOLIC BLOOD PRESSURE: 70 MMHG | OXYGEN SATURATION: 95 % | RESPIRATION RATE: 18 BRPM | HEART RATE: 70 BPM

## 2023-12-08 VITALS
HEIGHT: 61 IN | SYSTOLIC BLOOD PRESSURE: 164 MMHG | OXYGEN SATURATION: 98 % | TEMPERATURE: 98 F | HEART RATE: 82 BPM | DIASTOLIC BLOOD PRESSURE: 76 MMHG | WEIGHT: 98.11 LBS | RESPIRATION RATE: 19 BRPM

## 2023-12-08 DIAGNOSIS — R59.0 LOCALIZED ENLARGED LYMPH NODES: ICD-10-CM

## 2023-12-08 DIAGNOSIS — Z90.410 ACQUIRED TOTAL ABSENCE OF PANCREAS: Chronic | ICD-10-CM

## 2023-12-08 PROCEDURE — 88305 TISSUE EXAM BY PATHOLOGIST: CPT | Mod: 26

## 2023-12-08 PROCEDURE — 94640 AIRWAY INHALATION TREATMENT: CPT

## 2023-12-08 PROCEDURE — 43239 EGD BIOPSY SINGLE/MULTIPLE: CPT

## 2023-12-08 PROCEDURE — 43237 ENDOSCOPIC US EXAM ESOPH: CPT | Mod: GC

## 2023-12-08 PROCEDURE — 88305 TISSUE EXAM BY PATHOLOGIST: CPT

## 2023-12-08 RX ORDER — ESCITALOPRAM OXALATE 10 MG/1
1 TABLET, FILM COATED ORAL
Qty: 0 | Refills: 0 | DISCHARGE

## 2023-12-08 RX ORDER — TRAMADOL HYDROCHLORIDE 50 MG/1
1 TABLET ORAL
Refills: 0 | DISCHARGE

## 2023-12-08 RX ORDER — FOLIC ACID 0.8 MG
1 TABLET ORAL
Refills: 0 | DISCHARGE

## 2023-12-08 RX ORDER — LOSARTAN POTASSIUM 100 MG/1
1 TABLET, FILM COATED ORAL
Qty: 0 | Refills: 0 | DISCHARGE

## 2023-12-08 RX ORDER — ALENDRONATE SODIUM 70 MG/1
1 TABLET ORAL
Qty: 0 | Refills: 0 | DISCHARGE

## 2023-12-08 RX ORDER — OXCARBAZEPINE 300 MG/1
1 TABLET, FILM COATED ORAL
Qty: 0 | Refills: 0 | DISCHARGE

## 2023-12-08 RX ORDER — SODIUM CHLORIDE 9 MG/ML
500 INJECTION, SOLUTION INTRAVENOUS
Refills: 0 | Status: COMPLETED | OUTPATIENT
Start: 2023-12-08 | End: 2023-12-08

## 2023-12-08 RX ORDER — DIAZEPAM 5 MG
1 TABLET ORAL
Refills: 0 | DISCHARGE

## 2023-12-08 RX ORDER — LIDOCAINE HCL 20 MG/ML
4 VIAL (ML) INJECTION ONCE
Refills: 0 | Status: COMPLETED | OUTPATIENT
Start: 2023-12-08 | End: 2023-12-08

## 2023-12-08 RX ORDER — AMLODIPINE BESYLATE 2.5 MG/1
1 TABLET ORAL
Refills: 0 | DISCHARGE

## 2023-12-08 RX ORDER — LIPASE/PROTEASE/AMYLASE 16-48-48K
1 CAPSULE,DELAYED RELEASE (ENTERIC COATED) ORAL
Refills: 0 | DISCHARGE

## 2023-12-08 RX ORDER — ACETAMINOPHEN 500 MG
2 TABLET ORAL
Refills: 0 | DISCHARGE

## 2023-12-08 RX ADMIN — SODIUM CHLORIDE 30 MILLILITER(S): 9 INJECTION, SOLUTION INTRAVENOUS at 16:28

## 2023-12-08 RX ADMIN — Medication 4 MILLILITER(S): at 15:30

## 2023-12-08 NOTE — PRE PROCEDURE NOTE - PRE PROCEDURE EVALUATION
Attending Physician:   Dr. Noble    Procedure: EGD, EUS    Indication for Procedure: Celiac lymph node    ________________________________________________________  PAST MEDICAL & SURGICAL HISTORY:  Pancreatic cancer      Hypertension      Hypothyroid      Seasonal asthma      History of pancreatectomy        ALLERGIES:  phenytoin (Rash)  ibuprofen (Other)  Dilantin (Unknown)    HOME MEDICATIONS:  acetaminophen 500 mg oral tablet: 2 tab(s) orally 3 times a day as needed for  mild pain  amLODIPine 5 mg oral tablet: 1 tab(s) orally once a day  escitalopram 20 mg oral tablet: 1 tab(s) orally once a day  folic acid 1 mg oral tablet: 1 tab(s) orally once a day  Fosamax 70 mg oral tablet: 1 tab(s) orally once a week SUNDAY  losartan 100 mg oral tablet: 1 tab(s) orally once a day  pancrelipase 10,000 units-32,000 units-42,000 units oral delayed release capsule: 1 cap(s) orally 3 times a day (with meals)  traMADol 50 mg oral tablet: 1 tab(s) orally every 8 hours as needed for  moderate pain  Trileptal 300 mg oral tablet: 1 tab(s) orally 2 times a day  Valium 5 mg oral tablet: 1 tab(s) orally once a day as needed for  anxiety    AICD/PPM: [ ] yes   [x ] no    PERTINENT LAB DATA:                      PHYSICAL EXAMINATION:    Height (cm): 154.9  Weight (kg): 44.5  BMI (kg/m2): 18.5  BSA (m2): 1.4T(C): 36.7  AVSS    Constitutional: NAD  HEENT: PERRLA, EOMI,    Neck:  No JVD  Respiratory: CTAB/L  Cardiovascular: S1 and S2  Gastrointestinal: BS+, soft, NT/ND  Extremities: No peripheral edema  Neurological: A/O x 3, no focal deficits  Psychiatric: Normal mood, normal affect  Skin: No rashes    ASA Class: I [ ]  II [ ]  III [ x]  IV [ ]    COMMENTS:    The patient is a suitable candidate for the planned procedure unless box checked [ ]  No, explain:    I explained the risks (bleeding, infection, perforation, CV risk, anesthesia risk)/b/a with the patient. The patient agrees to proceed. Patient had opportunity to ask questions in preprocedure bay. All questions answered.

## 2023-12-08 NOTE — ASU DISCHARGE PLAN (ADULT/PEDIATRIC) - NS MD DC FALL RISK RISK
For information on Fall & Injury Prevention, visit: [email or web address] OR  [email or web address] OR  https://www.cdc.gov/steadi/patient.html For information on Fall & Injury Prevention, visit: [email or web address] OR  [email or web address] OR  https://www.cdc.gov/steadi/patient.html

## 2023-12-08 NOTE — ASU PATIENT PROFILE, ADULT - FALL HARM RISK - UNIVERSAL INTERVENTIONS
Bed in lowest position, wheels locked, appropriate side rails in place/Call bell, personal items and telephone in reach/Instruct patient to call for assistance before getting out of bed or chair/Non-slip footwear when patient is out of bed/[place] to call system/Physically safe environment - no spills, clutter or unnecessary equipment/Purposeful Proactive Rounding/Room/bathroom lighting operational, light cord in reach Bed in lowest position, wheels locked, appropriate side rails in place/Call bell, personal items and telephone in reach/Instruct patient to call for assistance before getting out of bed or chair/Non-slip footwear when patient is out of bed/[place] to call system/Physically safe environment - no spills, clutter or unnecessary equipment/Purposeful Proactive Rounding/Room/bathroom lighting operational, light cord in reach

## 2023-12-11 LAB
SURGICAL PATHOLOGY STUDY: SIGNIFICANT CHANGE UP
SURGICAL PATHOLOGY STUDY: SIGNIFICANT CHANGE UP

## 2023-12-14 ENCOUNTER — APPOINTMENT (OUTPATIENT)
Dept: INFECTIOUS DISEASE | Facility: CLINIC | Age: 68
End: 2023-12-14
Payer: COMMERCIAL

## 2023-12-14 VITALS
SYSTOLIC BLOOD PRESSURE: 103 MMHG | HEART RATE: 76 BPM | DIASTOLIC BLOOD PRESSURE: 82 MMHG | HEIGHT: 61 IN | TEMPERATURE: 97.8 F | WEIGHT: 102 LBS | BODY MASS INDEX: 19.26 KG/M2 | OXYGEN SATURATION: 98 %

## 2023-12-14 DIAGNOSIS — B96.20 BACTEREMIA: ICD-10-CM

## 2023-12-14 DIAGNOSIS — R78.81 BACTEREMIA: ICD-10-CM

## 2023-12-14 PROCEDURE — 99214 OFFICE O/P EST MOD 30 MIN: CPT

## 2023-12-14 NOTE — PHYSICAL EXAM
[General Appearance - Alert] : alert [Sclera] : the sclera and conjunctiva were normal [Outer Ear] : the ears and nose were normal in appearance [Neck Appearance] : the appearance of the neck was normal [Heart Rate And Rhythm] : heart rate was normal and rhythm regular [Bowel Sounds] : normal bowel sounds [No Palpable Adenopathy] : no palpable adenopathy [] : no rash [Sensation] : the sensory exam was normal to light touch and pinprick [Motor Exam] : the motor exam was normal [Oriented To Time, Place, And Person] : oriented to person, place, and time [Affect] : the affect was normal

## 2023-12-18 PROBLEM — R79.89 ABNORMAL LFTS (LIVER FUNCTION TESTS): Status: ACTIVE | Noted: 2023-12-18

## 2023-12-18 PROBLEM — Z85.07 HISTORY OF PANCREATIC CANCER: Status: ACTIVE | Noted: 2022-09-14

## 2023-12-18 PROBLEM — R63.4 UNINTENTIONAL WEIGHT LOSS: Status: ACTIVE | Noted: 2023-12-18

## 2023-12-18 PROBLEM — R59.0 LYMPHADENOPATHY, ABDOMINAL: Status: ACTIVE | Noted: 2023-11-28

## 2023-12-18 NOTE — REVIEW OF SYSTEMS
[Fever] : no fever [Chills] : no chills [Chest Pain] : no chest pain [Shortness Of Breath] : no shortness of breath [Abdominal Pain] : no abdominal pain [FreeTextEntry2] : Patient said she is eating well but  is not gaining her weight back , still has fatigue

## 2023-12-18 NOTE — HISTORY OF PRESENT ILLNESS
[FreeTextEntry1] : Patient follows up from hospital visit. Hospitalized for pyelonephritis. Consulted for dilated common bile duct on imaging and abnormal LFTs.  Had upper endoscopy with endoscopic ultrasound ERCP for biliary dilation and question of distal bile duct stricture. Inpatient LFTs were abnormal but not suggestive of biliary obstruction. No mass was seen.  There was a subtle narrowing of the distal common bile duct without obvious obstruction.  Sidney cytology was negative for malignant cells.  A stent was placed to ensure biliary drainage.  Patient notes weight loss.  Has increased frequency of solid stools, approximately 2-3 times per day.  Had diarrhea while on antibiotics for about 1 week.  Now formed and without blood.  No abdominal pain.  Complains of fatigue.  Was told that she might require 4 weeks recovery time for resolution of fatigue.  Labs notable for normocytic anemia and mildly abnormal LFTs (AST/ALT/Alk phos with normal bilirubin)  MRI/MRCP (inpatient) November 2023: PROCEDURE: MRI of the abdomen was performed. MRCP was performed.  FINDINGS: LOWER CHEST: Trace left pleural effusion.  LIVER: Increased signal on out of phase imaging, suggestive of iron  deposition. Several subcentimeter hepatic cysts. BILE DUCTS: See below. GALLBLADDER: Common bile duct dilatation, measuring up to 1.0 cm. There  is focal narrowing of the lower CBD in the region of the pancreatic head.  No discrete mass or calculus visualized. No gallstones. SPLEEN: Splenectomy. PANCREAS: Status post distal pancreatectomy. A few tiny cysts in the  pancreatic head. ADRENALS: Within normal limits. KIDNEYS/URETERS: Small renal cysts.  VISUALIZED PORTIONS: BOWEL: Within normal limits. PERITONEUM: No ascites. VESSELS: Atherosclerotic changes. RETROPERITONEUM/LYMPH NODES: No lymphadenopathy. ABDOMINAL WALL: Within normal limits. BONES: Degenerative changes. Thoracolumbar signal abnormality likely  represents post radiation changes.  IMPRESSION: Focal stricture of the lower common bile duct for which differential  includes benign and malignant etiologies.  EGD/EUS/ERCP November 2023: Impression:          EGD:                      - Normal upper GI tract.                      EUS:                      - There was dilation in the middle third of the main bile duct and in the                       upper third of the main bile duct which measured up to 10 mm. A vessel                       appeared to narrow the common bile duct, with the proximal duct measuring up                       to 10 mm and containing sludge, and the distal duct measuring up to 6 mm.                      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 Another 4 x 10 mm                       periportal lymph node may be benign.                      ERCP:                      - Needle knife precut papillotomy was used to access the commn bile duct.         - 6 mm semicircular structure along one wall of the distal common bile duct,                       with proximal biliary dilation                      - Scant biliary sludge removed with biliary sphincterotomy and balloon sweeps.            - Cells for cytology were obtained with the brush catheter.                      - One 10 Fr x 5 cm plastic stent was placed into the common bile duct.

## 2023-12-18 NOTE — CONSULT LETTER
[Dear  ___] : Dear  [unfilled], [Consult Letter:] : I had the pleasure of evaluating your patient, [unfilled]. [Please see my note below.] : Please see my note below. [Consult Closing:] : Thank you very much for allowing me to participate in the care of this patient.  If you have any questions, please do not hesitate to contact me. [Sincerely,] : Sincerely, [FreeTextEntry3] : Ellis Noble MD, MPH, LAYLA, GEOVANNAG Chief of Clinical Quality in Gastroenterology, [facility] Chief of Gastroenterology, [facility] Interim Director of Endoscopy, [facility] Director of Endoscopic Ultrasound, [address], [address] [facility], [id number] 24 hours [phone]

## 2023-12-18 NOTE — ASSESSMENT
[FreeTextEntry1] :  Impression:  #1.  Question of distal common bile duct stricture on MRI/MRCP, with bile duct abnormality.  ERCP with cytology negative.  EUS did not demonstrate obvious mass.  #2.  Celiac lymphadenopathy.  #3.  History of pancreatic cancer status post Whipple surgery with chemoradiation therapy.  #4.  Weight loss, in setting of hospitalization  #5.  Frequent stools, solid, with prior antibiotic associated diarrhea.  Recommendations:  #1.  Upper endoscopy with endoscopic ultrasound and fine-needle biopsy of celiac lymph node.  Will be scheduled next week.  #2.  Fiber supplementation.  Trial 1 month.  Probiotic if ineffective.  #3.  Office follow-up in 1 month.  #4.  ERCP in January 2024.

## 2024-01-01 ENCOUNTER — INPATIENT (INPATIENT)
Facility: HOSPITAL | Age: 69
LOS: 13 days | Discharge: HOME | DRG: 390 | End: 2024-11-15
Attending: INTERNAL MEDICINE | Admitting: INTERNAL MEDICINE
Payer: COMMERCIAL

## 2024-01-01 ENCOUNTER — RESULT REVIEW (OUTPATIENT)
Age: 69
End: 2024-01-01

## 2024-01-01 ENCOUNTER — INPATIENT (INPATIENT)
Facility: HOSPITAL | Age: 69
LOS: 5 days | DRG: 642 | End: 2024-12-02
Attending: INTERNAL MEDICINE | Admitting: INTERNAL MEDICINE
Payer: COMMERCIAL

## 2024-01-01 VITALS
DIASTOLIC BLOOD PRESSURE: 73 MMHG | TEMPERATURE: 98 F | RESPIRATION RATE: 18 BRPM | HEART RATE: 72 BPM | OXYGEN SATURATION: 96 % | SYSTOLIC BLOOD PRESSURE: 113 MMHG

## 2024-01-01 VITALS
RESPIRATION RATE: 20 BRPM | SYSTOLIC BLOOD PRESSURE: 94 MMHG | OXYGEN SATURATION: 94 % | TEMPERATURE: 98 F | DIASTOLIC BLOOD PRESSURE: 68 MMHG | HEART RATE: 116 BPM

## 2024-01-01 VITALS
SYSTOLIC BLOOD PRESSURE: 132 MMHG | HEIGHT: 61 IN | WEIGHT: 95.02 LBS | TEMPERATURE: 98 F | OXYGEN SATURATION: 98 % | DIASTOLIC BLOOD PRESSURE: 81 MMHG | HEART RATE: 110 BPM | RESPIRATION RATE: 16 BRPM

## 2024-01-01 VITALS
TEMPERATURE: 98 F | WEIGHT: 80.03 LBS | OXYGEN SATURATION: 99 % | RESPIRATION RATE: 20 BRPM | SYSTOLIC BLOOD PRESSURE: 131 MMHG | HEIGHT: 65 IN | HEART RATE: 110 BPM | DIASTOLIC BLOOD PRESSURE: 75 MMHG

## 2024-01-01 DIAGNOSIS — I10 ESSENTIAL (PRIMARY) HYPERTENSION: ICD-10-CM

## 2024-01-01 DIAGNOSIS — G92.8 OTHER TOXIC ENCEPHALOPATHY: ICD-10-CM

## 2024-01-01 DIAGNOSIS — Z85.07 PERSONAL HISTORY OF MALIGNANT NEOPLASM OF PANCREAS: ICD-10-CM

## 2024-01-01 DIAGNOSIS — R45.1 RESTLESSNESS AND AGITATION: ICD-10-CM

## 2024-01-01 DIAGNOSIS — R52 PAIN, UNSPECIFIED: ICD-10-CM

## 2024-01-01 DIAGNOSIS — R01.1 CARDIAC MURMUR, UNSPECIFIED: ICD-10-CM

## 2024-01-01 DIAGNOSIS — Z29.9 ENCOUNTER FOR PROPHYLACTIC MEASURES, UNSPECIFIED: ICD-10-CM

## 2024-01-01 DIAGNOSIS — N39.0 URINARY TRACT INFECTION, SITE NOT SPECIFIED: ICD-10-CM

## 2024-01-01 DIAGNOSIS — E78.5 HYPERLIPIDEMIA, UNSPECIFIED: ICD-10-CM

## 2024-01-01 DIAGNOSIS — R06.03 ACUTE RESPIRATORY DISTRESS: ICD-10-CM

## 2024-01-01 DIAGNOSIS — K56.609 UNSPECIFIED INTESTINAL OBSTRUCTION, UNSPECIFIED AS TO PARTIAL VERSUS COMPLETE OBSTRUCTION: ICD-10-CM

## 2024-01-01 DIAGNOSIS — Z51.5 ENCOUNTER FOR PALLIATIVE CARE: ICD-10-CM

## 2024-01-01 DIAGNOSIS — G89.3 NEOPLASM RELATED PAIN (ACUTE) (CHRONIC): ICD-10-CM

## 2024-01-01 DIAGNOSIS — E11.9 TYPE 2 DIABETES MELLITUS WITHOUT COMPLICATIONS: ICD-10-CM

## 2024-01-01 DIAGNOSIS — C25.9 MALIGNANT NEOPLASM OF PANCREAS, UNSPECIFIED: ICD-10-CM

## 2024-01-01 DIAGNOSIS — E72.20 DISORDER OF UREA CYCLE METABOLISM, UNSPECIFIED: ICD-10-CM

## 2024-01-01 DIAGNOSIS — E03.9 HYPOTHYROIDISM, UNSPECIFIED: ICD-10-CM

## 2024-01-01 DIAGNOSIS — A41.9 SEPSIS, UNSPECIFIED ORGANISM: ICD-10-CM

## 2024-01-01 DIAGNOSIS — Z71.89 OTHER SPECIFIED COUNSELING: ICD-10-CM

## 2024-01-01 DIAGNOSIS — R06.00 DYSPNEA, UNSPECIFIED: ICD-10-CM

## 2024-01-01 DIAGNOSIS — K31.1 ADULT HYPERTROPHIC PYLORIC STENOSIS: ICD-10-CM

## 2024-01-01 DIAGNOSIS — Z90.410 ACQUIRED TOTAL ABSENCE OF PANCREAS: Chronic | ICD-10-CM

## 2024-01-01 DIAGNOSIS — R53.2 FUNCTIONAL QUADRIPLEGIA: ICD-10-CM

## 2024-01-01 DIAGNOSIS — R41.82 ALTERED MENTAL STATUS, UNSPECIFIED: ICD-10-CM

## 2024-01-01 LAB
-  AMOXICILLIN/CLAVULANIC ACID: SIGNIFICANT CHANGE UP
-  AMOXICILLIN/CLAVULANIC ACID: SIGNIFICANT CHANGE UP
-  AMPICILLIN/SULBACTAM: SIGNIFICANT CHANGE UP
-  AMPICILLIN/SULBACTAM: SIGNIFICANT CHANGE UP
-  AMPICILLIN: SIGNIFICANT CHANGE UP
-  AMPICILLIN: SIGNIFICANT CHANGE UP
-  AZTREONAM: SIGNIFICANT CHANGE UP
-  AZTREONAM: SIGNIFICANT CHANGE UP
-  CEFAZOLIN: SIGNIFICANT CHANGE UP
-  CEFAZOLIN: SIGNIFICANT CHANGE UP
-  CEFEPIME: SIGNIFICANT CHANGE UP
-  CEFEPIME: SIGNIFICANT CHANGE UP
-  CEFOXITIN: SIGNIFICANT CHANGE UP
-  CEFOXITIN: SIGNIFICANT CHANGE UP
-  CEFTRIAXONE: SIGNIFICANT CHANGE UP
-  CEFTRIAXONE: SIGNIFICANT CHANGE UP
-  CEFUROXIME: SIGNIFICANT CHANGE UP
-  CEFUROXIME: SIGNIFICANT CHANGE UP
-  CIPROFLOXACIN: SIGNIFICANT CHANGE UP
-  CIPROFLOXACIN: SIGNIFICANT CHANGE UP
-  ERTAPENEM: SIGNIFICANT CHANGE UP
-  ERTAPENEM: SIGNIFICANT CHANGE UP
-  GENTAMICIN: SIGNIFICANT CHANGE UP
-  GENTAMICIN: SIGNIFICANT CHANGE UP
-  IMIPENEM: SIGNIFICANT CHANGE UP
-  IMIPENEM: SIGNIFICANT CHANGE UP
-  LEVOFLOXACIN: SIGNIFICANT CHANGE UP
-  LEVOFLOXACIN: SIGNIFICANT CHANGE UP
-  MEROPENEM: SIGNIFICANT CHANGE UP
-  MEROPENEM: SIGNIFICANT CHANGE UP
-  NITROFURANTOIN: SIGNIFICANT CHANGE UP
-  NITROFURANTOIN: SIGNIFICANT CHANGE UP
-  PIPERACILLIN/TAZOBACTAM: SIGNIFICANT CHANGE UP
-  PIPERACILLIN/TAZOBACTAM: SIGNIFICANT CHANGE UP
-  TOBRAMYCIN: SIGNIFICANT CHANGE UP
-  TOBRAMYCIN: SIGNIFICANT CHANGE UP
-  TRIMETHOPRIM/SULFAMETHOXAZOLE: SIGNIFICANT CHANGE UP
-  TRIMETHOPRIM/SULFAMETHOXAZOLE: SIGNIFICANT CHANGE UP
24R-OH-CALCIDIOL SERPL-MCNC: 9.5 NG/ML — LOW (ref 30–80)
A1C WITH ESTIMATED AVERAGE GLUCOSE RESULT: 6.4 % — HIGH (ref 4–5.6)
A1C WITH ESTIMATED AVERAGE GLUCOSE RESULT: 6.5 % — HIGH (ref 4–5.6)
ACANTHOCYTES BLD QL SMEAR: SLIGHT — SIGNIFICANT CHANGE UP
ADAMTS13 ACTIVITY: 79 % — SIGNIFICANT CHANGE UP (ref 40–130)
ADD ON TEST-SPECIMEN IN LAB: SIGNIFICANT CHANGE UP
ALBUMIN SERPL ELPH-MCNC: 2.8 G/DL — LOW (ref 3.3–5)
ALBUMIN SERPL ELPH-MCNC: 2.8 G/DL — LOW (ref 3.3–5)
ALBUMIN SERPL ELPH-MCNC: 2.9 G/DL — LOW (ref 3.3–5)
ALBUMIN SERPL ELPH-MCNC: 2.9 G/DL — LOW (ref 3.3–5)
ALBUMIN SERPL ELPH-MCNC: 3 G/DL — LOW (ref 3.3–5)
ALBUMIN SERPL ELPH-MCNC: 3.2 G/DL — LOW (ref 3.3–5)
ALBUMIN SERPL ELPH-MCNC: 3.4 G/DL — SIGNIFICANT CHANGE UP (ref 3.3–5)
ALBUMIN SERPL ELPH-MCNC: 3.9 G/DL — SIGNIFICANT CHANGE UP (ref 3.3–5)
ALP SERPL-CCNC: 119 U/L — SIGNIFICANT CHANGE UP (ref 40–120)
ALP SERPL-CCNC: 134 U/L — HIGH (ref 40–120)
ALP SERPL-CCNC: 171 U/L — HIGH (ref 40–120)
ALP SERPL-CCNC: 198 U/L — HIGH (ref 40–120)
ALP SERPL-CCNC: 83 U/L — SIGNIFICANT CHANGE UP (ref 40–120)
ALP SERPL-CCNC: 88 U/L — SIGNIFICANT CHANGE UP (ref 40–120)
ALP SERPL-CCNC: 94 U/L — SIGNIFICANT CHANGE UP (ref 40–120)
ALP SERPL-CCNC: 97 U/L — SIGNIFICANT CHANGE UP (ref 40–120)
ALT FLD-CCNC: 15 U/L — SIGNIFICANT CHANGE UP (ref 10–45)
ALT FLD-CCNC: 18 U/L — SIGNIFICANT CHANGE UP (ref 10–45)
ALT FLD-CCNC: 21 U/L — SIGNIFICANT CHANGE UP (ref 10–45)
ALT FLD-CCNC: 22 U/L — SIGNIFICANT CHANGE UP (ref 10–45)
ALT FLD-CCNC: 26 U/L — SIGNIFICANT CHANGE UP (ref 10–45)
ALT FLD-CCNC: 27 U/L — SIGNIFICANT CHANGE UP (ref 10–45)
ALT FLD-CCNC: 33 U/L — SIGNIFICANT CHANGE UP (ref 10–45)
ALT FLD-CCNC: 34 U/L — SIGNIFICANT CHANGE UP (ref 10–45)
AMMONIA BLD-MCNC: 145 UMOL/L — HIGH (ref 11–55)
AMMONIA BLD-MCNC: 308 UMOL/L — HIGH (ref 11–55)
ANION GAP SERPL CALC-SCNC: 10 MMOL/L — SIGNIFICANT CHANGE UP (ref 5–17)
ANION GAP SERPL CALC-SCNC: 11 MMOL/L — SIGNIFICANT CHANGE UP (ref 5–17)
ANION GAP SERPL CALC-SCNC: 11 MMOL/L — SIGNIFICANT CHANGE UP (ref 5–17)
ANION GAP SERPL CALC-SCNC: 12 MMOL/L — SIGNIFICANT CHANGE UP (ref 5–17)
ANION GAP SERPL CALC-SCNC: 13 MMOL/L — SIGNIFICANT CHANGE UP (ref 5–17)
ANION GAP SERPL CALC-SCNC: 14 MMOL/L — SIGNIFICANT CHANGE UP (ref 5–17)
ANION GAP SERPL CALC-SCNC: 15 MMOL/L — SIGNIFICANT CHANGE UP (ref 5–17)
ANION GAP SERPL CALC-SCNC: 20 MMOL/L — HIGH (ref 5–17)
ANION GAP SERPL CALC-SCNC: 8 MMOL/L — SIGNIFICANT CHANGE UP (ref 5–17)
ANION GAP SERPL CALC-SCNC: 9 MMOL/L — SIGNIFICANT CHANGE UP (ref 5–17)
ANISOCYTOSIS BLD QL: SLIGHT — SIGNIFICANT CHANGE UP
APPEARANCE UR: ABNORMAL
APPEARANCE UR: ABNORMAL
APTT BLD: 25.2 SEC — SIGNIFICANT CHANGE UP (ref 24.5–35.6)
APTT BLD: 25.3 SEC — SIGNIFICANT CHANGE UP (ref 24.5–35.6)
APTT BLD: 26.1 SEC — SIGNIFICANT CHANGE UP (ref 24.5–35.6)
APTT BLD: 26.2 SEC — SIGNIFICANT CHANGE UP (ref 24.5–35.6)
APTT BLD: 26.8 SEC — SIGNIFICANT CHANGE UP (ref 24.5–35.6)
APTT BLD: 26.9 SEC — SIGNIFICANT CHANGE UP (ref 24.5–35.6)
AST SERPL-CCNC: 20 U/L — SIGNIFICANT CHANGE UP (ref 10–40)
AST SERPL-CCNC: 22 U/L — SIGNIFICANT CHANGE UP (ref 10–40)
AST SERPL-CCNC: 26 U/L — SIGNIFICANT CHANGE UP (ref 10–40)
AST SERPL-CCNC: 30 U/L — SIGNIFICANT CHANGE UP (ref 10–40)
AST SERPL-CCNC: 34 U/L — SIGNIFICANT CHANGE UP (ref 10–40)
AST SERPL-CCNC: 39 U/L — SIGNIFICANT CHANGE UP (ref 10–40)
AST SERPL-CCNC: 41 U/L — HIGH (ref 10–40)
AST SERPL-CCNC: 43 U/L — HIGH (ref 10–40)
B-OH-BUTYR SERPL-SCNC: 0 MMOL/L — SIGNIFICANT CHANGE UP
BACTERIA # UR AUTO: ABNORMAL /HPF
BACTERIA # UR AUTO: ABNORMAL /HPF
BASOPHILS # BLD AUTO: 0.01 K/UL — SIGNIFICANT CHANGE UP (ref 0–0.2)
BASOPHILS # BLD AUTO: 0.02 K/UL — SIGNIFICANT CHANGE UP (ref 0–0.2)
BASOPHILS # BLD AUTO: 0.04 K/UL — SIGNIFICANT CHANGE UP (ref 0–0.2)
BASOPHILS # BLD AUTO: 0.07 K/UL — SIGNIFICANT CHANGE UP (ref 0–0.2)
BASOPHILS NFR BLD AUTO: 0.1 % — SIGNIFICANT CHANGE UP (ref 0–2)
BASOPHILS NFR BLD AUTO: 0.6 % — SIGNIFICANT CHANGE UP (ref 0–2)
BASOPHILS NFR BLD AUTO: 0.9 % — SIGNIFICANT CHANGE UP (ref 0–2)
BILIRUB DIRECT SERPL-MCNC: 0.3 MG/DL — SIGNIFICANT CHANGE UP (ref 0–0.3)
BILIRUB INDIRECT FLD-MCNC: 0.8 MG/DL — SIGNIFICANT CHANGE UP (ref 0.2–1)
BILIRUB INDIRECT FLD-MCNC: 0.9 MG/DL — SIGNIFICANT CHANGE UP (ref 0.2–1)
BILIRUB INDIRECT FLD-MCNC: 0.9 MG/DL — SIGNIFICANT CHANGE UP (ref 0.2–1)
BILIRUB SERPL-MCNC: 0.4 MG/DL — SIGNIFICANT CHANGE UP (ref 0.2–1.2)
BILIRUB SERPL-MCNC: 0.5 MG/DL — SIGNIFICANT CHANGE UP (ref 0.2–1.2)
BILIRUB SERPL-MCNC: 0.6 MG/DL — SIGNIFICANT CHANGE UP (ref 0.2–1.2)
BILIRUB SERPL-MCNC: 0.8 MG/DL — SIGNIFICANT CHANGE UP (ref 0.2–1.2)
BILIRUB SERPL-MCNC: 1.1 MG/DL — SIGNIFICANT CHANGE UP (ref 0.2–1.2)
BILIRUB SERPL-MCNC: 1.2 MG/DL — SIGNIFICANT CHANGE UP (ref 0.2–1.2)
BILIRUB SERPL-MCNC: 1.3 MG/DL — HIGH (ref 0.2–1.2)
BILIRUB SERPL-MCNC: 1.4 MG/DL — HIGH (ref 0.2–1.2)
BILIRUB UR-MCNC: NEGATIVE — SIGNIFICANT CHANGE UP
BILIRUB UR-MCNC: NEGATIVE — SIGNIFICANT CHANGE UP
BLD GP AB SCN SERPL QL: NEGATIVE — SIGNIFICANT CHANGE UP
BUN SERPL-MCNC: 18 MG/DL — SIGNIFICANT CHANGE UP (ref 7–23)
BUN SERPL-MCNC: 19 MG/DL — SIGNIFICANT CHANGE UP (ref 7–23)
BUN SERPL-MCNC: 20 MG/DL — SIGNIFICANT CHANGE UP (ref 7–23)
BUN SERPL-MCNC: 20 MG/DL — SIGNIFICANT CHANGE UP (ref 7–23)
BUN SERPL-MCNC: 21 MG/DL — SIGNIFICANT CHANGE UP (ref 7–23)
BUN SERPL-MCNC: 22 MG/DL — SIGNIFICANT CHANGE UP (ref 7–23)
BUN SERPL-MCNC: 23 MG/DL — SIGNIFICANT CHANGE UP (ref 7–23)
BUN SERPL-MCNC: 24 MG/DL — HIGH (ref 7–23)
BUN SERPL-MCNC: 25 MG/DL — HIGH (ref 7–23)
BUN SERPL-MCNC: 26 MG/DL — HIGH (ref 7–23)
BUN SERPL-MCNC: 26 MG/DL — HIGH (ref 7–23)
BUN SERPL-MCNC: 28 MG/DL — HIGH (ref 7–23)
BUN SERPL-MCNC: 30 MG/DL — HIGH (ref 7–23)
BUN SERPL-MCNC: 31 MG/DL — HIGH (ref 7–23)
CALCIUM SERPL-MCNC: 7.3 MG/DL — LOW (ref 8.4–10.5)
CALCIUM SERPL-MCNC: 7.5 MG/DL — LOW (ref 8.4–10.5)
CALCIUM SERPL-MCNC: 7.6 MG/DL — LOW (ref 8.4–10.5)
CALCIUM SERPL-MCNC: 7.8 MG/DL — LOW (ref 8.4–10.5)
CALCIUM SERPL-MCNC: 7.9 MG/DL — LOW (ref 8.4–10.5)
CALCIUM SERPL-MCNC: 7.9 MG/DL — LOW (ref 8.4–10.5)
CALCIUM SERPL-MCNC: 8.1 MG/DL — LOW (ref 8.4–10.5)
CALCIUM SERPL-MCNC: 8.2 MG/DL — LOW (ref 8.4–10.5)
CALCIUM SERPL-MCNC: 8.3 MG/DL — LOW (ref 8.4–10.5)
CALCIUM SERPL-MCNC: 8.4 MG/DL — SIGNIFICANT CHANGE UP (ref 8.4–10.5)
CALCIUM SERPL-MCNC: 8.5 MG/DL — SIGNIFICANT CHANGE UP (ref 8.4–10.5)
CALCIUM SERPL-MCNC: 8.6 MG/DL — SIGNIFICANT CHANGE UP (ref 8.4–10.5)
CALCIUM SERPL-MCNC: 9.6 MG/DL — SIGNIFICANT CHANGE UP (ref 8.4–10.5)
CAST: 2 /LPF — SIGNIFICANT CHANGE UP (ref 0–4)
CAST: 6 /LPF — HIGH (ref 0–4)
CHLORIDE SERPL-SCNC: 100 MMOL/L — SIGNIFICANT CHANGE UP (ref 96–108)
CHLORIDE SERPL-SCNC: 101 MMOL/L — SIGNIFICANT CHANGE UP (ref 96–108)
CHLORIDE SERPL-SCNC: 101 MMOL/L — SIGNIFICANT CHANGE UP (ref 96–108)
CHLORIDE SERPL-SCNC: 102 MMOL/L — SIGNIFICANT CHANGE UP (ref 96–108)
CHLORIDE SERPL-SCNC: 102 MMOL/L — SIGNIFICANT CHANGE UP (ref 96–108)
CHLORIDE SERPL-SCNC: 103 MMOL/L — SIGNIFICANT CHANGE UP (ref 96–108)
CHLORIDE SERPL-SCNC: 84 MMOL/L — LOW (ref 96–108)
CHLORIDE SERPL-SCNC: 89 MMOL/L — LOW (ref 96–108)
CHLORIDE SERPL-SCNC: 93 MMOL/L — LOW (ref 96–108)
CHLORIDE SERPL-SCNC: 96 MMOL/L — SIGNIFICANT CHANGE UP (ref 96–108)
CHLORIDE SERPL-SCNC: 97 MMOL/L — SIGNIFICANT CHANGE UP (ref 96–108)
CHLORIDE SERPL-SCNC: 98 MMOL/L — SIGNIFICANT CHANGE UP (ref 96–108)
CHLORIDE SERPL-SCNC: 99 MMOL/L — SIGNIFICANT CHANGE UP (ref 96–108)
CHOLEST SERPL-MCNC: 207 MG/DL — HIGH
CHROM ANALY OVERALL INTERP SPEC-IMP: SIGNIFICANT CHANGE UP
CLOSURE TME COLL+EPINEP BLD: 86 K/UL — LOW (ref 150–400)
CO2 SERPL-SCNC: 19 MMOL/L — LOW (ref 22–31)
CO2 SERPL-SCNC: 20 MMOL/L — LOW (ref 22–31)
CO2 SERPL-SCNC: 22 MMOL/L — SIGNIFICANT CHANGE UP (ref 22–31)
CO2 SERPL-SCNC: 23 MMOL/L — SIGNIFICANT CHANGE UP (ref 22–31)
CO2 SERPL-SCNC: 23 MMOL/L — SIGNIFICANT CHANGE UP (ref 22–31)
CO2 SERPL-SCNC: 24 MMOL/L — SIGNIFICANT CHANGE UP (ref 22–31)
CO2 SERPL-SCNC: 28 MMOL/L — SIGNIFICANT CHANGE UP (ref 22–31)
CO2 SERPL-SCNC: 29 MMOL/L — SIGNIFICANT CHANGE UP (ref 22–31)
CO2 SERPL-SCNC: 29 MMOL/L — SIGNIFICANT CHANGE UP (ref 22–31)
CO2 SERPL-SCNC: 30 MMOL/L — SIGNIFICANT CHANGE UP (ref 22–31)
CO2 SERPL-SCNC: 30 MMOL/L — SIGNIFICANT CHANGE UP (ref 22–31)
CO2 SERPL-SCNC: 32 MMOL/L — HIGH (ref 22–31)
COLOR SPEC: YELLOW — SIGNIFICANT CHANGE UP
COLOR SPEC: YELLOW — SIGNIFICANT CHANGE UP
CREAT SERPL-MCNC: 0.75 MG/DL — SIGNIFICANT CHANGE UP (ref 0.5–1.3)
CREAT SERPL-MCNC: 0.77 MG/DL — SIGNIFICANT CHANGE UP (ref 0.5–1.3)
CREAT SERPL-MCNC: 0.81 MG/DL — SIGNIFICANT CHANGE UP (ref 0.5–1.3)
CREAT SERPL-MCNC: 0.83 MG/DL — SIGNIFICANT CHANGE UP (ref 0.5–1.3)
CREAT SERPL-MCNC: 0.85 MG/DL — SIGNIFICANT CHANGE UP (ref 0.5–1.3)
CREAT SERPL-MCNC: 0.88 MG/DL — SIGNIFICANT CHANGE UP (ref 0.5–1.3)
CREAT SERPL-MCNC: 0.89 MG/DL — SIGNIFICANT CHANGE UP (ref 0.5–1.3)
CREAT SERPL-MCNC: 0.89 MG/DL — SIGNIFICANT CHANGE UP (ref 0.5–1.3)
CREAT SERPL-MCNC: 0.92 MG/DL — SIGNIFICANT CHANGE UP (ref 0.5–1.3)
CREAT SERPL-MCNC: 0.95 MG/DL — SIGNIFICANT CHANGE UP (ref 0.5–1.3)
CREAT SERPL-MCNC: 1.02 MG/DL — SIGNIFICANT CHANGE UP (ref 0.5–1.3)
CREAT SERPL-MCNC: 1.03 MG/DL — SIGNIFICANT CHANGE UP (ref 0.5–1.3)
CREAT SERPL-MCNC: 1.05 MG/DL — SIGNIFICANT CHANGE UP (ref 0.5–1.3)
CREAT SERPL-MCNC: 1.11 MG/DL — SIGNIFICANT CHANGE UP (ref 0.5–1.3)
CREAT SERPL-MCNC: 1.12 MG/DL — SIGNIFICANT CHANGE UP (ref 0.5–1.3)
CREAT SERPL-MCNC: 1.35 MG/DL — HIGH (ref 0.5–1.3)
CREAT SERPL-MCNC: 1.42 MG/DL — HIGH (ref 0.5–1.3)
CULTURE RESULTS: ABNORMAL
CULTURE RESULTS: ABNORMAL
CULTURE RESULTS: SIGNIFICANT CHANGE UP
CULTURE RESULTS: SIGNIFICANT CHANGE UP
DIFF PNL FLD: NEGATIVE — SIGNIFICANT CHANGE UP
DIFF PNL FLD: NEGATIVE — SIGNIFICANT CHANGE UP
EGFR: 40 ML/MIN/1.73M2 — LOW
EGFR: 43 ML/MIN/1.73M2 — LOW
EGFR: 53 ML/MIN/1.73M2 — LOW
EGFR: 54 ML/MIN/1.73M2 — LOW
EGFR: 58 ML/MIN/1.73M2 — LOW
EGFR: 59 ML/MIN/1.73M2 — LOW
EGFR: 60 ML/MIN/1.73M2 — SIGNIFICANT CHANGE UP
EGFR: 65 ML/MIN/1.73M2 — SIGNIFICANT CHANGE UP
EGFR: 67 ML/MIN/1.73M2 — SIGNIFICANT CHANGE UP
EGFR: 70 ML/MIN/1.73M2 — SIGNIFICANT CHANGE UP
EGFR: 70 ML/MIN/1.73M2 — SIGNIFICANT CHANGE UP
EGFR: 71 ML/MIN/1.73M2 — SIGNIFICANT CHANGE UP
EGFR: 74 ML/MIN/1.73M2 — SIGNIFICANT CHANGE UP
EGFR: 76 ML/MIN/1.73M2 — SIGNIFICANT CHANGE UP
EGFR: 79 ML/MIN/1.73M2 — SIGNIFICANT CHANGE UP
EGFR: 83 ML/MIN/1.73M2 — SIGNIFICANT CHANGE UP
EGFR: 86 ML/MIN/1.73M2 — SIGNIFICANT CHANGE UP
EOSINOPHIL # BLD AUTO: 0 K/UL — SIGNIFICANT CHANGE UP (ref 0–0.5)
EOSINOPHIL # BLD AUTO: 0.08 K/UL — SIGNIFICANT CHANGE UP (ref 0–0.5)
EOSINOPHIL NFR BLD AUTO: 0 % — SIGNIFICANT CHANGE UP (ref 0–6)
EOSINOPHIL NFR BLD AUTO: 1.1 % — SIGNIFICANT CHANGE UP (ref 0–6)
ESTIMATED AVERAGE GLUCOSE: 137 MG/DL — HIGH (ref 68–114)
ESTIMATED AVERAGE GLUCOSE: 140 MG/DL — HIGH (ref 68–114)
FERRITIN SERPL-MCNC: 951 NG/ML — HIGH (ref 13–330)
FIBRINOGEN AG PPP IA-MCNC: 296 MG/DL — SIGNIFICANT CHANGE UP (ref 233–496)
FOLATE SERPL-MCNC: 13.5 NG/ML — SIGNIFICANT CHANGE UP
GAS PNL BLDV: SIGNIFICANT CHANGE UP
GLUCOSE BLDC GLUCOMTR-MCNC: 151 MG/DL — HIGH (ref 70–99)
GLUCOSE BLDC GLUCOMTR-MCNC: 182 MG/DL — HIGH (ref 70–99)
GLUCOSE BLDC GLUCOMTR-MCNC: 188 MG/DL — HIGH (ref 70–99)
GLUCOSE BLDC GLUCOMTR-MCNC: 190 MG/DL — HIGH (ref 70–99)
GLUCOSE BLDC GLUCOMTR-MCNC: 191 MG/DL — HIGH (ref 70–99)
GLUCOSE BLDC GLUCOMTR-MCNC: 195 MG/DL — HIGH (ref 70–99)
GLUCOSE BLDC GLUCOMTR-MCNC: 197 MG/DL — HIGH (ref 70–99)
GLUCOSE BLDC GLUCOMTR-MCNC: 198 MG/DL — HIGH (ref 70–99)
GLUCOSE BLDC GLUCOMTR-MCNC: 198 MG/DL — HIGH (ref 70–99)
GLUCOSE BLDC GLUCOMTR-MCNC: 201 MG/DL — HIGH (ref 70–99)
GLUCOSE BLDC GLUCOMTR-MCNC: 202 MG/DL — HIGH (ref 70–99)
GLUCOSE BLDC GLUCOMTR-MCNC: 202 MG/DL — HIGH (ref 70–99)
GLUCOSE BLDC GLUCOMTR-MCNC: 203 MG/DL — HIGH (ref 70–99)
GLUCOSE BLDC GLUCOMTR-MCNC: 209 MG/DL — HIGH (ref 70–99)
GLUCOSE BLDC GLUCOMTR-MCNC: 210 MG/DL — HIGH (ref 70–99)
GLUCOSE BLDC GLUCOMTR-MCNC: 211 MG/DL — HIGH (ref 70–99)
GLUCOSE BLDC GLUCOMTR-MCNC: 214 MG/DL — HIGH (ref 70–99)
GLUCOSE BLDC GLUCOMTR-MCNC: 215 MG/DL — HIGH (ref 70–99)
GLUCOSE BLDC GLUCOMTR-MCNC: 221 MG/DL — HIGH (ref 70–99)
GLUCOSE BLDC GLUCOMTR-MCNC: 225 MG/DL — HIGH (ref 70–99)
GLUCOSE BLDC GLUCOMTR-MCNC: 226 MG/DL — HIGH (ref 70–99)
GLUCOSE BLDC GLUCOMTR-MCNC: 227 MG/DL — HIGH (ref 70–99)
GLUCOSE BLDC GLUCOMTR-MCNC: 228 MG/DL — HIGH (ref 70–99)
GLUCOSE BLDC GLUCOMTR-MCNC: 233 MG/DL — HIGH (ref 70–99)
GLUCOSE BLDC GLUCOMTR-MCNC: 233 MG/DL — HIGH (ref 70–99)
GLUCOSE BLDC GLUCOMTR-MCNC: 242 MG/DL — HIGH (ref 70–99)
GLUCOSE BLDC GLUCOMTR-MCNC: 242 MG/DL — HIGH (ref 70–99)
GLUCOSE BLDC GLUCOMTR-MCNC: 246 MG/DL — HIGH (ref 70–99)
GLUCOSE BLDC GLUCOMTR-MCNC: 246 MG/DL — HIGH (ref 70–99)
GLUCOSE BLDC GLUCOMTR-MCNC: 252 MG/DL — HIGH (ref 70–99)
GLUCOSE BLDC GLUCOMTR-MCNC: 259 MG/DL — HIGH (ref 70–99)
GLUCOSE BLDC GLUCOMTR-MCNC: 261 MG/DL — HIGH (ref 70–99)
GLUCOSE BLDC GLUCOMTR-MCNC: 261 MG/DL — HIGH (ref 70–99)
GLUCOSE BLDC GLUCOMTR-MCNC: 263 MG/DL — HIGH (ref 70–99)
GLUCOSE BLDC GLUCOMTR-MCNC: 277 MG/DL — HIGH (ref 70–99)
GLUCOSE BLDC GLUCOMTR-MCNC: 280 MG/DL — HIGH (ref 70–99)
GLUCOSE BLDC GLUCOMTR-MCNC: 280 MG/DL — HIGH (ref 70–99)
GLUCOSE BLDC GLUCOMTR-MCNC: 281 MG/DL — HIGH (ref 70–99)
GLUCOSE BLDC GLUCOMTR-MCNC: 282 MG/DL — HIGH (ref 70–99)
GLUCOSE BLDC GLUCOMTR-MCNC: 287 MG/DL — HIGH (ref 70–99)
GLUCOSE BLDC GLUCOMTR-MCNC: 289 MG/DL — HIGH (ref 70–99)
GLUCOSE BLDC GLUCOMTR-MCNC: 293 MG/DL — HIGH (ref 70–99)
GLUCOSE BLDC GLUCOMTR-MCNC: 299 MG/DL — HIGH (ref 70–99)
GLUCOSE BLDC GLUCOMTR-MCNC: 301 MG/DL — HIGH (ref 70–99)
GLUCOSE BLDC GLUCOMTR-MCNC: 304 MG/DL — HIGH (ref 70–99)
GLUCOSE BLDC GLUCOMTR-MCNC: 305 MG/DL — HIGH (ref 70–99)
GLUCOSE BLDC GLUCOMTR-MCNC: 323 MG/DL — HIGH (ref 70–99)
GLUCOSE BLDC GLUCOMTR-MCNC: 324 MG/DL — HIGH (ref 70–99)
GLUCOSE BLDC GLUCOMTR-MCNC: 340 MG/DL — HIGH (ref 70–99)
GLUCOSE BLDC GLUCOMTR-MCNC: 346 MG/DL — HIGH (ref 70–99)
GLUCOSE BLDC GLUCOMTR-MCNC: 349 MG/DL — HIGH (ref 70–99)
GLUCOSE BLDC GLUCOMTR-MCNC: 378 MG/DL — HIGH (ref 70–99)
GLUCOSE BLDC GLUCOMTR-MCNC: 379 MG/DL — HIGH (ref 70–99)
GLUCOSE BLDC GLUCOMTR-MCNC: 392 MG/DL — HIGH (ref 70–99)
GLUCOSE BLDC GLUCOMTR-MCNC: 405 MG/DL — HIGH (ref 70–99)
GLUCOSE BLDC GLUCOMTR-MCNC: 428 MG/DL — HIGH (ref 70–99)
GLUCOSE BLDC GLUCOMTR-MCNC: 433 MG/DL — HIGH (ref 70–99)
GLUCOSE BLDC GLUCOMTR-MCNC: 442 MG/DL — HIGH (ref 70–99)
GLUCOSE BLDC GLUCOMTR-MCNC: 444 MG/DL — HIGH (ref 70–99)
GLUCOSE BLDC GLUCOMTR-MCNC: 447 MG/DL — HIGH (ref 70–99)
GLUCOSE BLDC GLUCOMTR-MCNC: 453 MG/DL — CRITICAL HIGH (ref 70–99)
GLUCOSE SERPL-MCNC: 142 MG/DL — HIGH (ref 70–99)
GLUCOSE SERPL-MCNC: 177 MG/DL — HIGH (ref 70–99)
GLUCOSE SERPL-MCNC: 197 MG/DL — HIGH (ref 70–99)
GLUCOSE SERPL-MCNC: 198 MG/DL — HIGH (ref 70–99)
GLUCOSE SERPL-MCNC: 203 MG/DL — HIGH (ref 70–99)
GLUCOSE SERPL-MCNC: 206 MG/DL — HIGH (ref 70–99)
GLUCOSE SERPL-MCNC: 210 MG/DL — HIGH (ref 70–99)
GLUCOSE SERPL-MCNC: 214 MG/DL — HIGH (ref 70–99)
GLUCOSE SERPL-MCNC: 231 MG/DL — HIGH (ref 70–99)
GLUCOSE SERPL-MCNC: 231 MG/DL — HIGH (ref 70–99)
GLUCOSE SERPL-MCNC: 237 MG/DL — HIGH (ref 70–99)
GLUCOSE SERPL-MCNC: 239 MG/DL — HIGH (ref 70–99)
GLUCOSE SERPL-MCNC: 239 MG/DL — HIGH (ref 70–99)
GLUCOSE SERPL-MCNC: 244 MG/DL — HIGH (ref 70–99)
GLUCOSE SERPL-MCNC: 247 MG/DL — HIGH (ref 70–99)
GLUCOSE SERPL-MCNC: 282 MG/DL — HIGH (ref 70–99)
GLUCOSE SERPL-MCNC: 416 MG/DL — HIGH (ref 70–99)
GLUCOSE UR QL: NEGATIVE MG/DL — SIGNIFICANT CHANGE UP
GLUCOSE UR QL: NEGATIVE MG/DL — SIGNIFICANT CHANGE UP
HAPTOGLOB SERPL-MCNC: <20 MG/DL — LOW (ref 34–200)
HCT VFR BLD CALC: 20.7 % — CRITICAL LOW (ref 34.5–45)
HCT VFR BLD CALC: 22.3 % — LOW (ref 34.5–45)
HCT VFR BLD CALC: 22.6 % — LOW (ref 34.5–45)
HCT VFR BLD CALC: 22.6 % — LOW (ref 34.5–45)
HCT VFR BLD CALC: 23.3 % — LOW (ref 34.5–45)
HCT VFR BLD CALC: 24.2 % — LOW (ref 34.5–45)
HCT VFR BLD CALC: 24.3 % — LOW (ref 34.5–45)
HCT VFR BLD CALC: 24.3 % — LOW (ref 34.5–45)
HCT VFR BLD CALC: 24.5 % — LOW (ref 34.5–45)
HCT VFR BLD CALC: 24.7 % — LOW (ref 34.5–45)
HCT VFR BLD CALC: 25.2 % — LOW (ref 34.5–45)
HCT VFR BLD CALC: 25.2 % — LOW (ref 34.5–45)
HCT VFR BLD CALC: 25.7 % — LOW (ref 34.5–45)
HCT VFR BLD CALC: 26 % — LOW (ref 34.5–45)
HCT VFR BLD CALC: 26.6 % — LOW (ref 34.5–45)
HCT VFR BLD CALC: 27.3 % — LOW (ref 34.5–45)
HCT VFR BLD CALC: 28 % — LOW (ref 34.5–45)
HCT VFR BLD CALC: 28.2 % — LOW (ref 34.5–45)
HCT VFR BLD CALC: 28.8 % — LOW (ref 34.5–45)
HCT VFR BLD CALC: 29.8 % — LOW (ref 34.5–45)
HDLC SERPL-MCNC: 76 MG/DL — SIGNIFICANT CHANGE UP
HEMATOPATHOLOGY REPORT: SIGNIFICANT CHANGE UP
HEPARIN-PF4 AB RESULT: <0.6 U/ML — SIGNIFICANT CHANGE UP (ref 0–0.9)
HGB BLD-MCNC: 6.9 G/DL — CRITICAL LOW (ref 11.5–15.5)
HGB BLD-MCNC: 7.3 G/DL — LOW (ref 11.5–15.5)
HGB BLD-MCNC: 7.3 G/DL — LOW (ref 11.5–15.5)
HGB BLD-MCNC: 7.4 G/DL — LOW (ref 11.5–15.5)
HGB BLD-MCNC: 7.7 G/DL — LOW (ref 11.5–15.5)
HGB BLD-MCNC: 7.9 G/DL — LOW (ref 11.5–15.5)
HGB BLD-MCNC: 7.9 G/DL — LOW (ref 11.5–15.5)
HGB BLD-MCNC: 8.1 G/DL — LOW (ref 11.5–15.5)
HGB BLD-MCNC: 8.2 G/DL — LOW (ref 11.5–15.5)
HGB BLD-MCNC: 8.3 G/DL — LOW (ref 11.5–15.5)
HGB BLD-MCNC: 8.4 G/DL — LOW (ref 11.5–15.5)
HGB BLD-MCNC: 8.4 G/DL — LOW (ref 11.5–15.5)
HGB BLD-MCNC: 8.5 G/DL — LOW (ref 11.5–15.5)
HGB BLD-MCNC: 8.5 G/DL — LOW (ref 11.5–15.5)
HGB BLD-MCNC: 8.6 G/DL — LOW (ref 11.5–15.5)
HGB BLD-MCNC: 8.9 G/DL — LOW (ref 11.5–15.5)
HGB BLD-MCNC: 9 G/DL — LOW (ref 11.5–15.5)
HGB BLD-MCNC: 9 G/DL — LOW (ref 11.5–15.5)
HGB BLD-MCNC: 9.6 G/DL — LOW (ref 11.5–15.5)
HGB BLD-MCNC: 9.8 G/DL — LOW (ref 11.5–15.5)
IGA FLD-MCNC: 347 MG/DL — SIGNIFICANT CHANGE UP (ref 84–499)
IGG FLD-MCNC: 1025 MG/DL — SIGNIFICANT CHANGE UP (ref 610–1660)
IGM SERPL-MCNC: 71 MG/DL — SIGNIFICANT CHANGE UP (ref 35–242)
IMM GRANULOCYTES NFR BLD AUTO: 0.6 % — SIGNIFICANT CHANGE UP (ref 0–0.9)
IMM GRANULOCYTES NFR BLD AUTO: 0.6 % — SIGNIFICANT CHANGE UP (ref 0–0.9)
IMM GRANULOCYTES NFR BLD AUTO: 0.9 % — SIGNIFICANT CHANGE UP (ref 0–0.9)
IMM GRANULOCYTES NFR BLD AUTO: 1.3 % — HIGH (ref 0–0.9)
IMM GRANULOCYTES NFR BLD AUTO: 1.4 % — HIGH (ref 0–0.9)
INR BLD: 1.04 RATIO — SIGNIFICANT CHANGE UP (ref 0.85–1.16)
INR BLD: 1.05 RATIO — SIGNIFICANT CHANGE UP (ref 0.85–1.16)
INR BLD: 1.06 RATIO — SIGNIFICANT CHANGE UP (ref 0.85–1.16)
INR BLD: 1.11 RATIO — SIGNIFICANT CHANGE UP (ref 0.85–1.16)
INR BLD: 1.12 RATIO — SIGNIFICANT CHANGE UP (ref 0.85–1.16)
INR BLD: 1.13 RATIO — SIGNIFICANT CHANGE UP (ref 0.85–1.16)
INR BLD: 1.15 RATIO — SIGNIFICANT CHANGE UP (ref 0.85–1.16)
IRON SATN MFR SERPL: 143 UG/DL — SIGNIFICANT CHANGE UP (ref 30–160)
IRON SATN MFR SERPL: 80 % — HIGH (ref 14–50)
KAPPA LC SER QL IFE: 4.19 MG/DL — HIGH (ref 0.33–1.94)
KAPPA/LAMBDA FREE LIGHT CHAIN RATIO, SERUM: 1.56 RATIO — SIGNIFICANT CHANGE UP (ref 0.26–1.65)
KETONES UR-MCNC: NEGATIVE MG/DL — SIGNIFICANT CHANGE UP
KETONES UR-MCNC: NEGATIVE MG/DL — SIGNIFICANT CHANGE UP
LAMBDA LC SER QL IFE: 2.69 MG/DL — HIGH (ref 0.57–2.63)
LDH SERPL L TO P-CCNC: 454 U/L — HIGH (ref 50–242)
LDH SERPL L TO P-CCNC: 468 U/L — HIGH (ref 50–242)
LDH SERPL L TO P-CCNC: 487 U/L — HIGH (ref 50–242)
LDH SERPL L TO P-CCNC: 518 U/L — HIGH (ref 50–242)
LEUKOCYTE ESTERASE UR-ACNC: ABNORMAL
LEUKOCYTE ESTERASE UR-ACNC: ABNORMAL
LIDOCAIN IGE QN: 5 U/L — LOW (ref 7–60)
LIPID PNL WITH DIRECT LDL SERPL: 113 MG/DL — HIGH
LYMPHOCYTES # BLD AUTO: 0.42 K/UL — LOW (ref 1–3.3)
LYMPHOCYTES # BLD AUTO: 0.69 K/UL — LOW (ref 1–3.3)
LYMPHOCYTES # BLD AUTO: 0.77 K/UL — LOW (ref 1–3.3)
LYMPHOCYTES # BLD AUTO: 0.91 K/UL — LOW (ref 1–3.3)
LYMPHOCYTES # BLD AUTO: 1 K/UL — SIGNIFICANT CHANGE UP (ref 1–3.3)
LYMPHOCYTES # BLD AUTO: 1.14 K/UL — SIGNIFICANT CHANGE UP (ref 1–3.3)
LYMPHOCYTES # BLD AUTO: 5.3 % — LOW (ref 13–44)
LYMPHOCYTES # BLD AUTO: 5.9 % — LOW (ref 13–44)
LYMPHOCYTES # BLD AUTO: 8.3 % — LOW (ref 13–44)
LYMPHOCYTES # BLD AUTO: 9.7 % — LOW (ref 13–44)
LYMPHOCYTES # BLD AUTO: 9.7 % — LOW (ref 13–44)
LYMPHOCYTES # BLD AUTO: 9.9 % — LOW (ref 13–44)
MACROCYTES BLD QL: SLIGHT — SIGNIFICANT CHANGE UP
MAGNESIUM SERPL-MCNC: 1.5 MG/DL — LOW (ref 1.6–2.6)
MAGNESIUM SERPL-MCNC: 1.5 MG/DL — LOW (ref 1.6–2.6)
MAGNESIUM SERPL-MCNC: 1.6 MG/DL — SIGNIFICANT CHANGE UP (ref 1.6–2.6)
MAGNESIUM SERPL-MCNC: 1.7 MG/DL — SIGNIFICANT CHANGE UP (ref 1.6–2.6)
MAGNESIUM SERPL-MCNC: 1.8 MG/DL — SIGNIFICANT CHANGE UP (ref 1.6–2.6)
MAGNESIUM SERPL-MCNC: 1.9 MG/DL — SIGNIFICANT CHANGE UP (ref 1.6–2.6)
MAGNESIUM SERPL-MCNC: 1.9 MG/DL — SIGNIFICANT CHANGE UP (ref 1.6–2.6)
MAGNESIUM SERPL-MCNC: 2.1 MG/DL — SIGNIFICANT CHANGE UP (ref 1.6–2.6)
MAGNESIUM SERPL-MCNC: 2.2 MG/DL — SIGNIFICANT CHANGE UP (ref 1.6–2.6)
MANUAL SMEAR VERIFICATION: SIGNIFICANT CHANGE UP
MCHC RBC-ENTMCNC: 30.1 PG — SIGNIFICANT CHANGE UP (ref 27–34)
MCHC RBC-ENTMCNC: 30.2 PG — SIGNIFICANT CHANGE UP (ref 27–34)
MCHC RBC-ENTMCNC: 30.2 PG — SIGNIFICANT CHANGE UP (ref 27–34)
MCHC RBC-ENTMCNC: 30.4 PG — SIGNIFICANT CHANGE UP (ref 27–34)
MCHC RBC-ENTMCNC: 30.5 PG — SIGNIFICANT CHANGE UP (ref 27–34)
MCHC RBC-ENTMCNC: 30.5 PG — SIGNIFICANT CHANGE UP (ref 27–34)
MCHC RBC-ENTMCNC: 30.6 PG — SIGNIFICANT CHANGE UP (ref 27–34)
MCHC RBC-ENTMCNC: 30.7 PG — SIGNIFICANT CHANGE UP (ref 27–34)
MCHC RBC-ENTMCNC: 31 PG — SIGNIFICANT CHANGE UP (ref 27–34)
MCHC RBC-ENTMCNC: 31 PG — SIGNIFICANT CHANGE UP (ref 27–34)
MCHC RBC-ENTMCNC: 31.1 PG — SIGNIFICANT CHANGE UP (ref 27–34)
MCHC RBC-ENTMCNC: 31.2 PG — SIGNIFICANT CHANGE UP (ref 27–34)
MCHC RBC-ENTMCNC: 31.3 PG — SIGNIFICANT CHANGE UP (ref 27–34)
MCHC RBC-ENTMCNC: 31.3 PG — SIGNIFICANT CHANGE UP (ref 27–34)
MCHC RBC-ENTMCNC: 31.5 PG — SIGNIFICANT CHANGE UP (ref 27–34)
MCHC RBC-ENTMCNC: 31.5 PG — SIGNIFICANT CHANGE UP (ref 27–34)
MCHC RBC-ENTMCNC: 31.6 PG — SIGNIFICANT CHANGE UP (ref 27–34)
MCHC RBC-ENTMCNC: 31.7 PG — SIGNIFICANT CHANGE UP (ref 27–34)
MCHC RBC-ENTMCNC: 31.9 G/DL — LOW (ref 32–36)
MCHC RBC-ENTMCNC: 31.9 PG — SIGNIFICANT CHANGE UP (ref 27–34)
MCHC RBC-ENTMCNC: 32.1 G/DL — SIGNIFICANT CHANGE UP (ref 32–36)
MCHC RBC-ENTMCNC: 32.2 G/DL — SIGNIFICANT CHANGE UP (ref 32–36)
MCHC RBC-ENTMCNC: 32.3 G/DL — SIGNIFICANT CHANGE UP (ref 32–36)
MCHC RBC-ENTMCNC: 32.3 G/DL — SIGNIFICANT CHANGE UP (ref 32–36)
MCHC RBC-ENTMCNC: 32.6 G/DL — SIGNIFICANT CHANGE UP (ref 32–36)
MCHC RBC-ENTMCNC: 32.6 G/DL — SIGNIFICANT CHANGE UP (ref 32–36)
MCHC RBC-ENTMCNC: 32.7 G/DL — SIGNIFICANT CHANGE UP (ref 32–36)
MCHC RBC-ENTMCNC: 32.9 G/DL — SIGNIFICANT CHANGE UP (ref 32–36)
MCHC RBC-ENTMCNC: 32.9 PG — SIGNIFICANT CHANGE UP (ref 27–34)
MCHC RBC-ENTMCNC: 33 G/DL — SIGNIFICANT CHANGE UP (ref 32–36)
MCHC RBC-ENTMCNC: 33.3 G/DL — SIGNIFICANT CHANGE UP (ref 32–36)
MCHC RBC-ENTMCNC: 33.6 G/DL — SIGNIFICANT CHANGE UP (ref 32–36)
MCHC RBC-ENTMCNC: 33.7 G/DL — SIGNIFICANT CHANGE UP (ref 32–36)
MCHC RBC-ENTMCNC: 33.7 G/DL — SIGNIFICANT CHANGE UP (ref 32–36)
MCV RBC AUTO: 92 FL — SIGNIFICANT CHANGE UP (ref 80–100)
MCV RBC AUTO: 92.1 FL — SIGNIFICANT CHANGE UP (ref 80–100)
MCV RBC AUTO: 92.5 FL — SIGNIFICANT CHANGE UP (ref 80–100)
MCV RBC AUTO: 92.9 FL — SIGNIFICANT CHANGE UP (ref 80–100)
MCV RBC AUTO: 93 FL — SIGNIFICANT CHANGE UP (ref 80–100)
MCV RBC AUTO: 93 FL — SIGNIFICANT CHANGE UP (ref 80–100)
MCV RBC AUTO: 93.1 FL — SIGNIFICANT CHANGE UP (ref 80–100)
MCV RBC AUTO: 93.7 FL — SIGNIFICANT CHANGE UP (ref 80–100)
MCV RBC AUTO: 93.8 FL — SIGNIFICANT CHANGE UP (ref 80–100)
MCV RBC AUTO: 94.6 FL — SIGNIFICANT CHANGE UP (ref 80–100)
MCV RBC AUTO: 95.2 FL — SIGNIFICANT CHANGE UP (ref 80–100)
MCV RBC AUTO: 95.6 FL — SIGNIFICANT CHANGE UP (ref 80–100)
MCV RBC AUTO: 95.6 FL — SIGNIFICANT CHANGE UP (ref 80–100)
MCV RBC AUTO: 95.9 FL — SIGNIFICANT CHANGE UP (ref 80–100)
MCV RBC AUTO: 96.1 FL — SIGNIFICANT CHANGE UP (ref 80–100)
MCV RBC AUTO: 96.4 FL — SIGNIFICANT CHANGE UP (ref 80–100)
MCV RBC AUTO: 97.7 FL — SIGNIFICANT CHANGE UP (ref 80–100)
MCV RBC AUTO: 97.8 FL — SIGNIFICANT CHANGE UP (ref 80–100)
METHOD TYPE: SIGNIFICANT CHANGE UP
METHOD TYPE: SIGNIFICANT CHANGE UP
MONOCYTES # BLD AUTO: 0.65 K/UL — SIGNIFICANT CHANGE UP (ref 0–0.9)
MONOCYTES # BLD AUTO: 0.76 K/UL — SIGNIFICANT CHANGE UP (ref 0–0.9)
MONOCYTES # BLD AUTO: 0.99 K/UL — HIGH (ref 0–0.9)
MONOCYTES # BLD AUTO: 1.11 K/UL — HIGH (ref 0–0.9)
MONOCYTES # BLD AUTO: 1.21 K/UL — HIGH (ref 0–0.9)
MONOCYTES # BLD AUTO: 1.53 K/UL — HIGH (ref 0–0.9)
MONOCYTES NFR BLD AUTO: 12 % — SIGNIFICANT CHANGE UP (ref 2–14)
MONOCYTES NFR BLD AUTO: 13 % — SIGNIFICANT CHANGE UP (ref 2–14)
MONOCYTES NFR BLD AUTO: 7.9 % — SIGNIFICANT CHANGE UP (ref 2–14)
MONOCYTES NFR BLD AUTO: 9.3 % — SIGNIFICANT CHANGE UP (ref 2–14)
MONOCYTES NFR BLD AUTO: 9.6 % — SIGNIFICANT CHANGE UP (ref 2–14)
MONOCYTES NFR BLD AUTO: 9.6 % — SIGNIFICANT CHANGE UP (ref 2–14)
MYELOCYTES NFR BLD: 0.9 % — HIGH (ref 0–0)
NEUTROPHILS # BLD AUTO: 13.11 K/UL — HIGH (ref 1.8–7.4)
NEUTROPHILS # BLD AUTO: 5.48 K/UL — SIGNIFICANT CHANGE UP (ref 1.8–7.4)
NEUTROPHILS # BLD AUTO: 6.53 K/UL — SIGNIFICANT CHANGE UP (ref 1.8–7.4)
NEUTROPHILS # BLD AUTO: 7.27 K/UL — SIGNIFICANT CHANGE UP (ref 1.8–7.4)
NEUTROPHILS # BLD AUTO: 8.19 K/UL — HIGH (ref 1.8–7.4)
NEUTROPHILS # BLD AUTO: 8.95 K/UL — HIGH (ref 1.8–7.4)
NEUTROPHILS NFR BLD AUTO: 75.9 % — SIGNIFICANT CHANGE UP (ref 43–77)
NEUTROPHILS NFR BLD AUTO: 78.5 % — HIGH (ref 43–77)
NEUTROPHILS NFR BLD AUTO: 78.7 % — HIGH (ref 43–77)
NEUTROPHILS NFR BLD AUTO: 79.2 % — HIGH (ref 43–77)
NEUTROPHILS NFR BLD AUTO: 82.4 % — HIGH (ref 43–77)
NEUTROPHILS NFR BLD AUTO: 85.5 % — HIGH (ref 43–77)
NITRITE UR-MCNC: NEGATIVE — SIGNIFICANT CHANGE UP
NITRITE UR-MCNC: NEGATIVE — SIGNIFICANT CHANGE UP
NON HDL CHOLESTEROL: 130 MG/DL — HIGH
NRBC # BLD: 0 /100 WBCS — SIGNIFICANT CHANGE UP (ref 0–0)
NT-PROBNP SERPL-SCNC: 1388 PG/ML — HIGH (ref 0–300)
ONKOSIGHT MYELOID SEQUENCE: NORMAL — SIGNIFICANT CHANGE UP
ORGANISM # SPEC MICROSCOPIC CNT: ABNORMAL
OVALOCYTES BLD QL SMEAR: SLIGHT — SIGNIFICANT CHANGE UP
PF4 HEPARIN CMPLX AB SER-ACNC: NEGATIVE — SIGNIFICANT CHANGE UP
PH UR: 6 — SIGNIFICANT CHANGE UP (ref 5–8)
PH UR: 7 — SIGNIFICANT CHANGE UP (ref 5–8)
PHOSPHATE SERPL-MCNC: 2 MG/DL — LOW (ref 2.5–4.5)
PHOSPHATE SERPL-MCNC: 2.2 MG/DL — LOW (ref 2.5–4.5)
PHOSPHATE SERPL-MCNC: 2.3 MG/DL — LOW (ref 2.5–4.5)
PHOSPHATE SERPL-MCNC: 2.3 MG/DL — LOW (ref 2.5–4.5)
PHOSPHATE SERPL-MCNC: 2.9 MG/DL — SIGNIFICANT CHANGE UP (ref 2.5–4.5)
PHOSPHATE SERPL-MCNC: 3.4 MG/DL — SIGNIFICANT CHANGE UP (ref 2.5–4.5)
PHOSPHATE SERPL-MCNC: 3.6 MG/DL — SIGNIFICANT CHANGE UP (ref 2.5–4.5)
PHOSPHATE SERPL-MCNC: 3.7 MG/DL — SIGNIFICANT CHANGE UP (ref 2.5–4.5)
PLAT MORPH BLD: NORMAL — SIGNIFICANT CHANGE UP
PLATELET # BLD AUTO: 100 K/UL — LOW (ref 150–400)
PLATELET # BLD AUTO: 105 K/UL — LOW (ref 150–400)
PLATELET # BLD AUTO: 105 K/UL — LOW (ref 150–400)
PLATELET # BLD AUTO: 118 K/UL — LOW (ref 150–400)
PLATELET # BLD AUTO: 124 K/UL — LOW (ref 150–400)
PLATELET # BLD AUTO: 172 K/UL — SIGNIFICANT CHANGE UP (ref 150–400)
PLATELET # BLD AUTO: 183 K/UL — SIGNIFICANT CHANGE UP (ref 150–400)
PLATELET # BLD AUTO: 196 K/UL — SIGNIFICANT CHANGE UP (ref 150–400)
PLATELET # BLD AUTO: 242 K/UL — SIGNIFICANT CHANGE UP (ref 150–400)
PLATELET # BLD AUTO: 255 K/UL — SIGNIFICANT CHANGE UP (ref 150–400)
PLATELET # BLD AUTO: 32 K/UL — LOW (ref 150–400)
PLATELET # BLD AUTO: 34 K/UL — LOW (ref 150–400)
PLATELET # BLD AUTO: 40 K/UL — LOW (ref 150–400)
PLATELET # BLD AUTO: 54 K/UL — LOW (ref 150–400)
PLATELET # BLD AUTO: 55 K/UL — LOW (ref 150–400)
PLATELET # BLD AUTO: 59 K/UL — LOW (ref 150–400)
PLATELET # BLD AUTO: 60 K/UL — LOW (ref 150–400)
PLATELET # BLD AUTO: 65 K/UL — LOW (ref 150–400)
PLATELET # BLD AUTO: 78 K/UL — LOW (ref 150–400)
PLATELET # BLD AUTO: 79 K/UL — LOW (ref 150–400)
POIKILOCYTOSIS BLD QL AUTO: SLIGHT — SIGNIFICANT CHANGE UP
POTASSIUM SERPL-MCNC: 2.9 MMOL/L — CRITICAL LOW (ref 3.5–5.3)
POTASSIUM SERPL-MCNC: 3 MMOL/L — LOW (ref 3.5–5.3)
POTASSIUM SERPL-MCNC: 3 MMOL/L — LOW (ref 3.5–5.3)
POTASSIUM SERPL-MCNC: 3.1 MMOL/L — LOW (ref 3.5–5.3)
POTASSIUM SERPL-MCNC: 3.5 MMOL/L — SIGNIFICANT CHANGE UP (ref 3.5–5.3)
POTASSIUM SERPL-MCNC: 3.6 MMOL/L — SIGNIFICANT CHANGE UP (ref 3.5–5.3)
POTASSIUM SERPL-MCNC: 3.7 MMOL/L — SIGNIFICANT CHANGE UP (ref 3.5–5.3)
POTASSIUM SERPL-MCNC: 3.8 MMOL/L — SIGNIFICANT CHANGE UP (ref 3.5–5.3)
POTASSIUM SERPL-MCNC: 3.9 MMOL/L — SIGNIFICANT CHANGE UP (ref 3.5–5.3)
POTASSIUM SERPL-MCNC: 3.9 MMOL/L — SIGNIFICANT CHANGE UP (ref 3.5–5.3)
POTASSIUM SERPL-MCNC: 4 MMOL/L — SIGNIFICANT CHANGE UP (ref 3.5–5.3)
POTASSIUM SERPL-MCNC: 4.1 MMOL/L — SIGNIFICANT CHANGE UP (ref 3.5–5.3)
POTASSIUM SERPL-MCNC: 4.1 MMOL/L — SIGNIFICANT CHANGE UP (ref 3.5–5.3)
POTASSIUM SERPL-MCNC: 4.2 MMOL/L — SIGNIFICANT CHANGE UP (ref 3.5–5.3)
POTASSIUM SERPL-SCNC: 2.9 MMOL/L — CRITICAL LOW (ref 3.5–5.3)
POTASSIUM SERPL-SCNC: 3 MMOL/L — LOW (ref 3.5–5.3)
POTASSIUM SERPL-SCNC: 3 MMOL/L — LOW (ref 3.5–5.3)
POTASSIUM SERPL-SCNC: 3.1 MMOL/L — LOW (ref 3.5–5.3)
POTASSIUM SERPL-SCNC: 3.5 MMOL/L — SIGNIFICANT CHANGE UP (ref 3.5–5.3)
POTASSIUM SERPL-SCNC: 3.6 MMOL/L — SIGNIFICANT CHANGE UP (ref 3.5–5.3)
POTASSIUM SERPL-SCNC: 3.7 MMOL/L — SIGNIFICANT CHANGE UP (ref 3.5–5.3)
POTASSIUM SERPL-SCNC: 3.8 MMOL/L — SIGNIFICANT CHANGE UP (ref 3.5–5.3)
POTASSIUM SERPL-SCNC: 3.9 MMOL/L — SIGNIFICANT CHANGE UP (ref 3.5–5.3)
POTASSIUM SERPL-SCNC: 3.9 MMOL/L — SIGNIFICANT CHANGE UP (ref 3.5–5.3)
POTASSIUM SERPL-SCNC: 4 MMOL/L — SIGNIFICANT CHANGE UP (ref 3.5–5.3)
POTASSIUM SERPL-SCNC: 4.1 MMOL/L — SIGNIFICANT CHANGE UP (ref 3.5–5.3)
POTASSIUM SERPL-SCNC: 4.1 MMOL/L — SIGNIFICANT CHANGE UP (ref 3.5–5.3)
POTASSIUM SERPL-SCNC: 4.2 MMOL/L — SIGNIFICANT CHANGE UP (ref 3.5–5.3)
PREALB SERPL-MCNC: 12 MG/DL — LOW (ref 20–40)
PREALB SERPL-MCNC: 13 MG/DL — LOW (ref 20–40)
PROT SERPL-MCNC: 5.1 G/DL — LOW (ref 6–8.3)
PROT SERPL-MCNC: 5.5 G/DL — LOW (ref 6–8.3)
PROT SERPL-MCNC: 5.7 G/DL — LOW (ref 6–8.3)
PROT SERPL-MCNC: 6 G/DL — SIGNIFICANT CHANGE UP (ref 6–8.3)
PROT SERPL-MCNC: 6 G/DL — SIGNIFICANT CHANGE UP (ref 6–8.3)
PROT SERPL-MCNC: 6.6 G/DL — SIGNIFICANT CHANGE UP (ref 6–8.3)
PROT SERPL-MCNC: 7 G/DL — SIGNIFICANT CHANGE UP (ref 6–8.3)
PROT SERPL-MCNC: 8.2 G/DL — SIGNIFICANT CHANGE UP (ref 6–8.3)
PROT UR-MCNC: 100 MG/DL
PROT UR-MCNC: 30 MG/DL
PROTHROM AB SERPL-ACNC: 11.9 SEC — SIGNIFICANT CHANGE UP (ref 9.9–13.4)
PROTHROM AB SERPL-ACNC: 12 SEC — SIGNIFICANT CHANGE UP (ref 9.9–13.4)
PROTHROM AB SERPL-ACNC: 12.2 SEC — SIGNIFICANT CHANGE UP (ref 9.9–13.4)
PROTHROM AB SERPL-ACNC: 12.6 SEC — SIGNIFICANT CHANGE UP (ref 9.9–13.4)
PROTHROM AB SERPL-ACNC: 12.9 SEC — SIGNIFICANT CHANGE UP (ref 9.9–13.4)
PROTHROM AB SERPL-ACNC: 12.9 SEC — SIGNIFICANT CHANGE UP (ref 9.9–13.4)
PROTHROM AB SERPL-ACNC: 13.1 SEC — SIGNIFICANT CHANGE UP (ref 9.9–13.4)
PTH-INTACT FLD-MCNC: 126 PG/ML — HIGH (ref 15–65)
RBC # BLD: 2.21 M/UL — LOW (ref 3.8–5.2)
RBC # BLD: 2.31 M/UL — LOW (ref 3.8–5.2)
RBC # BLD: 2.31 M/UL — LOW (ref 3.8–5.2)
RBC # BLD: 2.41 M/UL — LOW (ref 3.8–5.2)
RBC # BLD: 2.42 M/UL — LOW (ref 3.8–5.2)
RBC # BLD: 2.43 M/UL — LOW (ref 3.8–5.2)
RBC # BLD: 2.43 M/UL — LOW (ref 3.8–5.2)
RBC # BLD: 2.51 M/UL — LOW (ref 3.8–5.2)
RBC # BLD: 2.51 M/UL — LOW (ref 3.8–5.2)
RBC # BLD: 2.57 M/UL — LOW (ref 3.8–5.2)
RBC # BLD: 2.57 M/UL — LOW (ref 3.8–5.2)
RBC # BLD: 2.58 M/UL — LOW (ref 3.8–5.2)
RBC # BLD: 2.59 M/UL — LOW (ref 3.8–5.2)
RBC # BLD: 2.67 M/UL — LOW (ref 3.8–5.2)
RBC # BLD: 2.71 M/UL — LOW (ref 3.8–5.2)
RBC # BLD: 2.72 M/UL — LOW (ref 3.8–5.2)
RBC # BLD: 2.74 M/UL — LOW (ref 3.8–5.2)
RBC # BLD: 2.76 M/UL — LOW (ref 3.8–5.2)
RBC # BLD: 2.84 M/UL — LOW (ref 3.8–5.2)
RBC # BLD: 2.86 M/UL — LOW (ref 3.8–5.2)
RBC # BLD: 2.94 M/UL — LOW (ref 3.8–5.2)
RBC # BLD: 2.95 M/UL — LOW (ref 3.8–5.2)
RBC # BLD: 3.1 M/UL — LOW (ref 3.8–5.2)
RBC # BLD: 3.24 M/UL — LOW (ref 3.8–5.2)
RBC # FLD: 14.6 % — HIGH (ref 10.3–14.5)
RBC # FLD: 14.6 % — HIGH (ref 10.3–14.5)
RBC # FLD: 14.7 % — HIGH (ref 10.3–14.5)
RBC # FLD: 14.8 % — HIGH (ref 10.3–14.5)
RBC # FLD: 14.9 % — HIGH (ref 10.3–14.5)
RBC # FLD: 15 % — HIGH (ref 10.3–14.5)
RBC # FLD: 15 % — HIGH (ref 10.3–14.5)
RBC # FLD: 15.1 % — HIGH (ref 10.3–14.5)
RBC # FLD: 15.1 % — HIGH (ref 10.3–14.5)
RBC # FLD: 15.4 % — HIGH (ref 10.3–14.5)
RBC # FLD: 15.4 % — HIGH (ref 10.3–14.5)
RBC # FLD: 15.7 % — HIGH (ref 10.3–14.5)
RBC # FLD: 16.3 % — HIGH (ref 10.3–14.5)
RBC # FLD: 16.4 % — HIGH (ref 10.3–14.5)
RBC # FLD: 17.7 % — HIGH (ref 10.3–14.5)
RBC # FLD: 17.7 % — HIGH (ref 10.3–14.5)
RBC # FLD: 18.3 % — HIGH (ref 10.3–14.5)
RBC # FLD: 19.1 % — HIGH (ref 10.3–14.5)
RBC # FLD: 19.2 % — HIGH (ref 10.3–14.5)
RBC # FLD: 19.9 % — HIGH (ref 10.3–14.5)
RBC BLD AUTO: ABNORMAL
RBC CASTS # UR COMP ASSIST: 0 /HPF — SIGNIFICANT CHANGE UP (ref 0–4)
RBC CASTS # UR COMP ASSIST: 2 /HPF — SIGNIFICANT CHANGE UP (ref 0–4)
RETICS #: 134.4 K/UL — HIGH (ref 25–125)
RETICS #: 150.7 K/UL — HIGH (ref 25–125)
RETICS #: 168.7 K/UL — HIGH (ref 25–125)
RETICS #: 65.5 K/UL — SIGNIFICANT CHANGE UP (ref 25–125)
RETICS/RBC NFR: 2.4 % — SIGNIFICANT CHANGE UP (ref 0.5–2.5)
RETICS/RBC NFR: 5.8 % — HIGH (ref 0.5–2.5)
RETICS/RBC NFR: 6.2 % — HIGH (ref 0.5–2.5)
RETICS/RBC NFR: 6.7 % — HIGH (ref 0.5–2.5)
REVIEW: SIGNIFICANT CHANGE UP
RH IG SCN BLD-IMP: POSITIVE — SIGNIFICANT CHANGE UP
SODIUM SERPL-SCNC: 130 MMOL/L — LOW (ref 135–145)
SODIUM SERPL-SCNC: 131 MMOL/L — LOW (ref 135–145)
SODIUM SERPL-SCNC: 131 MMOL/L — LOW (ref 135–145)
SODIUM SERPL-SCNC: 132 MMOL/L — LOW (ref 135–145)
SODIUM SERPL-SCNC: 132 MMOL/L — LOW (ref 135–145)
SODIUM SERPL-SCNC: 133 MMOL/L — LOW (ref 135–145)
SODIUM SERPL-SCNC: 133 MMOL/L — LOW (ref 135–145)
SODIUM SERPL-SCNC: 134 MMOL/L — LOW (ref 135–145)
SODIUM SERPL-SCNC: 134 MMOL/L — LOW (ref 135–145)
SODIUM SERPL-SCNC: 135 MMOL/L — SIGNIFICANT CHANGE UP (ref 135–145)
SODIUM SERPL-SCNC: 135 MMOL/L — SIGNIFICANT CHANGE UP (ref 135–145)
SODIUM SERPL-SCNC: 136 MMOL/L — SIGNIFICANT CHANGE UP (ref 135–145)
SODIUM SERPL-SCNC: 137 MMOL/L — SIGNIFICANT CHANGE UP (ref 135–145)
SODIUM SERPL-SCNC: 137 MMOL/L — SIGNIFICANT CHANGE UP (ref 135–145)
SODIUM SERPL-SCNC: 138 MMOL/L — SIGNIFICANT CHANGE UP (ref 135–145)
SP GR SPEC: 1.01 — SIGNIFICANT CHANGE UP (ref 1–1.03)
SP GR SPEC: >1.03 — HIGH (ref 1–1.03)
SPECIMEN SOURCE: SIGNIFICANT CHANGE UP
SQUAMOUS # UR AUTO: 1 /HPF — SIGNIFICANT CHANGE UP (ref 0–5)
SQUAMOUS # UR AUTO: 1 /HPF — SIGNIFICANT CHANGE UP (ref 0–5)
T4 FREE SERPL-MCNC: 0.9 NG/DL — SIGNIFICANT CHANGE UP (ref 0.9–1.8)
TARGETS BLD QL SMEAR: SLIGHT — SIGNIFICANT CHANGE UP
TIBC SERPL-MCNC: 179 UG/DL — LOW (ref 220–430)
TM INTERPRETATION: SIGNIFICANT CHANGE UP
TM INTERPRETATION: SIGNIFICANT CHANGE UP
TRIGL SERPL-MCNC: 100 MG/DL — SIGNIFICANT CHANGE UP
TROPONIN T, HIGH SENSITIVITY RESULT: 35 NG/L — SIGNIFICANT CHANGE UP (ref 0–51)
TSH SERPL-MCNC: 0.28 UIU/ML — SIGNIFICANT CHANGE UP (ref 0.27–4.2)
TSH SERPL-MCNC: 7.47 UIU/ML — HIGH (ref 0.27–4.2)
UIBC SERPL-MCNC: 36 UG/DL — LOW (ref 110–370)
UNFRACTIONATED HEPARIN INTERPRETATION: SIGNIFICANT CHANGE UP
UNFRACTIONATED HEPARIN RESULT: NEGATIVE — SIGNIFICANT CHANGE UP
UNFRACTIONATED HEPARIN-HIGH DOSE: 0 % — SIGNIFICANT CHANGE UP
UNFRACTIONATED HEPARIN-LOW DOSE: 0 % — SIGNIFICANT CHANGE UP
UROBILINOGEN FLD QL: 0.2 MG/DL — SIGNIFICANT CHANGE UP (ref 0.2–1)
UROBILINOGEN FLD QL: 1 MG/DL — SIGNIFICANT CHANGE UP (ref 0.2–1)
VARIANT LYMPHS # BLD: 0.9 % — SIGNIFICANT CHANGE UP (ref 0–6)
VIT B12 SERPL-MCNC: 859 PG/ML — SIGNIFICANT CHANGE UP (ref 232–1245)
WBC # BLD: 10.25 K/UL — SIGNIFICANT CHANGE UP (ref 3.8–10.5)
WBC # BLD: 10.33 K/UL — SIGNIFICANT CHANGE UP (ref 3.8–10.5)
WBC # BLD: 10.43 K/UL — SIGNIFICANT CHANGE UP (ref 3.8–10.5)
WBC # BLD: 10.95 K/UL — HIGH (ref 3.8–10.5)
WBC # BLD: 10.96 K/UL — HIGH (ref 3.8–10.5)
WBC # BLD: 11.46 K/UL — HIGH (ref 3.8–10.5)
WBC # BLD: 11.58 K/UL — HIGH (ref 3.8–10.5)
WBC # BLD: 11.6 K/UL — HIGH (ref 3.8–10.5)
WBC # BLD: 11.69 K/UL — HIGH (ref 3.8–10.5)
WBC # BLD: 12.34 K/UL — HIGH (ref 3.8–10.5)
WBC # BLD: 12.84 K/UL — HIGH (ref 3.8–10.5)
WBC # BLD: 15.34 K/UL — HIGH (ref 3.8–10.5)
WBC # BLD: 16.1 K/UL — HIGH (ref 3.8–10.5)
WBC # BLD: 6.98 K/UL — SIGNIFICANT CHANGE UP (ref 3.8–10.5)
WBC # BLD: 7.79 K/UL — SIGNIFICANT CHANGE UP (ref 3.8–10.5)
WBC # BLD: 7.93 K/UL — SIGNIFICANT CHANGE UP (ref 3.8–10.5)
WBC # BLD: 8.75 K/UL — SIGNIFICANT CHANGE UP (ref 3.8–10.5)
WBC # BLD: 9.06 K/UL — SIGNIFICANT CHANGE UP (ref 3.8–10.5)
WBC # BLD: 9.24 K/UL — SIGNIFICANT CHANGE UP (ref 3.8–10.5)
WBC # BLD: 9.36 K/UL — SIGNIFICANT CHANGE UP (ref 3.8–10.5)
WBC # FLD AUTO: 10.25 K/UL — SIGNIFICANT CHANGE UP (ref 3.8–10.5)
WBC # FLD AUTO: 10.33 K/UL — SIGNIFICANT CHANGE UP (ref 3.8–10.5)
WBC # FLD AUTO: 10.43 K/UL — SIGNIFICANT CHANGE UP (ref 3.8–10.5)
WBC # FLD AUTO: 10.95 K/UL — HIGH (ref 3.8–10.5)
WBC # FLD AUTO: 10.96 K/UL — HIGH (ref 3.8–10.5)
WBC # FLD AUTO: 11.46 K/UL — HIGH (ref 3.8–10.5)
WBC # FLD AUTO: 11.58 K/UL — HIGH (ref 3.8–10.5)
WBC # FLD AUTO: 11.6 K/UL — HIGH (ref 3.8–10.5)
WBC # FLD AUTO: 11.69 K/UL — HIGH (ref 3.8–10.5)
WBC # FLD AUTO: 12.34 K/UL — HIGH (ref 3.8–10.5)
WBC # FLD AUTO: 12.84 K/UL — HIGH (ref 3.8–10.5)
WBC # FLD AUTO: 15.34 K/UL — HIGH (ref 3.8–10.5)
WBC # FLD AUTO: 16.1 K/UL — HIGH (ref 3.8–10.5)
WBC # FLD AUTO: 6.98 K/UL — SIGNIFICANT CHANGE UP (ref 3.8–10.5)
WBC # FLD AUTO: 7.79 K/UL — SIGNIFICANT CHANGE UP (ref 3.8–10.5)
WBC # FLD AUTO: 7.93 K/UL — SIGNIFICANT CHANGE UP (ref 3.8–10.5)
WBC # FLD AUTO: 8.75 K/UL — SIGNIFICANT CHANGE UP (ref 3.8–10.5)
WBC # FLD AUTO: 9.06 K/UL — SIGNIFICANT CHANGE UP (ref 3.8–10.5)
WBC # FLD AUTO: 9.24 K/UL — SIGNIFICANT CHANGE UP (ref 3.8–10.5)
WBC # FLD AUTO: 9.36 K/UL — SIGNIFICANT CHANGE UP (ref 3.8–10.5)
WBC UR QL: 20 /HPF — HIGH (ref 0–5)
WBC UR QL: 20 /HPF — HIGH (ref 0–5)

## 2024-01-01 PROCEDURE — 87040 BLOOD CULTURE FOR BACTERIA: CPT

## 2024-01-01 PROCEDURE — 85097 BONE MARROW INTERPRETATION: CPT

## 2024-01-01 PROCEDURE — 83521 IG LIGHT CHAINS FREE EACH: CPT

## 2024-01-01 PROCEDURE — 99233 SBSQ HOSP IP/OBS HIGH 50: CPT | Mod: GC

## 2024-01-01 PROCEDURE — 86022 PLATELET ANTIBODIES: CPT

## 2024-01-01 PROCEDURE — P9100: CPT

## 2024-01-01 PROCEDURE — 70450 CT HEAD/BRAIN W/O DYE: CPT | Mod: 26,59,MC

## 2024-01-01 PROCEDURE — 86923 COMPATIBILITY TEST ELECTRIC: CPT

## 2024-01-01 PROCEDURE — 88189 FLOWCYTOMETRY/READ 16 & >: CPT | Mod: 59

## 2024-01-01 PROCEDURE — 88364 INSITU HYBRIDIZATION (FISH): CPT | Mod: 26

## 2024-01-01 PROCEDURE — 51703 INSERT BLADDER CATH COMPLEX: CPT

## 2024-01-01 PROCEDURE — C1769: CPT

## 2024-01-01 PROCEDURE — 87186 SC STD MICRODIL/AGAR DIL: CPT

## 2024-01-01 PROCEDURE — 88365 INSITU HYBRIDIZATION (FISH): CPT | Mod: 26,59

## 2024-01-01 PROCEDURE — 86078 PHYS BLOOD BANK SERV REACTJ: CPT

## 2024-01-01 PROCEDURE — 71045 X-RAY EXAM CHEST 1 VIEW: CPT | Mod: 26

## 2024-01-01 PROCEDURE — 85025 COMPLETE CBC W/AUTO DIFF WBC: CPT

## 2024-01-01 PROCEDURE — 99232 SBSQ HOSP IP/OBS MODERATE 35: CPT | Mod: GC

## 2024-01-01 PROCEDURE — C1889: CPT

## 2024-01-01 PROCEDURE — 74177 CT ABD & PELVIS W/CONTRAST: CPT | Mod: 26,MC

## 2024-01-01 PROCEDURE — 88365 INSITU HYBRIDIZATION (FISH): CPT

## 2024-01-01 PROCEDURE — 85014 HEMATOCRIT: CPT

## 2024-01-01 PROCEDURE — 83010 ASSAY OF HAPTOGLOBIN QUANT: CPT

## 2024-01-01 PROCEDURE — 82247 BILIRUBIN TOTAL: CPT

## 2024-01-01 PROCEDURE — 85045 AUTOMATED RETICULOCYTE COUNT: CPT

## 2024-01-01 PROCEDURE — 82784 ASSAY IGA/IGD/IGG/IGM EACH: CPT

## 2024-01-01 PROCEDURE — 83880 ASSAY OF NATRIURETIC PEPTIDE: CPT

## 2024-01-01 PROCEDURE — 0042T: CPT | Mod: MC

## 2024-01-01 PROCEDURE — 83036 HEMOGLOBIN GLYCOSYLATED A1C: CPT

## 2024-01-01 PROCEDURE — 38222 DX BONE MARROW BX & ASPIR: CPT

## 2024-01-01 PROCEDURE — 88108 CYTOPATH CONCENTRATE TECH: CPT | Mod: 26,59

## 2024-01-01 PROCEDURE — 82310 ASSAY OF CALCIUM: CPT

## 2024-01-01 PROCEDURE — 82803 BLOOD GASES ANY COMBINATION: CPT

## 2024-01-01 PROCEDURE — 87086 URINE CULTURE/COLONY COUNT: CPT

## 2024-01-01 PROCEDURE — 86880 COOMBS TEST DIRECT: CPT

## 2024-01-01 PROCEDURE — 99232 SBSQ HOSP IP/OBS MODERATE 35: CPT

## 2024-01-01 PROCEDURE — 70450 CT HEAD/BRAIN W/O DYE: CPT | Mod: 26

## 2024-01-01 PROCEDURE — 82607 VITAMIN B-12: CPT

## 2024-01-01 PROCEDURE — 71045 X-RAY EXAM CHEST 1 VIEW: CPT

## 2024-01-01 PROCEDURE — 86850 RBC ANTIBODY SCREEN: CPT

## 2024-01-01 PROCEDURE — 76705 ECHO EXAM OF ABDOMEN: CPT | Mod: 26

## 2024-01-01 PROCEDURE — 80053 COMPREHEN METABOLIC PANEL: CPT

## 2024-01-01 PROCEDURE — 88291 CYTO/MOLECULAR REPORT: CPT | Mod: 1L

## 2024-01-01 PROCEDURE — 82140 ASSAY OF AMMONIA: CPT

## 2024-01-01 PROCEDURE — 82330 ASSAY OF CALCIUM: CPT

## 2024-01-01 PROCEDURE — 74330 X-RAY BILE/PANC ENDOSCOPY: CPT

## 2024-01-01 PROCEDURE — 36430 TRANSFUSION BLD/BLD COMPNT: CPT

## 2024-01-01 PROCEDURE — 99233 SBSQ HOSP IP/OBS HIGH 50: CPT

## 2024-01-01 PROCEDURE — 36415 COLL VENOUS BLD VENIPUNCTURE: CPT

## 2024-01-01 PROCEDURE — 70450 CT HEAD/BRAIN W/O DYE: CPT | Mod: MC

## 2024-01-01 PROCEDURE — 84295 ASSAY OF SERUM SODIUM: CPT

## 2024-01-01 PROCEDURE — 88305 TISSUE EXAM BY PATHOLOGIST: CPT

## 2024-01-01 PROCEDURE — 82248 BILIRUBIN DIRECT: CPT

## 2024-01-01 PROCEDURE — 83540 ASSAY OF IRON: CPT

## 2024-01-01 PROCEDURE — 38222 DX BONE MARROW BX & ASPIR: CPT | Mod: LT

## 2024-01-01 PROCEDURE — 88313 SPECIAL STAINS GROUP 2: CPT

## 2024-01-01 PROCEDURE — 70496 CT ANGIOGRAPHY HEAD: CPT | Mod: 26,MC

## 2024-01-01 PROCEDURE — 82947 ASSAY GLUCOSE BLOOD QUANT: CPT

## 2024-01-01 PROCEDURE — 88280 CHROMOSOME KARYOTYPE STUDY: CPT

## 2024-01-01 PROCEDURE — 84132 ASSAY OF SERUM POTASSIUM: CPT

## 2024-01-01 PROCEDURE — 70498 CT ANGIOGRAPHY NECK: CPT | Mod: 26,MC

## 2024-01-01 PROCEDURE — 80061 LIPID PANEL: CPT

## 2024-01-01 PROCEDURE — 77012 CT SCAN FOR NEEDLE BIOPSY: CPT

## 2024-01-01 PROCEDURE — 84134 ASSAY OF PREALBUMIN: CPT

## 2024-01-01 PROCEDURE — 82306 VITAMIN D 25 HYDROXY: CPT

## 2024-01-01 PROCEDURE — 93971 EXTREMITY STUDY: CPT

## 2024-01-01 PROCEDURE — 99285 EMERGENCY DEPT VISIT HI MDM: CPT

## 2024-01-01 PROCEDURE — 85730 THROMBOPLASTIN TIME PARTIAL: CPT

## 2024-01-01 PROCEDURE — 85027 COMPLETE CBC AUTOMATED: CPT

## 2024-01-01 PROCEDURE — 77012 CT SCAN FOR NEEDLE BIOPSY: CPT | Mod: 26

## 2024-01-01 PROCEDURE — P9016: CPT

## 2024-01-01 PROCEDURE — 84484 ASSAY OF TROPONIN QUANT: CPT

## 2024-01-01 PROCEDURE — 82746 ASSAY OF FOLIC ACID SERUM: CPT

## 2024-01-01 PROCEDURE — 85018 HEMOGLOBIN: CPT

## 2024-01-01 PROCEDURE — 86900 BLOOD TYPING SEROLOGIC ABO: CPT

## 2024-01-01 PROCEDURE — 85049 AUTOMATED PLATELET COUNT: CPT

## 2024-01-01 PROCEDURE — 81451 HL NEO GSAP 5-50 RNA ALYS: CPT

## 2024-01-01 PROCEDURE — 88342 IMHCHEM/IMCYTCHM 1ST ANTB: CPT

## 2024-01-01 PROCEDURE — 84443 ASSAY THYROID STIM HORMONE: CPT

## 2024-01-01 PROCEDURE — 82728 ASSAY OF FERRITIN: CPT

## 2024-01-01 PROCEDURE — 88313 SPECIAL STAINS GROUP 2: CPT | Mod: 26

## 2024-01-01 PROCEDURE — 88364 INSITU HYBRIDIZATION (FISH): CPT

## 2024-01-01 PROCEDURE — 82010 KETONE BODYS QUAN: CPT

## 2024-01-01 PROCEDURE — 96375 TX/PRO/DX INJ NEW DRUG ADDON: CPT

## 2024-01-01 PROCEDURE — 83615 LACTATE (LD) (LDH) ENZYME: CPT

## 2024-01-01 PROCEDURE — 81450 HL NEO GSAP 5-50DNA/DNA&RNA: CPT

## 2024-01-01 PROCEDURE — 88184 FLOWCYTOMETRY/ TC 1 MARKER: CPT

## 2024-01-01 PROCEDURE — 99223 1ST HOSP IP/OBS HIGH 75: CPT

## 2024-01-01 PROCEDURE — 99285 EMERGENCY DEPT VISIT HI MDM: CPT | Mod: GC

## 2024-01-01 PROCEDURE — 85397 CLOTTING FUNCT ACTIVITY: CPT

## 2024-01-01 PROCEDURE — 99254 IP/OBS CNSLTJ NEW/EST MOD 60: CPT

## 2024-01-01 PROCEDURE — 83550 IRON BINDING TEST: CPT

## 2024-01-01 PROCEDURE — 80076 HEPATIC FUNCTION PANEL: CPT

## 2024-01-01 PROCEDURE — 87205 SMEAR GRAM STAIN: CPT

## 2024-01-01 PROCEDURE — 83735 ASSAY OF MAGNESIUM: CPT

## 2024-01-01 PROCEDURE — 43235 EGD DIAGNOSTIC BRUSH WASH: CPT | Mod: GC

## 2024-01-01 PROCEDURE — 80048 BASIC METABOLIC PNL TOTAL CA: CPT

## 2024-01-01 PROCEDURE — 83690 ASSAY OF LIPASE: CPT

## 2024-01-01 PROCEDURE — 74177 CT ABD & PELVIS W/CONTRAST: CPT | Mod: 26

## 2024-01-01 PROCEDURE — 88264 CHROMOSOME ANALYSIS 20-25: CPT

## 2024-01-01 PROCEDURE — 96374 THER/PROPH/DIAG INJ IV PUSH: CPT

## 2024-01-01 PROCEDURE — 93005 ELECTROCARDIOGRAM TRACING: CPT

## 2024-01-01 PROCEDURE — 82962 GLUCOSE BLOOD TEST: CPT

## 2024-01-01 PROCEDURE — 85610 PROTHROMBIN TIME: CPT

## 2024-01-01 PROCEDURE — C1729: CPT

## 2024-01-01 PROCEDURE — 93010 ELECTROCARDIOGRAM REPORT: CPT

## 2024-01-01 PROCEDURE — 88285 CHROMOSOME COUNT ADDITIONAL: CPT

## 2024-01-01 PROCEDURE — 87077 CULTURE AEROBIC IDENTIFY: CPT

## 2024-01-01 PROCEDURE — 93306 TTE W/DOPPLER COMPLETE: CPT

## 2024-01-01 PROCEDURE — 85385 FIBRINOGEN ANTIGEN: CPT

## 2024-01-01 PROCEDURE — 74177 CT ABD & PELVIS W/CONTRAST: CPT | Mod: MC

## 2024-01-01 PROCEDURE — 88305 TISSUE EXAM BY PATHOLOGIST: CPT | Mod: 26

## 2024-01-01 PROCEDURE — 81001 URINALYSIS AUTO W/SCOPE: CPT

## 2024-01-01 PROCEDURE — 93306 TTE W/DOPPLER COMPLETE: CPT | Mod: 26

## 2024-01-01 PROCEDURE — 88341 IMHCHEM/IMCYTCHM EA ADD ANTB: CPT | Mod: 26,59

## 2024-01-01 PROCEDURE — 88341 IMHCHEM/IMCYTCHM EA ADD ANTB: CPT

## 2024-01-01 PROCEDURE — 99235 HOSP IP/OBS SAME DATE MOD 70: CPT

## 2024-01-01 PROCEDURE — 76705 ECHO EXAM OF ABDOMEN: CPT

## 2024-01-01 PROCEDURE — 82435 ASSAY OF BLOOD CHLORIDE: CPT

## 2024-01-01 PROCEDURE — 88237 TISSUE CULTURE BONE MARROW: CPT

## 2024-01-01 PROCEDURE — 88342 IMHCHEM/IMCYTCHM 1ST ANTB: CPT | Mod: 26,59

## 2024-01-01 PROCEDURE — 99285 EMERGENCY DEPT VISIT HI MDM: CPT | Mod: 25

## 2024-01-01 PROCEDURE — 86901 BLOOD TYPING SEROLOGIC RH(D): CPT

## 2024-01-01 PROCEDURE — 84100 ASSAY OF PHOSPHORUS: CPT

## 2024-01-01 PROCEDURE — 83605 ASSAY OF LACTIC ACID: CPT

## 2024-01-01 PROCEDURE — 99497 ADVNCD CARE PLAN 30 MIN: CPT

## 2024-01-01 PROCEDURE — 93971 EXTREMITY STUDY: CPT | Mod: 26,RT

## 2024-01-01 PROCEDURE — P9037: CPT

## 2024-01-01 PROCEDURE — 83970 ASSAY OF PARATHORMONE: CPT

## 2024-01-01 PROCEDURE — 88185 FLOWCYTOMETRY/TC ADD-ON: CPT

## 2024-01-01 PROCEDURE — 84439 ASSAY OF FREE THYROXINE: CPT

## 2024-01-01 PROCEDURE — C9399: CPT

## 2024-01-01 PROCEDURE — 70498 CT ANGIOGRAPHY NECK: CPT | Mod: MC

## 2024-01-01 PROCEDURE — 70496 CT ANGIOGRAPHY HEAD: CPT | Mod: MC

## 2024-01-01 PROCEDURE — 96376 TX/PRO/DX INJ SAME DRUG ADON: CPT

## 2024-01-01 PROCEDURE — 43240 EGD W/TRANSMURAL DRAIN CYST: CPT

## 2024-01-01 PROCEDURE — C1874: CPT

## 2024-01-01 DEVICE — STENT AXIOS 10X15MM: Type: IMPLANTABLE DEVICE | Status: FUNCTIONAL

## 2024-01-01 DEVICE — DRNGE ST BIL NSL 7FR 250CM: Type: IMPLANTABLE DEVICE | Status: FUNCTIONAL

## 2024-01-01 DEVICE — BLLN EXTRCTR PRO RX-S 9-12MM: Type: IMPLANTABLE DEVICE | Status: FUNCTIONAL

## 2024-01-01 DEVICE — HYDRA WIRE: Type: IMPLANTABLE DEVICE | Status: FUNCTIONAL

## 2024-01-01 DEVICE — DREAMWIRE STD .035IN STRT: Type: IMPLANTABLE DEVICE | Status: FUNCTIONAL

## 2024-01-01 RX ORDER — POTASSIUM CHLORIDE 10 MEQ
10 TABLET, EXTENDED RELEASE ORAL
Refills: 0 | Status: COMPLETED | OUTPATIENT
Start: 2024-01-01 | End: 2024-01-01

## 2024-01-01 RX ORDER — SODIUM PHOSPHATE, DIBASIC AND SODIUM PHOSPHATE, MONOBASIC, UNSPECIFIED FORM 7; 19 G/118ML; G/118ML
1 ENEMA RECTAL ONCE
Refills: 0 | Status: DISCONTINUED | OUTPATIENT
Start: 2024-01-01 | End: 2024-01-01

## 2024-01-01 RX ORDER — AMLODIPINE BESYLATE 10 MG
1 TABLET ORAL
Refills: 0 | DISCHARGE

## 2024-01-01 RX ORDER — THIAMINE HCL 100 MG
100 TABLET ORAL DAILY
Refills: 0 | Status: DISCONTINUED | OUTPATIENT
Start: 2024-01-01 | End: 2024-01-01

## 2024-01-01 RX ORDER — NALOXONE HYDROCHLORIDE 1 MG/ML
0.4 INJECTION, SOLUTION INTRAMUSCULAR; INTRAVENOUS; SUBCUTANEOUS ONCE
Refills: 0 | Status: DISCONTINUED | OUTPATIENT
Start: 2024-01-01 | End: 2024-01-01

## 2024-01-01 RX ORDER — CHOLESTEROL/SOYBEAN OIL/C/E 60-200-80
1 POWDER (GRAM) ORAL
Qty: 0 | Refills: 0 | DISCHARGE
Start: 2024-01-01

## 2024-01-01 RX ORDER — HEPARIN SODIUM 10000 [USP'U]/ML
5000 INJECTION INTRAVENOUS; SUBCUTANEOUS EVERY 12 HOURS
Refills: 0 | Status: DISCONTINUED | OUTPATIENT
Start: 2024-01-01 | End: 2024-01-01

## 2024-01-01 RX ORDER — SODIUM CHLORIDE 9 MG/ML
500 INJECTION, SOLUTION INTRAMUSCULAR; INTRAVENOUS; SUBCUTANEOUS ONCE
Refills: 0 | Status: COMPLETED | OUTPATIENT
Start: 2024-01-01 | End: 2024-01-01

## 2024-01-01 RX ORDER — LIDOCAINE HYDROCHLORIDE 40 MG/ML
10 SOLUTION TOPICAL ONCE
Refills: 0 | Status: COMPLETED | OUTPATIENT
Start: 2024-01-01 | End: 2024-01-01

## 2024-01-01 RX ORDER — GLUCAGON INJECTION, SOLUTION 1 MG/.2ML
1 INJECTION, SOLUTION SUBCUTANEOUS ONCE
Refills: 0 | Status: DISCONTINUED | OUTPATIENT
Start: 2024-01-01 | End: 2024-01-01

## 2024-01-01 RX ORDER — 0.9 % SODIUM CHLORIDE 0.9 %
1000 INTRAVENOUS SOLUTION INTRAVENOUS
Refills: 0 | Status: DISCONTINUED | OUTPATIENT
Start: 2024-01-01 | End: 2024-01-01

## 2024-01-01 RX ORDER — DEXAMETHASONE 1.5 MG 1.5 MG/1
2 TABLET ORAL
Qty: 2 | Refills: 0
Start: 2024-01-01 | End: 2024-01-01

## 2024-01-01 RX ORDER — DIPHENHYDRAMINE HCL 12.5MG/5ML
25 ELIXIR ORAL ONCE
Refills: 0 | Status: COMPLETED | OUTPATIENT
Start: 2024-01-01 | End: 2024-01-01

## 2024-01-01 RX ORDER — THIAMINE HCL 100 MG
1 TABLET ORAL
Qty: 0 | Refills: 0 | DISCHARGE
Start: 2024-01-01

## 2024-01-01 RX ORDER — CEFTRIAXONE SODIUM 1 G
1000 VIAL (EA) INJECTION EVERY 24 HOURS
Refills: 0 | Status: DISCONTINUED | OUTPATIENT
Start: 2024-01-01 | End: 2024-01-01

## 2024-01-01 RX ORDER — MELATONIN 5 MG
3 TABLET ORAL ONCE
Refills: 0 | Status: DISCONTINUED | OUTPATIENT
Start: 2024-01-01 | End: 2024-01-01

## 2024-01-01 RX ORDER — ONDANSETRON HYDROCHLORIDE 2 MG/ML
4 INJECTION, SOLUTION INTRAMUSCULAR; INTRAVENOUS ONCE
Refills: 0 | Status: COMPLETED | OUTPATIENT
Start: 2024-01-01 | End: 2024-01-01

## 2024-01-01 RX ORDER — LANOLIN ALCOHOL/MO/W.PET/CERES
100 CREAM (GRAM) TOPICAL DAILY
Refills: 0 | Status: DISCONTINUED | OUTPATIENT
Start: 2024-01-01 | End: 2024-01-01

## 2024-01-01 RX ORDER — MELATONIN 5 MG
3 TABLET ORAL AT BEDTIME
Refills: 0 | Status: DISCONTINUED | OUTPATIENT
Start: 2024-01-01 | End: 2024-01-01

## 2024-01-01 RX ORDER — ALENDRONATE SODIUM 10 MG/1
1 TABLET ORAL
Refills: 0 | DISCHARGE

## 2024-01-01 RX ORDER — INSULIN LISPRO 100/ML
VIAL (ML) SUBCUTANEOUS
Refills: 0 | Status: DISCONTINUED | OUTPATIENT
Start: 2024-01-01 | End: 2024-01-01

## 2024-01-01 RX ORDER — POTASSIUM CHLORIDE 10 MEQ
40 TABLET, EXTENDED RELEASE ORAL EVERY 4 HOURS
Refills: 0 | Status: COMPLETED | OUTPATIENT
Start: 2024-01-01 | End: 2024-01-01

## 2024-01-01 RX ORDER — OCTREOTIDE ACETATE 1000 UG/ML
100 INJECTION INTRAVENOUS; SUBCUTANEOUS EVERY 8 HOURS
Refills: 0 | Status: DISCONTINUED | OUTPATIENT
Start: 2024-01-01 | End: 2024-01-01

## 2024-01-01 RX ORDER — INSULIN LISPRO 100/ML
VIAL (ML) SUBCUTANEOUS AT BEDTIME
Refills: 0 | Status: DISCONTINUED | OUTPATIENT
Start: 2024-01-01 | End: 2024-01-01

## 2024-01-01 RX ORDER — PANTOPRAZOLE SODIUM 40 MG/1
40 TABLET, DELAYED RELEASE ORAL DAILY
Refills: 0 | Status: DISCONTINUED | OUTPATIENT
Start: 2024-01-01 | End: 2024-01-01

## 2024-01-01 RX ORDER — SODIUM CHLORIDE 9 MG/ML
1000 INJECTION, SOLUTION INTRAMUSCULAR; INTRAVENOUS; SUBCUTANEOUS ONCE
Refills: 0 | Status: COMPLETED | OUTPATIENT
Start: 2024-01-01 | End: 2024-01-01

## 2024-01-01 RX ORDER — ACETAMINOPHEN 500MG 500 MG/1
650 TABLET, COATED ORAL EVERY 6 HOURS
Refills: 0 | Status: DISCONTINUED | OUTPATIENT
Start: 2024-01-01 | End: 2024-01-01

## 2024-01-01 RX ORDER — DEXAMETHASONE 1.5 MG 1.5 MG/1
4 TABLET ORAL EVERY 12 HOURS
Refills: 0 | Status: DISCONTINUED | OUTPATIENT
Start: 2024-01-01 | End: 2024-01-01

## 2024-01-01 RX ORDER — GLUCAGON INJECTION, SOLUTION 0.5 MG/.1ML
1 INJECTION, SOLUTION SUBCUTANEOUS ONCE
Refills: 0 | Status: DISCONTINUED | OUTPATIENT
Start: 2024-01-01 | End: 2024-01-01

## 2024-01-01 RX ORDER — INSULIN REG, HUM S-S BUFF 100/ML
4 VIAL (ML) INJECTION ONCE
Refills: 0 | Status: DISCONTINUED | OUTPATIENT
Start: 2024-01-01 | End: 2024-01-01

## 2024-01-01 RX ORDER — INSULIN GLARGINE,HUM.REC.ANLOG 100/ML
10 VIAL (ML) SUBCUTANEOUS AT BEDTIME
Refills: 0 | Status: DISCONTINUED | OUTPATIENT
Start: 2024-01-01 | End: 2024-01-01

## 2024-01-01 RX ORDER — MAGNESIUM SULFATE IN 0.9% NACL 2 G/50 ML
2 INTRAVENOUS SOLUTION, PIGGYBACK (ML) INTRAVENOUS ONCE
Refills: 0 | Status: COMPLETED | OUTPATIENT
Start: 2024-01-01 | End: 2024-01-01

## 2024-01-01 RX ORDER — DEXAMETHASONE 1.5 MG 1.5 MG/1
40 TABLET ORAL DAILY
Refills: 0 | Status: DISCONTINUED | OUTPATIENT
Start: 2024-01-01 | End: 2024-01-01

## 2024-01-01 RX ORDER — LEVOTHYROXINE SODIUM 88 MCG
70 TABLET ORAL AT BEDTIME
Refills: 0 | Status: DISCONTINUED | OUTPATIENT
Start: 2024-01-01 | End: 2024-01-01

## 2024-01-01 RX ORDER — LACTULOSE 10 G/15ML
200 SOLUTION ORAL DAILY
Refills: 0 | Status: DISCONTINUED | OUTPATIENT
Start: 2024-01-01 | End: 2024-01-01

## 2024-01-01 RX ORDER — FOLIC ACID 1 MG/1
1 TABLET ORAL
Qty: 0 | Refills: 0 | DISCHARGE
Start: 2024-01-01

## 2024-01-01 RX ORDER — POLYETHYLENE GLYCOL 3350 17 G/17G
17 POWDER, FOR SOLUTION ORAL
Refills: 0 | Status: DISCONTINUED | OUTPATIENT
Start: 2024-01-01 | End: 2024-01-01

## 2024-01-01 RX ORDER — LORAZEPAM 2 MG/1
0.25 TABLET ORAL
Refills: 0 | Status: DISCONTINUED | OUTPATIENT
Start: 2024-01-01 | End: 2024-01-01

## 2024-01-01 RX ORDER — LOSARTAN POTASSIUM 25 MG/1
25 TABLET ORAL ONCE
Refills: 0 | Status: COMPLETED | OUTPATIENT
Start: 2024-01-01 | End: 2024-01-01

## 2024-01-01 RX ORDER — MAGNESIUM SULFATE IN 0.9% NACL 2 G/50 ML
1 INTRAVENOUS SOLUTION, PIGGYBACK (ML) INTRAVENOUS ONCE
Refills: 0 | Status: COMPLETED | OUTPATIENT
Start: 2024-01-01 | End: 2024-01-01

## 2024-01-01 RX ORDER — SODIUM CHLORIDE 9 MG/ML
1000 INJECTION, SOLUTION INTRAMUSCULAR; INTRAVENOUS; SUBCUTANEOUS
Refills: 0 | Status: DISCONTINUED | OUTPATIENT
Start: 2024-01-01 | End: 2024-01-01

## 2024-01-01 RX ORDER — SODIUM PHOSPHATE, MONOBASIC, MONOHYDRATE AND SODIUM PHOSPHATE, DIBASIC ANHYDROUS 276; 142 MG/ML; MG/ML
15 INJECTION, SOLUTION INTRAVENOUS ONCE
Refills: 0 | Status: COMPLETED | OUTPATIENT
Start: 2024-01-01 | End: 2024-01-01

## 2024-01-01 RX ORDER — POLYETHYLENE GLYCOL 3350 17 G/17G
17 POWDER, FOR SOLUTION ORAL
Qty: 0 | Refills: 0 | DISCHARGE
Start: 2024-01-01

## 2024-01-01 RX ORDER — LORAZEPAM 2 MG
1 TABLET ORAL AT BEDTIME
Refills: 0 | Status: DISCONTINUED | OUTPATIENT
Start: 2024-01-01 | End: 2024-01-01

## 2024-01-01 RX ORDER — MAGNESIUM, ALUMINUM HYDROXIDE 200-200 MG
30 TABLET,CHEWABLE ORAL EVERY 4 HOURS
Refills: 0 | Status: DISCONTINUED | OUTPATIENT
Start: 2024-01-01 | End: 2024-01-01

## 2024-01-01 RX ORDER — LORAZEPAM 2 MG
1 TABLET ORAL ONCE
Refills: 0 | Status: DISCONTINUED | OUTPATIENT
Start: 2024-01-01 | End: 2024-01-01

## 2024-01-01 RX ORDER — CIPROFLOXACIN LACTATE 200MG/20ML
400 VIAL (ML) INTRAVENOUS ONCE
Refills: 0 | Status: COMPLETED | OUTPATIENT
Start: 2024-01-01 | End: 2024-01-01

## 2024-01-01 RX ORDER — LORAZEPAM 2 MG/1
0.25 TABLET ORAL EVERY 6 HOURS
Refills: 0 | Status: DISCONTINUED | OUTPATIENT
Start: 2024-01-01 | End: 2024-01-01

## 2024-01-01 RX ORDER — GLIPIZIDE 5 MG
1 TABLET ORAL
Refills: 0 | DISCHARGE

## 2024-01-01 RX ORDER — SUCRALFATE 1 G/10ML
1 SUSPENSION ORAL
Refills: 0 | DISCHARGE

## 2024-01-01 RX ORDER — SIMETHICONE 80 MG/1
80 TABLET, CHEWABLE ORAL ONCE
Refills: 0 | Status: COMPLETED | OUTPATIENT
Start: 2024-01-01 | End: 2024-01-01

## 2024-01-01 RX ORDER — HYDRALAZINE HYDROCHLORIDE 10 MG/1
5 TABLET ORAL ONCE
Refills: 0 | Status: COMPLETED | OUTPATIENT
Start: 2024-01-01 | End: 2024-01-01

## 2024-01-01 RX ORDER — LEVOTHYROXINE SODIUM 88 MCG
1 TABLET ORAL
Refills: 0 | DISCHARGE

## 2024-01-01 RX ORDER — INSULIN LISPRO 100/ML
2 VIAL (ML) SUBCUTANEOUS ONCE
Refills: 0 | Status: COMPLETED | OUTPATIENT
Start: 2024-01-01 | End: 2024-01-01

## 2024-01-01 RX ORDER — INSULIN LISPRO 100/ML
VIAL (ML) SUBCUTANEOUS EVERY 6 HOURS
Refills: 0 | Status: DISCONTINUED | OUTPATIENT
Start: 2024-01-01 | End: 2024-01-01

## 2024-01-01 RX ORDER — GLYCOPYRROLATE 1 MG/1
0.4 TABLET ORAL EVERY 6 HOURS
Refills: 0 | Status: DISCONTINUED | OUTPATIENT
Start: 2024-01-01 | End: 2024-01-01

## 2024-01-01 RX ORDER — METOCLOPRAMIDE HCL 10 MG
10 TABLET ORAL EVERY 8 HOURS
Refills: 0 | Status: COMPLETED | OUTPATIENT
Start: 2024-01-01 | End: 2024-01-01

## 2024-01-01 RX ORDER — ENOXAPARIN SODIUM 40MG/0.4ML
40 SYRINGE (ML) SUBCUTANEOUS EVERY 24 HOURS
Refills: 0 | Status: DISCONTINUED | OUTPATIENT
Start: 2024-01-01 | End: 2024-01-01

## 2024-01-01 RX ORDER — METFORMIN HYDROCHLORIDE 500 MG/1
1 TABLET, EXTENDED RELEASE ORAL
Refills: 0 | DISCHARGE

## 2024-01-01 RX ORDER — FOLIC ACID 1 MG/1
1 TABLET ORAL DAILY
Refills: 0 | Status: DISCONTINUED | OUTPATIENT
Start: 2024-01-01 | End: 2024-01-01

## 2024-01-01 RX ORDER — MORPHINE SULFATE 30 MG/1
4 TABLET, EXTENDED RELEASE ORAL ONCE
Refills: 0 | Status: DISCONTINUED | OUTPATIENT
Start: 2024-01-01 | End: 2024-01-01

## 2024-01-01 RX ORDER — LACTULOSE 10 G/15ML
10 SOLUTION ORAL ONCE
Refills: 0 | Status: COMPLETED | OUTPATIENT
Start: 2024-01-01 | End: 2024-01-01

## 2024-01-01 RX ORDER — PANTOPRAZOLE SODIUM 40 MG/1
1 TABLET, DELAYED RELEASE ORAL
Qty: 7 | Refills: 0
Start: 2024-01-01 | End: 2024-01-01

## 2024-01-01 RX ORDER — LACTULOSE 10 G/15ML
10 SOLUTION ORAL THREE TIMES A DAY
Refills: 0 | Status: DISCONTINUED | OUTPATIENT
Start: 2024-01-01 | End: 2024-01-01

## 2024-01-01 RX ORDER — CHOLESTEROL/SOYBEAN OIL/C/E 60-200-80
1 POWDER (GRAM) ORAL DAILY
Refills: 0 | Status: DISCONTINUED | OUTPATIENT
Start: 2024-01-01 | End: 2024-01-01

## 2024-01-01 RX ORDER — SODIUM PHOSPHATE, DIBASIC AND SODIUM PHOSPHATE, MONOBASIC, UNSPECIFIED FORM 7; 19 G/118ML; G/118ML
1 ENEMA RECTAL ONCE
Refills: 0 | Status: COMPLETED | OUTPATIENT
Start: 2024-01-01 | End: 2024-01-01

## 2024-01-01 RX ORDER — ACETAMINOPHEN 500 MG
650 TABLET ORAL EVERY 6 HOURS
Refills: 0 | Status: DISCONTINUED | OUTPATIENT
Start: 2024-01-01 | End: 2024-01-01

## 2024-01-01 RX ORDER — ONDANSETRON HYDROCHLORIDE 2 MG/ML
4 INJECTION, SOLUTION INTRAMUSCULAR; INTRAVENOUS EVERY 8 HOURS
Refills: 0 | Status: DISCONTINUED | OUTPATIENT
Start: 2024-01-01 | End: 2024-01-01

## 2024-01-01 RX ORDER — ACETAMINOPHEN, DIPHENHYDRAMINE HCL, PHENYLEPHRINE HCL 325; 25; 5 MG/1; MG/1; MG/1
3 TABLET ORAL AT BEDTIME
Refills: 0 | Status: DISCONTINUED | OUTPATIENT
Start: 2024-01-01 | End: 2024-01-01

## 2024-01-01 RX ORDER — CHLORHEXIDINE GLUCONATE 1.2 MG/ML
1 RINSE ORAL DAILY
Refills: 0 | Status: DISCONTINUED | OUTPATIENT
Start: 2024-01-01 | End: 2024-01-01

## 2024-01-01 RX ORDER — ACETAMINOPHEN 500 MG
650 TABLET ORAL ONCE
Refills: 0 | Status: COMPLETED | OUTPATIENT
Start: 2024-01-01 | End: 2024-01-01

## 2024-01-01 RX ORDER — LINACLOTIDE 290 UG/1
1 CAPSULE, GELATIN COATED ORAL
Refills: 0 | DISCHARGE

## 2024-01-01 RX ORDER — ENOXAPARIN SODIUM 30 MG/.3ML
40 INJECTION SUBCUTANEOUS EVERY 24 HOURS
Refills: 0 | Status: DISCONTINUED | OUTPATIENT
Start: 2024-01-01 | End: 2024-01-01

## 2024-01-01 RX ORDER — LACTULOSE 10 G/15ML
200 SOLUTION ORAL ONCE
Refills: 0 | Status: COMPLETED | OUTPATIENT
Start: 2024-01-01 | End: 2024-01-01

## 2024-01-01 RX ORDER — BENZOCAINE, BUTAMBEN, AND TETRACAINE HYDROCHLORIDE .028; .004; .004 G/.2G; G/.2G; G/.2G
1 SOLUTION TOPICAL EVERY 6 HOURS
Refills: 0 | Status: DISCONTINUED | OUTPATIENT
Start: 2024-01-01 | End: 2024-01-01

## 2024-01-01 RX ORDER — DEXAMETHASONE 1.5 MG 1.5 MG/1
2 TABLET ORAL EVERY 12 HOURS
Refills: 0 | Status: DISCONTINUED | OUTPATIENT
Start: 2024-01-01 | End: 2024-01-01

## 2024-01-01 RX ORDER — POTASSIUM CHLORIDE 10 MEQ
10 TABLET, EXTENDED RELEASE ORAL
Refills: 0 | Status: DISCONTINUED | OUTPATIENT
Start: 2024-01-01 | End: 2024-01-01

## 2024-01-01 RX ORDER — ACETAMINOPHEN 500 MG
550 TABLET ORAL ONCE
Refills: 0 | Status: COMPLETED | OUTPATIENT
Start: 2024-01-01 | End: 2024-01-01

## 2024-01-01 RX ORDER — POTASSIUM PHOSPHATE 236; 224 MG/ML; MG/ML
15 INJECTION, SOLUTION INTRAVENOUS ONCE
Refills: 0 | Status: COMPLETED | OUTPATIENT
Start: 2024-01-01 | End: 2024-01-01

## 2024-01-01 RX ORDER — MORPHINE SULFATE 30 MG/1
2 TABLET, EXTENDED RELEASE ORAL EVERY 4 HOURS
Refills: 0 | Status: DISCONTINUED | OUTPATIENT
Start: 2024-01-01 | End: 2024-01-01

## 2024-01-01 RX ORDER — LEVOTHYROXINE SODIUM 150 MCG
50 TABLET ORAL AT BEDTIME
Refills: 0 | Status: DISCONTINUED | OUTPATIENT
Start: 2024-01-01 | End: 2024-01-01

## 2024-01-01 RX ADMIN — Medication 1 MILLIGRAM(S): at 21:40

## 2024-01-01 RX ADMIN — POTASSIUM PHOSPHATE 62.5 MILLIMOLE(S): 236; 224 INJECTION, SOLUTION INTRAVENOUS at 15:52

## 2024-01-01 RX ADMIN — Medication 1 MILLIGRAM(S): at 21:44

## 2024-01-01 RX ADMIN — DEXAMETHASONE 1.5 MG 2 MILLIGRAM(S): 1.5 TABLET ORAL at 19:59

## 2024-01-01 RX ADMIN — Medication 1: at 22:02

## 2024-01-01 RX ADMIN — Medication 100 MILLIGRAM(S): at 09:03

## 2024-01-01 RX ADMIN — POLYETHYLENE GLYCOL 3350 17 GRAM(S): 17 POWDER, FOR SOLUTION ORAL at 19:59

## 2024-01-01 RX ADMIN — HYDRALAZINE HYDROCHLORIDE 5 MILLIGRAM(S): 10 TABLET ORAL at 01:15

## 2024-01-01 RX ADMIN — OCTREOTIDE ACETATE 100 MICROGRAM(S): 1000 INJECTION INTRAVENOUS; SUBCUTANEOUS at 13:18

## 2024-01-01 RX ADMIN — DEXAMETHASONE 1.5 MG 4 MILLIGRAM(S): 1.5 TABLET ORAL at 18:44

## 2024-01-01 RX ADMIN — Medication 100 MILLILITER(S): at 14:34

## 2024-01-01 RX ADMIN — SODIUM CHLORIDE 60 MILLILITER(S): 9 INJECTION, SOLUTION INTRAMUSCULAR; INTRAVENOUS; SUBCUTANEOUS at 10:55

## 2024-01-01 RX ADMIN — Medication 1 MILLIGRAM(S): at 17:29

## 2024-01-01 RX ADMIN — Medication 25 MILLIGRAM(S): at 10:04

## 2024-01-01 RX ADMIN — Medication 70 MICROGRAM(S): at 21:01

## 2024-01-01 RX ADMIN — Medication 1 MILLIGRAM(S): at 22:06

## 2024-01-01 RX ADMIN — OCTREOTIDE ACETATE 100 MICROGRAM(S): 1000 INJECTION INTRAVENOUS; SUBCUTANEOUS at 21:09

## 2024-01-01 RX ADMIN — POLYETHYLENE GLYCOL 3350 17 GRAM(S): 17 POWDER, FOR SOLUTION ORAL at 05:10

## 2024-01-01 RX ADMIN — Medication 100 MILLIGRAM(S): at 05:11

## 2024-01-01 RX ADMIN — PANTOPRAZOLE SODIUM 40 MILLIGRAM(S): 40 TABLET, DELAYED RELEASE ORAL at 08:58

## 2024-01-01 RX ADMIN — SODIUM PHOSPHATE, MONOBASIC, MONOHYDRATE AND SODIUM PHOSPHATE, DIBASIC ANHYDROUS 63.75 MILLIMOLE(S): 276; 142 INJECTION, SOLUTION INTRAVENOUS at 09:14

## 2024-01-01 RX ADMIN — OCTREOTIDE ACETATE 100 MICROGRAM(S): 1000 INJECTION INTRAVENOUS; SUBCUTANEOUS at 05:38

## 2024-01-01 RX ADMIN — Medication 100 MILLILITER(S): at 21:58

## 2024-01-01 RX ADMIN — Medication 100 MILLIEQUIVALENT(S): at 06:32

## 2024-01-01 RX ADMIN — PANTOPRAZOLE SODIUM 40 MILLIGRAM(S): 40 TABLET, DELAYED RELEASE ORAL at 11:07

## 2024-01-01 RX ADMIN — LORAZEPAM 0.25 MILLIGRAM(S): 2 TABLET ORAL at 14:59

## 2024-01-01 RX ADMIN — DEXAMETHASONE 1.5 MG 4 MILLIGRAM(S): 1.5 TABLET ORAL at 09:15

## 2024-01-01 RX ADMIN — POLYETHYLENE GLYCOL 3350 17 GRAM(S): 17 POWDER, FOR SOLUTION ORAL at 08:28

## 2024-01-01 RX ADMIN — Medication 100 MILLIGRAM(S): at 11:04

## 2024-01-01 RX ADMIN — DEXAMETHASONE 1.5 MG 4 MILLIGRAM(S): 1.5 TABLET ORAL at 05:22

## 2024-01-01 RX ADMIN — MORPHINE SULFATE 4 MILLIGRAM(S): 30 TABLET, EXTENDED RELEASE ORAL at 13:28

## 2024-01-01 RX ADMIN — Medication 25 MILLIGRAM(S): at 11:27

## 2024-01-01 RX ADMIN — SODIUM PHOSPHATE, MONOBASIC, MONOHYDRATE AND SODIUM PHOSPHATE, DIBASIC ANHYDROUS 63.75 MILLIMOLE(S): 276; 142 INJECTION, SOLUTION INTRAVENOUS at 11:33

## 2024-01-01 RX ADMIN — OCTREOTIDE ACETATE 100 MICROGRAM(S): 1000 INJECTION INTRAVENOUS; SUBCUTANEOUS at 05:13

## 2024-01-01 RX ADMIN — DEXAMETHASONE 1.5 MG 100 MILLIGRAM(S): 1.5 TABLET ORAL at 05:11

## 2024-01-01 RX ADMIN — Medication 100 MILLIGRAM(S): at 17:30

## 2024-01-01 RX ADMIN — SODIUM CHLORIDE 100 MILLILITER(S): 9 INJECTION, SOLUTION INTRAMUSCULAR; INTRAVENOUS; SUBCUTANEOUS at 19:03

## 2024-01-01 RX ADMIN — OCTREOTIDE ACETATE 100 MICROGRAM(S): 1000 INJECTION INTRAVENOUS; SUBCUTANEOUS at 13:55

## 2024-01-01 RX ADMIN — PANTOPRAZOLE SODIUM 40 MILLIGRAM(S): 40 TABLET, DELAYED RELEASE ORAL at 08:21

## 2024-01-01 RX ADMIN — Medication 4: at 07:43

## 2024-01-01 RX ADMIN — DEXAMETHASONE 1.5 MG 100 MILLIGRAM(S): 1.5 TABLET ORAL at 05:10

## 2024-01-01 RX ADMIN — GLYCOPYRROLATE 0.4 MILLIGRAM(S): 1 TABLET ORAL at 18:00

## 2024-01-01 RX ADMIN — Medication 1 TABLET(S): at 17:30

## 2024-01-01 RX ADMIN — Medication 2: at 18:40

## 2024-01-01 RX ADMIN — Medication 1: at 21:30

## 2024-01-01 RX ADMIN — Medication 10 MILLIGRAM(S): at 05:50

## 2024-01-01 RX ADMIN — POLYETHYLENE GLYCOL 3350 17 GRAM(S): 17 POWDER, FOR SOLUTION ORAL at 17:47

## 2024-01-01 RX ADMIN — Medication 100 MILLIGRAM(S): at 08:28

## 2024-01-01 RX ADMIN — Medication 1: at 21:57

## 2024-01-01 RX ADMIN — Medication 6: at 17:24

## 2024-01-01 RX ADMIN — OCTREOTIDE ACETATE 100 MICROGRAM(S): 1000 INJECTION INTRAVENOUS; SUBCUTANEOUS at 13:07

## 2024-01-01 RX ADMIN — Medication 2: at 18:57

## 2024-01-01 RX ADMIN — SIMETHICONE 80 MILLIGRAM(S): 80 TABLET, CHEWABLE ORAL at 22:40

## 2024-01-01 RX ADMIN — SODIUM CHLORIDE 100 MILLILITER(S): 9 INJECTION, SOLUTION INTRAMUSCULAR; INTRAVENOUS; SUBCUTANEOUS at 06:17

## 2024-01-01 RX ADMIN — Medication 50 MICROGRAM(S): at 21:41

## 2024-01-01 RX ADMIN — Medication 10 MILLIGRAM(S): at 23:48

## 2024-01-01 RX ADMIN — Medication 1: at 17:57

## 2024-01-01 RX ADMIN — SODIUM CHLORIDE 500 MILLILITER(S): 9 INJECTION, SOLUTION INTRAMUSCULAR; INTRAVENOUS; SUBCUTANEOUS at 03:02

## 2024-01-01 RX ADMIN — Medication 1: at 08:57

## 2024-01-01 RX ADMIN — POLYETHYLENE GLYCOL 3350 17 GRAM(S): 17 POWDER, FOR SOLUTION ORAL at 17:30

## 2024-01-01 RX ADMIN — Medication 3: at 08:39

## 2024-01-01 RX ADMIN — Medication 70 MICROGRAM(S): at 22:06

## 2024-01-01 RX ADMIN — CHLORHEXIDINE GLUCONATE 1 APPLICATION(S): 1.2 RINSE ORAL at 12:30

## 2024-01-01 RX ADMIN — DEXAMETHASONE 1.5 MG 2 MILLIGRAM(S): 1.5 TABLET ORAL at 20:13

## 2024-01-01 RX ADMIN — Medication 4: at 10:19

## 2024-01-01 RX ADMIN — Medication 1 MILLIGRAM(S): at 12:56

## 2024-01-01 RX ADMIN — Medication 100 MILLIGRAM(S): at 13:36

## 2024-01-01 RX ADMIN — CHLORHEXIDINE GLUCONATE 1 APPLICATION(S): 1.2 RINSE ORAL at 11:19

## 2024-01-01 RX ADMIN — Medication 40 MILLIEQUIVALENT(S): at 13:10

## 2024-01-01 RX ADMIN — Medication 2 MILLIGRAM(S): at 20:42

## 2024-01-01 RX ADMIN — Medication 100 GRAM(S): at 08:56

## 2024-01-01 RX ADMIN — Medication 4: at 12:35

## 2024-01-01 RX ADMIN — ONDANSETRON HYDROCHLORIDE 4 MILLIGRAM(S): 2 INJECTION, SOLUTION INTRAMUSCULAR; INTRAVENOUS at 23:06

## 2024-01-01 RX ADMIN — OCTREOTIDE ACETATE 100 MICROGRAM(S): 1000 INJECTION INTRAVENOUS; SUBCUTANEOUS at 05:04

## 2024-01-01 RX ADMIN — Medication 100 MILLIEQUIVALENT(S): at 05:30

## 2024-01-01 RX ADMIN — Medication 10 MILLIGRAM(S): at 21:48

## 2024-01-01 RX ADMIN — Medication 10 MILLIGRAM(S): at 13:17

## 2024-01-01 RX ADMIN — Medication 2: at 08:36

## 2024-01-01 RX ADMIN — LORAZEPAM 0.25 MILLIGRAM(S): 2 TABLET ORAL at 13:14

## 2024-01-01 RX ADMIN — Medication 1: at 12:55

## 2024-01-01 RX ADMIN — SODIUM CHLORIDE 1000 MILLILITER(S): 9 INJECTION, SOLUTION INTRAMUSCULAR; INTRAVENOUS; SUBCUTANEOUS at 13:28

## 2024-01-01 RX ADMIN — OCTREOTIDE ACETATE 100 MICROGRAM(S): 1000 INJECTION INTRAVENOUS; SUBCUTANEOUS at 13:27

## 2024-01-01 RX ADMIN — Medication 3: at 17:57

## 2024-01-01 RX ADMIN — CHLORHEXIDINE GLUCONATE 1 APPLICATION(S): 1.2 RINSE ORAL at 11:26

## 2024-01-01 RX ADMIN — OCTREOTIDE ACETATE 100 MICROGRAM(S): 1000 INJECTION INTRAVENOUS; SUBCUTANEOUS at 05:10

## 2024-01-01 RX ADMIN — Medication 100 MILLIEQUIVALENT(S): at 21:49

## 2024-01-01 RX ADMIN — Medication 6: at 05:59

## 2024-01-01 RX ADMIN — Medication 5: at 17:31

## 2024-01-01 RX ADMIN — Medication 70 MICROGRAM(S): at 22:40

## 2024-01-01 RX ADMIN — OCTREOTIDE ACETATE 100 MICROGRAM(S): 1000 INJECTION INTRAVENOUS; SUBCUTANEOUS at 14:04

## 2024-01-01 RX ADMIN — Medication 40 MILLIEQUIVALENT(S): at 17:01

## 2024-01-01 RX ADMIN — ONDANSETRON HYDROCHLORIDE 4 MILLIGRAM(S): 2 INJECTION, SOLUTION INTRAMUSCULAR; INTRAVENOUS at 00:59

## 2024-01-01 RX ADMIN — Medication 2: at 08:52

## 2024-01-01 RX ADMIN — Medication 100 MILLIEQUIVALENT(S): at 19:44

## 2024-01-01 RX ADMIN — Medication 3: at 13:18

## 2024-01-01 RX ADMIN — Medication 1 TABLET(S): at 09:02

## 2024-01-01 RX ADMIN — Medication 1 MILLIGRAM(S): at 01:28

## 2024-01-01 RX ADMIN — LIDOCAINE HYDROCHLORIDE 10 MILLILITER(S): 40 SOLUTION TOPICAL at 21:05

## 2024-01-01 RX ADMIN — Medication 100 MILLIEQUIVALENT(S): at 08:26

## 2024-01-01 RX ADMIN — HEPARIN SODIUM 5000 UNIT(S): 10000 INJECTION INTRAVENOUS; SUBCUTANEOUS at 17:36

## 2024-01-01 RX ADMIN — Medication 1: at 05:55

## 2024-01-01 RX ADMIN — Medication 550 MILLIGRAM(S): at 01:06

## 2024-01-01 RX ADMIN — Medication 4: at 23:59

## 2024-01-01 RX ADMIN — LOSARTAN POTASSIUM 25 MILLIGRAM(S): 25 TABLET ORAL at 06:00

## 2024-01-01 RX ADMIN — OCTREOTIDE ACETATE 100 MICROGRAM(S): 1000 INJECTION INTRAVENOUS; SUBCUTANEOUS at 05:51

## 2024-01-01 RX ADMIN — DEXAMETHASONE 1.5 MG 4 MILLIGRAM(S): 1.5 TABLET ORAL at 21:05

## 2024-01-01 RX ADMIN — Medication 5: at 12:16

## 2024-01-01 RX ADMIN — OCTREOTIDE ACETATE 100 MICROGRAM(S): 1000 INJECTION INTRAVENOUS; SUBCUTANEOUS at 05:22

## 2024-01-01 RX ADMIN — Medication 1 MILLIGRAM(S): at 22:16

## 2024-01-01 RX ADMIN — MORPHINE SULFATE 2 MILLIGRAM(S): 30 TABLET, EXTENDED RELEASE ORAL at 18:10

## 2024-01-01 RX ADMIN — DEXAMETHASONE 1.5 MG 2 MILLIGRAM(S): 1.5 TABLET ORAL at 21:00

## 2024-01-01 RX ADMIN — Medication 10 UNIT(S): at 22:11

## 2024-01-01 RX ADMIN — DEXAMETHASONE 1.5 MG 4 MILLIGRAM(S): 1.5 TABLET ORAL at 08:56

## 2024-01-01 RX ADMIN — HEPARIN SODIUM 5000 UNIT(S): 10000 INJECTION INTRAVENOUS; SUBCUTANEOUS at 05:40

## 2024-01-01 RX ADMIN — Medication 1 TABLET(S): at 12:09

## 2024-01-01 RX ADMIN — DEXAMETHASONE 1.5 MG 2 MILLIGRAM(S): 1.5 TABLET ORAL at 08:21

## 2024-01-01 RX ADMIN — Medication 6: at 17:46

## 2024-01-01 RX ADMIN — Medication 50 MICROGRAM(S): at 22:01

## 2024-01-01 RX ADMIN — Medication 10 MILLIGRAM(S): at 05:37

## 2024-01-01 RX ADMIN — Medication 70 MICROGRAM(S): at 21:35

## 2024-01-01 RX ADMIN — Medication 70 MICROGRAM(S): at 21:11

## 2024-01-01 RX ADMIN — Medication 1: at 17:31

## 2024-01-01 RX ADMIN — FOLIC ACID 1 MILLIGRAM(S): 1 TABLET ORAL at 12:09

## 2024-01-01 RX ADMIN — FOLIC ACID 1 MILLIGRAM(S): 1 TABLET ORAL at 10:20

## 2024-01-01 RX ADMIN — Medication 2: at 05:40

## 2024-01-01 RX ADMIN — Medication 100 MILLIEQUIVALENT(S): at 21:38

## 2024-01-01 RX ADMIN — Medication 1 TABLET(S): at 08:28

## 2024-01-01 RX ADMIN — SODIUM CHLORIDE 100 MILLILITER(S): 9 INJECTION, SOLUTION INTRAMUSCULAR; INTRAVENOUS; SUBCUTANEOUS at 00:59

## 2024-01-01 RX ADMIN — Medication 70 MICROGRAM(S): at 21:06

## 2024-01-01 RX ADMIN — Medication 1 TABLET(S): at 13:35

## 2024-01-01 RX ADMIN — HEPARIN SODIUM 5000 UNIT(S): 10000 INJECTION INTRAVENOUS; SUBCUTANEOUS at 17:04

## 2024-01-01 RX ADMIN — Medication 200 MILLIGRAM(S): at 14:12

## 2024-01-01 RX ADMIN — DEXAMETHASONE 1.5 MG 2 MILLIGRAM(S): 1.5 TABLET ORAL at 20:34

## 2024-01-01 RX ADMIN — Medication 4: at 17:25

## 2024-01-01 RX ADMIN — PANTOPRAZOLE SODIUM 40 MILLIGRAM(S): 40 TABLET, DELAYED RELEASE ORAL at 09:01

## 2024-01-01 RX ADMIN — Medication 25 GRAM(S): at 09:44

## 2024-01-01 RX ADMIN — POLYETHYLENE GLYCOL 3350 17 GRAM(S): 17 POWDER, FOR SOLUTION ORAL at 17:36

## 2024-01-01 RX ADMIN — Medication 100 MILLIGRAM(S): at 12:09

## 2024-01-01 RX ADMIN — Medication 2: at 08:44

## 2024-01-01 RX ADMIN — Medication 4: at 22:11

## 2024-01-01 RX ADMIN — Medication 100 MILLILITER(S): at 06:34

## 2024-01-01 RX ADMIN — Medication 100 MILLILITER(S): at 21:05

## 2024-01-01 RX ADMIN — Medication 50 MICROGRAM(S): at 21:49

## 2024-01-01 RX ADMIN — DEXAMETHASONE 1.5 MG 4 MILLIGRAM(S): 1.5 TABLET ORAL at 21:13

## 2024-01-01 RX ADMIN — Medication 3: at 08:26

## 2024-01-01 RX ADMIN — Medication 2: at 12:22

## 2024-01-01 RX ADMIN — POLYETHYLENE GLYCOL 3350 17 GRAM(S): 17 POWDER, FOR SOLUTION ORAL at 05:11

## 2024-01-01 RX ADMIN — Medication 100 MILLIEQUIVALENT(S): at 22:53

## 2024-01-01 RX ADMIN — PANTOPRAZOLE SODIUM 40 MILLIGRAM(S): 40 TABLET, DELAYED RELEASE ORAL at 13:35

## 2024-01-01 RX ADMIN — Medication 2 MILLIGRAM(S): at 05:30

## 2024-01-01 RX ADMIN — Medication 100 MILLIEQUIVALENT(S): at 20:21

## 2024-01-01 RX ADMIN — Medication 2: at 00:59

## 2024-01-01 RX ADMIN — Medication 100 MILLILITER(S): at 07:30

## 2024-01-01 RX ADMIN — POLYETHYLENE GLYCOL 3350 17 GRAM(S): 17 POWDER, FOR SOLUTION ORAL at 08:56

## 2024-01-01 RX ADMIN — DEXAMETHASONE 1.5 MG 100 MILLIGRAM(S): 1.5 TABLET ORAL at 11:05

## 2024-01-01 RX ADMIN — Medication 2 MILLIGRAM(S): at 05:45

## 2024-01-01 RX ADMIN — ENOXAPARIN SODIUM 40 MILLIGRAM(S): 30 INJECTION SUBCUTANEOUS at 07:43

## 2024-01-01 RX ADMIN — Medication 10 MILLIGRAM(S): at 22:02

## 2024-01-01 RX ADMIN — DEXAMETHASONE 1.5 MG 4 MILLIGRAM(S): 1.5 TABLET ORAL at 21:06

## 2024-01-01 RX ADMIN — DEXAMETHASONE 1.5 MG 2 MILLIGRAM(S): 1.5 TABLET ORAL at 09:02

## 2024-01-01 RX ADMIN — Medication 10 MILLIGRAM(S): at 14:07

## 2024-01-01 RX ADMIN — Medication 50 MICROGRAM(S): at 22:10

## 2024-01-01 RX ADMIN — OCTREOTIDE ACETATE 100 MICROGRAM(S): 1000 INJECTION INTRAVENOUS; SUBCUTANEOUS at 21:56

## 2024-01-01 RX ADMIN — Medication 2 MILLIGRAM(S): at 06:19

## 2024-01-01 RX ADMIN — PANTOPRAZOLE SODIUM 40 MILLIGRAM(S): 40 TABLET, DELAYED RELEASE ORAL at 12:08

## 2024-01-01 RX ADMIN — OCTREOTIDE ACETATE 100 MICROGRAM(S): 1000 INJECTION INTRAVENOUS; SUBCUTANEOUS at 21:13

## 2024-01-01 RX ADMIN — Medication 650 MILLIGRAM(S): at 11:28

## 2024-01-01 RX ADMIN — POLYETHYLENE GLYCOL 3350 17 GRAM(S): 17 POWDER, FOR SOLUTION ORAL at 05:56

## 2024-01-01 RX ADMIN — Medication 3: at 17:34

## 2024-01-01 RX ADMIN — SODIUM CHLORIDE 100 MILLILITER(S): 9 INJECTION, SOLUTION INTRAMUSCULAR; INTRAVENOUS; SUBCUTANEOUS at 21:13

## 2024-01-01 RX ADMIN — POLYETHYLENE GLYCOL 3350 17 GRAM(S): 17 POWDER, FOR SOLUTION ORAL at 05:06

## 2024-01-01 RX ADMIN — Medication 2: at 06:17

## 2024-01-01 RX ADMIN — Medication 2 MILLIGRAM(S): at 18:01

## 2024-01-01 RX ADMIN — Medication 2 MILLIGRAM(S): at 17:29

## 2024-01-01 RX ADMIN — Medication 2: at 06:30

## 2024-01-01 RX ADMIN — Medication 2: at 00:39

## 2024-01-01 RX ADMIN — Medication 70 MICROGRAM(S): at 21:32

## 2024-01-01 RX ADMIN — DEXAMETHASONE 1.5 MG 4 MILLIGRAM(S): 1.5 TABLET ORAL at 17:33

## 2024-01-01 RX ADMIN — Medication 10 MILLIGRAM(S): at 13:18

## 2024-01-01 RX ADMIN — Medication 2: at 21:32

## 2024-01-01 RX ADMIN — DEXAMETHASONE 1.5 MG 2 MILLIGRAM(S): 1.5 TABLET ORAL at 09:55

## 2024-01-01 RX ADMIN — ONDANSETRON HYDROCHLORIDE 4 MILLIGRAM(S): 2 INJECTION, SOLUTION INTRAMUSCULAR; INTRAVENOUS at 13:32

## 2024-01-01 RX ADMIN — OCTREOTIDE ACETATE 100 MICROGRAM(S): 1000 INJECTION INTRAVENOUS; SUBCUTANEOUS at 14:10

## 2024-01-01 RX ADMIN — PANTOPRAZOLE SODIUM 40 MILLIGRAM(S): 40 TABLET, DELAYED RELEASE ORAL at 10:21

## 2024-01-01 RX ADMIN — DEXAMETHASONE 1.5 MG 4 MILLIGRAM(S): 1.5 TABLET ORAL at 17:04

## 2024-01-01 RX ADMIN — Medication 1 MILLIGRAM(S): at 17:31

## 2024-01-01 RX ADMIN — Medication 2: at 13:09

## 2024-01-01 RX ADMIN — CHLORHEXIDINE GLUCONATE 1 APPLICATION(S): 1.2 RINSE ORAL at 12:56

## 2024-01-01 RX ADMIN — Medication 1: at 14:11

## 2024-01-01 RX ADMIN — Medication 2: at 12:42

## 2024-01-01 RX ADMIN — DEXAMETHASONE 1.5 MG 4 MILLIGRAM(S): 1.5 TABLET ORAL at 05:50

## 2024-01-01 RX ADMIN — Medication 1: at 21:34

## 2024-01-01 RX ADMIN — Medication 2: at 13:14

## 2024-01-01 RX ADMIN — DEXAMETHASONE 1.5 MG 4 MILLIGRAM(S): 1.5 TABLET ORAL at 08:54

## 2024-01-01 RX ADMIN — Medication 2 MILLIGRAM(S): at 13:02

## 2024-01-01 RX ADMIN — Medication 3: at 08:54

## 2024-01-01 RX ADMIN — Medication 4: at 13:56

## 2024-01-01 RX ADMIN — BENZOCAINE, BUTAMBEN, AND TETRACAINE HYDROCHLORIDE 1 SPRAY(S): .028; .004; .004 SOLUTION TOPICAL at 15:59

## 2024-01-01 RX ADMIN — Medication 220 MILLIGRAM(S): at 00:36

## 2024-01-01 RX ADMIN — Medication 1 MILLIGRAM(S): at 13:56

## 2024-01-01 RX ADMIN — DEXAMETHASONE 1.5 MG 4 MILLIGRAM(S): 1.5 TABLET ORAL at 05:36

## 2024-01-01 RX ADMIN — Medication 2 MILLIGRAM(S): at 05:11

## 2024-01-01 RX ADMIN — Medication 4: at 17:47

## 2024-01-01 RX ADMIN — DEXAMETHASONE 1.5 MG 4 MILLIGRAM(S): 1.5 TABLET ORAL at 05:38

## 2024-01-01 RX ADMIN — LORAZEPAM 0.25 MILLIGRAM(S): 2 TABLET ORAL at 16:23

## 2024-01-01 RX ADMIN — Medication 5: at 12:54

## 2024-01-01 RX ADMIN — Medication 1: at 12:26

## 2024-01-01 RX ADMIN — OCTREOTIDE ACETATE 100 MICROGRAM(S): 1000 INJECTION INTRAVENOUS; SUBCUTANEOUS at 21:06

## 2024-01-01 RX ADMIN — LACTULOSE 200 GRAM(S): 10 SOLUTION ORAL at 07:43

## 2024-01-01 RX ADMIN — HEPARIN SODIUM 5000 UNIT(S): 10000 INJECTION INTRAVENOUS; SUBCUTANEOUS at 05:51

## 2024-01-01 RX ADMIN — SODIUM CHLORIDE 100 MILLILITER(S): 9 INJECTION, SOLUTION INTRAMUSCULAR; INTRAVENOUS; SUBCUTANEOUS at 07:56

## 2024-01-01 RX ADMIN — Medication 3: at 18:01

## 2024-01-01 RX ADMIN — Medication 100 MILLILITER(S): at 01:22

## 2024-01-01 RX ADMIN — Medication 2 MILLIGRAM(S): at 18:11

## 2024-01-01 RX ADMIN — Medication 2 UNIT(S): at 16:38

## 2024-01-01 RX ADMIN — Medication 70 MICROGRAM(S): at 21:28

## 2024-01-01 RX ADMIN — PANTOPRAZOLE SODIUM 40 MILLIGRAM(S): 40 TABLET, DELAYED RELEASE ORAL at 17:20

## 2024-01-01 RX ADMIN — Medication 10 MILLIGRAM(S): at 05:38

## 2024-01-01 RX ADMIN — DEXAMETHASONE 1.5 MG 4 MILLIGRAM(S): 1.5 TABLET ORAL at 21:10

## 2024-01-01 RX ADMIN — Medication 1 TABLET(S): at 10:21

## 2024-01-01 RX ADMIN — OCTREOTIDE ACETATE 100 MICROGRAM(S): 1000 INJECTION INTRAVENOUS; SUBCUTANEOUS at 21:05

## 2024-01-01 RX ADMIN — Medication 100 MILLIGRAM(S): at 04:03

## 2024-01-01 RX ADMIN — Medication 10 UNIT(S): at 21:31

## 2024-01-01 RX ADMIN — DEXAMETHASONE 1.5 MG 2 MILLIGRAM(S): 1.5 TABLET ORAL at 08:27

## 2024-01-01 RX ADMIN — Medication 4: at 00:31

## 2024-01-01 RX ADMIN — Medication 100 MILLIGRAM(S): at 10:20

## 2024-01-01 RX ADMIN — HEPARIN SODIUM 5000 UNIT(S): 10000 INJECTION INTRAVENOUS; SUBCUTANEOUS at 05:13

## 2024-01-01 RX ADMIN — OCTREOTIDE ACETATE 100 MICROGRAM(S): 1000 INJECTION INTRAVENOUS; SUBCUTANEOUS at 22:06

## 2024-01-01 RX ADMIN — Medication 50 MICROGRAM(S): at 21:16

## 2024-01-01 RX ADMIN — Medication 1 TABLET(S): at 11:04

## 2024-01-01 RX ADMIN — Medication 3: at 06:29

## 2024-01-01 RX ADMIN — Medication 25 GRAM(S): at 12:42

## 2024-01-01 RX ADMIN — HEPARIN SODIUM 5000 UNIT(S): 10000 INJECTION INTRAVENOUS; SUBCUTANEOUS at 18:44

## 2024-01-01 RX ADMIN — OCTREOTIDE ACETATE 100 MICROGRAM(S): 1000 INJECTION INTRAVENOUS; SUBCUTANEOUS at 21:48

## 2024-01-01 RX ADMIN — PANTOPRAZOLE SODIUM 40 MILLIGRAM(S): 40 TABLET, DELAYED RELEASE ORAL at 08:27

## 2024-01-01 RX ADMIN — Medication 100 MILLIEQUIVALENT(S): at 18:47

## 2024-01-01 RX ADMIN — Medication 70 MICROGRAM(S): at 21:13

## 2024-01-01 RX ADMIN — SODIUM PHOSPHATE, DIBASIC AND SODIUM PHOSPHATE, MONOBASIC, UNSPECIFIED FORM 1 ENEMA: 7; 19 ENEMA RECTAL at 14:02

## 2024-01-01 RX ADMIN — MORPHINE SULFATE 2 MILLIGRAM(S): 30 TABLET, EXTENDED RELEASE ORAL at 17:41

## 2024-01-01 RX ADMIN — SODIUM CHLORIDE 1000 MILLILITER(S): 9 INJECTION, SOLUTION INTRAMUSCULAR; INTRAVENOUS; SUBCUTANEOUS at 18:47

## 2024-01-01 RX ADMIN — FOLIC ACID 1 MILLIGRAM(S): 1 TABLET ORAL at 11:04

## 2024-01-01 RX ADMIN — Medication 3: at 17:35

## 2024-01-02 DIAGNOSIS — K83.1 OBSTRUCTION OF BILE DUCT: ICD-10-CM

## 2024-01-16 ENCOUNTER — APPOINTMENT (OUTPATIENT)
Dept: GASTROENTEROLOGY | Facility: CLINIC | Age: 69
End: 2024-01-16

## 2024-01-22 NOTE — ASSESSMENT
[FreeTextEntry1] : 67 yo F was recently seen at [facility] for bacteremia S/p treatment course for E coli bacteremia, clear 11/4 Suspected pyelonephritis based on CT scan There was some question of GI source/bile ducts and had MRCP with structure of common bile duct Otherwise well but just with general fatigue No diarrhea Overall, E coli bacteremia, UTI, fatigue - Monitor off abx - Patient to self monitor for any new/recurrent symptoms - Following with GI for abd complaints - RTC PRN  30 mins spent in care of patient, documentation, eval, counseling, exam, review of records

## 2024-01-22 NOTE — HISTORY OF PRESENT ILLNESS
[FreeTextEntry1] : 67 yo F was recently seen at [facility] for bacteremia Patient was found to have E coli bacteremia, and was treated with PO abx Source thought from UTI Now feels generally improved, but still feels fatigued Patient with cramps Had diarrhea with PO antibiotics, currently soft stools, but not true diarrhea Now s/p antibiotics and doing well

## 2024-03-07 ENCOUNTER — RX RENEWAL (OUTPATIENT)
Age: 69
End: 2024-03-07

## 2024-03-27 ENCOUNTER — RX RENEWAL (OUTPATIENT)
Age: 69
End: 2024-03-27

## 2024-03-27 RX ORDER — LEVOTHYROXINE SODIUM 100 UG/1
100 TABLET ORAL
Qty: 90 | Refills: 0 | Status: ACTIVE | COMMUNITY
Start: 2022-12-12 | End: 1900-01-01

## 2024-06-06 ENCOUNTER — RX RENEWAL (OUTPATIENT)
Age: 69
End: 2024-06-06

## 2024-06-06 ENCOUNTER — OFFICE (OUTPATIENT)
Dept: URBAN - METROPOLITAN AREA CLINIC 70 | Facility: CLINIC | Age: 69
Setting detail: OPHTHALMOLOGY
End: 2024-06-06
Payer: COMMERCIAL

## 2024-06-06 DIAGNOSIS — H25.13: ICD-10-CM

## 2024-06-06 DIAGNOSIS — E11.3291: ICD-10-CM

## 2024-06-06 DIAGNOSIS — H52.4: ICD-10-CM

## 2024-06-06 PROBLEM — E11.9 DIABETES TYPE 2 NO RETINOPATHY; RIGHT MILD WITHOUT ME: Status: ACTIVE | Noted: 2024-06-06

## 2024-06-06 PROCEDURE — 92250 FUNDUS PHOTOGRAPHY W/I&R: CPT | Performed by: OPHTHALMOLOGY

## 2024-06-06 PROCEDURE — 92004 COMPRE OPH EXAM NEW PT 1/>: CPT | Performed by: OPHTHALMOLOGY

## 2024-06-06 RX ORDER — LOSARTAN POTASSIUM 100 MG/1
100 TABLET, FILM COATED ORAL
Qty: 90 | Refills: 0 | Status: ACTIVE | COMMUNITY
Start: 2022-12-12 | End: 1900-01-01

## 2024-06-06 ASSESSMENT — CONFRONTATIONAL VISUAL FIELD TEST (CVF)
OS_FINDINGS: FULL
OD_FINDINGS: FULL

## 2024-06-13 NOTE — PHYSICAL THERAPY INITIAL EVALUATION ADULT - GENERAL OBSERVATIONS, REHAB EVAL
Nani at [facility] notified by phone.
guidewire recovered/lumen(s) aspirated and flushed/sterile dressing applied/sterile technique, catheter placed/ultrasound guidance with use of sterile gel and probe cove
guidewire recovered/lumen(s) aspirated and flushed/sterile dressing applied/sterile technique, catheter placed/ultrasound guidance with use of sterile gel and probe cove
location identified, draped/prepped, sterile technique used, needle inserted/introduced/positive blood return obtained via catheter/connected to a pressurized flush line/sutured in place/all materials/supplies accounted for at end of procedure
Received in bed, +R chest wall mediport, +IVL, +external female catheter, denies pain however reports overall fatigue/lack of energy; agreeable to PT

## 2024-07-02 NOTE — PROGRESS NOTE ADULT - ATTENDING COMMENTS
I called patient for follow up as he was no show for group but no answer and could not leave a message.   Suma Huerta MD  Division of Hospital Medicine  [facility]   Available on [employer] Teams - messages preferred prior to calls.    Patient seen and examined today with Team 8 Resident and Intern. Agree with above findings, assessment, and plan with the following additions/exceptions:    Overall, 69 yo F with PMH of pancreatic ca since 2021 on chemotherapy (last session 10/30) who presented to [facility] with fever, weakness, fatigue, body aches, and lower back pain x 2 days after 2 eweks of urinary urgency with outpatient CT abd/pelv demonstrating pyelonephritis. Admitted for sepsis 2/2 E. coli bacteremia and pyelonephritis, also found to have biliary stricture on MRCP s/p ERCP and biliary stent placement on 11/10.    #E. Coli Bacteremia 2/2 Pyelonephritis  - s/p ertapenem now transition to PO Cipro (and flagyl) to complete total 14 day course  - 11/4 blood cultures remain negative    #Biliary Stricture  - s/p ERCP with biliary stent placement by GI on 11/10  - tolerating regular diet today  - outpatient f/u with GI  - cipro + flagyl to complete total 14 day course of abx      #MARY ELLEN - resolved  #Chronic Hyponatremia - improved/stable.    Medically clear for discharge home today with outpatient f/u with PCP, GI, Heme/onc.  Discharge planning time spent: 36 minutes.    Rest as detailed in note above.    Plan discussed with patient and Team 8 resident Dr. Thornton.

## 2024-08-01 NOTE — PACU DISCHARGE NOTE - NS MD DISCHARGE NOTE DISCHARGE
Floor [FreeTextEntry1] : -Reviewed improvement in exam, she was unable to tolerate vulvar exam at all at prior visit  -F/u vaginal culture, Rx Fluconazole provided to hold and initiate if +candida  -Continued care for healing lesions reviewed, discussed possible course of future outbreaks; significantly less severe, hopefully infrequent given type 1 -Discussed episodic v. suppressive therapy with valacyclovir, she will monitor and call if she desires episodic Rx  -Call or RTO PRN for any new problems, questions or concerns

## 2024-11-01 NOTE — ED ADULT NURSE NOTE - NSFALLRISKINTERV_ED_ALL_ED

## 2024-11-01 NOTE — H&P ADULT - PROBLEM SELECTOR PLAN 5
VTE ppx: sqh VTE ppx: sqh    STRICT NPO. NO EXCEPTIONS. Until re-eval by surgery VTE ppx: sqh    STRICT NPO. NO EXCEPTIONS. Until re-eval by surgery    Attempted GOC discussion, pt reports feeling anxious and does not wish to discuss it tonight.

## 2024-11-01 NOTE — H&P ADULT - PROBLEM SELECTOR PLAN 1
-NPO and has NGT to low suction per surgery. surg onc will follow  - 4mg IV dexamethasone, q12h  - 10 mg IV reglan, q8h  - 100 octreotide micrograms SubQ, q8h   - morphine 2mg q4h prn for pain   - D5NS @ 100cc/hr   -consider pallaitive consult given poor prognosis -NPO and has NGT to low suction per surgery. surg onc will follow  - 4mg IV dexamethasone, q12h  - 10 mg IV reglan, q8h  - 100 octreotide micrograms SubQ, q8h   - morphine 2mg q4h prn for pain   - D5NS @ 100cc/hr   -consider palliative consult given poor prognosis -NPO and has NGT to low suction per surgery. surg onc will follow. Strict NPO, no exception.   - 4mg IV dexamethasone, q12h  - 10 mg IV reglan, q8h  - 100mcg octreotide micrograms SubQ, q8h   - morphine 2mg q4h prn for pain. Monitor BM, if no bm in 48hr, enema prn while npo.   - D5NS @ 100cc/hr   -consider palliative consult given poor prognosis

## 2024-11-01 NOTE — ED PROVIDER NOTE - ATTENDING APP SHARED VISIT CONTRIBUTION OF CARE
PMD [facility], [facility]  69-year-old female past med history HTN, hypothyroidism, pancreatic cancer, pyelonephritis, asthma comes ER complaint of abdominal pain.  Patient states she has had constipation for the past 3 days with anorexia decreased p.o.  Pain started today with nausea vomiting not tolerating p.o. there is no fever chills, chest pain, shortness of breath, palpitations patient Dors is a 17 pound weight loss over the past month.  Previous treatment for pancreatic cancer clued surgery, radiation, chemo last approximately 6 months ago, [facility] oncologist is planning to restart chemo.  Physical exam thin adult female looking acutely ill.  HEENT normocephalic bitemporal wasting neck supple no José's raccoon's.  Chest clear A&P.  CV no rubs gallops murmur.  Abdomen flat, absent bowel sounds, mild generalized tenderness no rebound guarding, poor skin turgor  Neuro GCS 15 speech fluent moves all extremities  David Paul MD, Facep

## 2024-11-01 NOTE — ED PROVIDER NOTE - OBJECTIVE STATEMENT
69-year-old female with history of pancreatic CA s/p multiple surgeries, previously on chemo here for evaluation of diffuse abdominal pain, nausea, multiple episodes of emesis and constipation x 3 days.  Patient has bupropion patch and takes oxycodone for pain however states oxycodone is no longer helping with her pain.  Has been taking OTC stool softeners and MiraLAX without improvement of her symptoms.  Denies urinary symptoms, fevers or chills.

## 2024-11-01 NOTE — ED ADULT NURSE NOTE - OBJECTIVE STATEMENT
69 year old female presents to ED via ems c/o lower abd pain and constipation x3days.  Patient currently has pancreatic CA and has a 5mcg buprenorphine to L shoulder region and states she took 5mg of oxycodone this morning. Patient arrived with iv in place by EMS to [facility].  Sister at bedside. Patient is very thin and needs assistance to ambulate. EKG completed.

## 2024-11-01 NOTE — H&P ADULT - NSHPPHYSICALEXAM_GEN_ALL_CORE
Vital Signs Last 24 Hrs  T(C): 36.8 (02 Nov 2024 00:32), Max: 36.8 (01 Nov 2024 19:48)  T(F): 98.3 (02 Nov 2024 00:32), Max: 98.3 (02 Nov 2024 00:32)  HR: 90 (02 Nov 2024 00:32) (85 - 110)  BP: 157/85 (02 Nov 2024 00:32) (120/75 - 157/85)  BP(mean): 97 (01 Nov 2024 19:48) (97 - 97)  RR: 18 (02 Nov 2024 00:32) (15 - 20)  SpO2: 97% (02 Nov 2024 00:32) (97% - 100%)    Parameters below as of 02 Nov 2024 00:32  Patient On (Oxygen Delivery Method): room air        CONSTITUTIONAL: Well-groomed, in no apparent distress  EYES: No conjunctival or scleral injection, non-icteric  ENMT: No external nasal lesions; MMM  RESPIRATORY: Breathing comfortably; lungs CTA without wheeze/rhonchi/rales  CARDIOVASCULAR: +S1S2, RRR; no lower extremity edema  GASTROINTESTINAL: No tenderness, +BS throughout, no rebound/guarding  NEUROLOGIC: No gross focal neurological deficits, AAOX3  PSYCHIATRIC: mood and affect appropriate; appropriate insight and judgment Vital Signs Last 24 Hrs  T(C): 36.8 (02 Nov 2024 00:32), Max: 36.8 (01 Nov 2024 19:48)  T(F): 98.3 (02 Nov 2024 00:32), Max: 98.3 (02 Nov 2024 00:32)  HR: 90 (02 Nov 2024 00:32) (85 - 110)  BP: 157/85 (02 Nov 2024 00:32) (120/75 - 157/85)  BP(mean): 97 (01 Nov 2024 19:48) (97 - 97)  RR: 18 (02 Nov 2024 00:32) (15 - 20)  SpO2: 97% (02 Nov 2024 00:32) (97% - 100%)    Parameters below as of 02 Nov 2024 00:32  Patient On (Oxygen Delivery Method): room air        CONSTITUTIONAL: Well-groomed, in no apparent distress. frail appearing.   EYES: No conjunctival or scleral injection, non-icteric  ENMT: No external nasal lesions; dry oral mucosa. NGT in place.   RESPIRATORY: Breathing comfortably; lungs CTA without wheeze/rhonchi/rales  CARDIOVASCULAR: +S1S2, RRR; no lower extremity edema  GASTROINTESTINAL: soft , +BS throughout, mildly distended, RUQ TTP, no rebound/guarding  NEUROLOGIC: No gross focal neurological deficits, AAOX3  PSYCHIATRIC: mood and affect appropriate; appropriate insight and judgment

## 2024-11-01 NOTE — ED PROVIDER NOTE - PROGRESS NOTE DETAILS
Endorsed To Dr Tommy Paul MD, Facep s/w radiology, concerned for high grade/complete obstruction. also c/f R iliac DVT into the IVC. d/w patient, spoke with surgery will come see pt. -Rachel Fuentes PA-C I spoke with surgery who placed an NG tube and recommended patient be admitted to medicine for further management.  I spoke with Dr. Lawrence who agreed to admit the patient.    Nathan Conteh MD (PGY 2)

## 2024-11-01 NOTE — CONSULT NOTE ADULT - ASSESSMENT
69F PMH [facility] (2012) s/p chemo/rads w/ recurrence to abdominal wall, s/p resection, p/t ED for abdominal pain a/w nausea and vomiting. Pt follows at [facility] [place]. Pt here with gastric outlet obstruction 2/2 pancreatic head mass    PLAN  - No acute surgical intervention at this time  - If patient would like care at [facility] [place] [facility], would recommend transfer  - Otherwise, recommend medicine / [facility] admission  - Malignant bowel obstruction protocol   - 4mg IV dexamethasone, q12h  - 10 mg IV reglan, q8h  - 100 micrograms SubQ, q8h   - NPO/IVF  - NGT to low continuous suction    Discussed with Dr. Joo Valero Surgery  [id number] 69F PMH [facility] w distal pancreatectomy 2021 s/p chemo/rads w/ recurrence to abdominal wall, s/p resection 2022 and radiation, p/t ED for abdominal pain a/w nausea and vomiting. CT scan shows ill-defined hypodense pancreatic mass w/ multifocal narrowing along distal stomach and duodenum resulting in gastric outlet obstruction.    PLAN  - No acute surgical intervention at this time  - If patient would like [place] at [facility] [place] would recommend [place]  - Otherwise, recommend medicine / [facility] admission  - Malignant bowel obstruction protocol   - 4mg IV dexamethasone, q12h  - 10 mg IV reglan, q8h  - 100 micrograms SubQ, q8h   - NPO/IVF  - NGT placed by surgical team to low continuous suction  - If patient admitted, surgical oncology to follow    Discussed with Dr. Joo Valero Surgery  [id number] 69F PMH [facility] w distal pancreatectomy 2021 s/p chemo/rads w/ recurrence to abdominal wall, s/p resection 2022 and radiation, p/t ED for abdominal pain a/w nausea and vomiting. CT scan shows ill-defined hypodense pancreatic mass w/ multifocal narrowing along distal stomach and duodenum resulting in gastric outlet obstruction.    PLAN  - No acute surgical intervention at this time  - [place] [facility]  - Otherwise, recommend medicine / [facility] admission  - Malignant bowel obstruction protocol   - 4mg IV dexamethasone, q12h  - 10 mg IV reglan, q8h  - 100 micrograms octreotide SubQ, q8h   - NPO/IVF  - NGT placed by surgical team to low continuous suction  - If patient admitted, surgical oncology to follow    Discussed with Dr. Joo Valero Surgery  [id number]

## 2024-11-01 NOTE — ED PROVIDER NOTE - CLINICAL SUMMARY MEDICAL DECISION MAKING FREE TEXT BOX
adult female history of pancreatic CA presents with complaints of abdominal pain, nausea vomiting constipation.  Concerns for SBO, fecal impaction, progression of disease.  Plan IV fluids, check basic labs, CT abdomen pelvis,  Analgesics.  David Paul MD, Facep

## 2024-11-01 NOTE — H&P ADULT - HISTORY OF PRESENT ILLNESS
69F PMHx HTN, hypothyroid, pancreatic ca s/p resection '21, s/p chemo/rt then recurrence '22 s/p RT.   Presenting w/ diffuse abd pain, n/v and constipation x3d. Pt has oxycodone for chronic pain, it has not been helping at home. Pt took OTC stool softner and miralax at home w/o relief.     CT imaging c/f pancreatic mass resulting in severe multifocal luminal narrowing along the distal stomach and duodenum, c/w gastric outlet obstruction. Filling defect in R external and common iliac veins, likely flow artifact. Severe narrowing of the portal confluence w/o discrete thrombosis.     Surg consult in the ED, put NGT in to low suction, malignant gastric obstruction cocktail (dexamethasone, reglan and octreotide). Surg onc will follow.    69F PMHx DM, HTN, hypothyroid, pancreatic ca s/p resection '21, s/p chemo/rt then recurrence '22 s/p RT.   Presenting w/ diffuse abd pain, n/v and constipation x3d. Pt has oxycodone for chronic pain, it has not been helping at home. Pt took OTC stool softner and miralax at home w/o relief.     CT imaging c/f pancreatic mass resulting in severe multifocal luminal narrowing along the distal stomach and duodenum, c/w gastric outlet obstruction. Filling defect in R external and common iliac veins, likely flow artifact. Severe narrowing of the portal confluence w/o discrete thrombosis.     Surg consult in the ED, put NGT in to low suction, malignant gastric obstruction cocktail (dexamethasone, reglan and octreotide). Surg onc will follow.    69F PMHx DM, HTN, hypothyroid, pancreatic ca s/p resection '21, s/p chemo/rt then recurrence '22 s/p RT.   Presenting w/ diffuse abd pain, n/v and constipation x3d. Pt has oxycodone for chronic pain, it has not been helping at home. Pt took OTC stool softner and miralax at home w/o relief.     CT imaging c/f pancreatic mass resulting in severe multifocal luminal narrowing along the distal stomach and duodenum, c/w gastric outlet obstruction. Filling defect in R external and common iliac veins, likely flow artifact. Severe narrowing of the portal confluence w/o discrete thrombosis.     Surg consult in the ED, put NGT in to low suction, malignant gastric obstruction cocktail (dexamethasone, reglan and octreotide). Surg onc will follow.     On my interview/exam, pt complains of abd cramping after enema and having BM.

## 2024-11-01 NOTE — ED PROVIDER NOTE - PHYSICAL EXAMINATION
A&Ox3, in mild-mod distress 2/2 pain, thin  NCAT. PERRL, EOMI.  Lungs CTAB. No w/r/r  Cardiac  RRR  Abd soft, + diffuse ttp. nondistended, no rebound or guarding.   Extremities: cap refill <2, pulses in distal extremities 4+, no edema.   Skin without rash.   No focal Deficits, Strength 5/5 UE/LE.

## 2024-11-01 NOTE — ED CLERICAL - NS ED CLERK NOTE PRE-ARRIVAL INFORMATION; ADDITIONAL PRE-ARRIVAL INFORMATION
CC/Reason For referral: Stomach pain; vomiting; R/O SBO  Preferred Consultant(if applicable):  Who admits for you (if needed):  Do you have documents you would like to fax over?  Would you still like to speak to an ED attending? y

## 2024-11-01 NOTE — CONSULT NOTE ADULT - SUBJECTIVE AND OBJECTIVE BOX
SURGICAL ONCOLOGY SURGERY CONSULT NOTE    HPI: 69F PMH [facility] (2012) s/p chemo/rads w/ recurrence to abdominal wall, s/p resection, p/t ED for abdominal pain a/w nausea and vomiting. Pt follows at [facility] [place]. Pt has decreased PO intake over past few weeks, felt worsening abdominal pain today.     In ED, AF VSS, CBC normal, K 2.9, metabolic alkalosis, lactate 1.9 from 2.7. CT scan shows ill-defined hypodense pancreatic mass w/ multifocal narrowing along distal stomach and duodenum     Surgery consulted for gastric outlet obstruction with right external and common iliac vein, and severe narrowing of portal confluence w/o discrete thrombosis.     PMH/PSH:   Pancreatic cancer  Hypertension  Hypothyroid  Seasonal asthma  History of pancreatectomy    MEDS:potassium chloride  10 mEq/100 mL IVPB 10 milliEquivalent(s) IV Intermittent every 1 hour    ALLERGIES: NKDA    REVIEW OF SYSTEMS: All ROS negative except as per HPI.  ____________________________________________    VITALS:T(C): 36.7, Max: 36.7 (11-01)  T(F): 98, Max: 98 (11-01)  HR: 92 (92 - 110)  BP: 135/84 (131/75 - 135/84)  BP(mean): --  RR: 18 (18 - 20)  SpO2: 97% (97% - 99%) room air         PHYSICAL EXAM:  General: AAOx3, no acute distress.  Respiratory: breathing comfortably, no increased WOB   Abdomen: soft, mildly distended, mild LUQ TTP, no rebound, no guarding  Extremities: Moves all four.   ____________________________________________    LABS:                      9.8  7.93 )-----------( 255    ( 01 Nov 2024 13:37 )             29.8  135  |  89[L]  |  28[H]  ----------------------------<  142[H]    (11-01)  2.9[LL]   |  32[H]  |  1.35[H]          Ca    8.2[L]      11-01  Mg    x  Phos  x        LIVER FUNCTIONS - ( 01 Nov 2024 17:32 )  Alb: 3.4 g/dL / Pro: 7.0 g/dL / ALK PHOS: 171 U/L / ALT: 27 U/L / AST: 20 U/L / GGT: x      PT/INR - ( 01 Nov 2024 13:37 )   PT: 11.9 sec;   INR: 1.04 ratio         PTT - ( 01 Nov 2024 13:37 )  PTT:26.1 sec  Urinalysis Basic - ( 01 Nov 2024 17:32 )    Color: x / Appearance: x / SG: x / pH: x  Gluc: 142 mg/dL / Ketone: x  / Bili: x / Urobili: x   Blood: x / Protein: x / Nitrite: x   Leuk Esterase: x / RBC: x / WBC x   Sq Epi: x / Non Sq Epi: x / Bacteria: x      ____________________________________________    RADIOLOGY & ADDITIONAL STUDIES:    < from: CT Abdomen and Pelvis w/ Oral Cont and w/ IV Cont (11.01.24 @ 17:33) >  FINDINGS:  LOWER CHEST: Partially imaged catheter in the distal SVC. Lungs are clear.    LIVER: Within normal limits.  BILE DUCTS: Moderate central and mild peripheral intrahepatic biliary   dilatation with pneumobilia in the left greater than right hepatic bile   ducts. Patent CBD stent with oral contrast refluxing into the distal   stent.  GALLBLADDER: Fluid-filled distended gallbladder is otherwise within   normal limits.  SPLEEN: Splenectomy.  PANCREAS: Ill-defined hypodense mass measures at least 2.9 x 3.1 cm and   replaces the pancreatic head/body. The mass is inseparable from the   distal stomach and multiple areas of the duodenum with resultant luminal   narrowing as described below. Status post distal pancreatectomy.  ADRENALS: Within normal limits.  KIDNEYS/URETERS: Symmetric enhancement bilaterally, no hydronephrosis.   Subcentimeter hypodensity in the left renal parenchyma too small   otherwise characterize.    BLADDER: Within normal limits.  REPRODUCTIVE ORGANS: Atrophic uterus. No adnexal mass.    BOWEL: Hypodense pancreatic mass is inseparable from the distal stomach   and duodenum with multifocal severe luminal narrowing and minimal fluid   patency between the first and second portions of duodenum (series 301-36,   601-57) and the third and fourth portions of duodenum (series 301-45,   602-52). Decompressed jejunum and ileum. Stool-filled colon with areas of   mild wall thickening and hyperemia in the sigmoid colon. Focal luminal   narrowing in the distal sigmoid colon favored represent peristalsis   (series 301-82, 601-76). Appendix is not discretely identified. Prominent   enhancement along the external portion of the anal sphincter complex,   likely hemorrhagic.  PERITONEUM/RETROPERITONEUM: Within normal limits.  VESSELS: Apparent filling defect in the right external iliac vein   extending to the level of the iliac bifurcation, likely asymmetrical flow   artifact. Narrowing of the IVC due to extrinsic compression at the level   of the pancreas. Severe narrowing of the portal confluence without   discrete thrombosis (series 301-34).  LYMPH NODES: No lymphadenopathy.  ABDOMINAL WALL: Calcified expansion along the left abdominal rectus muscle  BONES: Scoliotic curvature the lumbar spine. Degenerative changes. No   aggressive appearing osseous lesions.    IMPRESSION:  Ill-defined hypodense pancreatic mass with resultant severe multifocal   luminal narrowing along thedistal stomach and duodenum. Findings are   consistent with a component of gastric outlet obstruction.    Filling defect in the right external and common iliac veins, most   consistent with flow artifact.    Severe narrowing of the portal confluence without discrete thrombosis.    Additional findings as per above.    < end of copied text >  < from: MR MRCP w/wo IV Cont (11.07.23 @ 22:55) >  LOWER CHEST: Trace left pleural effusion.    LIVER: Increased signal on out of phase imaging, suggestive of iron   deposition. Several subcentimeter hepatic cysts.  BILE DUCTS: See below.  GALLBLADDER: Common bile duct dilatation, measuring up to 1.0 cm. There   is focal narrowing of the lower CBD in the region of the pancreatic head.   No discrete mass or calculus visualized. No gallstones.  SPLEEN: Splenectomy.  PANCREAS: Status post distal pancreatectomy. A few tiny cysts in the   pancreatic head.  ADRENALS: Within normal limits.  KIDNEYS/URETERS: Small renal cysts.    VISUALIZED PORTIONS:  BOWEL: Within normal limits.  PERITONEUM: No ascites.  VESSELS: Atherosclerotic changes.  RETROPERITONEUM/LYMPH NODES: No lymphadenopathy.  ABDOMINAL WALL: Within normal limits.  BONES: Degenerative changes. Thoracolumbar signal abnormality likely   represents post radiation changes.    IMPRESSION:  Focal stricture of the lower common bile ductfor which differential   includes benign and malignant etiologies.    < end of copied text >   SURGICAL ONCOLOGY SURGERY CONSULT NOTE    HPI: 69F PMH [facility] w distal pancreatectomy 2021 s/p chemo/rads w/ recurrence to abdominal wall, s/p resection 2022 and radiation, p/t ED for abdominal pain a/w nausea and vomiting. Pt [facility] with Dr. Dunaway. Pt has decreased PO intake over past few weeks, felt worsening abdominal pain today.     In ED, AF VSS, CBC normal, K 2.9, metabolic alkalosis, lactate 1.9 from 2.7. CT scan shows ill-defined hypodense pancreatic mass w/ multifocal narrowing along distal stomach and duodenum     Surgery consulted for gastric outlet obstruction with right external and common iliac vein, and severe narrowing of portal confluence w/o discrete thrombosis.     PMH/PSH:   Pancreatic cancer  Hypertension  Hypothyroid  Seasonal asthma  History of pancreatectomy    MEDS:potassium chloride  10 mEq/100 mL IVPB 10 milliEquivalent(s) IV Intermittent every 1 hour    ALLERGIES: NKDA    REVIEW OF SYSTEMS: All ROS negative except as per HPI.  ____________________________________________    VITALS:T(C): 36.7, Max: 36.7 (11-01)  T(F): 98, Max: 98 (11-01)  HR: 92 (92 - 110)  BP: 135/84 (131/75 - 135/84)  BP(mean): --  RR: 18 (18 - 20)  SpO2: 97% (97% - 99%) room air         PHYSICAL EXAM:  General: AAOx3, no acute distress, cachectic  Respiratory: breathing comfortably, no increased WOB   Abdomen: soft, mildly distended, mild LUQ TTP, no rebound, voluntary guarding  Extremities: Moves all four.   ____________________________________________    LABS:                      9.8  7.93 )-----------( 255    ( 01 Nov 2024 13:37 )             29.8  135  |  89[L]  |  28[H]  ----------------------------<  142[H]    (11-01)  2.9[LL]   |  32[H]  |  1.35[H]          Ca    8.2[L]      11-01  Mg    x  Phos  x        LIVER FUNCTIONS - ( 01 Nov 2024 17:32 )  Alb: 3.4 g/dL / Pro: 7.0 g/dL / ALK PHOS: 171 U/L / ALT: 27 U/L / AST: 20 U/L / GGT: x      PT/INR - ( 01 Nov 2024 13:37 )   PT: 11.9 sec;   INR: 1.04 ratio         PTT - ( 01 Nov 2024 13:37 )  PTT:26.1 sec  Urinalysis Basic - ( 01 Nov 2024 17:32 )    Color: x / Appearance: x / SG: x / pH: x  Gluc: 142 mg/dL / Ketone: x  / Bili: x / Urobili: x   Blood: x / Protein: x / Nitrite: x   Leuk Esterase: x / RBC: x / WBC x   Sq Epi: x / Non Sq Epi: x / Bacteria: x      ____________________________________________    RADIOLOGY & ADDITIONAL STUDIES:    < from: CT Abdomen and Pelvis w/ Oral Cont and w/ IV Cont (11.01.24 @ 17:33) >  FINDINGS:  LOWER CHEST: Partially imaged catheter in the distal SVC. Lungs are clear.    LIVER: Within normal limits.  BILE DUCTS: Moderate central and mild peripheral intrahepatic biliary   dilatation with pneumobilia in the left greater than right hepatic bile   ducts. Patent CBD stent with oral contrast refluxing into the distal   stent.  GALLBLADDER: Fluid-filled distended gallbladder is otherwise within   normal limits.  SPLEEN: Splenectomy.  PANCREAS: Ill-defined hypodense mass measures at least 2.9 x 3.1 cm and   replaces the pancreatic head/body. The mass is inseparable from the   distal stomach and multiple areas of the duodenum with resultant luminal   narrowing as described below. Status post distal pancreatectomy.  ADRENALS: Within normal limits.  KIDNEYS/URETERS: Symmetric enhancement bilaterally, no hydronephrosis.   Subcentimeter hypodensity in the left renal parenchyma too small   otherwise characterize.    BLADDER: Within normal limits.  REPRODUCTIVE ORGANS: Atrophic uterus. No adnexal mass.    BOWEL: Hypodense pancreatic mass is inseparable from the distal stomach   and duodenum with multifocal severe luminal narrowing and minimal fluid   patency between the first and second portions of duodenum (series 301-36,   601-57) and the third and fourth portions of duodenum (series 301-45,   602-52). Decompressed jejunum and ileum. Stool-filled colon with areas of   mild wall thickening and hyperemia in the sigmoid colon. Focal luminal   narrowing in the distal sigmoid colon favored represent peristalsis   (series 301-82, 601-76). Appendix is not discretely identified. Prominent   enhancement along the external portion of the anal sphincter complex,   likely hemorrhagic.  PERITONEUM/RETROPERITONEUM: Within normal limits.  VESSELS: Apparent filling defect in the right external iliac vein   extending to the level of the iliac bifurcation, likely asymmetrical flow   artifact. Narrowing of the IVC due to extrinsic compression at the level   of the pancreas. Severe narrowing of the portal confluence without   discrete thrombosis (series 301-34).  LYMPH NODES: No lymphadenopathy.  ABDOMINAL WALL: Calcified expansion along the left abdominal rectus muscle  BONES: Scoliotic curvature the lumbar spine. Degenerative changes. No   aggressive appearing osseous lesions.    IMPRESSION:  Ill-defined hypodense pancreatic mass with resultant severe multifocal   luminal narrowing along thedistal stomach and duodenum. Findings are   consistent with a component of gastric outlet obstruction.    Filling defect in the right external and common iliac veins, most   consistent with flow artifact.    Severe narrowing of the portal confluence without discrete thrombosis.    Additional findings as per above.    < end of copied text >  < from: MR MRCP w/wo IV Cont (11.07.23 @ 22:55) >  LOWER CHEST: Trace left pleural effusion.    LIVER: Increased signal on out of phase imaging, suggestive of iron   deposition. Several subcentimeter hepatic cysts.  BILE DUCTS: See below.  GALLBLADDER: Common bile duct dilatation, measuring up to 1.0 cm. There   is focal narrowing of the lower CBD in the region of the pancreatic head.   No discrete mass or calculus visualized. No gallstones.  SPLEEN: Splenectomy.  PANCREAS: Status post distal pancreatectomy. A few tiny cysts in the   pancreatic head.  ADRENALS: Within normal limits.  KIDNEYS/URETERS: Small renal cysts.    VISUALIZED PORTIONS:  BOWEL: Within normal limits.  PERITONEUM: No ascites.  VESSELS: Atherosclerotic changes.  RETROPERITONEUM/LYMPH NODES: No lymphadenopathy.  ABDOMINAL WALL: Within normal limits.  BONES: Degenerative changes. Thoracolumbar signal abnormality likely   represents post radiation changes.    IMPRESSION:  Focal stricture of the lower common bile ductfor which differential   includes benign and malignant etiologies.    < end of copied text >

## 2024-11-01 NOTE — H&P ADULT - NSHPLABSRESULTS_GEN_ALL_CORE
9.8    7.93  )-----------( 255      ( 01 Nov 2024 13:37 )             29.8       11-01    135  |  89[L]  |  28[H]  ----------------------------<  142[H]  2.9[LL]   |  32[H]  |  1.35[H]    Ca    8.2[L]      01 Nov 2024 17:32  Mg     2.2     11-01    TPro  7.0  /  Alb  3.4  /  TBili  0.5  /  DBili  x   /  AST  20  /  ALT  27  /  AlkPhos  171[H]  11-01              Urinalysis Basic - ( 01 Nov 2024 17:32 )    Color: x / Appearance: x / SG: x / pH: x  Gluc: 142 mg/dL / Ketone: x  / Bili: x / Urobili: x   Blood: x / Protein: x / Nitrite: x   Leuk Esterase: x / RBC: x / WBC x   Sq Epi: x / Non Sq Epi: x / Bacteria: x        PT/INR - ( 01 Nov 2024 13:37 )   PT: 11.9 sec;   INR: 1.04 ratio         PTT - ( 01 Nov 2024 13:37 )  PTT:26.1 sec          CAPILLARY BLOOD GLUCOSE

## 2024-11-01 NOTE — ED ADULT NURSE REASSESSMENT NOTE - NS ED NURSE REASSESS COMMENT FT1
1948: Handoff taken from DELL Galvan in purple. Introduced self to pt, resting in bed, VSS. Surg team at bedside, NG tube currently being placed. 2nd K-rider being administered for hypokalemia. Awaiting for admission to hospital, updated on POC. C/o throat pain, MD aware, awaiting intervention. Safety maintained at this time.

## 2024-11-02 NOTE — PATIENT PROFILE ADULT - FUNCTIONAL ASSESSMENT - BASIC MOBILITY 6.
3-calculated by average/Not able to assess (calculate score using [facility] averaging method)

## 2024-11-02 NOTE — PROGRESS NOTE ADULT - ASSESSMENT
69F PMH PDIC w distal pancreatectomy 2021 s/p chemo/rads w/ recurrence to abdominal wall, s/p resection 2022 and radiation, p/t ED for abdominal pain a/w nausea and vomiting. CT scan shows ill-defined hypodense pancreatic mass w/ multifocal narrowing along distal stomach and duodenum resulting in gastric outlet obstruction.    PLAN  - No acute surgical intervention at this time  - If [place] would [place] [facility]  - Malignant bowel obstruction protocol      - 4mg IV dexamethasone, q12h     - 10 mg IV reglan, q8h     - 100 micrograms octreotide SubQ, q8h   - NPO/IVF  - NGT to LCWS  - surgical oncology to follow    [facility] Surgery  [id number]

## 2024-11-02 NOTE — PROGRESS NOTE ADULT - PROBLEM SELECTOR PLAN 1
-NPO and has NGT to low suction per surgery. surg onc will follow. Strict NPO, no exception.   - 4mg IV dexamethasone, q12h  - 10 mg IV reglan, q8h  - 100mcg octreotide micrograms SubQ, q8h   - morphine 2mg q4h prn for pain. Monitor BM, if no bm in 48hr, enema prn while npo.   - D5NS @ 100cc/hr   -consider palliative consult given poor prognosis

## 2024-11-02 NOTE — CONSULT NOTE ADULT - ASSESSMENT
69F PMHx DM, HTN, hypothyroid, pancreatic ca s/p resection '21, s/p chemo/rt then recurrence '22 s/p RT.  Presenting w/ diffuse abd pain, n/v and constipation x3d.     #Pancreatic ca  --[place] with [place]. Bess [place] [facility] [place]  --s/p distal pancreatectomy and splenectomy (06/2021)  --s/p adjuvant tx w/ mFOLFIRINOX (08/21-01/21), Xeloda +RTx 25 fractions (02/22-03/22)-> POD (01/23)  --most recent systemic tx w/ Gemcitabine and nab-paclitaxel (05/23-10/23, 02/24-07/24). Break in tx 2/2 jaundice and stent migration  --s/p RT x 10 fractions to abdominal wall mets 08/2024  --no plan for treatment inpatient and/or rehab  --ongoing care after discharge    #gastric outlet obstruction  --pt p/w abdominal pain, N/V  --CT a/p w/ ill-defined hypodense pancreatic mass w/ multifocal narrowing along distal stomach and duodenum resulting in gastric outlet obstruction.  --surgery following no plan for acute surgical intervention at this time   --currently on mSBO protocol w/dex, reglan, and octreotide. NGT to low continuous suction    #Anemia  --2/2 malignancy and treatment  --baseline Hgb 8-9.5  --will check for nutritional deficiencies, supplement as needed  --transfuse for Hgb <7, 8 if symptomatic    will follow,    Bailey Herrera NP  Hematology/ Oncology  [facility]  [phone] (office)  [phone] (alt office)  Evenings and weekends please call MD on call or office   69F PMHx DM, HTN, hypothyroid, pancreatic ca s/p resection '21, s/p chemo/rt then recurrence '22 s/p RT.  Presenting w/ diffuse abd pain, n/v and constipation x3d.     #Pancreatic ca  --follows with Dr. Bess of [facility]  --s/p distal pancreatectomy and splenectomy (06/2021)  --s/p adjuvant tx w/ mFOLFIRINOX (08/21-01/21), Xeloda +RTx 25 fractions (02/22-03/22)-> POD (01/23)  --most recent systemic tx w/ Gemcitabine and nab-paclitaxel (05/23-10/23, 02/24-07/24). Break in tx 2/2 jaundice and stent migration  --s/p RT x 10 fractions to abdominal wall mets 08/2024  --no plan for treatment inpatient and/or rehab  --ongoing care after discharge    #gastric outlet obstruction  --pt p/w abdominal pain, N/V  --CT a/p w/ ill-defined hypodense pancreatic mass w/ multifocal narrowing along distal stomach and duodenum resulting in gastric outlet obstruction.  --CTH No acute intracranial hemorrhage, mass effect, or midline shif  --surgery following no plan for acute surgical intervention at this time   --currently on mSBO protocol w/dex, reglan, and octreotide. NGT to low continuous suction    #Anemia  --2/2 malignancy and treatment  --baseline Hgb 8-9.5  --will check for nutritional deficiencies, supplement as needed  --transfuse for Hgb <7, 8 if symptomatic    will follow,    Bailey Herrera NP  Hematology/ Oncology  [facility]  [phone] (office)  [phone] (alt office)  Evenings and weekends please call MD on call or office

## 2024-11-02 NOTE — PROGRESS NOTE ADULT - SUBJECTIVE AND OBJECTIVE BOX
TEAM [ [facility] ] Surgery Daily Progress Note  =====================================================    SUBJECTIVE: Patient seen and examined at bedside on AM rounds. Patient reports that they're feeling distended still. NG tube in place. Patient denies passing gas.     PMH:  PAST MEDICAL & SURGICAL HISTORY:  Pancreatic cancer      Hypertension      Hypothyroid      Seasonal asthma      History of pancreatectomy          ALLERGIES:  phenytoin (Rash)  ibuprofen (Other)  Dilantin (Unknown)      --------------------------------------------------------------------------------------    MEDICATIONS:    Neurologic Medications  metoclopramide Injectable 10 milliGRAM(s) IV Push every 8 hours  morphine  - Injectable 2 milliGRAM(s) IV Push every 4 hours PRN Severe Pain (7 - 10)  ondansetron Injectable 4 milliGRAM(s) IV Push every 8 hours PRN Nausea and/or Vomiting    Respiratory Medications    Cardiovascular Medications    Gastrointestinal Medications  dextrose 5% + sodium chloride 0.9%. 1000 milliLiter(s) IV Continuous <Continuous>  dextrose 5%. 1000 milliLiter(s) IV Continuous <Continuous>  dextrose 5%. 1000 milliLiter(s) IV Continuous <Continuous>    Genitourinary Medications    Hematologic/Oncologic Medications  heparin   Injectable 5000 Unit(s) SubCutaneous every 12 hours    Antimicrobial/Immunologic Medications    Endocrine/Metabolic Medications  dexAMETHasone  Injectable 4 milliGRAM(s) IV Push every 12 hours  dextrose 50% Injectable 25 Gram(s) IV Push once  dextrose 50% Injectable 12.5 Gram(s) IV Push once  dextrose 50% Injectable 25 Gram(s) IV Push once  dextrose Oral Gel 15 Gram(s) Oral once PRN Blood Glucose LESS THAN 70 milliGRAM(s)/deciliter  glucagon  Injectable 1 milliGRAM(s) IntraMuscular once  insulin lispro (ADMELOG) corrective regimen sliding scale   SubCutaneous every 6 hours  octreotide  Injectable 100 MICROGram(s) SubCutaneous every 8 hours    Topical/Other Medications    --------------------------------------------------------------------------------------    VITAL SIGNS:  Vital Signs Last 24 Hrs  T(C): 36.5 (02 Nov 2024 04:28), Max: 36.8 (01 Nov 2024 19:48)  T(F): 97.7 (02 Nov 2024 04:28), Max: 98.3 (02 Nov 2024 00:32)  HR: 88 (02 Nov 2024 04:28) (85 - 110)  BP: 157/88 (02 Nov 2024 04:28) (120/75 - 157/88)  BP(mean): 97 (01 Nov 2024 19:48) (97 - 97)  RR: 17 (02 Nov 2024 04:28) (15 - 20)  SpO2: 97% (02 Nov 2024 04:28) (97% - 100%)    Parameters below as of 02 Nov 2024 04:28  Patient On (Oxygen Delivery Method): room air      --------------------------------------------------------------------------------------    PHYSICAL EXAM:  General: AAOx3, no acute distress, cachectic  Respiratory: breathing comfortably, no increased WOB   Abdomen: soft, mildly distended, mild LUQ TTP, no rebound, voluntary guarding  Extremities: Moves all four.     --------------------------------------------------------------------------------------    LABS                        9.0    8.75  )-----------( 242      ( 02 Nov 2024 06:51 )             28.2   11-02    137  |  93[L]  |  22  ----------------------------<  214[H]  3.0[L]   |  29  |  1.11    Ca    8.5      02 Nov 2024 06:51  Phos  3.6     11-02  Mg     1.9     11-02    TPro  7.0  /  Alb  3.4  /  TBili  0.5  /  DBili  x   /  AST  20  /  ALT  27  /  AlkPhos  171[H]  11-01    RADIOLOGY  < from: CT Abdomen and Pelvis w/ Oral Cont and w/ IV Cont (11.01.24 @ 17:33) >  IMPRESSION:  Ill-defined hypodense pancreatic mass with resultant severe multifocal   luminal narrowing along thedistal stomach and duodenum. Findings are   consistent with a component of gastric outlet obstruction.    Filling defect in the right external and common iliac veins, most   consistent with flow artifact.    Severe narrowing of the portal confluence without discrete thrombosis.    Additional findings as per above.    Findings discussed by Dr. Roland radiology with ACP Fuenets of the   primary emergency department team at 6:22 PM on 11/1/2024 with read back   confirmation.    --- End of Report ---    < end of copied text >        --------------------------------------------------------------------------------------    INS AND OUTS:    ((Insert SICU Vitals/Is+Os here))***  --------------------------------------------------------------------------------------

## 2024-11-02 NOTE — CONSULT NOTE ADULT - SUBJECTIVE AND OBJECTIVE BOX
Reason for consult: pancreatic ca    HPI:  69F PMHx DM, HTN, hypothyroid, pancreatic ca s/p resection '21, s/p chemo/rt then recurrence '22 s/p RT.   Presenting w/ diffuse abd pain, n/v and constipation x3d. Pt has oxycodone for chronic pain, it has not been helping at home. Pt took OTC stool softner and miralax at home w/o relief.     CT imaging c/f pancreatic mass resulting in severe multifocal luminal narrowing along the distal stomach and duodenum, c/w gastric outlet obstruction. Filling defect in R external and common iliac veins, likely flow artifact. Severe narrowing of the portal confluence w/o discrete thrombosis.     Surg consult in the ED, put NGT in to low suction, malignant gastric obstruction cocktail (dexamethasone, reglan and octreotide). Surg onc will follow.     On my interview/exam, pt complains of abd cramping after enema and having BM.  (01 Nov 2024 23:43)    Hematology/Oncology called to see patient who follows with Dr. Bess of [facility] for management of pancreatic ca    PAST MEDICAL & SURGICAL HISTORY:  Pancreatic cancer      Hypertension      Hypothyroid      Seasonal asthma      History of pancreatectomy          FAMILY HISTORY:      Alochol: Denied  Smoking: Nonsmoker  Drug Use: Denied  Marital Status:         Allergies    phenytoin (Rash)  ibuprofen (Other)  Dilantin (Unknown)    Intolerances        MEDICATIONS  (STANDING):  dexAMETHasone  Injectable 4 milliGRAM(s) IV Push every 12 hours  dextrose 5% + sodium chloride 0.9%. 1000 milliLiter(s) (100 mL/Hr) IV Continuous <Continuous>  dextrose 5%. 1000 milliLiter(s) (50 mL/Hr) IV Continuous <Continuous>  dextrose 5%. 1000 milliLiter(s) (100 mL/Hr) IV Continuous <Continuous>  dextrose 50% Injectable 25 Gram(s) IV Push once  dextrose 50% Injectable 12.5 Gram(s) IV Push once  dextrose 50% Injectable 25 Gram(s) IV Push once  glucagon  Injectable 1 milliGRAM(s) IntraMuscular once  heparin   Injectable 5000 Unit(s) SubCutaneous every 12 hours  insulin lispro (ADMELOG) corrective regimen sliding scale   SubCutaneous every 6 hours  metoclopramide Injectable 10 milliGRAM(s) IV Push every 8 hours  octreotide  Injectable 100 MICROGram(s) SubCutaneous every 8 hours    MEDICATIONS  (PRN):  dextrose Oral Gel 15 Gram(s) Oral once PRN Blood Glucose LESS THAN 70 milliGRAM(s)/deciliter  morphine  - Injectable 2 milliGRAM(s) IV Push every 4 hours PRN Severe Pain (7 - 10)  ondansetron Injectable 4 milliGRAM(s) IV Push every 8 hours PRN Nausea and/or Vomiting      ROS  No fever, sweats, chills  No epistaxis, HA, sore throat  No CP, SOB, cough, sputum  No n/v/d, abd pain, melena, hematochezia  No edema  No rash  No anxiety  No back pain, joint pain  No bleeding, bruising  No dysuria, hematuria    T(C): 36.5 (11-02-24 @ 04:28), Max: 36.8 (11-01-24 @ 19:48)  HR: 88 (11-02-24 @ 04:28) (85 - 110)  BP: 157/88 (11-02-24 @ 04:28) (120/75 - 157/88)  RR: 17 (11-02-24 @ 04:28) (15 - 20)  SpO2: 97% (11-02-24 @ 04:28) (97% - 100%)  Wt(kg): --    PE  NAD  Awake, alert  Anicteric, MMM  RRR  CTAB  Abd soft, NT, ND  No c/c/e  No rash grossly  FROM                          9.0    8.75  )-----------( 242      ( 02 Nov 2024 06:51 )             28.2       11-02    137  |  93[L]  |  22  ----------------------------<  214[H]  3.0[L]   |  29  |  1.11    Ca    8.5      02 Nov 2024 06:51  Phos  3.6     11-02  Mg     1.9     11-02    TPro  7.0  /  Alb  3.4  /  TBili  0.5  /  DBili  x   /  AST  20  /  ALT  27  /  AlkPhos  171[H]  11-01

## 2024-11-02 NOTE — PROGRESS NOTE ADULT - SUBJECTIVE AND OBJECTIVE BOX
Name of Patient : DANUTA CALDERON  MRN: [id number]  Date of visit: 11-02-24       Subjective: Patient seen and examined. No new events except as noted.   ngt     REVIEW OF SYSTEMS:    CONSTITUTIONAL: No weakness, fevers or chills  EYES/ENT: No visual changes;  No vertigo or throat pain   NECK: No pain or stiffness  RESPIRATORY: No cough, wheezing, hemoptysis; No shortness of breath  CARDIOVASCULAR: No chest pain or palpitations  GASTROINTESTINAL: discomfort  GENITOURINARY: No dysuria, frequency or hematuria  NEUROLOGICAL: No numbness or weakness  SKIN: No itching, burning, rashes, or lesions   All other review of systems is negative unless indicated above.    MEDICATIONS:  MEDICATIONS  (STANDING):  dexAMETHasone  Injectable 4 milliGRAM(s) IV Push every 12 hours  dextrose 5% + sodium chloride 0.9%. 1000 milliLiter(s) (100 mL/Hr) IV Continuous <Continuous>  dextrose 5%. 1000 milliLiter(s) (50 mL/Hr) IV Continuous <Continuous>  dextrose 5%. 1000 milliLiter(s) (100 mL/Hr) IV Continuous <Continuous>  dextrose 50% Injectable 25 Gram(s) IV Push once  dextrose 50% Injectable 12.5 Gram(s) IV Push once  dextrose 50% Injectable 25 Gram(s) IV Push once  glucagon  Injectable 1 milliGRAM(s) IntraMuscular once  heparin   Injectable 5000 Unit(s) SubCutaneous every 12 hours  insulin lispro (ADMELOG) corrective regimen sliding scale   SubCutaneous every 6 hours  metoclopramide Injectable 10 milliGRAM(s) IV Push every 8 hours  octreotide  Injectable 100 MICROGram(s) SubCutaneous every 8 hours      PHYSICAL EXAM:  T(C): 36.7 (11-02-24 @ 20:31), Max: 36.8 (11-01-24 @ 23:15)  HR: 71 (11-02-24 @ 20:31) (71 - 90)  BP: 141/87 (11-02-24 @ 20:31) (131/83 - 157/88)  RR: 18 (11-02-24 @ 20:31) (17 - 18)  SpO2: 97% (11-02-24 @ 20:31) (97% - 98%)  Wt(kg): --  I&O's Summary    02 Nov 2024 08:01  -  02 Nov 2024 23:12  --------------------------------------------------------  IN: 360 mL / OUT: 750 mL / NET: -390 mL      Height (cm): 154.9 (11-02 @ 00:32)  Weight (kg): 36 (11-02 @ 00:32)  BMI (kg/m2): 15 (11-02 @ 00:32)  BSA (m2): 1.28 (11-02 @ 00:32)    Appearance: Normal	  HEENT:  maria e WARREN   Lymphatic: No lymphadenopathy   Cardiovascular: Normal S1 S2, no JVD  Respiratory: normal effort , clear  Gastrointestinal:  Soft, Non-tender  Skin: No rashes,  warm to touch  Psychiatry:  Mood & affect appropriate  Musculuskeletal: No edema    recent labs, Imaging and EKGs personally reviewed     11-02-24 @ 08:01  -  11-02-24 @ 23:12  --------------------------------------------------------  IN: 360 mL / OUT: 750 mL / NET: -390 mL                          9.0    8.75  )-----------( 242      ( 02 Nov 2024 06:51 )             28.2               11-02    137  |  93[L]  |  22  ----------------------------<  214[H]  3.0[L]   |  29  |  1.11    Ca    8.5      02 Nov 2024 06:51  Phos  3.6     11-02  Mg     1.9     11-02    TPro  7.0  /  Alb  3.4  /  TBili  0.5  /  DBili  x   /  AST  20  /  ALT  27  /  AlkPhos  171[H]  11-01    PT/INR - ( 01 Nov 2024 13:37 )   PT: 11.9 sec;   INR: 1.04 ratio         PTT - ( 01 Nov 2024 13:37 )  PTT:26.1 sec                   Urinalysis Basic - ( 02 Nov 2024 06:51 )    Color: x / Appearance: x / SG: x / pH: x  Gluc: 214 mg/dL / Ketone: x  / Bili: x / Urobili: x   Blood: x / Protein: x / Nitrite: x   Leuk Esterase: x / RBC: x / WBC x   Sq Epi: x / Non Sq Epi: x / Bacteria: x

## 2024-11-02 NOTE — H&P ADULT - HISTORY OF PRESENT ILLNESS
69F PMHx HTN, hypothyroid, pancreatic ca s/p resection '21, s/p chemo/rt then recurrence '22 s/p RT.   Presenting w/ diffuse abd pain, n/v and constipation x3d. Pt has oxycodone for chronic pain, it has not been helping at home. Pt took OTC stool softner and miralax at home w/o relief.     CT imaging c/f pancreatic mass resulting in severe multifocal luminal narrowing along the distal stomach and duodenum, c/w gastric outlet obstruction. Filling defect in R external and common iliac veins, likely flow artifact. Severe narrowing of the portal confluence w/o discrete thrombosis.     Surg consult in the ED, put NGT in to low suction, malignant gastric obstruction cocktail (dexamethasone, reglan and octreotide). Surg onc will follow.

## 2024-11-02 NOTE — PATIENT PROFILE ADULT - FALL HARM RISK - HARM RISK INTERVENTIONS

## 2024-11-03 NOTE — CONSULT NOTE ADULT - PROBLEM SELECTOR RECOMMENDATION 3
I-stop reviewed. Patient was using Buprenorphine 5mcg patch (taken off on admission, called pharmacy, and we do not have buprenorphine patches on formulary)   > Continue 2mg IV morphine q4h prn severe pain   > narcan prn   > dulcolax suppository qd prn if no contraindications while patient is NPO  > Per i-stop, patient has outpatient Palliative care provider with [facility]- Dr. Laura Haywood  > Please consider ordering hurricane spray for throat irritation from NGT

## 2024-11-03 NOTE — CONSULT NOTE ADULT - ATTENDING COMMENTS
AS above  68 yo F with PMH of HTN, hypothyroidism, hiatal hernia, gastritis, and pancreatic cancer s/p distal pancreatectomy 2021 and chemo RT c/b recurrence to abdominal pain s/p resection and RT, and biliary stricture (malignant?) s/p stent in 2023 presenting with abdominal pain and N/V for 1 week, found to have ill defined hypodense pancreatic mass in the HOP with resultant severe multifocal luminal narrowing along stomach and duodenum.   Imaging reviewed with radiology, appears mass effect is at pylorus/duodenal bulb however contrast passes throught but appears to stop at D2/D3 without obvious mass effect there  Patient is ill appearing and has been on multiple lines of therapy  Will plan for EGD with possible duodenal stent vs EUS GJ (vs nothing if no obstructing lesions)  Keep NPO, malignant SBO protocol  Attempt scope tomorrow    Thank you for this interesting consult.  Please call the advanced GI service with any questions or concerns.

## 2024-11-03 NOTE — PROGRESS NOTE ADULT - SUBJECTIVE AND OBJECTIVE BOX
Name of Patient : DANUTA CALDERON  MRN: [id number]  Date of visit: 24      Subjective: Patient seen and examined. No new events except as noted.   no bowel function   + discomfort     REVIEW OF SYSTEMS:    CONSTITUTIONAL: No weakness, fevers or chills  EYES/ENT: No visual changes;  No vertigo or throat pain   NECK: No pain or stiffness  RESPIRATORY: No cough, wheezing, hemoptysis; No shortness of breath  CARDIOVASCULAR: No chest pain or palpitations  GASTROINTESTINAL: + discomfort   GENITOURINARY: No dysuria, frequency or hematuria  NEUROLOGICAL: No numbness or weakness  SKIN: No itching, burning, rashes, or lesions   All other review of systems is negative unless indicated above.    MEDICATIONS:  MEDICATIONS  (STANDING):  dexAMETHasone  Injectable 4 milliGRAM(s) IV Push every 12 hours  dextrose 5% + sodium chloride 0.9%. 1000 milliLiter(s) (100 mL/Hr) IV Continuous <Continuous>  dextrose 5%. 1000 milliLiter(s) (50 mL/Hr) IV Continuous <Continuous>  dextrose 5%. 1000 milliLiter(s) (100 mL/Hr) IV Continuous <Continuous>  dextrose 50% Injectable 25 Gram(s) IV Push once  dextrose 50% Injectable 12.5 Gram(s) IV Push once  dextrose 50% Injectable 25 Gram(s) IV Push once  glucagon  Injectable 1 milliGRAM(s) IntraMuscular once  heparin   Injectable 5000 Unit(s) SubCutaneous every 12 hours  insulin lispro (ADMELOG) corrective regimen sliding scale   SubCutaneous every 6 hours  metoclopramide Injectable 10 milliGRAM(s) IV Push every 8 hours  octreotide  Injectable 100 MICROGram(s) SubCutaneous every 8 hours      PHYSICAL EXAM:  T(C): 36.8 (24 @ 20:53), Max: 36.8 (24 @ 20:53)  HR: 67 (24 @ 20:53) (67 - 73)  BP: 155/78 (24 @ 20:53) (155/78 - 162/81)  RR: 18 (24 @ 20:53) (18 - 18)  SpO2: 96% (24 @ 20:53) (96% - 97%)  Wt(kg): --  I&O's Summary    2024 08:01  -  2024 07:00  --------------------------------------------------------  IN: 1860 mL / OUT: 750 mL / NET: 1110 mL    2024 07:01  -  2024 22:57  --------------------------------------------------------  IN: 0 mL / OUT: 1250 mL / NET: -1250 mL          Appearance: Normal	  HEENT:  PERRLA   Lymphatic: No lymphadenopathy   Cardiovascular: Normal S1 S2, no JVD  Respiratory: normal effort , clear  Gastrointestinal:  Soft, pain on palpation   Skin: No rashes,  warm to touch  Psychiatry:  Mood & affect appropriate  Musculuskeletal: No edema    recent labs, Imaging and EKGs personally reviewed     24 @ 08:01  -  24 @ 07:00  --------------------------------------------------------  IN: 1860 mL / OUT: 750 mL / NET: 1110 mL    24 @ 07:01  -  24 @ 22:57  --------------------------------------------------------  IN: 0 mL / OUT: 1250 mL / NET: -1250 mL                          8.5    7.79  )-----------( 196      ( 2024 06:55 )             26.0                   138  |  98  |  20  ----------------------------<  210[H]  4.0   |  30  |  1.12    Ca    8.1[L]      2024 06:55  Phos  3.7     11-  Mg     1.9     11-03                         Urinalysis Basic - ( 2024 09:45 )    Color: Yellow / Appearance: Turbid / S.014 / pH: x  Gluc: x / Ketone: Negative mg/dL  / Bili: Negative / Urobili: 1.0 mg/dL   Blood: x / Protein: 100 mg/dL / Nitrite: Negative   Leuk Esterase: Trace / RBC: 2 /HPF / WBC 20 /HPF   Sq Epi: x / Non Sq Epi: 1 /HPF / Bacteria: Too Numerous to count /HPF

## 2024-11-03 NOTE — CONSULT NOTE ADULT - SUBJECTIVE AND OBJECTIVE BOX
[facility] Geriatrics and Palliative Care  Kathy Charles, Palliative Care Attending  Date of [id number] @ 10:12    HPI:  69F PMHx DM, HTN, hypothyroid, pancreatic ca s/p resection ', s/p chemo/rt then recurrence ' s/p RT.   Presenting w/ diffuse abd pain, n/v and constipation x3d. Pt has oxycodone for chronic pain, it has not been helping at home. Pt took OTC stool softner and miralax at home w/o relief.     CT imaging c/f pancreatic mass resulting in severe multifocal luminal narrowing along the distal stomach and duodenum, c/w gastric outlet obstruction. Filling defect in R external and common iliac veins, likely flow artifact. Severe narrowing of the portal confluence w/o discrete thrombosis.     Surg consult in the ED, put NGT in to low suction, malignant gastric obstruction cocktail (dexamethasone, reglan and octreotide). Surg onc will follow.     On my interview/exam, pt complains of abd cramping after enema and having BM.  (2024 23:43)    PERTINENT PM/SXH:   Pancreatic cancer    Hypertension    Hypothyroid    Seasonal asthma      History of pancreatectomy      FAMILY HISTORY:    Family Hx substance abuse [ ]yes [ ]no  ITEMS NOT CHECKED ARE NOT PRESENT    SOCIAL HISTORY:   Significant other/partner[ ]  Children[ ]  Jewish/Spirituality:  Substance hx:  [ ]   Tobacco hx:  [ ]   Alcohol hx: [ ]   Home Opioid hx:  [ ] I-Stop Reference No:  This report was requested by: Kathy Charles | Reference #: [id number]    Practitioner Count: 2  Pharmacy Count: 1  Current Opioid Prescriptions: 1  Current Benzodiazepine Prescriptions: 0  Current Stimulant Prescriptions: 0      Patient Demographic Information (PDI)       PDI	First Name	Last Name	Birth Date	Gender	Street Address	[facility] Code  STEPHEN Arce	1955	Female	[address] [place]	[address]    Prescription Information      PDI Filter:    PDI	My Rx	Current Rx	Drug Type	Rx Written	Rx Dispensed	Drug	Quantity	Days Supply	Prescriber Name	Prescriber JUAN M #	Payment Method	Dispenser  A	N	Y	O	10/28/2024	2024	buprenorphine 5 mcg/hr patch	4	28	Laura Haywood)	[id number]	[facility] #[id number]  A	N	N	O	10/02/2024	10/05/2024	oxycodone hcl (ir) 5 mg tablet	10	5	O'Selena Hernandez MD	[id number]	Insurance	[facility] #[id number]  A	N	N	S	2024	methylphenidate 5 mg tablet	60	30	Nathan Loyd	[id number]	Insurance	[facility] #[id number]  A	N	N	S	2024	methylphenidate 5 mg tablet	30	30	Nathan Loyd	[id number]	Insurance	[facility] #[id number]    Living Situation: [ ]Home  [ ]Long term care  [ ]Rehab [ ]Other    ADVANCE DIRECTIVES:    DNR/MOLST  [ ]  Living Will  [ ]   DECISION MAKER(s):  [ ] Health Care Proxy(s)  [ ] Surrogate(s)  [ ] Guardian           Name(s): Phone Number(s):    BASELINE (I)ADL(s) (prior to admission):  [place]: [ ]Total  [ ] Moderate [ ]Dependent    Allergies    phenytoin (Rash)  ibuprofen (Other)  Dilantin (Unknown)    Intolerances    MEDICATIONS  (STANDING):  dexAMETHasone  Injectable 4 milliGRAM(s) IV Push every 12 hours  dextrose 5% + sodium chloride 0.9%. 1000 milliLiter(s) (100 mL/Hr) IV Continuous <Continuous>  dextrose 5%. 1000 milliLiter(s) (50 mL/Hr) IV Continuous <Continuous>  dextrose 5%. 1000 milliLiter(s) (100 mL/Hr) IV Continuous <Continuous>  dextrose 50% Injectable 25 Gram(s) IV Push once  dextrose 50% Injectable 25 Gram(s) IV Push once  dextrose 50% Injectable 12.5 Gram(s) IV Push once  glucagon  Injectable 1 milliGRAM(s) IntraMuscular once  heparin   Injectable 5000 Unit(s) SubCutaneous every 12 hours  insulin lispro (ADMELOG) corrective regimen sliding scale   SubCutaneous every 6 hours  metoclopramide Injectable 10 milliGRAM(s) IV Push every 8 hours  octreotide  Injectable 100 MICROGram(s) SubCutaneous every 8 hours    MEDICATIONS  (PRN):  dextrose Oral Gel 15 Gram(s) Oral once PRN Blood Glucose LESS THAN 70 milliGRAM(s)/deciliter  morphine  - Injectable 2 milliGRAM(s) IV Push every 4 hours PRN Severe Pain (7 - 10)  ondansetron Injectable 4 milliGRAM(s) IV Push every 8 hours PRN Nausea and/or Vomiting    PRESENT SYMPTOMS: [ ]Unable to self-report  [ ] CPOT [ ] PAINADs [ ] RDOS  Source if other than patient:  [ ]Family   [ ]Team     Pain: [ ]yes [ ]no  QOL impact -   Location -                    Aggravating factors -  Quality -  Radiation -  Timing-  Severity (0-10 scale):  Minimal acceptable level/pain goal (0-10 scale):     CPOT:    https://www.[facility].org/getattachment/[email or web address]/Critical-Care-Pain-Observation-Tool-(CPOT)    Dyspnea:                           [ ]Mild [ ]Moderate [ ]Severe  Anxiety:                             [ ]Mild [ ]Moderate [ ]Severe  Fatigue:                             [ ]Mild [ ]Moderate [ ]Severe  Nausea:                             [ ]Mild [ ]Moderate [ ]Severe  Loss of appetite:              [ ]Mild [ ]Moderate [ ]Severe  Constipation:                    [ ]Mild [ ]Moderate [ ]Severe    Other Symptoms:  [ ]All other review of systems negative     PCSSQ[Palliative Care Spiritual Screening Question]   Severity (0-10):  Chaplaincy Referral: [ ] yes [ ] refused [ ] following [ ] deferred     Caregiver [place]? : [ ] yes [ ] no [ ] Deferred [ ] Declined             Social work referral [ ] Patient & Family Centered Care Referral [ ]  Anticipatory Grief present?:  [ ] yes [ ] no  [ ] Deferred                  Social work referral [ ] Patient & Family Centered Care Referral [ ]    PHYSICAL EXAM:  Vital Signs Last 24 Hrs  T(C): 36.5 (2024 04:06), Max: 36.7 (2024 20:31)  T(F): 97.7 (2024 04:06), Max: 98.1 (2024 20:31)  HR: 73 (2024 04:06) (71 - 88)  BP: 157/78 (2024 04:06) (131/83 - 157/78)  BP(mean): --  RR: 18 (2024 04:06) (18 - 18)  SpO2: 96% (2024 04:06) (96% - 97%)    Parameters below as of 2024 04:06  Patient On (Oxygen Delivery Method): room air     I&O's Summary    2024 08:01  -  2024 07:00  --------------------------------------------------------  IN: 1860 mL / OUT: 750 mL / NET: 1110 mL      GENERAL: [ ]Cachexia    [ ]Alert  [ ]Oriented x   [ ]Lethargic  [ ]Unarousable  [ ]Verbal  [ ]Non-Verbal  Behavioral:   [ ] Anxiety  [ ] Delirium [ ] Agitation [ ] Other  HEENT:  [ ]Normal   [ ]Dry mouth   [ ]ET Tube/Trach  [ ]Oral lesions  PULMONARY:   [ ]Clear [ ]Tachypnea  [ ]Audible excessive secretions   [ ]Rhonchi        [ ]Right [ ]Left [ ]Bilateral  [ ]Crackles        [ ]Right [ ]Left [ ]Bilateral  [ ]Wheezing     [ ]Right [ ]Left [ ]Bilateral  [ ]Diminished breath sounds [ ]right [ ]left [ ]bilateral  CARDIOVASCULAR:    [ ]Regular [ ]Irregular [ ]Tachy  [ ]Rober [ ]Murmur [ ]Other  GASTROINTESTINAL:  [ ]Soft  [ ]Distended   [ ]+BS  [ ]Non tender [ ]Tender  [ ]Other [ ]PEG [ ]OGT/ NGT  Last BM:  GENITOURINARY:  [ ]Normal [ ] Incontinent   [ ]Oliguria/Anuria   [ ][place]  MUSCULOSKELETAL:   [ ]Normal   [ ]Weakness  [ ]Bed/Wheelchair bound [ ]Edema  NEUROLOGIC:   [ ]No focal deficits  [ ]Cognitive impairment  [ ]Dysphagia [ ]Dysarthria [ ]Paresis [ ]Other   SKIN: Please see flowsheets   [ ]Normal  [ ]Rash  [ ]Other  [ ]Pressure ulcer(s)       Present on admission [ ]y [ ]n    CRITICAL CARE:  [ ] Shock Present  [ ]Septic [ ]Cardiogenic [ ]Neurologic [ ]Hypovolemic  [ ]  Vasopressors [ ]  Inotropes   [ ]Respiratory failure present [ ]Mechanical ventilation [ ]Non-invasive ventilatory support [ ]High flow    [ ]Acute  [ ]Chronic [ ]Hypoxic  [ ]Hypercarbic [ ]Other  [ ]Other organ failure     LABS:                        8.5    7.79  )-----------( 196      ( 2024 06:55 )             26.0   11-03    138  |  98  |  20  ----------------------------<  210[H]  4.0   |  30  |  1.12    Ca    8.1[L]      2024 06:55  Phos  3.7       Mg     1.9         TPro  7.0  /  Alb  3.4  /  TBili  0.5  /  DBili  x   /  AST  20  /  ALT  27  /  AlkPhos  171[H]    PT/INR - ( 2024 13:37 )   PT: 11.9 sec;   INR: 1.04 ratio         PTT - ( 2024 13:37 )  PTT:26.1 sec    Urinalysis Basic - ( 2024 09:45 )    Color: Yellow / Appearance: Turbid / S.014 / pH: x  Gluc: x / Ketone: Negative mg/dL  / Bili: Negative / Urobili: 1.0 mg/dL   Blood: x / Protein: 100 mg/dL / Nitrite: Negative   Leuk Esterase: Trace / RBC: 2 /HPF / WBC 20 /HPF   Sq Epi: x / Non Sq Epi: 1 /HPF / Bacteria: Too Numerous to count /HPF      RADIOLOGY & ADDITIONAL STUDIES:    PROTEIN CALORIE MALNUTRITION PRESENT: [ ]mild [ ]moderate [ ]severe [ ]underweight [ ]morbid obesity  https://www.andeal.org/vault/2440/web/files/ONC/Table_Clinical%20Characteristics%20to%20Document%20Malnutrition-White%20JV%20et%20al%2020.pdf    Height (cm): 154.9 (24 @ 00:32), 154.9 (23 @ 15:52), 154.9 (11-10-23 @ 10:23)  Weight (kg): 36 (24 @ 00:32), 44.5 (23 @ 15:52), 54 (11-10-23 @ 10:23)  BMI (kg/m2): 15 (24 @ 00:32), 18.5 (23 @ 15:52), 22.5 (11-10-23 @ 10:23)    [ ]PPSV2 < or = to 30% [ ]significant weight loss  [ ]poor nutritional intake  [ ]anasarca[ ]Artificial Nutrition      Other REFERRALS:  [ ]Hospice  [ ]Child Life  [ ]Social Work  [ ]Case management [ ]Holistic Therapy    [facility] Geriatrics and Palliative Care  Kathy Charles, Palliative Care Attending  Date of [id number] @ 10:12    HPI:  69F PMHx DM, HTN, hypothyroid, pancreatic ca s/p resection ', s/p chemo/rt then recurrence ' s/p RT.   Presenting w/ diffuse abd pain, n/v and constipation x3d. Pt has oxycodone for chronic pain, it has not been helping at home. Pt took OTC stool softner and miralax at home w/o relief.     CT imaging c/f pancreatic mass resulting in severe multifocal luminal narrowing along the distal stomach and duodenum, c/w gastric outlet obstruction. Filling defect in R external and common iliac veins, likely flow artifact. Severe narrowing of the portal confluence w/o discrete thrombosis.     Surg consult in the ED, put NGT in to low suction, malignant gastric obstruction cocktail (dexamethasone, reglan and octreotide). Surg onc will follow.     On my interview/exam, pt complains of abd cramping after enema and having BM.  (2024 23:43)    PERTINENT PM/SXH:   Pancreatic cancer  Hypertension  Hypothyroid  Seasonal asthma  History of pancreatectomy    FAMILY HISTORY: No significant family history     Family Hx substance abuse [ ]yes [ ]no  ITEMS NOT CHECKED ARE NOT PRESENT    SOCIAL HISTORY:   Significant other/partner[ x]  Children[ ]  Scientology/Spirituality:  Substance hx:  [ ]   Tobacco hx:  [ ]   Alcohol hx: [ ]   Home Opioid hx:  [ ] I-Stop Reference No:  This report was requested by: Kathy Charles | Reference #: [id number]    Practitioner Count: 2  Pharmacy Count: 1  Current Opioid Prescriptions: 1  Current Benzodiazepine Prescriptions: 0  Current Stimulant Prescriptions: 0      Patient Demographic Information (PDI)       PDI	First Name	Last Name	Birth Date	Gender	Street Address	[facility]	Zip Code  STEPHEN Arce	1955	Female	[address] [place]	[address]    Prescription Information      PDI Filter:    PDI	My Rx	Current Rx	Drug Type	Rx Written	Rx Dispensed	Drug	Quantity	Days Supply	Prescriber Name	Prescriber JUAN M #	Payment Method	Dispenser  A	N	Y	O	10/28/2024	2024	buprenorphine 5 mcg/hr patch	4	28	Laura Haywood)	[id number]	[facility] #[id number]  A	N	N	O	10/02/2024	10/05/2024	oxycodone hcl (ir) 5 mg tablet	10	5	O'Selena Hernandez MD	[id number]	Insurance	[facility] #[id number]  A	N	N	S	2024	methylphenidate 5 mg tablet	60	30	Nathan Loyd	[id number]	Insurance	[facility] #[id number]  A	N	N	S	2024	methylphenidate 5 mg tablet	30	30	Nathan Loyd	[id number]	Insurance	[facility] #[id number]    Living Situation: [ x]Home  [ ]Long term care  [ ]Rehab [ ]Other    ADVANCE DIRECTIVES:    DNR/MOLST  [ ]  Living Will  [ ]   DECISION MAKER(s): Patient   [ ] Health Care Proxy(s)  [ ] Surrogate(s)  [ ] Guardian           Name(s): Phone Number(s):    BASELINE (I)ADL(s) (prior to admission):  [place]: [x ]Total  [ ] Moderate [ ]Dependent    Allergies    phenytoin (Rash)  ibuprofen (Other)  Dilantin (Unknown)    Intolerances    MEDICATIONS  (STANDING):  dexAMETHasone  Injectable 4 milliGRAM(s) IV Push every 12 hours  dextrose 5% + sodium chloride 0.9%. 1000 milliLiter(s) (100 mL/Hr) IV Continuous <Continuous>  dextrose 5%. 1000 milliLiter(s) (50 mL/Hr) IV Continuous <Continuous>  dextrose 5%. 1000 milliLiter(s) (100 mL/Hr) IV Continuous <Continuous>  dextrose 50% Injectable 25 Gram(s) IV Push once  dextrose 50% Injectable 25 Gram(s) IV Push once  dextrose 50% Injectable 12.5 Gram(s) IV Push once  glucagon  Injectable 1 milliGRAM(s) IntraMuscular once  heparin   Injectable 5000 Unit(s) SubCutaneous every 12 hours  insulin lispro (ADMELOG) corrective regimen sliding scale   SubCutaneous every 6 hours  metoclopramide Injectable 10 milliGRAM(s) IV Push every 8 hours  octreotide  Injectable 100 MICROGram(s) SubCutaneous every 8 hours    MEDICATIONS  (PRN):  dextrose Oral Gel 15 Gram(s) Oral once PRN Blood Glucose LESS THAN 70 milliGRAM(s)/deciliter  morphine  - Injectable 2 milliGRAM(s) IV Push every 4 hours PRN Severe Pain (7 - 10)  ondansetron Injectable 4 milliGRAM(s) IV Push every 8 hours PRN Nausea and/or Vomiting    PRESENT SYMPTOMS: [ ]Unable to self-report  [ ] CPOT [ ] PAINADs [ ] RDOS  Source if other than patient:  [ ]Family   [ ]Team     Pain: [ x]yes [ ]no  QOL impact -  unable to eat  Location -   abdominal pain, throat irritation from NGT                   Aggravating factors - obstruction from cancer   Quality - sharp   Radiation - denies   Timing- intermittent   Severity (0-10 scale): 7  Minimal acceptable level/pain goal (0-10 scale): 3    CPOT:    https://www.[facility].org/getattachment/[email or web address]/Critical-Care-Pain-Observation-Tool-(CPOT)    Dyspnea:                           [ ]Mild [ ]Moderate [ ]Severe  Anxiety:                             [ ]Mild [ ]Moderate [ ]Severe  Fatigue:                             [ ]Mild [ ]Moderate [ ]Severe  Nausea:                             [ ]Mild [ ]Moderate [ ]Severe  Loss of appetite:              [ ]Mild [ ]Moderate [ ]Severe  Constipation:                    [ ]Mild [ ]Moderate [ ]Severe    Other Symptoms: dry mouth- eating ice chips   [x ]All other review of systems negative     PCSSQ[Palliative Care Spiritual Screening Question]   Severity (0-10):  Chaplaincy Referral: [ ] yes [ ] refused [ ] following [x ] deferred     Caregiver [place]? : [ ] yes [x ] no [ ] Deferred [ ] Declined             Social work referral [ ] Patient & Family Centered Care Referral [ ]  Anticipatory Grief present?:  [ ] yes [ x] no  [ ] Deferred                  Social work referral [ ] Patient & Family Centered Care Referral [ ]    PHYSICAL EXAM:  Vital Signs Last 24 Hrs  T(C): 36.5 (2024 04:06), Max: 36.7 (2024 20:31)  T(F): 97.7 (2024 04:06), Max: 98.1 (2024 20:31)  HR: 73 (2024 04:06) (71 - 88)  BP: 157/78 (2024 04:06) (131/83 - 157/78)  BP(mean): --  RR: 18 (2024 04:06) (18 - 18)  SpO2: 96% (2024 04:06) (96% - 97%)    Parameters below as of 2024 04:06  Patient On (Oxygen Delivery Method): room air     I&O's Summary    2024 08:01  -  2024 07:00  --------------------------------------------------------  IN: 1860 mL / OUT: 750 mL / NET: 1110 mL      GENERAL: [ ]Cachexia    [x ]Alert  [x ]Oriented x 4  [ ]Lethargic  [ ]Unarousable  [ x]Verbal  [ ]Non-Verbal  Behavioral:   [ ] Anxiety  [ ] Delirium [ ] Agitation [x ] Other  HEENT:  [ ]Normal   [x ]Dry mouth   [ ]ET Tube/Trach  [ ]Oral lesions  PULMONARY:   [ ]Clear [ ]Tachypnea  [ ]Audible excessive secretions   [ ]Rhonchi        [ ]Right [ ]Left [ ]Bilateral  [ ]Crackles        [ ]Right [ ]Left [ ]Bilateral  [ ]Wheezing     [ ]Right [ ]Left [ ]Bilateral  [ ]Diminished breath sounds [ ]right [ ]left [ ]bilateral  CARDIOVASCULAR:    [x ]Regular [ ]Irregular [ ]Tachy  [ ]Rober [ ]Murmur [ ]Other  GASTROINTESTINAL:  [x ]Soft  [ ]Distended   [x ]+BS  [ ]Non tender [x ]Tender  [ ]Other [ ]PEG [x ]OGT/ NGT for decompression  Last BM:   GENITOURINARY:  [x ]Normal [ ] Incontinent   [ ]Oliguria/Anuria   [ ][place]  MUSCULOSKELETAL:   [ x]Normal   [ ]Weakness  [ ]Bed/Wheelchair bound [ ]Edema  NEUROLOGIC:   [x ]No focal deficits  [ ]Cognitive impairment  [ ]Dysphagia [ ]Dysarthria [ ]Paresis [ ]Other   SKIN: Please see flowsheets   [x ]Normal  [ ]Rash  [ ]Other  [ ]Pressure ulcer(s)       Present on admission [ ]y [ ]n    CRITICAL CARE:  [ ] Shock Present  [ ]Septic [ ]Cardiogenic [ ]Neurologic [ ]Hypovolemic  [ ]  Vasopressors [ ]  Inotropes   [ ]Respiratory failure present [ ]Mechanical ventilation [ ]Non-invasive ventilatory support [ ]High flow    [ ]Acute  [ ]Chronic [ ]Hypoxic  [ ]Hypercarbic [ ]Other  [ ]Other organ failure     LABS:                        8.5    7.79  )-----------( 196      ( 2024 06:55 )             26.0       138  |  98  |  20  ----------------------------<  210[H]  4.0   |  30  |  1.12    Ca    8.1[L]      2024 06:55  Phos  3.7       Mg     1.9         TPro  7.0  /  Alb  3.4  /  TBili  0.5  /  DBili  x   /  AST  20  /  ALT  27  /  AlkPhos  171[H]    PT/INR - ( 2024 13:37 )   PT: 11.9 sec;   INR: 1.04 ratio         PTT - ( 2024 13:37 )  PTT:26.1 sec    Urinalysis Basic - ( 2024 09:45 )    Color: Yellow / Appearance: Turbid / S.014 / pH: x  Gluc: x / Ketone: Negative mg/dL  / Bili: Negative / Urobili: 1.0 mg/dL   Blood: x / Protein: 100 mg/dL / Nitrite: Negative   Leuk Esterase: Trace / RBC: 2 /HPF / WBC 20 /HPF   Sq Epi: x / Non Sq Epi: 1 /HPF / Bacteria: Too Numerous to count /HPF      RADIOLOGY & ADDITIONAL STUDIES: < from: CT Abdomen and Pelvis w/ Oral Cont and w/ IV Cont (24 @ 17:33) >  IMPRESSION:  Ill-defined hypodense pancreatic mass with resultant severe multifocal   luminal narrowing along thedistal stomach and duodenum. Findings are   consistent with a component of gastric outlet obstruction.    Filling defect in the right external and common iliac veins, most   consistent with flow artifact.    Severe narrowing of the portal confluence without discrete thrombosis.    Additional findings as per above.    Findings discussed by Dr. Roland radiology with ACP Fuentes of the   primary emergency department team at 6:22 PM on 2024 with read back   confirmation.    < end of copied text >      PROTEIN CALORIE MALNUTRITION PRESENT: [ ]mild [ ]moderate [ ]severe [ ]underweight [ ]morbid obesity  https://www.andeal.org/vault/1800/web/files/ONC/Table_Clinical%20Characteristics%20to%20Document%20Malnutrition-White%20JV%20et%20al%466093.pdf    Height (cm): 154.9 (24 @ 00:32), 154.9 (23 @ 15:52), 154.9 (11-10-23 @ 10:23)  Weight (kg): 36 (24 @ 00:32), 44.5 (23 @ 15:52), 54 (11-10-23 @ 10:23)  BMI (kg/m2): 15 (24 @ 00:32), 18.5 (23 @ 15:52), 22.5 (11-10-23 @ 10:23)    [ ]PPSV2 < or = to 30% [ ]significant weight loss  [ ]poor nutritional intake  [ ]anasarca[ ]Artificial Nutrition      Other REFERRALS:  [ ]Hospice  [ ]Child Life  [ ]Social Work  [ ]Case management [ ]Holistic Therapy

## 2024-11-03 NOTE — CONSULT NOTE ADULT - ASSESSMENT
70 yo F with PMH of HTN, hypothyroidism, hiatal hernia, gastritis, and pancreatic cancer s/p distal pancreatectomy and splectomy 2021 and chemo RT c/b recurrence to abdominal pain s/p resection and RT, and biliary stricture s/p stent in 2023 presenting with abdominal pain and N/V for 1 week, found to have ill defined hypodense pancreatic mass with resultant severe multifocal luminal narrowing along stomach and duodenum.     Impression:  #Gastric outlet obstruction s/p NGT placement  #Pancreatic cancer s/p distal pancreatectomy and chemo/RT  #Hypodense pancreas mass with resultant severe multifocal luminal narrowing along stomach and duodenum  #Hx of biliary stricture s/p plastic stent 11/2023    P/w abdominal pain and n/v for 1 week, found to have gastric outlet obstruction now s/p NGT for decompression. GI consulted for evaluation for endoscopic intervention. Of note, pt w/ hx of pancreatic cancer s/p distal pancreatectomy and splenectomy c/b recurrence and abdominal wall resection with prior admission here 11/2023 for biliary stricture s/p plastic stent 11/2023. CT on admission shows ill-defined hypodense pancreatic mass with resultant severe multifocal luminal narrowing along the distal stomach and duodenum, narrowing of the IVC due to extrinsic compression from pancreas, severe narrowing of the portal confluence without discrete thrombosis, and moderate central and mild peripheral intrahepatic biliary dilatation with pneumobilia in the left greater than right hepatic bile ducts with patent CBD stent. Findings c/w gastric outlet obstruction due to pancreatic mass which is suspicious for cancer recurrence. Multifocal narrowing may make stenting difficult, may be a candidate for endoscopic jejunostomy    - Will discuss with advanced attending in AM regarding role of stenting vs endoscopic jejunostomy  - Plastic stent in place from prior ERCP overdue for removal  - Keep patient NPO at [facility] for possible procedure  - Maintain NGT in place  - Malignant bowel obstruction protocol per surgery  - Send early am preop labs (cbc, T&S, coags, bmp)  - Appreciate oncology and surgery recommendations    All recommendations are tentative until note is attested by attending.     Rachel Faith, PGY4  Gastroenterology/Hepatology Fellow  Available on Microsoft Teams  [phone] (Long Range Pager)  [phone] (Short Range Pager [facility])    After 5 pm, please contact the on-call GI fellow for any urgent issues via the Hospital Call   70 yo F with PMH of HTN, hypothyroidism, hiatal hernia, gastritis, and pancreatic cancer s/p distal pancreatectomy and splectomy 2021 and chemo RT c/b recurrence to abdominal pain s/p resection and RT, and biliary stricture s/p stent in 2023 presenting with abdominal pain and N/V for 1 week, found to have ill defined hypodense pancreatic mass with resultant severe multifocal luminal narrowing along stomach and duodenum.     Impression:  #Gastric outlet obstruction s/p NGT placement  #Pancreatic cancer s/p distal pancreatectomy and chemo/RT  #Hypodense pancreas mass with resultant severe multifocal luminal narrowing along stomach and duodenum  #Hx of biliary stricture s/p stent 11/2023    P/w abdominal pain and n/v for 1 week, found to have gastric outlet obstruction now s/p NGT for decompression. GI consulted for evaluation for endoscopic intervention. Of note, pt w/ hx of pancreatic cancer s/p distal pancreatectomy and splenectomy c/b recurrence and abdominal wall resection with prior admission here 11/2023 for biliary stricture s/p stent 11/2023. CT on admission shows ill-defined hypodense pancreatic mass with resultant severe multifocal luminal narrowing along the distal stomach and duodenum, narrowing of the IVC due to extrinsic compression from pancreas, severe narrowing of the portal confluence without discrete thrombosis, and moderate central and mild peripheral intrahepatic biliary dilatation with pneumobilia in the left greater than right hepatic bile ducts with patent CBD stent. Findings c/w gastric outlet obstruction due to pancreatic mass which is suspicious for cancer recurrence. Multifocal narrowing may make stenting difficult, may be a candidate for endoscopic jejunostomy    - Will discuss with advanced attending in AM regarding role of endoscopic jejunostomy vs stenting  - Keep patient NPO at [facility] for possible procedure  - Maintain NGT in place  - Malignant bowel obstruction protocol per surgery  - Send early am preop labs (cbc, T&S, coags, bmp)  - Appreciate oncology and surgery recommendations    All recommendations are tentative until note is attested by attending.     Rachel Faith, PGY4  Gastroenterology/Hepatology Fellow  Available on Microsoft Teams  [phone] (Long Range Pager)  [phone] (Short Range Pager [facility])    After 5 pm, please contact the on-call GI fellow for any urgent issues via the Hospital Call

## 2024-11-03 NOTE — CONSULT NOTE ADULT - ASSESSMENT
69F PMHx DM, HTN, hypothyroid, pancreatic ca s/p resection '21, s/p chemo/rt then recurrence '22 s/p RT.  Presenting w/ diffuse abd pain, n/v and constipation x3d. Palliative consulted for symptom management and goc in setting of pancreatic cancer.

## 2024-11-03 NOTE — CONSULT NOTE ADULT - PROBLEM SELECTOR RECOMMENDATION 4
Patient shares she is supported by her spouse, Wayne Arce, [phone]. She reports having a lot of friends that are supporting her as well.     In the event of newly developing, evolving, or worsening symptoms, please contact the Palliative Medicine team via pager (if the patient is at [facility] #[phone] or if the patient is at [facility] #[phone]) The Geriatric and Palliative Medicine service has coverage 24 hours a day/ 7 days a week to provide medical recommendations regarding symptom management needs via telephone.

## 2024-11-03 NOTE — PROGRESS NOTE ADULT - PROBLEM SELECTOR PLAN 1
-NPO and has NGT to low suction per surgery. surg onc will follow. Strict NPO, no exception.   - 4mg IV dexamethasone, q12h  - 10 mg IV reglan, q8h  - 100mcg octreotide micrograms SubQ, q8h   - morphine 2mg q4h prn for pain. Monitor BM, if no bm in 48hr, enema prn while npo.   - D5NS @ 100cc/hr   -consider palliative consult given poor prognosis  - gi eval called

## 2024-11-03 NOTE — PROGRESS NOTE ADULT - ASSESSMENT
69F PMHx DM, HTN, hypothyroid, pancreatic ca s/p resection '21, s/p chemo/rt then recurrence '22 s/p RT.  Presenting w/ diffuse abd pain, n/v and constipation x3d.     #Pancreatic ca  --[place] [facility] [place]s/p distal pancreatectomy and splenectomy (06/2021)  --s/p adjuvant tx w/ mFOLFIRINOX (08/21-01/21), Xeloda +RTx 25 fractions (02/22-03/22)-> POD (01/23)  --most recent systemic tx w/ Gemcitabine and nab-paclitaxel (05/23-10/23, 02/24-07/24). Break in tx 2/2 jaundice and stent migration  --s/p RT x 10 fractions to abdominal wall mets 08/2024  --no plan for treatment inpatient and/or rehab  --ongoing care after discharge    #gastric outlet obstruction  --pt p/w abdominal pain, N/V  --CT a/p w/ ill-defined hypodense pancreatic mass w/ multifocal narrowing along distal stomach and duodenum resulting in gastric outlet obstruction.  --CTH No acute intracranial hemorrhage, mass effect, or midline shif  --surgery following no plan for acute surgical intervention at this time   --currently on mSBO protocol w/dex, reglan, and octreotide. NGT to low continuous suction    #Anemia  --2/2 malignancy and treatment  --baseline Hgb 8-9.5  --will check for nutritional deficiencies, supplement as needed  --transfuse for Hgb <7, 8 if symptomatic    will follow,    Bailey Herrera NP  Hematology/ Oncology  [facility]  [phone] (office)  [phone] (alt office)  Evenings and weekends please call MD on call or office   69F PMHx DM, HTN, hypothyroid, pancreatic ca s/p resection '21, s/p chemo/rt then recurrence '22 s/p RT.  Presenting w/ diffuse abd pain, n/v and constipation x3d.     #Pancreatic ca  --follows with Dr. Bess of [facility] [place]  --s/p distal pancreatectomy and splenectomy (06/2021)  --s/p adjuvant tx w/ mFOLFIRINOX (08/21-01/21), Xeloda +RTx 25 fractions (02/22-03/22)-> POD (01/23)  --most recent systemic tx w/ Gemcitabine and nab-paclitaxel (05/23-10/23, 02/24-07/24). Break in tx 2/2 jaundice and stent migration  --s/p RT x 10 fractions to abdominal wall mets 08/2024  --no plan for treatment inpatient and/or rehab  --plan for possible [place] [facility] [place]    #gastric outlet obstruction  --pt p/w abdominal pain, N/V  --CT a/p w/ ill-defined hypodense pancreatic mass w/ multifocal narrowing along distal stomach and duodenum resulting in gastric outlet obstruction.  --CTH No acute intracranial hemorrhage, mass effect, or midline shif  --surgery following no plan for acute surgical intervention at this time   --currently on mSBO protocol w/dex, reglan, and octreotide. NGT to low continuous suction    #Anemia  --2/2 malignancy and treatment  --baseline Hgb 8-9.5  --will check for nutritional deficiencies, supplement as needed  --transfuse for Hgb <7, 8 if symptomatic    will follow,    Bailey Herrera NP  Hematology/ Oncology  [facility]  [phone] (office)  [phone] (alt office)  Evenings and weekends please call MD on call or office

## 2024-11-03 NOTE — CONSULT NOTE ADULT - PROBLEM SELECTOR RECOMMENDATION 9
Patient diagnosed with pancreatic cancer 2021 s/p resection, chemo/RT and now with recurrent in 2022 s/p RT. Patient currently on active DMT with Dr. Bess ([facility] [place])   > Appreciate inpt heme/onc recs

## 2024-11-03 NOTE — PROGRESS NOTE ADULT - SUBJECTIVE AND OBJECTIVE BOX
[facility] SURGERY PROGRESS NOTE    Patient is a 69y old  Female who presents with a chief complaint of     OVERNIGHT EVENTS: NAEON      SUBJECTIVE:   Patient seen and examined at bedside with surgical team. Pt states she is feeling okay. Had a BM overnight but not passing flatus. No n/v.       OBJECTIVE     Vital Signs Last 24 Hrs  T(C): 36.5 (03 Nov 2024 04:06), Max: 36.7 (02 Nov 2024 20:31)  T(F): 97.7 (03 Nov 2024 04:06), Max: 98.1 (02 Nov 2024 20:31)  HR: 73 (03 Nov 2024 04:06) (71 - 88)  BP: 157/78 (03 Nov 2024 04:06) (131/83 - 157/78)  BP(mean): --  RR: 18 (03 Nov 2024 04:06) (18 - 18)  SpO2: 96% (03 Nov 2024 04:06) (96% - 97%)    Parameters below as of 03 Nov 2024 04:06  Patient On (Oxygen Delivery Method): room air    I&O's Detail    02 Nov 2024 08:01  -  03 Nov 2024 07:00  --------------------------------------------------------  IN:    dextrose 5% + sodium chloride 0.9%: 1200 mL    IV PiggyBack: 300 mL    Oral Fluid: 360 mL  Total IN: 1860 mL    OUT:    Nasogastric/Oral tube (mL): 400 mL    Voided (mL): 350 mL  Total OUT: 750 mL    Total NET: 1110 mL          Physical Exam  Constitutional: A&Ox3, NAD, cachectic  HEENT: NGT in place   Respiratory: Breathing comfortably on RA  Gastrointestinal: Softly distended, nontender   Extremities: Moving all extremities, no edema    Medications  MEDICATIONS  (STANDING):  dexAMETHasone  Injectable 4 milliGRAM(s) IV Push every 12 hours  dextrose 5% + sodium chloride 0.9%. 1000 milliLiter(s) (100 mL/Hr) IV Continuous <Continuous>  dextrose 5%. 1000 milliLiter(s) (50 mL/Hr) IV Continuous <Continuous>  dextrose 5%. 1000 milliLiter(s) (100 mL/Hr) IV Continuous <Continuous>  dextrose 50% Injectable 25 Gram(s) IV Push once  dextrose 50% Injectable 12.5 Gram(s) IV Push once  dextrose 50% Injectable 25 Gram(s) IV Push once  glucagon  Injectable 1 milliGRAM(s) IntraMuscular once  heparin   Injectable 5000 Unit(s) SubCutaneous every 12 hours  insulin lispro (ADMELOG) corrective regimen sliding scale   SubCutaneous every 6 hours  metoclopramide Injectable 10 milliGRAM(s) IV Push every 8 hours  octreotide  Injectable 100 MICROGram(s) SubCutaneous every 8 hours    MEDICATIONS  (PRN):  dextrose Oral Gel 15 Gram(s) Oral once PRN Blood Glucose LESS THAN 70 milliGRAM(s)/deciliter  morphine  - Injectable 2 milliGRAM(s) IV Push every 4 hours PRN Severe Pain (7 - 10)  ondansetron Injectable 4 milliGRAM(s) IV Push every 8 hours PRN Nausea and/or Vomiting        LABS:                        8.5    7.79  )-----------( 196      ( 03 Nov 2024 06:55 )             26.0     11-03    138  |  98  |  20  ----------------------------<  210[H]  4.0   |  30  |  1.12    Ca    8.1[L]      03 Nov 2024 06:55  Phos  3.7     11-03  Mg     1.9     11-03    TPro  7.0  /  Alb  3.4  /  TBili  0.5  /  DBili  x   /  AST  20  /  ALT  27  /  AlkPhos  171[H]  11-01    PT/INR - ( 01 Nov 2024 13:37 )   PT: 11.9 sec;   INR: 1.04 ratio         PTT - ( 01 Nov 2024 13:37 )  PTT:26.1 sec  LIVER FUNCTIONS - ( 01 Nov 2024 17:32 )  Alb: 3.4 g/dL / Pro: 7.0 g/dL / ALK PHOS: 171 U/L / ALT: 27 U/L / AST: 20 U/L / GGT: x           Urinalysis Basic - ( 03 Nov 2024 06:55 )    Color: x / Appearance: x / SG: x / pH: x  Gluc: 210 mg/dL / Ketone: x  / Bili: x / Urobili: x   Blood: x / Protein: x / Nitrite: x   Leuk Esterase: x / RBC: x / WBC x   Sq Epi: x / Non Sq Epi: x / Bacteria: x

## 2024-11-03 NOTE — PROGRESS NOTE ADULT - ASSESSMENT
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 CT AP w/ ill-defined hypodense pancreatic mass w/ multifocal narrowing along distal stomach and duodenum resulting in gastric outlet obstruction. S/p NGT placement.     P:   - No acute surgical intervention at this time  - If patient would like care at 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 CT AP w/ ill-defined hypodense pancreatic mass w/ multifocal narrowing along distal stomach and duodenum resulting in gastric outlet obstruction. S/p NGT placement.     P:   - No acute surgical intervention at this time  - F/u GI recs  - If patient would like care at [facility] [place], would recommend transfer  - Malignant bowel obstruction protocol      - 4mg IV dexamethasone, q12h     - 10 mg IV reglan, q8h     - 100 micrograms octreotide SubQ, q8h   - NPO/IVF  - NGT to LCWS  - Surgical oncology to follow    [facility] Team Surgery  [id number], [phone]

## 2024-11-03 NOTE — PROGRESS NOTE ADULT - SUBJECTIVE AND OBJECTIVE BOX
Patient is a 69y old  Female who presents with a chief complaint of SBO    Patient seen and examined  No acute overnight events reported  No new complaints offered     MEDICATIONS  (STANDING):  dexAMETHasone  Injectable 4 milliGRAM(s) IV Push every 12 hours  dextrose 5% + sodium chloride 0.9%. 1000 milliLiter(s) (100 mL/Hr) IV Continuous <Continuous>  dextrose 5%. 1000 milliLiter(s) (50 mL/Hr) IV Continuous <Continuous>  dextrose 5%. 1000 milliLiter(s) (100 mL/Hr) IV Continuous <Continuous>  dextrose 50% Injectable 25 Gram(s) IV Push once  dextrose 50% Injectable 12.5 Gram(s) IV Push once  dextrose 50% Injectable 25 Gram(s) IV Push once  glucagon  Injectable 1 milliGRAM(s) IntraMuscular once  heparin   Injectable 5000 Unit(s) SubCutaneous every 12 hours  insulin lispro (ADMELOG) corrective regimen sliding scale   SubCutaneous every 6 hours  metoclopramide Injectable 10 milliGRAM(s) IV Push every 8 hours  octreotide  Injectable 100 MICROGram(s) SubCutaneous every 8 hours    MEDICATIONS  (PRN):  dextrose Oral Gel 15 Gram(s) Oral once PRN Blood Glucose LESS THAN 70 milliGRAM(s)/deciliter  morphine  - Injectable 2 milliGRAM(s) IV Push every 4 hours PRN Severe Pain (7 - 10)  ondansetron Injectable 4 milliGRAM(s) IV Push every 8 hours PRN Nausea and/or Vomiting      Vital Signs Last 24 Hrs  T(C): 36.5 (03 Nov 2024 04:06), Max: 36.7 (02 Nov 2024 20:31)  T(F): 97.7 (03 Nov 2024 04:06), Max: 98.1 (02 Nov 2024 20:31)  HR: 73 (03 Nov 2024 04:06) (71 - 88)  BP: 157/78 (03 Nov 2024 04:06) (131/83 - 157/78)  BP(mean): --  RR: 18 (03 Nov 2024 04:06) (18 - 18)  SpO2: 96% (03 Nov 2024 04:06) (96% - 97%)    Parameters below as of 03 Nov 2024 04:06  Patient On (Oxygen Delivery Method): room air        PE  Awake,  Anicteric, MMM  RRR  CTAB  Abd soft, NT, ND  +NGT  No c/c                        8.5    7.79  )-----------( 196      ( 03 Nov 2024 06:55 )             26.0       11-03    138  |  98  |  20  ----------------------------<  210[H]  4.0   |  30  |  1.12    Ca    8.1[L]      03 Nov 2024 06:55  Phos  3.7     11-03  Mg     1.9     11-03    TPro  7.0  /  Alb  3.4  /  TBili  0.5  /  DBili  x   /  AST  20  /  ALT  27  /  AlkPhos  171[H]  11-01

## 2024-11-03 NOTE — CONSULT NOTE ADULT - PROBLEM SELECTOR RECOMMENDATION 2
CT a/p w/ ill-defined hypodense pancreatic mass w/ multifocal narrowing along distal stomach and duodenum resulting in gastric outlet obstruction.  > Appreciate surgery recs- no acute surgical intervention  > Continue MBO protocol- ngt/ivf, octreotide   > Last BM 11/2

## 2024-11-04 NOTE — PROGRESS NOTE ADULT - PROBLEM SELECTOR PLAN 3
I-stop reviewed. Patient was using Buprenorphine 5mcg patch (taken off on admission, hospital does not have buprenorphine patches on formulary)   > Continue 2mg IV morphine q4h prn severe pain (no PRNs required in last 24hr 7am-7am)  > start tylenol 650mg q6h prn mild pain  > narcan prn   > dulcolax suppository qd prn if no contraindications while patient is NPO  > Per i-stop, patient has outpatient Palliative care provider with [facility]- Dr. Laura Haywood  > Please consider ordering hurricane spray for throat irritation from NGT. I-stop reviewed. Patient was using Buprenorphine 5mcg patch (taken off on admission, hospital does not have buprenorphine patches on formulary)   > Continue 2mg IV morphine q4h prn severe pain (no PRNs required in last 24hr 7am-7am)  > recommend IV tylenol for mild pain  > narcan prn   > dulcolax suppository qd prn if no contraindications while patient is NPO  > Per i-stop, patient has outpatient Palliative care provider with [facility]- Dr. Laura Haywood  > Please consider ordering hurricane spray for throat irritation from NGT. Controlled at the moment  > Advise recommend IV Tylenol for mild pain  > Advise to continue  2mg IV morphine q4h prn severe pain (no PRNs required in last 24hr 7am-7am)  > Please consider ordering hurricane spray for throat irritation from NGT.  > Patient follows with  outpatient Palliative care provider with [facility]- Dr. Laura Haywood  > narcan prn

## 2024-11-04 NOTE — DIETITIAN INITIAL EVALUATION ADULT - SIGNS/SYMPTOMS
severe muscle/fat loss, intake </=50% estimated energy needs =/>5 days, >7.5% wt loss x <3mo, BMI<19 pancreatic cancer per chart

## 2024-11-04 NOTE — DIETITIAN INITIAL EVALUATION ADULT - ORAL INTAKE PTA/DIET HISTORY
Decreased appetite and PO intake PTA in setting of altered GI function and cancer treatments. Watches sugar intake in setting of DM. Confirms no known food allergies. Drinks Premiere protein shakes PTA.

## 2024-11-04 NOTE — DIETITIAN INITIAL EVALUATION ADULT - PROBLEM SELECTOR PLAN 5
VTE ppx: sqh    STRICT NPO. NO EXCEPTIONS. Until re-eval by surgery    Attempted GOC discussion, pt reports feeling anxious and does not wish to discuss it tonight.

## 2024-11-04 NOTE — DIETITIAN INITIAL EVALUATION ADULT - ADD RECOMMEND
-Defer diet to team. If patient to continue with prolonged NPO status then recommend consulting TPN team for alternate means of nutrition.  -If PO diet warranted recommend Low Fiber + Consistent Carbohydrate diet. Defer texture/consistency to speech language pathologist/medical team.   -Monitor diet, weight, labs, skin, GI symptoms, and BM regularity.  -RD remains available upon request and will follow up per protocol.   -Malnutrition alert placed in chart.

## 2024-11-04 NOTE — DIETITIAN INITIAL EVALUATION ADULT - OTHER INFO
Patient reports weight loss to 80lb, wants to gain weight back.  Dosing weight: 79.3lb (11/1).  IBW: 105lb, %IBW: 76% based on dosing weight.  Weight history per [facility] HIE: 89.1lb (8/21/24), 96.4lb (7/31/24), 102lb (12/15/23), 99lb (11/28/23), 110lb (3/23/23), 115.5lb (11/9/22).  Weight appears decreasing, clinically significant for malnutrition. RD to continue to monitor weight trends as available/able.     -Hx Pancreatic cancer.  -Gastric outlet obstruction. NGT in place to suction. malignant bowel obstruction protocol in place, GI and Surgery following. Ordered for decadron which can increase blood glucose levels. Ordered for IV fluids in setting of NPO.   Correctional sliding scale insulin.  -s/p KCl 11/2 and 11/1.

## 2024-11-04 NOTE — DIETITIAN INITIAL EVALUATION ADULT - NSFNSGIIOFT_GEN_A_CORE
11-03-24 @ 07:01  -  11-04-24 @ 07:00  --------------------------------------------------------  OUT:    Nasogastric/Oral tube (mL): 900 mL  Total OUT: 900 mL    Total NET: -900 mL       Patient reports no nausea, reports constipation, not passing BMs, passing gas today. Last BM 2 days ago per patient. Bowel regimen: none  11-03-24 @ 07:01  -  11-04-24 @ 07:00  --------------------------------------------------------  OUT:    Nasogastric/Oral tube (mL): 900 mL  Total OUT: 900 mL    Total NET: -900 mL

## 2024-11-04 NOTE — PROGRESS NOTE ADULT - PROBLEM SELECTOR PLAN 2
CT a/p w/ ill-defined hypodense pancreatic mass w/ multifocal narrowing along distal stomach and duodenum resulting in gastric outlet obstruction.  > Appreciate surgery recs- no acute surgical intervention  > appreciate GI recs- evaluating for role of endoscopic jejunostomy vs stenting  > Continue MBO protocol- ngt/ivf, octreotide   > Last BM 11/2. CT a/p w/ ill-defined hypodense pancreatic mass w/ multifocal narrowing along distal stomach and duodenum resulting in gastric outlet obstruction.  > Appreciate surgery recs- no acute surgical intervention  > appreciate GI recs- evaluating for role of endoscopic jejunostomy vs stenting  > Continue MBO protocol- ngt/ivf, octreotide   > Last BM 11/2.    Patient and  would like to discuss with GI about stent placement

## 2024-11-04 NOTE — PROGRESS NOTE ADULT - SUBJECTIVE AND OBJECTIVE BOX
Paged the team: KENNETH Hay:  Just want to clarify if we're giving the albumin IV now, and how many hours for the soft restraints to be , can you pls enter an order for that?    -Will be re-evaluate by the team RE Restraints, and to give Albumin   Indication for Geriatrics and Palliative Care Services/INTERVAL HPI: 69F PMHx DM, HTN, hypothyroid, pancreatic ca s/p resection '21, s/p chemo/rt then recurrence '22 s/p RT.  Presenting w/ diffuse abd pain, n/v and constipation x3d. Palliative consulted for symptom management and goc in setting of pancreatic cancer.     SUBJECTIVE AND OBJECTIVE: Pt sitting up in bed with  at bedside. Reporting throat discomfort from NGT and lower abd pain. Reported passing flatus this morning but no BM. Pt asked if she's dying since she is being seen by palliative team. Refer to [facility] disucssion for further details.     DNR on chart:  Allergies    phenytoin (Rash)  ibuprofen (Other)  Dilantin (Unknown)    Intolerances    MEDICATIONS  (STANDING):  dexAMETHasone  Injectable 4 milliGRAM(s) IV Push every 12 hours  dextrose 5% + sodium chloride 0.9%. 1000 milliLiter(s) (100 mL/Hr) IV Continuous <Continuous>  dextrose 5%. 1000 milliLiter(s) (50 mL/Hr) IV Continuous <Continuous>  dextrose 5%. 1000 milliLiter(s) (100 mL/Hr) IV Continuous <Continuous>  dextrose 50% Injectable 25 Gram(s) IV Push once  dextrose 50% Injectable 25 Gram(s) IV Push once  dextrose 50% Injectable 12.5 Gram(s) IV Push once  glucagon  Injectable 1 milliGRAM(s) IntraMuscular once  heparin   Injectable 5000 Unit(s) SubCutaneous every 12 hours  insulin lispro (ADMELOG) corrective regimen sliding scale   SubCutaneous every 6 hours  metoclopramide Injectable 10 milliGRAM(s) IV Push every 8 hours  octreotide  Injectable 100 MICROGram(s) SubCutaneous every 8 hours    MEDICATIONS  (PRN):  benzocaine/butamben/tetracaine Spray 1 Spray(s) Topical every 6 hours PRN sore throat  dextrose Oral Gel 15 Gram(s) Oral once PRN Blood Glucose LESS THAN 70 milliGRAM(s)/deciliter  morphine  - Injectable 2 milliGRAM(s) IV Push every 4 hours PRN Severe Pain (7 - 10)  ondansetron Injectable 4 milliGRAM(s) IV Push every 8 hours PRN Nausea and/or Vomiting      ITEMS UNCHECKED ARE NOT PRESENT    PRESENT SYMPTOMS: [ ]Unable to self-report - see [ ] CPOT [ ] PAINADS [ ] RDOS  Source if other than patient:  [ ]Family   [ ]Team     Pain:  [ ]yes [ ]no  QOL impact -   Location -                    Aggravating factors -  Quality -  Radiation -  Timing-  Severity (0-10 scale):  Minimal acceptable level (0-10 scale):     CPOT:    https://www.[facility].org/getattachment/[email or web address]/Critical-Care-Pain-Observation-Tool-(CPOT)    Dyspnea:                           [ ]Mild [ ]Moderate [ ]Severe  Anxiety:                             [ ]Mild [ ]Moderate [ ]Severe  Fatigue:                             [ ]Mild [ ]Moderate [ ]Severe  Nausea:                             [ ]Mild [ ]Moderate [ ]Severe  Loss of appetite:              [ ]Mild [ ]Moderate [ ]Severe  Constipation:                    [ ]Mild [ ]Moderate [ ]Severe    PCSSQ[Palliative Care Spiritual Screening Question]   Severity (0-10):  Score of 4 or > indicate consideration of Chaplaincy referral.  Chaplaincy Referral: [ ] yes [ ] refused [ ] following [ ] Deferred     Caregiver [place]? : [ ] yes [ ] no [ ] Deferred [ ] Declined             Social work referral [ ] Patient & Family Centered Care Referral [ ]     Anticipatory Grief present?:  [ ] yes [ ] no  [ ] Deferred                  Social work referral [ ] Chaplaincy Referral[ ]      Other Symptoms:  [ ]All other review of systems negative   [ ]Unable to obtain due to poor mentation    Palliative Performance Status Version 2:         %      http://npcrc.org/files/news/palliative_performance_scale_ppsv2.pdf  PHYSICAL EXAM:  Vital Signs Last 24 Hrs  T(C): 36.8 (04 Nov 2024 04:34), Max: 36.8 (03 Nov 2024 20:53)  T(F): 98.3 (04 Nov 2024 04:34), Max: 98.3 (04 Nov 2024 04:34)  HR: 83 (04 Nov 2024 04:34) (67 - 83)  BP: 137/57 (04 Nov 2024 04:34) (137/57 - 162/81)  BP(mean): --  RR: 18 (04 Nov 2024 04:34) (18 - 18)  SpO2: 96% (04 Nov 2024 04:34) (96% - 97%)    Parameters below as of 04 Nov 2024 04:34  Patient On (Oxygen Delivery Method): room air     I&O's Summary    03 Nov 2024 07:01  -  04 Nov 2024 07:00  --------------------------------------------------------  IN: 0 mL / OUT: 1250 mL / NET: -1250 mL       GENERAL: [ ]Cachexia    [ ]Alert  [ ]Oriented x   [ ]Lethargic  [ ]Unarousable  [ ]Verbal  [ ]Non-Verbal  Behavioral:   [ ]Anxiety  [ ]Delirium [ ]Agitation [ ]Other  HEENT:  [ ]Normal   [ ]Dry mouth   [ ]ET Tube/Trach  [ ]Oral lesions  PULMONARY:   [ ]Clear [ ]Tachypnea  [ ]Audible excessive secretions   [ ]Rhonchi        [ ]Right [ ]Left [ ]Bilateral  [ ]Crackles        [ ]Right [ ]Left [ ]Bilateral  [ ]Wheezing     [ ]Right [ ]Left [ ]Bilateral  [ ]Diminished BS [ ] Right [ ]Left [ ]Bilateral  CARDIOVASCULAR:    [ ]Regular [ ]Irregular [ ]Tachy  [ ]Rober [ ]Murmur [ ]Other  GASTROINTESTINAL:  [ ]Soft  [ ]Distended   [ ]+BS  [ ]Non tender [ ]Tender  [ ]Other [ ]PEG [ ]OGT/ NGT   Last BM:   GENITOURINARY:  [ ]Normal [ ]Incontinent   [ ]Oliguria/Anuria   [ ]Payton  MUSCULOSKELETAL:   [ ]Normal   [ ]Weakness  [ ]Bed/Wheelchair bound [ ]Edema  NEUROLOGIC:   [ ]No focal deficits  [ ] Cognitive impairment  [ ] Dysphagia [ ]Dysarthria [ ] Paresis [ ]Other   SKIN:   [ ]Normal  [ ]Rash  [ ]Other  [ ]Pressure ulcer(s) [ ]y [ ]n present on admission    CRITICAL CARE:  [ ]Shock Present  [ ]Septic [ ]Cardiogenic [ ]Neurologic [ ]Hypovolemic  [ ]Vasopressors [ ]Inotropes  [ ]Respiratory failure present [ ]Mechanical Ventilation [ ]Non-invasive ventilatory support [ ]High-Flow   [ ]Acute  [ ]Chronic [ ]Hypoxic  [ ]Hypercarbic [ ]Other  [ ]Other organ failure     LABS:                        8.9    10.43 )-----------( 172      ( 04 Nov 2024 04:24 )             27.3   11-04    137  |  100  |  25[H]  ----------------------------<  198[H]  3.5   |  28  |  1.05    Ca    8.6      04 Nov 2024 04:25  Phos  3.7     11-03  Mg     1.9     11-03    PT/INR - ( 04 Nov 2024 04:24 )   PT: 12.9 sec;   INR: 1.12 ratio             Urinalysis Basic - ( 04 Nov 2024 04:25 )    Color: x / Appearance: x / SG: x / pH: x  Gluc: 198 mg/dL / Ketone: x  / Bili: x / Urobili: x   Blood: x / Protein: x / Nitrite: x   Leuk Esterase: x / RBC: x / WBC x   Sq Epi: x / Non Sq Epi: x / Bacteria: x      RADIOLOGY & ADDITIONAL STUDIES:    Protein Calorie Malnutrition Present: [ ]mild [ ]moderate [ ]severe [ ]underweight [ ]morbid obesity  https://www.andeal.org/vault/2440/web/files/ONC/Table_Clinical%20Characteristics%20to%20Document%20Malnutrition-White%20JV%20et%20al%202012.pdf    Height (cm): 154.9 (11-02-24 @ 00:32), 154.9 (12-08-23 @ 15:52), 154.9 (11-10-23 @ 10:23)  Weight (kg): 36 (11-02-24 @ 00:32), 44.5 (12-08-23 @ 15:52), 54 (11-10-23 @ 10:23)  BMI (kg/m2): 15 (11-02-24 @ 00:32), 18.5 (12-08-23 @ 15:52), 22.5 (11-10-23 @ 10:23)    [ ]PPSV2 < or = 30%  [ ]significant weight loss [ ]poor nutritional intake [ ]anasarca[ ]Artificial Nutrition    Other REFERRALS:  [ ]Hospice  [ ]Child Life  [ ]Social Work  [ ]Case management [ ]Holistic Therapy     Goals of Care Document: Indication for Geriatrics and Palliative Care Services/INTERVAL HPI: 69F PMHx DM, HTN, hypothyroid, pancreatic ca s/p resection '21, s/p chemo/rt then recurrence '22 s/p RT.  Presenting w/ diffuse abd pain, n/v and constipation x3d. Palliative consulted for symptom management and goc in setting of pancreatic cancer.     SUBJECTIVE AND OBJECTIVE: Pt sitting up in bed with  at bedside. Reporting throat discomfort from NGT and lower abd pain. Reported passing flatus this morning but no BM. Pt asked if she's dying since she is being seen by palliative team. Clarified what palliative care entails and palliative's role in pt's care. Further discussed with pt that she is not imminently dying but does have pancreatic cancer. Pt shared that she does not want to think about death and does not want to have any discussion regarding such things including advanced care planning. Pt and  shared that their goal is to receive more treatment which they reported is being offered by their outpatient oncologist. Emotional support provided and encouraged pt and  to have discussions regarding advanced care planning when pt is out of acute obstruction.     DNR on chart:  Allergies    phenytoin (Rash)  ibuprofen (Other)  Dilantin (Unknown)    Intolerances    MEDICATIONS  (STANDING):  dexAMETHasone  Injectable 4 milliGRAM(s) IV Push every 12 hours  dextrose 5% + sodium chloride 0.9%. 1000 milliLiter(s) (100 mL/Hr) IV Continuous <Continuous>  dextrose 5%. 1000 milliLiter(s) (50 mL/Hr) IV Continuous <Continuous>  dextrose 5%. 1000 milliLiter(s) (100 mL/Hr) IV Continuous <Continuous>  dextrose 50% Injectable 25 Gram(s) IV Push once  dextrose 50% Injectable 25 Gram(s) IV Push once  dextrose 50% Injectable 12.5 Gram(s) IV Push once  glucagon  Injectable 1 milliGRAM(s) IntraMuscular once  heparin   Injectable 5000 Unit(s) SubCutaneous every 12 hours  insulin lispro (ADMELOG) corrective regimen sliding scale   SubCutaneous every 6 hours  metoclopramide Injectable 10 milliGRAM(s) IV Push every 8 hours  octreotide  Injectable 100 MICROGram(s) SubCutaneous every 8 hours    MEDICATIONS  (PRN):  benzocaine/butamben/tetracaine Spray 1 Spray(s) Topical every 6 hours PRN sore throat  dextrose Oral Gel 15 Gram(s) Oral once PRN Blood Glucose LESS THAN 70 milliGRAM(s)/deciliter  morphine  - Injectable 2 milliGRAM(s) IV Push every 4 hours PRN Severe Pain (7 - 10)  ondansetron Injectable 4 milliGRAM(s) IV Push every 8 hours PRN Nausea and/or Vomiting      ITEMS UNCHECKED ARE NOT PRESENT    PRESENT SYMPTOMS: [ ]Unable to self-report - see [ ] CPOT [ ] PAINADS [ ] RDOS  Source if other than patient:  [ ]Family   [ ]Team     Pain:  [x ]yes [ ]no  QOL impact - cannot function  Location - lower abdomen                   Aggravating factors - none  Quality -  Radiation - non-radiating  Timing- intermittent  Severity (0-10 scale): 6/10  Minimal acceptable level (0-10 scale): 2/10    CPOT:    https://www.[facility].org/getattachment/[email or web address]/Critical-Care-Pain-Observation-Tool-(CPOT)    Dyspnea:                           [ ]Mild [ ]Moderate [ ]Severe  Anxiety:                             [ ]Mild [ ]Moderate [ ]Severe  Fatigue:                             [ ]Mild [ ]Moderate [ ]Severe  Nausea:                             [ ]Mild [ ]Moderate [ ]Severe  Loss of appetite:              [ ]Mild [ ]Moderate [ ]Severe  Constipation:                    [ ]Mild [ ]Moderate [ ]Severe    PCSSQ[Palliative Care Spiritual Screening Question]   Severity (0-10):  Score of 4 or > indicate consideration of Chaplaincy referral.  Chaplaincy Referral: [ ] yes [x ] refused [ ] following [ ] Deferred     Caregiver [place]? : [ ] yes [ ] no [ x] Deferred [ ] Declined             Social work referral [ ] Patient & Family Centered Care Referral [ ]     Anticipatory Grief present?:  [ ] yes [ ] no  [x ] Deferred                  Social work referral [ ] Chaplaincy Referral[ ]      Other Symptoms:  [ x]All other review of systems negative   [ ]Unable to obtain due to poor mentation    Palliative Performance Status Version 2:   60      %      http://npcrc.org/files/news/palliative_performance_scale_ppsv2.pdf  PHYSICAL EXAM:  Vital Signs Last 24 Hrs  T(C): 36.8 (04 Nov 2024 04:34), Max: 36.8 (03 Nov 2024 20:53)  T(F): 98.3 (04 Nov 2024 04:34), Max: 98.3 (04 Nov 2024 04:34)  HR: 83 (04 Nov 2024 04:34) (67 - 83)  BP: 137/57 (04 Nov 2024 04:34) (137/57 - 162/81)  BP(mean): --  RR: 18 (04 Nov 2024 04:34) (18 - 18)  SpO2: 96% (04 Nov 2024 04:34) (96% - 97%)    Parameters below as of 04 Nov 2024 04:34  Patient On (Oxygen Delivery Method): room air     I&O's Summary    03 Nov 2024 07:01  -  04 Nov 2024 07:00  --------------------------------------------------------  IN: 0 mL / OUT: 1250 mL / NET: -1250 mL       GENERAL: [ ]Cachexia    [x ]Alert  [x ]Oriented x 4  [ ]Lethargic  [ ]Unarousable  [ x]Verbal  [ ]Non-Verbal  Behavioral:   [ ] Anxiety  [ ] Delirium [ ] Agitation [x ] Other  HEENT:  [ ]Normal   [x ]Dry mouth   [ ]ET Tube/Trach  [ ]Oral lesions  PULMONARY:   [x ]Clear [ ]Tachypnea  [ ]Audible excessive secretions   [ ]Rhonchi        [ ]Right [ ]Left [ ]Bilateral  [ ]Crackles        [ ]Right [ ]Left [ ]Bilateral  [ ]Wheezing     [ ]Right [ ]Left [ ]Bilateral  [ ]Diminished breath sounds [ ]right [ ]left [ ]bilateral  CARDIOVASCULAR:    [x ]Regular [ ]Irregular [ ]Tachy  [ ]Rober [ ]Murmur [ ]Other  GASTROINTESTINAL:  [x ]Soft  [ ]Distended   [x ]+BS  [ ]Non tender [x ]Tender  [ ]Other [ ]PEG [x ]OGT/ NGT for decompression  Last BM: 11/2  GENITOURINARY:  [x ]Normal [ ] Incontinent   [ ]Oliguria/Anuria   [ ]Payton  MUSCULOSKELETAL:   [ x]Normal   [ ]Weakness  [ ]Bed/Wheelchair bound [ ]Edema  NEUROLOGIC:   [x ]No focal deficits  [ ]Cognitive impairment  [ ]Dysphagia [ ]Dysarthria [ ]Paresis [ ]Other   SKIN: Please see flowsheets   [x ]Normal  [ ]Rash  [ ]Other  [ ]Pressure ulcer(s)       Present on admission [ ]y [ ]n    CRITICAL CARE:  [ ]Shock Present  [ ]Septic [ ]Cardiogenic [ ]Neurologic [ ]Hypovolemic  [ ]Vasopressors [ ]Inotropes  [ ]Respiratory failure present [ ]Mechanical Ventilation [ ]Non-invasive ventilatory support [ ]High-Flow   [ ]Acute  [ ]Chronic [ ]Hypoxic  [ ]Hypercarbic [ ]Other  [ ]Other organ failure     LABS:                        8.9    10.43 )-----------( 172      ( 04 Nov 2024 04:24 )             27.3   11-04    137  |  100  |  25[H]  ----------------------------<  198[H]  3.5   |  28  |  1.05    Ca    8.6      04 Nov 2024 04:25  Phos  3.7     11-03  Mg     1.9     11-03    PT/INR - ( 04 Nov 2024 04:24 )   PT: 12.9 sec;   INR: 1.12 ratio             Urinalysis Basic - ( 04 Nov 2024 04:25 )    Color: x / Appearance: x / SG: x / pH: x  Gluc: 198 mg/dL / Ketone: x  / Bili: x / Urobili: x   Blood: x / Protein: x / Nitrite: x   Leuk Esterase: x / RBC: x / WBC x   Sq Epi: x / Non Sq Epi: x / Bacteria: x      RADIOLOGY & ADDITIONAL STUDIES:  CT abd/pelvis:  Ill-defined hypodense pancreatic mass with resultant severe multifocal   luminal narrowing along thedistal stomach and duodenum. Findings are   consistent with a component of gastric outlet obstruction.    Filling defect in the right external and common iliac veins, most   consistent with flow artifact.    Severe narrowing of the portal confluence without discrete thrombosis.    Additional findings as per above.    Findings discussed by Dr. Roland radiology with ACP Fuentes of the   primary emergency department team at 6:22 PM on 11/1/2024 with read back   confirmation.      Protein Calorie Malnutrition Present: [ ]mild [ ]moderate [ ]severe [ ]underweight [ ]morbid obesity  https://www.andeal.org/vault/0203/web/files/ONC/Table_Clinical%20Characteristics%20to%20Document%20Malnutrition-White%20JV%20et%20al%701656.pdf    Height (cm): 154.9 (11-02-24 @ 00:32), 154.9 (12-08-23 @ 15:52), 154.9 (11-10-23 @ 10:23)  Weight (kg): 36 (11-02-24 @ 00:32), 44.5 (12-08-23 @ 15:52), 54 (11-10-23 @ 10:23)  BMI (kg/m2): 15 (11-02-24 @ 00:32), 18.5 (12-08-23 @ 15:52), 22.5 (11-10-23 @ 10:23)    [ ]PPSV2 < or = 30%  [ ]significant weight loss [ ]poor nutritional intake [ ]anasarca[ ]Artificial Nutrition    Other REFERRALS:  [ ]Hospice  [ ]Child Life  [ ]Social Work  [ ]Case management [ ]Holistic Therapy     Goals of Care Document: Indication for Geriatrics and Palliative Care Services/INTERVAL HPI: 69F PMHx DM, HTN, hypothyroid, pancreatic ca s/p resection '21, s/p chemo/rt then recurrence '22 s/p RT.  Presenting w/ diffuse abd pain, n/v and constipation x3d. Palliative consulted for symptom management and goc in setting of pancreatic cancer.     Palliative Interval: Pt sitting up in bed with  at bedside. Reporting throat discomfort from NGT and lower abd pain. Reported passing flatus this morning but no BM. Pt asked if she's dying since she is being seen by palliative team. Clarified what palliative care entails and palliative's role in pt's care. Further discussed with pt that she is not imminently dying but does have pancreatic cancer. Pt shared that she does not want to think about death and does not want to have any discussion regarding such things including advanced care planning. Pt and  shared that their goal is to receive more treatment which they reported is being offered by their outpatient oncologist. Emotional support provided and encouraged pt and  to have discussions regarding advanced care planning when pt is out of acute obstruction.     DNR on chart:  Allergies    phenytoin (Rash)  ibuprofen (Other)  Dilantin (Unknown)    Intolerances    MEDICATIONS  (STANDING):  dexAMETHasone  Injectable 4 milliGRAM(s) IV Push every 12 hours  dextrose 5% + sodium chloride 0.9%. 1000 milliLiter(s) (100 mL/Hr) IV Continuous <Continuous>  dextrose 5%. 1000 milliLiter(s) (50 mL/Hr) IV Continuous <Continuous>  dextrose 5%. 1000 milliLiter(s) (100 mL/Hr) IV Continuous <Continuous>  dextrose 50% Injectable 25 Gram(s) IV Push once  dextrose 50% Injectable 25 Gram(s) IV Push once  dextrose 50% Injectable 12.5 Gram(s) IV Push once  glucagon  Injectable 1 milliGRAM(s) IntraMuscular once  heparin   Injectable 5000 Unit(s) SubCutaneous every 12 hours  insulin lispro (ADMELOG) corrective regimen sliding scale   SubCutaneous every 6 hours  metoclopramide Injectable 10 milliGRAM(s) IV Push every 8 hours  octreotide  Injectable 100 MICROGram(s) SubCutaneous every 8 hours    MEDICATIONS  (PRN):  benzocaine/butamben/tetracaine Spray 1 Spray(s) Topical every 6 hours PRN sore throat  dextrose Oral Gel 15 Gram(s) Oral once PRN Blood Glucose LESS THAN 70 milliGRAM(s)/deciliter  morphine  - Injectable 2 milliGRAM(s) IV Push every 4 hours PRN Severe Pain (7 - 10)  ondansetron Injectable 4 milliGRAM(s) IV Push every 8 hours PRN Nausea and/or Vomiting      ITEMS UNCHECKED ARE NOT PRESENT    PRESENT SYMPTOMS: [ ]Unable to self-report - see [ ] CPOT [ ] PAINADS [ ] RDOS  Source if other than patient:  [ ]Family   [ ]Team     Pain:  [x ]yes [ ]no  QOL impact - cannot function  Location - lower abdomen                   Aggravating factors - none  Quality -  Radiation - non-radiating  Timing- intermittent  Severity (0-10 scale): 6/10  Minimal acceptable level (0-10 scale): 2/10    CPOT:    https://www.[facility].org/getattachment/[email or web address]/Critical-Care-Pain-Observation-Tool-(CPOT)    Dyspnea:                           [ ]Mild [ ]Moderate [ ]Severe  Anxiety:                             [ ]Mild [ ]Moderate [ ]Severe  Fatigue:                             [ ]Mild [ x]Moderate [ ]Severe  Nausea:                             [ ]Mild [ ]Moderate [ ]Severe  Loss of appetite:              [ ]Mild [ x]Moderate [ ]Severe  Constipation:                    [ ]Mild [ ]Moderate [x ]Severe    PCSSQ[Palliative Care Spiritual Screening Question]   Severity (0-10):  Score of 4 or > indicate consideration of Chaplaincy referral.  Chaplaincy Referral: [ ] yes [x ] refused [ ] following [ ] Deferred   Caregiver [place]? : [ ] yes [ ] no [ x] Deferred [ ] Declined             Social work referral [ ] Patient & Family Centered Care Referral [ ]   Anticipatory Grief present?:  [ ] yes [ ] no  [x ] Deferred                  Social work referral [ ] Chaplaincy Referral[ ]      Other Symptoms:  [ x]All other review of systems negative   [ ]Unable to obtain due to poor mentation    Palliative Performance Status Version 2:   60      %      http://npcrc.org/files/news/palliative_performance_scale_ppsv2.pdf  PHYSICAL EXAM:  Vital Signs Last 24 Hrs  T(C): 36.8 (04 Nov 2024 04:34), Max: 36.8 (03 Nov 2024 20:53)  T(F): 98.3 (04 Nov 2024 04:34), Max: 98.3 (04 Nov 2024 04:34)  HR: 83 (04 Nov 2024 04:34) (67 - 83)  BP: 137/57 (04 Nov 2024 04:34) (137/57 - 162/81)  BP(mean): --  RR: 18 (04 Nov 2024 04:34) (18 - 18)  SpO2: 96% (04 Nov 2024 04:34) (96% - 97%)    Parameters below as of 04 Nov 2024 04:34  Patient On (Oxygen Delivery Method): room air     I&O's Summary    03 Nov 2024 07:01  -  04 Nov 2024 07:00  --------------------------------------------------------  IN: 0 mL / OUT: 1250 mL / NET: -1250 mL       GENERAL: [ ]Cachexia    [x ]Alert  [x ]Oriented x 4  [ ]Lethargic  [ ]Unarousable  [ x]Verbal  [ ]Non-Verbal  Behavioral:   [x ] Anxiety  [ ] Delirium [ ] Agitation [ ] Other  HEENT:  [ ]Normal   [x ]Dry mouth   [ ]ET Tube/Trach  [ ]Oral lesions  PULMONARY:   [x ]Clear [ ]Tachypnea  [ ]Audible excessive secretions   [ ]Rhonchi        [ ]Right [ ]Left [ ]Bilateral  [ ]Crackles        [ ]Right [ ]Left [ ]Bilateral  [ ]Wheezing     [ ]Right [ ]Left [ ]Bilateral  [ ]Diminished breath sounds [ ]right [ ]left [ ]bilateral  CARDIOVASCULAR:    [x ]Regular [ ]Irregular [ ]Tachy  [ ]Rober [ ]Murmur [ ]Other  GASTROINTESTINAL:  [x ]Soft  [ ]Distended   [x ]+BS  [ ]Non tender [x ]Tender  [ ]Other [ ]PEG [x ]OGT/ NGT for decompression  Last BM: 11/2  GENITOURINARY:  [x ]Normal [ ] Incontinent   [ ]Oliguria/Anuria   [ ]Payton  MUSCULOSKELETAL:   [ x]Normal   [ ]Weakness  [ ]Bed/Wheelchair bound [ ]Edema  NEUROLOGIC:   [x ]No focal deficits  [ ]Cognitive impairment  [ ]Dysphagia [ ]Dysarthria [ ]Paresis [ ]Other   SKIN: Please see flowsheets   [x ]Normal  [ ]Rash  [ ]Other  [ ]Pressure ulcer(s)       Present on admission [ ]y [ ]n    CRITICAL CARE:  [ ]Shock Present  [ ]Septic [ ]Cardiogenic [ ]Neurologic [ ]Hypovolemic  [ ]Vasopressors [ ]Inotropes  [ ]Respiratory failure present [ ]Mechanical Ventilation [ ]Non-invasive ventilatory support [ ]High-Flow   [ ]Acute  [ ]Chronic [ ]Hypoxic  [ ]Hypercarbic [ ]Other  [ ]Other organ failure     LABS:                        8.9    10.43 )-----------( 172      ( 04 Nov 2024 04:24 )             27.3   11-04    137  |  100  |  25[H]  ----------------------------<  198[H]  3.5   |  28  |  1.05    Ca    8.6      04 Nov 2024 04:25  Phos  3.7     11-03  Mg     1.9     11-03    PT/INR - ( 04 Nov 2024 04:24 )   PT: 12.9 sec;   INR: 1.12 ratio             Urinalysis Basic - ( 04 Nov 2024 04:25 )    Color: x / Appearance: x / SG: x / pH: x  Gluc: 198 mg/dL / Ketone: x  / Bili: x / Urobili: x   Blood: x / Protein: x / Nitrite: x   Leuk Esterase: x / RBC: x / WBC x   Sq Epi: x / Non Sq Epi: x / Bacteria: x      RADIOLOGY & ADDITIONAL STUDIES:  CT abd/pelvis:  Ill-defined hypodense pancreatic mass with resultant severe multifocal   luminal narrowing along the distal stomach and duodenum. Findings are   consistent with a component of gastric outlet obstruction.  Filling defect in the right external and common iliac veins, most consistent with flow artifact. Severe narrowing of the portal confluence without discrete thrombosis.     MR MRCP w/wo IV Cont (11.07.23 @ 22:55) >  Focal stricture of the lower common bile ductfor which differential   includes benign and malignant etiologies.    US Abdomen Doppler (11.06.23 @ 16:53) >  1. Mild intra and extra hepatic biliary dilatation, the common bile duct   measuring up to 1.1 cm. The common bile duct is dilated to the level of   the pancreatic head. The pancreas is not well assessed on this   examination. For further evaluation and to exclude distal obstructing   lesion, abdominal MRI/MRCP is recommended.  2. The gallbladder is markedly distended. No stones are identified.   Possible small amount of intraluminal sludge. No wall thickening,   pericholecystic fluid, or sonographic Pierre sign. No evidence for acute   cholecystitis.  3. The hepatic vasculature is patent, as detailed above. The main portal   vein, left portal vein and right portal vein are patent with hepatopedal   flow although with diminished peak systolic velocities, the velocity   within the main portal vein is 16.2 cm/s. Suggest follow-up as clinically   warranted.      Protein Calorie Malnutrition Present: [ ]mild [ ]moderate [x ]severe [ ]underweight [ ]morbid obesity  [email or web address]    Height (cm): 154.9 (11-02-24 @ 00:32), 154.9 (12-08-23 @ 15:52), 154.9 (11-10-23 @ 10:23)  Weight (kg): 36 (11-02-24 @ 00:32), 44.5 (12-08-23 @ 15:52), 54 (11-10-23 @ 10:23)  BMI (kg/m2): 15 (11-02-24 @ 00:32), 18.5 (12-08-23 @ 15:52), 22.5 (11-10-23 @ 10:23)    [ ]PPSV2 < or = 30%  [ ]significant weight loss [ x]poor nutritional intake [ ]anasarca[ ]Artificial Nutrition    Other REFERRALS:  [ ]Hospice  [ ]Child Life  [ ]Social Work  [x ]Case management [ ]Holistic Therapy                               Care Coordination Assessment 201    COGNITIVE/LEARNING 201  Mental Status    Answers: Alert,  Answers: Oriented: Person,  Answers: Oriented: Place,  Answers: Oriented: Time,  Answers: Oriented: Situation    Ability to make needs known    Answers: Understands/communicates without difficulty    ADMISSION HISTORY 201  Admitted From    Answers: Home    Functional Status Prior to Admission    Answers: Independent    Services Present on Admission    Answers: None    FINANCIAL 201  Does patient have financial concerns for discharge?    Answers: None    LIVING ARRANGEMENTS/SUPPORT 201  Housing Environment    Answers: House    Living Arrangements    Answers: Lives with spouse, significant other    Sources of support/caregivers    Answers: Spouse/Significant Other    CAREGIVER CONTACT 201  Does the patient wish to identify a Caregiver?    Answers: No    EMERGENCY CONTACTS OUTSIDE HOME 201  Emergency Contact    Answers: Emergency Contact Name Wayne Arce,  Answers: Emergency Contact Phone # [phone],  Answers: Emergency Contact Relationship spouse    DISCHARGE PLANNING 201  Potential Discharge Plan and Services    Answers: Home    Anticipated Transportation Needs for Discharge    Answers: Auto    Who will accompany patient at discharge?    Answers: Self,  Answers: Spouse/Significant Other    SCREENING 201  Social Work Screen and Referral    Answers: None    SUMMARY 201  Initial Clinical Summary    Notes: Weekend covering . Chart reviewed and case management  referral received. Patient admit with Dx gastric outlet obstruction. Pmhx-  asthma, hypothyroid and HTN. Met with patient at bedside. Discussed role of  case management, medical plan of care and discharge planning. Patient  verbalized understanding and provided with contact info for this .  Patient lives with spouse. Independent with ADL's and ambulation. No prior home  care. No skilled needs for home care identified at this time.  will  continue to follow-up and remain available.       Electronically signed by:  Lay Flores  Electronically signed on:  2024-11-02  11:34

## 2024-11-04 NOTE — DIETITIAN INITIAL EVALUATION ADULT - ETIOLOGY
increased physiological demand for nutrients  Inadequate energy intake secondary to altered GI function

## 2024-11-04 NOTE — DIETITIAN INITIAL EVALUATION ADULT - PERTINENT MEDS FT
MEDICATIONS  (STANDING):  dexAMETHasone  Injectable 4 milliGRAM(s) IV Push every 12 hours  dextrose 5% + sodium chloride 0.9%. 1000 milliLiter(s) (100 mL/Hr) IV Continuous <Continuous>  dextrose 5%. 1000 milliLiter(s) (50 mL/Hr) IV Continuous <Continuous>  dextrose 5%. 1000 milliLiter(s) (100 mL/Hr) IV Continuous <Continuous>  dextrose 50% Injectable 25 Gram(s) IV Push once  dextrose 50% Injectable 12.5 Gram(s) IV Push once  dextrose 50% Injectable 25 Gram(s) IV Push once  glucagon  Injectable 1 milliGRAM(s) IntraMuscular once  heparin   Injectable 5000 Unit(s) SubCutaneous every 12 hours  insulin lispro (ADMELOG) corrective regimen sliding scale   SubCutaneous every 6 hours  metoclopramide Injectable 10 milliGRAM(s) IV Push every 8 hours  octreotide  Injectable 100 MICROGram(s) SubCutaneous every 8 hours    MEDICATIONS  (PRN):  benzocaine/butamben/tetracaine Spray 1 Spray(s) Topical every 6 hours PRN sore throat  dextrose Oral Gel 15 Gram(s) Oral once PRN Blood Glucose LESS THAN 70 milliGRAM(s)/deciliter  morphine  - Injectable 2 milliGRAM(s) IV Push every 4 hours PRN Severe Pain (7 - 10)  ondansetron Injectable 4 milliGRAM(s) IV Push every 8 hours PRN Nausea and/or Vomiting

## 2024-11-04 NOTE — DIETITIAN INITIAL EVALUATION ADULT - REASON INDICATOR FOR ASSESSMENT
RD assessment warranted for: Consult for MST score 2 or >. Chart reviewed, events noted.  Source: medical record, patient, son at bedside.

## 2024-11-04 NOTE — PROGRESS NOTE ADULT - PROBLEM SELECTOR PLAN 1
Patient diagnosed with pancreatic cancer 2021 s/p resection, chemo/RT and now with recurrent in 2022 s/p RT. Patient currently on active [place] with [place]. Bess [place] [place]   > Appreciate inpt heme/onc recs Patient diagnosed with pancreatic cancer 2021 s/p resection, chemo/RT and now with recurrent in 2022 s/p RT. [place] [place]   - CT Abdomen and Pelvis w/ Oral Cont and w/ IV Cont (11.01.24 @ 17:33) Ill-defined hypodense pancreatic mass with resultant severe multifocal iluminal narrowing along the distal stomach and duodenum. Findings are consistent with a component of gastric outlet obstruction. Filling defect in the right external and common iliac veins, most consistent with flow artifact. Severe narrowing of the portal confluence without discrete thrombosis.  >Appreciate inpt heme/onc recs

## 2024-11-04 NOTE — PROGRESS NOTE ADULT - ASSESSMENT
69F admitted for malignant gastric outlet obstruction       Malignant Gastric outlet obstruction.   - CT A/P as noted; CT H as noted   - NPO and has NGT to low suction per surgery. surg onc will follow. Strict NPO, no exception.   - 4mg IV dexamethasone, q12h  - 10 mg IV reglan, q8h  - 100mcg octreotide micrograms SubQ, q8h   - morphine 2mg q4h prn for pain. Monitor BM, if no bm in 48hr, enema prn while npo.   - D5NS @ 100cc/hr   - GI eval consulted; F/u recs  - Heme/Onc, palliative eval appreciated; F/u recs     Pancreatic cancer.   - Diagnosed in 2021 s/p resection, chemo/RT and now with recurrent in 2022 s/p RT.  - On active DMT with [facility]   - Heme/Onc and palliative eval appreciated; F/u recs     Anemia  - Likely due to malignancy and treatment; F/u anemia labs   - Transfuse for Hgb <7  - Heme/Onc eval appreciated; F/u recs    Hypertension.   - No meds per mouth.   - Monitor BP. If sustained SBP >160, may use iv hydralazine as needed.    Type 2 diabetes mellitus.   - Hold home oral dm meds  - ISS while inpatient  - IVF for hydration   - Monitor glucose levels closely     Hypothyroid.   - Check TSH and FT4  - Convert Synthroid to IV as per Pharmacy     Prophylactic measure.   - Heparin SubQ      Discussed with Attending and ACPs      STRICT NPO.    69F admitted for malignant gastric outlet obstruction       Malignant Gastric outlet obstruction.   - CT A/P as noted; CT H as noted   - NPO and has NGT to low suction per surgery. surg onc will follow. Strict NPO, no exception.   - 4mg IV dexamethasone, q12h  - 10 mg IV reglan, q8h  - 100mcg octreotide micrograms SubQ, q8h   - morphine 2mg q4h prn for pain. Monitor BM, if no bm in 48hr, enema prn while npo.   - D5NS @ 100cc/hr   - GI eval consulted; F/u recs --> Awaiting final recommendations for possible role of endoscopic jejunostomy vs stenting  - Heme/Onc, palliative eval appreciated; F/u recs     Pancreatic cancer.   - Diagnosed in 2021 s/p resection, chemo/RT and now with recurrent in 2022 s/p RT.  - On active DMT with [facility]   - Heme/Onc and palliative eval appreciated; F/u recs     Anemia  - Likely due to malignancy and treatment; F/u anemia labs   - Transfuse for Hgb <7  - Heme/Onc eval appreciated; F/u recs    Hypertension.   - No meds per mouth.   - Monitor BP. If sustained SBP >160, may use iv hydralazine as needed.    Type 2 diabetes mellitus.   - Hold home oral dm meds  - ISS while inpatient  - IVF for hydration   - Monitor glucose levels closely     Hypothyroid.   - Check TSH and FT4  - Convert Synthroid to IV as per Pharmacy     Prophylactic measure.   - Heparin SubQ      Discussed with Attending and ACPs      STRICT NPO.

## 2024-11-04 NOTE — PROGRESS NOTE ADULT - PROBLEM SELECTOR PLAN 5
In the event of worsening symptoms, please contact the Palliative Medicine team via pager (if the patient is at [facility][phone] or if the patient is at [facility] #[phone]) The Geriatric and Palliative Medicine service has coverage 24 hours a day/ 7 days a week to provide medical recommendations regarding symptom management needs via telephone.    Note incomplete until attested by attending. GaP team consulted for complex medical decision making in the setting of serious illness and symptoms management. Will continue to follow, discuss with ACP.     Can be reached by TEAMS M-F 9-5 Mona Nicolas. Any other time please page [phone] if needed.    In the event of newly developing, evolving, or worsening symptoms, please contact the Palliative Medicine team via pager (if the patient is at [facility] #[phone] or if the patient is at [facility] #[phone]) The Geriatric and Palliative Medicine service has coverage 24 hours a day/ 7 days a week to provide medical recommendations regarding symptom management needs via telephone.

## 2024-11-04 NOTE — PROGRESS NOTE ADULT - SUBJECTIVE AND OBJECTIVE BOX
SURGERY DAILY PROGRESS NOTE    24 Hour/Overnight Events: NGT output 900cc    SUBJECTIVE: Patient seen and evaluated on AM rounds. Pt is resting comfortably in bed with no acute complaints.  ------------------------------------------------------------------------------------------------------------  OBJECTIVE:  Vital Signs Last 24 Hrs  T(C): 36.8 (03 Nov 2024 20:53), Max: 36.8 (03 Nov 2024 20:53)  T(F): 98.2 (03 Nov 2024 20:53), Max: 98.2 (03 Nov 2024 20:53)  HR: 67 (03 Nov 2024 20:53) (67 - 73)  BP: 155/78 (03 Nov 2024 20:53) (155/78 - 162/81)  BP(mean): --  RR: 18 (03 Nov 2024 20:53) (18 - 18)  SpO2: 96% (03 Nov 2024 20:53) (96% - 97%)    Parameters below as of 03 Nov 2024 20:53  Patient On (Oxygen Delivery Method): room air      I&O's Detail    02 Nov 2024 08:01  -  03 Nov 2024 07:00  --------------------------------------------------------  IN:    dextrose 5% + sodium chloride 0.9%: 1200 mL    IV PiggyBack: 300 mL    Oral Fluid: 360 mL  Total IN: 1860 mL    OUT:    Nasogastric/Oral tube (mL): 400 mL    Voided (mL): 350 mL  Total OUT: 750 mL    Total NET: 1110 mL      03 Nov 2024 07:01  -  04 Nov 2024 02:51  --------------------------------------------------------  IN:  Total IN: 0 mL    OUT:    Nasogastric/Oral tube (mL): 900 mL    Voided (mL): 350 mL  Total OUT: 1250 mL    Total NET: -1250 mL          PHYSICAL EXAM:  Constitutional: Well developed, well nourished , NAD  Chest: Symmetric chest rise bilaterally, no increased WOB on room air  Abdomen: Soft***. NGT to LCWS with murky tan fluid with particulate    LABS:                        8.5    7.79  )-----------( 196      ( 03 Nov 2024 06:55 )             26.0     11-03    138  |  98  |  20  ----------------------------<  210[H]  4.0   |  30  |  1.12    Ca    8.1[L]      03 Nov 2024 06:55  Phos  3.7     11-03  Mg     1.9     11-03

## 2024-11-04 NOTE — PROGRESS NOTE ADULT - SUBJECTIVE AND OBJECTIVE BOX
Name of Patient : DANUTA CALDERON  MRN: [id number]  Date of visit: 11-04-24 @ 14:11      Subjective: Patient seen and examined. No new events except as noted.   Patient seen earlier this AM. Reports ongoing abdominal discomfort, denies pain.   Admits to passing of flatus, has not had a BM   NGT with output. On IVF  Reports discomfort from NGT, spray ordered    REVIEW OF SYSTEMS:    CONSTITUTIONAL: Generalized weakness, No fevers or chills  EYES/ENT: No visual changes;  No vertigo or throat pain   NECK: No pain or stiffness  RESPIRATORY: No cough, wheezing, hemoptysis; No shortness of breath  CARDIOVASCULAR: No chest pain or palpitations  GASTROINTESTINAL: + Abdominal discomfort, passing of flatus,  no BM    GENITOURINARY: No dysuria, frequency or hematuria  NEUROLOGICAL: No numbness or weakness  SKIN: No itching, burning, rashes, or lesions   All other review of systems is negative unless indicated above.    MEDICATIONS:  MEDICATIONS  (STANDING):  dexAMETHasone  Injectable 4 milliGRAM(s) IV Push every 12 hours  dextrose 5% + sodium chloride 0.9%. 1000 milliLiter(s) (100 mL/Hr) IV Continuous <Continuous>  dextrose 5%. 1000 milliLiter(s) (50 mL/Hr) IV Continuous <Continuous>  dextrose 5%. 1000 milliLiter(s) (100 mL/Hr) IV Continuous <Continuous>  dextrose 50% Injectable 25 Gram(s) IV Push once  dextrose 50% Injectable 12.5 Gram(s) IV Push once  dextrose 50% Injectable 25 Gram(s) IV Push once  glucagon  Injectable 1 milliGRAM(s) IntraMuscular once  heparin   Injectable 5000 Unit(s) SubCutaneous every 12 hours  insulin lispro (ADMELOG) corrective regimen sliding scale   SubCutaneous every 6 hours  metoclopramide Injectable 10 milliGRAM(s) IV Push every 8 hours  octreotide  Injectable 100 MICROGram(s) SubCutaneous every 8 hours      PHYSICAL EXAM:  T(C): 36.7 (11-04-24 @ 13:36), Max: 36.8 (11-03-24 @ 20:53)  HR: 69 (11-04-24 @ 13:36) (67 - 83)  BP: 166/88 (11-04-24 @ 13:36) (137/57 - 166/88)  RR: 18 (11-04-24 @ 13:36) (18 - 18)  SpO2: 96% (11-04-24 @ 13:36) (96% - 96%)  Wt(kg): --  I&O's Summary    03 Nov 2024 07:01  -  04 Nov 2024 07:00  --------------------------------------------------------  IN: 0 mL / OUT: 1250 mL / NET: -1250 mL          Appearance: Awake, weak appearing female, lying down in bed 	  HEENT:  Eyes are open; NGT in place   Lymphatic: No lymphadenopathy grossly   Cardiovascular: Normal   Respiratory: normal effort , clear  Gastrointestinal:  Softly distended   Skin: No rashes,  warm to touch  Psychiatry:  Mood & affect appropriate  Musculoskeletal: No edema      11-03-24 @ 07:01  -  11-04-24 @ 07:00  --------------------------------------------------------  IN: 0 mL / OUT: 1250 mL / NET: -1250 mL                            8.9    10.43 )-----------( 172      ( 04 Nov 2024 04:24 )             27.3               11-04    137  |  100  |  25[H]  ----------------------------<  198[H]  3.5   |  28  |  1.05    Ca    8.6      04 Nov 2024 04:25  Phos  3.7     11-03  Mg     1.9     11-03      PT/INR - ( 04 Nov 2024 04:24 )   PT: 12.9 sec;   INR: 1.12 ratio                            Urinalysis Basic - ( 04 Nov 2024 04:25 )    Color: x / Appearance: x / SG: x / pH: x  Gluc: 198 mg/dL / Ketone: x  / Bili: x / Urobili: x   Blood: x / Protein: x / Nitrite: x   Leuk Esterase: x / RBC: x / WBC x   Sq Epi: x / Non Sq Epi: x / Bacteria: x          < from: CT Head No Cont (11.01.24 @ 21:25) >    IMPRESSION:  No acute intracranial hemorrhage, mass effect, or midline shift. More   sensitive evaluation for intracranial metastases with contrast-enhanced  MRI may be obtained, as clinically warranted, if no contraindications   exist.      < end of copied text >        < from: CT Abdomen and Pelvis w/ Oral Cont and w/ IV Cont (11.01.24 @ 17:33) >  IMPRESSION:  Ill-defined hypodense pancreatic mass with resultant severe multifocal   luminal narrowing along thedistal stomach and duodenum. Findings are   consistent with a component of gastric outlet obstruction.    Filling defect in the right external and common iliac veins, most   consistent with flow artifact.    Severe narrowing of the portal confluence without discrete thrombosis.    Additional findings as per above.    Findings discussed by Dr. Roland radiology with ACP Fuentes of the   primary emergency department team at 6:22 PM on 11/1/2024 with read back   confirmation.    < end of copied text >

## 2024-11-04 NOTE — PROGRESS NOTE ADULT - ASSESSMENT
69F with PMHx of PDIC s/p distal pancreatectomy (2021) and chemo/radiation w/ recurrence to abdominal wall s/p resection (2022) and radiation who presented 11/1 to the ED with abdominal pain, nausea, vomiting, and CT A/P with ill-defined hypodense pancreatic head mass with multifocal narrowing along distal stomach and duodenum, consistent with gastric outlet obstruction and cancer recurrence. Surgery was consulted for gastric outlet obstruction management.    P:   - No acute surgical intervention at this time  - Continue NGT to WS  - NPO/IVF  - Malignant bowel obstruction protocol   - dexamethasone 4mg IV q12h  - reglan 10 mg IV q8h  - octreotide 100 ucg SQ q8h   - Advanced GI to evaluate for stenting versus endoscopic jejunostomy this AM  - If patient would like care at [facility] [place], would recommend transfer  - Surgical oncology to continue following    Gold Team Surgery  [id number] 69F with PMHx of PDIC s/p distal pancreatectomy (2021) and chemo/radiation w/ recurrence to abdominal wall s/p resection (2022) and radiation who presented 11/1 to the ED with abdominal pain, nausea, vomiting, and CT A/P with ill-defined hypodense pancreatic head mass with multifocal narrowing along distal stomach and duodenum, consistent with gastric outlet obstruction and cancer recurrence. Surgery was consulted for gastric outlet obstruction management.    P:   - Patient may be potential operative candidate  - Continue NGT to [facility]  - NPO/IVF  - Malignant bowel obstruction protocol   - dexamethasone 4mg IV q12h  - reglan 10 mg IV q8h  - octreotide 100 ucg SQ q8h   - Advanced GI to evaluate for stenting versus endoscopic jejunostomy this AM  - If patient would like [place] at [facility] [place], would recommend [place]  - Surgical oncology to continue following    Gold Team Surgery  [id number]

## 2024-11-04 NOTE — PROGRESS NOTE ADULT - SUBJECTIVE AND OBJECTIVE BOX
Patient is a 69y old  Female who presents with a chief complaint of SBO    Patient seen and examined  No acute overnight events reported.   Passed gas this AM.   Reports dry mouth and throat pain.     Vital Signs Last 24 Hrs  T(C): 36.5 (03 Nov 2024 04:06), Max: 36.7 (02 Nov 2024 20:31)  T(F): 97.7 (03 Nov 2024 04:06), Max: 98.1 (02 Nov 2024 20:31)  HR: 73 (03 Nov 2024 04:06) (71 - 88)  BP: 157/78 (03 Nov 2024 04:06) (131/83 - 157/78)  BP(mean): --  RR: 18 (03 Nov 2024 04:06) (18 - 18)  SpO2: 96% (03 Nov 2024 04:06) (96% - 97%)    Parameters below as of 03 Nov 2024 04:06  Patient On (Oxygen Delivery Method): room air        PE  Awake,  Anicteric, MMM  RRR  CTAB  Abd soft, NT, ND  +NGT  No c/c                        8.5    7.79  )-----------( 196      ( 03 Nov 2024 06:55 )             26.0       11-03    138  |  98  |  20  ----------------------------<  210[H]  4.0   |  30  |  1.12    Ca    8.1[L]      03 Nov 2024 06:55  Phos  3.7     11-03  Mg     1.9     11-03    TPro  7.0  /  Alb  3.4  /  TBili  0.5  /  DBili  x   /  AST  20  /  ALT  27  /  AlkPhos  171[H]  11-01       Patient is a 68 y/o F who presented with a chief complaint of SBO    Patient seen and examined  No acute overnight events reported.   Passed gas this AM.   Reports dry mouth and throat pain.     Vital Signs Last 24 Hrs  T(C): 36.5 (03 Nov 2024 04:06), Max: 36.7 (02 Nov 2024 20:31)  T(F): 97.7 (03 Nov 2024 04:06), Max: 98.1 (02 Nov 2024 20:31)  HR: 73 (03 Nov 2024 04:06) (71 - 88)  BP: 157/78 (03 Nov 2024 04:06) (131/83 - 157/78)  BP(mean): --  RR: 18 (03 Nov 2024 04:06) (18 - 18)  SpO2: 96% (03 Nov 2024 04:06) (96% - 97%)    Parameters below as of 03 Nov 2024 04:06  Patient On (Oxygen Delivery Method): room air        PE  Awake,  Anicteric, MMM  RRR  CTAB  Abd soft, NT, ND  +NGT  No c/c                        8.5    7.79  )-----------( 196      ( 03 Nov 2024 06:55 )             26.0       11-03    138  |  98  |  20  ----------------------------<  210[H]  4.0   |  30  |  1.12    Ca    8.1[L]      03 Nov 2024 06:55  Phos  3.7     11-03  Mg     1.9     11-03    TPro  7.0  /  Alb  3.4  /  TBili  0.5  /  DBili  x   /  AST  20  /  ALT  27  /  AlkPhos  171[H]  11-01

## 2024-11-04 NOTE — DIETITIAN INITIAL EVALUATION ADULT - OTHER CALCULATIONS
Defer fluid needs to team.  Estimated nutrient needs based on dosing weight 36kg with consideration for cancer, malnutrition, BMI

## 2024-11-04 NOTE — PROGRESS NOTE ADULT - ASSESSMENT
69F PMHx DM, HTN, hypothyroid, pancreatic ca s/p resection '21, s/p chemo/rt then recurrence '22 s/p RT.  Presenting w/ diffuse abd pain, n/v and constipation x3d.     #Pancreatic ca  --[place] [facility] [place]s/p distal pancreatectomy and splenectomy (06/2021)  --s/p adjuvant tx w/ mFOLFIRINOX (08/21-01/21), Xeloda +RTx 25 fractions (02/22-03/22)-> POD (01/23)  --most recent systemic tx w/ Gemcitabine and nab-paclitaxel (05/23-10/23, 02/24-07/24). Break in tx 2/2 jaundice and stent migration  --s/p RT x 10 fractions to abdominal wall mets 08/2024  --no plan for treatment inpatient and/or rehab  --plan for possible [place] [facility] [place]    #gastric outlet obstruction  --passed gas this AM  --pt p/w abdominal pain, N/V  --CT a/p w/ ill-defined hypodense pancreatic mass w/ multifocal narrowing along distal stomach and duodenum resulting in gastric outlet obstruction.  --CTH No acute intracranial hemorrhage, mass effect, or midline shif  --surgery following no plan for acute surgical intervention at this time   --currently on mSBO protocol w/dex, reglan, and octreotide. NGT to low continuous suction      #Anemia  --2/2 malignancy and treatment  --baseline Hgb 8-9.5  --will check for nutritional deficiencies, supplement as needed  --transfuse for Hgb <7, 8 if symptomatic    will follow,    Kirsten Kim NP  Hematology/ Oncology  [facility]  [phone] (office)  [phone] (alt office)  Evenings and weekends please call MD on call or office

## 2024-11-04 NOTE — PROGRESS NOTE ADULT - PROBLEM SELECTOR PLAN 4
Attempted GOC however pt refused stating she does not want to think about death and does not want to have any discussion regarding such things including advanced care planning.  - GAP team will continue to follow for symptoms and will address GOC if opportunity arises and pt amenable Attempted GOC however pt refused stating she does not want to think about death and does not want to have any discussion regarding such things including advanced care planning. Encouraged pt and  to have such discussion on their own terms. Goals are for chemo at [facility] [place]  - GAP team will continue to follow for symptoms and will address GOC if opportunity arises and pt amenable Pre eclampsia on Mag Attempted GOC however pt refused stating she does not want to think about death and does not want to have any discussion regarding such things including advanced care planning. Encouraged pt and  to have such discussion on their own terms. Goals are for chemo at [facility]

## 2024-11-04 NOTE — DIETITIAN INITIAL EVALUATION ADULT - PERTINENT LABORATORY DATA
11-04    137  |  100  |  25[H]  ----------------------------<  198[H]  3.5   |  28  |  1.05    Ca    8.6      04 Nov 2024 04:25  Phos  3.7     11-03  Mg     1.9     11-03    POCT Blood Glucose.: 221 mg/dL (04 Nov 2024 12:30)  POCT Blood Glucose.: 198 mg/dL (04 Nov 2024 08:29)  POCT Blood Glucose.: 215 mg/dL (04 Nov 2024 06:27)  POCT Blood Glucose.: 242 mg/dL (04 Nov 2024 00:54)  POCT Blood Glucose.: 228 mg/dL (03 Nov 2024 18:39)  POCT Blood Glucose.: 195 mg/dL (03 Nov 2024 14:02)  A1C with Estimated Average Glucose Result: 6.4 % (11-02-24 @ 06:50)

## 2024-11-04 NOTE — DIETITIAN INITIAL EVALUATION ADULT - NSFNSADHERENCEPTAFT_GEN_A_CORE
Patient with hx DM, A1C 6.4% from 11/2/24. Patient reports blood glucose levels are checked regularly at home, and takes metformin and glipizide PTA. Reports DM is new, diagnosed ~2 months ago.

## 2024-11-05 NOTE — PROGRESS NOTE ADULT - ASSESSMENT
69F PMHx DM, HTN, hypothyroid, pancreatic ca s/p resection '21, s/p chemo/rt then recurrence '22 s/p RT.  Presenting w/ diffuse abd pain, n/v and constipation x3d.     #Pancreatic ca  --[place] Dr. Bess of [facility] [place]  --s/p distal pancreatectomy and splenectomy (06/2021)  --s/p adjuvant tx w/ mFOLFIRINOX (08/21-01/21), Xeloda +RTx 25 fractions (02/22-03/22)-> POD (01/23)  --most recent systemic tx w/ Gemcitabine and nab-paclitaxel (05/23-10/23, 02/24-07/24). Break in tx 2/2 jaundice and stent migration  --s/p RT x 10 fractions to abdominal wall mets 08/2024  --no plan for treatment inpatient and/or rehab  --[place] [facility] [place]    #gastric outlet obstruction  --pt p/w abdominal pain, N/V  --CT a/p w/ ill-defined hypodense pancreatic mass w/ multifocal narrowing along distal stomach and duodenum resulting in gastric outlet obstruction.  --CTH No acute intracranial hemorrhage, mass effect, or midline shif  --surgery following no plan for acute surgical intervention at this time   --currently on mSBO protocol w/dex, reglan, and octreotide. NGT to LCWS  --[facility] eval consulted: Awaiting final recommendations for possible role of endoscopic jejunostomy vs stenting      #Anemia  --2/2 malignancy and treatment  --baseline Hgb 8-9.5  --will check for nutritional deficiencies, supplement as needed  --transfuse for Hgb <7, 8 if symptomatic    will follow,    Dee Fowler NP  Hematology/ Oncology  [facility]  [phone] (office)  [phone] (alt office)  Evenings and weekends please call MD on call or office

## 2024-11-05 NOTE — PRE PROCEDURE NOTE - PRE PROCEDURE EVALUATION
Attending Physician:   Johnny                        Procedure: egd stent    Indication for Procedure: duodenal stricture  ________________________________________________________  PAST MEDICAL & SURGICAL HISTORY:  Pancreatic cancer      Hypertension      Hypothyroid      Seasonal asthma      History of pancreatectomy        ALLERGIES:  phenytoin (Rash)  ibuprofen (Other)  Dilantin (Unknown)    HOME MEDICATIONS:  alendronate 70 mg oral tablet: 1 tab(s) orally once a week on sunday  amLODIPine 10 mg oral tablet: 1 tab(s) orally once a day  escitalopram 20 mg oral tablet: 1 tab(s) orally once a day  Fosamax 70 mg oral tablet: 1 tab(s) orally once a week SUNDAY  glipiZIDE 2.5 mg oral tablet: 1 tab(s) orally once a day  levothyroxine 100 mcg (0.1 mg) oral tablet: 1 tab(s) orally once a day  linaclotide 290 mcg oral capsule: 1 cap(s) orally once a day  losartan 100 mg oral tablet: 1 tab(s) orally once a day  MetFORMIN (Eqv-Fortamet) 500 mg oral tablet, extended release: 1 tab(s) orally once a day  pancrelipase 10,000 units-32,000 units-42,000 units oral delayed release capsule: 1 cap(s) orally 3 times a day (with meals)  sucralfate 1 g oral tablet: 1 tab(s) orally once a day    AICD/PPM: [ ] yes   [ ] no    PERTINENT LAB DATA:                        8.6    9.06  )-----------( 118      ( 05 Nov 2024 04:07 )             26.6     11-05    136  |  96  |  23  ----------------------------<  206[H]  3.1[L]   |  29  |  0.92    Ca    7.9[L]      05 Nov 2024 04:07  Phos  2.2     11-05  Mg     1.7     11-05      PT/INR - ( 05 Nov 2024 04:07 )   PT: 12.9 sec;   INR: 1.13 ratio         PTT - ( 05 Nov 2024 04:07 )  PTT:26.9 sec            PHYSICAL EXAMINATION:    T(C): 36.8  HR: 72  BP: 180/97  RR: 22  SpO2: 97%    Constitutional: NAD  HEENT: PERRLA, EOMI,    Neck:  No JVD  Respiratory: CTAB/L  Cardiovascular: S1 and S2  Gastrointestinal: BS+, soft, NT/ND  Extremities: No peripheral edema  Neurological: A/O x 3, no focal deficits  Psychiatric: Normal mood, normal affect  Skin: No rashes    ASA Class: I [ ]  II [ ]  III [ ]  IV [ ]    COMMENTS:    The patient is a suitable candidate for the planned procedure unless box checked [ ]  No, explain:

## 2024-11-05 NOTE — PROGRESS NOTE ADULT - ASSESSMENT
69F admitted for malignant gastric outlet obstruction       Malignant Gastric outlet obstruction.   - CT A/P as noted; CT H as noted   - NPO and has NGT to low suction per surgery. surg onc will follow. Strict NPO, no exception.   - 4mg IV dexamethasone, q12h  - 10 mg IV reglan, q8h  - 100mcg octreotide micrograms SubQ, q8h   - morphine 2mg q4h prn for pain. Monitor BM, if no bm in 48hr, enema prn while npo.   - D5NS @ 100cc/hr   - GI eval consulted; F/u recs --> plan for endoscopic stent placement by advance GI   - Heme/Onc, palliative eval appreciated; F/u recs     Pancreatic cancer.   - Diagnosed in 2021 s/p resection, chemo/RT and now with recurrent in 2022 s/p RT.  - On active DMT with [facility]   - Heme/Onc and palliative eval appreciated; F/u recs     Anemia  - Likely due to malignancy and treatment; F/u anemia labs   - Transfuse for Hgb <7  - Heme/Onc eval appreciated; F/u recs    Hypertension.   - No meds per mouth.   - Monitor BP. If sustained SBP >160, may use iv hydralazine as needed.    Type 2 diabetes mellitus.   - Hold home oral dm meds  - ISS while inpatient  - IVF for hydration   - Monitor glucose levels closely     Hypothyroid.   - Check TSH and FT4  - Convert Synthroid to IV as per Pharmacy     Prophylactic measure.   - Heparin SubQ

## 2024-11-05 NOTE — PROGRESS NOTE ADULT - SUBJECTIVE AND OBJECTIVE BOX
Name of Patient : DANUTA CALDERON  MRN: [id number]  Date of visit: 11-05-24      Subjective: Patient seen and examined. No new events except as noted.   EGD today with stent placement   NGT     REVIEW OF SYSTEMS:    CONSTITUTIONAL: No weakness, fevers or chills  EYES/ENT: No visual changes;  No vertigo or throat pain   NECK: No pain or stiffness  RESPIRATORY: No cough, wheezing, hemoptysis; No shortness of breath  CARDIOVASCULAR: No chest pain or palpitations  GASTROINTESTINAL: No abdominal or epigastric pain. No nausea, vomiting, or hematemesis; No diarrhea or constipation. No melena or hematochezia.  GENITOURINARY: No dysuria, frequency or hematuria  NEUROLOGICAL: No numbness or weakness  SKIN: No itching, burning, rashes, or lesions   All other review of systems is negative unless indicated above.    MEDICATIONS:  MEDICATIONS  (STANDING):  dexAMETHasone  Injectable 4 milliGRAM(s) IV Push every 12 hours  dextrose 5% + sodium chloride 0.9%. 1000 milliLiter(s) (100 mL/Hr) IV Continuous <Continuous>  dextrose 5%. 1000 milliLiter(s) (50 mL/Hr) IV Continuous <Continuous>  dextrose 5%. 1000 milliLiter(s) (100 mL/Hr) IV Continuous <Continuous>  dextrose 50% Injectable 25 Gram(s) IV Push once  dextrose 50% Injectable 12.5 Gram(s) IV Push once  dextrose 50% Injectable 25 Gram(s) IV Push once  glucagon  Injectable 1 milliGRAM(s) IntraMuscular once  heparin   Injectable 5000 Unit(s) SubCutaneous every 12 hours  insulin lispro (ADMELOG) corrective regimen sliding scale   SubCutaneous every 6 hours  naloxone Injectable 0.4 milliGRAM(s) IV Push once  octreotide  Injectable 100 MICROGram(s) SubCutaneous every 8 hours      PHYSICAL EXAM:  T(C): 36.6 (11-05-24 @ 22:48), Max: 36.8 (11-05-24 @ 16:06)  HR: 67 (11-05-24 @ 22:48) (61 - 78)  BP: 170/90 (11-05-24 @ 22:48) (145/86 - 180/97)  RR: 18 (11-05-24 @ 22:48) (14 - 22)  SpO2: 96% (11-05-24 @ 22:48) (96% - 99%)  Wt(kg): --  I&O's Summary    04 Nov 2024 07:01  -  05 Nov 2024 07:00  --------------------------------------------------------  IN: 1400 mL / OUT: 250 mL / NET: 1150 mL    05 Nov 2024 07:01  -  05 Nov 2024 23:31  --------------------------------------------------------  IN: 0 mL / OUT: 900 mL / NET: -900 mL      Height (cm): 154.9 (11-05 @ 17:33)  Weight (kg): 36 (11-05 @ 17:33)  BMI (kg/m2): 15 (11-05 @ 17:33)  BSA (m2): 1.28 (11-05 @ 17:33)    Appearance: Normal	  HEENT:  maria e WARREN   Lymphatic: No lymphadenopathy   Cardiovascular: Normal S1 S2, no JVD  Respiratory: normal effort , clear  Gastrointestinal:  Soft, Non-tender  Skin: No rashes,  warm to touch  Psychiatry:  Mood & affect appropriate  Musculuskeletal: No edema    recent labs, Imaging and EKGs personally reviewed             11-04-24 @ 07:01  -  11-05-24 @ 07:00  --------------------------------------------------------  IN: 1400 mL / OUT: 250 mL / NET: 1150 mL    11-05-24 @ 07:01  -  11-05-24 @ 23:31  --------------------------------------------------------  IN: 0 mL / OUT: 900 mL / NET: -900 mL

## 2024-11-05 NOTE — PROGRESS NOTE ADULT - SUBJECTIVE AND OBJECTIVE BOX
Patient is a 69y old  Female who presents with a chief complaint of Intestinal obstruction     (04 Nov 2024 12:47)      MEDICATIONS  (STANDING):  dexAMETHasone  Injectable 4 milliGRAM(s) IV Push every 12 hours  dextrose 5% + sodium chloride 0.9%. 1000 milliLiter(s) (100 mL/Hr) IV Continuous <Continuous>  dextrose 5%. 1000 milliLiter(s) (50 mL/Hr) IV Continuous <Continuous>  dextrose 5%. 1000 milliLiter(s) (100 mL/Hr) IV Continuous <Continuous>  dextrose 50% Injectable 25 Gram(s) IV Push once  dextrose 50% Injectable 25 Gram(s) IV Push once  dextrose 50% Injectable 12.5 Gram(s) IV Push once  glucagon  Injectable 1 milliGRAM(s) IntraMuscular once  heparin   Injectable 5000 Unit(s) SubCutaneous every 12 hours  insulin lispro (ADMELOG) corrective regimen sliding scale   SubCutaneous every 6 hours  naloxone Injectable 0.4 milliGRAM(s) IV Push once  octreotide  Injectable 100 MICROGram(s) SubCutaneous every 8 hours    MEDICATIONS  (PRN):  benzocaine/butamben/tetracaine Spray 1 Spray(s) Topical every 6 hours PRN sore throat  dextrose Oral Gel 15 Gram(s) Oral once PRN Blood Glucose LESS THAN 70 milliGRAM(s)/deciliter  morphine  - Injectable 2 milliGRAM(s) IV Push every 4 hours PRN Severe Pain (7 - 10)  ondansetron Injectable 4 milliGRAM(s) IV Push every 8 hours PRN Nausea and/or Vomiting      Vital Signs Last 24 Hrs  T(C): 36.4 (05 Nov 2024 04:25), Max: 36.7 (04 Nov 2024 13:36)  T(F): 97.6 (05 Nov 2024 04:25), Max: 98.1 (04 Nov 2024 20:19)  HR: 70 (05 Nov 2024 04:25) (65 - 80)  BP: 146/84 (05 Nov 2024 04:25) (139/88 - 177/94)  BP(mean): --  RR: 18 (05 Nov 2024 04:25) (18 - 18)  SpO2: 97% (05 Nov 2024 04:25) (95% - 97%)    Parameters below as of 05 Nov 2024 04:25  Patient On (Oxygen Delivery Method): room air      PE  NAD  Awake, alert  Anicteric, MMM  RRR  CTAB  Abd soft, NT, ND                          8.6    9.06  )-----------( 118      ( 05 Nov 2024 04:07 )             26.6       11-05    136  |  96  |  23  ----------------------------<  206[H]  3.1[L]   |  29  |  0.92    Ca    7.9[L]      05 Nov 2024 04:07  Phos  2.2     11-05  Mg     1.7     11-05

## 2024-11-06 NOTE — PROGRESS NOTE ADULT - SUBJECTIVE AND OBJECTIVE BOX
SUBJECTIVE AND OBJECTIVE:  Indication for Geriatrics and Palliative Care Services/INTERVAL HPI: GaP team consulted for complex medical decision making in the setting of serious illness and symptoms management.    OVERNIGHT EVENTS: No acute events reported overnight  Patient seen and examined at bedside. Lying in bed comfortably, patient states is tolerating well CLD. Patient mentioned that is confused and anxious given GI was not able to place stents. she is concern about will be the next step. Patient reports abdominal discomfort 5/10 in severity well controlled with IV Tylenol.       DNR on chart:  Allergies    phenytoin (Rash)  ibuprofen (Other)  Dilantin (Unknown)    Intolerances    MEDICATIONS  (STANDING):  dexAMETHasone  Injectable 4 milliGRAM(s) IV Push every 12 hours  dextrose 5%. 1000 milliLiter(s) (50 mL/Hr) IV Continuous <Continuous>  dextrose 5%. 1000 milliLiter(s) (100 mL/Hr) IV Continuous <Continuous>  dextrose 50% Injectable 25 Gram(s) IV Push once  dextrose 50% Injectable 25 Gram(s) IV Push once  dextrose 50% Injectable 12.5 Gram(s) IV Push once  glucagon  Injectable 1 milliGRAM(s) IntraMuscular once  heparin   Injectable 5000 Unit(s) SubCutaneous every 12 hours  insulin lispro (ADMELOG) corrective regimen sliding scale   SubCutaneous three times a day before meals  insulin lispro (ADMELOG) corrective regimen sliding scale   SubCutaneous at bedtime  levothyroxine Injectable 70 MICROGram(s) IV Push at bedtime  naloxone Injectable 0.4 milliGRAM(s) IV Push once  octreotide  Injectable 100 MICROGram(s) SubCutaneous every 8 hours  pantoprazole  Injectable 40 milliGRAM(s) IV Push daily  sodium chloride 0.9%. 1000 milliLiter(s) (100 mL/Hr) IV Continuous <Continuous>    MEDICATIONS  (PRN):  benzocaine/butamben/tetracaine Spray 1 Spray(s) Topical every 6 hours PRN sore throat  dextrose Oral Gel 15 Gram(s) Oral once PRN Blood Glucose LESS THAN 70 milliGRAM(s)/deciliter  morphine  - Injectable 2 milliGRAM(s) IV Push every 4 hours PRN Severe Pain (7 - 10)  ondansetron Injectable 4 milliGRAM(s) IV Push every 8 hours PRN Nausea and/or Vomiting      ITEMS UNCHECKED ARE NOT PRESENT    PRESENT SYMPTOMS: [ ]Unable to self-report - see  CPOT, PAINADS, RDOS below  Source if other than patient:  [ ]Family   [ ]Team     Pain:  [x ]yes [ ]no  QOL impact - moderate   Location -                  abdomen   Aggravating factors -   Quality - dull   Radiation - none   Timing- intermittent   Severity (0-10 scale): 5  Minimal acceptable level (0-10 scale):     Dyspnea:                           [ ]Mild [ ]Moderate [ ]Severe  Anxiety:                             [ ]Mild [x ]Moderate [ ]Severe  Fatigue:                             [ ]Mild [x ]Moderate [ ]Severe  Nausea:                             [ ]Mild [ ]Moderate [ ]Severe  Loss of appetite:              [ ]Mild [ ]Moderate [ ]Severe  Constipation:                    [ ]Mild [ ]Moderate [ x]Severe    PCSSQ[Palliative Care Spiritual Screening Question]   Severity (0-10):  Score of 4 or > indicate consideration of Chaplaincy referral.  Chaplaincy Referral: [ ] yes x ] refused [ ] following  Caregiver [place]? : [ ] yes [ ] no  [x ] deferred:  Social work referral [ ] Patient & Family Centered Care Referral [ ]   Anticipatory Grief present?:  [ ] yes [ ] no  [x ] deferred: Social work referral [ ] Patient & Family Centered Care Referral [ ]      Other Symptoms:  [x ]All other review of systems negative     PHYSICAL EXAM:  Vital Signs Last 24 Hrs  T(C): 36.6 (06 Nov 2024 11:39), Max: 36.8 (05 Nov 2024 17:33)  T(F): 97.9 (06 Nov 2024 11:39), Max: 98 (05 Nov 2024 18:14)  HR: 69 (06 Nov 2024 11:39) (61 - 78)  BP: 167/84 (06 Nov 2024 11:39) (157/91 - 180/97)  BP(mean): 97 (05 Nov 2024 17:33) (97 - 97)  RR: 18 (06 Nov 2024 11:39) (14 - 22)  SpO2: 98% (06 Nov 2024 11:39) (96% - 99%)    Parameters below as of 06 Nov 2024 11:39  Patient On (Oxygen Delivery Method): room air     I&O's Summary    05 Nov 2024 07:01  -  06 Nov 2024 07:00  --------------------------------------------------------  IN: 300 mL / OUT: 900 mL / NET: -600 mL    06 Nov 2024 07:01  -  06 Nov 2024 16:11  --------------------------------------------------------  IN: 100 mL / OUT: 0 mL / NET: 100 mL         GENERAL: [x ]Cachexia    [x ]Alert  [x ]Oriented x 4  [ ]Lethargic  [ ]Unarousable  [ x]Verbal  [ ]Non-Verbal  Behavioral:   [x ] Anxiety  [ ] Delirium [ ] Agitation [ ] Other  HEENT:  [ ]Normal   [x ]Dry mouth   [ ]ET Tube/Trach  [ ]Oral lesions  PULMONARY:   [x ]Clear [ ]Tachypnea  [ ]Audible excessive secretions   [ ]Rhonchi        [ ]Right [ ]Left [ ]Bilateral  [ ]Crackles        [ ]Right [ ]Left [ ]Bilateral  [ ]Wheezing     [ ]Right [ ]Left [ ]Bilateral  [ ]Diminished breath sounds [ ]right [ ]left [ ]bilateral  CARDIOVASCULAR:    [x ]Regular [ ]Irregular [ ]Tachy  [ ]Rober [ ]Murmur [ ]Other  GASTROINTESTINAL:  [x ]Soft  [ ]Distended   [ ]+BS  [ ]Non tender [x ]Mild Tender  [ ]Other [ ]PEG [x ]OGT/ NGT for decompression  Last BM: 11/2  GENITOURINARY:  [x ]Normal [ ] Incontinent   [ ]Oliguria/Anuria   [ ][place]  MUSCULOSKELETAL:   [ x]Normal   [ z]Weakness  [ ]Bed/Wheelchair bound [ ]Edema  NEUROLOGIC:   [x ]No focal deficits  [ ]Cognitive impairment  [ ]Dysphagia [ ]Dysarthria [ ]Paresis [ ]Other   SKIN: Please see flowsheets   [x ]Normal  [ ]Rash  [ ]Other  [ ]Pressure ulcer(s)       Present on admission [ ]y [ ]n    CRITICAL CARE:  [ ]Shock Present  [ ]Septic [ ]Cardiogenic [ ]Neurologic [ ]Hypovolemic  [ ]Vasopressors [ ]Inotropes  [ ]Respiratory failure present [ ]Mechanical Ventilation [ ]Non-invasive ventilatory support [ ]High-Flow   [ ]Acute  [ ]Chronic [ ]Hypoxic  [ ]Hypercarbic [ ]Other  [ ]Other organ failure       LABS:                        9.0    9.36  )-----------( 79       ( 06 Nov 2024 07:09 )             28.0   11-06    132[L]  |  97  |  24[H]  ----------------------------<  416[H]  3.9   |  22  |  0.95    Ca    7.5[L]      06 Nov 2024 07:09  Phos  3.4     11-06  Mg     1.5     11-06    TPro  6.6  /  Alb  3.2[L]  /  TBili  1.1  /  DBili  0.3  /  AST  41[H]  /  ALT  26  /  AlkPhos  134[H]  11-06  PT/INR - ( 05 Nov 2024 04:07 )   PT: 12.9 sec;   INR: 1.13 ratio         PTT - ( 05 Nov 2024 04:07 )  PTT:26.9 sec    Urinalysis Basic - ( 06 Nov 2024 07:09 )    Color: x / Appearance: x / SG: x / pH: x  Gluc: 416 mg/dL / Ketone: x  / Bili: x / Urobili: x   Blood: x / Protein: x / Nitrite: x   Leuk Esterase: x / RBC: x / WBC x   Sq Epi: x / Non Sq Epi: x / Bacteria: x      RADIOLOGY & ADDITIONAL STUDIES:  < from: Upper Endoscopy (11.05.24 @ 16:10) >  Findings:       A  film was performed with fluorscopy and showed a RUQ biliary stent.       The examined esophagus was normal.       The stomach had dispersed erosions without bleeding or ulcerations throughout the gastric        body, likely due to NGT trauma.       The pyloric channel was stenotic and angulated likely due to tumor ingrowth but with some        manipulation the upper and therapeutic upper scopes were able to traverse this region.       The duodenal bulb, sweep and proximal duodenum were dilated with pooled biliary fluid. There        was a metal biliary stent protruding from the ampulla.       Just beyond the stent there was a second stricture in D2 due to tumor ingrowth. The distal        lumen of the duodenum was visualized past the stricture but the scope was not able to        traverse this stricture.       Given the concern from the CTfor a third stricture in D4, as well as the significant        distance between the pyloric stricture and D2 stricture (that would require telescoping        duodenal stent to traverse both regions), no enteral stent was placed.       The NGT was replaced.    < end of copied text >      Protein Calorie Malnutrition Present: [ ]mild [ ]moderate [ x]severe [ ]underweight [ ]morbid obesity  https://www.andeal.org/vault/2440/web/files/ONC/Table_Clinical%20Characteristics%20to%20Document%20Malnutrition-White%20JV%20et%20al%202012.pdf    Height (cm): 154.9 (11-05-24 @ 17:33), 154.9 (12-08-23 @ 15:52), 154.9 (11-10-23 @ 10:23)  Weight (kg): 36 (11-05-24 @ 17:33), 44.5 (12-08-23 @ 15:52), 54 (11-10-23 @ 10:23)  BMI (kg/m2): 15 (11-05-24 @ 17:33), 18.5 (12-08-23 @ 15:52), 22.5 (11-10-23 @ 10:23)    [ ]PPSV2 < or = 30%  [ ]significant weight loss [ ]poor nutritional intake [ ]anasarca[ ]Artificial Nutrition    Other REFERRALS:  [ ]Hospice  [ ]Child Life  [ ]Social Work  [x ]Case management [ ]Holistic Therapy                                      Progress Notes    PROGRESS NOTE  Date & Time of Note   2024-11-06 11:36    Notes    Notes: Covering CM.Patient remains acute NPO w/ clears trial. S/P EGD  yesterday, possible jejunostomy on Thursday, TPN consult ordered.D/C plan  unclear pending hospital course.       Electronically signed by:  Yudith Parsons RN  Electronically signed on:  2024-11-06  11:37  Palliative Performance Scale:  http://npcrc.org/files/news/palliative_performance_scale_ppsv2.pdf  (Ctrl +  left click to view)  Respiratory Distress Observation Tool:  https://homecareinformation.net/handouts/hen/Respiratory_Distress_Observation_Scale.pdf (Ctrl +  left click to view)  PAINAD Score:  http://geriatrictoolkit.[facility]/cog/painad.pdf (Ctrl +  left click to view)

## 2024-11-06 NOTE — PROGRESS NOTE ADULT - PROBLEM SELECTOR PLAN 2
- s/p EGD  11/5 demonstrating multifocal stricturing disease of the pylorus and duodenum due to tumor ingrowth.   -  GI planning on endoscopic GJ versus [employer] stent placement on Thursday 11/07/24  - On SBO malignant protocol: Octreotide, IV Reglan, IV dexa   - Surgery, GI following.  recommendations appreciated.

## 2024-11-06 NOTE — PROGRESS NOTE ADULT - PROBLEM SELECTOR PROBLEM 5
Prophylactic measure
Encounter for palliative care
Prophylactic measure
Encounter for palliative care

## 2024-11-06 NOTE — PROGRESS NOTE ADULT - SUBJECTIVE AND OBJECTIVE BOX
Chief Complaint:  Patient is a 69y old  Female who presents with a chief complaint of Intestinal obstruction    Interval Events:  s/p EGD yesterday; vitals stable  tolerating CLD; no nausea/vomiting    Allergies:  phenytoin (Rash)  ibuprofen (Other)  Dilantin (Unknown)        Hospital Medications:  benzocaine/butamben/tetracaine Spray 1 Spray(s) Topical every 6 hours PRN  dexAMETHasone  Injectable 4 milliGRAM(s) IV Push every 12 hours  dextrose 5%. 1000 milliLiter(s) IV Continuous <Continuous>  dextrose 5%. 1000 milliLiter(s) IV Continuous <Continuous>  dextrose 50% Injectable 25 Gram(s) IV Push once  dextrose 50% Injectable 12.5 Gram(s) IV Push once  dextrose 50% Injectable 25 Gram(s) IV Push once  dextrose Oral Gel 15 Gram(s) Oral once PRN  glucagon  Injectable 1 milliGRAM(s) IntraMuscular once  heparin   Injectable 5000 Unit(s) SubCutaneous every 12 hours  insulin lispro (ADMELOG) corrective regimen sliding scale   SubCutaneous three times a day before meals  insulin lispro (ADMELOG) corrective regimen sliding scale   SubCutaneous at bedtime  morphine  - Injectable 2 milliGRAM(s) IV Push every 4 hours PRN  naloxone Injectable 0.4 milliGRAM(s) IV Push once  octreotide  Injectable 100 MICROGram(s) SubCutaneous every 8 hours  ondansetron Injectable 4 milliGRAM(s) IV Push every 8 hours PRN  sodium chloride 0.9%. 1000 milliLiter(s) IV Continuous <Continuous>    PMHX/PSHX:  Pancreatic cancer    Hypertension    Hypothyroid    Seasonal asthma    History of pancreatectomy    Family history:  No pertinent family history in first degree relatives    PHYSICAL EXAM:   Vital Signs:  Vital Signs Last 24 Hrs  T(C): 36.6 (06 Nov 2024 11:39), Max: 36.8 (05 Nov 2024 16:06)  T(F): 97.9 (06 Nov 2024 11:39), Max: 98.3 (05 Nov 2024 16:06)  HR: 69 (06 Nov 2024 11:39) (61 - 78)  BP: 167/84 (06 Nov 2024 11:39) (157/91 - 180/97)  BP(mean): 97 (05 Nov 2024 17:33) (97 - 97)  RR: 18 (06 Nov 2024 11:39) (14 - 22)  SpO2: 98% (06 Nov 2024 11:39) (96% - 99%)    Parameters below as of 06 Nov 2024 11:39  Patient On (Oxygen Delivery Method): room air      Daily Height in cm: 154.94 (05 Nov 2024 17:33)    Daily     GENERAL:  cachectic; chronically ill appearing  HEENT:  no scleral icterus  CHEST:  no accessory muscle use  HEART:  Regular rate and rhythm  ABDOMEN:  Soft, non-tender, non-distended  EXTREMITIES:  No edema  SKIN:  No rash/ecchymoses  NEURO:  Alert and oriented x 3    LABS:                        9.0    9.36  )-----------( 79       ( 06 Nov 2024 07:09 )             28.0     Mean Cell Volume: 95.2 fl (11-06-24 @ 07:09)    11-06    132[L]  |  97  |  24[H]  ----------------------------<  416[H]  3.9   |  22  |  0.95    Ca    7.5[L]      06 Nov 2024 07:09  Phos  3.4     11-06  Mg     1.5     11-06    TPro  6.6  /  Alb  3.2[L]  /  TBili  1.1  /  DBili  0.3  /  AST  41[H]  /  ALT  26  /  AlkPhos  134[H]  11-06    LIVER FUNCTIONS - ( 06 Nov 2024 07:09 )  Alb: 3.2 g/dL / Pro: 6.6 g/dL / ALK PHOS: 134 U/L / ALT: 26 U/L / AST: 41 U/L / GGT: x           PT/INR - ( 05 Nov 2024 04:07 )   PT: 12.9 sec;   INR: 1.13 ratio         PTT - ( 05 Nov 2024 04:07 )  PTT:26.9 sec  Urinalysis Basic - ( 06 Nov 2024 07:09 )    Color: x / Appearance: x / SG: x / pH: x  Gluc: 416 mg/dL / Ketone: x  / Bili: x / Urobili: x   Blood: x / Protein: x / Nitrite: x   Leuk Esterase: x / RBC: x / WBC x   Sq Epi: x / Non Sq Epi: x / Bacteria: x                              9.0    9.36  )-----------( 79       ( 06 Nov 2024 07:09 )             28.0                         8.6    9.06  )-----------( 118      ( 05 Nov 2024 04:07 )             26.6                         8.9    10.43 )-----------( 172      ( 04 Nov 2024 04:24 )             27.3       < from: Upper Endoscopy (11.05.24 @ 16:10) >  Findings:       A  film was performed with fluorscopy and showed a RUQ biliary stent.       The examined esophagus was normal.       The stomach had dispersed erosions without bleeding or ulcerations throughout the gastric        body, likely due to NGT trauma.       The pyloric channel was stenotic and angulated likely due to tumor ingrowth but with some        manipulation the upper and therapeutic upper scopes were able to traverse this region.       The duodenal bulb, sweep and proximal duodenum were dilated with pooled biliary fluid. There        was a metal biliary stent protruding from the ampulla.       Just beyond the stent there was a second stricture in D2 due to tumor ingrowth. The distal        lumen of the duodenum was visualized past the stricture but the scope was not able to        traverse this stricture.       Given the concern from the CTfor a third stricture in D4, as well as the significant        distance between the pyloric stricture and D2 stricture (that would require telescoping        duodenal stent to traverse both regions), no enteral stent was placed.       The NGT was replaced.                                                                                                        Impression:          - Normal esophagus.                       - NGT trauma in stomach.                       - Strictures 2/2 tumor ingrowthat pylorus and also at D2. Could not traverse                        this stricture. Addtional stricture per CT report in D4. Thus, no enteral                        stenting performed.    < end of copied text >

## 2024-11-06 NOTE — PROGRESS NOTE ADULT - PROBLEM SELECTOR PLAN 5
GaP team consulted for complex medical decision making in the setting of serious illness and symptoms management. Will continue to follow, discussed with ACP. Patient not ready to discuss advance directives.     Can be reached by TEAMS MRoblesF 9-5 Mona Nicolas. Any other time please page [phone] if needed.    In the event of newly developing, evolving, or worsening symptoms, please contact the Palliative Medicine team via pager (if the patient is at [facility] #[phone] or if the patient is at [phone]) The Geriatric and Palliative Medicine service has coverage 24 hours a day/ 7 days a week to provide medical recommendations regarding symptom management needs via telephone.

## 2024-11-06 NOTE — PROGRESS NOTE ADULT - PROBLEM SELECTOR PLAN 1
Patient diagnosed with pancreatic cancer 2021 s/p resection, chemo/RT and now with recurrent in 2022 s/p RT. Patient currently on active DMT with Dr. Bess ([facility] [place])   - CT Abdomen and Pelvis w/ Oral Cont and w/ IV Cont (11.01.24 @ 17:33) Ill-defined hypodense pancreatic mass with resultant severe multifocal iluminal narrowing along the distal stomach and duodenum. Findings are consistent with a component of gastric outlet obstruction. Filling defect in the right external and common iliac veins, most consistent with flow artifact. Severe narrowing of the portal confluence without discrete thrombosis.  - Heme/onc following; no plan for treatment inpatient and/or rehab.

## 2024-11-06 NOTE — PROGRESS NOTE ADULT - SUBJECTIVE AND OBJECTIVE BOX
Name of Patient : DANUTA CALDERON  MRN: [id number]  Date of visit: 11-06-24 @ 16:06      Subjective: Patient seen and examined. No new events except as noted.   Patient seen earlier this AM. Lying down in bed. NGT in place, tolerating liquid diet. Passing flatus, no BM, N/V  TPN team consulted   Diet changed to Diabetic Diet  Planned for GJ with GI tomorrow    REVIEW OF SYSTEMS:    CONSTITUTIONAL: Generalized weakness, No fevers or chills  EYES/ENT: No visual changes;  No vertigo or throat pain   NECK: No pain or stiffness  RESPIRATORY: No cough, wheezing, hemoptysis; No shortness of breath  CARDIOVASCULAR: No chest pain or palpitations  GASTROINTESTINAL: + Abdominal discomfort, passing of flatus,  no BM    GENITOURINARY: No dysuria, frequency or hematuria  NEUROLOGICAL: No numbness or weakness  SKIN: No itching, burning, rashes, or lesions   All other review of systems is negative unless indicated above.    MEDICATIONS:  MEDICATIONS  (STANDING):  dexAMETHasone  Injectable 4 milliGRAM(s) IV Push every 12 hours  dextrose 5%. 1000 milliLiter(s) (50 mL/Hr) IV Continuous <Continuous>  dextrose 5%. 1000 milliLiter(s) (100 mL/Hr) IV Continuous <Continuous>  dextrose 50% Injectable 25 Gram(s) IV Push once  dextrose 50% Injectable 12.5 Gram(s) IV Push once  dextrose 50% Injectable 25 Gram(s) IV Push once  glucagon  Injectable 1 milliGRAM(s) IntraMuscular once  heparin   Injectable 5000 Unit(s) SubCutaneous every 12 hours  insulin lispro (ADMELOG) corrective regimen sliding scale   SubCutaneous three times a day before meals  insulin lispro (ADMELOG) corrective regimen sliding scale   SubCutaneous at bedtime  levothyroxine Injectable 70 MICROGram(s) IV Push at bedtime  naloxone Injectable 0.4 milliGRAM(s) IV Push once  octreotide  Injectable 100 MICROGram(s) SubCutaneous every 8 hours  sodium chloride 0.9%. 1000 milliLiter(s) (100 mL/Hr) IV Continuous <Continuous>      PHYSICAL EXAM:  T(C): 36.6 (11-06-24 @ 11:39), Max: 36.8 (11-05-24 @ 17:33)  HR: 69 (11-06-24 @ 11:39) (61 - 78)  BP: 167/84 (11-06-24 @ 11:39) (157/91 - 180/97)  RR: 18 (11-06-24 @ 11:39) (14 - 22)  SpO2: 98% (11-06-24 @ 11:39) (96% - 99%)  Wt(kg): --  I&O's Summary    05 Nov 2024 07:01  -  06 Nov 2024 07:00  --------------------------------------------------------  IN: 300 mL / OUT: 900 mL / NET: -600 mL    06 Nov 2024 07:01  -  06 Nov 2024 16:06  --------------------------------------------------------  IN: 100 mL / OUT: 0 mL / NET: 100 mL      Height (cm): 154.9 (11-05 @ 17:33)  Weight (kg): 36 (11-05 @ 17:33)  BMI (kg/m2): 15 (11-05 @ 17:33)  BSA (m2): 1.28 (11-05 @ 17:33)    Appearance: Awake, weak appearing female, lying down in bed 	  HEENT:  Eyes are open; NGT in place   Lymphatic: No lymphadenopathy grossly   Cardiovascular: Normal   Respiratory: normal effort , clear  Gastrointestinal:  Softly distended   Skin: No rashes,  warm to touch  Psychiatry:  Mood & affect appropriate  Musculoskeletal: No edema        11-05-24 @ 07:01  -  11-06-24 @ 07:00  --------------------------------------------------------  IN: 300 mL / OUT: 900 mL / NET: -600 mL    11-06-24 @ 07:01  -  11-06-24 @ 16:06  --------------------------------------------------------  IN: 100 mL / OUT: 0 mL / NET: 100 mL                              9.0    9.36  )-----------( 79       ( 06 Nov 2024 07:09 )             28.0               11-06    132[L]  |  97  |  24[H]  ----------------------------<  416[H]  3.9   |  22  |  0.95    Ca    7.5[L]      06 Nov 2024 07:09  Phos  3.4     11-06  Mg     1.5     11-06    TPro  6.6  /  Alb  3.2[L]  /  TBili  1.1  /  DBili  0.3  /  AST  41[H]  /  ALT  26  /  AlkPhos  134[H]  11-06    PT/INR - ( 05 Nov 2024 04:07 )   PT: 12.9 sec;   INR: 1.13 ratio         PTT - ( 05 Nov 2024 04:07 )  PTT:26.9 sec                   Urinalysis Basic - ( 06 Nov 2024 07:09 )    Color: x / Appearance: x / SG: x / pH: x  Gluc: 416 mg/dL / Ketone: x  / Bili: x / Urobili: x   Blood: x / Protein: x / Nitrite: x   Leuk Esterase: x / RBC: x / WBC x   Sq Epi: x / Non Sq Epi: x / Bacteria: x          < from: Upper Endoscopy (11.05.24 @ 16:10) >  Impression:          - Normal esophagus.                       - NGT trauma in stomach.                       - Strictures 2/2 tumor ingrowthat pylorus and also at D2. Could not traverse                        this stricture. Addtional stricture per CT report in D4. Thus, no enteral                        stenting performed.  Recommendation:      - Return patient to hospital [facility] for ongoing care.                       - XR to confirm NGT placement.                       - Clear liquid trial (sips) if tolerates.                       - Tentative plan for endoscopic GJ via EUS/AXIOS placement on Thursday                        morning.                       - Consider initiation of TPN                       - If endoscopic GJ not possible will need to re-eval for surgical GJ                                                                                                        Attending Participation:       I was present and participated during the entire procedure, including non-key portions.                                                                                                          < end of copied text >

## 2024-11-06 NOTE — CONSULT NOTE ADULT - ASSESSMENT
69F with PMHx of PDIC s/p distal pancreatectomy (2021) and chemo/radiation w/ recurrence to abdominal wall s/p resection (2022) and radiation who presented 11/1 to the ED with abdominal pain, nausea, vomiting, and CT A/P with ill-defined hypodense pancreatic head mass with multifocal narrowing along distal stomach and duodenum, consistent with gastric outlet obstruction and cancer recurrence. Surgery was consulted for gastric outlet obstruction management. EGD on 11/5 demonstrating multifocal stricturing disease of the pylorus and duodenum due to tumor ingrowth.  Advanced GI planning on endoscopic GJ versus Axios stent placement tomorrow. Pt on Malignant bowel obstruction protocol   (dexamethasone 4mg IV q12h, reglan 10 mg IV q8h, octreotide 100 ucg SQ q8h)    -Nutritional Assessment, pt still not meeting nutritional goals enterally. Please order prealbumin, CMP, MG, Phos, Lipid panel, A1c, B12, folate, iron studies, Vit D and PTH.  -Electrolyte Imbalance risk- check CMP, Mg, Phos, iCa and K daily, replete as per Medicine.  -Risks and benefits regarding TPN explained, including risk of bleeding with the PICC placement, and risk for sepsis. Benefits of TPN explained including the nutrition provided with the amino acids, carbohydrates and lipids in the TPN formula. All questions answered.  -Dedicated Central line must be placed for TPN, if pt deemed appropriate to initiate TPN.  -Risk of Hyperglycemia, please order q6 fingersticks and ISS sage if pt deemed appropriate to initiate TPN until FS stable then can check twice a day.  -Risk of Hyperglyceredemia. Check triglycerides daily until stable at goal SMOF for three days, then weekly-  if pt deemed appropriate to initiate TPN.  -Further care as per Medicine, dw Medicine ACP Milady.    Available on TEAMS  TPN spectra [id number] ([phone] when dialing from outside line)  M-F 8A-2:P, Weekends and holidays 8/9A-12/1P  Above reviewed with Dr. Lucas Avalos             69F with PMHx of [facility] s/p distal pancreatectomy (2021) and chemo/radiation w/ recurrence to abdominal wall s/p resection (2022) and radiation who presented 11/1 to the ED with abdominal pain, nausea, vomiting, and CT A/P with ill-defined hypodense pancreatic head mass with multifocal narrowing along distal stomach and duodenum, consistent with gastric outlet obstruction and cancer recurrence. Surgery was consulted for gastric outlet obstruction management. EGD on 11/5 demonstrating multifocal stricturing disease of the pylorus and duodenum due to tumor ingrowth.  Advanced GI planning on endoscopic GJ versus Axios stent placement tomorrow. Pt on Malignant bowel obstruction protocol   (dexamethasone 4mg IV q12h, reglan 10 mg IV q8h, octreotide 100 ucg SQ q8h)    -Nutritional Assessment, pt is not meeting nutritional goals enterally. Please order prealbumin, CMP, MG, Phos, Lipid panel, A1c, B12, folate, iron studies, Vit D and PTH.  -Electrolyte Imbalance risk- check CMP, Mg, Phos, iCa and K daily, replete as per Medicine.  -Risks and benefits regarding TPN explained, including risk of bleeding with the PICC placement, and risk for sepsis. Benefits of TPN explained including the nutrition provided with the amino acids, carbohydrates and lipids in the TPN formula. All questions answered.  -GJ versus Axios stent placement for tomorrow. Follow up if pt able to have stent placed or if able to place feeding tube. Pt would be an appropriate TPN candidate if unable to resume enteral diet after possible placement of Axios stent and/or initiation of tube feeds.  -Dedicated Central line must be placed for TPN, if pt deemed appropriate to initiate TPN.  -Risk of Hyperglycemia, please order q6 fingersticks and ISS sage if pt deemed appropriate to initiate TPN until FS stable then can check twice a day.  -Risk of Hyperglyceredemia. Check triglycerides daily until stable at goal SMOF for three days, then weekly-  if pt deemed appropriate to initiate TPN.  -Further care as per Medicine, dw Medicine ACP Milady.    Available on TEAMS  TPN spectra [id number] ([phone] when dialing from outside line)  M-F 8A-2:P, Weekends and holidays 8/9A-12/1P  Above reviewed with Dr. Lucas Avalos

## 2024-11-06 NOTE — CONSULT NOTE ADULT - SUBJECTIVE AND OBJECTIVE BOX
NUTRITION SUPPORT / TPN CONSULT NOTE    HPI:  69F PMHx DM, HTN, hypothyroid, pancreatic ca s/p resection '21, s/p chemo/rt then recurrence '22 s/p RT.   Presenting w/ diffuse abd pain, n/v and constipation x3d. Pt has oxycodone for chronic pain, it has not been helping at home. Pt took OTC stool softner and miralax at home w/o relief.     CT imaging c/f pancreatic mass resulting in severe multifocal luminal narrowing along the distal stomach and duodenum, c/w gastric outlet obstruction. Filling defect in R external and common iliac veins, likely flow artifact. Severe narrowing of the portal confluence w/o discrete thrombosis.     Surg consult in the ED, put NGT in to low suction, malignant gastric obstruction cocktail (dexamethasone, reglan and octreotide). Surg onc will follow.     On my interview/exam, pt complains of abd cramping after enema and having BM.  (01 Nov 2024 23:43)      24 hour/overnight events:      MEDICATIONS  (STANDING):  dexAMETHasone  Injectable 4 milliGRAM(s) IV Push every 12 hours  dextrose 5%. 1000 milliLiter(s) (50 mL/Hr) IV Continuous <Continuous>  dextrose 5%. 1000 milliLiter(s) (100 mL/Hr) IV Continuous <Continuous>  dextrose 50% Injectable 25 Gram(s) IV Push once  dextrose 50% Injectable 12.5 Gram(s) IV Push once  dextrose 50% Injectable 25 Gram(s) IV Push once  glucagon  Injectable 1 milliGRAM(s) IntraMuscular once  heparin   Injectable 5000 Unit(s) SubCutaneous every 12 hours  insulin lispro (ADMELOG) corrective regimen sliding scale   SubCutaneous three times a day before meals  insulin lispro (ADMELOG) corrective regimen sliding scale   SubCutaneous at bedtime  magnesium sulfate  IVPB 2 Gram(s) IV Intermittent once  naloxone Injectable 0.4 milliGRAM(s) IV Push once  octreotide  Injectable 100 MICROGram(s) SubCutaneous every 8 hours  sodium chloride 0.9%. 1000 milliLiter(s) (100 mL/Hr) IV Continuous <Continuous>    MEDICATIONS  (PRN):  benzocaine/butamben/tetracaine Spray 1 Spray(s) Topical every 6 hours PRN sore throat  dextrose Oral Gel 15 Gram(s) Oral once PRN Blood Glucose LESS THAN 70 milliGRAM(s)/deciliter  morphine  - Injectable 2 milliGRAM(s) IV Push every 4 hours PRN Severe Pain (7 - 10)  ondansetron Injectable 4 milliGRAM(s) IV Push every 8 hours PRN Nausea and/or Vomiting      PAST MEDICAL & SURGICAL HISTORY:  Pancreatic cancer      Hypertension      Hypothyroid      Seasonal asthma      History of pancreatectomy          FAMILY HISTORY:      ALLERGIES  phenytoin (Rash)  ibuprofen (Other)  Dilantin (Unknown)      REVIEW OF SYSTEMS  --------------------------------------------------------------------------------    Skin: No rashes  Head/Eyes/Ears/Mouth: No headache; Normal hearing; Normal vision w/o blurriness; No sinus pain/discomfort, sore throat  Respiratory: No dyspnea, cough, wheezing, hemoptysis  CV: No chest pain, PND, orthopnea  GI: No abdominal pain, diarrhea, constipation, nausea, vomiting, melena, hematochezia  : No increased frequency, dysuria, hematuria, nocturia  MSK: No joint pain/swelling; no back pain; no edema  Neuro: No dizziness/lightheadedness, weakness, seizures, numbness, tingling  Heme: No easy bruising or bleeding  Endo: No heat/cold intolerance  Psych: No significant nervousness, anxiety, stress, depression      VITALS  T(C): 36.3 (11-06-24 @ 04:28), Max: 36.8 (11-05-24 @ 16:06)  HR: 77 (11-06-24 @ 06:00) (61 - 78)  BP: 170/90 (11-06-24 @ 06:00) (145/86 - 180/97)  RR: 17 (11-06-24 @ 04:28) (14 - 22)  SpO2: 98% (11-06-24 @ 04:28) (96% - 99%)  I&O's Detail    05 Nov 2024 07:01  -  06 Nov 2024 07:00  --------------------------------------------------------  IN:    Oral Fluid: 300 mL  Total IN: 300 mL    OUT:    Nasogastric/Oral tube (mL): 900 mL  Total OUT: 900 mL    Total NET: -600 mL            LABS:                        9.0    9.36  )-----------( 79       ( 06 Nov 2024 07:09 )             28.0     11-06    132[L]  |  97  |  24[H]  ----------------------------<  416[H]  3.9   |  22  |  0.95    Ca    7.5[L]      06 Nov 2024 07:09  Phos  3.4     11-06  Mg     1.5     11-06      Ionized Calcium Levels:     CAPILLARY BLOOD GLUCOSE      POCT Blood Glucose.: 277 mg/dL (06 Nov 2024 08:31)  CAPILLARY BLOOD GLUCOSE      POCT Blood Glucose.: 277 mg/dL (06 Nov 2024 08:31)  POCT Blood Glucose.: 442 mg/dL (06 Nov 2024 05:53)  POCT Blood Glucose.: 349 mg/dL (06 Nov 2024 00:18)  POCT Blood Glucose.: 214 mg/dL (05 Nov 2024 16:56)  POCT Blood Glucose.: 214 mg/dL (05 Nov 2024 16:09)  POCT Blood Glucose.: 323 mg/dL (05 Nov 2024 13:40)    PT/INR - ( 05 Nov 2024 04:07 )   PT: 12.9 sec;   INR: 1.13 ratio         PTT - ( 05 Nov 2024 04:07 )  PTT:26.9 sec      	       NUTRITION SUPPORT / TPN CONSULT NOTE    HPI:  69F PMHx DM, HTN, hypothyroid, pancreatic ca s/p resection '21, s/p chemo/rt then recurrence '22 s/p RT.   Presenting w/ diffuse abd pain, n/v and constipation x3d. Pt has oxycodone for chronic pain, it has not been helping at home. Pt took OTC stool softner and miralax at home w/o relief.     CT imaging c/f pancreatic mass resulting in severe multifocal luminal narrowing along the distal stomach and duodenum, c/w gastric outlet obstruction. Filling defect in R external and common iliac veins, likely flow artifact. Severe narrowing of the portal confluence w/o discrete thrombosis.     Surg consult in the ED, put NGT in to low suction, malignant gastric obstruction cocktail (dexamethasone, reglan and octreotide). Surg onc will follow.     On my interview/exam, pt complains of abd cramping after enema and having BM.  (01 Nov 2024 23:43)      24 hour/overnight events: EGD 11.5 demonstrated strictures of the pylorus and duodenum due to tumor ingrowth, ampullary stent in place, stomach erosions. TPN consulted for Protein Calorie Malnutrition in the setting of nutritional optimization for anticipated prolonged minimal PO intake. Pt reports tolerating some clears today. Pt asymptomatic for N/V/abd pain. She reports losing 10 lbs over the past month due to nausea and vomting.        MEDICATIONS  (STANDING):  dexAMETHasone  Injectable 4 milliGRAM(s) IV Push every 12 hours  dextrose 5%. 1000 milliLiter(s) (50 mL/Hr) IV Continuous <Continuous>  dextrose 5%. 1000 milliLiter(s) (100 mL/Hr) IV Continuous <Continuous>  dextrose 50% Injectable 25 Gram(s) IV Push once  dextrose 50% Injectable 12.5 Gram(s) IV Push once  dextrose 50% Injectable 25 Gram(s) IV Push once  glucagon  Injectable 1 milliGRAM(s) IntraMuscular once  heparin   Injectable 5000 Unit(s) SubCutaneous every 12 hours  insulin lispro (ADMELOG) corrective regimen sliding scale   SubCutaneous three times a day before meals  insulin lispro (ADMELOG) corrective regimen sliding scale   SubCutaneous at bedtime  magnesium sulfate  IVPB 2 Gram(s) IV Intermittent once  naloxone Injectable 0.4 milliGRAM(s) IV Push once  octreotide  Injectable 100 MICROGram(s) SubCutaneous every 8 hours  sodium chloride 0.9%. 1000 milliLiter(s) (100 mL/Hr) IV Continuous <Continuous>    MEDICATIONS  (PRN):  benzocaine/butamben/tetracaine Spray 1 Spray(s) Topical every 6 hours PRN sore throat  dextrose Oral Gel 15 Gram(s) Oral once PRN Blood Glucose LESS THAN 70 milliGRAM(s)/deciliter  morphine  - Injectable 2 milliGRAM(s) IV Push every 4 hours PRN Severe Pain (7 - 10)  ondansetron Injectable 4 milliGRAM(s) IV Push every 8 hours PRN Nausea and/or Vomiting      PAST MEDICAL & SURGICAL HISTORY:  Pancreatic cancer      Hypertension      Hypothyroid      Seasonal asthma      History of pancreatectomy          FAMILY HISTORY:      ALLERGIES  phenytoin (Rash)  ibuprofen (Other)  Dilantin (Unknown)      REVIEW OF SYSTEMS  --------------------------------------------------------------------------------    Skin: No rashes  Head/Eyes/Ears/Mouth: No headache; Normal hearing; Normal vision w/o blurriness; No sinus pain/discomfort, sore throat  Respiratory: No dyspnea, cough, wheezing, hemoptysis  CV: No chest pain, PND, orthopnea  GI: No abdominal pain, diarrhea, constipation, nausea, vomiting, melena, hematochezia  : No increased frequency, dysuria, hematuria, nocturia  MSK: No joint pain/swelling; no back pain; no edema  Neuro: No dizziness/lightheadedness, weakness, seizures, numbness, tingling  Heme: No easy bruising or bleeding  Endo: No heat/cold intolerance  Psych: No significant nervousness, anxiety, stress, depression      VITALS  T(C): 36.3 (11-06-24 @ 04:28), Max: 36.8 (11-05-24 @ 16:06)  HR: 77 (11-06-24 @ 06:00) (61 - 78)  BP: 170/90 (11-06-24 @ 06:00) (145/86 - 180/97)  RR: 17 (11-06-24 @ 04:28) (14 - 22)  SpO2: 98% (11-06-24 @ 04:28) (96% - 99%)  I&O's Detail    05 Nov 2024 07:01  -  06 Nov 2024 07:00  --------------------------------------------------------  IN:    Oral Fluid: 300 mL  Total IN: 300 mL    OUT:    Nasogastric/Oral tube (mL): 900 mL  Total OUT: 900 mL    Total NET: -600 mL            LABS:                        9.0    9.36  )-----------( 79       ( 06 Nov 2024 07:09 )             28.0     11-06    132[L]  |  97  |  24[H]  ----------------------------<  416[H]  3.9   |  22  |  0.95    Ca    7.5[L]      06 Nov 2024 07:09  Phos  3.4     11-06  Mg     1.5     11-06      Ionized Calcium Levels:     CAPILLARY BLOOD GLUCOSE      POCT Blood Glucose.: 277 mg/dL (06 Nov 2024 08:31)  CAPILLARY BLOOD GLUCOSE      POCT Blood Glucose.: 277 mg/dL (06 Nov 2024 08:31)  POCT Blood Glucose.: 442 mg/dL (06 Nov 2024 05:53)  POCT Blood Glucose.: 349 mg/dL (06 Nov 2024 00:18)  POCT Blood Glucose.: 214 mg/dL (05 Nov 2024 16:56)  POCT Blood Glucose.: 214 mg/dL (05 Nov 2024 16:09)  POCT Blood Glucose.: 323 mg/dL (05 Nov 2024 13:40)    PT/INR - ( 05 Nov 2024 04:07 )   PT: 12.9 sec;   INR: 1.13 ratio         PTT - ( 05 Nov 2024 04:07 )  PTT:26.9 sec      	       NUTRITION SUPPORT / TPN CONSULT NOTE    HPI:  69F PMHx DM, HTN, hypothyroid, pancreatic ca s/p resection '21, s/p chemo/rt then recurrence '22 s/p RT.   Presenting w/ diffuse abd pain, n/v and constipation x3d. Pt has oxycodone for chronic pain, it has not been helping at home. Pt took OTC stool softner and miralax at home w/o relief.     CT imaging c/f pancreatic mass resulting in severe multifocal luminal narrowing along the distal stomach and duodenum, c/w gastric outlet obstruction. Filling defect in R external and common iliac veins, likely flow artifact. Severe narrowing of the portal confluence w/o discrete thrombosis.     Surg consult in the ED, put NGT in to low suction, malignant gastric obstruction cocktail (dexamethasone, reglan and octreotide). Surg onc will follow.     24 hour/overnight events: EGD on 11/5 demonstrated strictures of the pylorus and duodenum due to tumor ingrowth, ampullary stent in place, stomach erosions. TPN consulted for Protein Calorie Malnutrition in the setting of nutritional optimization for anticipated prolonged minimal PO intake. Pt reports tolerating some clears today. Pt asymptomatic for N/V/abd pain. She reports losing 10 lbs over the past month due to nausea and vomiting.        MEDICATIONS  (STANDING):  dexAMETHasone  Injectable 4 milliGRAM(s) IV Push every 12 hours  dextrose 5%. 1000 milliLiter(s) (50 mL/Hr) IV Continuous <Continuous>  dextrose 5%. 1000 milliLiter(s) (100 mL/Hr) IV Continuous <Continuous>  dextrose 50% Injectable 25 Gram(s) IV Push once  dextrose 50% Injectable 12.5 Gram(s) IV Push once  dextrose 50% Injectable 25 Gram(s) IV Push once  glucagon  Injectable 1 milliGRAM(s) IntraMuscular once  heparin   Injectable 5000 Unit(s) SubCutaneous every 12 hours  insulin lispro (ADMELOG) corrective regimen sliding scale   SubCutaneous three times a day before meals  insulin lispro (ADMELOG) corrective regimen sliding scale   SubCutaneous at bedtime  magnesium sulfate  IVPB 2 Gram(s) IV Intermittent once  naloxone Injectable 0.4 milliGRAM(s) IV Push once  octreotide  Injectable 100 MICROGram(s) SubCutaneous every 8 hours  sodium chloride 0.9%. 1000 milliLiter(s) (100 mL/Hr) IV Continuous <Continuous>    MEDICATIONS  (PRN):  benzocaine/butamben/tetracaine Spray 1 Spray(s) Topical every 6 hours PRN sore throat  dextrose Oral Gel 15 Gram(s) Oral once PRN Blood Glucose LESS THAN 70 milliGRAM(s)/deciliter  morphine  - Injectable 2 milliGRAM(s) IV Push every 4 hours PRN Severe Pain (7 - 10)  ondansetron Injectable 4 milliGRAM(s) IV Push every 8 hours PRN Nausea and/or Vomiting      PAST MEDICAL & SURGICAL HISTORY:  Pancreatic cancer      Hypertension      Hypothyroid      Seasonal asthma      History of pancreatectomy          FAMILY HISTORY:      ALLERGIES  phenytoin (Rash)  ibuprofen (Other)  Dilantin (Unknown)      REVIEW OF SYSTEMS  --------------------------------------------------------------------------------    Skin: No rashes  Head/Eyes/Ears/Mouth: No headache; Normal hearing; Normal vision w/o blurriness; No sinus pain/discomfort, sore throat  Respiratory: No dyspnea, cough, wheezing, hemoptysis  CV: No chest pain, PND, orthopnea  GI: No abdominal pain, diarrhea, constipation, nausea, vomiting, melena, hematochezia  : No increased frequency, dysuria, hematuria, nocturia  MSK: No joint pain/swelling; no back pain; no edema  Neuro: No dizziness/lightheadedness, weakness, seizures, numbness, tingling  Heme: No easy bruising or bleeding  Endo: No heat/cold intolerance  Psych: No significant nervousness, anxiety, stress, depression      VITALS  T(C): 36.3 (11-06-24 @ 04:28), Max: 36.8 (11-05-24 @ 16:06)  HR: 77 (11-06-24 @ 06:00) (61 - 78)  BP: 170/90 (11-06-24 @ 06:00) (145/86 - 180/97)  RR: 17 (11-06-24 @ 04:28) (14 - 22)  SpO2: 98% (11-06-24 @ 04:28) (96% - 99%)  I&O's Detail    05 Nov 2024 07:01  -  06 Nov 2024 07:00  --------------------------------------------------------  IN:    Oral Fluid: 300 mL  Total IN: 300 mL    OUT:    Nasogastric/Oral tube (mL): 900 mL  Total OUT: 900 mL    Total NET: -600 mL            LABS:                        9.0    9.36  )-----------( 79       ( 06 Nov 2024 07:09 )             28.0     11-06    132[L]  |  97  |  24[H]  ----------------------------<  416[H]  3.9   |  22  |  0.95    Ca    7.5[L]      06 Nov 2024 07:09  Phos  3.4     11-06  Mg     1.5     11-06      Ionized Calcium Levels:     CAPILLARY BLOOD GLUCOSE      POCT Blood Glucose.: 277 mg/dL (06 Nov 2024 08:31)  CAPILLARY BLOOD GLUCOSE      POCT Blood Glucose.: 277 mg/dL (06 Nov 2024 08:31)  POCT Blood Glucose.: 442 mg/dL (06 Nov 2024 05:53)  POCT Blood Glucose.: 349 mg/dL (06 Nov 2024 00:18)  POCT Blood Glucose.: 214 mg/dL (05 Nov 2024 16:56)  POCT Blood Glucose.: 214 mg/dL (05 Nov 2024 16:09)  POCT Blood Glucose.: 323 mg/dL (05 Nov 2024 13:40)    PT/INR - ( 05 Nov 2024 04:07 )   PT: 12.9 sec;   INR: 1.13 ratio         PTT - ( 05 Nov 2024 04:07 )  PTT:26.9 sec

## 2024-11-06 NOTE — PROGRESS NOTE ADULT - TIME BILLING
Total Time Spent 35 minutes.    This includes chart review, patient assessment, discussion and collaboration with interdisciplinary team members, excluding ACP.

## 2024-11-06 NOTE — PROGRESS NOTE ADULT - ASSESSMENT
69F with PMHx of PDIC s/p distal pancreatectomy (2021) and chemo/radiation w/ recurrence to abdominal wall s/p resection (2022) and radiation who presented 11/1 to the ED with abdominal pain, nausea, vomiting, and CT A/P with ill-defined hypodense pancreatic head mass with multifocal narrowing along distal stomach and duodenum, consistent with gastric outlet obstruction and cancer recurrence. Surgery was consulted for gastric outlet obstruction management. EGD on 11/5 demonstrating multifocal stricturing disease of the pylorus and duodenum due to tumor ingrowth.    P:   - Advanced GI planning on endoscopic GJ versus Axios stent placement on Thursday.  - Diet: CLD  - Malignant bowel obstruction protocol   - dexamethasone 4mg IV q12h  - reglan 10 mg IV q8h  - octreotide 100 ucg SQ q8h   - If patient would like care at [facility] [place], would recommend transfer  - Surgical oncology to continue following    Gold Team Surgery  [id number] 69F with PMHx of PDIC s/p distal pancreatectomy (2021) and chemo/radiation w/ recurrence to abdominal wall s/p resection (2022) and radiation who presented 11/1 to the ED with abdominal pain, nausea, vomiting, and CT A/P with ill-defined hypodense pancreatic head mass with multifocal narrowing along distal stomach and duodenum, consistent with gastric outlet obstruction and cancer recurrence. Surgery was consulted for gastric outlet obstruction management. EGD on 11/5 demonstrating multifocal stricturing disease of the pylorus and duodenum due to tumor ingrowth.     P:   - Advanced GI planning on endoscopic GJ versus Axios stent placement on Thursday.  - Diet: CLD  - Malignant bowel obstruction protocol   - dexamethasone 4mg IV q12h  - reglan 10 mg IV q8h  - octreotide 100 ucg SQ q8h   - If patient would like care at [facility] [place], would recommend transfer  - Surgical oncology to continue following    Gold Team Surgery  [id number]

## 2024-11-06 NOTE — PROGRESS NOTE ADULT - ASSESSMENT
68 yo F with PMH of HTN, hypothyroidism, hiatal hernia, gastritis, and pancreatic cancer s/p distal pancreatectomy and splectomy 2021 and chemo RT c/b recurrence to abdominal pain s/p resection and RT, and biliary stricture s/p stent in 2023 presenting with abdominal pain and N/V for 1 week, found to have ill defined hypodense pancreatic mass with resultant severe multifocal luminal narrowing along stomach and duodenum.     Impression:  #Gastric outlet obstruction  #Pancreatic cancer s/p distal pancreatectomy and chemo/RT  #Hypodense pancreas mass with resultant severe multifocal luminal narrowing along stomach and duodenum  #Hx of biliary stricture s/p stent 11/2023    P/w abdominal pain and n/v for 1 week, found to have gastric outlet obstruction now s/p NGT for decompression. GI consulted for evaluation for endoscopic intervention. Of note, pt w/ hx of pancreatic cancer s/p distal pancreatectomy and splenectomy c/b recurrence and abdominal wall resection with prior admission here 11/2023 for biliary stricture s/p stent 11/2023. CT on admission shows ill-defined hypodense pancreatic mass with resultant severe multifocal luminal narrowing along the distal stomach and duodenum, narrowing of the IVC due to extrinsic compression from pancreas, severe narrowing of the portal confluence without discrete thrombosis, and moderate central and mild peripheral intrahepatic biliary dilatation with pneumobilia in the left greater than right hepatic bile ducts with patent CBD stent. S/P EGD showing 2 strictures (pyloric and duodenal) with additional D4 stricture noted on CT; not amenable for stenting given multifocal nature. Discussed with surgery and given frailty/surgical risk, will plan for endoscopic GJ tomorrow. Tolerating CLD.    - continue CLD; monitor for refeeding syndrome  - plan for endoscopic GJ tomorrow; NPO after midnight  - 5AM labs  - Malignant bowel obstruction protocol per surgery  - Send early am preop labs (cbc, T&S, coags, bmp)  - Appreciate oncology and surgery recommendations    Note and recommendations are incomplete until reviewed and attested by attending.    Td Ramirez MD  GI/Hepatology Fellow, PGY5  Long Range Pager [phone] or [employer] Pager [phone]  Teams preferred (7AM to 5PM); after 5PM, call GI fellow on call    On Weekends/Holidays (All Day) and Weekdays after 5PM to 8AM  For non-urgent consults, please email [email or web address] and [email or web address]  For urgent consults, please contact on call GI team. See Amion schedule ([facility]), [employer] paging system ([facility]), or call hospital  ([facility]) 68 yo F with PMH of HTN, hypothyroidism, hiatal hernia, gastritis, and pancreatic cancer s/p distal pancreatectomy and splectomy 2021 and chemo RT c/b recurrence to abdominal pain s/p resection and RT, and biliary stricture s/p stent in 2023 presenting with abdominal pain and N/V for 1 week, found to have ill defined hypodense pancreatic mass with resultant severe multifocal luminal narrowing along stomach and duodenum.     Impression:  #Gastric outlet obstruction  #Pancreatic cancer s/p distal pancreatectomy and chemo/RT  #Hypodense pancreas mass with resultant severe multifocal luminal narrowing along stomach and duodenum  #Hx of biliary stricture s/p stent 11/2023    P/w abdominal pain and n/v for 1 week, found to have gastric outlet obstruction now s/p NGT for decompression. GI consulted for evaluation for endoscopic intervention. Of note, pt w/ hx of pancreatic cancer s/p distal pancreatectomy and splenectomy c/b recurrence and abdominal wall resection with prior admission here 11/2023 for biliary stricture s/p stent 11/2023. CT on admission shows ill-defined hypodense pancreatic mass with resultant severe multifocal luminal narrowing along the distal stomach and duodenum, narrowing of the IVC due to extrinsic compression from pancreas, severe narrowing of the portal confluence without discrete thrombosis, and moderate central and mild peripheral intrahepatic biliary dilatation with pneumobilia in the left greater than right hepatic bile ducts with patent CBD stent. S/P EGD showing 2 strictures (pyloric and duodenal) with additional D4 stricture noted on CT; not amenable for stenting given multifocal nature. Discussed with surgery and given frailty/surgical risk, will plan for endoscopic GJ tomorrow. Tolerating CLD.    - continue CLD; monitor for refeeding syndrome  - ensure adequate glycemic control prior to endoscopy tomorrow  - plan for endoscopic GJ tomorrow; NPO after midnight  - 5AM labs  - Malignant bowel obstruction protocol per surgery  - Send early am preop labs (cbc, T&S, coags, bmp)  - Appreciate oncology and surgery recommendations    Note and recommendations are incomplete until reviewed and attested by attending.    Td Ramirez MD  GI/Hepatology Fellow, PGY5  [employer] Pager [phone] or [employer] Pager [phone]  Teams preferred (7AM to 5PM); after 5PM, call GI fellow on call    On Weekends/Holidays (All Day) and Weekdays after 5PM to 8AM  For non-urgent consults, please email [email or web address] and [email or web address]  For urgent consults, please contact on call GI team. See Amion schedule ([facility]), [employer] paging system ([facility]), or call hospital  ([facility]) 70 yo F with PMH of HTN, hypothyroidism, hiatal hernia, gastritis, and pancreatic cancer s/p distal pancreatectomy and splectomy 2021 and chemo RT c/b recurrence to abdominal pain s/p resection and RT, and biliary stricture s/p stent in 2023 presenting with abdominal pain and N/V for 1 week, found to have ill defined hypodense pancreatic mass with resultant severe multifocal luminal narrowing along stomach and duodenum.     Impression:  #Gastric outlet obstruction  #Pancreatic cancer s/p distal pancreatectomy and chemo/RT  #Hypodense pancreas mass with resultant severe multifocal luminal narrowing along stomach and duodenum  #Hx of biliary stricture s/p stent 11/2023    P/w abdominal pain and n/v for 1 week, found to have gastric outlet obstruction now s/p NGT for decompression. GI consulted for evaluation for endoscopic intervention. Of note, pt w/ hx of pancreatic cancer s/p distal pancreatectomy and splenectomy c/b recurrence and abdominal wall resection with prior admission here 11/2023 for biliary stricture s/p stent 11/2023. CT on admission shows ill-defined hypodense pancreatic mass with resultant severe multifocal luminal narrowing along the distal stomach and duodenum, narrowing of the IVC due to extrinsic compression from pancreas, severe narrowing of the portal confluence without discrete thrombosis, and moderate central and mild peripheral intrahepatic biliary dilatation with pneumobilia in the left greater than right hepatic bile ducts with patent CBD stent. S/P EGD showing 2 strictures (pyloric and duodenal) with additional D4 stricture noted on CT; not amenable for stenting given multifocal nature. Discussed with surgery and given frailty/surgical risk, will plan for endoscopic GJ tomorrow. Tolerating CLD.    - continue CLD; monitor for refeeding syndrome  - ensure adequate glycemic control prior to endoscopy tomorrow  - workup for thrombocytopenia  - plan for endoscopic GJ tomorrow; NPO after midnight  - 5AM labs  - Malignant bowel obstruction protocol per surgery  - Send early am preop labs (cbc, T&S, coags, bmp)  - Appreciate oncology and surgery recommendations    Note and recommendations are incomplete until reviewed and attested by attending.    Td Ramirez MD  GI/Hepatology Fellow, PGY5  [employer] Pager [phone] or [employer] Pager [phone]  Teams preferred (7AM to 5PM); after 5PM, call GI fellow on call    On Weekends/Holidays (All Day) and Weekdays after 5PM to 8AM  For non-urgent consults, please email [email or web address] and [email or web address]  For urgent consults, please contact on call GI team. See Amion schedule ([facility]), [employer] paging system ([facility]), or call hospital  ([facility])

## 2024-11-06 NOTE — PROGRESS NOTE ADULT - ASSESSMENT
69F admitted for malignant gastric outlet obstruction       Malignant Gastric outlet obstruction.   - CT A/P as noted; CT H as noted   - NPO and has NGT to low suction per surgery. surg onc will follow. Strict NPO, no exception.   - 4mg IV dexamethasone, q12h  - 10 mg IV reglan, q8h  - 100mcg octreotide micrograms SubQ, q8h   - morphine 2mg q4h prn for pain. Monitor BM, if no bm in 48hr, enema prn while npo.   - D5NS @ 100cc/hr   - 11/4 egd w/  Strictures 2/2 tumor ingrowth, planned for endoscopic GJ via EUS/AXIOS placement on 11/7   - TPN consulted; F/u recs                                                                                             - GI eval consulted; F/u recs   - Heme/Onc, palliative eval appreciated; F/u recs     Pancreatic cancer.   - Diagnosed in 2021 s/p resection, chemo/RT and now with recurrent in 2022 s/p RT.  - On active DMT with [facility]   - Heme/Onc and palliative eval appreciated; F/u recs     Anemia  - Likely due to malignancy and treatment; F/u anemia labs   - Transfuse for Hgb <7  - Heme/Onc eval appreciated; F/u recs    Hypertension.   - No meds per mouth.   - Monitor BP. If sustained SBP >160, may use iv hydralazine as needed.    Type 2 diabetes mellitus.   - Hold home oral dm meds  - ISS while inpatient  - IVF for hydration   - Monitor glucose levels closely   - patient hyperglycemic, diet switched to diabetic diet     Hypothyroid.   - TSH and FT4 noted   - On IV Synthroid 70 - dose confirmed with Pharmacy    Prophylactic measure.   - Heparin SubQ      Discussed with Attending, ACP and GI    69F admitted for malignant gastric outlet obstruction       Malignant Gastric outlet obstruction.   - CT A/P as noted; CT H as noted   - NPO and has NGT to low suction per surgery. surg onc will follow. Strict NPO, no exception.   - 4mg IV dexamethasone, q12h  - 10 mg IV reglan, q8h  - 100mcg octreotide micrograms SubQ, q8h   - morphine 2mg q4h prn for pain. Monitor BM, if no bm in 48hr, enema prn while npo.   - D5NS @ 100cc/hr   - 11/4 egd w/  Strictures 2/2 tumor ingrowth, planned for endoscopic GJ via EUS/AXIOS placement on 11/7   - TPN consulted; F/u recs                                                                                             - GI eval consulted; F/u recs   - Heme/Onc, palliative eval appreciated; F/u recs     Pancreatic cancer.   - Diagnosed in 2021 s/p resection, chemo/RT and now with recurrent in 2022 s/p RT.  - On active DMT with [facility]   - Heme/Onc and palliative eval appreciated; F/u recs     Anemia, Thrombocytopenia   - Likely due to malignancy and treatment; F/u anemia labs   - Platelet count down-trending. Monitor closely --> will check for HIT. Hold DVT PPX   - Transfuse for Hgb <7, Plt <10K or < 50K w/ fever/bleeding   - Heme/Onc eval appreciated; F/u recs    Hypertension.   - No meds per mouth.   - Monitor BP. If sustained SBP >160, may use iv hydralazine as needed.    Type 2 diabetes mellitus.   - Hold home oral dm meds  - ISS while inpatient  - IVF for hydration   - Monitor glucose levels closely   - patient hyperglycemic, diet switched to diabetic diet     Hypothyroid.   - TSH and FT4 noted   - On IV Synthroid 70 - dose confirmed with Pharmacy    Prophylactic measure.   - Heparin SubQ      Discussed with Attending, ACP and GI

## 2024-11-06 NOTE — PROGRESS NOTE ADULT - SUBJECTIVE AND OBJECTIVE BOX
Patient is a 69y old  Female who presents with a chief complaint of Intestinal obstruction     (04 Nov 2024 12:47)      MEDICATIONS  (STANDING):  dexAMETHasone  Injectable 4 milliGRAM(s) IV Push every 12 hours  dextrose 5%. 1000 milliLiter(s) (50 mL/Hr) IV Continuous <Continuous>  dextrose 5%. 1000 milliLiter(s) (100 mL/Hr) IV Continuous <Continuous>  dextrose 50% Injectable 25 Gram(s) IV Push once  dextrose 50% Injectable 12.5 Gram(s) IV Push once  dextrose 50% Injectable 25 Gram(s) IV Push once  glucagon  Injectable 1 milliGRAM(s) IntraMuscular once  heparin   Injectable 5000 Unit(s) SubCutaneous every 12 hours  insulin lispro (ADMELOG) corrective regimen sliding scale   SubCutaneous three times a day before meals  insulin lispro (ADMELOG) corrective regimen sliding scale   SubCutaneous at bedtime  naloxone Injectable 0.4 milliGRAM(s) IV Push once  octreotide  Injectable 100 MICROGram(s) SubCutaneous every 8 hours  sodium chloride 0.9%. 1000 milliLiter(s) (100 mL/Hr) IV Continuous <Continuous>    MEDICATIONS  (PRN):  benzocaine/butamben/tetracaine Spray 1 Spray(s) Topical every 6 hours PRN sore throat  dextrose Oral Gel 15 Gram(s) Oral once PRN Blood Glucose LESS THAN 70 milliGRAM(s)/deciliter  morphine  - Injectable 2 milliGRAM(s) IV Push every 4 hours PRN Severe Pain (7 - 10)  ondansetron Injectable 4 milliGRAM(s) IV Push every 8 hours PRN Nausea and/or Vomiting      Vital Signs Last 24 Hrs  T(C): 36.3 (06 Nov 2024 04:28), Max: 36.8 (05 Nov 2024 16:06)  T(F): 97.3 (06 Nov 2024 04:28), Max: 98.3 (05 Nov 2024 16:06)  HR: 77 (06 Nov 2024 06:00) (61 - 78)  BP: 170/90 (06 Nov 2024 06:00) (145/86 - 180/97)  BP(mean): 97 (05 Nov 2024 17:33) (97 - 97)  RR: 17 (06 Nov 2024 04:28) (14 - 22)  SpO2: 98% (06 Nov 2024 04:28) (96% - 99%)    Parameters below as of 06 Nov 2024 04:28  Patient On (Oxygen Delivery Method): room air    PE  NAD  Awake, alert  Anicteric, MMM  RRR  CTAB  Abd soft, NT, ND                          9.0    9.36  )-----------( 79       ( 06 Nov 2024 07:09 )             28.0       11-06    132[L]  |  97  |  24[H]  ----------------------------<  416[H]  3.9   |  22  |  0.95    Ca    7.5[L]      06 Nov 2024 07:09  Phos  3.4     11-06  Mg     1.5     11-06

## 2024-11-06 NOTE — PROGRESS NOTE ADULT - PROBLEM SELECTOR PLAN 3
Controlled at the moment with IV Tylenol   - Advise  2mg IV morphine q4h prn severe pain (no PRNs required in last 24hr 7am-7am)  - Patient follows with  outpatient Palliative care provider with [facility]- Dr. Laura Haywood  - vani prn

## 2024-11-06 NOTE — CHART NOTE - NSCHARTNOTEFT_GEN_A_CORE
NUTRITION FOLLOW UP NOTE    PATIENT SEEN FOR: Consult for Tube Feeding and TPN    SOURCE: [x] Patient  [x] Current Medical Record  [] RN  [x] Family/support person at bedside  [] Patient unavailable/inappropriate  [] Other:    CHART REVIEWED/EVENTS NOTED.  [] No changes to nutrition care plan to note  [x] Nutrition Status:  -Hx Pancreatic cancer.  -Gastric outlet obstruction.  -Per Surgery: "GD with GI demonstrated strictures of the pylorus and duodenum due to tumor ingrowth, ampullary stent in place, stomach erosions."  -Per GI: "ensure adequate glycemic control prior to endoscopy tomorrow. plan for endoscopic GJ tomorrow; NPO after midnight."  -Seen by TPN team today.    DIET ORDER:   Diet, Clear Liquid:   Supplement Feeding Modality:  Oral  Ensure Clear Cans or Servings Per Day:  1       Frequency:  Two Times a day (11-06-24)      CURRENT DIET ORDER IS:  [] Appropriate:   [x] Inadequate: defer diet to team given current medical   [] Other:    NUTRITION INTAKE/PROVISION:  [x] PO: tolerating clear liquid diet.   [] Enteral Nutrition:  [] Parenteral Nutrition:    ANTHROPOMETRICS:  Drug Dosing Weight  Height (cm): 154.9 (05 Nov 2024 17:33)  Weight (kg): 36 (05 Nov 2024 17:33)  BMI (kg/m2): 15 (05 Nov 2024 17:33)      NUTRITIONALLY PERTINENT MEDICATIONS:  MEDICATIONS  (STANDING):  dexAMETHasone  Injectable 4 milliGRAM(s) IV Push every 12 hours  dextrose 5%. 1000 milliLiter(s) (50 mL/Hr) IV Continuous <Continuous>  dextrose 5%. 1000 milliLiter(s) (100 mL/Hr) IV Continuous <Continuous>  dextrose 50% Injectable 25 Gram(s) IV Push once  dextrose 50% Injectable 12.5 Gram(s) IV Push once  dextrose 50% Injectable 25 Gram(s) IV Push once  glucagon  Injectable 1 milliGRAM(s) IntraMuscular once  heparin   Injectable 5000 Unit(s) SubCutaneous every 12 hours  insulin lispro (ADMELOG) corrective regimen sliding scale   SubCutaneous three times a day before meals  insulin lispro (ADMELOG) corrective regimen sliding scale   SubCutaneous at bedtime  naloxone Injectable 0.4 milliGRAM(s) IV Push once  octreotide  Injectable 100 MICROGram(s) SubCutaneous every 8 hours  sodium chloride 0.9%. 1000 milliLiter(s) (100 mL/Hr) IV Continuous <Continuous>    MEDICATIONS  (PRN):  benzocaine/butamben/tetracaine Spray 1 Spray(s) Topical every 6 hours PRN sore throat  dextrose Oral Gel 15 Gram(s) Oral once PRN Blood Glucose LESS THAN 70 milliGRAM(s)/deciliter  morphine  - Injectable 2 milliGRAM(s) IV Push every 4 hours PRN Severe Pain (7 - 10)  ondansetron Injectable 4 milliGRAM(s) IV Push every 8 hours PRN Nausea and/or Vomiting         NUTRITIONALLY PERTINENT LABS:  11-06 Na132 mmol/L[L] Glu 416 mg/dL[H] K+ 3.9 mmol/L Cr  0.95 mg/dL BUN 24 mg/dL[H] 11-06 Phos 3.4 mg/dL 11-06 Alb 3.2 g/dL[L]11-06 ALT 26 U/L AST 41 U/L[H] Alkaline Phosphatase 134 U/L[H]  11-02-24 @ 06:50 a1c 6.4    A1C with Estimated Average Glucose Result: 6.4 % (11-02-24 @ 06:50)          Finger Sticks:  POCT Blood Glucose.: 392 mg/dL (11-06 @ 12:18)  POCT Blood Glucose.: 277 mg/dL (11-06 @ 08:31)  POCT Blood Glucose.: 442 mg/dL (11-06 @ 05:53)  POCT Blood Glucose.: 349 mg/dL (11-06 @ 00:18)  POCT Blood Glucose.: 214 mg/dL (11-05 @ 16:56)  POCT Blood Glucose.: 214 mg/dL (11-05 @ 16:09)      NUTRITIONALLY PERTINENT MEDICATIONS/LABS:  [x] Reviewed  [x] Relevant notes on medications/labs:  -Ordered for decadron which can increase blood glucose levels.  -IV fluids.  -Correctional sliding scale insulin. elevated Finger Sticks noted (highest 442).  -s/p Mg sulfate 11/6; s/p KCl 11/5; s/p K phosphate 11/5.      EDEMA:  [x] Reviewed  [x] Relevant notes: No noted edema as per flowsheets.     GI/ I&O:  [x] Reviewed  [x] Relevant notes: Patient reports slight nausea, still no BM but is passing gas. Abdominal tenderness.  [] Other:    SKIN:   [x] No pressure injuries documented, per nursing flowsheet  [] Pressure injury previously noted  [] Change in pressure injury documentation:  [] Other:    ESTIMATED NEEDS:  [x] No change:  [] Updated:  Energy:  5241-0770 kcal/day (35-40 kcal/kg)  Protein:  54-72 g/day (1.5-2.0 g/kg)  Fluid:   ml/day or [x] defer to team  Based on: dosing weight 36kg with consideration for cancer, malnutrition, BMI    NUTRITION DIAGNOSIS:  [x] Prior Dx: Severe acute malnutrition; Underweight; Increased Nutrient Needs (protein-energy, micronutrients)   [] New Dx:    EDUCATION:  [x] Yes: RD answered questions about clear liquid diet.  [] Not appropriate/warranted    NUTRITION CARE PLAN:  1. While on clear liquid diet, consider adding Consistent Carbohydrate restriction and discontinuing Ensure Clear oral nutrition supplements (each contains 52g carbohydrates) as GI wants adequate glycemic control prior to procedure.  2. Enteral nutrition: if enteral nutrition initiated, recommend Glucerna 1.5 @ 15ml/hr, increase by 10ml q6hr to goal rate 35ml/hr x 24hr to provide 840ml total volume, 1260kcal, 69g protein, 638ml free water; meets 35kcal/kg and 1.92g/kg protein based on dosing weight 36kg.  3. TPN management per TPN team.  4. Patient at risk for refeeding syndrome: Recommend adding multivitamin and thiamine. Monitor daily potassium, magnesium, and phosphorous. Monitor electrolytes, replete as needed.     [] Achieved - Continue current nutrition intervention(s)  [] Current medical condition precludes nutrition intervention at this time.    MONITORING AND EVALUATION:   RD remains available upon request and will follow up per protocol.    Corinne Lima RDN, CDN - available via [facility] TE[facility]

## 2024-11-06 NOTE — PROGRESS NOTE ADULT - ASSESSMENT
69F PMHx DM, HTN, hypothyroid, pancreatic ca s/p resection '21, s/p chemo/rt then recurrence '22 s/p RT.  Presenting w/ diffuse abd pain, n/v and constipation x3d.     #Pancreatic ca  --follows with Dr. Bess of [facility]  --s/p distal pancreatectomy and splenectomy (06/2021)  --s/p adjuvant tx w/ mFOLFIRINOX (08/21-01/21), Xeloda +RTx 25 fractions (02/22-03/22)-> POD (01/23)  --most recent systemic tx w/ Gemcitabine and nab-paclitaxel (05/23-10/23, 02/24-07/24). Break in tx 2/2 jaundice and stent migration  --s/p RT x 10 fractions to abdominal wall mets 08/2024  --no plan for treatment inpatient and/or rehab  --plan for possible transfer to [facility]    #gastric outlet obstruction  --pt p/w abdominal pain, N/V  --CT a/p w/ ill-defined hypodense pancreatic mass w/ multifocal narrowing along distal stomach and duodenum resulting in gastric outlet obstruction.  --CTH No acute intracranial hemorrhage, mass effect, or midline shift  --surgery following no plan for acute surgical intervention at this time   --currently on Malignant bowel obstruction protocol w/dex, reglan, and octreotide. NGT to [facility]  --EGD on 11/5 demonstrating multifocal stricturing disease of the pylorus and duodenum due to tumor ingrowth.   --[facility] planning on endoscopic GJ versus Axios stent placement on Thursday 11/07/24      #Anemia  --2/2 malignancy and treatment  --baseline Hgb 8-9.5  --will check for nutritional deficiencies, supplement as needed  --transfuse for Hgb <7, 8 if symptomatic    will follow,    Dee Fowler NP  Hematology/ Oncology  [facility]  [phone] (office)  [phone] (alt office)  Evenings and weekends please call MD on call or office

## 2024-11-06 NOTE — PROGRESS NOTE ADULT - SUBJECTIVE AND OBJECTIVE BOX
SURGERY DAILY PROGRESS NOTE    24 Hour/Overnight Events:   - EGD with GI demonstrated strictures of the pylorus and duodenum due to tumor ingrowth, ampullary stent in place, stomach erosions      SUBJECTIVE: Patient seen and evaluated on AM rounds.     ------------------------------------------------------------------------------------------------------------  OBJECTIVE:  Vital Signs Last 24 Hrs  T(C): 36.6 (05 Nov 2024 22:48), Max: 36.8 (05 Nov 2024 16:06)  T(F): 97.9 (05 Nov 2024 22:48), Max: 98.3 (05 Nov 2024 16:06)  HR: 67 (05 Nov 2024 22:48) (61 - 78)  BP: 170/90 (05 Nov 2024 22:48) (145/86 - 180/97)  BP(mean): 97 (05 Nov 2024 17:33) (97 - 97)  RR: 18 (05 Nov 2024 22:48) (14 - 22)  SpO2: 96% (05 Nov 2024 22:48) (96% - 99%)    Parameters below as of 05 Nov 2024 22:48  Patient On (Oxygen Delivery Method): room air      I&O's Detail    04 Nov 2024 07:01  -  05 Nov 2024 07:00  --------------------------------------------------------  IN:    dextrose 5% + sodium chloride 0.9%: 1200 mL    IV PiggyBack: 200 mL  Total IN: 1400 mL    OUT:    Nasogastric/Oral tube (mL): 250 mL  Total OUT: 250 mL    Total NET: 1150 mL      05 Nov 2024 07:01  -  06 Nov 2024 04:08  --------------------------------------------------------  IN:  Total IN: 0 mL    OUT:    Nasogastric/Oral tube (mL): 900 mL  Total OUT: 900 mL    Total NET: -900 mL          PHYSICAL EXAM:  Constitutional: thin-appearing female with no acute distress  Chest: Symmetric chest rise bilaterally, no increased WOB  Abdomen: Soft, nondistended.    LABS:                        8.6    9.06  )-----------( 118      ( 05 Nov 2024 04:07 )             26.6     11-05    136  |  96  |  23  ----------------------------<  206[H]  3.1[L]   |  29  |  0.92    Ca    7.9[L]      05 Nov 2024 04:07  Phos  2.2     11-05  Mg     1.7     11-05        PT/INR - ( 05 Nov 2024 04:07 )   PT: 12.9 sec;   INR: 1.13 ratio         PTT - ( 05 Nov 2024 04:07 )  PTT:26.9 sec     SURGERY DAILY PROGRESS NOTE    24 Hour/Overnight Events:   - EGD with GI demonstrated strictures of the pylorus and duodenum due to tumor ingrowth, ampullary stent in place, stomach erosions      SUBJECTIVE: Patient seen and evaluated on rounds.      ------------------------------------------------------------------------------------------------------------  OBJECTIVE:  Vital Signs Last 24 Hrs  T(C): 36.6 (05 Nov 2024 22:48), Max: 36.8 (05 Nov 2024 16:06)  T(F): 97.9 (05 Nov 2024 22:48), Max: 98.3 (05 Nov 2024 16:06)  HR: 67 (05 Nov 2024 22:48) (61 - 78)  BP: 170/90 (05 Nov 2024 22:48) (145/86 - 180/97)  BP(mean): 97 (05 Nov 2024 17:33) (97 - 97)  RR: 18 (05 Nov 2024 22:48) (14 - 22)  SpO2: 96% (05 Nov 2024 22:48) (96% - 99%)    Parameters below as of 05 Nov 2024 22:48  Patient On (Oxygen Delivery Method): room air      I&O's Detail    04 Nov 2024 07:01  -  05 Nov 2024 07:00  --------------------------------------------------------  IN:    dextrose 5% + sodium chloride 0.9%: 1200 mL    IV PiggyBack: 200 mL  Total IN: 1400 mL    OUT:    Nasogastric/Oral tube (mL): 250 mL  Total OUT: 250 mL    Total NET: 1150 mL      05 Nov 2024 07:01  -  06 Nov 2024 04:08  --------------------------------------------------------  IN:  Total IN: 0 mL    OUT:    Nasogastric/Oral tube (mL): 900 mL  Total OUT: 900 mL    Total NET: -900 mL          PHYSICAL EXAM:  Constitutional: thin-appearing female with no acute distress, NGT with bilious output  Chest: Symmetric chest rise bilaterally, no increased WOB  Abdomen: Soft, nondistended.    LABS:                        8.6    9.06  )-----------( 118      ( 05 Nov 2024 04:07 )             26.6     11-05    136  |  96  |  23  ----------------------------<  206[H]  3.1[L]   |  29  |  0.92    Ca    7.9[L]      05 Nov 2024 04:07  Phos  2.2     11-05  Mg     1.7     11-05        PT/INR - ( 05 Nov 2024 04:07 )   PT: 12.9 sec;   INR: 1.13 ratio         PTT - ( 05 Nov 2024 04:07 )  PTT:26.9 sec

## 2024-11-07 NOTE — CHART NOTE - NSCHARTNOTEFT_GEN_A_CORE
Plan for EUS guided gastroenterostomy today for malignant gastric outlet obstruction. Risks, benefits, and alternatives of the procedure were discussed at length with the patient including but not limited to perforation, the possible need for emergent surgery, bleeding, infection, anesthesia related complication, etc. Amongst alternatives discussed was proceeding directly to surgery for surgical gastrojejunostomy without endoscopic intervention. Patient acknowledged these risks and alternatives. Will proceed as planned.

## 2024-11-07 NOTE — PROGRESS NOTE ADULT - ASSESSMENT
69F admitted for malignant gastric outlet obstruction       Malignant Gastric outlet obstruction.   - CT A/P as noted; CT H as noted   - NPO and has NGT to low suction per surgery. surg onc will follow. Strict NPO   - 4mg IV dexamethasone, q12h  - 10 mg IV reglan, q8h  - 100mcg octreotide micrograms SubQ, q8h   - morphine 2mg q4h prn for pain. Monitor BM, if no bm in 48hr, enema prn while npo.   - D5NS @ 100cc/hr   - 11/4 egd w/  Strictures 2/2 tumor ingrowth, planned for endoscopic GJ placement with advanced GI today as platelet > 50K  - TPN, GI evals appreciated; F/u recs                                                                                             - Heme/Onc, palliative eval appreciated; F/u recs     R/O Transfusion reaction   - Patient's Plt down-trending and was being transfused 1 unit of platelets, reported that near completion of 1 unit of platelets, Patient with erythematous rash and pruritis. no respiratory symptoms   - Platelet transfusion stopped and bag was sent to blood bank for work up--> Currently under review with  blood bank  - S/P IV Bbenadryl with improvement in pruritis  - Await further transfusions pending Blood Bank input   - Heme/Onc notified  - Monitor respiratory status closely. Patient and family advised to notify staff if any changes occur. Phone number given to patient's family.     Pancreatic cancer.   - Diagnosed in 2021 s/p resection, chemo/RT and now with recurrent in 2022 s/p RT.  - On active DMT with [facility]   - Heme/Onc and palliative eval appreciated; F/u recs     Anemia, Thrombocytopenia   - Likely due to malignancy and treatment; F/u anemia labs   - Platelet count down-trending. Monitor closely --> will check for HIT --> Hep Ab neg, ISIDORO pending. Hold DVT PPX   - Transfuse for Hgb <7, Plt <10K or < 50K w/ fever/bleeding   - Heme/Onc checking hemolysis labs and smear to evaluate thrombocytopenia.   - Heme/Onc eval appreciated; F/u recs    Hypertension.   - No meds per mouth.   - Monitor BP. If sustained SBP >160, may use iv hydralazine as needed.    Type 2 diabetes mellitus.   - Hold home oral dm meds  - ISS while inpatient  - IVF for hydration   - Monitor glucose levels closely   - patient hyperglycemic, diet switched to diabetic diet     Hypothyroid.   - TSH and FT4 noted   - On IV Synthroid 70 - dose confirmed with Pharmacy    Prophylactic measure.   - Heparin SubQ on HOLD       Discussed with Attending, ACP, Heme/Onc  Discussed with Family at the bedside in detail

## 2024-11-07 NOTE — PROGRESS NOTE ADULT - ASSESSMENT
69F with PMHx of PDIC s/p distal pancreatectomy (2021) and chemo/radiation w/ recurrence to abdominal wall s/p resection (2022) and radiation who presented 11/1 to the ED with abdominal pain, nausea, vomiting, and CT A/P with ill-defined hypodense pancreatic head mass with multifocal narrowing along distal stomach and duodenum, consistent with gastric outlet obstruction and cancer recurrence. Surgery was consulted for gastric outlet obstruction management. EGD on 11/5 demonstrating multifocal stricturing disease of the pylorus and duodenum due to tumor ingrowth.     P:   - Advanced GI planning on endoscopic GJ placement today  - Diet: NPO  - Malignant bowel obstruction protocol   - dexamethasone 4mg IV q12h  - reglan 10 mg IV q8h  - octreotide 100 ucg SQ q8h   - If patient would like care at [facility], would recommend transfer  - Surgical oncology to continue following    [facility] Team Surgery  [phone]

## 2024-11-07 NOTE — CHART NOTE - NSCHARTNOTEFT_GEN_A_CORE
s/p 1 unit platelets with transfusion reaction......... Informed by RN patient with concern for transfusion reaction . Saw and evaluated patient at bedside. Patient A&Ox4 - endorsed generalized pruritis otherwise denies sob, chills, sweats, N/V/D, chest pain, palpitations, lightheadedness/dizziness.   VS taken at bedside WNL                            9.6    16.10 )-----------( 100      ( 07 Nov 2024 12:12 )             28.8   11-07    132[L]  |  99  |  21  ----------------------------<  282[H]  3.9   |  24  |  0.89    Ca    7.6[L]      07 Nov 2024 03:48  Phos  2.0     11-07  Mg     1.6     11-07    TPro  6.0  /  Alb  3.0[L]  /  TBili  1.4[H]  /  DBili  x   /  AST  30  /  ALT  21  /  AlkPhos  119  11-07      Physical Exam:  GEN: A&O X 3 , NAD , comfortable, pleasant, calm, erythema to face and neck   CVS: S1S2 , regular , No murmurs/rubs/gallops appreciated  PULM: CTA B/L,  no wheezing/rales/rhonchi appreciated  ABD.: soft. non tender, non distended,  bowel sounds present   Extrem: intact pulses , no edema, erythema to b/l arms      Assessment:   69F PMHx DM, HTN, hypothyroid, pancreatic ca s/p resection '21, s/p chemo/rt then recurrence '22 s/p RT.  Presenting w/ diffuse abd pain, n/v and constipation x3d. found to have Gastric outlet obstruction. Malignant Gastric outlet obstruction protocol initiated. 11/4 egd w/  Strictures 2/2 tumor ingrowth, planned for endoscopic GJ placement with advanced GI today. Requested by GI to give a unit of platelet to optimize for the procedure. Upon near completion of 1 unit of platelets, Patient became erythematous to face, neck, and extending to B/L upper extremities with associated pruritis. no respiratory symptoms.     Plan:   - STAT Vitals - WNL  - Platelet transfusion stopped and bag was sent to blood bank for work up--> Currently under review with  blood bank    - S/P IV Bbenadryl with improvement in pruritis  - Await further transfusions pending Blood Bank input   - Repeat CBC w/ platelets 100k.  - Dr. Lawrence made aware.

## 2024-11-07 NOTE — PROGRESS NOTE ADULT - SUBJECTIVE AND OBJECTIVE BOX
Patient is a 69y old  Female who presents with a chief complaint of N/v (07 Nov 2024 11:14)    Pt seen and examined at bedside, had transfusion rx earlier. S/p platelets. Going for procedure soon.     MEDICATIONS  (STANDING):  ciprofloxacin   IVPB 400 milliGRAM(s) IV Intermittent once  dexAMETHasone  Injectable 4 milliGRAM(s) IV Push every 12 hours  dextrose 5%. 1000 milliLiter(s) (50 mL/Hr) IV Continuous <Continuous>  dextrose 5%. 1000 milliLiter(s) (100 mL/Hr) IV Continuous <Continuous>  dextrose 50% Injectable 25 Gram(s) IV Push once  dextrose 50% Injectable 25 Gram(s) IV Push once  dextrose 50% Injectable 12.5 Gram(s) IV Push once  glucagon  Injectable 1 milliGRAM(s) IntraMuscular once  insulin lispro (ADMELOG) corrective regimen sliding scale   SubCutaneous every 6 hours  levothyroxine Injectable 70 MICROGram(s) IV Push at bedtime  naloxone Injectable 0.4 milliGRAM(s) IV Push once  octreotide  Injectable 100 MICROGram(s) SubCutaneous every 8 hours  pantoprazole  Injectable 40 milliGRAM(s) IV Push daily  sodium chloride 0.9%. 1000 milliLiter(s) (100 mL/Hr) IV Continuous <Continuous>    MEDICATIONS  (PRN):  benzocaine/butamben/tetracaine Spray 1 Spray(s) Topical every 6 hours PRN sore throat  dextrose Oral Gel 15 Gram(s) Oral once PRN Blood Glucose LESS THAN 70 milliGRAM(s)/deciliter  morphine  - Injectable 2 milliGRAM(s) IV Push every 4 hours PRN Severe Pain (7 - 10)  ondansetron Injectable 4 milliGRAM(s) IV Push every 8 hours PRN Nausea and/or Vomiting      Vital Signs Last 24 Hrs  T(C): 36.8 (07 Nov 2024 12:31), Max: 36.8 (06 Nov 2024 17:45)  T(F): 98.3 (07 Nov 2024 12:13), Max: 98.3 (07 Nov 2024 12:13)  HR: 64 (07 Nov 2024 12:31) (60 - 77)  BP: 184/92 (07 Nov 2024 12:31) (140/60 - 184/92)  BP(mean): 97 (07 Nov 2024 12:31) (97 - 97)  RR: 15 (07 Nov 2024 12:31) (15 - 19)  SpO2: 98% (07 Nov 2024 12:31) (96% - 100%)    Parameters below as of 07 Nov 2024 12:13  Patient On (Oxygen Delivery Method): room air    PE  NAD  Awake, alert  Anicteric, MMM  RRR  CTAB  Abd soft, NT, ND  No c/c/e  No rash grossly                          9.6    16.10 )-----------( 100      ( 07 Nov 2024 12:12 )             28.8       11-07    132[L]  |  99  |  21  ----------------------------<  282[H]  3.9   |  24  |  0.89    Ca    7.6[L]      07 Nov 2024 03:48  Phos  2.0     11-07  Mg     1.6     11-07    TPro  6.0  /  Alb  3.0[L]  /  TBili  1.4[H]  /  DBili  x   /  AST  30  /  ALT  21  /  AlkPhos  119  11-07

## 2024-11-07 NOTE — PROGRESS NOTE ADULT - SUBJECTIVE AND OBJECTIVE BOX
Name of Patient : DANUTA CALDERON  MRN: [id number]  Date of visit: 11-07-24 @ 13:26      Subjective: Patient seen and examined. No new events except as noted.   Patient seen earlier this AM. Plt down-trending and was being transfused 1 unit of platelets. Writer was not at bedside, however informed by ACPs that patient with possible transfusion reaction near complete of the 1 unit of platelets. Patient with erythematous rash and pruritis. Denied shortness of breath, dyspnea, any feelings of throat closure, numbness, tingling, lightheadedness.   Platelet bag was sent to blood bank for work up. Patient received benadryl with reported improvement in pruritis. Following this, write at bedside, patient accompanied by  and sister. Reports that she is feeling better, denies any respiratory symptoms and rash is improving per patient's .   For GJ tube today.   Heme/Onc checking hemolysis labs and smear to evaluate thrombocytopenia.   Heme/Onc team and IM attending notified.     REVIEW OF SYSTEMS:    CONSTITUTIONAL: + Weakness as above   EYES/ENT: No visual changes;  No vertigo or throat pain   NECK: No pain or stiffness  RESPIRATORY: No cough, wheezing, hemoptysis; No shortness of breath  CARDIOVASCULAR: No chest pain or palpitations  GASTROINTESTINAL: No abdominal or epigastric pain. No nausea, vomiting, or hematemesis; No diarrhea or constipation. No melena or hematochezia.  GENITOURINARY: No dysuria, frequency or hematuria  NEUROLOGICAL: No numbness or weakness  SKIN: + Rash, Pruritis   All other review of systems is negative unless indicated above.    MEDICATIONS:  MEDICATIONS  (STANDING):  ciprofloxacin   IVPB 400 milliGRAM(s) IV Intermittent once  dexAMETHasone  Injectable 4 milliGRAM(s) IV Push every 12 hours  dextrose 5%. 1000 milliLiter(s) (50 mL/Hr) IV Continuous <Continuous>  dextrose 5%. 1000 milliLiter(s) (100 mL/Hr) IV Continuous <Continuous>  dextrose 50% Injectable 25 Gram(s) IV Push once  dextrose 50% Injectable 12.5 Gram(s) IV Push once  dextrose 50% Injectable 25 Gram(s) IV Push once  glucagon  Injectable 1 milliGRAM(s) IntraMuscular once  insulin lispro (ADMELOG) corrective regimen sliding scale   SubCutaneous every 6 hours  levothyroxine Injectable 70 MICROGram(s) IV Push at bedtime  naloxone Injectable 0.4 milliGRAM(s) IV Push once  octreotide  Injectable 100 MICROGram(s) SubCutaneous every 8 hours  pantoprazole  Injectable 40 milliGRAM(s) IV Push daily  sodium chloride 0.9%. 1000 milliLiter(s) (100 mL/Hr) IV Continuous <Continuous>      PHYSICAL EXAM:  T(C): 36.8 (11-07-24 @ 12:31), Max: 36.8 (11-06-24 @ 17:45)  HR: 64 (11-07-24 @ 12:31) (60 - 77)  BP: 184/92 (11-07-24 @ 12:31) (140/60 - 184/92)  RR: 15 (11-07-24 @ 12:31) (15 - 19)  SpO2: 98% (11-07-24 @ 12:31) (96% - 100%)  Wt(kg): --  I&O's Summary    06 Nov 2024 07:01  -  07 Nov 2024 07:00  --------------------------------------------------------  IN: 250 mL / OUT: 0 mL / NET: 250 mL      Height (cm): 154.9 (11-07 @ 12:31)  Weight (kg): 36 (11-07 @ 12:31)  BMI (kg/m2): 15 (11-07 @ 12:31)  BSA (m2): 1.28 (11-07 @ 12:31)        Appearance: Awake, lying down in bed, erythematous rash 	  HEENT:  Eyes are open   Lymphatic: No lymphadenopathy grossly   Cardiovascular: Normal    Respiratory: normal effort, clear  Gastrointestinal:  Soft, Non-tender  Skin: + Erythematous rash over face, neck, chest, arms   Psychiatry:  Mood & affect appropriate  Musculoskeletal: No edema         11-06-24 @ 07:01  -  11-07-24 @ 07:00  --------------------------------------------------------  IN: 250 mL / OUT: 0 mL / NET: 250 mL                                  9.6    16.10 )-----------( 100      ( 07 Nov 2024 12:12 )             28.8               11-07    132[L]  |  99  |  21  ----------------------------<  282[H]  3.9   |  24  |  0.89    Ca    7.6[L]      07 Nov 2024 03:48  Phos  2.0     11-07  Mg     1.6     11-07    TPro  6.0  /  Alb  3.0[L]  /  TBili  1.4[H]  /  DBili  x   /  AST  30  /  ALT  21  /  AlkPhos  119  11-07    PT/INR - ( 07 Nov 2024 03:48 )   PT: 13.1 sec;   INR: 1.15 ratio         PTT - ( 07 Nov 2024 03:48 )  PTT:26.2 sec                   Urinalysis Basic - ( 07 Nov 2024 03:48 )    Color: x / Appearance: x / SG: x / pH: x  Gluc: 282 mg/dL / Ketone: x  / Bili: x / Urobili: x   Blood: x / Protein: x / Nitrite: x   Leuk Esterase: x / RBC: x / WBC x   Sq Epi: x / Non Sq Epi: x / Bacteria: x

## 2024-11-07 NOTE — CHART NOTE - NSCHARTNOTEFT_GEN_A_CORE
See full endoscopy report for details.    Successful EUS guided gastrojejunostomy performed.     Can trial clear liquid diet today, full liquid diet tomorrow, then advance as tolerated thereafter to low residue stent diet.

## 2024-11-07 NOTE — CHART NOTE - NSCHARTNOTEFT_GEN_A_CORE
SURGERY POST OP CHECK    STATUS POST PROCEDURE: EUS-guided gastrojejunostomy    SUBJECTIVE: Pt seen and examined at bedside. Patient reports minimal abdominal pain. She's tolerated a majority of her clear liquid tray without nausea or vomiting, and she's passing gas. Has not had a bowel movement in about a week. Endorses fatigue but otherwise denies fever, chills, shortness of breath, chest pain.    --------------------------------------------------------------------------------------------------------------------------------------------------------------------------------------------------------------  OBJECTIVE:  Vital Signs Last 24 Hrs  T(C): 36.3 (07 Nov 2024 15:56), Max: 36.8 (07 Nov 2024 12:13)  T(F): 97.3 (07 Nov 2024 15:56), Max: 98.3 (07 Nov 2024 12:13)  HR: 71 (07 Nov 2024 15:56) (60 - 81)  BP: 147/83 (07 Nov 2024 15:56) (140/60 - 184/92)  BP(mean): 97 (07 Nov 2024 12:31) (97 - 97)  RR: 19 (07 Nov 2024 15:56) (10 - 21)  SpO2: 98% (07 Nov 2024 15:56) (96% - 100%)    Parameters below as of 07 Nov 2024 15:56  Patient On (Oxygen Delivery Method): room air      I&O's Summary    06 Nov 2024 07:01  -  07 Nov 2024 07:00  --------------------------------------------------------  IN: 250 mL / OUT: 0 mL / NET: 250 mL        PHYSICAL EXAM:  Constitutional: thin-appearing female in no acute distress  Chest: Symmetric chest rise bilaterally, normal work of breathing on room air  Abdomen: Soft, nondistended, non-tender to palpation diffusely   Extremities: Warm to touch, soft, moves all extremities equally  ----------------------------------------------------------------------------------------------------------------------------------------------------------------------------------------------------------------  ASSESSMENT:  69F with PMHx of PDIC s/p distal pancreatectomy (2021) and chemo/radiation w/ recurrence to abdominal wall s/p resection (2022) and radiation who presented 11/1 with symptoms of gastric outlet obstruction and EGD demonstrating stricturing disease of the pylorus and duodenum due to tumor recurrence. She is now PPD#0 s/p endoscopic gastrojejunostomy with advanced GI. She is tolerating a diet.    PLAN:  - Diet per GI  - Malignant bowel obstruction protocol     - dexamethasone 4mg IV q12h     - reglan 10 mg IV q8h     - octreotide 100 ucg SQ q8h   - Surgery will continue to follow    [facility]  [id number]

## 2024-11-07 NOTE — PRE PROCEDURE NOTE - PRE PROCEDURE EVALUATION
Attending Physician:                            Procedure:    Indication for Procedure:  ________________________________________________________  PAST MEDICAL & SURGICAL HISTORY:  Pancreatic cancer      Hypertension      Hypothyroid      Seasonal asthma      History of pancreatectomy        ALLERGIES:  phenytoin (Rash)  ibuprofen (Other)  Dilantin (Unknown)    HOME MEDICATIONS:  alendronate 70 mg oral tablet: 1 tab(s) orally once a week on sunday  amLODIPine 10 mg oral tablet: 1 tab(s) orally once a day  escitalopram 20 mg oral tablet: 1 tab(s) orally once a day  Fosamax 70 mg oral tablet: 1 tab(s) orally once a week SUNDAY  glipiZIDE 2.5 mg oral tablet: 1 tab(s) orally once a day  levothyroxine 100 mcg (0.1 mg) oral tablet: 1 tab(s) orally once a day  linaclotide 290 mcg oral capsule: 1 cap(s) orally once a day  losartan 100 mg oral tablet: 1 tab(s) orally once a day  MetFORMIN (Eqv-Fortamet) 500 mg oral tablet, extended release: 1 tab(s) orally once a day  pancrelipase 10,000 units-32,000 units-42,000 units oral delayed release capsule: 1 cap(s) orally 3 times a day (with meals)  sucralfate 1 g oral tablet: 1 tab(s) orally once a day    AICD/PPM: [ ] yes   [ ] no    PERTINENT LAB DATA:                        9.6    16.10 )-----------( 100      ( 07 Nov 2024 12:12 )             28.8     11-07    132[L]  |  99  |  21  ----------------------------<  282[H]  3.9   |  24  |  0.89    Ca    7.6[L]      07 Nov 2024 03:48  Phos  2.0     11-07  Mg     1.6     11-07    TPro  6.0  /  Alb  3.0[L]  /  TBili  1.4[H]  /  DBili  x   /  AST  30  /  ALT  21  /  AlkPhos  119  11-07    PT/INR - ( 07 Nov 2024 03:48 )   PT: 13.1 sec;   INR: 1.15 ratio         PTT - ( 07 Nov 2024 03:48 )  PTT:26.2 sec            PHYSICAL EXAMINATION:    Height (cm): 154.9  Weight (kg): 36  BMI (kg/m2): 15  BSA (m2): 1.28T(C): 36.8  HR: 64  BP: 184/92  RR: 15  SpO2: 98%    Constitutional: NAD    Neck:  No JVD  Respiratory: CTAB/L  Cardiovascular: S1 and S2  Gastrointestinal: BS+, soft, NT/ND  Extremities: No peripheral edema  Neurological: A/O x 3, no focal deficits        COMMENTS:    The patient is a suitable candidate for the planned procedure unless box checked [ ]  No, explain:     Attending Physician:  Dr Turner and Dr Naik                           Procedure: EUS guided gastroenterostomy today for malignant gastric outlet obstruction.     Indication for Procedure: malignant gastric outlet obstruction.   ________________________________________________________  PAST MEDICAL & SURGICAL HISTORY:  Pancreatic cancer      Hypertension      Hypothyroid      Seasonal asthma      History of pancreatectomy        ALLERGIES:  phenytoin (Rash)  ibuprofen (Other)  Dilantin (Unknown)    HOME MEDICATIONS:  alendronate 70 mg oral tablet: 1 tab(s) orally once a week on sunday  amLODIPine 10 mg oral tablet: 1 tab(s) orally once a day  escitalopram 20 mg oral tablet: 1 tab(s) orally once a day  Fosamax 70 mg oral tablet: 1 tab(s) orally once a week SUNDAY  glipiZIDE 2.5 mg oral tablet: 1 tab(s) orally once a day  levothyroxine 100 mcg (0.1 mg) oral tablet: 1 tab(s) orally once a day  linaclotide 290 mcg oral capsule: 1 cap(s) orally once a day  losartan 100 mg oral tablet: 1 tab(s) orally once a day  MetFORMIN (Eqv-Fortamet) 500 mg oral tablet, extended release: 1 tab(s) orally once a day  pancrelipase 10,000 units-32,000 units-42,000 units oral delayed release capsule: 1 cap(s) orally 3 times a day (with meals)  sucralfate 1 g oral tablet: 1 tab(s) orally once a day    AICD/PPM: [ ] yes   x[ ] no    PERTINENT LAB DATA:                        9.6    16.10 )-----------( 100      ( 07 Nov 2024 12:12 )             28.8     11-07    132[L]  |  99  |  21  ----------------------------<  282[H]  3.9   |  24  |  0.89    Ca    7.6[L]      07 Nov 2024 03:48  Phos  2.0     11-07  Mg     1.6     11-07    TPro  6.0  /  Alb  3.0[L]  /  TBili  1.4[H]  /  DBili  x   /  AST  30  /  ALT  21  /  AlkPhos  119  11-07    PT/INR - ( 07 Nov 2024 03:48 )   PT: 13.1 sec;   INR: 1.15 ratio         PTT - ( 07 Nov 2024 03:48 )  PTT:26.2 sec            PHYSICAL EXAMINATION:    Height (cm): 154.9  Weight (kg): 36  BMI (kg/m2): 15  BSA (m2): 1.28T(C): 36.8  HR: 64  BP: 184/92  RR: 15  SpO2: 98%    Constitutional: NAD    Neck:  No JVD  Respiratory: normal effort   Cardiovascular: RRR  Extremities: No peripheral edema  Neurological: A/O x 4, no focal deficits        COMMENTS:    The patient is a suitable candidate for the planned procedure unless box checked [ ]  No, explain:

## 2024-11-07 NOTE — PROGRESS NOTE ADULT - SUBJECTIVE AND OBJECTIVE BOX
[facility] NUTRITION SUPPORT-- FOLLOW UP NOTE      24 hour events/subjective:  Patient seen and examined at bedside, chart reviewed and events noted and I's and O's reviewed.  Patient denies chest pain, shortness of breath, nausea or vomiting, dizziness, chills at time of visit.  Patient NPO for endoscopic GJ versus stent placement today, getting work up for possible transfusion reaction, platelet transfusion and phos repletion noted.  Patient continues on IV fluids and malignant SBO protocol.  Patient's VSS except tachycardia/elevated BP and no other acute overnight events noted.   Glucose trend is elevated with diabetic meds being on hold/Dexamethasone use.       ROS:  Except as noted above, all other systems reviewed and are negative     Diet:  Diet, Clear Liquid:   Consistent Carbohydrate No Snacks (CSTCHO)  Supplement Feeding Modality:  Oral  Ensure Clear Cans or Servings Per Day:  1       Frequency:  Two Times a day (11-06-24 @ 16:06)  Diet, NPO after Midnight:      NPO Start Date: 06-Nov-2024,   NPO Start Time: 23:59 (11-06-24 @ 15:13)      PAST HISTORY  --------------------------------------------------------------------------------  PAST MEDICAL & SURGICAL HISTORY:  Pancreatic cancer      Hypertension      Hypothyroid      Seasonal asthma      History of pancreatectomy        No significant changes to PMH, PSH, FHx, SHx, unless otherwise noted    ALLERGIES & MEDICATIONS  --------------------------------------------------------------------------------  Allergies    phenytoin (Rash)  ibuprofen (Other)  Dilantin (Unknown)    Intolerances      Standing Inpatient Medications  dexAMETHasone  Injectable 4 milliGRAM(s) IV Push every 12 hours  dextrose 5%. 1000 milliLiter(s) IV Continuous <Continuous>  dextrose 5%. 1000 milliLiter(s) IV Continuous <Continuous>  dextrose 50% Injectable 25 Gram(s) IV Push once  dextrose 50% Injectable 12.5 Gram(s) IV Push once  dextrose 50% Injectable 25 Gram(s) IV Push once  glucagon  Injectable 1 milliGRAM(s) IntraMuscular once  insulin lispro (ADMELOG) corrective regimen sliding scale   SubCutaneous every 6 hours  levothyroxine Injectable 70 MICROGram(s) IV Push at bedtime  naloxone Injectable 0.4 milliGRAM(s) IV Push once  octreotide  Injectable 100 MICROGram(s) SubCutaneous every 8 hours  pantoprazole  Injectable 40 milliGRAM(s) IV Push daily  sodium chloride 0.9%. 1000 milliLiter(s) IV Continuous <Continuous>    PRN Inpatient Medications  benzocaine/butamben/tetracaine Spray 1 Spray(s) Topical every 6 hours PRN  dextrose Oral Gel 15 Gram(s) Oral once PRN  morphine  - Injectable 2 milliGRAM(s) IV Push every 4 hours PRN  ondansetron Injectable 4 milliGRAM(s) IV Push every 8 hours PRN        VITALS/PHYSICAL EXAM  --------------------------------------------------------------------------------  T(C): 36.4 (11-07-24 @ 04:26), Max: 36.8 (11-06-24 @ 17:45)  HR: 60 (11-07-24 @ 04:26) (60 - 69)  BP: 162/91 (11-07-24 @ 04:26) (140/60 - 172/84)  RR: 17 (11-07-24 @ 04:26) (17 - 18)  SpO2: 96% (11-07-24 @ 04:26) (96% - 98%)  Wt(kg): --  Height (cm): 154.9 (11-05-24 @ 17:33)  Weight (kg): 36 (11-05-24 @ 17:33)  BMI (kg/m2): 15 (11-05-24 @ 17:33)  BSA (m2): 1.28 (11-05-24 @ 17:33)    11-06-24 @ 07:01  -  11-07-24 @ 07:00  --------------------------------------------------------  IN: 250 mL / OUT: 0 mL / NET: 250 mL      I&O's Detail    06 Nov 2024 07:01  -  07 Nov 2024 07:00  --------------------------------------------------------  IN:    IV PiggyBack: 100 mL    Oral Fluid: 150 mL  Total IN: 250 mL    OUT:  Total OUT: 0 mL    Total NET: 250 mL          Physical Exam:  Gen: NAD, laying in bed   Chest: non labored breathing, equal chest expansion bilaterally  Abd: +BS, soft, nontender/nondistended; +NGT  Ext: Warm, FROM, no edema b/l LE  Neuro: No focal deficits  Psych: Normal affect and mood  Skin: Warm, without rashes     LABS/STUDIES  --------------------------------------------------------------------------------              8.4    11.60 >-----------<  105      [11-07-24 @ 10:48]              25.7     132  |  99  |  21  ----------------------------<  282      [11-07-24 @ 03:48]  3.9   |  24  |  0.89        Ca     7.6     [11-07-24 @ 03:48]      Mg     1.6     [11-07-24 @ 03:48]      Phos  2.0     [11-07-24 @ 03:48]    TPro  6.0  /  Alb  3.0  /  TBili  1.4  /  DBili  x   /  AST  30  /  ALT  21  /  AlkPhos  119  [11-07-24 @ 03:48]    PT/INR: PT 13.1 , INR 1.15       [11-07-24 @ 03:48]  PTT: 26.2       [11-07-24 @ 03:48]      Ca ionized  Creatinine Trend:  POC glucoseGlucose: 282 mg/dL (11-07-24 @ 03:48)  CAPILLARY BLOOD GLUCOSE      POCT Blood Glucose.: 281 mg/dL (07 Nov 2024 06:23)  POCT Blood Glucose.: 211 mg/dL (07 Nov 2024 00:06)  POCT Blood Glucose.: 197 mg/dL (06 Nov 2024 21:21)  POCT Blood Glucose.: 340 mg/dL (06 Nov 2024 17:10)  POCT Blood Glucose.: 392 mg/dL (06 Nov 2024 12:18)    PrealbuminPrealbumin, Serum: 12 mg/dL (11-07-24 @ 03:48)    Triglycerides

## 2024-11-07 NOTE — PROGRESS NOTE ADULT - SUBJECTIVE AND OBJECTIVE BOX
[facility] Daily Progress Note  =====================================================    SUBJECTIVE: Patient reports that they're feeling okay.     --------------------------------------------------------------------------------------  VITAL SIGNS:  T(C): 36.4 (11-07-24 @ 04:26), Max: 36.8 (11-06-24 @ 17:45)  HR: 60 (11-07-24 @ 04:26) (60 - 69)  BP: 162/91 (11-07-24 @ 04:26) (140/60 - 172/84)  RR: 17 (11-07-24 @ 04:26) (17 - 18)  SpO2: 96% (11-07-24 @ 04:26) (96% - 98%)  --------------------------------------------------------------------------------------    EXAM    General: NAD, resting in bed comfortably  Cardiac: Regular rate, normotensive  Respiratory: Nonlabored respirations, normal cw expansion, on RA  Abdomen: Soft, nondistended, nontender. NGt in place  Extremities: Warm, well perfused  Neuro: AOx3, CNII-XII intact on gross examination    --------------------------------------------------------------------------------------    IMAGING:

## 2024-11-07 NOTE — PROGRESS NOTE ADULT - ASSESSMENT
69F PMHx DM, HTN, hypothyroid, pancreatic ca s/p resection '21, s/p chemo/rt then recurrence '22 s/p RT.  Presenting w/ diffuse abd pain, n/v and constipation x3d.     #Pancreatic ca  --follows with Dr. 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 Break in tx 2/2 jaundice and stent migration  --s/p RT x 10 fractions to abdominal wall mets 08/2024  --no plan for treatment inpatient and/or rehab  --plan for possible transfer to [facility]    #gastric outlet obstruction  --pt p/w abdominal pain, N/V  --CT a/p w/ ill-defined hypodense pancreatic mass w/ multifocal narrowing along distal stomach and duodenum resulting in gastric outlet obstruction.  --CTH No acute intracranial hemorrhage, mass effect, or midline shift  --surgery following no plan for acute surgical intervention at this time   --currently on Malignant bowel obstruction protocol w/dex, reglan, and octreotide.   --EGD on 11/5 demonstrating multifocal stricturing disease of the pylorus and duodenum due to tumor ingrowth.   --Advanced GI planning on endoscopic GJ today; s/p platelet transfusion w/ reaction earlier. Repeat CBC w/ platelets 100k    #Anemia  --2/2 malignancy and treatment  --baseline Hgb 8-9.5  --Iron studies adequate, normal B12/folate. F/u ferritin  --transfuse for Hgb <7, 8 if symptomatic    will follow,    Chandler Johansen PA-C  Hematology/Oncology  [facility]  [phone] (office)

## 2024-11-07 NOTE — PROGRESS NOTE ADULT - ASSESSMENT
69F with PMHx of [facility] s/p distal pancreatectomy (2021) and chemo/radiation w/ recurrence to abdominal wall s/p resection (2022) and radiation who presented 11/1 to the ED with abdominal pain, nausea, vomiting, and CT A/P with ill-defined hypodense pancreatic head mass with multifocal narrowing along distal stomach and duodenum, consistent with gastric outlet obstruction and cancer recurrence. Surgery was consulted for gastric outlet obstruction management. EGD on 11/5 demonstrating multifocal stricturing disease of the pylorus and duodenum due to tumor ingrowth.  Advanced GI planning on endoscopic GJ versus Axios stent placement tomorrow. Pt on Malignant bowel obstruction protocol   (dexamethasone 4mg IV q12h, reglan 10 mg IV q8h, octreotide 100 ucg SQ q8h)    - Nutritional Assessment, pt is not meeting nutritional goals enterally.  Prealbumin decreased at 12 mg/dL with severe protein calorie malnutrition  - TPN plan:  f/u GI procedure today for endoscopic GJ vs stent placement today and hold off on TPN unless enteral route unable to be utilized for nutrition   - TPN access:  Patient will need a dedicated central line if/once TPN approved  - Electrolyte Imbalance risk- check CMP, Mg, Phos, iCa and K daily, replete as per Medicine.  - GOO:  GJ versus Axios stent placement for today  - Risk of Hyperglycemia: HgA1c 6.5% and glucose elevated on Dexamethasone/off Oral Diabetic meds; fingersticks per primary team   - Risk of Hypertriglyceridemia:   mg/dL on 11/7; plan to trend TG closely on TPN   - Elevated T bilirubin:  trend in AM  - Thrombocytopenia:  s/p transfusion w/possible transfusion reaction; work up/management per primary team  - Vitamin D def:  recommend enteral repletion once diet resumes  - Recommend strict intake and output/weight checks three times a week; IV fluids per primary team  - Further care as per Medicine    Available on TEAMS  TPN spectra [id number] ([phone] when dialing from outside line)  Above reviewed with Dr. Lucas Avalos

## 2024-11-08 NOTE — CHART NOTE - NSCHARTNOTEFT_GEN_A_CORE
Pain is well controlled, no required pain medications in the last 24hrs. Patient and family have establish goals of care.   Palliative care consult appreciated and completed. Palliative care team will sign off. The team remains available if additional services are needed. Please reconsult as needed. Discussed with the primary team.

## 2024-11-08 NOTE — PROGRESS NOTE ADULT - ASSESSMENT
69F with PMHx of [facility] s/p distal pancreatectomy (2021) and chemo/radiation w/ recurrence to abdominal wall s/p resection (2022) and radiation who presented 11/1 to the ED with abdominal pain, nausea, vomiting, and CT A/P with ill-defined hypodense pancreatic head mass with multifocal narrowing along distal stomach and duodenum, consistent with gastric outlet obstruction and cancer recurrence. Surgery was consulted for gastric outlet obstruction management. EGD on 11/5 demonstrating multifocal stricturing disease of the pylorus and duodenum due to tumor ingrowth.  Advanced GI planning on endoscopic GJ versus Axios stent placement tomorrow. Pt on Malignant bowel obstruction protocol   (dexamethasone 4mg IV q12h, reglan 10 mg IV q8h, octreotide 100 ucg SQ q8h)    - Nutritional Assessment, pt is not meeting nutritional goals enterally.  Prealbumin decreased at 12 mg/dL with severe protein calorie malnutrition  - TPN plan: s/p endoscopic gastrojejunostomy; f/u diet advancement/tolerance and hold off on TPN unless enteral route unable to be utilized for nutrition   - TPN access:  Patient will need a dedicated central line if/once TPN approved  - Electrolyte Imbalance risk- check CMP, Mg, Phos, iCa and K daily, replete as per Medicine.  - GOO:  s/p endoscopic gastrojejunostomy  - Risk of Hyperglycemia: HgA1c 6.5% and glucose elevated on Dexamethasone/off Oral Diabetic meds; fingersticks per primary team   - Risk of Hypertriglyceridemia:   mg/dL on 11/7; plan to trend TG closely on TPN   - Thrombocytopenia:  s/p transfusion w/possible transfusion reaction; work up/management per primary team  - Vitamin D def:  recommend enteral repletion once diet resumes  - Recommend strict intake and output/weight checks three times a week; IV fluids per primary team  - Recommend MVI and Thiamine to decrease risk of refeeding syndrome and close monitoring of electrolytes   - Further care as per Medicine    Available on TEAMS  TPN spectra [id number] ([phone] when dialing from outside line)  Above reviewed with Dr. Lucas Avalos

## 2024-11-08 NOTE — PROGRESS NOTE ADULT - SUBJECTIVE AND OBJECTIVE BOX
[facility] [place] NUTRITION SUPPORT-- FOLLOW UP NOTE      24 hour events/subjective:  Patient seen and examined at bedside, chart reviewed and events noted and I's and O's reviewed.  Patient denies chest pain, shortness of breath, nausea or vomiting, dizziness, chills at time of visit.  Patient s/p endoscopic gastrojejunostomy yesterday, planned for diet advancement slowly to FLD.  Patient continues on IV fluids and malignant SBO protocol with intermittent cramps.  Patient's VSS except elevated BP and no other acute overnight events noted.   Glucose trend is elevated with diabetic meds being on hold/Dexamethasone use.       ROS:  Except as noted above, all other systems reviewed and are negative       Diet:  Diet, Clear Liquid:   Consistent Carbohydrate No Snacks (CSTCHO)  Supplement Feeding Modality:  Oral  Ensure Clear Cans or Servings Per Day:  1       Frequency:  Two Times a day (11-06-24 @ 16:06)      PAST HISTORY  --------------------------------------------------------------------------------  PAST MEDICAL & SURGICAL HISTORY:  Pancreatic cancer      Hypertension      Hypothyroid      Seasonal asthma      History of pancreatectomy        No significant changes to PMH, PSH, FHx, SHx, unless otherwise noted    ALLERGIES & MEDICATIONS  --------------------------------------------------------------------------------  Allergies    phenytoin (Rash)  ibuprofen (Other)  Dilantin (Unknown)    Intolerances      Standing Inpatient Medications  bisacodyl Suppository 10 milliGRAM(s) Rectal once  dexAMETHasone  Injectable 4 milliGRAM(s) IV Push every 12 hours  dextrose 5%. 1000 milliLiter(s) IV Continuous <Continuous>  dextrose 5%. 1000 milliLiter(s) IV Continuous <Continuous>  dextrose 50% Injectable 25 Gram(s) IV Push once  dextrose 50% Injectable 25 Gram(s) IV Push once  dextrose 50% Injectable 12.5 Gram(s) IV Push once  glucagon  Injectable 1 milliGRAM(s) IntraMuscular once  insulin lispro (ADMELOG) corrective regimen sliding scale   SubCutaneous three times a day before meals  insulin lispro (ADMELOG) corrective regimen sliding scale   SubCutaneous at bedtime  levothyroxine Injectable 70 MICROGram(s) IV Push at bedtime  naloxone Injectable 0.4 milliGRAM(s) IV Push once  octreotide  Injectable 100 MICROGram(s) SubCutaneous every 8 hours  pantoprazole  Injectable 40 milliGRAM(s) IV Push daily  polyethylene glycol 3350 17 Gram(s) Oral two times a day  saline laxative (FLEET) Rectal Enema 1 Enema Rectal once  sodium chloride 0.9%. 1000 milliLiter(s) IV Continuous <Continuous>    PRN Inpatient Medications  benzocaine/butamben/tetracaine Spray 1 Spray(s) Topical every 6 hours PRN  dextrose Oral Gel 15 Gram(s) Oral once PRN  morphine  - Injectable 2 milliGRAM(s) IV Push every 4 hours PRN  ondansetron Injectable 4 milliGRAM(s) IV Push every 8 hours PRN        VITALS/PHYSICAL EXAM  --------------------------------------------------------------------------------  T(C): 36.8 (11-08-24 @ 12:15), Max: 36.8 (11-08-24 @ 12:15)  HR: 67 (11-08-24 @ 12:15) (62 - 81)  BP: 154/78 (11-08-24 @ 12:15) (135/80 - 183/86)  RR: 18 (11-08-24 @ 12:15) (18 - 21)  SpO2: 100% (11-08-24 @ 12:15) (98% - 100%)  Wt(kg): --  Height (cm): 154.9 (11-07-24 @ 12:31)  Weight (kg): 36 (11-07-24 @ 12:31)  BMI (kg/m2): 15 (11-07-24 @ 12:31)  BSA (m2): 1.28 (11-07-24 @ 12:31)    I&O's Detail      Physical Exam:  Gen: NAD, laying in bed   Chest: non labored breathing, equal chest expansion bilaterally  Abd: +BS, soft, nontender/nondistended  Ext: Warm, FROM, no edema b/l LE  Neuro: No focal deficits  Psych: Normal affect and mood  Skin: Warm, without rashes         LABS/STUDIES  --------------------------------------------------------------------------------              7.3    10.95 >-----------<  54       [11-08-24 @ 11:53]              22.3     131  |  99  |  26  ----------------------------<  247      [11-08-24 @ 07:58]  3.7   |  23  |  1.02        Ca     7.3     [11-08-24 @ 07:58]      Mg     1.5     [11-08-24 @ 07:58]      Phos  2.9     [11-08-24 @ 07:58]    TPro  5.5  /  Alb  2.8  /  TBili  1.2  /  DBili  x   /  AST  22  /  ALT  18  /  AlkPhos  97  [11-08-24 @ 07:58]    PT/INR: PT 13.1 , INR 1.15       [11-07-24 @ 03:48]  PTT: 26.2       [11-07-24 @ 03:48]          [11-07-24 @ 10:48]    Ca ionized  Creatinine Trend:  POC glucoseGlucose: 247 mg/dL (11-08-24 @ 07:58)  CAPILLARY BLOOD GLUCOSE      POCT Blood Glucose.: 221 mg/dL (08 Nov 2024 12:25)  POCT Blood Glucose.: 259 mg/dL (08 Nov 2024 08:26)  POCT Blood Glucose.: 280 mg/dL (07 Nov 2024 21:37)  POCT Blood Glucose.: 282 mg/dL (07 Nov 2024 17:50)    PrealbuminPrealbumin, Serum: 12 mg/dL (11-07-24 @ 03:48)    Triglycerides

## 2024-11-08 NOTE — PROGRESS NOTE ADULT - SUBJECTIVE AND OBJECTIVE BOX
Surgery Daily Progress Note  =====================================================    SUBJECTIVE: Patient reports that they're feeling well, is tolerating CLD and NGt being out. Is passing gas, feels crampy pains like she needs to have a bowel movement.     --------------------------------------------------------------------------------------  VITAL SIGNS:  T(C): 36.7 (11-08-24 @ 04:33), Max: 36.8 (11-07-24 @ 12:13)  HR: 68 (11-08-24 @ 04:33) (62 - 81)  BP: 135/80 (11-08-24 @ 04:33) (135/80 - 184/92)  RR: 18 (11-08-24 @ 04:33) (10 - 21)  SpO2: 98% (11-08-24 @ 04:33) (98% - 100%)  --------------------------------------------------------------------------------------    EXAM    General: NAD, resting in bed comfortably  Cardiac: Regular rate, normotensive  Respiratory: Nonlabored respirations, normal cw expansion, on RA  Abdomen: Soft, nondistended, nontender  Extremities: Warm, well perfused  Neuro: AOx3, CNII-XII intact on gross examination    --------------------------------------------------------------------------------------

## 2024-11-08 NOTE — PROVIDER CONTACT NOTE (OTHER) - ASSESSMENT
Patient is A&Ox4. VSS. No complaints of chest pain, SOB, or dizziness.
A&Ox4, mental status at baseline, asymptomatic
Patient denies HA/vision changes, NAD noted.
A&Ox4, mental status at baseline.

## 2024-11-08 NOTE — PROVIDER CONTACT NOTE (OTHER) - SITUATION
BP- 189/99 electronic BP manual 170/90
6 am [id number]
Patient hyperglycemic
Patient received to endoscopy hypertensive, 180/97, HR 72.

## 2024-11-08 NOTE — PROGRESS NOTE ADULT - ASSESSMENT
69F with PMHx of PDIC s/p distal pancreatectomy (2021) and chemo/radiation w/ recurrence to abdominal wall s/p resection (2022) and radiation who presented 11/1 to the ED with abdominal pain, nausea, vomiting, and CT A/P with ill-defined hypodense pancreatic head mass with multifocal narrowing along distal stomach and duodenum, consistent with gastric outlet obstruction and cancer recurrence. Surgery was consulted for gastric outlet obstruction management. EGD on 11/5 demonstrating multifocal stricturing disease of the pylorus and duodenum due to tumor ingrowth. Patient now s/p GJ stent placement w/ good progression post operatively.     Recommendations:  - Advance diet as tolerated/per GI recommendations  - OOBAT  - Bowel regimen, suppository  - Surgical oncology will follow    [facility]  [id number]

## 2024-11-08 NOTE — PROGRESS NOTE ADULT - SUBJECTIVE AND OBJECTIVE BOX
Name of Patient : DANUTA CALDERON  MRN: [id number]  Date of visit: 11-08-24       Subjective: Patient seen and examined. No new events except as noted.   Doing okay  S/P stent placement by advance gi   started on clear liquis  tolerating well     REVIEW OF SYSTEMS:    CONSTITUTIONAL: No weakness, fevers or chills  EYES/ENT: No visual changes;  No vertigo or throat pain   NECK: No pain or stiffness  RESPIRATORY: No cough, wheezing, hemoptysis; No shortness of breath  CARDIOVASCULAR: No chest pain or palpitations  GASTROINTESTINAL: No abdominal or epigastric pain. No nausea, vomiting, or hematemesis; No diarrhea or constipation. No melena or hematochezia.  GENITOURINARY: No dysuria, frequency or hematuria  NEUROLOGICAL: No numbness or weakness  SKIN: No itching, burning, rashes, or lesions   All other review of systems is negative unless indicated above.    MEDICATIONS:  MEDICATIONS  (STANDING):  bisacodyl Suppository 10 milliGRAM(s) Rectal once  dexAMETHasone  Injectable 4 milliGRAM(s) IV Push every 12 hours  dextrose 5%. 1000 milliLiter(s) (100 mL/Hr) IV Continuous <Continuous>  dextrose 5%. 1000 milliLiter(s) (50 mL/Hr) IV Continuous <Continuous>  dextrose 50% Injectable 12.5 Gram(s) IV Push once  dextrose 50% Injectable 25 Gram(s) IV Push once  dextrose 50% Injectable 25 Gram(s) IV Push once  glucagon  Injectable 1 milliGRAM(s) IntraMuscular once  insulin lispro (ADMELOG) corrective regimen sliding scale   SubCutaneous three times a day before meals  insulin lispro (ADMELOG) corrective regimen sliding scale   SubCutaneous at bedtime  levothyroxine Injectable 70 MICROGram(s) IV Push at bedtime  naloxone Injectable 0.4 milliGRAM(s) IV Push once  octreotide  Injectable 100 MICROGram(s) SubCutaneous every 8 hours  pantoprazole  Injectable 40 milliGRAM(s) IV Push daily  polyethylene glycol 3350 17 Gram(s) Oral two times a day  saline laxative (FLEET) Rectal Enema 1 Enema Rectal once  sodium chloride 0.9%. 1000 milliLiter(s) (100 mL/Hr) IV Continuous <Continuous>      PHYSICAL EXAM:  T(C): 36.8 (11-08-24 @ 20:15), Max: 36.8 (11-08-24 @ 12:15)  HR: 66 (11-08-24 @ 20:15) (66 - 68)  BP: 157/85 (11-08-24 @ 20:15) (135/80 - 157/85)  RR: 18 (11-08-24 @ 20:15) (18 - 18)  SpO2: 99% (11-08-24 @ 20:15) (98% - 100%)  Wt(kg): --  I&O's Summary    08 Nov 2024 07:01  -  08 Nov 2024 22:37  --------------------------------------------------------  IN: 240 mL / OUT: 0 mL / NET: 240 mL          Appearance: Normal	  HEENT:  PERRLA   Lymphatic: No lymphadenopathy   Cardiovascular: Normal S1 S2, no JVD  Respiratory: normal effort , clear  Gastrointestinal:  Soft, Non-tender  Skin: No rashes,  warm to touch  Psychiatry:  Mood & affect appropriate  Musculuskeletal: No edema    recent labs, Imaging and EKGs personally reviewed     11-08-24 @ 07:01  -  11-08-24 @ 22:37  --------------------------------------------------------  IN: 240 mL / OUT: 0 mL / NET: 240 mL                          7.3    10.95 )-----------( 54       ( 08 Nov 2024 11:53 )             22.3               11-08    131[L]  |  99  |  26[H]  ----------------------------<  247[H]  3.7   |  23  |  1.02    Ca    7.3[L]      08 Nov 2024 07:58  Phos  2.9     11-08  Mg     1.5     11-08    TPro  5.5[L]  /  Alb  2.8[L]  /  TBili  1.2  /  DBili  x   /  AST  22  /  ALT  18  /  AlkPhos  97  11-08    PT/INR - ( 07 Nov 2024 03:48 )   PT: 13.1 sec;   INR: 1.15 ratio         PTT - ( 07 Nov 2024 03:48 )  PTT:26.2 sec                   Urinalysis Basic - ( 08 Nov 2024 07:58 )    Color: x / Appearance: x / SG: x / pH: x  Gluc: 247 mg/dL / Ketone: x  / Bili: x / Urobili: x   Blood: x / Protein: x / Nitrite: x   Leuk Esterase: x / RBC: x / WBC x   Sq Epi: x / Non Sq Epi: x / Bacteria: x

## 2024-11-08 NOTE — PROGRESS NOTE ADULT - SUBJECTIVE AND OBJECTIVE BOX
Chief Complaint:  Patient is a 69y old  Female who presents with a chief complaint of GOO (08 Nov 2024 08:08)    Interval Events:  s/p EGD/GJ placement  vitals stable  notes some lower abdominal cramping; no nausea/vomiting  last BM several days ago  tolerating CLD    Allergies:  phenytoin (Rash)  ibuprofen (Other)  Dilantin (Unknown)        Hospital Medications:  benzocaine/butamben/tetracaine Spray 1 Spray(s) Topical every 6 hours PRN  bisacodyl Suppository 10 milliGRAM(s) Rectal once  dexAMETHasone  Injectable 4 milliGRAM(s) IV Push every 12 hours  dextrose 5%. 1000 milliLiter(s) IV Continuous <Continuous>  dextrose 5%. 1000 milliLiter(s) IV Continuous <Continuous>  dextrose 50% Injectable 12.5 Gram(s) IV Push once  dextrose 50% Injectable 25 Gram(s) IV Push once  dextrose 50% Injectable 25 Gram(s) IV Push once  dextrose Oral Gel 15 Gram(s) Oral once PRN  glucagon  Injectable 1 milliGRAM(s) IntraMuscular once  insulin lispro (ADMELOG) corrective regimen sliding scale   SubCutaneous three times a day before meals  insulin lispro (ADMELOG) corrective regimen sliding scale   SubCutaneous at bedtime  levothyroxine Injectable 70 MICROGram(s) IV Push at bedtime  morphine  - Injectable 2 milliGRAM(s) IV Push every 4 hours PRN  naloxone Injectable 0.4 milliGRAM(s) IV Push once  octreotide  Injectable 100 MICROGram(s) SubCutaneous every 8 hours  ondansetron Injectable 4 milliGRAM(s) IV Push every 8 hours PRN  pantoprazole  Injectable 40 milliGRAM(s) IV Push daily  polyethylene glycol 3350 17 Gram(s) Oral two times a day  saline laxative (FLEET) Rectal Enema 1 Enema Rectal once  sodium chloride 0.9%. 1000 milliLiter(s) IV Continuous <Continuous>      PMHX/PSHX:  Pancreatic cancer    Hypertension    Hypothyroid    Seasonal asthma    History of pancreatectomy        Family history:  No pertinent family history in first degree relatives        PHYSICAL EXAM:   Vital Signs:  Vital Signs Last 24 Hrs  T(C): 36.7 (08 Nov 2024 04:33), Max: 36.8 (07 Nov 2024 12:13)  T(F): 98 (08 Nov 2024 04:33), Max: 98.3 (07 Nov 2024 12:13)  HR: 68 (08 Nov 2024 04:33) (62 - 81)  BP: 135/80 (08 Nov 2024 04:33) (135/80 - 184/92)  BP(mean): 97 (07 Nov 2024 12:31) (97 - 97)  RR: 18 (08 Nov 2024 04:33) (10 - 21)  SpO2: 98% (08 Nov 2024 04:33) (98% - 100%)    Parameters below as of 08 Nov 2024 04:33  Patient On (Oxygen Delivery Method): room air      Daily Height in cm: 154.94 (07 Nov 2024 12:31)    Daily     GENERAL:  No acute distress  HEENT:  no scleral icterus  CHEST:  no accessory muscle use  HEART:  Regular rate and rhythm  ABDOMEN:  Soft, lower abdominal tenderness, non-distended  EXTREMITIES:  No edema  SKIN:  No rash/ecchymoses  NEURO:  Alert and oriented x 3    LABS:                        7.4    9.24  )-----------( x        ( 08 Nov 2024 07:58 )             22.6     Mean Cell Volume: 93.8 fl (11-08-24 @ 07:58)    11-08    131[L]  |  99  |  26[H]  ----------------------------<  247[H]  3.7   |  23  |  1.02    Ca    7.3[L]      08 Nov 2024 07:58  Phos  2.9     11-08  Mg     1.5     11-08    TPro  5.5[L]  /  Alb  2.8[L]  /  TBili  1.2  /  DBili  x   /  AST  22  /  ALT  18  /  AlkPhos  97  11-08    LIVER FUNCTIONS - ( 08 Nov 2024 07:58 )  Alb: 2.8 g/dL / Pro: 5.5 g/dL / ALK PHOS: 97 U/L / ALT: 18 U/L / AST: 22 U/L / GGT: x           PT/INR - ( 07 Nov 2024 03:48 )   PT: 13.1 sec;   INR: 1.15 ratio         PTT - ( 07 Nov 2024 03:48 )  PTT:26.2 sec  Urinalysis Basic - ( 08 Nov 2024 07:58 )    Color: x / Appearance: x / SG: x / pH: x  Gluc: 247 mg/dL / Ketone: x  / Bili: x / Urobili: x   Blood: x / Protein: x / Nitrite: x   Leuk Esterase: x / RBC: x / WBC x   Sq Epi: x / Non Sq Epi: x / Bacteria: x                              7.4    9.24  )-----------( x        ( 08 Nov 2024 07:58 )             22.6                         9.6    16.10 )-----------( 100      ( 07 Nov 2024 12:12 )             28.8                         8.4    11.60 )-----------( 105      ( 07 Nov 2024 10:48 )             25.7                         8.3    10.25 )-----------( 32       ( 07 Nov 2024 03:48 )             24.7                         9.0    9.36  )-----------( 79       ( 06 Nov 2024 07:09 )             28.0

## 2024-11-08 NOTE — PROGRESS NOTE ADULT - SUBJECTIVE AND OBJECTIVE BOX
Patient is a 69y old  Female who presents with a chief complaint of GOO (08 Nov 2024 08:08)    Patient seen and examined, s/p stent. Notes some abd discomfort.  MEDICATIONS  (STANDING):  bisacodyl Suppository 10 milliGRAM(s) Rectal once  dexAMETHasone  Injectable 4 milliGRAM(s) IV Push every 12 hours  dextrose 5%. 1000 milliLiter(s) (50 mL/Hr) IV Continuous <Continuous>  dextrose 5%. 1000 milliLiter(s) (100 mL/Hr) IV Continuous <Continuous>  dextrose 50% Injectable 25 Gram(s) IV Push once  dextrose 50% Injectable 25 Gram(s) IV Push once  dextrose 50% Injectable 12.5 Gram(s) IV Push once  glucagon  Injectable 1 milliGRAM(s) IntraMuscular once  insulin lispro (ADMELOG) corrective regimen sliding scale   SubCutaneous three times a day before meals  insulin lispro (ADMELOG) corrective regimen sliding scale   SubCutaneous at bedtime  levothyroxine Injectable 70 MICROGram(s) IV Push at bedtime  magnesium sulfate  IVPB 2 Gram(s) IV Intermittent once  naloxone Injectable 0.4 milliGRAM(s) IV Push once  octreotide  Injectable 100 MICROGram(s) SubCutaneous every 8 hours  pantoprazole  Injectable 40 milliGRAM(s) IV Push daily  polyethylene glycol 3350 17 Gram(s) Oral two times a day  saline laxative (FLEET) Rectal Enema 1 Enema Rectal once  sodium chloride 0.9%. 1000 milliLiter(s) (100 mL/Hr) IV Continuous <Continuous>    MEDICATIONS  (PRN):  benzocaine/butamben/tetracaine Spray 1 Spray(s) Topical every 6 hours PRN sore throat  dextrose Oral Gel 15 Gram(s) Oral once PRN Blood Glucose LESS THAN 70 milliGRAM(s)/deciliter  morphine  - Injectable 2 milliGRAM(s) IV Push every 4 hours PRN Severe Pain (7 - 10)  ondansetron Injectable 4 milliGRAM(s) IV Push every 8 hours PRN Nausea and/or Vomiting    Vital Signs Last 24 Hrs  T(C): 36.7 (08 Nov 2024 04:33), Max: 36.8 (07 Nov 2024 12:31)  T(F): 98 (08 Nov 2024 04:33), Max: 98 (08 Nov 2024 04:33)  HR: 68 (08 Nov 2024 04:33) (62 - 81)  BP: 135/80 (08 Nov 2024 04:33) (135/80 - 184/92)  BP(mean): 97 (07 Nov 2024 12:31) (97 - 97)  RR: 18 (08 Nov 2024 04:33) (10 - 21)  SpO2: 98% (08 Nov 2024 04:33) (98% - 100%)    Parameters below as of 08 Nov 2024 04:33  Patient On (Oxygen Delivery Method): room air      PE  NAD  Awake, alert  Anicteric, MMM  RRR  CTAB  Abd soft  No c/c/e  No rash grossly                        7.3    10.95 )-----------( 54       ( 08 Nov 2024 11:53 )             22.3       11-08    131[L]  |  99  |  26[H]  ----------------------------<  247[H]  3.7   |  23  |  1.02    Ca    7.3[L]      08 Nov 2024 07:58  Phos  2.9     11-08  Mg     1.5     11-08    TPro  5.5[L]  /  Alb  2.8[L]  /  TBili  1.2  /  DBili  x   /  AST  22  /  ALT  18  /  AlkPhos  97  11-08

## 2024-11-08 NOTE — PROGRESS NOTE ADULT - ASSESSMENT
69F admitted for malignant gastric outlet obstruction       Malignant Gastric outlet obstruction.   - CT A/P as noted; CT H as noted   - NPO and has NGT to low suction per surgery. surg onc will follow. Strict NPO   - 4mg IV dexamethasone, q12h  - 10 mg IV reglan, q8h  - 100mcg octreotide micrograms SubQ, q8h   - morphine 2mg q4h prn for pain. Monitor BM, if no bm in 48hr, enema prn while npo.   - s/P Sten placement  y GI   - clear liquid, advance as per GI recs     R/O Transfusion reaction   - Patient's Plt down-trending and was being transfused 1 unit of platelets, reported that near completion of 1 unit of platelets, Patient with erythematous rash and pruritis. no respiratory symptoms   - Platelet transfusion stopped and bag was sent to blood bank for work up  - Heme/Onc follow up       Pancreatic cancer.   - Diagnosed in 2021 s/p resection, chemo/RT and now with recurrent in 2022 s/p RT.  - On active DMT with [facility]   - Heme/Onc and palliative eval appreciated; F/u recs     Anemia, Thrombocytopenia   - Likely due to malignancy and treatment; F/u anemia labs   - Platelet count down-trending. Monitor closely --> will check for HIT --> Hep Ab neg, ISIDORO pending. Hold DVT PPX   - Transfuse for Hgb <7, Plt <10K or < 50K w/ fever/bleeding   - Heme/Onc checking hemolysis labs and smear to evaluate thrombocytopenia.   - Heme/Onc eval appreciated; F/u recs    Hypertension.   - No meds per mouth.   - Monitor BP. If sustained SBP >160, may use iv hydralazine as needed.    Type 2 diabetes mellitus.   - Hold home oral dm meds  - ISS while inpatient  - IVF for hydration   - Monitor glucose levels closely       Hypothyroid.   - TSH and FT4 noted   - On IV Synthroid 70 - dose confirmed with Pharmacy  - resume oral when tolerating     Prophylactic measure.   - Heparin SubQ on HOLD       discussed in detail with patient and family

## 2024-11-08 NOTE — PROVIDER CONTACT NOTE (OTHER) - ACTION/TREATMENT ORDERED:
6 units insulin given as per orders
MD notified, no new orders.  Will continue to monitor.
give due sliding scale 6 units admelog
losartan 25 mg po x 1 dose

## 2024-11-08 NOTE — PROGRESS NOTE ADULT - ASSESSMENT
68 yo F with PMH of HTN, hypothyroidism, hiatal hernia, gastritis, and pancreatic cancer s/p distal pancreatectomy and splectomy 2021 and chemo RT c/b recurrence to abdominal pain s/p resection and RT, and biliary stricture s/p stent in 2023 presenting with abdominal pain and N/V for 1 week, found to have ill defined hypodense pancreatic mass with resultant severe multifocal luminal narrowing along stomach and duodenum.     Impression:  #Gastric outlet obstruction  #Pancreatic cancer s/p distal pancreatectomy and chemo/RT  #Hypodense pancreas mass with resultant severe multifocal luminal narrowing along stomach and duodenum  #Hx of biliary stricture s/p stent 11/2023    P/w abdominal pain and n/v for 1 week, found to have gastric outlet obstruction now s/p NGT for decompression. GI consulted for evaluation for endoscopic intervention. Of note, pt w/ hx of pancreatic cancer s/p distal pancreatectomy and splenectomy c/b recurrence and abdominal wall resection with prior admission here 11/2023 for biliary stricture s/p stent 11/2023. CT on admission shows ill-defined hypodense pancreatic mass with resultant severe multifocal luminal narrowing along the distal stomach and duodenum, narrowing of the IVC due to extrinsic compression from pancreas, severe narrowing of the portal confluence without discrete thrombosis, and moderate central and mild peripheral intrahepatic biliary dilatation with pneumobilia in the left greater than right hepatic bile ducts with patent CBD stent. S/P EGD showing 2 strictures (pyloric and duodenal) with additional D4 stricture noted on CT; not amenable for stenting given multifocal nature. S/P EGD with endoscopic GJ placement; doing well post-procedurally and tolerating CLD; notes some lower abdominal cramping with last BM several days ago; suspect constipation contributing to abdominal pain    Recommendations  - CLD this AM; if tolerating, advance to FLD for the afternoon  - start bowel regimen; if worsening abdominal pain despite BM, would obtain repeat CT A/P  - monitor for refeeding syndrome  - workup for thrombocytopenia per primary team  - Appreciate oncology and surgery recommendations    Note and recommendations are incomplete until reviewed and attested by attending.    Td Ramirez MD  GI/Hepatology Fellow, PGY5  [employer] Pager [phone] or [employer] Pager [phone]  Teams preferred (7AM to 5PM); after 5PM, call GI fellow on call    On Weekends/Holidays (All Day) and Weekdays after 5PM to 8AM  For non-urgent consults, please email [email or web address] and [email or web address]  For urgent consults, please contact on call GI team. See Amion schedule ([facility]), [employer] paging system ([facility]), or call hospital  ([facility] 70 [facility] F with PMH of HTN, hypothyroidism, hiatal hernia, gastritis, and pancreatic cancer s/p distal pancreatectomy and splectomy 2021 and chemo RT c/b recurrence to abdominal pain s/p resection and RT, and biliary stricture s/p stent in 2023 presenting with abdominal pain and N/V for 1 week, found to have ill defined hypodense pancreatic mass with resultant severe multifocal luminal narrowing along stomach and duodenum.     Impression:  #Gastric outlet obstruction  #Pancreatic cancer s/p distal pancreatectomy and chemo/RT  #Hypodense pancreas mass with resultant severe multifocal luminal narrowing along stomach and duodenum  #Hx of biliary stricture s/p stent 11/2023    P/w abdominal pain and n/v for 1 week, found to have gastric outlet obstruction now s/p NGT for decompression. GI consulted for evaluation for endoscopic intervention. Of note, pt w/ hx of pancreatic cancer s/p distal pancreatectomy and splenectomy c/b recurrence and abdominal wall resection with prior admission here 11/2023 for biliary stricture s/p stent 11/2023. CT on admission shows ill-defined hypodense pancreatic mass with resultant severe multifocal luminal narrowing along the distal stomach and duodenum, narrowing of the IVC due to extrinsic compression from pancreas, severe narrowing of the portal confluence without discrete thrombosis, and moderate central and mild peripheral intrahepatic biliary dilatation with pneumobilia in the left greater than right hepatic bile ducts with patent CBD stent. S/P EGD showing 2 strictures (pyloric and duodenal) with additional D4 stricture noted on CT; not amenable for stenting given multifocal nature. S/P EGD with endoscopic GJ placement; doing well post-procedurally and tolerating CLD; notes some lower abdominal cramping with last BM several days ago; suspect constipation contributing to abdominal pain    Recommendations  - CLD this AM; if tolerating, advance to FLD for the afternoon  - start bowel regimen; if worsening abdominal pain despite BM, would obtain repeat CT A/P  - monitor for refeeding syndrome  - workup for thrombocytopenia per primary team; will be important to offset bleeding risks with GJ placement  - Appreciate oncology and surgery recommendations    Note and recommendations are incomplete until reviewed and attested by attending.    Td Ramirez MD  GI/Hepatology Fellow, PGY5  [employer] Pager [phone] or [employer] Pager [phone]  Teams preferred (7AM to 5PM); after 5PM, call GI fellow on call    On Weekends/Holidays (All Day) and Weekdays after 5PM to 8AM  For non-urgent consults, please email [email or web address] and [email or web address]  For urgent consults, please contact on call GI team. See Amion schedule ([facility]), [employer] paging system ([facility]), or call hospital  ([facility] 70 [facility] F with PMH of HTN, hypothyroidism, hiatal hernia, gastritis, and pancreatic cancer s/p distal pancreatectomy and splectomy 2021 and chemo RT c/b recurrence to abdominal pain s/p resection and RT, and biliary stricture s/p stent in 2023 presenting with abdominal pain and N/V for 1 week, found to have ill defined hypodense pancreatic mass with resultant severe multifocal luminal narrowing along stomach and duodenum.     Impression:  #Gastric outlet obstruction  #Pancreatic cancer s/p distal pancreatectomy and chemo/RT  #Hypodense pancreas mass with resultant severe multifocal luminal narrowing along stomach and duodenum  #Hx of biliary stricture s/p stent 11/2023    P/w abdominal pain and n/v for 1 week, found to have gastric outlet obstruction now s/p NGT for decompression. GI consulted for evaluation for endoscopic intervention. Of note, pt w/ hx of pancreatic cancer s/p distal pancreatectomy and splenectomy c/b recurrence and abdominal wall resection with prior admission here 11/2023 for biliary stricture s/p stent 11/2023. CT on admission shows ill-defined hypodense pancreatic mass with resultant severe multifocal luminal narrowing along the distal stomach and duodenum, narrowing of the IVC due to extrinsic compression from pancreas, severe narrowing of the portal confluence without discrete thrombosis, and moderate central and mild peripheral intrahepatic biliary dilatation with pneumobilia in the left greater than right hepatic bile ducts with patent CBD stent. S/P EGD showing 2 strictures (pyloric and duodenal) with additional D4 stricture noted on CT; not amenable for stenting given multifocal nature. S/P EGD with endoscopic GJ placement; doing well post-procedurally and tolerating CLD; notes some lower abdominal cramping with last BM several days ago; suspect constipation contributing to abdominal pain. Labs with Hgb drop to 7.4 without overt bleeding.    Recommendations  - repeat CBC; if downtrending, will need CT A/P  - CLD for now  - start bowel regimen  - monitor for refeeding syndrome  - workup for thrombocytopenia per primary team; will be important to offset bleeding risks with GJ placement  - Appreciate oncology and surgery recommendations    Note and recommendations are incomplete until reviewed and attested by attending.    Td Ramirez MD  GI/Hepatology Fellow, PGY5  [employer] Pager [phone] or [employer] Pager [phone]  Teams preferred (7AM to 5PM); after 5PM, call GI fellow on call    On Weekends/Holidays (All Day) and Weekdays after 5PM to 8AM  For non-urgent consults, please email [email or web address] and [email or web address]  For urgent consults, please contact on call GI team. See Amion schedule ([facility]), [employer] paging system ([facility]), or call hospital  ([facility]

## 2024-11-08 NOTE — PROGRESS NOTE ADULT - ASSESSMENT
69F PMHx DM, HTN, hypothyroid, pancreatic ca s/p resection '21, s/p chemo/rt then recurrence '22 s/p RT.  Presenting w/ diffuse abd pain, n/v and constipation x3d.     #Pancreatic ca  --[place] [facility] [place]/p distal pancreatectomy and splenectomy (06/2021)  --s/p adjuvant tx w/ mFOLFIRINOX (08/21-01/21), Xeloda +RTx 25 fractions (02/22-03/22)-> POD (01/23)  --most recent systemic tx w/ Gemcitabine and nab-paclitaxel (05/23-10/23, 02/24-07/24). Break in tx 2/2 jaundice and stent migration  --s/p RT x 10 fractions to abdominal wall mets 08/2024  --no plan for treatment inpatient and/or rehab    #gastric outlet obstruction  --pt p/w abdominal pain, N/V  --CT a/p w/ ill-defined hypodense pancreatic mass w/ multifocal narrowing along distal stomach and duodenum resulting in gastric outlet obstruction.  --CTH No acute intracranial hemorrhage, mass effect, or midline shift  --surgery following no plan for acute surgical intervention at this time   --currently on Malignant bowel obstruction protocol w/dex, reglan, and octreotide.   --EGD on 11/5 demonstrating multifocal stricturing disease of the pylorus and duodenum due to tumor ingrowth.   --Advanced GI s/p endoscopic GJ 11/7. start bowel regimen    #Anemia, thrombocytopenia  --2/2 malignancy and treatment. Platelets normal on 11/4, downtrending. HIT negative, f/u ISIDORO. Elevated LDH, low hapto, bili normal. Continue to closely monitor.   --baseline Hgb 8-9.5, platelets usually normal  --Iron studies adequate, normal B12/folate. F/u ferritin  --S/p platelet transfusion 11/7 with ?reaction- transfusional medicine evaluating.  --transfuse for Hgb <7, 8 if symptomatic. Recommend transfusion for platelets < 10k or < 50k with bleeding    will follow,    Chandler Johansen PA-C  Hematology/Oncology  [facility]  [phone] (office)

## 2024-11-08 NOTE — PROVIDER CONTACT NOTE (OTHER) - BACKGROUND
(Admit Diagnosis) Intestinal obstruction, (PMH) Hypertension, Pancreatic cancer
Patient admitted for bowel obstruction. Patient has history of diabetes.
Patient admitted to endoscopy for EGD.
(Admit Diagnosis) Intestinal obstruction, (PMH) Hypertension, Pancreatic cancer,

## 2024-11-09 NOTE — PROGRESS NOTE ADULT - SUBJECTIVE AND OBJECTIVE BOX
INTERVAL HPI/OVERNIGHT EVENTS:  Patient S&E at bedside. No o/n events, patient resting comfortably. Reports feeling much better since procedure.     VITAL SIGNS:  T(F): 98.3 (11-09-24 @ 13:54)  HR: 65 (11-09-24 @ 13:54)  BP: 151/79 (11-09-24 @ 13:54)  RR: 18 (11-09-24 @ 13:54)  SpO2: 99% (11-09-24 @ 13:54)  Wt(kg): --    PHYSICAL EXAM:    Constitutional: NAD  Eyes: EOMI, sclera non-icteric  Neck: supple, no masses  Respiratory: CTA b/l, good air entry b/l  Cardiovascular: RRR, no M/R/G  Gastrointestinal: soft, NTND, no masses palpable  Extremities: no c/c/e  Neurological: AAOx3      MEDICATIONS  (STANDING):  bisacodyl Suppository 10 milliGRAM(s) Rectal once  dexAMETHasone  Injectable 2 milliGRAM(s) IV Push every 12 hours  dextrose 5%. 1000 milliLiter(s) (50 mL/Hr) IV Continuous <Continuous>  dextrose 5%. 1000 milliLiter(s) (100 mL/Hr) IV Continuous <Continuous>  dextrose 50% Injectable 25 Gram(s) IV Push once  dextrose 50% Injectable 25 Gram(s) IV Push once  dextrose 50% Injectable 12.5 Gram(s) IV Push once  glucagon  Injectable 1 milliGRAM(s) IntraMuscular once  insulin lispro (ADMELOG) corrective regimen sliding scale   SubCutaneous three times a day before meals  insulin lispro (ADMELOG) corrective regimen sliding scale   SubCutaneous at bedtime  levothyroxine Injectable 70 MICROGram(s) IV Push at bedtime  multivitamin/minerals 1 Tablet(s) Oral daily  naloxone Injectable 0.4 milliGRAM(s) IV Push once  pantoprazole  Injectable 40 milliGRAM(s) IV Push daily  polyethylene glycol 3350 17 Gram(s) Oral two times a day  saline laxative (FLEET) Rectal Enema 1 Enema Rectal once  sodium chloride 0.9%. 1000 milliLiter(s) (100 mL/Hr) IV Continuous <Continuous>  thiamine 100 milliGRAM(s) Oral daily    MEDICATIONS  (PRN):  benzocaine/butamben/tetracaine Spray 1 Spray(s) Topical every 6 hours PRN sore throat  dextrose Oral Gel 15 Gram(s) Oral once PRN Blood Glucose LESS THAN 70 milliGRAM(s)/deciliter  morphine  - Injectable 2 milliGRAM(s) IV Push every 4 hours PRN Severe Pain (7 - 10)  ondansetron Injectable 4 milliGRAM(s) IV Push every 8 hours PRN Nausea and/or Vomiting      Allergies    phenytoin (Rash)  ibuprofen (Other)  Dilantin (Unknown)    Intolerances        LABS:                        7.9    10.33 )-----------( 40       ( 09 Nov 2024 08:31 )             24.5     11-09    130[L]  |  98  |  22  ----------------------------<  239[H]  4.2   |  20[L]  |  1.03    Ca    8.3[L]      09 Nov 2024 08:31  Phos  2.3     11-09  Mg     2.1     11-09    TPro  5.5[L]  /  Alb  2.8[L]  /  TBili  1.2  /  DBili  x   /  AST  22  /  ALT  18  /  AlkPhos  97  11-08      Urinalysis Basic - ( 09 Nov 2024 08:31 )    Color: x / Appearance: x / SG: x / pH: x  Gluc: 239 mg/dL / Ketone: x  / Bili: x / Urobili: x   Blood: x / Protein: x / Nitrite: x   Leuk Esterase: x / RBC: x / WBC x   Sq Epi: x / Non Sq Epi: x / Bacteria: x        RADIOLOGY & ADDITIONAL TESTS:  Studies reviewed.

## 2024-11-09 NOTE — PROGRESS NOTE ADULT - ASSESSMENT
69F PMHx DM, HTN, hypothyroid, pancreatic ca s/p resection '21, s/p chemo/rt then recurrence '22 s/p RT.  Presenting w/ diffuse abd pain, n/v and constipation x3d.     #Pancreatic ca  --[place] with [place]. Bess of [facility] [place]  --s/p distal pancreatectomy and splenectomy (06/2021)  --s/p adjuvant tx w/ mFOLFIRINOX (08/21-01/21), Xeloda +RTx 25 fractions (02/22-03/22)-> POD (01/23)  --most recent systemic tx w/ Gemcitabine and nab-paclitaxel (05/23-10/23, 02/24-07/24). Break in tx 2/2 jaundice and stent migration  --s/p RT x 10 fractions to abdominal wall mets 08/2024  --no plan for treatment inpatient and/or rehab    #gastric outlet obstruction  --pt p/w abdominal pain, N/V  --CT a/p w/ ill-defined hypodense pancreatic mass w/ multifocal narrowing along distal stomach and duodenum resulting in gastric outlet obstruction.  --CTH No acute intracranial hemorrhage, mass effect, or midline shift  --surgery following no plan for acute surgical intervention at this time   --currently on Malignant bowel obstruction protocol w/dex, reglan, and octreotide.   --EGD on 11/5 demonstrating multifocal stricturing disease of the pylorus and duodenum due to tumor ingrowth.   --GI following, s/p endoscopic GJ 11/7    #Anemia, thrombocytopenia  --2/2 malignancy and treatment. Platelets normal on 11/4, downtrending. HIT negative, f/u ISIDORO. Elevated LDH, low hapto, bili normal. Continue to closely monitor.   --baseline Hgb 8-9.5, platelets usually normal  --Iron studies adequate, normal B12/folate  --S/p platelet transfusion 11/7 with ?reaction- transfusional medicine evaluating.  --transfuse for Hgb <7, 8 if symptomatic. Recommend transfusion for platelets < 10k or < 50k with bleeding    will follow,    Heena Haskins MD  Hematology/Oncology  [facility]  [phone] (office)

## 2024-11-09 NOTE — PROGRESS NOTE ADULT - SUBJECTIVE AND OBJECTIVE BOX
SURGERY DAILY PROGRESS NOTE    24 Hour/Overnight Events: No acute events overnight    SUBJECTIVE: Patient seen and evaluated on AM rounds. patient passing gas and having bowel movements. No N/V     ------------------------------------------------------------------------------------------------------------  OBJECTIVE:  Vital Signs Last 24 Hrs  T(C): 36.3 (09 Nov 2024 05:16), Max: 36.8 (08 Nov 2024 12:15)  T(F): 97.3 (09 Nov 2024 05:16), Max: 98.3 (08 Nov 2024 12:15)  HR: 64 (09 Nov 2024 05:16) (64 - 67)  BP: 168/90 (09 Nov 2024 05:16) (154/78 - 168/90)  BP(mean): --  RR: 18 (09 Nov 2024 05:16) (18 - 18)  SpO2: 96% (09 Nov 2024 05:16) (96% - 100%)    Parameters below as of 09 Nov 2024 05:16  Patient On (Oxygen Delivery Method): room air      I&O's Detail    08 Nov 2024 07:01  -  09 Nov 2024 07:00  --------------------------------------------------------  IN:    Oral Fluid: 390 mL  Total IN: 390 mL    OUT:  Total OUT: 0 mL    Total NET: 390 mL          LABS:                        7.9    10.33 )-----------( 40       ( 09 Nov 2024 08:31 )             24.5     11-09    130[L]  |  98  |  22  ----------------------------<  239[H]  4.2   |  20[L]  |  1.03    Ca    8.3[L]      09 Nov 2024 08:31  Phos  2.3     11-09  Mg     2.1     11-09    TPro  5.5[L]  /  Alb  2.8[L]  /  TBili  1.2  /  DBili  x   /  AST  22  /  ALT  18  /  AlkPhos  97  11-08    LIVER FUNCTIONS - ( 08 Nov 2024 07:58 )  Alb: 2.8 g/dL / Pro: 5.5 g/dL / ALK PHOS: 97 U/L / ALT: 18 U/L / AST: 22 U/L / GGT: x             Urinalysis Basic - ( 09 Nov 2024 08:31 )    Color: x / Appearance: x / SG: x / pH: x  Gluc: 239 mg/dL / Ketone: x  / Bili: x / Urobili: x   Blood: x / Protein: x / Nitrite: x   Leuk Esterase: x / RBC: x / WBC x   Sq Epi: x / Non Sq Epi: x / Bacteria: x        EXAM    General: NAD, resting in bed comfortably  Cardiac: Regular rate, normotensive  Respiratory: Nonlabored respirations, normal cw expansion, on RA  Abdomen: Soft, nondistended, nontender  Extremities: Warm, well perfused  Neuro: AOx3, CNII-XII intact on gross examination

## 2024-11-09 NOTE — PROGRESS NOTE ADULT - SUBJECTIVE AND OBJECTIVE BOX
Name of Patient : DANUTA CALDERON  MRN: [id number]  Date of visit: 11-09-24       Subjective: Patient seen and examined. No new events except as noted.     REVIEW OF SYSTEMS:    CONSTITUTIONAL: No weakness, fevers or chills  EYES/ENT: No visual changes;  No vertigo or throat pain   NECK: No pain or stiffness  RESPIRATORY: No cough, wheezing, hemoptysis; No shortness of breath  CARDIOVASCULAR: No chest pain or palpitations  GASTROINTESTINAL: No abdominal or epigastric pain. No nausea, vomiting, or hematemesis; No diarrhea or constipation. No melena or hematochezia.  GENITOURINARY: No dysuria, frequency or hematuria  NEUROLOGICAL: No numbness or weakness  SKIN: No itching, burning, rashes, or lesions   All other review of systems is negative unless indicated above.    MEDICATIONS:  MEDICATIONS  (STANDING):  bisacodyl Suppository 10 milliGRAM(s) Rectal once  dexAMETHasone  Injectable 2 milliGRAM(s) IV Push every 12 hours  dextrose 5%. 1000 milliLiter(s) (50 mL/Hr) IV Continuous <Continuous>  dextrose 5%. 1000 milliLiter(s) (100 mL/Hr) IV Continuous <Continuous>  dextrose 50% Injectable 25 Gram(s) IV Push once  dextrose 50% Injectable 25 Gram(s) IV Push once  dextrose 50% Injectable 12.5 Gram(s) IV Push once  glucagon  Injectable 1 milliGRAM(s) IntraMuscular once  insulin lispro (ADMELOG) corrective regimen sliding scale   SubCutaneous three times a day before meals  insulin lispro (ADMELOG) corrective regimen sliding scale   SubCutaneous at bedtime  levothyroxine Injectable 70 MICROGram(s) IV Push at bedtime  multivitamin/minerals 1 Tablet(s) Oral daily  naloxone Injectable 0.4 milliGRAM(s) IV Push once  pantoprazole  Injectable 40 milliGRAM(s) IV Push daily  polyethylene glycol 3350 17 Gram(s) Oral two times a day  saline laxative (FLEET) Rectal Enema 1 Enema Rectal once  sodium chloride 0.9%. 1000 milliLiter(s) (100 mL/Hr) IV Continuous <Continuous>  thiamine 100 milliGRAM(s) Oral daily      PHYSICAL EXAM:  T(C): 36.7 (11-09-24 @ 21:30), Max: 36.8 (11-09-24 @ 13:54)  HR: 70 (11-09-24 @ 21:30) (64 - 70)  BP: 158/84 (11-09-24 @ 21:30) (151/79 - 168/90)  RR: 18 (11-09-24 @ 21:30) (18 - 18)  SpO2: 96% (11-09-24 @ 21:30) (96% - 99%)  Wt(kg): --  I&O's Summary    08 Nov 2024 07:01  -  09 Nov 2024 07:00  --------------------------------------------------------  IN: 390 mL / OUT: 0 mL / NET: 390 mL          Appearance: Normal	  HEENT:  PERRLA   Lymphatic: No lymphadenopathy   Cardiovascular: Normal S1 S2, no JVD  Respiratory: normal effort , clear  Gastrointestinal:  Soft, Non-tender  Skin: No rashes,  warm to touch  Psychiatry:  Mood & affect appropriate  Musculuskeletal: No edema    recent labs, Imaging and EKGs personally reviewed     11-08-24 @ 07:01  -  11-09-24 @ 07:00  --------------------------------------------------------  IN: 390 mL / OUT: 0 mL / NET: 390 mL                          7.9    10.33 )-----------( 40       ( 09 Nov 2024 08:31 )             24.5               11-09    130[L]  |  98  |  22  ----------------------------<  239[H]  4.2   |  20[L]  |  1.03    Ca    8.3[L]      09 Nov 2024 08:31  Phos  2.3     11-09  Mg     2.1     11-09    TPro  5.5[L]  /  Alb  2.8[L]  /  TBili  1.2  /  DBili  x   /  AST  22  /  ALT  18  /  AlkPhos  97  11-08                       Urinalysis Basic - ( 09 Nov 2024 08:31 )    Color: x / Appearance: x / SG: x / pH: x  Gluc: 239 mg/dL / Ketone: x  / Bili: x / Urobili: x   Blood: x / Protein: x / Nitrite: x   Leuk Esterase: x / RBC: x / WBC x   Sq Epi: x / Non Sq Epi: x / Bacteria: x

## 2024-11-09 NOTE — PROGRESS NOTE ADULT - SUBJECTIVE AND OBJECTIVE BOX
[facility] NUTRITION SUPPORT-- FOLLOW UP NOTE      24 hour events/subjective:    TPN consulted for Protein Calorie Malnutrition in the setting of nutritional optimization for anticipated prolonged NPO, pt now s/p  endoscopic gastrojejunostomy. Plan to monitor off of TPN and will follow for enteral diet advancement.    Diet:  Diet, Full Liquid:   Consistent Carbohydrate No Snacks (CSTCHO)  Supplement Feeding Modality:  Oral  Glucerna Shake Cans or Servings Per Day:  1       Frequency:  Daily (11-08-24 @ 23:27)      PAST HISTORY  --------------------------------------------------------------------------------  No significant changes to PMH, PSH, FHx, SHx, unless otherwise noted    ALLERGIES & MEDICATIONS  --------------------------------------------------------------------------------  Allergies    phenytoin (Rash)  ibuprofen (Other)  Dilantin (Unknown)    Intolerances      Standing Inpatient Medications  bisacodyl Suppository 10 milliGRAM(s) Rectal once  dexAMETHasone  Injectable 2 milliGRAM(s) IV Push every 12 hours  dextrose 5%. 1000 milliLiter(s) IV Continuous <Continuous>  dextrose 5%. 1000 milliLiter(s) IV Continuous <Continuous>  dextrose 50% Injectable 12.5 Gram(s) IV Push once  dextrose 50% Injectable 25 Gram(s) IV Push once  dextrose 50% Injectable 25 Gram(s) IV Push once  glucagon  Injectable 1 milliGRAM(s) IntraMuscular once  insulin lispro (ADMELOG) corrective regimen sliding scale   SubCutaneous three times a day before meals  insulin lispro (ADMELOG) corrective regimen sliding scale   SubCutaneous at bedtime  levothyroxine Injectable 70 MICROGram(s) IV Push at bedtime  multivitamin/minerals 1 Tablet(s) Oral daily  naloxone Injectable 0.4 milliGRAM(s) IV Push once  pantoprazole  Injectable 40 milliGRAM(s) IV Push daily  polyethylene glycol 3350 17 Gram(s) Oral two times a day  saline laxative (FLEET) Rectal Enema 1 Enema Rectal once  sodium chloride 0.9%. 1000 milliLiter(s) IV Continuous <Continuous>  sodium phosphate 15 milliMole(s)/250 mL IVPB 15 milliMole(s) IV Intermittent once  thiamine 100 milliGRAM(s) Oral daily    PRN Inpatient Medications  benzocaine/butamben/tetracaine Spray 1 Spray(s) Topical every 6 hours PRN  dextrose Oral Gel 15 Gram(s) Oral once PRN  morphine  - Injectable 2 milliGRAM(s) IV Push every 4 hours PRN  ondansetron Injectable 4 milliGRAM(s) IV Push every 8 hours PRN      REVIEW OF SYSTEMS  --------------------------------------------------------------------------------  Gen: as per HPI  Skin: No rashes  Head/Eyes/Ears/Mouth: No headache; No sore throat  Respiratory: No dyspnea, cough,   CV: No chest pain, PND, orthopnea  GI: as per HPI  : No increased frequency, dysuria, hematuria, nocturia  MSK: No joint pain/swelling; no back pain; no edema  Neuro: No dizziness/lightheadedness, weakness, seizures, numbness, tingling  Psych: No significant nervousness, anxiety, stress, depression    All other systems were reviewed and are negative, except as noted.        LABS/STUDIES  --------------------------------------------------------------------------------              7.9    10.33 >-----------<  40       [11-09-24 @ 08:31]              24.5     130  |  98  |  22  ----------------------------<  239      [11-09-24 @ 08:31]  4.2   |  20  |  1.03        Ca     8.3     [11-09-24 @ 08:31]      Mg     2.1     [11-09-24 @ 08:31]      Phos  2.3     [11-09-24 @ 08:31]    TPro  5.5  /  Alb  2.8  /  TBili  1.2  /  DBili  x   /  AST  22  /  ALT  18  /  AlkPhos  97  [11-08-24 @ 07:58]              [11-07-24 @ 10:48]        Glucose: 239 mg/dL (11-09-24 @ 08:31)    Prealbumin, Serum: 12 mg/dL (11-07-24 @ 03:48)    Triglycerides      CAPILLARY BLOOD GLUCOSE      POCT Blood Glucose.: 261 mg/dL (09 Nov 2024 08:10)  POCT Blood Glucose.: 252 mg/dL (08 Nov 2024 21:26)  POCT Blood Glucose.: 453 mg/dL (08 Nov 2024 17:21)  POCT Blood Glucose.: 447 mg/dL (08 Nov 2024 17:18)  POCT Blood Glucose.: 433 mg/dL (08 Nov 2024 17:17)  POCT Blood Glucose.: 221 mg/dL (08 Nov 2024 12:25)      VITALS/PHYSICAL EXAM  --------------------------------------------------------------------------------  T(C): 36.3 (11-09-24 @ 05:16), Max: 36.8 (11-08-24 @ 12:15)  HR: 64 (11-09-24 @ 05:16) (64 - 67)  BP: 168/90 (11-09-24 @ 05:16) (154/78 - 168/90)  RR: 18 (11-09-24 @ 05:16) (18 - 18)  SpO2: 96% (11-09-24 @ 05:16) (96% - 100%)  Wt(kg): --  Height (cm): 154.9 (11-07-24 @ 12:31)  Weight (kg): 36 (11-07-24 @ 12:31)  BMI (kg/m2): 15 (11-07-24 @ 12:31)  BSA (m2): 1.28 (11-07-24 @ 12:31)      11-08-24 @ 07:01  -  11-09-24 @ 07:00  --------------------------------------------------------  IN: 390 mL / OUT: 0 mL / NET: 390 mL      Physical Exam:        Gen: NAD, laying in bed   Chest: non labored breathing, equal chest expansion bilaterally  Abd: +BS, soft, nontender/nondistended  Ext: Warm, FROM, no edema b/l LE  Neuro: No focal deficits  Psych: Normal affect and mood  Skin: Warm, without rashes     .  .  .

## 2024-11-09 NOTE — PROGRESS NOTE ADULT - ASSESSMENT
69F with PMHx of [facility] s/p distal pancreatectomy (2021) and chemo/radiation w/ recurrence to abdominal wall s/p resection (2022) and radiation who presented 11/1 to the ED with abdominal pain, nausea, vomiting, and CT A/P with ill-defined hypodense pancreatic head mass with multifocal narrowing along distal stomach and duodenum, consistent with gastric outlet obstruction and cancer recurrence. Surgery was consulted for gastric outlet obstruction management. EGD on 11/5 demonstrating multifocal stricturing disease of the pylorus and duodenum due to tumor ingrowth.  Advanced GI planning on endoscopic GJ versus [employer] stent placement tomorrow. Pt on Malignant bowel obstruction protocol   (dexamethasone 4mg IV q12h, reglan 10 mg IV q8h, octreotide 100 ucg SQ q8h)    - Nutritional Assessment, pt is not meeting nutritional goals enterally.  Prealbumin decreased at 12 mg/dL with severe protein calorie malnutrition  - TPN plan: s/p endoscopic gastrojejunostomy; f/u diet advancement/tolerance and hold off on TPN unless enteral route unable to be utilized for nutrition   - TPN access:  Patient will need a dedicated central line if/once TPN approved  - Electrolyte Imbalance risk- check CMP, Mg, Phos, iCa and K daily, replete as per Medicine.  - GOO:  s/p endoscopic gastrojejunostomy  - Risk of Hyperglycemia: HgA1c 6.5% and glucose elevated on Dexamethasone/off Oral Diabetic meds; fingersticks per primary team   - Risk of Hypertriglyceridemia:   mg/dL on 11/7; plan to trend TG closely on TPN   - Thrombocytopenia:  s/p transfusion w/possible transfusion reaction; work up/management per primary team  - Vitamin D def:  recommend enteral repletion once diet resumes  - Recommend strict intake and output/weight checks three times a week; IV fluids per primary team  - Recommend MVI and Thiamine to decrease risk of refeeding syndrome and close monitoring of electrolytes   - Further care as per Medicine    Available on TEAMS  TPN spectra [id number] ([phone] when dialing from outside line)  Above reviewed with Dr. Lucas Avalos

## 2024-11-09 NOTE — PROGRESS NOTE ADULT - ASSESSMENT
69F with PMHx of [facility] s/p distal pancreatectomy (2021) and chemo/radiation w/ recurrence to abdominal wall s/p resection (2022) and radiation who presented 11/1 to the ED with abdominal pain, nausea, vomiting, and CT A/P with ill-defined hypodense pancreatic head mass with multifocal narrowing along distal stomach and duodenum, consistent with gastric outlet obstruction and cancer recurrence. Surgery was consulted for gastric outlet obstruction management. EGD on 11/5 demonstrating multifocal stricturing disease of the pylorus and duodenum due to tumor ingrowth. Patient now s/p GJ stent placement w/ good progression post operatively.     Recommendations:  -FLD   - +/+ flatus/BM   - OOBAT  - Bowel regimen, suppository  - Surgical oncology will follow    [facility] Surgery  [id number]

## 2024-11-09 NOTE — PROGRESS NOTE ADULT - ASSESSMENT
69F admitted for malignant gastric outlet obstruction       Malignant Gastric outlet obstruction.   - CT A/P as noted; CT H as noted   - NPO and has NGT to low suction per surgery. surg onc will follow. Strict NPO   - 4mg IV dexamethasone, q12h  - 10 mg IV reglan, q8h  - 100mcg octreotide micrograms SubQ, q8h   - morphine 2mg q4h prn for pain. Monitor BM, if no bm in 48hr, enema prn while npo.   - s/P Sten placement  y GI   - Full liquid, advance as per GI recs     R/O Transfusion reaction   - Patient's Plt down-trending and was being transfused 1 unit of platelets, reported that near completion of 1 unit of platelets, Patient with erythematous rash and pruritis. no respiratory symptoms   - Platelet transfusion stopped and bag was sent to blood bank for work up  - Heme/Onc follow up       Pancreatic cancer.   - Diagnosed in 2021 s/p resection, chemo/RT and now with recurrent in 2022 s/p RT.  - On active DMT with [facility]   - Heme/Onc and palliative eval appreciated; F/u recs     Anemia, Thrombocytopenia   - Likely due to malignancy and treatment; F/u anemia labs   - Platelet count down-trending. Monitor closely --> will check for HIT --> Hep Ab neg, ISIDORO pending. Hold DVT PPX   - Transfuse for Hgb <7, Plt <10K or < 50K w/ fever/bleeding   - Heme/Onc checking hemolysis labs and smear to evaluate thrombocytopenia.   - Heme/Onc eval appreciated; F/u recs    Hypertension.   - No meds per mouth.   - Monitor BP. If sustained SBP >160, may use iv hydralazine as needed.    Type 2 diabetes mellitus.   - Hold home oral dm meds  - ISS while inpatient  - IVF for hydration   - Monitor glucose levels closely       Hypothyroid.   - TSH and FT4 noted   - On IV Synthroid 70 - dose confirmed with Pharmacy  - resume oral when tolerating     Prophylactic measure.   - Heparin SubQ on HOLD       discussed in detail with patient and family

## 2024-11-10 NOTE — CHART NOTE - NSCHARTNOTEFT_GEN_A_CORE
Seen and examined Alert and oriented x3   patient denies Hematuria, Melena or active bleeding   Vital Signs Last 24 Hrs  T(C): 36.4 (10 Nov 2024 04:20), Max: 36.8 (09 Nov 2024 13:54)  T(F): 97.6 (10 Nov 2024 04:20), Max: 98.3 (09 Nov 2024 13:54)  HR: 71 (10 Nov 2024 04:20) (65 - 71)  BP: 152/82 (10 Nov 2024 04:20) (151/79 - 158/84)  BP(mean): --  RR: 18 (10 Nov 2024 04:20) (18 - 18)  SpO2: 98% (10 Nov 2024 04:20) (96% - 99%)    Parameters below as of 10 Nov 2024 04:20  Patient On (Oxygen Delivery Method): room air  # Anemia possible secondary  malignant gastric outlet obstruction                         6.9    11.69 )-----------( 34       ( 10 Nov 2024 06:59 )             20.7   Patient will received 1 unit Prbc and 1 platelet  Ordered 1 prbc and 1 platelet     Monitor CBC   monitor vital signs   F/u GI   F/u Oncology /Hematology Seen and examined Alert and oriented x3   patient denies Hematuria, Melena or active bleeding   Vital Signs Last 24 Hrs  T(C): 36.4 (10 Nov 2024 04:20), Max: 36.8 (09 Nov 2024 13:54)  T(F): 97.6 (10 Nov 2024 04:20), Max: 98.3 (09 Nov 2024 13:54)  HR: 71 (10 Nov 2024 04:20) (65 - 71)  BP: 152/82 (10 Nov 2024 04:20) (151/79 - 158/84)  BP(mean): --  RR: 18 (10 Nov 2024 04:20) (18 - 18)  SpO2: 98% (10 Nov 2024 04:20) (96% - 99%)    Parameters below as of 10 Nov 2024 04:20  Patient On (Oxygen Delivery Method): room air  # Anemia possible secondary  malignant gastric outlet obstruction                         6.9    11.69 )-----------( 34       ( 10 Nov 2024 06:59 )             20.7   Patient will received 1 unit Prbc and 1 platelet  Ordered 1 prbc and 1 platelet     Monitor CBC   monitor vital signs   F/u GI - recommend CT Abd /pelvic r/o hematoma  GI appreciated   f/u cbc   F/u Oncology /Hematology  Labs placed for PTT/ INR fibrin /   signed out to night ACP

## 2024-11-10 NOTE — PROGRESS NOTE ADULT - SUBJECTIVE AND OBJECTIVE BOX
[facility] NUTRITION SUPPORT-- FOLLOW UP NOTE      24 hour events/subjective:    TPN originally consulted for Protein Calorie Malnutrition in the setting of nutritional optimization for anticipated prolonged NPO, pt now s/p  endoscopic gastrojejunostomy. Plan to monitor off of TPN and will follow for enteral diet advancement.    Diet:  Diet, Full Liquid:   Consistent Carbohydrate No Snacks (CSTCHO)  Supplement Feeding Modality:  Oral  Glucerna Shake Cans or Servings Per Day:  1       Frequency:  Daily (11-08-24 @ 23:27)      PAST HISTORY  --------------------------------------------------------------------------------  No significant changes to PMH, PSH, FHx, SHx, unless otherwise noted    ALLERGIES & MEDICATIONS  --------------------------------------------------------------------------------  Allergies    phenytoin (Rash)  ibuprofen (Other)  Dilantin (Unknown)    Intolerances      Standing Inpatient Medications  bisacodyl Suppository 10 milliGRAM(s) Rectal once  dexAMETHasone  Injectable 2 milliGRAM(s) IV Push every 12 hours  dextrose 5%. 1000 milliLiter(s) IV Continuous <Continuous>  dextrose 5%. 1000 milliLiter(s) IV Continuous <Continuous>  dextrose 50% Injectable 12.5 Gram(s) IV Push once  dextrose 50% Injectable 25 Gram(s) IV Push once  dextrose 50% Injectable 25 Gram(s) IV Push once  glucagon  Injectable 1 milliGRAM(s) IntraMuscular once  insulin lispro (ADMELOG) corrective regimen sliding scale   SubCutaneous three times a day before meals  insulin lispro (ADMELOG) corrective regimen sliding scale   SubCutaneous at bedtime  levothyroxine Injectable 70 MICROGram(s) IV Push at bedtime  multivitamin/minerals 1 Tablet(s) Oral daily  naloxone Injectable 0.4 milliGRAM(s) IV Push once  pantoprazole  Injectable 40 milliGRAM(s) IV Push daily  polyethylene glycol 3350 17 Gram(s) Oral two times a day  saline laxative (FLEET) Rectal Enema 1 Enema Rectal once  sodium chloride 0.9%. 1000 milliLiter(s) IV Continuous <Continuous>  thiamine 100 milliGRAM(s) Oral daily    PRN Inpatient Medications  benzocaine/butamben/tetracaine Spray 1 Spray(s) Topical every 6 hours PRN  dextrose Oral Gel 15 Gram(s) Oral once PRN  ondansetron Injectable 4 milliGRAM(s) IV Push every 8 hours PRN      REVIEW OF SYSTEMS  --------------------------------------------------------------------------------  Gen: as per HPI  Skin: No rashes  Head/Eyes/Ears/Mouth: No headache; No sore throat  Respiratory: No dyspnea, cough,   CV: No chest pain, PND, orthopnea  GI: as per HPI  : No increased frequency, dysuria, hematuria, nocturia  MSK: No joint pain/swelling; no back pain; no edema  Neuro: No dizziness/lightheadedness, weakness, seizures, numbness, tingling  Psych: No significant nervousness, anxiety, stress, depression    All other systems were reviewed and are negative, except as noted.        LABS/STUDIES  --------------------------------------------------------------------------------              6.9    11.69 >-----------<  34       [11-10-24 @ 06:59]              20.7     130  |  99  |  20  ----------------------------<  244      [11-10-24 @ 06:59]  3.8   |  22  |  0.89        Ca     7.8     [11-10-24 @ 06:59]      Mg     1.8     [11-10-24 @ 06:59]      Phos  2.3     [11-10-24 @ 06:59]                Glucose: 244 mg/dL (11-10-24 @ 06:59)    Prealbumin, Serum: 12 mg/dL (11-07-24 @ 03:48)    Triglycerides      CAPILLARY BLOOD GLUCOSE      POCT Blood Glucose.: 261 mg/dL (10 Nov 2024 08:08)  POCT Blood Glucose.: 280 mg/dL (09 Nov 2024 21:27)  POCT Blood Glucose.: 428 mg/dL (09 Nov 2024 17:20)  POCT Blood Glucose.: 444 mg/dL (09 Nov 2024 17:18)  POCT Blood Glucose.: 299 mg/dL (09 Nov 2024 12:42)      VITALS/PHYSICAL EXAM  --------------------------------------------------------------------------------  T(C): 36.4 (11-10-24 @ 04:20), Max: 36.8 (11-09-24 @ 13:54)  HR: 71 (11-10-24 @ 04:20) (65 - 71)  BP: 152/82 (11-10-24 @ 04:20) (151/79 - 158/84)  RR: 18 (11-10-24 @ 04:20) (18 - 18)  SpO2: 98% (11-10-24 @ 04:20) (96% - 99%)  Wt(kg): --        11-09-24 @ 07:01  -  11-10-24 @ 07:00  --------------------------------------------------------  IN: 1560 mL / OUT: 0 mL / NET: 1560 mL        Physical Exam:        Gen: NAD, laying in bed   Chest: non labored breathing, equal chest expansion bilaterally  Abd: +BS, soft, nontender/nondistended  Ext: Warm, FROM, no edema b/l LE  Neuro: No focal deficits  Psych: Normal affect and mood  Skin: Warm, without rashes     .  .  .        .  .  .

## 2024-11-10 NOTE — PROGRESS NOTE ADULT - ASSESSMENT
69F with PMHx of [facility] s/p distal pancreatectomy (2021) and chemo/radiation w/ recurrence to abdominal wall s/p resection (2022) and radiation who presented 11/1 to the ED with abdominal pain, nausea, vomiting, and CT A/P with ill-defined hypodense pancreatic head mass with multifocal narrowing along distal stomach and duodenum, consistent with gastric outlet obstruction and cancer recurrence. Surgery was consulted for gastric outlet obstruction management. EGD on 11/5 demonstrating multifocal stricturing disease of the pylorus and duodenum due to tumor ingrowth. Patient now s/p GJ stent placement w/ good progression post operatively.     Recommendations:  -FLD   - +/+ flatus/BM   - OOBAT  - Bowel regimen, suppository  - Surgical oncology will follow    [facility]  [id number]

## 2024-11-10 NOTE — PROGRESS NOTE ADULT - SUBJECTIVE AND OBJECTIVE BOX
SURGERY DAILY PROGRESS NOTE    24 Hour/Overnight Events: No acute events overnight    SUBJECTIVE: Patient seen and evaluated on AM rounds.   ------------------------------------------------------------------------------------------------------------  OBJECTIVE:  Vital Signs Last 24 Hrs  T(C): 36.7 (09 Nov 2024 21:30), Max: 36.8 (09 Nov 2024 13:54)  T(F): 98.1 (09 Nov 2024 21:30), Max: 98.3 (09 Nov 2024 13:54)  HR: 70 (09 Nov 2024 21:30) (64 - 70)  BP: 158/84 (09 Nov 2024 21:30) (151/79 - 168/90)  BP(mean): --  RR: 18 (09 Nov 2024 21:30) (18 - 18)  SpO2: 96% (09 Nov 2024 21:30) (96% - 99%)    Parameters below as of 09 Nov 2024 21:30  Patient On (Oxygen Delivery Method): room air      I&O's Detail    08 Nov 2024 07:01  -  09 Nov 2024 07:00  --------------------------------------------------------  IN:    Oral Fluid: 390 mL  Total IN: 390 mL    OUT:  Total OUT: 0 mL    Total NET: 390 mL          LABS:                        7.9    10.33 )-----------( 40       ( 09 Nov 2024 08:31 )             24.5     11-09    130[L]  |  98  |  22  ----------------------------<  239[H]  4.2   |  20[L]  |  1.03    Ca    8.3[L]      09 Nov 2024 08:31  Phos  2.3     11-09  Mg     2.1     11-09    TPro  5.5[L]  /  Alb  2.8[L]  /  TBili  1.2  /  DBili  x   /  AST  22  /  ALT  18  /  AlkPhos  97  11-08    LIVER FUNCTIONS - ( 08 Nov 2024 07:58 )  Alb: 2.8 g/dL / Pro: 5.5 g/dL / ALK PHOS: 97 U/L / ALT: 18 U/L / AST: 22 U/L / GGT: x             Urinalysis Basic - ( 09 Nov 2024 08:31 )    Color: x / Appearance: x / SG: x / pH: x  Gluc: 239 mg/dL / Ketone: x  / Bili: x / Urobili: x   Blood: x / Protein: x / Nitrite: x   Leuk Esterase: x / RBC: x / WBC x   Sq Epi: x / Non Sq Epi: x / Bacteria: x        EXAM    General: NAD, resting in bed comfortably  Cardiac: Regular rate, normotensive  Respiratory: Nonlabored respirations, normal cw expansion, on RA  Abdomen: Soft, nondistended, nontender  Extremities: Warm, well perfused  Neuro: AOx3, CNII-XII intact on gross examination

## 2024-11-10 NOTE — PROVIDER CONTACT NOTE (CRITICAL VALUE NOTIFICATION) - ASSESSMENT
patient exhibiting no signs of distress. denies dizziness, difficulty breathing, chest pain
patient exhibiting no signs of distress

## 2024-11-10 NOTE — PROGRESS NOTE ADULT - SUBJECTIVE AND OBJECTIVE BOX
Name of Patient : DANUTA CALDERON  MRN: [id number]  Date of visit: 11-10-24       Subjective: Patient seen and examined. No new events except as noted.   cont to drop hgb an dplt level   tolerating oral feeding, full liquid     REVIEW OF SYSTEMS:    CONSTITUTIONAL: No weakness, fevers or chills  EYES/ENT: No visual changes;  No vertigo or throat pain   NECK: No pain or stiffness  RESPIRATORY: No cough, wheezing, hemoptysis; No shortness of breath  CARDIOVASCULAR: No chest pain or palpitations  GASTROINTESTINAL: No abdominal or epigastric pain. No nausea, vomiting, or hematemesis; No diarrhea or constipation. No melena or hematochezia.  GENITOURINARY: No dysuria, frequency or hematuria  NEUROLOGICAL: No numbness or weakness  SKIN: No itching, burning, rashes, or lesions   All other review of systems is negative unless indicated above.    MEDICATIONS:  MEDICATIONS  (STANDING):  bisacodyl Suppository 10 milliGRAM(s) Rectal once  dexAMETHasone  Injectable 2 milliGRAM(s) IV Push every 12 hours  dextrose 5%. 1000 milliLiter(s) (50 mL/Hr) IV Continuous <Continuous>  dextrose 5%. 1000 milliLiter(s) (100 mL/Hr) IV Continuous <Continuous>  dextrose 50% Injectable 12.5 Gram(s) IV Push once  dextrose 50% Injectable 25 Gram(s) IV Push once  dextrose 50% Injectable 25 Gram(s) IV Push once  glucagon  Injectable 1 milliGRAM(s) IntraMuscular once  insulin lispro (ADMELOG) corrective regimen sliding scale   SubCutaneous three times a day before meals  insulin lispro (ADMELOG) corrective regimen sliding scale   SubCutaneous at bedtime  levothyroxine Injectable 70 MICROGram(s) IV Push at bedtime  multivitamin/minerals 1 Tablet(s) Oral daily  naloxone Injectable 0.4 milliGRAM(s) IV Push once  pantoprazole  Injectable 40 milliGRAM(s) IV Push daily  polyethylene glycol 3350 17 Gram(s) Oral two times a day  saline laxative (FLEET) Rectal Enema 1 Enema Rectal once  sodium chloride 0.9%. 1000 milliLiter(s) (100 mL/Hr) IV Continuous <Continuous>  thiamine 100 milliGRAM(s) Oral daily      PHYSICAL EXAM:  T(C): 36.8 (11-10-24 @ 22:42), Max: 36.8 (11-10-24 @ 12:27)  HR: 70 (11-10-24 @ 22:42) (70 - 84)  BP: 166/89 (11-10-24 @ 22:42) (129/82 - 166/89)  RR: 18 (11-10-24 @ 22:42) (18 - 18)  SpO2: 100% (11-10-24 @ 22:42) (97% - 100%)  Wt(kg): --  I&O's Summary    09 Nov 2024 07:01  -  10 Nov 2024 07:00  --------------------------------------------------------  IN: 1560 mL / OUT: 0 mL / NET: 1560 mL          Appearance: Normal	  HEENT:  PERRLA   Lymphatic: No lymphadenopathy   Cardiovascular: Normal S1 S2, no JVD  Respiratory: normal effort , clear  Gastrointestinal:  Soft, Non-tender  Skin: No rashes,  warm to touch  Psychiatry:  Mood & affect appropriate  Musculuskeletal: No edema    recent labs, Imaging and EKGs personally reviewed     11-09-24 @ 07:01  -  11-10-24 @ 07:00  --------------------------------------------------------  IN: 1560 mL / OUT: 0 mL / NET: 1560 mL                          8.1    12.34 )-----------( 124      ( 10 Nov 2024 18:15 )             24.3               11-10    130[L]  |  99  |  20  ----------------------------<  244[H]  3.8   |  22  |  0.89    Ca    7.8[L]      10 Nov 2024 06:59  Phos  2.3     11-10  Mg     1.8     11-10    TPro  x   /  Alb  x   /  TBili  1.2  /  DBili  0.3  /  AST  x   /  ALT  x   /  AlkPhos  x   11-10    PT/INR - ( 10 Nov 2024 18:15 )   PT: 12.0 sec;   INR: 1.05 ratio         PTT - ( 10 Nov 2024 18:15 )  PTT:25.2 sec                   Urinalysis Basic - ( 10 Nov 2024 06:59 )    Color: x / Appearance: x / SG: x / pH: x  Gluc: 244 mg/dL / Ketone: x  / Bili: x / Urobili: x   Blood: x / Protein: x / Nitrite: x   Leuk Esterase: x / RBC: x / WBC x   Sq Epi: x / Non Sq Epi: x / Bacteria: x

## 2024-11-10 NOTE — PROGRESS NOTE ADULT - ASSESSMENT
69F with PMHx of [facility] s/p distal pancreatectomy (2021) and chemo/radiation w/ recurrence to abdominal wall s/p resection (2022) and radiation who presented 11/1 to the ED with abdominal pain, nausea, vomiting, and CT A/P with ill-defined hypodense pancreatic head mass with multifocal narrowing along distal stomach and duodenum, consistent with gastric outlet obstruction and cancer recurrence. Surgery was consulted for gastric outlet obstruction management. EGD on 11/5 demonstrating multifocal stricturing disease of the pylorus and duodenum due to tumor ingrowth.  Advanced GI planning on endoscopic GJ versus [employer] stent placement tomorrow. Pt on Malignant bowel obstruction protocol   (dexamethasone 4mg IV q12h, reglan 10 mg IV q8h, octreotide 100 ucg SQ q8h)    - Nutritional Assessment, pt is not meeting nutritional goals enterally.  Prealbumin decreased at 12 mg/dL with severe protein calorie malnutrition  - TPN plan: s/p endoscopic gastrojejunostomy; f/u diet advancement/tolerance and hold off on TPN unless enteral route unable to be utilized for nutrition   - TPN access:  Patient will need a dedicated central line if/once TPN approved  - Electrolyte Imbalance risk- check CMP, Mg, Phos, iCa and K daily, replete as per Medicine.  - GOO:  s/p endoscopic gastrojejunostomy  - Risk of Hyperglycemia: HgA1c 6.5% and glucose elevated on Dexamethasone/off Oral Diabetic meds; fingersticks per primary team   - Risk of Hypertriglyceridemia:   mg/dL on 11/7; plan to trend TG closely on TPN   - Thrombocytopenia:  s/p transfusion w/possible transfusion reaction; work up/management per primary team  - Vitamin D def:  recommend enteral repletion once diet resumes  - Recommend strict intake and output/weight checks three times a week; IV fluids per primary team  - Recommend MVI and Thiamine to decrease risk of refeeding syndrome and close monitoring of electrolytes   - Further care as per Medicine    Available on TEAMS  TPN spectra [id number] ([phone] when dialing from outside line)  Above reviewed with Dr. Lucas Avalos

## 2024-11-10 NOTE — CHART NOTE - NSCHARTNOTEFT_GEN_A_CORE
Spoke with patient Alert and oriented x3   Patient reports she is not allergy to IV contrast or shellfish  Patient report she had IV CT Scan done recently and had no reaction      Cleared to move forward with CT Scan abd /pelvic IV

## 2024-11-10 NOTE — PROGRESS NOTE ADULT - SUBJECTIVE AND OBJECTIVE BOX
INTERVAL HPI/OVERNIGHT EVENTS:  Patient S&E at bedside. No o/n events, patient resting comfortably. No complaints at this time. Patient denies fever, chills, dizziness, weakness, CP, palpitations, SOB, cough, N/V/D/C, dysuria, changes in bowel movements, LE edema.    VITAL SIGNS:  T(F): 97.6 (11-10-24 @ 04:20)  HR: 71 (11-10-24 @ 04:20)  BP: 152/82 (11-10-24 @ 04:20)  RR: 18 (11-10-24 @ 04:20)  SpO2: 98% (11-10-24 @ 04:20)  Wt(kg): --    PHYSICAL EXAM:    Constitutional: NAD  Eyes: EOMI, sclera non-icteric  Neck: supple, no masses, no JVD  Respiratory: CTA b/l, good air entry b/l  Cardiovascular: RRR, no M/R/G  Gastrointestinal: soft, NTND, no masses palpable, + BS, no hepatosplenomegaly  Extremities: no c/c/e  Neurological: AAOx3      MEDICATIONS  (STANDING):  bisacodyl Suppository 10 milliGRAM(s) Rectal once  dexAMETHasone  Injectable 2 milliGRAM(s) IV Push every 12 hours  dextrose 5%. 1000 milliLiter(s) (50 mL/Hr) IV Continuous <Continuous>  dextrose 5%. 1000 milliLiter(s) (100 mL/Hr) IV Continuous <Continuous>  dextrose 50% Injectable 12.5 Gram(s) IV Push once  dextrose 50% Injectable 25 Gram(s) IV Push once  dextrose 50% Injectable 25 Gram(s) IV Push once  glucagon  Injectable 1 milliGRAM(s) IntraMuscular once  insulin lispro (ADMELOG) corrective regimen sliding scale   SubCutaneous three times a day before meals  insulin lispro (ADMELOG) corrective regimen sliding scale   SubCutaneous at bedtime  levothyroxine Injectable 70 MICROGram(s) IV Push at bedtime  multivitamin/minerals 1 Tablet(s) Oral daily  naloxone Injectable 0.4 milliGRAM(s) IV Push once  pantoprazole  Injectable 40 milliGRAM(s) IV Push daily  polyethylene glycol 3350 17 Gram(s) Oral two times a day  saline laxative (FLEET) Rectal Enema 1 Enema Rectal once  sodium chloride 0.9%. 1000 milliLiter(s) (100 mL/Hr) IV Continuous <Continuous>  thiamine 100 milliGRAM(s) Oral daily    MEDICATIONS  (PRN):  benzocaine/butamben/tetracaine Spray 1 Spray(s) Topical every 6 hours PRN sore throat  dextrose Oral Gel 15 Gram(s) Oral once PRN Blood Glucose LESS THAN 70 milliGRAM(s)/deciliter  ondansetron Injectable 4 milliGRAM(s) IV Push every 8 hours PRN Nausea and/or Vomiting      Allergies    phenytoin (Rash)  ibuprofen (Other)  Dilantin (Unknown)    Intolerances        LABS:                        6.9    11.69 )-----------( 34       ( 10 Nov 2024 06:59 )             20.7     11-10    130[L]  |  99  |  20  ----------------------------<  244[H]  3.8   |  22  |  0.89    Ca    7.8[L]      10 Nov 2024 06:59  Phos  2.3     11-10  Mg     1.8     11-10        Urinalysis Basic - ( 10 Nov 2024 06:59 )    Color: x / Appearance: x / SG: x / pH: x  Gluc: 244 mg/dL / Ketone: x  / Bili: x / Urobili: x   Blood: x / Protein: x / Nitrite: x   Leuk Esterase: x / RBC: x / WBC x   Sq Epi: x / Non Sq Epi: x / Bacteria: x        RADIOLOGY & ADDITIONAL TESTS:  Studies reviewed.   INTERVAL HPI/OVERNIGHT EVENTS:  Patient S&E at bedside. No o/n events, patient resting comfortably. Having some stomach pain/discomfort again. CBC with worsening anemia and thrombocytopenia.     VITAL SIGNS:  T(F): 97.6 (11-10-24 @ 04:20)  HR: 71 (11-10-24 @ 04:20)  BP: 152/82 (11-10-24 @ 04:20)  RR: 18 (11-10-24 @ 04:20)  SpO2: 98% (11-10-24 @ 04:20)  Wt(kg): --    PHYSICAL EXAM:    Constitutional: NAD  Eyes: EOMI, sclera non-icteric  Neck: supple, no masses  Respiratory: CTA b/l, good air entry b/l  Cardiovascular: RRR, no M/R/G  Gastrointestinal: soft  Extremities: no c/c/e  Neurological: AAOx3      MEDICATIONS  (STANDING):  bisacodyl Suppository 10 milliGRAM(s) Rectal once  dexAMETHasone  Injectable 2 milliGRAM(s) IV Push every 12 hours  dextrose 5%. 1000 milliLiter(s) (50 mL/Hr) IV Continuous <Continuous>  dextrose 5%. 1000 milliLiter(s) (100 mL/Hr) IV Continuous <Continuous>  dextrose 50% Injectable 12.5 Gram(s) IV Push once  dextrose 50% Injectable 25 Gram(s) IV Push once  dextrose 50% Injectable 25 Gram(s) IV Push once  glucagon  Injectable 1 milliGRAM(s) IntraMuscular once  insulin lispro (ADMELOG) corrective regimen sliding scale   SubCutaneous three times a day before meals  insulin lispro (ADMELOG) corrective regimen sliding scale   SubCutaneous at bedtime  levothyroxine Injectable 70 MICROGram(s) IV Push at bedtime  multivitamin/minerals 1 Tablet(s) Oral daily  naloxone Injectable 0.4 milliGRAM(s) IV Push once  pantoprazole  Injectable 40 milliGRAM(s) IV Push daily  polyethylene glycol 3350 17 Gram(s) Oral two times a day  saline laxative (FLEET) Rectal Enema 1 Enema Rectal once  sodium chloride 0.9%. 1000 milliLiter(s) (100 mL/Hr) IV Continuous <Continuous>  thiamine 100 milliGRAM(s) Oral daily    MEDICATIONS  (PRN):  benzocaine/butamben/tetracaine Spray 1 Spray(s) Topical every 6 hours PRN sore throat  dextrose Oral Gel 15 Gram(s) Oral once PRN Blood Glucose LESS THAN 70 milliGRAM(s)/deciliter  ondansetron Injectable 4 milliGRAM(s) IV Push every 8 hours PRN Nausea and/or Vomiting      Allergies    phenytoin (Rash)  ibuprofen (Other)  Dilantin (Unknown)    Intolerances        LABS:                        6.9    11.69 )-----------( 34       ( 10 Nov 2024 06:59 )             20.7     11-10    130[L]  |  99  |  20  ----------------------------<  244[H]  3.8   |  22  |  0.89    Ca    7.8[L]      10 Nov 2024 06:59  Phos  2.3     11-10  Mg     1.8     11-10        Urinalysis Basic - ( 10 Nov 2024 06:59 )    Color: x / Appearance: x / SG: x / pH: x  Gluc: 244 mg/dL / Ketone: x  / Bili: x / Urobili: x   Blood: x / Protein: x / Nitrite: x   Leuk Esterase: x / RBC: x / WBC x   Sq Epi: x / Non Sq Epi: x / Bacteria: x        RADIOLOGY & ADDITIONAL TESTS:  Studies reviewed.

## 2024-11-10 NOTE — PROGRESS NOTE ADULT - ASSESSMENT
69F admitted for malignant gastric outlet obstruction       Malignant Gastric outlet obstruction.   - CT A/P as noted; CT H as noted   - NPO and has NGT to low suction per surgery. surg onc will follow. Strict NPO   - 4mg IV dexamethasone, q12h  - 10 mg IV reglan, q8h  - 100mcg octreotide micrograms SubQ, q8h   - morphine 2mg q4h prn for pain. Monitor BM, if no bm in 48hr, enema prn while npo.   - s/P Sten placement  y GI   - Full liquid, advance as per GI recs     R/O Transfusion reaction   - Patient's Plt down-trending and was being transfused 1 unit of platelets, reported that near completion of 1 unit of platelets, Patient with erythematous rash and pruritis. no respiratory symptoms   - Platelet transfusion stopped and bag was sent to blood bank for work up  - Heme/Onc follow up   xont to have low plt and dop in hgb level- transfuse 1 unit pf plt and PRBC  - heme follow up   - R/o hemolysis      Pancreatic cancer.   - Diagnosed in 2021 s/p resection, chemo/RT and now with recurrent in 2022 s/p RT.  - On active DMT with [facility]   - Heme/Onc and palliative eval appreciated; F/u recs     Anemia, Thrombocytopenia   - Likely due to malignancy and treatment; F/u anemia labs   - Platelet count down-trending. Monitor closely --> will check for HIT --> Hep Ab neg, ISIDORO pending. Hold DVT PPX   - Transfuse for Hgb <7, Plt <10K or < 50K w/ fever/bleeding   - Heme/Onc checking hemolysis labs and smear to evaluate thrombocytopenia.   - Heme/Onc eval appreciated; F/u recs  - 1 unit of plt and PRBC  - heme forllow up, ? BM biopsy     Hypertension.   - No meds per mouth.   - Monitor BP. If sustained SBP >160, may use iv hydralazine as needed.    Type 2 diabetes mellitus.   - Hold home oral dm meds  - ISS while inpatient  - IVF for hydration   - Monitor glucose levels closely       Hypothyroid.   - TSH and FT4 noted   - On IV Synthroid 70 - dose confirmed with Pharmacy  - resume oral when tolerating     Prophylactic measure.   - Heparin SubQ on HOLD       discussed in detail with patient and family   discussed with hem eteam

## 2024-11-10 NOTE — PROGRESS NOTE ADULT - ASSESSMENT
70 y/o F w/pancreatic ca s/p resection '21, s/p chemo/rt then recurrence '22 s/p RT.  Presenting w/ diffuse abd pain, n/v and constipation x3d.     #Pancreatic ca  --[place]. Bess [place] [facility] [place]  --S/p distal pancreatectomy and splenectomy (06/2021)  --S/p adjuvant tx w/ mFOLFIRINOX (08/21-01/21), Xeloda +RTx 25 fractions (02/22-03/22)-> POD (01/23)  --Most recent systemic tx w/ Gemcitabine and nab-paclitaxel (05/23-10/23, 02/24-07/24). Break in tx 2/2 jaundice and stent migration  --S/p RT x 10 fractions to abdominal wall mets 08/2024  --No plan for cancer treatment inpatient and/or rehab    #Gastric outlet obstruction  --CT a/p w/ ill-defined hypodense pancreatic mass w/ multifocal narrowing along distal stomach and duodenum resulting in gastric outlet obstruction.  --Surgery following no plan for acute surgical intervention at this time   --On malignant bowel obstruction protocol w/dex, reglan, and octreotide.   --EGD on 11/5 demonstrated multifocal stricturing disease of the pylorus and duodenum due to tumor ingrowth.   --GI following, s/p endoscopic GJ 11/7     #Anemia, thrombocytopenia  --2/2 malignancy and treatment. Platelets normal on 11/4, downtrending. HIT negative, f/u ISIDORO. Elevated LDH, low hapto, bili normal. Continue to closely monitor.   --Baseline Hgb 8-9.5, platelets usually normal  --Iron studies adequate, normal B12/folate  --S/p platelet transfusion 11/7 with ?reaction- transfusional medicine evaluating.  --Ordered for 1U PRBC and 1U PLT today  --Transfuse for Hgb <7, 8 if symptomatic. Recommend transfusion for platelets < 10k or < 50k with bleeding  --Peripheral smear requested, will review  - Repeat LDH, hapto and check sadaf   --Would send out flow cytometry    Will follow,    Heena Haskins MD  Hematology/Oncology  [facility]  [phone] (office) 68 y/o F w/pancreatic ca s/p resection '21, s/p chemo/rt then recurrence '22 s/p RT.  Presenting w/ diffuse abd pain, n/v and constipation x3d.     #Pancreatic ca  --Follow[place] with Dr. Bess of [facility] [place]  --S/p distal pancreatectomy and splenectomy (06/2021)  --S/p adjuvant tx w/ mFOLFIRINOX (08/21-01/21), Xeloda +RTx 25 fractions (02/22-03/22)-> POD (01/23)  --Most recent systemic tx w/ Gemcitabine and nab-paclitaxel (05/23-10/23, 02/24-07/24). Break in tx 2/2 jaundice and stent migration  --S/p RT x 10 fractions to abdominal wall mets 08/2024  --No plan for cancer treatment inpatient and/or rehab    #Gastric outlet obstruction  --CT a/p w/ ill-defined hypodense pancreatic mass w/ multifocal narrowing along distal stomach and duodenum resulting in gastric outlet obstruction.  --Surgery following no plan for acute surgical intervention at this time   --On malignant bowel obstruction protocol w/dex, reglan, and octreotide.   --EGD on 11/5 demonstrated multifocal stricturing disease of the pylorus and duodenum due to tumor ingrowth.   --GI following, s/p endoscopic GJ 11/7     #Anemia, thrombocytopenia  Platelets normal on admission -> started downtrending around 11/5, responded to transfusion on 11/7 (PLT 32 -> 105, but dropped to 65 by the next morning), now 34  Hgb 9.8 on admission (likely 2/2 malignancy and cancer treatments) -> 6.9 now   - Smear reviewed today, many schistocytes suggestive of intravascular hemolysis: no known cardiac valvular disease, no obvious signs of infection  - Please check blood cultures  - LDH high and hapto low, bili normal, taken together with smear these findings are concerning for intravascular hemolysis - please repeat LDH, hapto, bili today and check retic count  - Coags previously normal so less concerning for DIC, although possible / perhaps myelopthisis, please check PT/PTT today as well as fibrinogen level  - Acute non-febrile reaction to platelet transfusion the other day - acute urticarial; no need for washed products per conversation with blood bank today, please check IgA level  - Send flow cytometry  - Check [id number], although low plasmic score - creatinine normal, MCV normal  - Can have MAHA from gemcitabine but it is unusual to occur to long after treatment (last chemo was July)   - HIT negative, ISIDORO negative   - Would transfuse 1U PRBC today, hold off on PLT transfusion for now  - Given low suspicion for TTP at this time, will hold off on plex for now - discussed with blood bank; will trial high dose steroids for possible autoimmune component, start dex 40mg x 4 days   - Please get IR for bone marrow biopsy     Will follow,    Heena Haskins MD  Hematology/Oncology  [facility]  [phone] (office)

## 2024-11-11 NOTE — PROGRESS NOTE ADULT - SUBJECTIVE AND OBJECTIVE BOX
Surgical Oncology Daily Progress Note  =====================================================    SUBJECTIVE: Patient reports that they're feeling well w/ some abdominal cramping. Tolerating full liquid diet. Denies further episodes of melena. Had one normal BM yesterday.     --------------------------------------------------------------------------------------  VITAL SIGNS:  T(C): 36.9 (11-11-24 @ 04:23), Max: 36.9 (11-11-24 @ 04:23)  HR: 75 (11-11-24 @ 04:23) (70 - 84)  BP: 150/87 (11-11-24 @ 04:23) (129/82 - 166/89)  RR: 18 (11-11-24 @ 04:23) (18 - 18)  SpO2: 100% (11-11-24 @ 04:23) (97% - 100%)  --------------------------------------------------------------------------------------    EXAM    General: NAD, resting in bed comfortably  Cardiac: Regular rate, normotensive  Respiratory: Nonlabored respirations, normal cw expansion, on RA  Abdomen: Mildly distended, soft, nontender to palpation  Extremities: Warm, well perfused  Neuro: AOx3, CNII-XII intact on gross examination    --------------------------------------------------------------------------------------

## 2024-11-11 NOTE — PROGRESS NOTE ADULT - ASSESSMENT
Impression:   70 yo F with PMH of HTN, hypothyroidism, hiatal hernia, gastritis, and pancreatic cancer s/p distal pancreatectomy and splectomy 2021 and chemo RT c/b recurrence to abdominal pain s/p resection and RT, and biliary stricture s/p stent in 2023 presenting with abdominal pain and N/V for 1 week, found to have ill defined hypodense pancreatic mass with resultant severe multifocal luminal narrowing along stomach and duodenum.     #Gastric outlet obstruction  #Pancreatic cancer s/p distal pancreatectomy and chemo/RT  #Hypodense pancreas mass with resultant severe multifocal luminal narrowing along stomach and duodenum  #Hx of biliary stricture s/p stent 11/2023    P/w abdominal pain and n/v for 1 week, found to have gastric outlet obstruction now s/p NGT for decompression. GI consulted for evaluation for endoscopic intervention. Of note, pt w/ hx of pancreatic cancer s/p distal pancreatectomy and splenectomy c/b recurrence and abdominal wall resection with prior admission here 11/2023 for biliary stricture s/p stent 11/2023. CT on admission shows ill-defined hypodense pancreatic mass with resultant severe multifocal luminal narrowing along the distal stomach and duodenum, narrowing of the IVC due to extrinsic compression from pancreas, severe narrowing of the portal confluence without discrete thrombosis, and moderate central and mild peripheral intrahepatic biliary dilatation with pneumobilia in the left greater than right hepatic bile ducts with patent CBD stent. S/P EGD showing 2 strictures (pyloric and duodenal) with additional D4 stricture noted on CT; not amenable for stenting given multifocal nature. S/P EGD with endoscopic GJ placement; doing well post-procedurally and tolerating CLD; notes some lower abdominal cramping with last BM several days ago; suspect constipation contributing to abdominal pain. Labs with Hgb drop to 6.9, received blood transfusion but no overt signs of GI bleeding.   - Underwent CT A/P on 11/11 - showed CBD stent, GJ AXIOS in place with contrast passing to the rectum. No active bleeding seen.     Recommendations:   - No indication for endoscopic intervention for now.   - If the hgb is stable, can consider advancing to soft diet tomorrow.   - CMP and CBC daily.     Discussed the case with Dr. Turner.     All recommendations are tentative until note is attested by an attending.     Ferdinand Mir, PGY-6  Gastroenterology/Hepatology Fellow  Available on Microsoft Teams  [phone] (Short Range Pager)  [phone] (Long Range Pager)    After 5pm, please contact the on-call GI fellow.

## 2024-11-11 NOTE — PROGRESS NOTE ADULT - ASSESSMENT
69F with PMHx of [place] s/p distal pancreatectomy (2021) and chemo/radiation w/ recurrence to abdominal wall s/p resection (2022) and radiation who presented 11/1 to the ED with abdominal pain, nausea, vomiting, and CT A/P with ill-defined hypodense pancreatic head mass with multifocal narrowing along distal stomach and duodenum, consistent with gastric outlet obstruction and cancer recurrence. Surgery was consulted for gastric outlet obstruction management. EGD on 11/5 demonstrating multifocal stricturing disease of the pylorus and duodenum due to tumor ingrowth.  Advanced GI planning on endoscopic GJ versus Axios stent placement tomorrow. Pt on Malignant bowel obstruction protocol   (dexamethasone 4mg IV q12h, reglan 10 mg IV q8h, octreotide 100 ucg SQ q8h)    - Nutritional Assessment, pt is not meeting nutritional goals enterally.  Prealbumin decreased at 12 mg/dL with severe protein calorie malnutrition  - TPN plan: s/p endoscopic gastrojejunostomy; f/u diet advancement/tolerance plus calorie count planned 11/11-11/14 and hold off on TPN unless enteral route unable to be utilized for nutrition   - TPN access:  Patient will need a dedicated central line if/once TPN approved  - Electrolyte Imbalance risk- check CMP, Mg, Phos, iCa and K daily, replete as per Medicine.  - GOO:  s/p endoscopic gastrojejunostomy; f/u GI and Sx recs  - Risk of Hyperglycemia: HgA1c 6.5% and glucose elevated on Dexamethasone/off Oral Diabetic meds; fingersticks/management per primary team   - Risk of Hypertriglyceridemia:   mg/dL on 11/7; plan to trend TG closely on TPN   - Thrombocytopenia:  s/p transfusion w/possible transfusion reaction; work up/management per primary team  - Vitamin D def:  recommend enteral repletion once diet resumes  - Recommend strict intake and output/weight checks three times a week; IV fluids per primary team  - Recommended MVI and Thiamine to decrease risk of refeeding syndrome and close monitoring of electrolytes   - Further care as per Medicine    Available on TEAMS  TPN spectra [id number] ([phone] when dialing from outside line)  Above reviewed with Dr. Lucas Avalos       69F with PMHx of [place] s/p distal pancreatectomy (2021) and chemo/radiation w/ recurrence to abdominal wall s/p resection (2022) and radiation who presented 11/1 to the ED with abdominal pain, nausea, vomiting, and CT A/P with ill-defined hypodense pancreatic head mass with multifocal narrowing along distal stomach and duodenum, consistent with gastric outlet obstruction and cancer recurrence. Surgery was consulted for gastric outlet obstruction management. EGD on 11/5 demonstrating multifocal stricturing disease of the pylorus and duodenum due to tumor ingrowth.  Advanced GI planning on endoscopic GJ versus Axios stent placement tomorrow. Pt on Malignant bowel obstruction protocol   (dexamethasone 4mg IV q12h, reglan 10 mg IV q8h, octreotide 100 ucg SQ q8h)    - Nutritional Assessment, pt is not meeting nutritional goals enterally.  Prealbumin decreased at 12 mg/dL with severe protein calorie malnutrition  - TPN plan: s/p endoscopic gastrojejunostomy; f/u diet advancement/tolerance plus calorie count planned 11/11-11/14 and hold off on TPN unless enteral route unable to be utilized for nutrition   - TPN access:  Patient will need a dedicated central line if/once TPN approved  - Electrolyte Imbalance risk- check CMP, Mg, Phos, iCa and K daily, replete as per Medicine.  - GOO:  s/p endoscopic gastrojejunostomy; f/u GI and Sx recs; f/u CT a/p   - Risk of Hyperglycemia: HgA1c 6.5% and glucose elevated on Dexamethasone/off Oral Diabetic meds; fingersticks/management per primary team   - Risk of Hypertriglyceridemia:   mg/dL on 11/7; plan to trend TG closely on TPN   - Thrombocytopenia:  s/p transfusion w/possible transfusion reaction; work up/management per primary team  - Vitamin D def:  recommend enteral repletion once diet resumes  - Recommend strict intake and output/weight checks three times a week; IV fluids per primary team  - Recommended MVI and Thiamine to decrease risk of refeeding syndrome and close monitoring of electrolytes   - Further care as per Medicine    Available on TEAMS  TPN spectra [id number] ([phone] when dialing from outside line)  Above reviewed with Dr. Lucas Avalos

## 2024-11-11 NOTE — PROGRESS NOTE ADULT - SUBJECTIVE AND OBJECTIVE BOX
[facility] NUTRITION SUPPORT-- FOLLOW UP NOTE      24 hour events/subjective:  Patient seen and examined at bedside, chart reviewed and events noted and I's and O's reviewed.  Patient denies chest pain, shortness of breath, nausea or vomiting, dizziness, chills at time of visit; c/o abdominal discomfort.  Patient's VSS and no other acute overnight events noted.   Patient continues to tolerate FLD, WBC stable/elevated, h/h stable, glucose trend elevated, planned for CT a/p today given anemia and continues on calorie count    ROS:  Except as noted above, all other systems reviewed and are negative     Diet:  Diet, Full Liquid:   Consistent Carbohydrate No Snacks (CSTCHO)  Supplement Feeding Modality:  Oral  Glucerna Shake Cans or Servings Per Day:  1       Frequency:  Daily (11-08-24 @ 23:27)      PAST HISTORY  --------------------------------------------------------------------------------  PAST MEDICAL & SURGICAL HISTORY:  Pancreatic cancer      Hypertension      Hypothyroid      Seasonal asthma      History of pancreatectomy        No significant changes to PMH, PSH, FHx, SHx, unless otherwise noted    ALLERGIES & MEDICATIONS  --------------------------------------------------------------------------------  Allergies    phenytoin (Rash)  ibuprofen (Other)  Dilantin (Unknown)    Intolerances    platelet rash- will need pre-medication (Rash)    Standing Inpatient Medications  bisacodyl Suppository 10 milliGRAM(s) Rectal once  dexAMETHasone  Injectable 2 milliGRAM(s) IV Push every 12 hours  dextrose 5%. 1000 milliLiter(s) IV Continuous <Continuous>  dextrose 5%. 1000 milliLiter(s) IV Continuous <Continuous>  dextrose 50% Injectable 12.5 Gram(s) IV Push once  dextrose 50% Injectable 25 Gram(s) IV Push once  dextrose 50% Injectable 25 Gram(s) IV Push once  glucagon  Injectable 1 milliGRAM(s) IntraMuscular once  insulin lispro (ADMELOG) corrective regimen sliding scale   SubCutaneous three times a day before meals  insulin lispro (ADMELOG) corrective regimen sliding scale   SubCutaneous at bedtime  levothyroxine Injectable 70 MICROGram(s) IV Push at bedtime  multivitamin/minerals 1 Tablet(s) Oral daily  naloxone Injectable 0.4 milliGRAM(s) IV Push once  pantoprazole  Injectable 40 milliGRAM(s) IV Push daily  polyethylene glycol 3350 17 Gram(s) Oral two times a day  saline laxative (FLEET) Rectal Enema 1 Enema Rectal once  sodium chloride 0.9%. 1000 milliLiter(s) IV Continuous <Continuous>  thiamine 100 milliGRAM(s) Oral daily    PRN Inpatient Medications  benzocaine/butamben/tetracaine Spray 1 Spray(s) Topical every 6 hours PRN  dextrose Oral Gel 15 Gram(s) Oral once PRN  ondansetron Injectable 4 milliGRAM(s) IV Push every 8 hours PRN        VITALS/PHYSICAL EXAM  --------------------------------------------------------------------------------  T(C): 36.9 (11-11-24 @ 04:23), Max: 36.9 (11-11-24 @ 04:23)  HR: 75 (11-11-24 @ 04:23) (70 - 84)  BP: 150/87 (11-11-24 @ 04:23) (129/82 - 166/89)  RR: 18 (11-11-24 @ 04:23) (18 - 18)  SpO2: 100% (11-11-24 @ 04:23) (97% - 100%)  Wt(kg): --      11-10-24 @ 07:01  -  11-11-24 @ 07:00  --------------------------------------------------------  IN: 1680 mL / OUT: 0 mL / NET: 1680 mL      I&O's Detail    10 Nov 2024 07:01  -  11 Nov 2024 07:00  --------------------------------------------------------  IN:    Oral Fluid: 480 mL    sodium chloride 0.9%: 1200 mL  Total IN: 1680 mL    OUT:  Total OUT: 0 mL    Total NET: 1680 mL          Physical Exam:  Gen: NAD, laying in bed   Chest: non labored breathing, equal chest expansion bilaterally  Abd: +BS, soft, nontender/nondistended  Ext: Warm, FROM, no edema b/l LE  Neuro: No focal deficits  Psych: Normal affect and mood  Skin: Warm, without rashes         LABS/STUDIES  --------------------------------------------------------------------------------              8.4    12.84 >-----------<  105      [11-11-24 @ 07:12]              25.2     134  |  102  |  19  ----------------------------<  231      [11-11-24 @ 07:08]  4.0   |  22  |  0.83        Ca     8.4     [11-11-24 @ 07:08]      Mg     1.8     [11-10-24 @ 06:59]      Phos  2.3     [11-10-24 @ 06:59]    TPro  x   /  Alb  x   /  TBili  1.2  /  DBili  0.3  /  AST  x   /  ALT  x   /  AlkPhos  x   [11-10-24 @ 18:15]    PT/INR: PT 12.0 , INR 1.05       [11-10-24 @ 18:15]  PTT: 25.2       [11-10-24 @ 18:15]          [11-10-24 @ 18:15]    Ca ionized  Creatinine Trend:  POC glucoseGlucose: 231 mg/dL (11-11-24 @ 07:08)  CAPILLARY BLOOD GLUCOSE      POCT Blood Glucose.: 225 mg/dL (11 Nov 2024 08:20)  POCT Blood Glucose.: 242 mg/dL (10 Nov 2024 22:18)  POCT Blood Glucose.: 191 mg/dL (10 Nov 2024 17:34)  POCT Blood Glucose.: 305 mg/dL (10 Nov 2024 12:20)    PrealbuminPrealbumin, Serum: 12 mg/dL (11-07-24 @ 03:48)    Triglycerides     [facility] NUTRITION SUPPORT-- FOLLOW UP NOTE      24 hour events/subjective:  Patient seen and examined at bedside, chart reviewed and events noted and I's and O's reviewed.  Patient denies chest pain, shortness of breath, nausea or vomiting, dizziness, chills at time of visit; c/o abdominal discomfort and requesting diet upgrade.  Patient's VSS and no other acute overnight events noted.   Patient continues to tolerate FLD, WBC stable/elevated, h/h stable, glucose trend elevated, planned for CT a/p today given anemia and continues on calorie count    ROS:  Except as noted above, all other systems reviewed and are negative     Diet:  Diet, Full Liquid:   Consistent Carbohydrate No Snacks (CSTCHO)  Supplement Feeding Modality:  Oral  Glucerna Shake Cans or Servings Per Day:  1       Frequency:  Daily (11-08-24 @ 23:27)      PAST HISTORY  --------------------------------------------------------------------------------  PAST MEDICAL & SURGICAL HISTORY:  Pancreatic cancer      Hypertension      Hypothyroid      Seasonal asthma      History of pancreatectomy        No significant changes to PMH, PSH, FHx, SHx, unless otherwise noted    ALLERGIES & MEDICATIONS  --------------------------------------------------------------------------------  Allergies    phenytoin (Rash)  ibuprofen (Other)  Dilantin (Unknown)    Intolerances    platelet rash- will need pre-medication (Rash)    Standing Inpatient Medications  bisacodyl Suppository 10 milliGRAM(s) Rectal once  dexAMETHasone  Injectable 2 milliGRAM(s) IV Push every 12 hours  dextrose 5%. 1000 milliLiter(s) IV Continuous <Continuous>  dextrose 5%. 1000 milliLiter(s) IV Continuous <Continuous>  dextrose 50% Injectable 12.5 Gram(s) IV Push once  dextrose 50% Injectable 25 Gram(s) IV Push once  dextrose 50% Injectable 25 Gram(s) IV Push once  glucagon  Injectable 1 milliGRAM(s) IntraMuscular once  insulin lispro (ADMELOG) corrective regimen sliding scale   SubCutaneous three times a day before meals  insulin lispro (ADMELOG) corrective regimen sliding scale   SubCutaneous at bedtime  levothyroxine Injectable 70 MICROGram(s) IV Push at bedtime  multivitamin/minerals 1 Tablet(s) Oral daily  naloxone Injectable 0.4 milliGRAM(s) IV Push once  pantoprazole  Injectable 40 milliGRAM(s) IV Push daily  polyethylene glycol 3350 17 Gram(s) Oral two times a day  saline laxative (FLEET) Rectal Enema 1 Enema Rectal once  sodium chloride 0.9%. 1000 milliLiter(s) IV Continuous <Continuous>  thiamine 100 milliGRAM(s) Oral daily    PRN Inpatient Medications  benzocaine/butamben/tetracaine Spray 1 Spray(s) Topical every 6 hours PRN  dextrose Oral Gel 15 Gram(s) Oral once PRN  ondansetron Injectable 4 milliGRAM(s) IV Push every 8 hours PRN        VITALS/PHYSICAL EXAM  --------------------------------------------------------------------------------  T(C): 36.9 (11-11-24 @ 04:23), Max: 36.9 (11-11-24 @ 04:23)  HR: 75 (11-11-24 @ 04:23) (70 - 84)  BP: 150/87 (11-11-24 @ 04:23) (129/82 - 166/89)  RR: 18 (11-11-24 @ 04:23) (18 - 18)  SpO2: 100% (11-11-24 @ 04:23) (97% - 100%)  Wt(kg): --      11-10-24 @ 07:01  -  11-11-24 @ 07:00  --------------------------------------------------------  IN: 1680 mL / OUT: 0 mL / NET: 1680 mL      I&O's Detail    10 Nov 2024 07:01  -  11 Nov 2024 07:00  --------------------------------------------------------  IN:    Oral Fluid: 480 mL    sodium chloride 0.9%: 1200 mL  Total IN: 1680 mL    OUT:  Total OUT: 0 mL    Total NET: 1680 mL          Physical Exam:  Gen: NAD, laying in bed   Chest: non labored breathing, equal chest expansion bilaterally  Abd: +BS, soft, nontender/nondistended  Ext: Warm, FROM, no edema b/l LE  Neuro: No focal deficits  Psych: Normal affect and mood  Skin: Warm, without rashes         LABS/STUDIES  --------------------------------------------------------------------------------              8.4    12.84 >-----------<  105      [11-11-24 @ 07:12]              25.2     134  |  102  |  19  ----------------------------<  231      [11-11-24 @ 07:08]  4.0   |  22  |  0.83        Ca     8.4     [11-11-24 @ 07:08]      Mg     1.8     [11-10-24 @ 06:59]      Phos  2.3     [11-10-24 @ 06:59]    TPro  x   /  Alb  x   /  TBili  1.2  /  DBili  0.3  /  AST  x   /  ALT  x   /  AlkPhos  x   [11-10-24 @ 18:15]    PT/INR: PT 12.0 , INR 1.05       [11-10-24 @ 18:15]  PTT: 25.2       [11-10-24 @ 18:15]          [11-10-24 @ 18:15]    Ca ionized  Creatinine Trend:  POC glucoseGlucose: 231 mg/dL (11-11-24 @ 07:08)  CAPILLARY BLOOD GLUCOSE      POCT Blood Glucose.: 225 mg/dL (11 Nov 2024 08:20)  POCT Blood Glucose.: 242 mg/dL (10 Nov 2024 22:18)  POCT Blood Glucose.: 191 mg/dL (10 Nov 2024 17:34)  POCT Blood Glucose.: 305 mg/dL (10 Nov 2024 12:20)    PrealbuminPrealbumin, Serum: 12 mg/dL (11-07-24 @ 03:48)    Triglycerides

## 2024-11-11 NOTE — PROGRESS NOTE ADULT - ASSESSMENT
69F with PMHx of [place] s/p distal pancreatectomy (2021) and chemo/radiation w/ recurrence to abdominal wall s/p resection (2022) and radiation who presented 11/1 to the ED with abdominal pain, nausea, vomiting, and CT A/P with ill-defined hypodense pancreatic head mass with multifocal narrowing along distal stomach and duodenum, consistent with gastric outlet obstruction and cancer recurrence. Surgery was consulted for gastric outlet obstruction management. EGD on 11/5 demonstrating multifocal stricturing disease of the pylorus and duodenum due to tumor ingrowth. Patient now s/p GJ stent placement w/ good progression post operatively.     Recommendations:  - Continue FLD   - +/+ flatus/BM   - OOBAT  - Bowel regimen, suppository  - Surgical oncology will follow    [facility] Surgery  [id number]

## 2024-11-11 NOTE — PROGRESS NOTE ADULT - ASSESSMENT
69F admitted for malignant gastric outlet obstruction       Malignant Gastric outlet obstruction.   - CT A/P as noted; CT H as noted   - NPO and has NGT to low suction per surgery. surg onc will follow. Strict NPO   - 4mg IV dexamethasone, q12h --> Now on Dexa 2 as per Heme   - 10 mg IV reglan, q8h, completed   - 100mcg octreotide micrograms SubQ, q8h, completed   - Pain control   - s/P Sten placement  by GI, tolerating diet   - Full liquid, advance as per GI recs     R/O Transfusion reaction   - Patient's Plt down-trending and was being transfused 1 unit of platelets, reported that near completion of 1 unit of platelets, Patient with erythematous rash and pruritis. no respiratory symptoms   - Platelet transfusion stopped and bag was sent to blood bank for work up  - Heme/Onc follow up   xont to have low plt and dop in hgb level- S/P 1 unit pf plt and PRBC 11/10  - Repeat CT A/P as noted   - heme follow up   - R/o hemolysis  - For IR bone marrow Bx in AM. NPO at MN, Heme/Onc to complete FISH form    Pancreatic cancer.   - Diagnosed in 2021 s/p resection, chemo/RT and now with recurrent in 2022 s/p RT.  - On active DMT with [facility]   - Heme/Onc and palliative eval appreciated; F/u recs     Anemia, Thrombocytopenia   - Likely due to malignancy and treatment; F/u anemia labs   - Platelet count down-trending. Monitor closely --> will check for HIT --> Hep Ab neg, ISIDORO pending. Hold DVT PPX   - Transfuse for Hgb <7, Plt <10K or < 50K w/ fever/bleeding   - Heme/Onc checking hemolysis labs and smear to evaluate thrombocytopenia.   - Heme/Onc eval appreciated; F/u recs  - 1 unit of plt and PRBC, completed 11/10   - heme/onc follow up   - For BM Bx 11/12     Hypertension.   - No meds per mouth.   - Monitor BP. If sustained SBP >160, may use iv hydralazine as needed.    Type 2 diabetes mellitus.   - Hold home oral dm meds  - ISS while inpatient  - IVF for hydration   - Monitor glucose levels closely       Hypothyroid.   - TSH and FT4 noted   - On IV Synthroid 70 - dose confirmed with Pharmacy  - resume oral when tolerating     Prophylactic measure.   - Heparin SubQ on HOLD        Discussed with Attending and ACP

## 2024-11-11 NOTE — CONSULT NOTE ADULT - CONSULT REQUESTED DATE/TIME
03-Nov-2024 11:30
06-Nov-2024
01-Nov-2024 20:18
02-Nov-2024 08:30
03-Nov-2024 10:11
11-Nov-2024 12:50

## 2024-11-11 NOTE — PROGRESS NOTE ADULT - SUBJECTIVE AND OBJECTIVE BOX
Name of Patient : DANUTA CALDERON  MRN: [id number]  Date of visit: 11-11-24 @ 16:08      Subjective: Patient seen and examined. No new events except as noted.   Patient seen earlier this AM. Lying down in bed. Tolerating PO liquid diet.   Passing flatus, had a BM yesterday   S/P 1 unit of Plt and PRBCs yesterday   For CT today     REVIEW OF SYSTEMS:    CONSTITUTIONAL: Generalized weakness, No fevers or chills  EYES/ENT: No visual changes;  No vertigo or throat pain   NECK: No pain or stiffness  RESPIRATORY: No cough, wheezing, hemoptysis; No shortness of breath  CARDIOVASCULAR: No chest pain or palpitations  GASTROINTESTINAL: + Abdominal cramping; Tolerating; PO diet; Passing of flatus and had a BM yesterday   GENITOURINARY: No dysuria, frequency or hematuria  NEUROLOGICAL: No numbness or weakness  SKIN: No itching, burning, rashes, or lesions   All other review of systems is negative unless indicated above.    MEDICATIONS:  MEDICATIONS  (STANDING):  bisacodyl Suppository 10 milliGRAM(s) Rectal once  dexAMETHasone  Injectable 2 milliGRAM(s) IV Push every 12 hours  dextrose 5%. 1000 milliLiter(s) (50 mL/Hr) IV Continuous <Continuous>  dextrose 5%. 1000 milliLiter(s) (100 mL/Hr) IV Continuous <Continuous>  dextrose 50% Injectable 25 Gram(s) IV Push once  dextrose 50% Injectable 25 Gram(s) IV Push once  dextrose 50% Injectable 12.5 Gram(s) IV Push once  glucagon  Injectable 1 milliGRAM(s) IntraMuscular once  insulin lispro (ADMELOG) corrective regimen sliding scale   SubCutaneous three times a day before meals  insulin lispro (ADMELOG) corrective regimen sliding scale   SubCutaneous at bedtime  levothyroxine Injectable 70 MICROGram(s) IV Push at bedtime  multivitamin/minerals 1 Tablet(s) Oral daily  naloxone Injectable 0.4 milliGRAM(s) IV Push once  pantoprazole  Injectable 40 milliGRAM(s) IV Push daily  polyethylene glycol 3350 17 Gram(s) Oral two times a day  saline laxative (FLEET) Rectal Enema 1 Enema Rectal once  sodium chloride 0.9%. 1000 milliLiter(s) (100 mL/Hr) IV Continuous <Continuous>  thiamine 100 milliGRAM(s) Oral daily      PHYSICAL EXAM:  T(C): 36.5 (11-11-24 @ 12:10), Max: 36.9 (11-11-24 @ 04:23)  HR: 92 (11-11-24 @ 12:10) (70 - 92)  BP: 133/76 (11-11-24 @ 12:10) (133/76 - 166/89)  RR: 18 (11-11-24 @ 12:10) (18 - 18)  SpO2: 99% (11-11-24 @ 12:10) (99% - 100%)  Wt(kg): --  I&O's Summary    10 Nov 2024 07:01  -  11 Nov 2024 07:00  --------------------------------------------------------  IN: 1680 mL / OUT: 0 mL / NET: 1680 mL          Appearance: Awake, frail, lying down in bed 	  HEENT:  Eyes are open   Lymphatic: No lymphadenopathy   Cardiovascular: Normal   Respiratory: normal effort , clear  Gastrointestinal:  Soft, Non-tender  Skin: No rashes,  warm to touch  Psychiatry:  Mood & affect appropriate  Musculoskeletal: No edema           11-10-24 @ 07:01  -  11-11-24 @ 07:00  --------------------------------------------------------  IN: 1680 mL / OUT: 0 mL / NET: 1680 mL                              8.4    12.84 )-----------( 105      ( 11 Nov 2024 07:12 )             25.2               11-11    134[L]  |  102  |  19  ----------------------------<  231[H]  4.0   |  22  |  0.83    Ca    8.4      11 Nov 2024 07:08  Phos  2.3     11-10  Mg     1.8     11-10    TPro  x   /  Alb  x   /  TBili  1.2  /  DBili  0.3  /  AST  x   /  ALT  x   /  AlkPhos  x   11-10    PT/INR - ( 10 Nov 2024 18:15 )   PT: 12.0 sec;   INR: 1.05 ratio         PTT - ( 10 Nov 2024 18:15 )  PTT:25.2 sec                   Urinalysis Basic - ( 11 Nov 2024 07:08 )    Color: x / Appearance: x / SG: x / pH: x  Gluc: 231 mg/dL / Ketone: x  / Bili: x / Urobili: x   Blood: x / Protein: x / Nitrite: x   Leuk Esterase: x / RBC: x / WBC x   Sq Epi: x / Non Sq Epi: x / Bacteria: x          < from: CT Abdomen and Pelvis w/ IV Cont (11.11.24 @ 11:12) >  IMPRESSION:  Infiltrative pancreatic head mass again demonstrated encasing common   hepatic artery with associated severe narrowing/occlusion portosplenic   confluence.  Small ascites.  Resolution gastric outlet obstruction status post placement gastrojejunal   stent.  Minimal residual intrahepatic biliary duct dilatation    < end of copied text >

## 2024-11-11 NOTE — PROGRESS NOTE ADULT - SUBJECTIVE AND OBJECTIVE BOX
INTERVAL HPI/OVERNIGHT EVENTS:  Patient S&E at bedside. No o/n events, patient resting comfortably.     VITAL SIGNS:  T(F): 98.4 (11-11-24 @ 04:23)  HR: 75 (11-11-24 @ 04:23)  BP: 150/87 (11-11-24 @ 04:23)  RR: 18 (11-11-24 @ 04:23)  SpO2: 100% (11-11-24 @ 04:23)  Wt(kg): --    PHYSICAL EXAM:    Constitutional: cachectic appearing, NAD  Eyes: EOMI, sclera non-icteric  Neck: supple, no masses  Respiratory: symmetric chest expansion   Cardiovascular: RRR, no M/R/G  Gastrointestinal: soft, NTND, no masses palpable  Extremities: no c/c/e  Neurological: AAOx3      MEDICATIONS  (STANDING):  bisacodyl Suppository 10 milliGRAM(s) Rectal once  dexAMETHasone  Injectable 2 milliGRAM(s) IV Push every 12 hours  dextrose 5%. 1000 milliLiter(s) (50 mL/Hr) IV Continuous <Continuous>  dextrose 5%. 1000 milliLiter(s) (100 mL/Hr) IV Continuous <Continuous>  dextrose 50% Injectable 12.5 Gram(s) IV Push once  dextrose 50% Injectable 25 Gram(s) IV Push once  dextrose 50% Injectable 25 Gram(s) IV Push once  glucagon  Injectable 1 milliGRAM(s) IntraMuscular once  insulin lispro (ADMELOG) corrective regimen sliding scale   SubCutaneous three times a day before meals  insulin lispro (ADMELOG) corrective regimen sliding scale   SubCutaneous at bedtime  levothyroxine Injectable 70 MICROGram(s) IV Push at bedtime  multivitamin/minerals 1 Tablet(s) Oral daily  naloxone Injectable 0.4 milliGRAM(s) IV Push once  pantoprazole  Injectable 40 milliGRAM(s) IV Push daily  polyethylene glycol 3350 17 Gram(s) Oral two times a day  saline laxative (FLEET) Rectal Enema 1 Enema Rectal once  sodium chloride 0.9%. 1000 milliLiter(s) (100 mL/Hr) IV Continuous <Continuous>  thiamine 100 milliGRAM(s) Oral daily    MEDICATIONS  (PRN):  benzocaine/butamben/tetracaine Spray 1 Spray(s) Topical every 6 hours PRN sore throat  dextrose Oral Gel 15 Gram(s) Oral once PRN Blood Glucose LESS THAN 70 milliGRAM(s)/deciliter  ondansetron Injectable 4 milliGRAM(s) IV Push every 8 hours PRN Nausea and/or Vomiting      Allergies    phenytoin (Rash)  ibuprofen (Other)  Dilantin (Unknown)    Intolerances    platelet rash- will need pre-medication (Rash)      LABS:                        8.4    12.84 )-----------( 105      ( 11 Nov 2024 07:12 )             25.2     11-11    134[L]  |  102  |  19  ----------------------------<  231[H]  4.0   |  22  |  0.83    Ca    8.4      11 Nov 2024 07:08  Phos  2.3     11-10  Mg     1.8     11-10    TPro  x   /  Alb  x   /  TBili  1.2  /  DBili  0.3  /  AST  x   /  ALT  x   /  AlkPhos  x   11-10    PT/INR - ( 10 Nov 2024 18:15 )   PT: 12.0 sec;   INR: 1.05 ratio         PTT - ( 10 Nov 2024 18:15 )  PTT:25.2 sec  Urinalysis Basic - ( 11 Nov 2024 07:08 )    Color: x / Appearance: x / SG: x / pH: x  Gluc: 231 mg/dL / Ketone: x  / Bili: x / Urobili: x   Blood: x / Protein: x / Nitrite: x   Leuk Esterase: x / RBC: x / WBC x   Sq Epi: x / Non Sq Epi: x / Bacteria: x        RADIOLOGY & ADDITIONAL TESTS:  Studies reviewed.

## 2024-11-11 NOTE — PROGRESS NOTE ADULT - ASSESSMENT
68 y/o F w/pancreatic ca s/p resection '21, s/p chemo/rt then recurrence '22 s/p RT.  Presenting w/ diffuse abd pain, n/v and constipation x3d.     #Pancreatic ca  --[place] with Dr. Bess of [facility] [place]  --S/p distal pancreatectomy and splenectomy (06/2021)  --S/p adjuvant tx w/ mFOLFIRINOX (08/21-01/21), Xeloda +RTx 25 fractions (02/22-03/22)-> POD (01/23)  --Most recent systemic tx w/ Gemcitabine and nab-paclitaxel (05/23-10/23, 02/24-07/24). Break in tx 2/2 jaundice and stent migration  --S/p RT x 10 fractions to abdominal wall mets 08/2024  --No plan for cancer treatment inpatient and/or rehab    #Gastric outlet obstruction  --CT a/p w/ ill-defined hypodense pancreatic mass w/ multifocal narrowing along distal stomach and duodenum resulting in gastric outlet obstruction.  --Surgery following no plan for acute surgical intervention at this time   --On malignant bowel obstruction protocol w/dex, reglan, and octreotide.   --EGD on 11/5 demonstrated multifocal stricturing disease of the pylorus and duodenum due to tumor ingrowth.   --GI following, s/p endoscopic GJ 11/7     #MAHA  Platelets normal on admission -> started downtrending around 11/5, responded to transfusion on 11/7 (PLT 32 -> 105, but dropped to 65 by the next morning), now 34  Hgb 9.8 on admission (likely 2/2 malignancy and cancer treatments) -> 6.9 now   - Smear reviewed today, many schistocytes suggestive of intravascular hemolysis: no known cardiac valvular disease, no obvious signs of infection  - Please check blood cultures  - LDH high and hapto low, bili normal, taken together with smear these findings are concerning for intravascular hemolysis - please repeat LDH, hapto, bili today and check retic count  - Coags previously normal so less concerning for DIC, although possible / perhaps myelopthisis, please check PT/PTT today as well as fibrinogen level  - Acute non-febrile reaction to platelet transfusion the other day - acute urticarial; no need for washed products per conversation with blood bank today, please check IgA level  - Send flow cytometry  - Check [id number], although low plasmic score - creatinine normal, MCV normal, less suspicion for classic TTP  - Can have MAHA from gemcitabine but it is unusual to occur to long after treatment (last chemo was July)   - HIT negative, ISIDORO negative   - S/p 1U PRBC, PLT yesterday; PLEASE HOLD OFF ON FURTHER PLATELET TRANSFUSIONS AT THIS TIME  - Concerned for TMA of malignancy.  - discussed with blood bank; trial of high dose steroids for possible autoimmune component, can complete dex 40mg x 4 days   - There is no beneficial role for PEX, steroids, or other immuno-suppression if this is truly cancer-associated TMA. Treatment would be aimed at the underlying cancer, but this would be an overall poor prognostic factor   - Please get IR for bone marrow biopsy - pt agreeable     Will follow,    Heena Haskins MD  Hematology/Oncology  [facility]  [phone] (office)

## 2024-11-11 NOTE — CONSULT NOTE ADULT - CONSULT REQUESTED BY NAME
Dr. Lawrence
team
Surgery
Barbara Joseph
Dr. Lawrence
ED
Abdomen soft, non-tender and non-distended without organomegaly or masses. Normal bowel sounds.

## 2024-11-11 NOTE — CHART NOTE - NSCHARTNOTEFT_GEN_A_CORE
NUTRITION FOLLOW UP NOTE    PATIENT SEEN FOR: Calorie count initiation / Malnutrition follow up.     SOURCE: [x] Patient  [x] Current Medical Record  [x] RN  [] Family/support person at bedside  [] Patient unavailable/inappropriate  [] Other:    CHART REVIEWED/EVENTS NOTED.  [] No changes to nutrition care plan to note  [x] Nutrition Status:  - Diet advanced from clear liquid --> full liquid 11/08.   - 3 Day calorie count ordered - hung at bedside, RN made aware. Running from 11/11-11/13.   - S/p endoscopic gastrojejunostomy 11/07 for malignant gastric outlet obstruction.   - TPN team following; per note 11/10 " f/u diet advancement/tolerance and hold off on TPN unless enteral route unable to be utilized for nutrition."   - Heme/Onc: Hx pancreatic cancer.    DIET ORDER:   Diet, Full Liquid:   Consistent Carbohydrate {No Snacks} (CSTCHO)  Supplement Feeding Modality:  Oral  Glucerna Shake Cans or Servings Per Day:  1       Frequency:  Daily (11-08-24)      CURRENT DIET ORDER IS:  [x] Appropriate.     NUTRITION INTAKE/PROVISION:  [x] PO: Pt reports good PO intake of full liquids and tolerating well but forcing self to eat in setting of "sick of this diet." Drinking Glucerna oral nutrition supplements daily. Calorie count hung at bedside, RN made aware.     ANTHROPOMETRICS:  Drug Dosing Weight  Height (cm): 154.9 (07 Nov 2024 12:31)  Weight (kg): 36 (07 Nov 2024 12:31)  BMI (kg/m2): 15 (07 Nov 2024 12:31)  Weights:   Daily      MEDICATIONS:  MEDICATIONS  (STANDING):  bisacodyl Suppository 10 milliGRAM(s) Rectal once  dexAMETHasone  Injectable 2 milliGRAM(s) IV Push every 12 hours  dextrose 5%. 1000 milliLiter(s) (50 mL/Hr) IV Continuous <Continuous>  dextrose 5%. 1000 milliLiter(s) (100 mL/Hr) IV Continuous <Continuous>  dextrose 50% Injectable 25 Gram(s) IV Push once  dextrose 50% Injectable 25 Gram(s) IV Push once  dextrose 50% Injectable 12.5 Gram(s) IV Push once  glucagon  Injectable 1 milliGRAM(s) IntraMuscular once  insulin lispro (ADMELOG) corrective regimen sliding scale   SubCutaneous three times a day before meals  insulin lispro (ADMELOG) corrective regimen sliding scale   SubCutaneous at bedtime  levothyroxine Injectable 70 MICROGram(s) IV Push at bedtime  multivitamin/minerals 1 Tablet(s) Oral daily  naloxone Injectable 0.4 milliGRAM(s) IV Push once  pantoprazole  Injectable 40 milliGRAM(s) IV Push daily  polyethylene glycol 3350 17 Gram(s) Oral two times a day  saline laxative (FLEET) Rectal Enema 1 Enema Rectal once  sodium chloride 0.9%. 1000 milliLiter(s) (100 mL/Hr) IV Continuous <Continuous>  thiamine 100 milliGRAM(s) Oral daily    MEDICATIONS  (PRN):  benzocaine/butamben/tetracaine Spray 1 Spray(s) Topical every 6 hours PRN sore throat  dextrose Oral Gel 15 Gram(s) Oral once PRN Blood Glucose LESS THAN 70 milliGRAM(s)/deciliter  ondansetron Injectable 4 milliGRAM(s) IV Push every 8 hours PRN Nausea and/or Vomiting      NUTRITIONALLY PERTINENT LABS:  11-11 Na134 mmol/L[L] Glu 231 mg/dL[H] K+ 4.0 mmol/L Cr  0.83 mg/dL BUN 19 mg/dL 11-10 Phos 2.3 mg/dL[L] 11-08 Alb 2.8 g/dL[L] 11-07 PAB 12 mg/dL[L] 11-07 Chol 207 mg/dL[H] LDL --    HDL 76 mg/dL Trig 100 mg/dL11-08 ALT 18 U/L AST 22 U/L Alkaline Phosphatase 97 U/L  11-07-24 @ 03:48 a1c 6.5  11-02-24 @ 06:50 a1c 6.4    A1C with Estimated Average Glucose Result: 6.5 % (11-07-24 @ 03:48)  A1C with Estimated Average Glucose Result: 6.4 % (11-02-24 @ 06:50)      Finger Sticks:  POCT Blood Glucose.: 225 mg/dL (11-11 @ 08:20)  POCT Blood Glucose.: 242 mg/dL (11-10 @ 22:18)  POCT Blood Glucose.: 191 mg/dL (11-10 @ 17:34)  POCT Blood Glucose.: 305 mg/dL (11-10 @ 12:20)      NUTRITIONALLY PERTINENT MEDICATIONS/LABS:  [x] Reviewed  [x] Relevant notes on medications/labs:  - Endo: SSI for coverage.   - Micronutrient supplementation: Multivitamin with minerals, thiamine.   - Ordered for Dexamethasone - could elevate BG levels.   - Hyponatremia, Hypophosphatemia noted - Monitor electrolytes daily and replete PRN.     EDEMA:  [x] Reviewed - none.     GI/ I&O:  [x] Reviewed  [x] Relevant notes: ordered for miralax, fleet enema PRN, zofran PRN.     SKIN:   [x] No pressure injuries documented, per nursing flowsheet    ESTIMATED NEEDS:  [x] No change:  Energy: 1022-9635 kcal/day (35-40 kcal/kg)  Protein: 54-72 g/day (1.5-2.0 g/kg)  Fluid: [x] defer to team  Based on: dosing weight 36kg with consideration for cancer, malnutrition, BMI      NUTRITION DIAGNOSIS:  [x] Prior Dx: Severe acute malnutrition; Underweight; Increased Nutrient Needs      EDUCATION:  [x] Yes: Encouraged adequate consumption of meals/supplements to optimize protein-energy intake. Encouraged small/frequent meals, nutrient dense snacks, prioritizing protein foods at meal time. Discussed calorie count. Addressed all questions asked by pt. Pt made aware RD remains available PRN.       NUTRITION CARE PLAN:  1. Diet: Continue Full Liquid Consistent Carbohydrate diet as tolerated - Defer diet advancement to Team.          -- IF EN regimen warranted, Recommend previous RD recommendation for Glucerna 1.5 @15ml/hr, increasing by 10ml q6hr to GOAL @35ml/hr x 24hr to provide 840ml total volume, 1260kcal (35kcal/kg), 69g protein (1.92g/kg), 638ml free water based on dosing wt 36kg.         -- Parenteral nutrition deferred to TPN team, nutrition assessment.   2. Supplements: Continue Glucerna oral nutrition supplement daily.   3. Multivitamin/mineral supplementation: Continue Multivitamin, thiamine daily pending no medical contraindications.   4: CALORIE COUNT running from 11/11-11/13; RD to follow up to evaluate on 11/14 as feasible.     [] Achieved - Continue current nutrition intervention(s)  [] Current medical condition precludes nutrition intervention at this time.    MONITORING AND EVALUATION:   RD remains available upon request and will follow up per protocol.    Delores Nguyen, CRESENCION, CDN / TEAMS   Available on [facility] TEAMS

## 2024-11-11 NOTE — CONSULT NOTE ADULT - SUBJECTIVE AND OBJECTIVE BOX
Interventional Radiology    Evaluate for Procedure: BMBx    HPI: 68 y/o F w/pancreatic ca s/p resection '21, s/p chemo/rt then recurrence '22 s/p RT.  Presenting w/ diffuse abd pain, n/v and constipation x3d. Heme/Onc with c/f bone marrow suppression iso of low platelets.    IR consulted for bone marrow bx.    Allergies: phenytoin (Rash)  ibuprofen (Other)  Dilantin (Unknown)    Medications (Abx/Cardiac/Anticoagulation/Blood Products)      Data:    T(C): 36.9  HR: 75  BP: 150/87  RR: 18  SpO2: 100%    -WBC 12.84 / HgB 8.4 / Hct 25.2 / Plt 105  -Na 134 / Cl 102 / BUN 19 / Glucose 231  -K 4.0 / CO2 22 / Cr 0.83  -ALT -- / Alk Phos -- / T.Bili --  -INR 1.05 / PTT 25.2    Radiology: Reviewed.    Assessment/Plan: 68 y/o F w/pancreatic ca s/p resection '21, s/p chemo/rt then recurrence '22 s/p RT.  Presenting w/ diffuse abd pain, n/v and constipation x3d. Heme/Onc with c/f bone marrow suppression iso of low platelets.    - case reviewed and approved for 11/12/24  - please place IR procedure order under LANCE Cesar  - STAT labs in AM (cbc,coags, bmp, T&S)  - hold AC x 24 hours  - NPO on 11/11/24at 11pm  - d/w primary team

## 2024-11-11 NOTE — CONSULT NOTE ADULT - CONSULT REASON
gastric outlet obstruction
TPN
Pancreatic CA w/ gastric outlet obstruction
symptom management and goc in setting of pancreatic cancer
Please evaluate for bone marrow biopsy as per Heme/Onc
pancreatic ca

## 2024-11-11 NOTE — PROGRESS NOTE ADULT - SUBJECTIVE AND OBJECTIVE BOX
Gastroenterology/Hepatology Progress Note      Interval Events:     - Patient has GJ AXIOS stent. Tolerating full liquid diet .   - Feels well overall.   - Over the weekend, hgb decreased 6.9, received 1 unit of pRBC and platelets.   - Now hgb stable. Denied with any overt signs of GI bleeding. No melena, hematochezia or hematemesis.       Allergies:  phenytoin (Rash)  ibuprofen (Other)  Dilantin (Unknown)  platelet rash- will need pre-medication (Rash)      Hospital Medications:  benzocaine/butamben/tetracaine Spray 1 Spray(s) Topical every 6 hours PRN  bisacodyl Suppository 10 milliGRAM(s) Rectal once  dexAMETHasone  Injectable 2 milliGRAM(s) IV Push every 12 hours  dextrose 5%. 1000 milliLiter(s) IV Continuous <Continuous>  dextrose 5%. 1000 milliLiter(s) IV Continuous <Continuous>  dextrose 50% Injectable 12.5 Gram(s) IV Push once  dextrose 50% Injectable 25 Gram(s) IV Push once  dextrose 50% Injectable 25 Gram(s) IV Push once  dextrose Oral Gel 15 Gram(s) Oral once PRN  glucagon  Injectable 1 milliGRAM(s) IntraMuscular once  insulin lispro (ADMELOG) corrective regimen sliding scale   SubCutaneous three times a day before meals  insulin lispro (ADMELOG) corrective regimen sliding scale   SubCutaneous at bedtime  levothyroxine Injectable 70 MICROGram(s) IV Push at bedtime  multivitamin/minerals 1 Tablet(s) Oral daily  naloxone Injectable 0.4 milliGRAM(s) IV Push once  ondansetron Injectable 4 milliGRAM(s) IV Push every 8 hours PRN  pantoprazole  Injectable 40 milliGRAM(s) IV Push daily  polyethylene glycol 3350 17 Gram(s) Oral two times a day  saline laxative (FLEET) Rectal Enema 1 Enema Rectal once  sodium chloride 0.9%. 1000 milliLiter(s) IV Continuous <Continuous>  thiamine 100 milliGRAM(s) Oral daily      ROS: 14 point ROS negative unless otherwise state in subjective    PHYSICAL EXAM:   Vital Signs:  Vital Signs Last 24 Hrs  T(C): 36.5 (11 Nov 2024 12:10), Max: 36.9 (11 Nov 2024 04:23)  T(F): 97.7 (11 Nov 2024 12:10), Max: 98.4 (11 Nov 2024 04:23)  HR: 92 (11 Nov 2024 12:10) (70 - 92)  BP: 133/76 (11 Nov 2024 12:10) (133/76 - 166/89)  BP(mean): --  RR: 18 (11 Nov 2024 12:10) (18 - 18)  SpO2: 99% (11 Nov 2024 12:10) (99% - 100%)    Parameters below as of 11 Nov 2024 12:10  Patient On (Oxygen Delivery Method): room air      Daily     Daily     GENERAL:  No acute distress  HEENT:  no scleral icterus  CHEST:  no accessory muscle use  HEART:  Regular rate and rhythm  ABDOMEN:  Soft, lower abdominal tenderness, non-distended  EXTREMITIES:  No edema  SKIN:  No rash/ecchymoses  NEURO:  Alert and oriented x 3    LABS:                        8.4    12.84 )-----------( 105      ( 11 Nov 2024 07:12 )             25.2     Mean Cell Volume: 93.0 fl (11-11-24 @ 07:12)    11-11    134[L]  |  102  |  19  ----------------------------<  231[H]  4.0   |  22  |  0.83    Ca    8.4      11 Nov 2024 07:08  Phos  2.3     11-10  Mg     1.8     11-10    TPro  x   /  Alb  x   /  TBili  1.2  /  DBili  0.3  /  AST  x   /  ALT  x   /  AlkPhos  x   11-10      PT/INR - ( 10 Nov 2024 18:15 )   PT: 12.0 sec;   INR: 1.05 ratio         PTT - ( 10 Nov 2024 18:15 )  PTT:25.2 sec  Urinalysis Basic - ( 11 Nov 2024 07:08 )    Color: x / Appearance: x / SG: x / pH: x  Gluc: 231 mg/dL / Ketone: x  / Bili: x / Urobili: x   Blood: x / Protein: x / Nitrite: x   Leuk Esterase: x / RBC: x / WBC x   Sq Epi: x / Non Sq Epi: x / Bacteria: x            Imaging:

## 2024-11-12 ENCOUNTER — RESULT REVIEW (OUTPATIENT)
Age: 69
End: 2024-11-12

## 2024-11-12 NOTE — PROGRESS NOTE ADULT - ATTENDING COMMENTS
As above   68 yo F with PMH of HTN, hypothyroidism, hiatal hernia, gastritis, and pancreatic cancer s/p distal pancreatectomy and splectomy 2021 and chemo RT c/b recurrence here with GOO  S/p Endoscopic gastrojejunostomy last week  Doing well from obstruction standpoint  Advance to soft low residue diet  Heme workup for bicytopenia    Thank you for this interesting consult.  Please call the advanced GI service with any questions or concerns.
AS above  70 yo F with PMH of HTN, hypothyroidism, hiatal hernia, gastritis, and pancreatic cancer s/p distal pancreatectomy and splenectomy 2021 and chemo RT c/b recurrence to abdominal pain s/p resection and RT, and biliary stricture s/p stent in 2023 presenting with GOO in setting of multifocal duodenal stenoses  S/p endoscopic GJ  Doing well on fulls--> ok to advance to low residue diet  bicytopenia workup pending    Thank you for this interesting consult.  Please call the advanced GI service with any questions or concerns.
As above.    Patient seen and examined in the afternoon.  Discussed with GI fellow earlier in the day.    Impression:    #1.  Gastric outlet obstruction due to metastatic recurrent pancreatic cancer following distal pancreatectomy, with multifocal duodenal strictures not amenable to endoscopic duodenal stenting.    2.  Worsening thrombocytopenia, rule out heparin-induced thrombocytopenia or other drug-induced thrombocytopenia.    Recommendations:    #1.  Clear liquid diet as tolerated, then n.p.o. past midnight for upper endoscopy/endoscopic ultrasound evaluation for endoscopic gastrojejunostomy with lumen opposing metal stent.    #2.  Follow CBC, will need to keep platelets above 50k for endoscopic procedure    Discussed with family members present, including .
As above.    Patient seen and examined in the afternoon.  Discussed with GI fellow earlier in the day.    Impression:    #1.  Gastric outlet obstruction due to metastatic recurrent pancreatic cancer following distal pancreatectomy, with multifocal duodenal strictures not amenable to endoscopic duodenal stenting, now s/p EUS-gastrojejunostomy with lumen apposing metal stent on 11/7/24    #2.  Lower abd pain, significantly improved with bowel movement today, attributed to constipation.    #3.  Acute worsening of chronic anemia without visible/overt GI bleed., baseline 9, today 7.    #4.  Thrombocytopenia, improved and stabilized     Recommendations:    #1.  Follow CBC/transfuse as necessary, watch for overt bleeding.    #2.  If suspected hemoperitoneum, obtain urgent CT scan abd/pelvis    #3.  Clear liquid diet, may advance as tolerated if no significant abd pain, n/v, bleeding.  Would progress slowly to maximum diet of soft low fiber diet.
69 Female PMHx DM, HTN, hypothyroid, pancreatic ca s/p resection '21, s/p chemo/rt then recurrence '22 s/p RT.  Presenting w/ diffuse abd pain, n/v and constipation for 3 days. CT Abdomen and Pelvis w/ Oral Cont and w/ IV Cont (11.01.24 @ 17:33) showed Ill-defined hypodense pancreatic mass with resultant severe multifocal iluminal narrowing along the distal stomach and duodenum. Findings are consistent with a component of gastric outlet obstruction. Started malignant SBO protocol by surgery, GI following; family requesting to discuss with them regarding intervention. Patient this morning reported that has moderate abdominal pain, advise to continue IV morphine 2 mg q4hrs prn for moderate pain, also advise IV Tylenol q6hrs prn for mild pain. Will continue to follow up. Discussed with patient,  and ACP

## 2024-11-12 NOTE — PROCEDURE NOTE - PROCEDURE DATE TIME, MLM
Cr 1.7 GFR 39  Baseline Cr used to be 1-1.2, new baseline as of recent CKD III range     12-Nov-2024 11:13

## 2024-11-12 NOTE — PROGRESS NOTE ADULT - SUBJECTIVE AND OBJECTIVE BOX
Gastroenterology/Hepatology Progress Note      Interval Events:     - Patient has been tolerating full liquid diet. No nausea or vomiting. Has bowel movements .   - Today, she has BM biopsy scheduled with IR.     Allergies:  phenytoin (Rash)  ibuprofen (Other)  Dilantin (Unknown)  platelet rash- will need pre-medication (Rash)      Hospital Medications:  benzocaine/butamben/tetracaine Spray 1 Spray(s) Topical every 6 hours PRN  bisacodyl Suppository 10 milliGRAM(s) Rectal once  dexAMETHasone  Injectable 2 milliGRAM(s) IV Push every 12 hours  dextrose 5%. 1000 milliLiter(s) IV Continuous <Continuous>  dextrose 5%. 1000 milliLiter(s) IV Continuous <Continuous>  dextrose 50% Injectable 25 Gram(s) IV Push once  dextrose 50% Injectable 25 Gram(s) IV Push once  dextrose 50% Injectable 12.5 Gram(s) IV Push once  dextrose Oral Gel 15 Gram(s) Oral once PRN  glucagon  Injectable 1 milliGRAM(s) IntraMuscular once  insulin lispro (ADMELOG) corrective regimen sliding scale   SubCutaneous every 6 hours  lactated ringers. 1000 milliLiter(s) IV Continuous <Continuous>  levothyroxine Injectable 70 MICROGram(s) IV Push at bedtime  multivitamin/minerals 1 Tablet(s) Oral daily  naloxone Injectable 0.4 milliGRAM(s) IV Push once  ondansetron Injectable 4 milliGRAM(s) IV Push every 8 hours PRN  pantoprazole  Injectable 40 milliGRAM(s) IV Push daily  polyethylene glycol 3350 17 Gram(s) Oral two times a day  saline laxative (FLEET) Rectal Enema 1 Enema Rectal once  sodium chloride 0.9%. 1000 milliLiter(s) IV Continuous <Continuous>  thiamine 100 milliGRAM(s) Oral daily      ROS: 14 point ROS negative unless otherwise state in subjective    PHYSICAL EXAM:   Vital Signs:  Vital Signs Last 24 Hrs  T(C): 36.8 (12 Nov 2024 11:20), Max: 36.8 (12 Nov 2024 11:20)  T(F): 98.3 (12 Nov 2024 11:20), Max: 98.3 (12 Nov 2024 11:20)  HR: 73 (12 Nov 2024 11:50) (62 - 92)  BP: 130/84 (12 Nov 2024 11:50) (120/80 - 163/92)  BP(mean): 98 (12 Nov 2024 11:50) (93 - 101)  RR: 13 (12 Nov 2024 11:50) (12 - 18)  SpO2: 100% (12 Nov 2024 11:50) (98% - 100%)    Parameters below as of 12 Nov 2024 11:20  Patient On (Oxygen Delivery Method): room air      Daily Height in cm: 154.94 (12 Nov 2024 09:31)    Daily     GENERAL:  No acute distress  HEENT:  no scleral icterus  CHEST:  no accessory muscle use  HEART:  Regular rate and rhythm  ABDOMEN:  Soft, lower abdominal tenderness, non-distended  EXTREMITIES:  No edema  SKIN:  No rash/ecchymoses  NEURO:  Alert and oriented x 3    LABS:                        8.2    11.58 )-----------( 78       ( 12 Nov 2024 04:55 )             24.3     Mean Cell Volume: 94.6 fl (11-12-24 @ 04:55)    11-12    133[L]  |  102  |  18  ----------------------------<  177[H]  4.0   |  23  |  0.77    Ca    7.8[L]      12 Nov 2024 04:55    TPro  5.7[L]  /  Alb  2.9[L]  /  TBili  1.3[H]  /  DBili  x   /  AST  26  /  ALT  15  /  AlkPhos  94  11-12    LIVER FUNCTIONS - ( 12 Nov 2024 04:55 )  Alb: 2.9 g/dL / Pro: 5.7 g/dL / ALK PHOS: 94 U/L / ALT: 15 U/L / AST: 26 U/L / GGT: x           PT/INR - ( 12 Nov 2024 04:55 )   PT: 12.2 sec;   INR: 1.06 ratio         PTT - ( 12 Nov 2024 04:55 )  PTT:25.3 sec  Urinalysis Basic - ( 12 Nov 2024 04:55 )    Color: x / Appearance: x / SG: x / pH: x  Gluc: 177 mg/dL / Ketone: x  / Bili: x / Urobili: x   Blood: x / Protein: x / Nitrite: x   Leuk Esterase: x / RBC: x / WBC x   Sq Epi: x / Non Sq Epi: x / Bacteria: x            Imaging:

## 2024-11-12 NOTE — PROGRESS NOTE ADULT - SUBJECTIVE AND OBJECTIVE BOX
Name of Patient : DANUTA CALDERON  MRN: [id number]  Date of visit: 11-12-24       Subjective: Patient seen and examined. No new events except as noted.   ty cast  BMB today  advance diet after procedure       REVIEW OF SYSTEMS:    CONSTITUTIONAL: No weakness, fevers or chills  EYES/ENT: No visual changes;  No vertigo or throat pain   NECK: No pain or stiffness  RESPIRATORY: No cough, wheezing, hemoptysis; No shortness of breath  CARDIOVASCULAR: No chest pain or palpitations  GASTROINTESTINAL: No abdominal or epigastric pain. No nausea, vomiting, or hematemesis; No diarrhea or constipation. No melena or hematochezia.  GENITOURINARY: No dysuria, frequency or hematuria  NEUROLOGICAL: No numbness or weakness  SKIN: No itching, burning, rashes, or lesions   All other review of systems is negative unless indicated above.    MEDICATIONS:  MEDICATIONS  (STANDING):  bisacodyl Suppository 10 milliGRAM(s) Rectal once  dexAMETHasone  Injectable 2 milliGRAM(s) IV Push every 12 hours  dextrose 5%. 1000 milliLiter(s) (50 mL/Hr) IV Continuous <Continuous>  dextrose 5%. 1000 milliLiter(s) (100 mL/Hr) IV Continuous <Continuous>  dextrose 50% Injectable 12.5 Gram(s) IV Push once  dextrose 50% Injectable 25 Gram(s) IV Push once  dextrose 50% Injectable 25 Gram(s) IV Push once  glucagon  Injectable 1 milliGRAM(s) IntraMuscular once  insulin lispro (ADMELOG) corrective regimen sliding scale   SubCutaneous every 6 hours  lactated ringers. 1000 milliLiter(s) (75 mL/Hr) IV Continuous <Continuous>  levothyroxine Injectable 70 MICROGram(s) IV Push at bedtime  multivitamin/minerals 1 Tablet(s) Oral daily  naloxone Injectable 0.4 milliGRAM(s) IV Push once  pantoprazole  Injectable 40 milliGRAM(s) IV Push daily  polyethylene glycol 3350 17 Gram(s) Oral two times a day  saline laxative (FLEET) Rectal Enema 1 Enema Rectal once  sodium chloride 0.9%. 1000 milliLiter(s) (100 mL/Hr) IV Continuous <Continuous>  thiamine 100 milliGRAM(s) Oral daily      PHYSICAL EXAM:  T(C): 36.4 (11-12-24 @ 13:57), Max: 36.8 (11-12-24 @ 11:20)  HR: 74 (11-12-24 @ 13:57) (62 - 76)  BP: 131/74 (11-12-24 @ 13:57) (120/80 - 163/92)  RR: 17 (11-12-24 @ 13:57) (11 - 18)  SpO2: 100% (11-12-24 @ 13:57) (98% - 100%)  Wt(kg): --  I&O's Summary    11 Nov 2024 07:01  -  12 Nov 2024 07:00  --------------------------------------------------------  IN: 120 mL / OUT: 0 mL / NET: 120 mL      Height (cm): 154.9 (11-12 @ 09:31)  Weight (kg): 36 (11-12 @ 09:31)  BMI (kg/m2): 15 (11-12 @ 09:31)  BSA (m2): 1.28 (11-12 @ 09:31)    Appearance: Normal	  HEENT:  PERRLA   Lymphatic: No lymphadenopathy   Cardiovascular: Normal S1 S2, no JVD  Respiratory: normal effort , clear  Gastrointestinal:  Soft, Non-tender  Skin: No rashes,  warm to touch  Psychiatry:  Mood & affect appropriate  Musculuskeletal: No edema    recent labs, Imaging and EKGs personally reviewed     11-11-24 @ 07:01  -  11-12-24 @ 07:00  --------------------------------------------------------  IN: 120 mL / OUT: 0 mL / NET: 120 mL                          8.2    11.58 )-----------( 78       ( 12 Nov 2024 04:55 )             24.3               11-12    133[L]  |  102  |  18  ----------------------------<  177[H]  4.0   |  23  |  0.77    Ca    7.8[L]      12 Nov 2024 04:55    TPro  5.7[L]  /  Alb  2.9[L]  /  TBili  1.3[H]  /  DBili  x   /  AST  26  /  ALT  15  /  AlkPhos  94  11-12    PT/INR - ( 12 Nov 2024 04:55 )   PT: 12.2 sec;   INR: 1.06 ratio         PTT - ( 12 Nov 2024 04:55 )  PTT:25.3 sec                   Urinalysis Basic - ( 12 Nov 2024 04:55 )    Color: x / Appearance: x / SG: x / pH: x  Gluc: 177 mg/dL / Ketone: x  / Bili: x / Urobili: x   Blood: x / Protein: x / Nitrite: x   Leuk Esterase: x / RBC: x / WBC x   Sq Epi: x / Non Sq Epi: x / Bacteria: x

## 2024-11-12 NOTE — PROGRESS NOTE ADULT - ASSESSMENT
69F admitted for malignant gastric outlet obstruction       Malignant Gastric outlet obstruction.   - CT A/P as noted; CT H as noted   - NPO and has NGT to low suction per surgery. surg onc will follow. Strict NPO   - 4mg IV dexamethasone, q12h --> Now on Dexa 2 as per Heme   - 10 mg IV reglan, q8h, completed   - 100mcg octreotide micrograms SubQ, q8h, completed   - Pain control   - s/P Sten placement  by GI, tolerating diet   - Full liquid, advance as per GI recs , advanced to soft diet, monitor for tolerance     R/O Transfusion reaction   - Patient's Plt down-trending and was being transfused 1 unit of platelets, reported that near completion of 1 unit of platelets, Patient with erythematous rash and pruritis. no respiratory symptoms   - Platelet transfusion stopped and bag was sent to blood bank for work up  - Heme/Onc follow up   xont to have low plt and dop in hgb level- S/P 1 unit pf plt and PRBC 11/10  - Repeat CT A/P as noted   - heme follow up   - R/o hemolysis  - For IR bone marrow BX  - patient can be D/Davey in AM if toelrating food, plt >50 and hgb >7. discussed with hematology team     Pancreatic cancer.   - Diagnosed in 2021 s/p resection, chemo/RT and now with recurrent in 2022 s/p RT.  - On active DMT with [facility]   - Heme/Onc and palliative eval appreciated; F/u recs     Anemia, Thrombocytopenia   - Likely due to malignancy and treatment; F/u anemia labs   - Platelet count down-trending. Monitor closely --> will check for HIT --> Hep Ab neg, ISIDORO pending. Hold DVT PPX   - Transfuse for Hgb <7, Plt <10K or < 50K w/ fever/bleeding   - Heme/Onc checking hemolysis labs and smear to evaluate thrombocytopenia.   - Heme/Onc eval appreciated; F/u recs  - 1 unit of plt and PRBC, completed 11/10   - heme/onc follow up   - For BM Bx 11/12     Hypertension.   - No meds per mouth.   - Monitor BP. If sustained SBP >160, may use iv hydralazine as needed.    Type 2 diabetes mellitus.   - Hold home oral dm meds  - ISS while inpatient  - IVF for hydration   - Monitor glucose levels closely       Hypothyroid.   - TSH and FT4 noted   - On IV Synthroid 70 - dose confirmed with Pharmacy  - resume oral when tolerating     Prophylactic measure.   - Heparin SubQ on HOLD       discussed in detail with patient and  at the bedside

## 2024-11-12 NOTE — PROGRESS NOTE ADULT - ASSESSMENT
9F with PMHx of PDIC s/p distal pancreatectomy (2021) and chemo/radiation w/ recurrence to abdominal wall s/p resection (2022) and radiation who presented 11/1 to the ED with abdominal pain, nausea, vomiting, and CT A/P with ill-defined hypodense pancreatic head mass with multifocal narrowing along distal stomach and duodenum, consistent with gastric outlet obstruction and cancer recurrence. Surgery was consulted for gastric outlet obstruction management. EGD on 11/5 demonstrating multifocal stricturing disease of the pylorus and duodenum due to tumor ingrowth. Patient now s/p GJ stent placement w/ good progression post operatively.     Recommendations:  - Continue FLD   - +/+ flatus/BM   - OOBAT  - Bowel regimen, suppository  - f/u BM biopsy results   - Surgical oncology will follow    [facility]  [id number] 9F with PMHx of PDIC s/p distal pancreatectomy (2021) and chemo/radiation w/ recurrence to abdominal wall s/p resection (2022) and radiation who presented 11/1 to the ED with abdominal pain, nausea, vomiting, and CT A/P with ill-defined hypodense pancreatic head mass with multifocal narrowing along distal stomach and duodenum, consistent with gastric outlet obstruction and cancer recurrence. Surgery was consulted for gastric outlet obstruction management. EGD on 11/5 demonstrating multifocal stricturing disease of the pylorus and duodenum due to tumor ingrowth. Patient now s/p GJ stent placement w/ good progression post operatively.     Recommendations:  - Diet per GI  - +/+ flatus/BM   - OOBAT  - Bowel regimen, suppository  - Please recontact with any further questions or concerns    [facility]  [id number]

## 2024-11-12 NOTE — PROGRESS NOTE ADULT - ASSESSMENT
Impression:   68 yo F with PMH of HTN, hypothyroidism, hiatal hernia, gastritis, and pancreatic cancer s/p distal pancreatectomy and splectomy 2021 and chemo RT c/b recurrence to abdominal pain s/p resection and RT, and biliary stricture s/p stent in 2023 presenting with abdominal pain and N/V for 1 week, found to have ill defined hypodense pancreatic mass with resultant severe multifocal luminal narrowing along stomach and duodenum.     #Gastric outlet obstruction  #Pancreatic cancer s/p distal pancreatectomy and chemo/RT  #Hypodense pancreas mass with resultant severe multifocal luminal narrowing along stomach and duodenum  #Hx of biliary stricture s/p stent 11/2023    P/w abdominal pain and n/v for 1 week, found to have gastric outlet obstruction now s/p NGT for decompression. GI consulted for evaluation for endoscopic intervention. Of note, pt w/ hx of pancreatic cancer s/p distal pancreatectomy and splenectomy c/b recurrence and abdominal wall resection with prior admission here 11/2023 for biliary stricture s/p stent 11/2023. CT on admission shows ill-defined hypodense pancreatic mass with resultant severe multifocal luminal narrowing along the distal stomach and duodenum, narrowing of the IVC due to extrinsic compression from pancreas, severe narrowing of the portal confluence without discrete thrombosis, and moderate central and mild peripheral intrahepatic biliary dilatation with pneumobilia in the left greater than right hepatic bile ducts with patent CBD stent. S/P EGD showing 2 strictures (pyloric and duodenal) with additional D4 stricture noted on CT; not amenable for stenting given multifocal nature. S/P EGD with endoscopic GJ placement; doing well post-procedurally and tolerating CLD; notes some lower abdominal cramping with last BM several days ago; suspect constipation contributing to abdominal pain. Labs with Hgb drop to 6.9, received blood transfusion but no overt signs of GI bleeding.   - Underwent CT A/P on 11/11 - showed CBD stent, GJ AXIOS in place with contrast passing to the rectum. No active bleeding seen.     Recommendations:   - No indication for endoscopic intervention for now.   - Can advance to soft low residue diet now.   - CMP and CBC daily.     Discussed the case with Dr. Turner.     GI will sign off. Thank you for the consult. Please call us back if you have any questions or concerns.     All recommendations are tentative until note is attested by an attending.     Ferdinand Mir, PGY-6  Gastroenterology/Hepatology Fellow  Available on Microsoft Teams  [phone] (Short Range Pager)  [phone] (Long Range Pager)    After 5pm, please contact the on-call GI fellow. Impression:   68 yo F with PMH of HTN, hypothyroidism, hiatal hernia, gastritis, and pancreatic cancer s/p distal pancreatectomy and splenectomy 2021 and chemo RT c/b recurrence to abdominal pain s/p resection and RT, and biliary stricture s/p stent in 2023 presenting with abdominal pain and N/V for 1 week, found to have ill defined hypodense pancreatic mass with resultant severe multifocal luminal narrowing along stomach and duodenum.     #Gastric outlet obstruction  #Pancreatic cancer s/p distal pancreatectomy and chemo/RT  #Hypodense pancreas mass with resultant severe multifocal luminal narrowing along stomach and duodenum  #Hx of biliary stricture s/p stent 11/2023    P/w abdominal pain and n/v for 1 week, found to have gastric outlet obstruction now s/p NGT for decompression. GI consulted for evaluation for endoscopic intervention. Of note, pt w/ hx of pancreatic cancer s/p distal pancreatectomy and splenectomy c/b recurrence and abdominal wall resection with prior admission here 11/2023 for biliary stricture s/p stent 11/2023. CT on admission shows ill-defined hypodense pancreatic mass with resultant severe multifocal luminal narrowing along the distal stomach and duodenum, narrowing of the IVC due to extrinsic compression from pancreas, severe narrowing of the portal confluence without discrete thrombosis, and moderate central and mild peripheral intrahepatic biliary dilatation with pneumobilia in the left greater than right hepatic bile ducts with patent CBD stent. S/P EGD showing 2 strictures (pyloric and duodenal) with additional D4 stricture noted on CT; not amenable for stenting given multifocal nature. S/P EGD with endoscopic GJ placement; doing well post-procedurally and tolerating CLD; notes some lower abdominal cramping with last BM several days ago; suspect constipation contributing to abdominal pain. Labs with Hgb drop to 6.9, received blood transfusion but no overt signs of GI bleeding.   - Underwent CT A/P on 11/11 - showed CBD stent, GJ AXIOS in place with contrast passing to the rectum. No active bleeding seen.     Recommendations:   - No indication for endoscopic intervention for now.   - Can advance to soft low residue diet now.   - CMP and CBC daily.     Discussed the case with Dr. Turner.     GI will sign off. Thank you for the consult. Please call us back if you have any questions or concerns.     All recommendations are tentative until note is attested by an attending.     Ferdinand Mir, PGY-6  Gastroenterology/Hepatology Fellow  Available on Microsoft Teams  [phone] (Short Range Pager)  [phone] (Long Range Pager)    After 5pm, please contact the on-call GI fellow.

## 2024-11-12 NOTE — PROGRESS NOTE ADULT - SUBJECTIVE AND OBJECTIVE BOX
Patient is a 69y old  Female who presents with a chief complaint of n/v (11 Nov 2024 10:49)      MEDICATIONS  (STANDING):  bisacodyl Suppository 10 milliGRAM(s) Rectal once  dexAMETHasone  Injectable 2 milliGRAM(s) IV Push every 12 hours  dextrose 5%. 1000 milliLiter(s) (50 mL/Hr) IV Continuous <Continuous>  dextrose 5%. 1000 milliLiter(s) (100 mL/Hr) IV Continuous <Continuous>  dextrose 50% Injectable 12.5 Gram(s) IV Push once  dextrose 50% Injectable 25 Gram(s) IV Push once  dextrose 50% Injectable 25 Gram(s) IV Push once  glucagon  Injectable 1 milliGRAM(s) IntraMuscular once  insulin lispro (ADMELOG) corrective regimen sliding scale   SubCutaneous every 6 hours  lactated ringers. 1000 milliLiter(s) (75 mL/Hr) IV Continuous <Continuous>  levothyroxine Injectable 70 MICROGram(s) IV Push at bedtime  multivitamin/minerals 1 Tablet(s) Oral daily  naloxone Injectable 0.4 milliGRAM(s) IV Push once  pantoprazole  Injectable 40 milliGRAM(s) IV Push daily  polyethylene glycol 3350 17 Gram(s) Oral two times a day  saline laxative (FLEET) Rectal Enema 1 Enema Rectal once  sodium chloride 0.9%. 1000 milliLiter(s) (100 mL/Hr) IV Continuous <Continuous>  thiamine 100 milliGRAM(s) Oral daily    MEDICATIONS  (PRN):  benzocaine/butamben/tetracaine Spray 1 Spray(s) Topical every 6 hours PRN sore throat  dextrose Oral Gel 15 Gram(s) Oral once PRN Blood Glucose LESS THAN 70 milliGRAM(s)/deciliter  ondansetron Injectable 4 milliGRAM(s) IV Push every 8 hours PRN Nausea and/or Vomiting    Vital Signs Last 24 Hrs  T(C): 36.8 (12 Nov 2024 11:20), Max: 36.8 (12 Nov 2024 11:20)  T(F): 98.3 (12 Nov 2024 11:20), Max: 98.3 (12 Nov 2024 11:20)  HR: 73 (12 Nov 2024 11:50) (62 - 92)  BP: 130/84 (12 Nov 2024 11:50) (120/80 - 163/92)  BP(mean): 98 (12 Nov 2024 11:50) (93 - 101)  RR: 13 (12 Nov 2024 11:50) (12 - 18)  SpO2: 100% (12 Nov 2024 11:50) (98% - 100%)    Parameters below as of 12 Nov 2024 11:20  Patient On (Oxygen Delivery Method): room air        PE  NAD  Awake, alert  Anicteric, MMM  RRR  CTAB  Abd soft, NT, ND  No c/c/e  No rash grossly                          8.2    11.58 )-----------( 78       ( 12 Nov 2024 04:55 )             24.3       11-12    133[L]  |  102  |  18  ----------------------------<  177[H]  4.0   |  23  |  0.77    Ca    7.8[L]      12 Nov 2024 04:55    TPro  5.7[L]  /  Alb  2.9[L]  /  TBili  1.3[H]  /  DBili  x   /  AST  26  /  ALT  15  /  AlkPhos  94  11-12

## 2024-11-12 NOTE — PROGRESS NOTE ADULT - ASSESSMENT
69F with PMHx of [facility] s/p distal pancreatectomy (2021) and chemo/radiation w/ recurrence to abdominal wall s/p resection (2022) and radiation who presented 11/1 to the ED with abdominal pain, nausea, vomiting, and CT A/P with ill-defined hypodense pancreatic head mass with multifocal narrowing along distal stomach and duodenum, consistent with gastric outlet obstruction and cancer recurrence. Surgery was consulted for gastric outlet obstruction management. EGD on 11/5 demonstrating multifocal stricturing disease of the pylorus and duodenum due to tumor ingrowth.  Advanced GI planning on endoscopic GJ versus Axios stent placement tomorrow. Pt on Malignant bowel obstruction protocol   (dexamethasone 4mg IV q12h, reglan 10 mg IV q8h, octreotide 100 ucg SQ q8h)    - Nutritional Assessment, pt is not meeting nutritional goals enterally.  Prealbumin decreased at 12 mg/dL with severe protein calorie malnutrition; trend prealbumin weekly   - TPN plan: s/p endoscopic gastrojejunostomy; f/u diet advancement/tolerance plus calorie count planned 11/11-11/14 and hold off on TPN unless enteral route unable to be utilized for nutrition; possible diet advancement planned per GI at low residue/soft diet  - TPN access:  Patient will need a dedicated central line if/once TPN approved  - Electrolyte Imbalance risk- check CMP, Mg, Phos, iCa and K daily, replete as per Medicine.  - GOO:  s/p endoscopic gastrojejunostomy; f/u GI and Sx recs; CT a/p from 11/11 showed infiltrative pancreatic head mass w/severe narrowing/occlusion portosplenic confluence; small ascites and resolution of GOO s/p gastrojejunal stent placement   - Risk of Hyperglycemia: HgA1c 6.5% and glucose elevated on Dexamethasone/off Oral Diabetic meds; fingersticks/management per primary team   - Risk of Hypertriglyceridemia:   mg/dL on 11/7; plan to trend TG closely on TPN   - Thrombocytopenia:  s/p transfusion w/possible transfusion reaction; work up/management per primary team; IR plan for BM biopsy today   - Vitamin D def:  recommend enteral repletion once diet resumes  - Recommend strict intake and output/weight checks three times a week; IV fluids per primary team  - Recommended MVI and Thiamine to decrease risk of refeeding syndrome and close monitoring of electrolytes   - Further care as per Medicine    Available on TEAMS  TPN spectra [id number] ([phone] when dialing from outside line)  Above reviewed with Dr. Lucas Avalos

## 2024-11-12 NOTE — PRE PROCEDURE NOTE - DAY OF PROCEDURE ATTESTATION
I have personally seen, examined, and participated in the care of this patient.

## 2024-11-12 NOTE — PROCEDURE NOTE - PROCEDURE FINDINGS AND DETAILS
Successful bone marrow biopsy using 13 g core needle.  Post-images - no complication.  Full report to follow.

## 2024-11-12 NOTE — PROGRESS NOTE ADULT - ASSESSMENT
68 y/o F w/pancreatic ca s/p resection '21, s/p chemo/rt then recurrence '22 s/p RT.  Presenting w/ diffuse abd pain, n/v and constipation x3d.     #Pancreatic ca  --Follows with Dr. Bess of [facility]  --S/p distal pancreatectomy and splenectomy (06/2021)  --S/p adjuvant tx w/ mFOLFIRINOX (08/21-01/21), Xeloda +RTx 25 fractions (02/22-03/22)-> POD (01/23)  --Most recent systemic tx w/ Gemcitabine and nab-paclitaxel (05/23-10/23, 02/24-07/24). Break in tx 2/2 jaundice and stent migration  --S/p RT x 10 fractions to abdominal wall mets 08/2024  --No plan for cancer treatment inpatient and/or rehab    #Gastric outlet obstruction  --CT a/p w/ ill-defined hypodense pancreatic mass w/ multifocal narrowing along distal stomach and duodenum resulting in gastric outlet obstruction.  --Surgery following no plan for acute surgical intervention at this time   --On malignant bowel obstruction protocol w/dex, reglan, and octreotide.   --EGD on 11/5 demonstrated multifocal stricturing disease of the pylorus and duodenum due to tumor ingrowth.   --GI following, s/p endoscopic GJ 11/7     #MAHA  Platelets normal on admission -> started downtrending around 11/5, responded to transfusion on 11/7 (PLT 32 -> 105, but dropped to 65 by the next morning), now 78  Hgb 9.8 on admission (likely 2/2 malignancy and cancer treatments) -> 8.2 now   - Smear reviewed today, many schistocytes suggestive of intravascular hemolysis: no known cardiac valvular disease, no obvious signs of infection  - Please check blood cultures  - LDH high and hapto low, bili normal, taken together with smear these findings are concerning for intravascular hemolysis - please repeat LDH, hapto, bili and check retic count  - Coags previously normal so less concerning for DIC, although possible / perhaps myelopthisis, normal PT/PTT. F/u fibrinogen level  - Acute non-febrile reaction to platelet transfusion the other day - acute urticarial; no need for washed products per conversation with blood bank today, please check IgA level  - F/u flow cytometry  - F/u [id number], although low plasmic score - creatinine normal, MCV normal, less suspicion for classic TTP  - Can have MAHA from gemcitabine but it is unusual to occur to long after treatment (last chemo was July)   - HIT negative, ISIDORO negative   - S/p 1U PRBC, PLT 11/10; PLEASE HOLD OFF ON FURTHER PLATELET TRANSFUSIONS AT THIS TIME  - Concerned for TMA of malignancy.  - discussed with blood bank; continue trial of high dose steroids for possible autoimmune component, can complete dex 40mg x 4 days   - There is no beneficial role for PEX, steroids, or other immuno-suppression if this is truly cancer-associated TMA. Treatment would be aimed at the underlying cancer, but this would be an overall poor prognostic factor   - IR bone marrow biopsy today, f/u path    Will follow,    Chandler Johansen PA-C  Hematology/Oncology  [facility]  [phone] (office)

## 2024-11-12 NOTE — PROGRESS NOTE ADULT - SUBJECTIVE AND OBJECTIVE BOX
Surgery Progress Note     DANUTA CALDERON  [id number]  [facility] [id number] W1    Interval/Subjective:  Patient seen and examined bedside, resting comfortable. no acute events overnight.     - pt to undergo IR Bone marrow biopsy today   __________________________________________________________________  Vitals:  T(C): 36.4 (13:57), Max: 36.8 (11:20)  HR: 74 (13:57) (62 - 76)  BP: 131/74 (13:57) (120/80 - 163/92)  RR: 17 (13:57) (11 - 18)  SpO2: 100% (13:57) (98% - 100%)    I & O's:  IN:    Oral Fluid: 120 mL  Total IN: 120 mL    OUT:  Total OUT: 0 mL        Physical Exam:  General: NAD, resting comfortably in bed  HEENT: Normocephalic atraumatic  Respiratory: Nonlabored respirations  Cardio: regular rate, normotensive  Abdomen: soft, nontender, nondistended  Vascular: extremities are warm and well perfused.     Labs:  CBC: 11-12-24 @ 04:55          8.2   11.58 >----< 78          24.3    Chemistry: 11-12-24 @ 04:55  133|102|18    ------------< 177  4.0|23|0.77    Ca: 7.8 11-12-24 @ 04:55  Mg: -- 11-12-24 @ 04:55  Phos: -- 11-12-24 @ 04:55    LFT's: 11-12-24 @ 04:55  AST: 26  ALT: 15  ALP: 94  T.Bili: 1.3  D.Bili: --  CBC: 11-11-24 @ 07:12          8.4   12.84 >----< 105          25.2    Chemistry: 11-11-24 @ 07:12  --|--|--    ------------< --  --|--|--    Ca: -- 11-11-24 @ 07:12  Mg: -- 11-11-24 @ 07:12  Phos: -- 11-11-24 @ 07:12    LFT's: 11-11-24 @ 07:12  AST: --  ALT: --  ALP: --  Harvey: --  Davina: --    __________________________________________________________________

## 2024-11-12 NOTE — PROGRESS NOTE ADULT - SUBJECTIVE AND OBJECTIVE BOX
[facility] NUTRITION SUPPORT-- FOLLOW UP NOTE      24 hour events/subjective:  Patient seen and examined at bedside, chart reviewed and events noted and I's and O's reviewed.  Patient denies chest pain, shortness of breath, nausea or vomiting, dizziness, chills at time of visit; c/o abdominal discomfort and NPO for IR BM biopsy.  Patient's VSS and no other acute overnight events noted.   Patient continues to tolerate FLD, WBC downtrending, h/h stable, glucose trend elevated, CT a/p showedCT a/p nd continues on calorie count    ROS:  Except as noted above, all other systems reviewed and are negative       Diet:  Diet, NPO after Midnight:      NPO Start Date: 11-Nov-2024,   NPO Start Time: 23:59 (11-11-24 @ 16:09)  Diet, Full Liquid:   Consistent Carbohydrate No Snacks (CSTCHO)  Supplement Feeding Modality:  Oral  Glucerna Shake Cans or Servings Per Day:  1       Frequency:  Daily (11-08-24 @ 23:27)      PAST HISTORY  --------------------------------------------------------------------------------  PAST MEDICAL & SURGICAL HISTORY:  Pancreatic cancer      Hypertension      Hypothyroid      Seasonal asthma      History of pancreatectomy        No significant changes to PMH, PSH, FHx, SHx, unless otherwise noted    ALLERGIES & MEDICATIONS  --------------------------------------------------------------------------------  Allergies    phenytoin (Rash)  ibuprofen (Other)  Dilantin (Unknown)    Intolerances    platelet rash- will need pre-medication (Rash)    Standing Inpatient Medications  bisacodyl Suppository 10 milliGRAM(s) Rectal once  dexAMETHasone  Injectable 2 milliGRAM(s) IV Push every 12 hours  dextrose 5%. 1000 milliLiter(s) IV Continuous <Continuous>  dextrose 5%. 1000 milliLiter(s) IV Continuous <Continuous>  dextrose 50% Injectable 12.5 Gram(s) IV Push once  dextrose 50% Injectable 25 Gram(s) IV Push once  dextrose 50% Injectable 25 Gram(s) IV Push once  glucagon  Injectable 1 milliGRAM(s) IntraMuscular once  insulin lispro (ADMELOG) corrective regimen sliding scale   SubCutaneous every 6 hours  levothyroxine Injectable 70 MICROGram(s) IV Push at bedtime  multivitamin/minerals 1 Tablet(s) Oral daily  naloxone Injectable 0.4 milliGRAM(s) IV Push once  pantoprazole  Injectable 40 milliGRAM(s) IV Push daily  polyethylene glycol 3350 17 Gram(s) Oral two times a day  saline laxative (FLEET) Rectal Enema 1 Enema Rectal once  sodium chloride 0.9%. 1000 milliLiter(s) IV Continuous <Continuous>  thiamine 100 milliGRAM(s) Oral daily    PRN Inpatient Medications  benzocaine/butamben/tetracaine Spray 1 Spray(s) Topical every 6 hours PRN  dextrose Oral Gel 15 Gram(s) Oral once PRN  ondansetron Injectable 4 milliGRAM(s) IV Push every 8 hours PRN        VITALS/PHYSICAL EXAM  --------------------------------------------------------------------------------  T(C): 36.8 (11-12-24 @ 11:20), Max: 36.8 (11-12-24 @ 11:20)  HR: 66 (11-12-24 @ 11:35) (62 - 92)  BP: 120/80 (11-12-24 @ 11:35) (120/80 - 163/92)  RR: 11 (11-12-24 @ 11:35) (11 - 18)  SpO2: 100% (11-12-24 @ 11:35) (98% - 100%)  Wt(kg): --  Height (cm): 154.9 (11-12-24 @ 09:31)  Weight (kg): 36 (11-12-24 @ 09:31)  BMI (kg/m2): 15 (11-12-24 @ 09:31)  BSA (m2): 1.28 (11-12-24 @ 09:31)    11-11-24 @ 07:01  -  11-12-24 @ 07:00  --------------------------------------------------------  IN: 120 mL / OUT: 0 mL / NET: 120 mL      I&O's Detail    11 Nov 2024 07:01  -  12 Nov 2024 07:00  --------------------------------------------------------  IN:    Oral Fluid: 120 mL  Total IN: 120 mL    OUT:  Total OUT: 0 mL    Total NET: 120 mL          Physical Exam:  Gen: NAD, laying in bed   Chest: non labored breathing, equal chest expansion bilaterally  Abd: +BS, soft, nontender/nondistended  Ext: Warm, FROM, no edema b/l LE  Neuro: No focal deficits  Psych: Normal affect and mood  Skin: Warm, without rashes         LABS/STUDIES  --------------------------------------------------------------------------------              8.2    11.58 >-----------<  78       [11-12-24 @ 04:55]              24.3     133  |  102  |  18  ----------------------------<  177      [11-12-24 @ 04:55]  4.0   |  23  |  0.77        Ca     7.8     [11-12-24 @ 04:55]    TPro  5.7  /  Alb  2.9  /  TBili  1.3  /  DBili  x   /  AST  26  /  ALT  15  /  AlkPhos  94  [11-12-24 @ 04:55]    PT/INR: PT 12.2 , INR 1.06       [11-12-24 @ 04:55]  PTT: 25.3       [11-12-24 @ 04:55]          [11-10-24 @ 18:15]    Ca ionized  Creatinine Trend:  POC glucoseGlucose: 177 mg/dL (11-12-24 @ 04:55)  CAPILLARY BLOOD GLUCOSE      POCT Blood Glucose.: 201 mg/dL (12 Nov 2024 08:11)  POCT Blood Glucose.: 233 mg/dL (12 Nov 2024 06:32)  POCT Blood Glucose.: 289 mg/dL (11 Nov 2024 21:34)  POCT Blood Glucose.: 287 mg/dL (11 Nov 2024 17:31)  POCT Blood Glucose.: 378 mg/dL (11 Nov 2024 12:14)    PrealbuminPrealbumin, Serum: 12 mg/dL (11-07-24 @ 03:48)    Triglycerides

## 2024-11-12 NOTE — CHART NOTE - NSCHARTNOTEFT_GEN_A_CORE
SURGERY PROGRESS NOTE    Procedure:  Bone Marrow Biopsy  Surgeon: CHUY    SUBJECTIVE: : 69y Female  s/p  bone marrow biopsy    Patient examined bedside. YARIEL since OR. Patient recovering uneventfully. Vitals WNR. Afebrile. Pain score 3/10. Patient denies fever, chills, nausea, vomiting.     Objective:  Gen: NAD, AAOx3  Pulm: No work on breathing  Card: RRR  abdomen: soft, non-distended. tenderness consistent with post operative course. incisions in tact, dry. dressings in place without strikethrough.      Vital Signs Last 24 Hrs  T(C): 36.4 (12 Nov 2024 13:57), Max: 36.8 (12 Nov 2024 11:20)  T(F): 97.5 (12 Nov 2024 13:57), Max: 98.3 (12 Nov 2024 11:20)  HR: 74 (12 Nov 2024 13:57) (62 - 76)  BP: 131/74 (12 Nov 2024 13:57) (120/80 - 163/92)  BP(mean): 95 (12 Nov 2024 12:20) (93 - 101)  RR: 17 (12 Nov 2024 13:57) (11 - 18)  SpO2: 100% (12 Nov 2024 13:57) (98% - 100%)    Parameters below as of 12 Nov 2024 13:57  Patient On (Oxygen Delivery Method): room air      I&O's Summary    11 Nov 2024 07:01  -  12 Nov 2024 07:00  --------------------------------------------------------  IN: 120 mL / OUT: 0 mL / NET: 120 mL      I&O's Detail    11 Nov 2024 07:01  -  12 Nov 2024 07:00  --------------------------------------------------------  IN:    Oral Fluid: 120 mL  Total IN: 120 mL    OUT:  Total OUT: 0 mL    Total NET: 120 mL            LABS:                        8.2    11.58 )-----------( 78       ( 12 Nov 2024 04:55 )             24.3     11-12    133[L]  |  102  |  18  ----------------------------<  177[H]  4.0   |  23  |  0.77    Ca    7.8[L]      12 Nov 2024 04:55    TPro  5.7[L]  /  Alb  2.9[L]  /  TBili  1.3[H]  /  DBili  x   /  AST  26  /  ALT  15  /  AlkPhos  94  11-12    PT/INR - ( 12 Nov 2024 04:55 )   PT: 12.2 sec;   INR: 1.06 ratio         PTT - ( 12 Nov 2024 04:55 )  PTT:25.3 sec  Urinalysis Basic - ( 12 Nov 2024 04:55 )    Color: x / Appearance: x / SG: x / pH: x  Gluc: 177 mg/dL / Ketone: x  / Bili: x / Urobili: x   Blood: x / Protein: x / Nitrite: x   Leuk Esterase: x / RBC: x / WBC x   Sq Epi: x / Non Sq Epi: x / Bacteria: x          A/P:     69y Female  s/p bone marrow biopsy    - YARIEL since OR, recovering well from procedure post operatively.   - C/w Pain control   - Monitor i/o's + Vitals    [facility]  [id number]

## 2024-11-12 NOTE — PRE PROCEDURE NOTE - PRE PROCEDURE EVALUATION
Interventional Radiology    HPI: 69y Female with pancreatic ca s/p resection '21, s/p chemo/rt then recurrence '22 s/p RT.  Presenting w/ diffuse abd pain, n/v and constipation x3d    Review of Systems:   Constitutional: NCAT, well-appearing   Respiratory: No shortness of breath  Cardiovascular: No chest pain  Gastrointestinal: No abdominal or epigastric pain. No nausea or vomiting.    Allergies: phenytoin (Rash)  ibuprofen (Other)  Dilantin (Unknown)    Medications (Abx/Cardiac/Anticoagulation/Blood Products)      Data:  154.9  36  T(C): 36.6  HR: 76  BP: 163/92  RR: 17  SpO2: 100%    -WBC 11.58 / HgB 8.2 / Hct 24.3 / Plt 78  -Na 133 / Cl 102 / BUN 18 / Glucose 177  -K 4.0 / CO2 23 / Cr 0.77  -ALT 15 / Alk Phos 94 / T.Bili 1.3  -INR1.06      Physical Exam  General: No acute distress, nontoxic, A&Ox3  Chest: Non labored breathing  Skin: No rashes or lesions    RADIOLOGY & ADDITIONAL TESTS:    Imaging Reviewed    H & P Note Reviewed from: 11/1/24    Plan: 69y Female presents for Bone Marrow Bx  -Risks/Benefits/alternatives explained with the patient and/or healthcare proxy and witnessed informed consent obtained.

## 2024-11-13 NOTE — PROGRESS NOTE ADULT - SUBJECTIVE AND OBJECTIVE BOX
[facility] NUTRITION SUPPORT-- FOLLOW UP NOTE      24 hour events/subjective:  Patient seen and examined at bedside, chart reviewed and events noted and I's and O's reviewed.  Patient denies chest pain, shortness of breath, nausea or vomiting, dizziness, chills at time of visit; s/p IR BM biopsy yesterday and diet advanced to minced.  Patient endorses good appetite however does not like taste of food and c/o diarrhea.  Patient's VSS and no other acute overnight events noted.  Patient's WBC downtrending, h/h stable, glucose trend elevated and continues on calorie count    ROS:  Except as noted above, all other systems reviewed and are negative   Diet:      PAST HISTORY  --------------------------------------------------------------------------------  PAST MEDICAL & SURGICAL HISTORY:  Pancreatic cancer      Hypertension      Hypothyroid      Seasonal asthma      History of pancreatectomy        No significant changes to PMH, PSH, FHx, SHx, unless otherwise noted    ALLERGIES & MEDICATIONS  --------------------------------------------------------------------------------  Allergies    phenytoin (Rash)  ibuprofen (Other)  Dilantin (Unknown)    Intolerances    platelet rash- will need pre-medication (Rash)    Standing Inpatient Medications  bisacodyl Suppository 10 milliGRAM(s) Rectal once  dexAMETHasone  IVPB 40 milliGRAM(s) IV Intermittent daily  dextrose 5%. 1000 milliLiter(s) IV Continuous <Continuous>  dextrose 5%. 1000 milliLiter(s) IV Continuous <Continuous>  dextrose 50% Injectable 12.5 Gram(s) IV Push once  dextrose 50% Injectable 25 Gram(s) IV Push once  dextrose 50% Injectable 25 Gram(s) IV Push once  folic acid 1 milliGRAM(s) Oral daily  glucagon  Injectable 1 milliGRAM(s) IntraMuscular once  insulin glargine Injectable (LANTUS) 10 Unit(s) SubCutaneous at bedtime  insulin lispro (ADMELOG) corrective regimen sliding scale   SubCutaneous three times a day before meals  insulin lispro (ADMELOG) corrective regimen sliding scale   SubCutaneous at bedtime  levothyroxine Injectable 70 MICROGram(s) IV Push at bedtime  multivitamin/minerals 1 Tablet(s) Oral daily  naloxone Injectable 0.4 milliGRAM(s) IV Push once  pantoprazole  Injectable 40 milliGRAM(s) IV Push daily  polyethylene glycol 3350 17 Gram(s) Oral two times a day  saline laxative (FLEET) Rectal Enema 1 Enema Rectal once  thiamine 100 milliGRAM(s) Oral daily    PRN Inpatient Medications  benzocaine/butamben/tetracaine Spray 1 Spray(s) Topical every 6 hours PRN  dextrose Oral Gel 15 Gram(s) Oral once PRN  ondansetron Injectable 4 milliGRAM(s) IV Push every 8 hours PRN        VITALS/PHYSICAL EXAM  --------------------------------------------------------------------------------  T(C): 36.6 (11-13-24 @ 04:56), Max: 36.6 (11-13-24 @ 04:56)  HR: 72 (11-13-24 @ 04:56) (72 - 79)  BP: 152/81 (11-13-24 @ 04:56) (131/74 - 152/81)  RR: 18 (11-13-24 @ 04:56) (17 - 18)  SpO2: 98% (11-13-24 @ 04:56) (98% - 100%)  Wt(kg): --  Height (cm): 154.9 (11-12-24 @ 09:31)  Weight (kg): 36 (11-12-24 @ 09:31)  BMI (kg/m2): 15 (11-12-24 @ 09:31)  BSA (m2): 1.28 (11-12-24 @ 09:31)    I&O's Detail      Physical Exam:  Gen: NAD, laying in bed   Chest: non labored breathing, equal chest expansion bilaterally  Abd: +BS, soft, nontender/nondistended  Ext: Warm, FROM, no edema b/l LE  Neuro: No focal deficits  Psych: Normal affect and mood  Skin: Warm, without rashes           LABS/STUDIES  --------------------------------------------------------------------------------              7.7    10.96 >-----------<  59       [11-13-24 @ 07:10]              23.3     130  |  98  |  22  ----------------------------<  237      [11-13-24 @ 07:12]  4.0   |  20  |  0.81        Ca     7.8     [11-13-24 @ 07:12]    TPro  x   /  Alb  x   /  TBili  1.2  /  DBili  0.3  /  AST  x   /  ALT  x   /  AlkPhos  x   [11-13-24 @ 07:12]    PT/INR: PT 12.2 , INR 1.06       [11-12-24 @ 04:55]  PTT: 25.3       [11-12-24 @ 04:55]          [11-13-24 @ 07:12]    Ca ionized  Creatinine Trend:  POC glucoseGlucose: 237 mg/dL (11-13-24 @ 07:12)  CAPILLARY BLOOD GLUCOSE      POCT Blood Glucose.: 198 mg/dL (13 Nov 2024 12:35)  POCT Blood Glucose.: 324 mg/dL (13 Nov 2024 10:18)  POCT Blood Glucose.: 233 mg/dL (13 Nov 2024 08:25)  POCT Blood Glucose.: 246 mg/dL (12 Nov 2024 21:17)  POCT Blood Glucose.: 379 mg/dL (12 Nov 2024 17:26)  POCT Blood Glucose.: 226 mg/dL (12 Nov 2024 13:40)    PrealbuminPrealbumin, Serum: 12 mg/dL (11-07-24 @ 03:48)    Triglycerides

## 2024-11-13 NOTE — PROGRESS NOTE ADULT - SUBJECTIVE AND OBJECTIVE BOX
Patient is a 69y old  Female who presents with a chief complaint of n/v (12 Nov 2024 11:45)    Patient seen and examined s/p bmbx yesterday. Platelets a bit lower.     MEDICATIONS  (STANDING):  bisacodyl Suppository 10 milliGRAM(s) Rectal once  dexAMETHasone  IVPB 40 milliGRAM(s) IV Intermittent daily  dextrose 5%. 1000 milliLiter(s) (50 mL/Hr) IV Continuous <Continuous>  dextrose 5%. 1000 milliLiter(s) (100 mL/Hr) IV Continuous <Continuous>  dextrose 50% Injectable 25 Gram(s) IV Push once  dextrose 50% Injectable 25 Gram(s) IV Push once  dextrose 50% Injectable 12.5 Gram(s) IV Push once  folic acid 1 milliGRAM(s) Oral daily  glucagon  Injectable 1 milliGRAM(s) IntraMuscular once  insulin lispro (ADMELOG) corrective regimen sliding scale   SubCutaneous three times a day before meals  insulin lispro (ADMELOG) corrective regimen sliding scale   SubCutaneous at bedtime  levothyroxine Injectable 70 MICROGram(s) IV Push at bedtime  multivitamin/minerals 1 Tablet(s) Oral daily  naloxone Injectable 0.4 milliGRAM(s) IV Push once  pantoprazole  Injectable 40 milliGRAM(s) IV Push daily  polyethylene glycol 3350 17 Gram(s) Oral two times a day  saline laxative (FLEET) Rectal Enema 1 Enema Rectal once  thiamine 100 milliGRAM(s) Oral daily    MEDICATIONS  (PRN):  benzocaine/butamben/tetracaine Spray 1 Spray(s) Topical every 6 hours PRN sore throat  dextrose Oral Gel 15 Gram(s) Oral once PRN Blood Glucose LESS THAN 70 milliGRAM(s)/deciliter  ondansetron Injectable 4 milliGRAM(s) IV Push every 8 hours PRN Nausea and/or Vomiting    Vital Signs Last 24 Hrs  T(C): 36.6 (13 Nov 2024 04:56), Max: 36.8 (12 Nov 2024 11:20)  T(F): 97.9 (13 Nov 2024 04:56), Max: 98.3 (12 Nov 2024 11:20)  HR: 72 (13 Nov 2024 04:56) (62 - 79)  BP: 152/81 (13 Nov 2024 04:56) (120/80 - 152/81)  BP(mean): 95 (12 Nov 2024 12:20) (93 - 101)  RR: 18 (13 Nov 2024 04:56) (11 - 18)  SpO2: 98% (13 Nov 2024 04:56) (98% - 100%)    Parameters below as of 13 Nov 2024 04:56  Patient On (Oxygen Delivery Method): room air    PE  NAD  Awake, alert  Anicteric, MMM  RRR  CTAB  Abd soft, NT, ND  No c/c/e  No rash grossly                          7.7    10.96 )-----------( 59       ( 13 Nov 2024 07:10 )             23.3       11-13    130[L]  |  98  |  22  ----------------------------<  237[H]  4.0   |  20[L]  |  0.81    Ca    7.8[L]      13 Nov 2024 07:12    TPro  x   /  Alb  x   /  TBili  1.2  /  DBili  0.3  /  AST  x   /  ALT  x   /  AlkPhos  x   11-13

## 2024-11-13 NOTE — PROGRESS NOTE ADULT - SUBJECTIVE AND OBJECTIVE BOX
Name of Patient : DANUTA CALDERON  MRN: [id number]  Date of visit: 11-13-24 @ 14:43      Subjective: Patient seen and examined. No new events except as noted.   Patient seen earlier this AM. Lying down in bed. Tolerating Minced and Moist diet.   S/P Bone marrow biopsy yesterday. Reports pain is better controlled.   Platelets down-trending. Started on IV Dexa 40 Qd as per heme/onc with insulin coverage.     REVIEW OF SYSTEMS:    CONSTITUTIONAL: + Generalized weakness   EYES/ENT: No visual changes;  No vertigo or throat pain   NECK: No pain or stiffness  RESPIRATORY: No cough, wheezing, hemoptysis; No shortness of breath  CARDIOVASCULAR: No chest pain or palpitations  GASTROINTESTINAL: + Tolerating diet; + BM; + Pain controlled   GENITOURINARY: No dysuria, frequency or hematuria  NEUROLOGICAL: No numbness or weakness  SKIN: No itching, burning, rashes, or lesions   All other review of systems is negative unless indicated above.    MEDICATIONS:  MEDICATIONS  (STANDING):  bisacodyl Suppository 10 milliGRAM(s) Rectal once  dexAMETHasone  IVPB 40 milliGRAM(s) IV Intermittent daily  dextrose 5%. 1000 milliLiter(s) (50 mL/Hr) IV Continuous <Continuous>  dextrose 5%. 1000 milliLiter(s) (100 mL/Hr) IV Continuous <Continuous>  dextrose 50% Injectable 12.5 Gram(s) IV Push once  dextrose 50% Injectable 25 Gram(s) IV Push once  dextrose 50% Injectable 25 Gram(s) IV Push once  folic acid 1 milliGRAM(s) Oral daily  glucagon  Injectable 1 milliGRAM(s) IntraMuscular once  insulin glargine Injectable (LANTUS) 10 Unit(s) SubCutaneous at bedtime  insulin lispro (ADMELOG) corrective regimen sliding scale   SubCutaneous three times a day before meals  insulin lispro (ADMELOG) corrective regimen sliding scale   SubCutaneous at bedtime  levothyroxine Injectable 70 MICROGram(s) IV Push at bedtime  multivitamin/minerals 1 Tablet(s) Oral daily  naloxone Injectable 0.4 milliGRAM(s) IV Push once  pantoprazole  Injectable 40 milliGRAM(s) IV Push daily  polyethylene glycol 3350 17 Gram(s) Oral two times a day  saline laxative (FLEET) Rectal Enema 1 Enema Rectal once  thiamine 100 milliGRAM(s) Oral daily      PHYSICAL EXAM:  T(C): 36.7 (11-13-24 @ 12:10), Max: 36.7 (11-13-24 @ 12:10)  HR: 77 (11-13-24 @ 12:10) (72 - 79)  BP: 135/81 (11-13-24 @ 12:10) (131/81 - 152/81)  RR: 18 (11-13-24 @ 12:10) (18 - 18)  SpO2: 100% (11-13-24 @ 12:10) (98% - 100%)  Wt(kg): --  I&O's Summary      Appearance: Awake, frail, lying down in bed 	  HEENT:  Eyes are open   Lymphatic: No lymphadenopathy   Cardiovascular: Normal   Respiratory: normal effort , clear  Gastrointestinal:  Soft, Non-tender  Skin: No rashes,  warm to touch  Psychiatry:  Mood & affect appropriate  Musculoskeletal: No edema                             7.7    10.96 )-----------( 59       ( 13 Nov 2024 07:10 )             23.3               11-13    130[L]  |  98  |  22  ----------------------------<  237[H]  4.0   |  20[L]  |  0.81    Ca    7.8[L]      13 Nov 2024 07:12    TPro  x   /  Alb  x   /  TBili  1.2  /  DBili  0.3  /  AST  x   /  ALT  x   /  AlkPhos  x   11-13    PT/INR - ( 12 Nov 2024 04:55 )   PT: 12.2 sec;   INR: 1.06 ratio         PTT - ( 12 Nov 2024 04:55 )  PTT:25.3 sec                   Urinalysis Basic - ( 13 Nov 2024 07:12 )    Color: x / Appearance: x / SG: x / pH: x  Gluc: 237 mg/dL / Ketone: x  / Bili: x / Urobili: x   Blood: x / Protein: x / Nitrite: x   Leuk Esterase: x / RBC: x / WBC x   Sq Epi: x / Non Sq Epi: x / Bacteria: x        < from: [facility] Duplex Upper Ext Vein Scan, Right (11.11.24 @ 17:05) >    IMPRESSION:  No evidence of right upper extremity deep venous thrombosis.  Nonocclusive thrombus in the distal upper arm basilic vein.    < end of copied text >

## 2024-11-13 NOTE — PROGRESS NOTE ADULT - ASSESSMENT
69F admitted for malignant gastric outlet obstruction       Malignant Gastric outlet obstruction.   - CT A/P as noted; CT H as noted   - Was on malignant GOO protocol, now off.   - Pain control   - s/P Sten placement by GI, tolerating diet, advance as tolerated --> Now on Minced and Moist. Monitor for tolerance   - GI eval appreciated; F/u recs     R/O Transfusion reaction   - Patient's Plt down-trending and was being transfused 1 unit of platelets, reported that near completion of 1 unit of platelets, Patient with erythematous rash and pruritis. no respiratory symptoms   - Platelet transfusion stopped and bag was sent to blood bank for work up  - Cont to have low plt and dop in hgb level- S/P 1 unit pf plt and PRBC 11/10  - Repeat CT A/P as noted   - R/o hemolysis per Heme  - S/P IR Bone Marrow Bx on 11/12; F/u results   - Heme/Onc eval appreciated; F/u recs --> Plt down-trending 11/13 Now on Dexa 40 Qd X 4 days w/ PPI. Monitor Plt.     HypoNa  - Na of 130. Likely due to steroid induced hyperglycemia  - Monitor and trend Na level. Avoid overcorrection > 6-8 mEq in 24 hours  - Trend Na level in AM, Lantus added onto regimen     Pancreatic cancer.   - Diagnosed in 2021 s/p resection, chemo/RT and now with recurrent in 2022 s/p RT.  - On active DMT with [facility]   - Heme/Onc and palliative eval appreciated; F/u recs     Anemia, Thrombocytopenia   - Likely due to malignancy and treatment; F/u anemia labs   - Platelet count down-trending. Monitor closely --> HIT w/u neg --> Hep Ab/ ISIDORO neg   - Transfuse for Hgb <7, Plt <10K or < 50K w/ fever/bleeding   - Heme/Onc checking hemolysis labs and smear to evaluate thrombocytopenia.   - Heme/Onc eval appreciated; F/u recs  - 1 unit of plt and PRBC, completed 11/10  - S/P BM Bx 11/12, F.u rsults  - Heme/Onc eval appreciated; F/u recs     Hypertension.   - No meds per mouth.   - Monitor BP. If sustained SBP >160, may use iv hydralazine as needed.    Type 2 diabetes mellitus.   - Hold home oral dm meds  - ISS while inpatient  - IVF for hydration   - Monitor glucose levels closely --> Lantus 10 started on view of High dose steroids. Monitor and adjust as tolerated    Abnormal Duplex   - Duplex w/ Nonocclusive thrombus in distal upper arm basilic vein, no evidence of RUE DVT   - Check routine surveillance duplexes     Hypothyroid.   - TSH and FT4 noted   - On IV Synthroid 70 - dose confirmed with Pharmacy  - resume oral when tolerating     Prophylactic measure.   - Heparin SubQ on HOLD       Discussed with Attending, ACP

## 2024-11-13 NOTE — PROGRESS NOTE ADULT - ASSESSMENT
69F with PMHx of [facility] s/p distal pancreatectomy (2021) and chemo/radiation w/ recurrence to abdominal wall s/p resection (2022) and radiation who presented 11/1 to the ED with abdominal pain, nausea, vomiting, and CT A/P with ill-defined hypodense pancreatic head mass with multifocal narrowing along distal stomach and duodenum, consistent with gastric outlet obstruction and cancer recurrence. Surgery was consulted for gastric outlet obstruction management. EGD on 11/5 demonstrating multifocal stricturing disease of the pylorus and duodenum due to tumor ingrowth.  Advanced GI planning on endoscopic GJ versus Axios stent placement tomorrow. Pt on Malignant bowel obstruction protocol   (dexamethasone 4mg IV q12h, reglan 10 mg IV q8h, octreotide 100 ucg SQ q8h)    - Nutritional Assessment, pt is not meeting nutritional goals enterally.  Prealbumin decreased at 12 mg/dL with severe protein calorie malnutrition; trend prealbumin weekly   - TPN plan: s/p endoscopic gastrojejunostomy; diet advanced to minced on 11/12; f/u calorie count planned 11/11-11/14; NO plan for TPN unless enteral route unable to be utilized for nutrition  - TPN access:  Patient will need a dedicated central line if/once TPN approved  - Electrolyte Imbalance risk- check CMP, Mg, Phos, iCa and K daily, replete as per Medicine.  - GOO:  s/p endoscopic gastrojejunostomy; f/u GI and Sx recs; CT a/p from 11/11 showed infiltrative pancreatic head mass w/severe narrowing/occlusion portosplenic confluence; small ascites and resolution of GOO s/p gastrojejunal stent placement   - Risk of Hyperglycemia: HgA1c 6.5% and glucose elevated on Dexamethasone/off Oral Diabetic meds; fingersticks/management per primary team; Lantus started 11/13; management per primary team   - Risk of Hypertriglyceridemia:   mg/dL on 11/7; plan to trend TG closely on TPN   - Thrombocytopenia:  s/p transfusion w/possible transfusion reaction; work up/management per primary team; s/p BM biopsy by IR on 11/12; f/u results  - Vitamin D def:  recommend enteral repletion once diet resumes  - Recommend strict intake and output/weight checks three times a week; IV fluids per primary team  - Further care as per Medicine    Available on TEAMS  TPN spectra [id number] ([phone] when dialing from outside line)  Above reviewed with Dr. Lucas Avalos

## 2024-11-13 NOTE — PROGRESS NOTE ADULT - ASSESSMENT
70 y/o F w/pancreatic ca s/p resection '21, s/p chemo/rt then recurrence '22 s/p RT.  Presenting w/ diffuse abd pain, n/v and constipation x3d.     #Pancreatic ca  --Follows with Dr. Bess of [facility] [place]  --S/p distal pancreatectomy and splenectomy (06/2021)  --S/p adjuvant tx w/ mFOLFIRINOX (08/21-01/21), Xeloda +RTx 25 fractions (02/22-03/22)-> POD (01/23)  --Most recent systemic tx w/ Gemcitabine and nab-paclitaxel (05/23-10/23, 02/24-07/24). Break in tx 2/2 jaundice and stent migration  --S/p RT x 10 fractions to abdominal wall mets 08/2024  --No plan for cancer treatment inpatient and/or rehab    #Gastric outlet obstruction  --CT a/p w/ ill-defined hypodense pancreatic mass w/ multifocal narrowing along distal stomach and duodenum resulting in gastric outlet obstruction.  --Surgery following no plan for acute surgical intervention at this time   --On malignant bowel obstruction protocol w/dex, reglan, and octreotide.   --EGD on 11/5 demonstrated multifocal stricturing disease of the pylorus and duodenum due to tumor ingrowth.   --GI following, s/p endoscopic GJ 11/7. GI advancing diet    #MAHA  Platelets normal on admission -> started downtrending around 11/5, responded to transfusion on 11/7 (PLT 32 -> 105, but dropped to 65 by the next morning), now 59  Hgb 9.8 on admission (likely 2/2 malignancy and cancer treatments) -> 8.2 now   - Smear reviewed, many schistocytes suggestive of intravascular hemolysis: no known cardiac valvular disease, no obvious signs of infection. Repeat smear reviewed today, 11/13, 1-2 schistocytes per HPF  - Please check blood cultures  - LDH high and hapto low, bili normal, taken together with smear these findings are concerning for intravascular hemolysis - repeat , f/u hapto, normal bili and retic 150.7  - Coags previously normal so less concerning for DIC, although possible / perhaps myelopthisis, normal PT/PTT. Normal fibrinogen level  - Acute non-febrile reaction to platelet transfusion the other day - acute urticarial; no need for washed products per conversation with blood bank, please check IgA level (lab never drawn on 11/11, ordered again today)  - F/u flow cytometry  - F/u [id number], although low plasmic score - creatinine normal, MCV normal, less suspicion for classic TTP  - Can have MAHA from gemcitabine but it is unusual to occur to long after treatment (last chemo was July)   - HIT negative, ISIDORO negative   - S/p 1U PRBC, PLT 11/10; PLEASE HOLD OFF ON FURTHER PLATELET TRANSFUSIONS AT THIS TIME  - Concerned for TMA of malignancy.  - discussed with blood bank; continue trial of high dose steroids for possible autoimmune component, can complete dex 40mg x 4 days (was on incorrect dose, corrected today, 11/13)  - There is no beneficial role for PEX, steroids, or other immuno-suppression if this is truly cancer-associated TMA. Treatment would be aimed at the underlying cancer, but this would be an overall poor prognostic factor   - IR bone marrow biopsy 11/12, f/u path  - Continue follic acid    Will follow,    Chandler Johansen PA-C  Hematology/Oncology  [facility]  [phone] (office)

## 2024-11-14 NOTE — PROGRESS NOTE ADULT - SUBJECTIVE AND OBJECTIVE BOX
Patient is a 69y old  Female who presents with a chief complaint of Abd pain (14 Nov 2024 12:15)    Patient seen and examined  Appears comfortable, reports concern about her thrombocytopenia    MEDICATIONS  (STANDING):  bisacodyl Suppository 10 milliGRAM(s) Rectal once  dexAMETHasone  IVPB 40 milliGRAM(s) IV Intermittent daily  dextrose 5%. 1000 milliLiter(s) (50 mL/Hr) IV Continuous <Continuous>  dextrose 5%. 1000 milliLiter(s) (100 mL/Hr) IV Continuous <Continuous>  dextrose 50% Injectable 12.5 Gram(s) IV Push once  dextrose 50% Injectable 25 Gram(s) IV Push once  dextrose 50% Injectable 25 Gram(s) IV Push once  folic acid 1 milliGRAM(s) Oral daily  glucagon  Injectable 1 milliGRAM(s) IntraMuscular once  insulin glargine Injectable (LANTUS) 10 Unit(s) SubCutaneous at bedtime  insulin lispro (ADMELOG) corrective regimen sliding scale   SubCutaneous three times a day before meals  insulin lispro (ADMELOG) corrective regimen sliding scale   SubCutaneous at bedtime  levothyroxine Injectable 70 MICROGram(s) IV Push at bedtime  multivitamin/minerals 1 Tablet(s) Oral daily  naloxone Injectable 0.4 milliGRAM(s) IV Push once  pantoprazole  Injectable 40 milliGRAM(s) IV Push daily  polyethylene glycol 3350 17 Gram(s) Oral two times a day  saline laxative (FLEET) Rectal Enema 1 Enema Rectal once  thiamine 100 milliGRAM(s) Oral daily    MEDICATIONS  (PRN):  benzocaine/butamben/tetracaine Spray 1 Spray(s) Topical every 6 hours PRN sore throat  dextrose Oral Gel 15 Gram(s) Oral once PRN Blood Glucose LESS THAN 70 milliGRAM(s)/deciliter  ondansetron Injectable 4 milliGRAM(s) IV Push every 8 hours PRN Nausea and/or Vomiting      Vital Signs Last 24 Hrs  T(C): 36.4 (14 Nov 2024 12:17), Max: 36.9 (13 Nov 2024 20:28)  T(F): 97.5 (14 Nov 2024 12:17), Max: 98.5 (13 Nov 2024 20:28)  HR: 72 (14 Nov 2024 12:17) (72 - 80)  BP: 129/78 (14 Nov 2024 12:17) (121/77 - 159/78)  BP(mean): --  RR: 18 (14 Nov 2024 12:17) (18 - 18)  SpO2: 97% (14 Nov 2024 12:17) (97% - 100%)    Parameters below as of 14 Nov 2024 12:17  Patient On (Oxygen Delivery Method): room air        PE  Awake, alert  Anicteric, MMM  RRR  CTAB  Abd soft, NT, ND  No c/c                        7.9    11.46 )-----------( 55       ( 14 Nov 2024 07:30 )             24.2       11-14    133[L]  |  101  |  22  ----------------------------<  197[H]  4.1   |  22  |  0.88    Ca    8.2[L]      14 Nov 2024 07:30    TPro  x   /  Alb  x   /  TBili  1.2  /  DBili  0.3  /  AST  x   /  ALT  x   /  AlkPhos  x   11-13

## 2024-11-14 NOTE — PROGRESS NOTE ADULT - ASSESSMENT
69F with PMHx of [facility] s/p distal pancreatectomy (2021) and chemo/radiation w/ recurrence to abdominal wall s/p resection (2022) and radiation who presented 11/1 to the ED with abdominal pain, nausea, vomiting, and CT A/P with ill-defined hypodense pancreatic head mass with multifocal narrowing along distal stomach and duodenum, consistent with gastric outlet obstruction and cancer recurrence. Surgery was consulted for gastric outlet obstruction management. EGD on 11/5 demonstrating multifocal stricturing disease of the pylorus and duodenum due to tumor ingrowth.  Advanced GI planning on endoscopic GJ versus Axios stent placement tomorrow. Pt on Malignant bowel obstruction protocol   (dexamethasone 4mg IV q12h, reglan 10 mg IV q8h, octreotide 100 ucg SQ q8h)    - Nutritional Assessment, pt is not meeting nutritional goals enterally.  Prealbumin decreased at 12 mg/dL with severe protein calorie malnutrition; trend prealbumin weekly   - TPN plan: s/p endoscopic gastrojejunostomy; diet advanced to minced on 11/12; f/u calorie count planned 11/11-11/14; NO plan for TPN unless enteral route unable to be utilized for nutrition  - TPN access:  Patient will need a dedicated central line if/once TPN approved  - Electrolyte Imbalance risk- check CMP, Mg, Phos, iCa and K daily, replete as per Medicine.  - GOO:  s/p endoscopic gastrojejunostomy; f/u GI and Sx recs; CT a/p from 11/11 showed infiltrative pancreatic head mass w/severe narrowing/occlusion portosplenic confluence; small ascites and resolution of GOO s/p gastrojejunal stent placement   - Risk of Hyperglycemia: HgA1c 6.5% and glucose elevated on Dexamethasone/off Oral Diabetic meds; fingersticks/management per primary team; Lantus started 11/13; management per primary team   - Risk of Hypertriglyceridemia:   mg/dL on 11/7; plan to trend TG closely on TPN   - Thrombocytopenia:  s/p transfusion w/possible transfusion reaction; work up/management per primary team; s/p BM biopsy by IR on 11/12; f/u results  - Vitamin D def:  recommend enteral repletion once diet resumes  - Recommend strict intake and output/weight checks three times a week; IV fluids per primary team  - Further care as per Medicine    Available on TEAMS  TPN spectra [id number] ([phone] when dialing from outside line)  Above reviewed with Dr. Lucas Avalos

## 2024-11-14 NOTE — PROGRESS NOTE ADULT - SUBJECTIVE AND OBJECTIVE BOX
[facility] NUTRITION SUPPORT-- FOLLOW UP NOTE      24 hour events/subjective:   Patient seen and examined at bedside, chart reviewed and events noted and I's and O's reviewed.  Patient denies chest pain, shortness of breath, nausea or vomiting, dizziness, chills at time of visit.  Patient tolerating enteral diet; VSS and no other acute overnight events noted.     ROS:  Except as noted above, all other systems reviewed and are negative       Diet, Minced and Moist:   Consistent Carbohydrate Evening Snack (CSTCHOSN)  Supplement Feeding Modality:  Oral  Glucerna Shake Cans or Servings Per Day:  1       Frequency:  Daily (11-12-24 @ 13:22) [Active]        PAST HISTORY  --------------------------------------------------------------------------------  PAST MEDICAL & SURGICAL HISTORY:  Pancreatic cancer      Hypertension      Hypothyroid      Seasonal asthma      History of pancreatectomy        No significant changes to PMH, PSH, FHx, SHx, unless otherwise noted    ALLERGIES & MEDICATIONS  --------------------------------------------------------------------------------  Allergies    phenytoin (Rash)  ibuprofen (Other)  Dilantin (Unknown)    Intolerances    platelet rash- will need pre-medication (Rash)    Standing Inpatient Medications  bisacodyl Suppository 10 milliGRAM(s) Rectal once  dexAMETHasone  IVPB 40 milliGRAM(s) IV Intermittent daily  dextrose 5%. 1000 milliLiter(s) IV Continuous <Continuous>  dextrose 5%. 1000 milliLiter(s) IV Continuous <Continuous>  dextrose 50% Injectable 12.5 Gram(s) IV Push once  dextrose 50% Injectable 25 Gram(s) IV Push once  dextrose 50% Injectable 25 Gram(s) IV Push once  folic acid 1 milliGRAM(s) Oral daily  glucagon  Injectable 1 milliGRAM(s) IntraMuscular once  insulin glargine Injectable (LANTUS) 10 Unit(s) SubCutaneous at bedtime  insulin lispro (ADMELOG) corrective regimen sliding scale   SubCutaneous three times a day before meals  insulin lispro (ADMELOG) corrective regimen sliding scale   SubCutaneous at bedtime  levothyroxine Injectable 70 MICROGram(s) IV Push at bedtime  multivitamin/minerals 1 Tablet(s) Oral daily  naloxone Injectable 0.4 milliGRAM(s) IV Push once  pantoprazole  Injectable 40 milliGRAM(s) IV Push daily  polyethylene glycol 3350 17 Gram(s) Oral two times a day  saline laxative (FLEET) Rectal Enema 1 Enema Rectal once  thiamine 100 milliGRAM(s) Oral daily    PRN Inpatient Medications  benzocaine/butamben/tetracaine Spray 1 Spray(s) Topical every 6 hours PRN  dextrose Oral Gel 15 Gram(s) Oral once PRN  ondansetron Injectable 4 milliGRAM(s) IV Push every 8 hours PRN        VITALS/PHYSICAL EXAM  --------------------------------------------------------------------------------  T(C): 36.4 (11-14-24 @ 12:17), Max: 36.9 (11-13-24 @ 20:28)  HR: 72 (11-14-24 @ 12:17) (72 - 80)  BP: 129/78 (11-14-24 @ 12:17) (121/77 - 159/78)  RR: 18 (11-14-24 @ 12:17) (18 - 18)  SpO2: 97% (11-14-24 @ 12:17) (97% - 100%)  Wt(kg): --      11-14-24 @ 07:01  -  11-14-24 @ 12:29  --------------------------------------------------------  IN: 240 mL / OUT: 0 mL / NET: 240 mL      I&O's Detail    14 Nov 2024 07:01  -  14 Nov 2024 12:29  --------------------------------------------------------  IN:    Oral Fluid: 240 mL  Total IN: 240 mL    OUT:  Total OUT: 0 mL    Total NET: 240 mL          Physical Exam:  Gen: NAD, laying in bed   Chest: non labored breathing, equal chest expansion bilaterally  Abd: +BS, soft, nontender/nondistended  Ext: Warm, FROM, no edema b/l LE  Neuro: No focal deficits  Psych: Normal affect and mood  Skin: Warm, without rashes       LABS/STUDIES  --------------------------------------------------------------------------------              7.9    11.46 >-----------<  55       [11-14-24 @ 07:30]              24.2     133  |  101  |  22  ----------------------------<  197      [11-14-24 @ 07:30]  4.1   |  22  |  0.88        Ca     8.2     [11-14-24 @ 07:30]    TPro  x   /  Alb  x   /  TBili  1.2  /  DBili  0.3  /  AST  x   /  ALT  x   /  AlkPhos  x   [11-13-24 @ 07:12]              [11-13-24 @ 07:12]    Ca ionized  Creatinine Trend:  POC glucoseGlucose: 197 mg/dL (11-14-24 @ 07:30)  CAPILLARY BLOOD GLUCOSE      POCT Blood Glucose.: 210 mg/dL (14 Nov 2024 12:09)  POCT Blood Glucose.: 202 mg/dL (14 Nov 2024 08:27)  POCT Blood Glucose.: 346 mg/dL (13 Nov 2024 21:25)  POCT Blood Glucose.: 304 mg/dL (13 Nov 2024 17:00)  POCT Blood Glucose.: 198 mg/dL (13 Nov 2024 12:35)    PrealbuminPrealbumin, Serum: 13 mg/dL (11-13-24 @ 07:14)  Prealbumin, Serum: 12 mg/dL (11-07-24 @ 03:48)    Triglycerides

## 2024-11-14 NOTE — PROGRESS NOTE ADULT - ASSESSMENT
68 y/o F w/pancreatic ca s/p resection '21, s/p chemo/rt then recurrence '22 s/p RT.  Presenting w/ diffuse abd pain, n/v and constipation x3d.     #Pancreatic ca  --[place] [facility] [place]  --S/p distal pancreatectomy and splenectomy (06/2021)  --S/p adjuvant tx w/ mFOLFIRINOX (08/21-01/21), Xeloda +RTx 25 fractions (02/22-03/22)-> POD (01/23)  --Most recent systemic tx w/ Gemcitabine and nab-paclitaxel (05/23-10/23, 02/24-07/24). Break in tx 2/2 jaundice and stent migration  --S/p RT x 10 fractions to abdominal wall mets 08/2024  --No plan for cancer treatment inpatient and/or rehab    #Gastric outlet obstruction  --CT a/p w/ ill-defined hypodense pancreatic mass w/ multifocal narrowing along distal stomach and duodenum resulting in gastric outlet obstruction.  --Surgery following no plan for acute surgical intervention at this time   --On malignant bowel obstruction protocol w/dex, reglan, and octreotide.   --EGD on 11/5 demonstrated multifocal stricturing disease of the pylorus and duodenum due to tumor ingrowth.   --GI following, s/p endoscopic GJ 11/7. GI advancing diet    #MAHA  Platelets normal on admission -> started downtrending around 11/5, responded to transfusion on 11/7 (PLT 32 -> 105, but dropped to 65 by the next morning), now 59  Hgb 9.8 on admission (likely 2/2 malignancy and cancer treatments) -> 8.2 now   - Smear reviewed, many schistocytes suggestive of intravascular hemolysis: no known cardiac valvular disease, no obvious signs of infection. Repeat smear reviewed today, 11/13, 1-2 schistocytes per HPF  - Please check blood cultures  - LDH high and hapto low, bili normal, taken together with smear these findings are concerning for intravascular hemolysis - repeat , f/u hapto, normal bili and retic 150.7  - Coags previously normal so less concerning for DIC, although possible / perhaps myelopthisis, normal PT/PTT. Normal fibrinogen level  - Acute non-febrile reaction to platelet transfusion the other day - acute urticarial; no need for washed products per conversation with blood bank, please check IgA level (lab never drawn on 11/11, ordered again today)  - peripheral flow w/ immunophenotypically unremarkable B-cell, T-cell and myeloid populations  - QLOCND15 unremarkable  - creatinine normal, MCV normal, less suspicion for classic TTP  - Can have MAHA from gemcitabine but it is unusual to occur to long after treatment (last chemo was July)   - HIT negative, ISIDORO negative   - S/p 1U PRBC, PLT 11/10; PLEASE HOLD OFF ON FURTHER PLATELET TRANSFUSIONS AT THIS TIME  - Concerned for TMA of malignancy.  - discussed with blood bank; continue trial of high dose steroids for possible autoimmune component, can complete dex 40mg x 4 days (was on incorrect dose, corrected on 11/13)  - There is no beneficial role for PLEX, steroids, or other immuno-suppression if this is truly cancer-associated TMA. Treatment would be aimed at the underlying cancer, but this would be an overall poor prognostic factor   - IR bone marrow biopsy 11/12, f/up path  - Continue follic acid    Will follow,    Bailey Herrera NP  Hematology/ Oncology  [facility]  [phone] (office)  [phone] (alt office)  Evenings and weekends please call MD on call or office

## 2024-11-14 NOTE — PROGRESS NOTE ADULT - ASSESSMENT
69F admitted for malignant gastric outlet obstruction       Malignant Gastric outlet obstruction.   - CT A/P as noted; CT H as noted   - Was on malignant GOO protocol, now off.   - Pain control   - s/P Sten placement by GI, tolerating diet, advance as tolerated --> Now on Minced and Moist. Monitor for tolerance   - GI eval appreciated; F/u recs     R/O Transfusion reaction   - Patient's Plt down-trending and was being transfused 1 unit of platelets, reported that near completion of 1 unit of platelets, Patient with erythematous rash and pruritis. no respiratory symptoms   - Platelet transfusion stopped and bag was sent to blood bank for work up  - Cont to have low plt and dop in hgb level- S/P 1 unit pf plt and PRBC 11/10  - Repeat CT A/P as noted   - R/o hemolysis per Heme  - S/P IR Bone Marrow Bx on 11/12; F/u results   - Heme/Onc eval appreciated; F/u recs --> Plt down-trending 11/13 C/w Dexa 40 Qd X 4 days w/ PPI. Monitor Plt.     HypoNa  - Lkely due to steroid induced hyperglycemia  - Monitor and trend Na level. Avoid overcorrection > 6-8 mEq in 24 hours  - Trend Na level in AM, Lantus added onto regimen     Pancreatic cancer.   - Diagnosed in 2021 s/p resection, chemo/RT and now with recurrent in 2022 s/p RT.  - On active DMT with [facility]   - Heme/Onc and palliative eval appreciated; F/u recs     Anemia, Thrombocytopenia   - Likely due to malignancy and treatment; F/u anemia labs   - Platelet count down-trending. Monitor closely --> HIT w/u neg --> Hep Ab/ ISIDORO neg   - Transfuse for Hgb <7, Plt <10K or < 50K w/ fever/bleeding   - Heme/Onc checking hemolysis labs and smear to evaluate thrombocytopenia.   - Heme/Onc eval appreciated; F/u recs  - 1 unit of plt and PRBC, completed 11/10  - S/P BM Bx 11/12, F.u rsults  - Heme/Onc eval appreciated; F/u recs     Hypertension.   - No meds per mouth.   - Monitor BP. If sustained SBP >160, may use iv hydralazine as needed.    Type 2 diabetes mellitus.   - Hold home oral dm meds  - ISS while inpatient  - IVF for hydration   - Monitor glucose levels closely --> Lantus 10 started on view of High dose steroids. Monitor and adjust as tolerated    Abnormal Duplex   - Duplex w/ Nonocclusive thrombus in distal upper arm basilic vein, no evidence of RUE DVT   - Check routine surveillance duplexes     Hypothyroid.   - TSH and FT4 noted   - On IV Synthroid 70 - dose confirmed with Pharmacy  - resume oral when tolerating     Prophylactic measure.   - Heparin SubQ on HOLD       Discussed with Attending, ACP

## 2024-11-14 NOTE — CHART NOTE - NSCHARTNOTEFT_GEN_A_CORE
NUTRITION FOLLOW UP NOTE    PATIENT SEEN FOR:    SOURCE: [] Patient  [x] Current Medical Record  [] RN  [] Family/support person at bedside  [] Patient unavailable/inappropriate  [] Other:    CHART REVIEWED/EVENTS NOTED.  [] No changes to nutrition care plan to note  [] Nutrition Status:  - Diet advanced from full liquid diet --> minced and moist on 11/12.   - S/p endoscopic gastrojejunostomy 11/07 for malignant gastric outlet obstruction.   - TPN team following; per note 11/10 "f/u diet advancement/tolerance and hold off on TPN unless enteral route unable to be utilized for nutrition."   - Heme/Onc: Hx pancreatic cancer.  -s/p bone marrow biopsy 11/12.     DIET ORDER:   Diet, Minced and Moist:   Consistent Carbohydrate {Evening Snack} (CSTCHOSN)  Supplement Feeding Modality:  Oral  Glucerna Shake Cans or Servings Per Day:  1       Frequency:  Daily (11-12-24 @ 13:22) [Active]      CURRENT DIET ORDER IS:  [x] Appropriate:  [] Inadequate:  [] Other:    NUTRITION INTAKE/PROVISION:  [x] PO: Patient reports tolerating minced and moist diet. See calorie count results below. Per RN patient eating okay.   [] Enteral Nutrition:  [] Parenteral Nutrition:    Calorie Count results:  -Day 1 (11/11/24): Pt consumed ~525 kcal and ~23 g pro.  Meets ~42% lower estimated energy needs, and ~43% lower estimated protein needs. Note patient on full liquid diet this day.   -Day 2 (11/12/24): Pt consumed ~200 kcal and ~11 g pro. Note patient was NPO so only dinner documented.   -Day 3 (11/13/24): N/a not documented  -Overall, unable to assess protein-energy intake from calorie count results given full liquid diet and NPO status, and lack of documentation. Patient appears to be eating okay and tolerating minced and moist diet currently.     ANTHROPOMETRICS:  Drug Dosing Weight  Height (cm): 154.9 (12 Nov 2024 09:31)  Weight (kg): 36 (12 Nov 2024 09:31)  BMI (kg/m2): 15 (12 Nov 2024 09:31)      NUTRITIONALLY PERTINENT MEDICATIONS:  MEDICATIONS  (STANDING):  bisacodyl Suppository 10 milliGRAM(s) Rectal once  dexAMETHasone  IVPB 40 milliGRAM(s) IV Intermittent daily  dextrose 5%. 1000 milliLiter(s) (50 mL/Hr) IV Continuous <Continuous>  dextrose 5%. 1000 milliLiter(s) (100 mL/Hr) IV Continuous <Continuous>  dextrose 50% Injectable 12.5 Gram(s) IV Push once  dextrose 50% Injectable 25 Gram(s) IV Push once  dextrose 50% Injectable 25 Gram(s) IV Push once  folic acid 1 milliGRAM(s) Oral daily  glucagon  Injectable 1 milliGRAM(s) IntraMuscular once  insulin glargine Injectable (LANTUS) 10 Unit(s) SubCutaneous at bedtime  insulin lispro (ADMELOG) corrective regimen sliding scale   SubCutaneous three times a day before meals  insulin lispro (ADMELOG) corrective regimen sliding scale   SubCutaneous at bedtime  levothyroxine Injectable 70 MICROGram(s) IV Push at bedtime  multivitamin/minerals 1 Tablet(s) Oral daily  naloxone Injectable 0.4 milliGRAM(s) IV Push once  pantoprazole  Injectable 40 milliGRAM(s) IV Push daily  polyethylene glycol 3350 17 Gram(s) Oral two times a day  saline laxative (FLEET) Rectal Enema 1 Enema Rectal once  thiamine 100 milliGRAM(s) Oral daily    MEDICATIONS  (PRN):  benzocaine/butamben/tetracaine Spray 1 Spray(s) Topical every 6 hours PRN sore throat  dextrose Oral Gel 15 Gram(s) Oral once PRN Blood Glucose LESS THAN 70 milliGRAM(s)/deciliter  ondansetron Injectable 4 milliGRAM(s) IV Push every 8 hours PRN Nausea and/or Vomiting         NUTRITIONALLY PERTINENT LABS:  11-14 Na133 mmol/L[L] Glu 197 mg/dL[H] K+ 4.1 mmol/L Cr  0.88 mg/dL BUN 22 mg/dL 11-10 Phos 2.3 mg/dL[L] 11-12 Alb 2.9 g/dL[L] 11-13 PAB 13 mg/dL[L] 11-07 Chol 207 mg/dL[H] LDL --    HDL 76 mg/dL Trig 100 mg/dL11-12 ALT 15 U/L AST 26 U/L Alkaline Phosphatase 94 U/L  11-07-24 @ 03:48 a1c 6.5  11-02-24 @ 06:50 a1c 6.4    A1C with Estimated Average Glucose Result: 6.5 % (11-07-24 @ 03:48)  A1C with Estimated Average Glucose Result: 6.4 % (11-02-24 @ 06:50)          Finger Sticks:  POCT Blood Glucose.: 210 mg/dL (11-14 @ 12:09)  POCT Blood Glucose.: 202 mg/dL (11-14 @ 08:27)  POCT Blood Glucose.: 346 mg/dL (11-13 @ 21:25)  POCT Blood Glucose.: 304 mg/dL (11-13 @ 17:00)      NUTRITIONALLY PERTINENT MEDICATIONS/LABS:  [x] Reviewed  [x] Relevant notes on medications/labs:  -Ordered for decadron which can increase blood glucose levels.  -Lantus and Correctional sliding scale insulin. elevated Finger Sticks noted (highest 304 in past 24hr).  -ordered for Multivitamin, thiamine, folic acid   -oral synthroid.    EDEMA:  [x] Reviewed  [x] Relevant notes: No noted edema as per flowsheets.     GI/ I&O:  [x] Reviewed  [x] Relevant notes: Reports no nausea, notes diarrhea but suspects because only started eating solid foods 2 days ago. ordered for miralax.  [] Other:    SKIN:   [x] No pressure injuries documented, per nursing flowsheet  [] Pressure injury previously noted  [] Change in pressure injury documentation:  [] Other:    ESTIMATED NEEDS:  [x] No change:  [] Updated:  Energy: 4999-9785 kcal/day (35-40 kcal/kg)  Protein: 54-72 g/day (1.5-2.0 g/kg)  Fluid: [x] defer to team  Based on: dosing weight 36kg with consideration for cancer, malnutrition, BMI    NUTRITION DIAGNOSIS:  [x] Prior Dx: Severe acute malnutrition; Underweight; Increased Nutrient Needs  [] New Dx:    EDUCATION:  [x] Yes: Discussed softer foods. Discussed chewing well and small, frequent meals.  [] Not appropriate/warranted    NUTRITION CARE PLAN:  1. Diet: Continue Consistent Carbohydrate diet. Defer texture/consistency to speech language pathologist/medical team.   2. Supplements: Continue Glucerna x 1/day (provides 220 kcal, 10 g protein).  3. Multivitamin/mineral supplementation: continue Multivitamin and thiamine pending no medical contraindications.    [] Achieved - Continue current nutrition intervention(s)  [] Current medical condition precludes nutrition intervention at this time.    MONITORING AND EVALUATION:   RD remains available upon request and will follow up per protocol.    Corinne Lima RDN, CDN - available via [facility] TE[facility] NUTRITION FOLLOW UP NOTE    PATIENT SEEN FOR: calorie count follow up    SOURCE: [x] Patient  [x] Current Medical Record  [x] RN  [x] Family/support person at bedside  [] Patient unavailable/inappropriate  [] Other:    CHART REVIEWED/EVENTS NOTED.  [] No changes to nutrition care plan to note  [x] Nutrition Status:  - Diet advanced from full liquid diet --> minced and moist on 11/12.   - S/p endoscopic gastrojejunostomy 11/07 for malignant gastric outlet obstruction.   - TPN team following; per note 11/10 "f/u diet advancement/tolerance and hold off on TPN unless enteral route unable to be utilized for nutrition."   - Heme/Onc: Hx pancreatic cancer.  -s/p bone marrow biopsy 11/12.     DIET ORDER:   Diet, Minced and Moist:   Consistent Carbohydrate {Evening Snack} (CSTCHOSN)  Supplement Feeding Modality:  Oral  Glucerna Shake Cans or Servings Per Day:  1       Frequency:  Daily (11-12-24 @ 13:22) [Active]      CURRENT DIET ORDER IS:  [x] Appropriate:  [] Inadequate:  [] Other:    NUTRITION INTAKE/PROVISION:  [x] PO: Patient reports tolerating minced and moist diet. See calorie count results below. Per RN patient eating okay.   [] Enteral Nutrition:  [] Parenteral Nutrition:    Calorie Count results:  -Day 1 (11/11/24): Pt consumed ~525 kcal and ~23 g pro.  Meets ~42% lower estimated energy needs, and ~43% lower estimated protein needs. Note patient on full liquid diet this day.   -Day 2 (11/12/24): Pt consumed ~200 kcal and ~11 g pro. Note patient was NPO so only dinner documented.   -Day 3 (11/13/24): N/a not documented  -Overall, unable to assess protein-energy intake from calorie count results given full liquid diet and NPO status, and lack of documentation. Patient appears to be eating okay and tolerating minced and moist diet currently.     ANTHROPOMETRICS:  Drug Dosing Weight  Height (cm): 154.9 (12 Nov 2024 09:31)  Weight (kg): 36 (12 Nov 2024 09:31)  BMI (kg/m2): 15 (12 Nov 2024 09:31)      NUTRITIONALLY PERTINENT MEDICATIONS:  MEDICATIONS  (STANDING):  bisacodyl Suppository 10 milliGRAM(s) Rectal once  dexAMETHasone  IVPB 40 milliGRAM(s) IV Intermittent daily  dextrose 5%. 1000 milliLiter(s) (50 mL/Hr) IV Continuous <Continuous>  dextrose 5%. 1000 milliLiter(s) (100 mL/Hr) IV Continuous <Continuous>  dextrose 50% Injectable 12.5 Gram(s) IV Push once  dextrose 50% Injectable 25 Gram(s) IV Push once  dextrose 50% Injectable 25 Gram(s) IV Push once  folic acid 1 milliGRAM(s) Oral daily  glucagon  Injectable 1 milliGRAM(s) IntraMuscular once  insulin glargine Injectable (LANTUS) 10 Unit(s) SubCutaneous at bedtime  insulin lispro (ADMELOG) corrective regimen sliding scale   SubCutaneous three times a day before meals  insulin lispro (ADMELOG) corrective regimen sliding scale   SubCutaneous at bedtime  levothyroxine Injectable 70 MICROGram(s) IV Push at bedtime  multivitamin/minerals 1 Tablet(s) Oral daily  naloxone Injectable 0.4 milliGRAM(s) IV Push once  pantoprazole  Injectable 40 milliGRAM(s) IV Push daily  polyethylene glycol 3350 17 Gram(s) Oral two times a day  saline laxative (FLEET) Rectal Enema 1 Enema Rectal once  thiamine 100 milliGRAM(s) Oral daily    MEDICATIONS  (PRN):  benzocaine/butamben/tetracaine Spray 1 Spray(s) Topical every 6 hours PRN sore throat  dextrose Oral Gel 15 Gram(s) Oral once PRN Blood Glucose LESS THAN 70 milliGRAM(s)/deciliter  ondansetron Injectable 4 milliGRAM(s) IV Push every 8 hours PRN Nausea and/or Vomiting         NUTRITIONALLY PERTINENT LABS:  11-14 Na133 mmol/L[L] Glu 197 mg/dL[H] K+ 4.1 mmol/L Cr  0.88 mg/dL BUN 22 mg/dL 11-10 Phos 2.3 mg/dL[L] 11-12 Alb 2.9 g/dL[L] 11-13 PAB 13 mg/dL[L] 11-07 Chol 207 mg/dL[H] LDL --    HDL 76 mg/dL Trig 100 mg/dL11-12 ALT 15 U/L AST 26 U/L Alkaline Phosphatase 94 U/L  11-07-24 @ 03:48 a1c 6.5  11-02-24 @ 06:50 a1c 6.4    A1C with Estimated Average Glucose Result: 6.5 % (11-07-24 @ 03:48)  A1C with Estimated Average Glucose Result: 6.4 % (11-02-24 @ 06:50)          Finger Sticks:  POCT Blood Glucose.: 210 mg/dL (11-14 @ 12:09)  POCT Blood Glucose.: 202 mg/dL (11-14 @ 08:27)  POCT Blood Glucose.: 346 mg/dL (11-13 @ 21:25)  POCT Blood Glucose.: 304 mg/dL (11-13 @ 17:00)      NUTRITIONALLY PERTINENT MEDICATIONS/LABS:  [x] Reviewed  [x] Relevant notes on medications/labs:  -Ordered for decadron which can increase blood glucose levels.  -Lantus and Correctional sliding scale insulin. elevated Finger Sticks noted (highest 304 in past 24hr).  -ordered for Multivitamin, thiamine, folic acid   -oral synthroid.    EDEMA:  [x] Reviewed  [x] Relevant notes: No noted edema as per flowsheets.     GI/ I&O:  [x] Reviewed  [x] Relevant notes: Reports no nausea, notes diarrhea but suspects because only started eating solid foods 2 days ago. ordered for miralax.  [] Other:    SKIN:   [x] No pressure injuries documented, per nursing flowsheet  [] Pressure injury previously noted  [] Change in pressure injury documentation:  [] Other:    ESTIMATED NEEDS:  [x] No change:  [] Updated:  Energy: 8244-6133 kcal/day (35-40 kcal/kg)  Protein: 54-72 g/day (1.5-2.0 g/kg)  Fluid: [x] defer to team  Based on: dosing weight 36kg with consideration for cancer, malnutrition, BMI    NUTRITION DIAGNOSIS:  [x] Prior Dx: Severe acute malnutrition; Underweight; Increased Nutrient Needs  [] New Dx:    EDUCATION:  [x] Yes: Discussed softer foods. Discussed chewing well and small, frequent meals.  [] Not appropriate/warranted    NUTRITION CARE PLAN:  1. Diet: Continue Consistent Carbohydrate diet. Defer texture/consistency to speech language pathologist/medical team.   2. Supplements: Continue Glucerna x 1/day (provides 220 kcal, 10 g protein).  3. Multivitamin/mineral supplementation: continue Multivitamin and thiamine pending no medical contraindications.    [] Achieved - Continue current nutrition intervention(s)  [] Current medical condition precludes nutrition intervention at this time.    MONITORING AND EVALUATION:   RD remains available upon request and will follow up per protocol.    Corinne Lima RDN, CDN - available via [facility] TEAMS

## 2024-11-15 ENCOUNTER — TRANSCRIPTION ENCOUNTER (OUTPATIENT)
Age: 69
End: 2024-11-15

## 2024-11-15 NOTE — DISCHARGE NOTE PROVIDER - CARE PROVIDER_API CALL
ISABELLA LOO  [address] [place] [address]  Phone: [phone]  Fax: ()-  Follow Up Time: 2 weeks    Ellis Noble  Gastroenterology  [address] [place] [address], Division of Gastroenterology - [address] [place] [address]  Phone: [phone]  Fax: [phone]  Follow Up Time: 2 weeks   ISABELLA LOO  [address] [place] [address]  Phone: ()-  Fax: ()-  Follow Up Time: 1 week    Ellis Noble  Gastroenterology  [address] [place] [address], Division of Gastroenterology - [address] [place] [address]  Phone: [phone]  Fax: [phone]  Follow Up Time: 2 weeks    Michi Lawrence  Internal Medicine  [address] [place] [id number]  Phone: [phone]  Fax: [phone]  Follow Up Time: 1-3 days

## 2024-11-15 NOTE — PROGRESS NOTE ADULT - ASSESSMENT
69F with PMHx of [facility] s/p distal pancreatectomy (2021) and chemo/radiation w/ recurrence to abdominal wall s/p resection (2022) and radiation who presented 11/1 to the ED with abdominal pain, nausea, vomiting, and CT A/P with ill-defined hypodense pancreatic head mass with multifocal narrowing along distal stomach and duodenum, consistent with gastric outlet obstruction and cancer recurrence. Surgery was consulted for gastric outlet obstruction management. EGD on 11/5 demonstrating multifocal stricturing disease of the pylorus and duodenum due to tumor ingrowth.  Advanced GI planning on endoscopic GJ versus [employer] stent placement tomorrow. Pt on Malignant bowel obstruction protocol   (dexamethasone 4mg IV q12h, reglan 10 mg IV q8h, octreotide 100 ucg SQ q8h)    - Nutritional Assessment, pt is not meeting nutritional goals enterally.  Prealbumin decreased at 12 mg/dL with severe protein calorie malnutrition; trend prealbumin weekly   - TPN plan: s/p endoscopic gastrojejunostomy; diet advanced to minced on 11/12;  calorie count 11/11-11/14 insufficient given FLD/NPO status and lack of clear documentation however patient reports improved appetite and family bringing in home foods; NO plan for TPN   - TPN access:  Patient will need a dedicated central line if/once TPN approved  - Electrolyte Imbalance risk- check CMP, Mg, Phos, iCa and K daily, replete as per Medicine.  - GOO:  s/p endoscopic gastrojejunostomy; f/u GI and Sx recs; CT a/p from 11/11 showed infiltrative pancreatic head mass w/severe narrowing/occlusion portosplenic confluence; small ascites and resolution of GOO s/p gastrojejunal stent placement   - Risk of Hyperglycemia: HgA1c 6.5% and glucose elevated on Dexamethasone/off Oral Diabetic meds; fingersticks/management per primary team; Lantus started 11/13; management per primary team   - Risk of Hypertriglyceridemia:   mg/dL on 11/7; plan to trend TG closely if on TPN   - Thrombocytopenia:  s/p transfusion w/possible transfusion reaction; work up/management per primary team; s/p BM biopsy by IR on 11/12; f/u results  - Vitamin D def:  recommend enteral repletion   - Recommend strict intake and output/weight checks three times a week; IV fluids per primary team  - Further care as per Medicine; TPN team will sign off - please reconsult prn    Available on TEAMS  TPN spectra [phone] ([phone] when dialing from outside line)  Above reviewed with Dr. Lucas Avalos

## 2024-11-15 NOTE — PROGRESS NOTE ADULT - ASSESSMENT
69F admitted for malignant gastric outlet obstruction       Malignant Gastric outlet obstruction.   - CT A/P as noted; CT H as noted   - Was on malignant GOO protocol, now off.   - Pain control   - s/P Sten placement by GI, tolerating diet, advance as tolerated --> Now on Minced and Moist. Monitor for tolerance   - GI eval appreciated; F/u recs     R/O Transfusion reaction   - Patient's Plt down-trending and was being transfused 1 unit of platelets, reported that near completion of 1 unit of platelets, Patient with erythematous rash and pruritis. no respiratory symptoms   - Platelet transfusion stopped and bag was sent to blood bank for work up  - Cont to have low plt and dop in hgb level- S/P 1 unit pf plt and PRBC 11/10  - Repeat CT A/P as noted   - R/o hemolysis per Heme  - S/P IR Bone Marrow Bx on 11/12; F/u results   - Heme/Onc eval appreciated; F/u recs --> Plt down-trending 11/13 C/w Dexa 40 Qd X 4 days w/ PPI. Monitor Plt.   - 1 unit PRBC. D/c on oral decadron     HypoNa  - Lkely due to steroid induced hyperglycemia  - Monitor and trend Na level. Avoid overcorrection > 6-8 mEq in 24 hours  - Trend Na level in AM, Lantus added onto regimen     Pancreatic cancer.   - Diagnosed in 2021 s/p resection, chemo/RT and now with recurrent in 2022 s/p RT.  - On active DMT with [facility]   - Heme/Onc and palliative eval appreciated; F/u recs     Anemia, Thrombocytopenia   - Likely due to malignancy and treatment; F/u anemia labs   - Platelet count down-trending. Monitor closely --> HIT w/u neg --> Hep Ab/ ISIDORO neg   - Transfuse for Hgb <7, Plt <10K or < 50K w/ fever/bleeding   - Heme/Onc checking hemolysis labs and smear to evaluate thrombocytopenia.   - Heme/Onc eval appreciated; F/u recs  - 1 unit of plt and PRBC, completed 11/10  - S/P BM Bx 11/12, F.u rsults  - Heme/Onc eval appreciated; F/u recs     Hypertension.   - No meds per mouth.   - Monitor BP. If sustained SBP >160, may use iv hydralazine as needed.    Type 2 diabetes mellitus.   - Hold home oral dm meds  - ISS while inpatient  - IVF for hydration   - Monitor glucose levels closely --> Lantus 10 started on view of High dose steroids. Monitor and adjust as tolerated    Abnormal Duplex   - Duplex w/ Nonocclusive thrombus in distal upper arm basilic vein, no evidence of RUE DVT   - Check routine surveillance duplexes     Hypothyroid.   - TSH and FT4 noted   - On IV Synthroid 70 - dose confirmed with Pharmacy  - resume oral when tolerating     Prophylactic measure.   - Heparin SubQ on HOLD       /C planing

## 2024-11-15 NOTE — PROGRESS NOTE ADULT - NUTRITIONAL ASSESSMENT
This patient has been assessed with a concern for Malnutrition and has been determined to have a diagnosis/diagnoses of Severe protein-calorie malnutrition and Underweight (BMI < 19).    This patient is being managed with:   Diet Clear Liquid-  Entered: Nov 5 2024  6:32PM  
This patient has been assessed with a concern for Malnutrition and has been determined to have a diagnosis/diagnoses of Severe protein-calorie malnutrition and Underweight (BMI < 19).    This patient is being managed with:   Diet Minced and Moist-  Consistent Carbohydrate {Evening Snack} (CSTCHOSN)  Supplement Feeding Modality:  Oral  Glucerna Shake Cans or Servings Per Day:  1       Frequency:  Daily  Entered: Nov 12 2024  1:21PM  
This patient has been assessed with a concern for Malnutrition and has been determined to have a diagnosis/diagnoses of Severe protein-calorie malnutrition and Underweight (BMI < 19).    This patient is being managed with:   Diet Full Liquid-  Consistent Carbohydrate {No Snacks} (CSTCHO)  Supplement Feeding Modality:  Oral  Glucerna Shake Cans or Servings Per Day:  1       Frequency:  Daily  Entered: Nov 8 2024 11:27PM  
This patient has been assessed with a concern for Malnutrition and has been determined to have a diagnosis/diagnoses of Severe protein-calorie malnutrition and Underweight (BMI < 19).    This patient is being managed with:   Diet Minced and Moist-  Consistent Carbohydrate {Evening Snack} (CSTCHOSN)  Supplement Feeding Modality:  Oral  Glucerna Shake Cans or Servings Per Day:  1       Frequency:  Daily  Entered: Nov 12 2024  1:21PM  
This patient has been assessed with a concern for Malnutrition and has been determined to have a diagnosis/diagnoses of Severe protein-calorie malnutrition and Underweight (BMI < 19).    This patient is being managed with:   Diet Clear Liquid-  Entered: Nov 5 2024  6:32PM  
This patient has been assessed with a concern for Malnutrition and has been determined to have a diagnosis/diagnoses of Severe protein-calorie malnutrition and Underweight (BMI < 19).    This patient is being managed with:   Diet Full Liquid-  Consistent Carbohydrate {No Snacks} (CSTCHO)  Supplement Feeding Modality:  Oral  Glucerna Shake Cans or Servings Per Day:  1       Frequency:  Daily  Entered: Nov 8 2024 11:27PM  
This patient has been assessed with a concern for Malnutrition and has been determined to have a diagnosis/diagnoses of Severe protein-calorie malnutrition and Underweight (BMI < 19).    This patient is being managed with:   Diet Clear Liquid-  Entered: Nov 5 2024  6:32PM  
This patient has been assessed with a concern for Malnutrition and has been determined to have a diagnosis/diagnoses of Severe protein-calorie malnutrition and Underweight (BMI < 19).    This patient is being managed with:   Diet Full Liquid-  Consistent Carbohydrate {No Snacks} (CSTCHO)  Supplement Feeding Modality:  Oral  Glucerna Shake Cans or Servings Per Day:  1       Frequency:  Daily  Entered: Nov 8 2024 11:27PM  
This patient has been assessed with a concern for Malnutrition and has been determined to have a diagnosis/diagnoses of Severe protein-calorie malnutrition and Underweight (BMI < 19).    This patient is being managed with:   Diet Full Liquid-  Supplement Feeding Modality:  Oral  Ensure Clear Cans or Servings Per Day:  1       Frequency:  Two Times a day  Entered: Nov 8 2024  7:05PM  
This patient has been assessed with a concern for Malnutrition and has been determined to have a diagnosis/diagnoses of Severe protein-calorie malnutrition and Underweight (BMI < 19).    This patient is being managed with:   Diet Minced and Moist-  Consistent Carbohydrate {Evening Snack} (CSTCHOSN)  Supplement Feeding Modality:  Oral  Glucerna Shake Cans or Servings Per Day:  1       Frequency:  Daily  Entered: Nov 12 2024  1:21PM

## 2024-11-15 NOTE — PROGRESS NOTE ADULT - ASSESSMENT
70 y/o F w/pancreatic ca s/p resection '21, s/p chemo/rt then recurrence '22 s/p RT.  Presenting w/ diffuse abd pain, n/v and constipation x3d.     #Pancreatic ca  --Follows with Dr. Bess of [facility] [place]  --S/p distal pancreatectomy and splenectomy (06/2021)  --S/p adjuvant tx w/ mFOLFIRINOX (08/21-01/21), Xeloda +RTx 25 fractions (02/22-03/22)-> POD (01/23)  --Most recent systemic tx w/ Gemcitabine and nab-paclitaxel (05/23-10/23, 02/24-07/24). Break in tx 2/2 jaundice and stent migration  --S/p RT x 10 fractions to abdominal wall mets 08/2024  --No plan for cancer treatment inpatient and/or rehab    #Gastric outlet obstruction, resolved  --CT a/p w/ ill-defined hypodense pancreatic mass w/ multifocal narrowing along distal stomach and duodenum resulting in gastric outlet obstruction.  --Surgery following no plan for acute surgical intervention at this time   --On malignant bowel obstruction protocol w/dex, reglan, and octreotide.   --EGD on 11/5 demonstrated multifocal stricturing disease of the pylorus and duodenum due to tumor ingrowth.   --GI following, s/p endoscopic GJ 11/7. GI advancing diet    #MAHA  Platelets normal on admission -> started downtrending around 11/5, responded to transfusion on 11/7 (PLT 32 -> 105, but dropped to 65 by the next morning), now 59  Hgb 9.8 on admission (likely 2/2 malignancy and cancer treatments) -> 8.2 now   - Smear reviewed, many schistocytes suggestive of intravascular hemolysis: no known cardiac valvular disease, no obvious signs of infection. Repeat smear reviewed today, 11/13, 1-2 schistocytes per HPF  - LDH high and hapto low, bili normal, taken together with smear these findings are concerning for intravascular hemolysis - repeat , f/u hapto, normal bili and retic 150.7  - Coags previously normal so less concerning for DIC, although possible / perhaps myelopthisis, normal PT/PTT. Normal fibrinogen level  - Acute non-febrile reaction to platelet transfusion the other day - acute urticarial; no need for washed products per conversation with blood bank, please check IgA level (lab never drawn on 11/11, ordered again today)  - peripheral flow w/ immunophenotypically unremarkable B-cell, T-cell and myeloid populations  - [id number] unremarkable  - creatinine normal, MCV normal, less suspicion for classic TTP  - Can have MAHA from gemcitabine but it is unusual to occur to long after treatment (last chemo was July)   - HIT negative, ISIDORO negative   - S/p 1U PRBC, PLT 11/10; PLEASE HOLD OFF ON FURTHER PLATELET TRANSFUSIONS AT THIS TIME  - Concerned for TMA of malignancy.  - discussed with blood bank; continue trial of high dose steroids for possible autoimmune component, can complete dex 40mg x 4 days (was on incorrect dose, corrected on 11/13)-->platelets slowly up-trending  - There is no beneficial role for PLEX, steroids, or other immuno-suppression if this is truly cancer-associated TMA. Treatment would be aimed at the underlying cancer, but this would be an overall poor prognostic factor   - IR bone marrow biopsy 11/12, f/up path  - Continue follic acid    Patient planned for outpatient chemo, Hgb currently borderline, please give 1u PRBCs prior to discharge       Bailey Herrera NP  Hematology/ Oncology  [facility]  [phone] (office)  [phone] (alt office)  Evenings and weekends please call MD on call or office

## 2024-11-15 NOTE — DISCHARGE NOTE PROVIDER - PROVIDER TOKENS
PROVIDER:[TOKEN:[[id number]],FOLLOWUP:[2 weeks]],PROVIDER:[TOKEN:[[id number]],FOLLOWUP:[2 weeks]] PROVIDER:[TOKEN:[[id number]],FOLLOWUP:[1 week]],PROVIDER:[TOKEN:[[id number]],FOLLOWUP:[2 weeks]],PROVIDER:[TOKEN:[[id number]],FOLLOWUP:[1-3 days]]

## 2024-11-15 NOTE — CHART NOTE - NSCHARTNOTEFT_GEN_A_CORE
Request from Dr. Lawrence to facilitate patient discharge.  Medication reconciliation reviewed, revised, and resolved with Dr. Lawrence, who has medically cleared patient for discharge with follow up as advised.  Please refer to discharge note for detailed hospital course.

## 2024-11-15 NOTE — PROGRESS NOTE ADULT - NS ATTEND OPT1 GEN_ALL_CORE

## 2024-11-15 NOTE — DISCHARGE NOTE PROVIDER - NSDCFUADDAPPT_GEN_ALL_CORE_FT
APPTS ARE READY TO BE MADE: [x] YES    Best Family or Patient Contact (if needed):    Additional Information about above appointments (if needed):    1:   2:   3:     Other comments or requests:    You must follow up with your primary medical doctor within 2-3 days of discharge - please call to make an appointment.  At this appointment, you will need a BMP drawn to monitor your potassium level.  You must also discuss your current medication regimen to see if any changes need to be made.    You must follow up with your oncologist within one week of discharge - please call to make an appointment.    You must follow up with your gastroenterologist within one week of discharge - please call to make an appointment.            APPTS ARE READY TO BE MADE: [X] YES    Best Family or Patient Contact (if needed):    Additional Information about above appointments (if needed):    1: Primary medical doctor  2: Oncologist  3: Gastroenterologist    Other comments or requests:    You must follow up with your primary medical doctor within 2-3 days of discharge - please call to make an appointment.  At this appointment, you will need a BMP drawn to monitor your potassium level.  You must also have a CBC drawn to monitor your hemoglobin/hematocrit and platelets.  You must also discuss your current medication regimen to see if any changes need to be made.    You must follow up with your oncologist within one week of discharge - please call to make an appointment.      You must follow up with your gastroenterologist within one week of discharge - please call to make an appointment.            APPTS ARE READY TO BE MADE: [X] YES    Best Family or Patient Contact (if needed):    Additional Information about above appointments (if needed):    1: Primary medical doctor  2: Oncologist  3: Gastroenterologist    Other comments or requests:    You must follow up with your primary medical doctor within 2-3 days of discharge - please call to make an appointment.  At this appointment, you will need a BMP drawn to monitor your potassium level.  You must also have a CBC drawn to monitor your hemoglobin/hematocrit and platelets.  You must also discuss your current medication regimen to see if any changes need to be made.    You must follow up with your oncologist within one week of discharge - please call to make an appointment.      You must follow up with your gastroenterologist within one week of discharge - please call to make an appointment.            APPTS ARE READY TO BE MADE: [X] YES    Best Family or Patient Contact (if needed):    Additional Information about above appointments (if needed):    1: Primary medical doctor  2: Oncologist  3: Gastroenterologist    Other comments or requests:   Patient informed us they already have secured a follow up appointment which is not visible on [facility] and declined to provide appointment details. Patient advised they saw Dr. Lawrence and their oncologist Dr. Bess post discharge but did not specify a date.     Patient advised they did not want to proceed with scheduling appointments with the providers on their referrals. Patient advised that Dr. Ellis Noble's office spoke with patient and advised she does not need to follow up with gastroenterology.

## 2024-11-15 NOTE — PROGRESS NOTE ADULT - PROVIDER SPECIALTY LIST ADULT
Gastroenterology
Gastroenterology
Heme/Onc
Internal Medicine
Nutrition Support
Palliative Care
Surgery
Gastroenterology
Heme/Onc
Heme/Onc
Internal Medicine
Internal Medicine
Nutrition Support
Nutrition Support
Surgery
Gastroenterology
Heme/Onc
Internal Medicine
Nutrition Support
Surgery
Surgery
Heme/Onc
Internal Medicine
Nutrition Support
Nutrition Support
Internal Medicine
Palliative Care

## 2024-11-15 NOTE — PROGRESS NOTE ADULT - NS ATTEND AMEND GEN_ALL_CORE FT
Agree with above assessment and plan.   Overall MAHA of malignancy suspected however cannot rule out another process such as ITP. She has no evidence of TTP with normal [id number] therefore PLEX would not be beneficial.   Bone marrow biopsy done and awaiting path.   It is unlikely she developed an MDS during her hospital stay  IVIG can be considered if worsening thrombocytopenia. Would continue with pulse Dex 40mg daily 11/13-11/16. If able to discharge (if counts are stable) can continue to Dex orally. She will need follow up with [facility] [place] after discharge and she will need follow up Monday with Dr Shine at [facility] for further care of her thrombocytopenia as we can accommodate IVIG outpatient as well.  Ultimately MAHA of malignancy carries a poor prognosis and improvement in survival with treatment of the malignancy   Discussed with [facility] [place] oncologist Dr Selena Tanner today
Agree with above assessment and plan.   PRBC today   Complete 4 days Dex 40- can complete PO if discharged   Needs close follow up outpatient.   Can follow up with Dr Haskins or Dr Huerta at [facility] on Tuesday at [facility]
Patient seen and examined by me. patient care and plan discussed and reviewed with PA.     discussed in detail with patient and family at the bedside
Patient seen and examined by me. patient care and plan discussed and reviewed with PA. Plan as outlined above edited by me to reflect our discussion. Advanced care planning/advanced directives discussed with patient/family. DNR status including forceful chest compressions to attempt to restart the heart, ventilator support/artificial breathing, electric shock, artificial nutrition, health care proxy, Molst form all discussed with pt. More than 50% of the visit was spent counseling and/or coordinating care by the attending physician.
follow up with advanced GI. plan for GJ tube
I agree with the PA assessment and plan   for BM biopsy today, GI for advancing diet as tolerated  counts are overall stable
Patient seen and examined by me. patient care and plan discussed and reviewed with PA. Plan as outlined above edited by me to reflect our discussion. Advanced care planning/advanced directives discussed with patient/family. DNR status including forceful chest compressions to attempt to restart the heart, ventilator support/artificial breathing, electric shock, artificial nutrition, health care proxy, Molst form all discussed with pt. More than 50% of the visit was spent counseling and/or coordinating care by the attending physician.
Patient seen and examined by me. patient care and plan discussed and reviewed with PA. Plan as outlined above edited by me to reflect our discussion. Advanced care planning/advanced directives discussed with patient/family. DNR status including forceful chest compressions to attempt to restart the heart, ventilator support/artificial breathing, electric shock, artificial nutrition, health care proxy, Molst form all discussed with pt. More than 50% of the visit was spent counseling and/or coordinating care by the attending physician.
Patient seen and examined with the NP during rounds  I agree with her assessment and plan    Remains on NGT, for EGD and stenting with GI today
agree with the above assessment and plan
- smear reviewed showing about 2 shiscto per HPF, her LDH is elevated hapto low but bili is normal   - will give trial of hi dose dex starting today   - follow up bone marrow and [id number].
Agree with above assessment and plan.   Pancreatic cancer with GOO planning for stent today. She has worsening thrombocytopenia. HIT negative. Smear requested, not available for review when went to lab. Labs as outlined above to further evaluate.   Continue to monitor, transfuse as needed. Had a unit today, ?reaction- transfusional medicine evaluating. Scans similar to [facility] imaging in regards to her malignancy.
I agree with the above assessment and plan
NGT still in place, on malignant SBO protocol   Reports passing some gas this morning   Pending advanced GI eval
Patient seen and examined by me. patient care and plan discussed and reviewed with PA. Plan as outlined above edited by me to reflect our discussion. Advanced care planning/advanced directives discussed with patient/family. DNR status including forceful chest compressions to attempt to restart the heart, ventilator support/artificial breathing, electric shock, artificial nutrition, health care proxy, Molst form all discussed with pt. More than 50% of the visit was spent counseling and/or coordinating care by the attending physician.     Discussed with family at the bedside in detail
Patient seen and examined by me. patient care and plan discussed and reviewed with PA. Plan as outlined above edited by me to reflect our discussion. Advanced care planning/advanced directives discussed with patient/family. DNR status including forceful chest compressions to attempt to restart the heart, ventilator support/artificial breathing, electric shock, artificial nutrition, health care proxy, Molst form all discussed with pt. More than 50% of the visit was spent counseling and/or coordinating care by the attending physician.     discussed with sister and causin . awaiting GI to follow up

## 2024-11-15 NOTE — DISCHARGE NOTE NURSING/CASE MANAGEMENT/SOCIAL WORK - FINANCIAL ASSISTANCE
[facility] provides services at a reduced cost to those who are determined to be eligible through [facility]’s financial assistance program. Information regarding [facility]’s financial assistance program can be found by going to [email or web address] or by calling [phone].

## 2024-11-15 NOTE — DISCHARGE NOTE NURSING/CASE MANAGEMENT/SOCIAL WORK - PATIENT PORTAL LINK FT
You can access the FollowMyHealth Patient Portal offered by [facility] by registering at the following website: [email or web address]. By joining [employer]’s FollowMyHealth portal, you will also be able to view your health information using other applications (apps) compatible with our system.

## 2024-11-15 NOTE — DISCHARGE NOTE PROVIDER - HOSPITAL COURSE
HPI:  69F PMHx DM, HTN, hypothyroid, pancreatic ca s/p resection '21, s/p chemo/rt then recurrence '22 s/p RT.   Presenting w/ diffuse abd pain, n/v and constipation x3d. Pt has oxycodone for chronic pain, it has not been helping at home. Pt took OTC stool softner and miralax at home w/o relief.     CT imaging c/f pancreatic mass resulting in severe multifocal luminal narrowing along the distal stomach and duodenum, c/w gastric outlet obstruction. Filling defect in R external and common iliac veins, likely flow artifact. Severe narrowing of the portal confluence w/o discrete thrombosis.     Surg consult in the ED, put NGT in to low suction, malignant gastric obstruction cocktail (dexamethasone, reglan and octreotide). Surg onc will follow.     On my interview/exam, pt complains of abd cramping after enema and having BM.  (01 Nov 2024 23:43)    Hospital Course:      Important Medication Changes and Reason:    Active or Pending Issues Requiring Follow-up:    Advanced Directives:   [ ] Full code  [ ] DNR  [ ] Hospice    Discharge Diagnoses:         HPI:  69F PMHx DM, HTN, hypothyroid, pancreatic ca s/p resection '21, s/p chemo/rt then recurrence '22 s/p RT.   Presenting w/ diffuse abd pain, n/v and constipation x3d. Pt has oxycodone for chronic pain, it has not been helping at home. Pt took OTC stool softner and miralax at home w/o relief.     CT imaging c/f pancreatic mass resulting in severe multifocal luminal narrowing along the distal stomach and duodenum, c/w gastric outlet obstruction. Filling defect in R external and common iliac veins, likely flow artifact. Severe narrowing of the portal confluence w/o discrete thrombosis.     Surg consult in the ED, put NGT in to low suction, malignant gastric obstruction cocktail (dexamethasone, reglan and octreotide). Surg onc will follow.     On my interview/exam, pt complains of abd cramping after enema and having BM.  (01 Nov 2024 23:43)    Hospital Course:  CT a/p w/ ill-defined hypodense pancreatic mass w/ multifocal narrowing along distal stomach and duodenum resulting in gastric outlet obstruction . Surgery following no plan for acute surgical intervention at this time . s/p malignant bowel obstruction protocol w/dex, reglan, and octreotide. EGD on 11/5 demonstrated multifocal stricturing disease of the pylorus and duodenum due to tumor ingrowth. GI following, s/p endoscopic GJ 11/7. GI advancing diet. Tolerating    Platelets normal on admission -> started downtrending around 11/5, responded to transfusion on 11/7 (PLT 32 -> 105, but dropped to 65 by the next morning), now 59  Hgb 9.8 on admission (likely 2/2 malignancy and cancer treatments) -> 8.2 now . Hematology consulted.  Smear reviewed, many schistocytes suggestive of intravascular hemolysis: no known cardiac valvular disease, no obvious signs of infection. PEr Hem  Can have MAHA from gemcitabine.  S/p 1U PRBC, PLT 11/10; Concerned for TMA of malignancy.  -  s/p  trial of high dose steroids for possible autoimmune component with improvement.  IR bone marrow biopsy 11/12, f/up path  Medically cleared for discharge home       Important Medication Changes and Reason:    Active or Pending Issues Requiring Follow-up:  > Hem Onc follow up for pancreatic cancer and MAHA  > Gi follow up post GJ stent  Advanced Directives:   [ x] Full code  [ ] DNR  [ ] Hospice    Discharge Diagnoses:  Gastric outlet obstruction  MAHA  Thrombocytopenia  DM  HTN  hypothyroid  pancreatic ca s/p resection '21, s/p chemo/rt then recurrence '22 s/p RT.          HPI:  69F PMHx DM, HTN, hypothyroid, pancreatic ca s/p resection '21, s/p chemo/rt then recurrence '22 s/p RT.   Presenting w/ diffuse abd pain, n/v and constipation x3d. Pt has oxycodone for chronic pain, it has not been helping at home. Pt took OTC stool softner and miralax at home w/o relief.     CT imaging c/f pancreatic mass resulting in severe multifocal luminal narrowing along the distal stomach and duodenum, c/w gastric outlet obstruction. Filling defect in R external and common iliac veins, likely flow artifact. Severe narrowing of the portal confluence w/o discrete thrombosis.     Surg consult in the ED, put NGT in to low suction, malignant gastric obstruction cocktail (dexamethasone, reglan and octreotide). Surg onc will follow.     On my interview/exam, pt complains of abd cramping after enema and having BM.  (01 Nov 2024 23:43)    Hospital Course:  CT a/p w/ ill-defined hypodense pancreatic mass w/ multifocal narrowing along distal stomach and duodenum resulting in gastric outlet obstruction . Surgery following no plan for acute surgical intervention at this time . s/p malignant bowel obstruction protocol w/dex, reglan, and octreotide. EGD on 11/5 demonstrated multifocal stricturing disease of the pylorus and duodenum due to tumor ingrowth. GI following, s/p endoscopic GJ 11/7. GI advancing diet. Tolerating    Platelets normal on admission -> started downtrending around 11/5, responded to transfusion on 11/7 (PLT 32 -> 105, but dropped to 65 by the next morning), now 59  Hgb 9.8 on admission (likely 2/2 malignancy and cancer treatments) -> 8.2 now . Hematology consulted.  Smear reviewed, many schistocytes suggestive of intravascular hemolysis: no known cardiac valvular disease, no obvious signs of infection. Per Hem, can have MAHA from gemcitabine.  S/p 1U PRBC, PLT 11/10; Concerned for TMA of malignancy.  -  s/p  trial of high dose steroids for possible autoimmune component with improvement - will get one more dose of po steroids tomorrow to complete course.  IR bone marrow biopsy 11/12, f/up path  Medically cleared for discharge home       Important Medication Changes and Reason:  Losartan on hold - f/u with PCP outpt    Active or Pending Issues Requiring Follow-up:  > Hem Onc follow up for pancreatic cancer and MAHA  > Gi follow up post GJ stent  Advanced Directives:   [ x] Full code  [ ] DNR  [ ] Hospice    Discharge Diagnoses:  Gastric outlet obstruction  MAHA  Thrombocytopenia  DM  HTN  hypothyroid  pancreatic ca s/p resection '21, s/p chemo/rt then recurrence '22 s/p RT.

## 2024-11-15 NOTE — PROGRESS NOTE ADULT - SUBJECTIVE AND OBJECTIVE BOX
Patient is a 69y old  Female who presents with a chief complaint of Abd pain (14 Nov 2024 12:15)    Patient seen and examined  No new complaints, looking forward to discharge    MEDICATIONS  (STANDING):  bisacodyl Suppository 10 milliGRAM(s) Rectal once  dexAMETHasone  IVPB 40 milliGRAM(s) IV Intermittent daily  dextrose 5%. 1000 milliLiter(s) (50 mL/Hr) IV Continuous <Continuous>  dextrose 5%. 1000 milliLiter(s) (100 mL/Hr) IV Continuous <Continuous>  dextrose 50% Injectable 12.5 Gram(s) IV Push once  dextrose 50% Injectable 25 Gram(s) IV Push once  dextrose 50% Injectable 25 Gram(s) IV Push once  folic acid 1 milliGRAM(s) Oral daily  glucagon  Injectable 1 milliGRAM(s) IntraMuscular once  insulin glargine Injectable (LANTUS) 10 Unit(s) SubCutaneous at bedtime  insulin lispro (ADMELOG) corrective regimen sliding scale   SubCutaneous three times a day before meals  insulin lispro (ADMELOG) corrective regimen sliding scale   SubCutaneous at bedtime  levothyroxine Injectable 70 MICROGram(s) IV Push at bedtime  multivitamin/minerals 1 Tablet(s) Oral daily  naloxone Injectable 0.4 milliGRAM(s) IV Push once  pantoprazole  Injectable 40 milliGRAM(s) IV Push daily  polyethylene glycol 3350 17 Gram(s) Oral two times a day  saline laxative (FLEET) Rectal Enema 1 Enema Rectal once  thiamine 100 milliGRAM(s) Oral daily    MEDICATIONS  (PRN):  benzocaine/butamben/tetracaine Spray 1 Spray(s) Topical every 6 hours PRN sore throat  dextrose Oral Gel 15 Gram(s) Oral once PRN Blood Glucose LESS THAN 70 milliGRAM(s)/deciliter  ondansetron Injectable 4 milliGRAM(s) IV Push every 8 hours PRN Nausea and/or Vomiting    Vital Signs Last 24 Hrs  T(C): 36.6 (15 Nov 2024 04:23), Max: 36.6 (14 Nov 2024 20:46)  T(F): 97.8 (15 Nov 2024 04:23), Max: 97.8 (14 Nov 2024 20:46)  HR: 76 (15 Nov 2024 04:23) (73 - 76)  BP: 152/93 (15 Nov 2024 04:23) (134/80 - 152/93)  BP(mean): --  RR: 18 (15 Nov 2024 04:23) (18 - 18)  SpO2: 99% (15 Nov 2024 04:23) (99% - 100%)    Parameters below as of 15 Nov 2024 04:23  Patient On (Oxygen Delivery Method): room air        PE  Awake, alert  Anicteric, MMM  RRR  CTAB  Abd soft, NT, ND  No c/c  FROM                          7.3    15.34 )-----------( 60       ( 15 Nov 2024 07:05 )             22.6       11-15    131[L]  |  101  |  26[H]  ----------------------------<  231[H]  3.0[L]   |  19[L]  |  0.85    Ca    7.9[L]      15 Nov 2024 07:05    TPro  5.1[L]  /  Alb  2.9[L]  /  TBili  0.8  /  DBili  x   /  AST  34  /  ALT  33  /  AlkPhos  88  11-15

## 2024-11-15 NOTE — CHART NOTE - NSCHARTNOTESELECT_GEN_ALL_CORE
Discharge Note
Event Note
Nutrition Services
Platelets/Event Note
Event Note
GaP team/Event Note
Nutrition Services
Nutrition Services
Post Op check
Post-procedure check

## 2024-11-15 NOTE — DISCHARGE NOTE PROVIDER - CARE PROVIDERS DIRECT ADDRESSES
,DirectAddress_Unknown,[email or web address] ,DirectAddress_Unknown,[email or web address]

## 2024-11-15 NOTE — PROGRESS NOTE ADULT - SUBJECTIVE AND OBJECTIVE BOX
[facility] NUTRITION SUPPORT-- FOLLOW UP NOTE      24 hour events/subjective:  Patient seen and examined at bedside, chart reviewed and events noted and I's and O's reviewed.  Patient denies chest pain, shortness of breath, nausea or vomiting, dizziness, chills at time of visit.  Patient tolerating enteral diet; VSS and no other acute overnight events noted.     ROS:  Except as noted above, all other systems reviewed and are negative     Diet, Minced and Moist:   Consistent Carbohydrate Evening Snack (CSTCHOSN)  Supplement Feeding Modality:  Oral  Glucerna Shake Cans or Servings Per Day:  1       Frequency:  Daily (11-12-24 @ 13:22) [Active]          PAST HISTORY  --------------------------------------------------------------------------------  PAST MEDICAL & SURGICAL HISTORY:  Pancreatic cancer      Hypertension      Hypothyroid      Seasonal asthma      History of pancreatectomy        No significant changes to PMH, PSH, FHx, SHx, unless otherwise noted    ALLERGIES & MEDICATIONS  --------------------------------------------------------------------------------  Allergies    phenytoin (Rash)  ibuprofen (Other)  Dilantin (Unknown)    Intolerances    platelet rash- will need pre-medication (Rash)    Standing Inpatient Medications  bisacodyl Suppository 10 milliGRAM(s) Rectal once  dexAMETHasone  IVPB 40 milliGRAM(s) IV Intermittent daily  dextrose 5%. 1000 milliLiter(s) IV Continuous <Continuous>  dextrose 5%. 1000 milliLiter(s) IV Continuous <Continuous>  dextrose 50% Injectable 12.5 Gram(s) IV Push once  dextrose 50% Injectable 25 Gram(s) IV Push once  dextrose 50% Injectable 25 Gram(s) IV Push once  folic acid 1 milliGRAM(s) Oral daily  glucagon  Injectable 1 milliGRAM(s) IntraMuscular once  insulin glargine Injectable (LANTUS) 10 Unit(s) SubCutaneous at bedtime  insulin lispro (ADMELOG) corrective regimen sliding scale   SubCutaneous three times a day before meals  insulin lispro (ADMELOG) corrective regimen sliding scale   SubCutaneous at bedtime  levothyroxine Injectable 70 MICROGram(s) IV Push at bedtime  multivitamin/minerals 1 Tablet(s) Oral daily  naloxone Injectable 0.4 milliGRAM(s) IV Push once  pantoprazole  Injectable 40 milliGRAM(s) IV Push daily  polyethylene glycol 3350 17 Gram(s) Oral two times a day  potassium chloride    Tablet ER 40 milliEquivalent(s) Oral every 4 hours  saline laxative (FLEET) Rectal Enema 1 Enema Rectal once  thiamine 100 milliGRAM(s) Oral daily    PRN Inpatient Medications  benzocaine/butamben/tetracaine Spray 1 Spray(s) Topical every 6 hours PRN  dextrose Oral Gel 15 Gram(s) Oral once PRN  ondansetron Injectable 4 milliGRAM(s) IV Push every 8 hours PRN        VITALS/PHYSICAL EXAM  --------------------------------------------------------------------------------  T(C): 36.6 (11-15-24 @ 04:23), Max: 36.6 (11-14-24 @ 20:46)  HR: 76 (11-15-24 @ 04:23) (73 - 76)  BP: 152/93 (11-15-24 @ 04:23) (134/80 - 152/93)  RR: 18 (11-15-24 @ 04:23) (18 - 18)  SpO2: 99% (11-15-24 @ 04:23) (99% - 100%)  Wt(kg): --      11-14-24 @ 07:01  -  11-15-24 @ 07:00  --------------------------------------------------------  IN: 720 mL / OUT: 0 mL / NET: 720 mL      I&O's Detail    14 Nov 2024 07:01  -  15 Nov 2024 07:00  --------------------------------------------------------  IN:    Oral Fluid: 720 mL  Total IN: 720 mL    OUT:  Total OUT: 0 mL    Total NET: 720 mL          Physical Exam:  Gen: NAD, laying in bed   Chest: non labored breathing, equal chest expansion bilaterally  Abd: +BS, soft, nontender/nondistended  Ext: Warm, FROM, no edema b/l LE  Neuro: No focal deficits  Psych: Normal affect and mood  Skin: Warm, without rashes       LABS/STUDIES  --------------------------------------------------------------------------------              7.3    15.34 >-----------<  60       [11-15-24 @ 07:05]              22.6     131  |  101  |  26  ----------------------------<  231      [11-15-24 @ 07:05]  3.0   |  19  |  0.85        Ca     7.9     [11-15-24 @ 07:05]    TPro  5.1  /  Alb  2.9  /  TBili  0.8  /  DBili  x   /  AST  34  /  ALT  33  /  AlkPhos  88  [11-15-24 @ 07:05]              [11-15-24 @ 12:07]    Ca ionized  Creatinine Trend:  POC glucoseGlucose: 231 mg/dL (11-15-24 @ 07:05)  CAPILLARY BLOOD GLUCOSE      POCT Blood Glucose.: 188 mg/dL (15 Nov 2024 12:22)  POCT Blood Glucose.: 203 mg/dL (15 Nov 2024 08:32)  POCT Blood Glucose.: 405 mg/dL (14 Nov 2024 22:06)  POCT Blood Glucose.: 263 mg/dL (14 Nov 2024 17:32)    PrealbuminPrealbumin, Serum: 13 mg/dL (11-13-24 @ 07:14)  Prealbumin, Serum: 12 mg/dL (11-07-24 @ 03:48)    Triglycerides

## 2024-11-15 NOTE — PROGRESS NOTE ADULT - REASON FOR ADMISSION
Abd pain
N/v
Abd pain
n/v
n/v
Abd pain
GOO
N/v
Display Individual Monthly Dosage In The Note (If Yes Will Display All Dosages Which Are Not N/A): yes

## 2024-11-15 NOTE — PROGRESS NOTE ADULT - SUBJECTIVE AND OBJECTIVE BOX
Name of Patient : DANUTA CALDERON  MRN: [id number]  Date of visit: 11-15-24      Subjective: Patient seen and examined. No new events except as noted.   doing [id number]       REVIEW OF SYSTEMS:    CONSTITUTIONAL: No weakness, fevers or chills  EYES/ENT: No visual changes;  No vertigo or throat pain   NECK: No pain or stiffness  RESPIRATORY: No cough, wheezing, hemoptysis; No shortness of breath  CARDIOVASCULAR: No chest pain or palpitations  GASTROINTESTINAL: No abdominal or epigastric pain. No nausea, vomiting, or hematemesis; No diarrhea or constipation. No melena or hematochezia.  GENITOURINARY: No dysuria, frequency or hematuria  NEUROLOGICAL: No numbness or weakness  SKIN: No itching, burning, rashes, or lesions   All other review of systems is negative unless indicated above.    MEDICATIONS:  MEDICATIONS  (STANDING):  bisacodyl Suppository 10 milliGRAM(s) Rectal once  dexAMETHasone  IVPB 40 milliGRAM(s) IV Intermittent daily  dextrose 5%. 1000 milliLiter(s) (100 mL/Hr) IV Continuous <Continuous>  dextrose 5%. 1000 milliLiter(s) (50 mL/Hr) IV Continuous <Continuous>  dextrose 50% Injectable 25 Gram(s) IV Push once  dextrose 50% Injectable 12.5 Gram(s) IV Push once  dextrose 50% Injectable 25 Gram(s) IV Push once  folic acid 1 milliGRAM(s) Oral daily  glucagon  Injectable 1 milliGRAM(s) IntraMuscular once  insulin glargine Injectable (LANTUS) 10 Unit(s) SubCutaneous at bedtime  insulin lispro (ADMELOG) corrective regimen sliding scale   SubCutaneous three times a day before meals  insulin lispro (ADMELOG) corrective regimen sliding scale   SubCutaneous at bedtime  levothyroxine Injectable 70 MICROGram(s) IV Push at bedtime  multivitamin/minerals 1 Tablet(s) Oral daily  naloxone Injectable 0.4 milliGRAM(s) IV Push once  pantoprazole  Injectable 40 milliGRAM(s) IV Push daily  polyethylene glycol 3350 17 Gram(s) Oral two times a day  saline laxative (FLEET) Rectal Enema 1 Enema Rectal once  thiamine 100 milliGRAM(s) Oral daily      PHYSICAL EXAM:  T(C): 36.7 (11-15-24 @ 17:30), Max: 36.9 (11-15-24 @ 12:00)  HR: 72 (11-15-24 @ 17:30) (72 - 84)  BP: 113/73 (11-15-24 @ 17:30) (105/65 - 152/93)  RR: 18 (11-15-24 @ 17:30) (18 - 18)  SpO2: 96% (11-15-24 @ 17:30) (96% - 99%)  Wt(kg): --  I&O's Summary    14 Nov 2024 07:01  -  15 Nov 2024 07:00  --------------------------------------------------------  IN: 720 mL / OUT: 0 mL / NET: 720 mL          Appearance: Normal	  HEENT:  PERRLA   Lymphatic: No lymphadenopathy   Cardiovascular: Normal S1 S2, no JVD  Respiratory: normal effort , clear  Gastrointestinal:  Soft, Non-tender  Skin: No rashes,  warm to touch  Psychiatry:  Mood & affect appropriate  Musculuskeletal: No edema    recent labs, Imaging and EKGs personally reviewed     11-14-24 @ 07:01  -  11-15-24 @ 07:00  --------------------------------------------------------  IN: 720 mL / OUT: 0 mL / NET: 720 mL                          7.3    15.34 )-----------( 60       ( 15 Nov 2024 07:05 )             22.6               11-15    131[L]  |  101  |  26[H]  ----------------------------<  231[H]  3.0[L]   |  19[L]  |  0.85    Ca    7.9[L]      15 Nov 2024 07:05    TPro  5.1[L]  /  Alb  2.9[L]  /  TBili  0.8  /  DBili  x   /  AST  34  /  ALT  33  /  AlkPhos  88  11-15                       Urinalysis Basic - ( 15 Nov 2024 07:05 )    Color: x / Appearance: x / SG: x / pH: x  Gluc: 231 mg/dL / Ketone: x  / Bili: x / Urobili: x   Blood: x / Protein: x / Nitrite: x   Leuk Esterase: x / RBC: x / WBC x   Sq Epi: x / Non Sq Epi: x / Bacteria: x

## 2024-11-15 NOTE — DISCHARGE NOTE PROVIDER - NSDCMRMEDTOKEN_GEN_ALL_CORE_FT
alendronate 70 mg oral tablet: 1 tab(s) orally once a week on sunday  amLODIPine 10 mg oral tablet: 1 tab(s) orally once a day  escitalopram 20 mg oral tablet: 1 tab(s) orally once a day  Fosamax 70 mg oral tablet: 1 tab(s) orally once a week SUNDAY  glipiZIDE 2.5 mg oral tablet: 1 tab(s) orally once a day  levothyroxine 100 mcg (0.1 mg) oral tablet: 1 tab(s) orally once a day  linaclotide 290 mcg oral capsule: 1 cap(s) orally once a day  losartan 100 mg oral tablet: 1 tab(s) orally once a day  MetFORMIN (Eqv-Fortamet) 500 mg oral tablet, extended release: 1 tab(s) orally once a day  pancrelipase 10,000 units-32,000 units-42,000 units oral delayed release capsule: 1 cap(s) orally 3 times a day (with meals)  sucralfate 1 g oral tablet: 1 tab(s) orally once a day   alendronate 70 mg oral tablet: 1 tab(s) orally once a week on sunday  amLODIPine 10 mg oral tablet: 1 tab(s) orally once a day  dexAMETHasone 20 mg oral tablet: 2 tab(s) orally once a day Take on November 16, 2024 and then discontinue  escitalopram 20 mg oral tablet: 1 tab(s) orally once a day  folic acid 1 mg oral tablet: 1 tab(s) orally once a day  glipiZIDE 2.5 mg oral tablet: 1 tab(s) orally once a day  levothyroxine 100 mcg (0.1 mg) oral tablet: 1 tab(s) orally once a day  linaclotide 290 mcg oral capsule: 1 cap(s) orally once a day  MetFORMIN (Eqv-Fortamet) 500 mg oral tablet, extended release: 1 tab(s) orally once a day  Multiple Vitamins with Minerals oral tablet: 1 tab(s) orally once a day  pancrelipase 10,000 units-32,000 units-42,000 units oral delayed release capsule: 1 cap(s) orally 3 times a day (with meals)  polyethylene glycol 3350 oral powder for reconstitution: 17 gram(s) orally once a day as needed for  constipation  Protonix 40 mg oral delayed release tablet: 1 tab(s) orally once a day  sucralfate 1 g oral tablet: 1 tab(s) orally once a day  thiamine 100 mg oral tablet: 1 tab(s) orally once a day

## 2024-11-15 NOTE — DISCHARGE NOTE PROVIDER - DISCHARGE DIET
Consistent Carbohydrate Diabetic Diets/Minced and Moist Diet/Nutrition Supplements Other Diet Instructions

## 2024-11-15 NOTE — DISCHARGE NOTE NURSING/CASE MANAGEMENT/SOCIAL WORK - NSDCFUADDAPPT_GEN_ALL_CORE_FT
You must follow up with your primary medical doctor within 2-3 days of discharge - please call to make an appointment.  At this appointment, you will need a BMP drawn to monitor your potassium level.  You must also have a CBC drawn to monitor your hemoglobin/hematocrit and platelets.  You must also discuss your current medication regimen to see if any changes need to be made.    You must follow up with your oncologist within one week of discharge - please call to make an appointment.      You must follow up with your gastroenterologist within one week of discharge - please call to make an appointment.            APPTS ARE READY TO BE MADE: [X] YES    Best Family or Patient Contact (if needed):    Additional Information about above appointments (if needed):    1: Primary medical doctor  2: Oncologist  3: Gastroenterologist    Other comments or requests:

## 2024-11-15 NOTE — DISCHARGE NOTE PROVIDER - NSDCCPCAREPLAN_GEN_ALL_CORE_FT
PRINCIPAL DISCHARGE DIAGNOSIS  Diagnosis: Gastric outlet obstruction  Assessment and Plan of Treatment: EGD on 11/5 demonstrated multifocal stricturing disease of the pylorus and duodenum due to tumor ingrowth.   GI following,you had endoscopic GJ 11/7.   you are  Now on Minced and Moist., advance as tolertaed   Follow up with GI      SECONDARY DISCHARGE DIAGNOSES  Diagnosis: Pancreatic cancer  Assessment and Plan of Treatment: you are on active treatemnt at  [facility] [place]   Follow up  with Dr. Bess of [facility]      Diagnosis: Thrombocytopenic  Assessment and Plan of Treatment: you have Anemia and  Thrombocytopenia likely due to malignancy and treatment;    you had  platelet  and PRBC transfusion  you hade bone marrow biopsy done  Follow up with Hematology        PRINCIPAL DISCHARGE DIAGNOSIS  Diagnosis: Gastric outlet obstruction  Assessment and Plan of Treatment: EGD on 11/5 demonstrated multifocal stricturing disease of the pylorus and duodenum due to tumor ingrowth.   GI following,you had endoscopic GJ 11/7.   you are  Now on Minced and Moist., advance as tolertaed   Follow up with GI with one week      SECONDARY DISCHARGE DIAGNOSES  Diagnosis: Pancreatic cancer  Assessment and Plan of Treatment: you are on active treatemnt at  [facility]   Follow up  with Dr. eBss of [facility] [place] within one week.  At this appointment, you will further discuss the results of your bone marrow biopsy.      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   You will take one more dose of steroids (dexamethasone) tomorrow (11/16/2024) and then discontinue.  You have Anemia and  Thrombocytopenia likely due to malignancy and treatment.   you had  platelet  and PRBC transfusion  you hade bone marrow biopsy done  Follow up with Hematology       Diagnosis: Hypokalemia  Assessment and Plan of Treatment: Your potassium level was low during this admission, so you were given supplementation.  You must follow up with your primary medical doctor within 2-3 days of discharge - at this appointment, you will need your potassium level checked to monitor.    Diagnosis: Hypertension  Assessment and Plan of Treatment: Your losartan was stopped during this admission.  You will need to follow up with your primary medical doctor within 2-3 days of discharge - at this appointment, you will need to discuss if/when to restart this medication.  Low salt diet  Activity as tolerated.  Follow up with your medical doctor for routine blood pressure monitoring at your next visit.  Notify your doctor if you have any of the following symptoms:   Dizziness, Lightheadedness, Blurry vision, Headache, Chest pain, Shortness of breath       PRINCIPAL DISCHARGE DIAGNOSIS  Diagnosis: Gastric outlet obstruction  Assessment and Plan of Treatment: EGD on 11/5 demonstrated multifocal stricturing disease of the pylorus and duodenum due to tumor ingrowth.   GI following,you had endoscopic GJ 11/7.   you are  Now on Minced and Moist., advance as tolertaed   Follow up with GI with one week      SECONDARY DISCHARGE DIAGNOSES  Diagnosis: Pancreatic cancer  Assessment and Plan of Treatment: you are on active treatemnt at  [facility]   Follow up  with Dr. Bess of [facility] [place] within one week.  At this appointment, you will further discuss the results of your bone marrow biopsy.      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   You will take one more dose of steroids (dexamethasone) tomorrow (11/16/2024) and then discontinue.  You have Anemia and  Thrombocytopenia likely due to malignancy and treatment.   you had  platelet  and PRBC transfusion  you hade bone marrow biopsy done  Follow up with Hematology       Diagnosis: Hypokalemia  Assessment and Plan of Treatment: Your potassium level was low during this admission, so you were given supplementation.  You must follow up with your primary medical doctor within 2-3 days of discharge - at this appointment, you will need your potassium level checked to monitor.    Diagnosis: Hypertension  Assessment and Plan of Treatment: Your losartan was stopped during this admission.  You will need to follow up with your primary medical doctor within 2-3 days of discharge - at this appointment, you will need to discuss if/when to restart this medication.  Low salt diet  Activity as tolerated.  Follow up with your medical doctor for routine blood pressure monitoring at your next visit.  Notify your doctor if you have any of the following symptoms:   Dizziness, Lightheadedness, Blurry vision, Headache, Chest pain, Shortness of breath      Diagnosis: Anemia  Assessment and Plan of Treatment: due to malignancy  s/p blood and platelet transfusions  You will need a CBC drawn within 2-3 days to monitor.

## 2024-11-18 NOTE — PACU DISCHARGE NOTE - AIRWAY PATENCY:
Satisfactory
Bed/Stretcher in lowest position, wheels locked, appropriate side rails in place/Call bell, personal items and telephone in reach/Instruct patient to call for assistance before getting out of bed/chair/stretcher/Non-slip footwear applied when patient is off stretcher/[place] to call system/Physically safe environment - no spills, clutter or unnecessary equipment/Purposeful proactive rounding/Room/bathroom lighting operational, light cord in reach

## 2024-11-26 NOTE — PROCEDURE NOTE - NSPATIENTPOSTION_GEN_A_CORE
No testing at this point. Patient to be scheduled as soon as possible to discuss case and formulate plan.   supine

## 2024-11-26 NOTE — CONSULT NOTE ADULT - ATTENDING COMMENTS
Case discussed with team. Family placed patient in Palliative care and preferred I not evaluate patient. PCP attending was present I agreeed with their wishes. Therefore, I did not examine patient and will not bill patient.

## 2024-11-26 NOTE — ED ADULT NURSE NOTE - NSFALLRISKINTERV_ED_ALL_ED
Assistance OOB with selected safe patient handling equipment if applicable/Assistance with ambulation/Communicate fall risk and risk factors to all staff, patient, and family/Monitor gait and stability/Monitor for mental status changes and reorient to person, place, and time, as needed/Provide visual cue: yellow wristband, yellow gown, etc/Reinforce activity limits and safety measures with patient and family/Toileting schedule using arm’s reach rule for commode and bathroom/Use of alarms - bed, stretcher, chair and/or video monitoring/Call bell, personal items and telephone in reach/Instruct patient to call for assistance before getting out of bed/chair/stretcher/Non-slip footwear applied when patient is off stretcher/[place] to call system/Physically safe environment - no spills, clutter or unnecessary equipment/Purposeful Proactive Rounding/Room/bathroom lighting operational, light cord in reach

## 2024-11-26 NOTE — ED PROVIDER NOTE - ATTENDING CONTRIBUTION TO CARE
Attending MD Gauthier: I personally have seen and examined this patient.  Resident note reviewed and agree on plan of care and except where noted.  See below for details.     Seen in [place], accompanied by  and cousin who is physician here at [facility]  CODE STROKE 1805.  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   reports that patient's last known well was 21:00 yesterday.  Reports when patient awoke today noted that patient was not acting at baseline.  Reports patient attempted to put on her shirt as pants.  Reports went to appointment at [facility] and was persistently confused so was sent to the Emergency Department.  Unclear if fall but reports later in the day.  Denies chest pain, shortness of breath, nausea, vomiting, diarrhea, urinary complaints. Here patient is AAOx2 (knows name and hospital but does not know date despite multiple attempts and redirection).  Patient did recognize her cousin, minimally contributory to HPI.    Exam:   General: NAD, cachectic, AAOx2 for this MD  HENT: head NCAT, airway patent  Eyes: anicteric, no conjunctival injection, PERRL, not following instructions to test EOMI but no obvious deficits  Lungs: lungs CTAB, no increased work of breathing  Cardiac: +S1S2, no obvious m/r/g  GI: abdomen soft with +BS, NT, ND  : no CVAT  MSK: ranging neck and extremities freely  Neuro: moving all extremities spontaneously, nonfocal, no facial asymmetry, sensation grossly intact  Skin: no obvious rash    A/P: 69F with change in baseline mentation, confusion, CODE STROKE CALLED, will evaluate for but not limited to metabolic derangement, progression of known disease, encephalopathy, stroke, will obtain labs, CT of head, obtain EKG, will keep on monitor, will admit

## 2024-11-26 NOTE — CONSULT NOTE ADULT - ASSESSMENT
DANUTA CALDERON is a 69y (1955) woman with a PMHx significant history of HTN, hypothyroidism, pancreatic carcinoma with prior chemotherapy currently on radiation therapy last dose in August 2024 s/p pancreatectomy and splenectomy with recent admission workup concerning for chemotherapy-induced TTP presenting with confusion.  Per  she was in her normal state last night 11/25 2100. This morning when they woke up he immediately knew she was confused when she attempted to put on her shirt as pants.  She then went to her [facility] appointment and was persistently confused and so they transferred here for further evaluation.  Patient also had a fall which reportedly occurred after the confusion onset.  Code stroke was called given acute change within 24-hour window.  Neuro to bedside.  Other differentials include metabolic derangement secondary to cancer, TTP infection, hypoglycemia, hepatic encephalopathy, stroke, ICH, others.  Plan CT imaging of head, CBC, CMP, VBG, UA, troponin, EKG and reassess.    NIHSS 2  MRS 1  LKW 11/25 2100    Impression  Worsening confusion and perseverating words in the setting of recent fall without focal neurologic deficits concerning for toxic metabolic etiology vs less likely AIS vs metastatic disease    Recommendations  [] Toxic Metabolic Workup  [] Incidental 7mm aneurysm identified, recommend outpatient followup with NSG  [] MR Brain w/wo contrast to further evaluate AMS  [] Consider EEG if other workup negative    Case discussed with stroke fellow, Dr. Craft. Pending chart attestation.    DANUTA CALDERON is a 69y (1955) woman with a PMHx significant history of HTN, hypothyroidism, pancreatic carcinoma with prior chemotherapy currently on radiation therapy last dose in August 2024 s/p pancreatectomy and splenectomy with recent admission workup concerning for chemotherapy-induced TTP presenting with confusion.  Per  she was in her normal state last night 11/25 2100. This morning when they woke up he immediately knew she was confused when she attempted to put on her shirt as pants.  She then went to her [facility] appointment and was persistently confused and so they transferred here for further evaluation.  Patient also had a fall which reportedly occurred after the confusion onset.  Code stroke was called given acute change within 24-hour window.  Neuro to bedside.  Other differentials include metabolic derangement secondary to cancer, TTP infection, hypoglycemia, hepatic encephalopathy, stroke, ICH, others.  Plan CT imaging of head, CBC, CMP, VBG, UA, troponin, EKG and reassess.    NIHSS 2  MRS 1  LKW 11/25 2100    Impression  Worsening confusion and perseverating words in the setting of recent fall without focal neurologic deficits concerning for toxic metabolic etiology vs less likely AIS vs metastatic disease    Recommendations  [] Toxic Metabolic Workup  [] Incidental 7mm aneurysm identified, recommend NSG inpatient consult   [] MR Brain w/wo contrast to further evaluate AMS  [] Consider EEG if other workup negative    Case discussed with stroke fellow, Dr. Craft. Pending chart attestation.

## 2024-11-26 NOTE — PROCEDURE NOTE - ADDITIONAL PROCEDURE DETAILS
69yoF with urinary retention bladder scan showing ~400cc in bladder. ED attempted straight catheterizations, unsuccessful. Urology called for difficult siddiqi placement.   Patient was prepped and draped with sterile technique. Mass/skin tag noted at urethral meatal opening. 12F silicone catheter inserted past mass with immediate return of urine. Balloon inflated with 10cc sterile water and siddiqi was secured to leg. Patient tolerated procedure well. Siddiqi management per primary team.     [facility]  [address] [place] [address], [place] [id number]  [place] [address] [phone]

## 2024-11-26 NOTE — ED ADULT NURSE REASSESSMENT NOTE - NS ED NURSE REASSESS COMMENT FT1
Patient found in stretcher breathing spontaneously and unlabored on Room Air. No signs of respiratory distress @ this time. The patient is alert and orientated to person and place but disorientated to time. Patient noted to presents with a Right Chest Wall Port accessed outpatient. The patient presents with a Right AC 20G PIV that is C/D/I and saline locked @ this time. Pt denies chest pain, palpitations, shortness of breath, headache, visual disturbances, numbness/tingling, fever, chills, diaphoresis,  nausea, vomiting, constipation, diarrhea, or urinary symptoms. Safety and comfort measures provided, bed locked and in lowest position, side rails up for safety. VS to order and Awaiting update on the ongoing plan of care.

## 2024-11-26 NOTE — ED PROVIDER NOTE - PHYSICAL EXAMINATION
GENERAL: Awake, alert, NAD, cachectic   HEAD: NC/AT, neck supple, moist mucous membranes  EYES: PERRL, EOM grossly intact but unable to test as she cannot track to the right, sclera anicteric  LUNGS: normal WOB on RA, CTAB, no wheezes or crackles   CARDIAC: RRR, no m/r/g  ABDOMEN: Soft, non tender, non distended, no rebound, no guarding  BACK: No midline spinal tenderness, no CVA tenderness  EXT: No edema, no deformities.  NEURO: A&Ox1. Moving all extremities. Unable to follow directions  SKIN: Warm and dry. No rash.  PSYCH: Normal affect. GENERAL: Awake, alert, NAD, cachectic   HEAD: NC/AT, neck supple, moist mucous membranes  EYES: PERRL, EOM grossly intact but unable to test as she cannot track to the right, sclera anicteric  LUNGS: normal WOB on RA, CTAB, no wheezes or crackles   CARDIAC: RRR, no m/r/g  ABDOMEN: Soft, non tender, non distended, no rebound, no guarding  EXT: No edema, no deformities.  NEURO: A&Ox1. Moving all extremities. Unable to follow directions, answering certain questions inappropriately  SKIN: Warm and dry. No rash.  PSYCH: Normal affect.

## 2024-11-26 NOTE — ED ADULT NURSE NOTE - CHPI ED NUR AGGRAVATING FX
Pre-Visit Chart Review  For Appointment Scheduled on 9-7-17    Health Maintenance Due   Topic Date Due    TETANUS VACCINE  10/31/1962    Zoster Vaccine  10/31/2004    Influenza Vaccine  08/01/2017       none

## 2024-11-26 NOTE — ED PROVIDER NOTE - PROGRESS NOTE DETAILS
Attending MD Gauthier: SPoke with lab, cancelled ammonia, discussed with RN, will send new sample. Spoke with neuro, no acute neuro intervention, do not suspect neuro intervention. I received signout of this patient at the usual time, as per signout patient with altered mental status, hemoglobin is at baseline appears higher than it was at time of last discharge, labs thus far without obvious etiology for altered mental status.  Patient was code stroke upon arrival CTA significant only for left mid ICA 7 mm aneurysm otherwise no acute pathology identified CXR prelim read without acute pathology.  Patient pending urinalysis currently undergoing straight catheterization, also pending ammonia.  Results discussed with patient family ember all questions answered, plan for admission.  Discussed with Dr. Ronquillo who accepts admission.  Neurology recommendations  appreciated, discussed the case with neurosurgery for inpatient consult given ICA aneurysm. -Keon Lopez PA-C Patient with over 400 cc PVR on bladder scan, 2 RNs attempted to obtain catheterized specimen were unable, will order for Payton catheter given urinary retention, consult urology. -Keon Lopez PA-C Attending MD Gauthier: Nathaniel noted, lactulose ordered and Dr. Lawrence made aware

## 2024-11-26 NOTE — ED ADULT NURSE NOTE - OBJECTIVE STATEMENT
Pt presents to ED via EMS, LKW 2100, pt was at [facility] receiving IVF and had become more altered concerning staff, as pt was at baseline AAOx3 and was presenting AAOx1 with them, Code Stroke called at 1805. Pt endorses to have had an unwitnessed fall this AM, negative headstrike, no LOC. As per EMS, FS was 229, and tachy to the 110's. Pt has hx of Pancreatic CA, last radiation performed in August. Pt presents, AAOx1- disoriented to place, time and situation, moving all limbs spontaneously. Pt has a port to L chest wall, site is clean, dry and intact. Pt placed on CM, NSR. VS as documented. Plan of care and montoring discussed with pt. Bed locked and lowered for safety. Safety and comfort maintained.

## 2024-11-26 NOTE — ED ADULT NURSE NOTE - BEFAST SCREENING
Discharge Summary    CHIEF COMPLAINT ON ADMISSION  Chief Complaint   Patient presents with   • Syncope   • Fall Less than 10 Feet   • Low Back Pain   • Neck Pain       Reason for Admission  Fall     Admission Date  10/6/2018    CODE STATUS  Prior    HPI & HOSPITAL COURSE  44 y.o. male admitted 10/6/2018 with syncope, concern for possible sepsis recently diagnosed with diverticulitis and discharged home on antibiotics he reports that he was unable to keep his antibiotics secondary to nausea and vomiting.  He has a previous history of PE following knee surgery he completed his course of anticoagulation and has been off any anticoagulation since July. due to the history of syncope a d-dimer was obtained and this was within normal range patient was initially monitored in the ICU due to concerns for sepsis which seems to have been ruled out in favor of the diagnosis of hypovolemia and vasovagal.  He had some abdominal bloating post discharge from the ICU but passed a lot of gas and had a small BM he was also having some loose stools which were felt to be due to stool softeners he received after admission.  By the date of discharge he was not having any lightheadedness or dizziness he did  continue to complain of upper thoracic and lower cervical spine pain and tenderness from his fall however CT scans were negative.  Patient is noted to have a prior history of chronic narcotic use but none since 2017.  Anti-inflammatory and Tylenol medication seem to be inadequate during his hospital stay, he was giving a small quantity of oxycodone at discharge and we discussed addictive potential, I also encouraged him to use Lidoderm patches for the full duration of 10 days for maximum effect.     Therefore, he is discharged in good and stable condition to home with close outpatient follow-up.    The patient met 2-midnight criteria for an inpatient stay at the time of discharge.    Discharge Date  10/9/2018    FOLLOW UP ITEMS POST  DISCHARGE  PCP    DISCHARGE DIAGNOSES  Active Problems:    Leukocytosis POA: Yes    History of pulmonary embolism POA: Yes      Overview: Lower, left lung and Eliquis to be continue until 7/30/18    Tobacco abuse POA: Yes    High anion gap metabolic acidosis POA: Yes    Acute kidney injury (HCC) POA: Yes    Acute diverticulitis POA: Yes    Hypercalcemia POA: Yes    Syncope POA: Unknown    Elevated troponin POA: Yes    Hypokalemia POA: Unknown    Contusion of back POA: Unknown    Pulmonary HTN (HCC) POA: Unknown  Resolved Problems:    Sepsis (HCC) POA: Yes    Lactic acidosis POA: Yes      FOLLOW UP  Future Appointments  Date Time Provider Department Center   10/17/2018 10:30 AM [facility] [place]   11/29/2018 10:00 AM [facility] None     Andrea Machado, P.A.-C.  [address]  [address]  [facility] [address]  [phone]            MEDICATIONS ON DISCHARGE     Medication List      START taking these medications      Instructions   acyclovir 400 MG tablet  Commonly known as:  ZOVIRAX   Take 1 Tab by mouth 3 times a day.  Dose:  400 mg     cefdinir 300 MG Caps  Commonly known as:  OMNICEF   Take 1 Cap by mouth 2 times a day.  Dose:  300 mg     lidocaine 5 % Ptch  Commonly known as:  LIDODERM   Apply 1 Patch to skin as directed every 24 hours.  Dose:  1 Patch     metroNIDAZOLE 500 MG Tabs  Commonly known as:  FLAGYL   Take 1 Tab by mouth every 8 hours.  Dose:  500 mg     oxyCODONE immediate-release 5 MG Tabs  Commonly known as:  ROXICODONE   Take 1 Tab by mouth every four hours as needed for Severe Pain for up to 4 days.  Dose:  5 mg        CONTINUE taking these medications      Instructions   amitriptyline 100 MG Tabs  Commonly known as:  ELAVIL   Take 100 mg by mouth every evening.  Dose:  100 mg     pantoprazole 40 MG Tbec  Commonly known as:  PROTONIX   Take 40 mg by mouth 2 times a day.  Dose:  40 mg     promethazine 25 MG Tabs  Commonly known as:  PHENERGAN   Take 1 Tab by mouth every 6  Negative hours as needed for Nausea/Vomiting.  Dose:  25 mg     raNITidine 150 MG Tabs  Commonly known as:  ZANTAC   Doctor's comments:  Please consider 90 day supplies to promote better adherence  TAKE ONE TABLET BY MOUTH TWICE DAILY     simvastatin 10 MG Tabs  Commonly known as:  ZOCOR   Take 10 mg by mouth every evening.  Dose:  10 mg        STOP taking these medications    amoxicillin-clavulanate 875-125 MG Tabs  Commonly known as:  AUGMENTIN            Allergies  Allergies   Allergen Reactions   • Fish Anaphylaxis     [place] and tuna   • Barley Grass Anaphylaxis   • Green Beans Shortness of Breath and Nausea   • Nicoderm [Nicotine] Rash, Itching and Swelling     Blisters noted to patch site   • Turkey Shortness of Breath and Nausea       DIET  Low residue    ACTIVITY  As tolerated    CONSULTATIONS  none    PROCEDURES  none    LABORATORY  Lab Results   Component Value Date    SODIUM 139 10/08/2018    POTASSIUM 3.5 (L) 10/08/2018    CHLORIDE 106 10/08/2018    CO2 25 10/08/2018    GLUCOSE 98 10/08/2018    BUN 10 10/08/2018    CREATININE 0.77 10/08/2018        Lab Results   Component Value Date    WBC 7.2 10/08/2018    HEMOGLOBIN 12.4 (L) 10/08/2018    HEMATOCRIT 37.2 (L) 10/08/2018    PLATELETCT 124 (L) 10/08/2018        Total time of the discharge process exceeds 34 minutes.

## 2024-11-26 NOTE — ED PROVIDER NOTE - CLINICAL SUMMARY MEDICAL DECISION MAKING FREE TEXT BOX
69-year-old female with a history of HTN, hypothyroidism, pancreatic carcinoma with prior chemo currently on radiation therapy last dose in August s/p pancreatectomy and splenectomy with recent admission workup concerning for chemotherapy-induced TTP presenting with confusion.  Per  she was in her normal state last night with last known well approximately 9 PM.  This morning when they woke up he immediately knew she was confused when she attempted to put on her shirt as pants.  She then went to her [facility] appointment and was persistently confused and so they transferred here for further evaluation.  Patient also had a fall which reportedly occurred after the confusion onset.  Denies fevers, chest pain, difficulty breathing, abdominal pain, vomiting, diarrhea, leg swelling.    On examination.  Patient is very cachectic appearing, mildly tachycardic, afebrile but hemodynamically stable with no hypotension or tachycardia.  ANO x 1, perseverating on her own birthday and her pancreatic cancer when asked questions unrelated to this.  She has equal strength in her upper and lower extremities bilaterally.  Pupils are equal round and reactive to light.  Unable to test extraocular eye movements as patient is unable to follow directions or track finger to the right.  No facial asymmetry, sensation intact bilaterally in the face, no pronator drift.  At this time differential is broad for altered mental status.  Code stroke was called given acute change within 24-hour window.  Neuro to bedside.  Other differentials include metabolic derangement secondary to cancer, TTP infection, hypoglycemia, hepatic encephalopathy, stroke, ICH, others.  Plan CT imaging of head, CBC, CMP, VBG, UA, troponin, EKG and reassess.

## 2024-11-27 NOTE — H&P ADULT - PROBLEM SELECTOR PLAN 8
- Can give PRN for hypertension in setting of NPO right now - Can give IV meds PRN for hypertension in setting of NPO right now

## 2024-11-27 NOTE — PROGRESS NOTE ADULT - SUBJECTIVE AND OBJECTIVE BOX
Name of Patient : DANUTA CALDERON  MRN: [id number]  Date of visit: 11-27-24 @ 14:20      Subjective: Patient seen and examined. No new events except as noted.   Patient seen earlier this AM. Sisters that the bedside.   Patient minimally responsive. Ammonia elevated. Received lactulose enema this AM.   Continues on ABX and started on IVF.     REVIEW OF SYSTEMS:  Unable to obtain ROS        MEDICATIONS:  MEDICATIONS  (STANDING):  cefTRIAXone   IVPB 1000 milliGRAM(s) IV Intermittent every 24 hours  dextrose 5%. 1000 milliLiter(s) (50 mL/Hr) IV Continuous <Continuous>  dextrose 5%. 1000 milliLiter(s) (100 mL/Hr) IV Continuous <Continuous>  dextrose 50% Injectable 25 Gram(s) IV Push once  dextrose 50% Injectable 12.5 Gram(s) IV Push once  dextrose 50% Injectable 25 Gram(s) IV Push once  enoxaparin Injectable 40 milliGRAM(s) SubCutaneous every 24 hours  glucagon  Injectable 1 milliGRAM(s) IntraMuscular once  insulin lispro (ADMELOG) corrective regimen sliding scale   SubCutaneous three times a day before meals  lactulose Syrup 10 Gram(s) Oral three times a day  levothyroxine Injectable 50 MICROGram(s) IV Push at bedtime  pantoprazole  Injectable 40 milliGRAM(s) IV Push daily  sodium chloride 0.9%. 1000 milliLiter(s) (60 mL/Hr) IV Continuous <Continuous>  thiamine Injectable 100 milliGRAM(s) IV Push daily      PHYSICAL EXAM:  T(C): 36.5 (11-27-24 @ 06:00), Max: 36.8 (11-26-24 @ 18:07)  HR: 120 (11-27-24 @ 06:00) (94 - 120)  BP: 102/65 (11-27-24 @ 06:00) (93/56 - 173/103)  RR: 18 (11-27-24 @ 06:00) (14 - 19)  SpO2: 97% (11-27-24 @ 06:00) (94% - 98%)  Wt(kg): --  I&O's Summary    26 Nov 2024 07:01  -  27 Nov 2024 07:00  --------------------------------------------------------  IN: 0 mL / OUT: 400 mL / NET: -400 mL      Height (cm): 154.9 (11-26 @ 17:27)  Weight (kg): 43.1 (11-26 @ 17:27)  BMI (kg/m2): 18 (11-26 @ 17:27)  BSA (m2): 1.38 (11-26 @ 17:27)    Appearance: Weak appearing, frail, lethargic, minimally responsive 	  HEENT:  Eyes are closed, raises eyebrows   Lymphatic: No lymphadenopathy grossly   Cardiovascular: Tachycardic   Respiratory: normal effort   Gastrointestinal:  Soft   Skin: No rashes grossly   Psychiatry: Unable to assess   Musculoskeletal: No edema       11-26-24 @ 07:01  -  11-27-24 @ 07:00  --------------------------------------------------------  IN: 0 mL / OUT: 400 mL / NET: -400 mL                                8.5    6.98  )-----------( 183      ( 26 Nov 2024 18:11 )             25.2               11-26    135  |  103  |  30[H]  ----------------------------<  239[H]  4.1   |  20[L]  |  0.75    Ca    8.2[L]      26 Nov 2024 18:11    TPro  6.0  /  Alb  2.8[L]  /  TBili  0.4  /  DBili  x   /  AST  43[H]  /  ALT  22  /  AlkPhos  83  11-26    PT/INR - ( 26 Nov 2024 18:11 )   PT: 12.6 sec;   INR: 1.11 ratio         PTT - ( 26 Nov 2024 18:11 )  PTT:26.8 sec                   Urinalysis Basic - ( 26 Nov 2024 22:53 )    Color: Yellow / Appearance: Cloudy / SG: >1.030 / pH: x  Gluc: x / Ketone: Negative mg/dL  / Bili: Negative / Urobili: 0.2 mg/dL   Blood: x / Protein: 30 mg/dL / Nitrite: Negative   Leuk Esterase: Small / RBC: 0 /HPF / WBC 20 /HPF   Sq Epi: x / Non Sq Epi: 1 /HPF / Bacteria: Too Numerous to count /HPF        < from: CT Angio Neck Stroke Protocol w/ IV Cont (11.26.24 @ 18:26) >  IMPRESSION:    CT PERFUSION:  Technical limitations: None.    Core infarction: 0 ml  Penumbra / tissue at risk for active ischemia: 0 ml    CTA NECK:  Left mid ICA 7 mm aneurysm.    CTA HEAD:  No large vessel occlusion, significant stenosis or vascular abnormality   identified.    Findings were discussed with Dr. Rosas 11/26/2024 6:41 PM by Dr. Milton with read back confirmation.    < end of copied text >        < from: CT Brain Stroke Protocol (11.26.24 @ 18:22) >    IMPRESSION:  No acute intracranial hemorrhage, mass effect, or midline shift.    Findings were discussed with Dr. Rosas at 11/26/2024 6:22 PM by Dr. Milton with read back confirmation.    < end of copied text >      < from: TTE W or WO Ultrasound Enhancing Agent (11.27.24 @ 11:30) >     CONCLUSIONS:      1. Technically difficult image quality.   2. Tachycardia was present throughout the study.   3. Left ventricle was not well visualized.   4. Left ventricular cavity is small. Left ventricular systolic function is hyperdynamic with an ejection fraction visually estimated at > 70 %. There is poor visualization of the endocardial borders to determine the presence of wall motion abnormalities.   5. The right ventricle is not well visualized. small right ventricular cavity size.   6. Grossly hyperdynamic RV systolic function.   7. Small pericardial effusion.   8. Abdominal ascites is incidentally noted.   9. No prior echocardiogram is available for comparison.    < end of copied text >

## 2024-11-27 NOTE — SWALLOW BEDSIDE ASSESSMENT ADULT - SWALLOW EVAL: DIAGNOSIS
Consult received and appreciated. Chart reviewed. Upon encounter, Pt asleep in bed on RA and appears tachypneic. Family at bedside tearful and wishes to defer evaluation at this time. Covering RN Shana and LYNETTE Kaur made aware. GOC conversation w/ family is ongoing. This service will continue to follow Pt and evaluate as medically appropriate.

## 2024-11-27 NOTE — PHYSICAL THERAPY INITIAL EVALUATION ADULT - ADDITIONAL COMMENTS
Pt resides in pvt house with spouse, 5 steps to enter. 6 steps inside. Patient ambulated without AD independent. pt owns no dme s at home.

## 2024-11-27 NOTE — H&P ADULT - PROBLEM SELECTOR PLAN 1
Patient with significant AMS, in setting of UTI and hyperammonemia  - Treatment as below Patient with significant AMS, in setting of UTI and hyperammonemia  - Treatment as below  - Neurology/neruosurgery following  - CT: Incidental ICA aneurysm found, no surg intervention per neurosurg  - Consider MR/MRA as recommended per Neuro and neurosurg if treating reversible causes per below doesn't improve mental status Patient with significant AMS, in setting of UTI and hyperammonemia  - Treatment as below  - Neurology/neurosurgery following  - CT: Incidental ICA aneurysm found, no surg intervention per neurosurg  - Consider MR/MRA as recommended per Neuro and neurosurg if treating reversible causes per below doesn't improve mental status  - NPO  - F/u Swallow eval  - Aspiration, fall precautions

## 2024-11-27 NOTE — CONSULT NOTE ADULT - CONSULT REASON
Code Stroke
incidental ica aneurysm
Pancreatic cancer
GaP team consulted for complex medical decision making in the setting of serious illness and symptoms management.

## 2024-11-27 NOTE — H&P ADULT - PROBLEM SELECTOR PLAN 7
- Can give PRN for hypertension in setting of NPO right now - Levothyroxine 50mcg IV  - Can restart PO when no longer NPO

## 2024-11-27 NOTE — CONSULT NOTE ADULT - PROBLEM SELECTOR RECOMMENDATION 4
Met with patient sister at bedside this morning, who shared that patient has been progressively getting weaker due to poor nutrition. They showed a good understanding about patient's medical condition and prognosis, however patient and  Wayne are having a hard time accepting or taking about it. Patient was referred to outpatient Psych.      Will meet in person with Wayne - I-stop reviewed   - Advise IV morphine 1 mg q2hrs prn for moderate pain PRN   - Advise IV morphine 2 mg q2hrs prn for severe pain/dyspnea PRN   - Bowel regimen while on opioids.  Monitor for constipation.

## 2024-11-27 NOTE — H&P ADULT - PROBLEM SELECTOR PLAN 5
- Hold home PO meds  - Mod ISS  - F/u POCT, A1c - Known hx of pancreatic cancer  - Palliative consulted

## 2024-11-27 NOTE — CONSULT NOTE ADULT - CONVERSATION DETAILS
After introducing myself and my role in the patient's care, I discussed with the patient's , Wayne, and the family. They demonstrated a clear understanding of the patient's current medical condition.  The patient's current medical condition, including her readmission with alternate mental status and poor oral intake since the last discharge.      Spoke to Wayne about patient's deteriorating condition. Discussed that with various comorbidities and poor functional status, patient was high risk of further medical events. Discussed option of comfort care to prioritize end of life symptom management with deescalation of other interventions that would be burdensome at end of life including continued blood draws, imaging, other investigations, vitals monitoring.     Offered transfer to palliative care unit for management of symptoms. Wayne tearful shared that they fight Luisa cancer together, and did not image that this time will come too soon, but his priority is patient comfort and agreed to transfer to PCU when bed available.       Discussed code status, recommended DNR/DNI as CPR/intubation have poor outcomes in a patient with such serious illness and add more burdens at end of life. Wayne consented to DNR/DNI status. MOLST form filled out and placed in chart.         I answered all the questions that arose during our discussion and provided emotional support.

## 2024-11-27 NOTE — PATIENT PROFILE ADULT - FALL HARM RISK - HARM RISK INTERVENTIONS

## 2024-11-27 NOTE — H&P ADULT - ASSESSMENT
69F w/Hx of HTN, hypothyroidism, pancreatic carcinoma s/p chemo and radiation therapy (currently on disease modifying therapy), s/p pancreatectomy and splenectomy presenting due to worsening confusion and AMS.

## 2024-11-27 NOTE — CONSULT NOTE ADULT - PROBLEM SELECTOR RECOMMENDATION 6
Surrogate:  Claude Black   Code status: DNR/I, comfort measures when bed available   PCU transfer when bed available   Chaplaincy referral accepted     Please see complete GOC note

## 2024-11-27 NOTE — PHYSICAL THERAPY INITIAL EVALUATION ADULT - PERTINENT HX OF CURRENT PROBLEM, REHAB EVAL
69-year-old female with a history of HTN, hypothyroidism, pancreatic carcinoma with prior chemo currently on radiation therapy last dose in August s/p pancreatectomy and splenectomy with recent admission workup concerning for chemotherapy-induced TTP presenting with confusion. Patient also had a fall which reportedly occurred after the confusion onset.  Code stroke was called given acute change within 24-hour window. CTA HEAD:No large vessel occlusion, significant stenosis or vascular abnormality identified. CTH wnl. 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 Exam: JUANA, PERRL, blank stare. nonverbal, WDx4 -No acute neurosurgical intervention. Admitted med for toxo-metabolic AMS santacruz.

## 2024-11-27 NOTE — CONSULT NOTE ADULT - PROBLEM SELECTOR RECOMMENDATION 9
Possible multifactorial in the setting of hyperammonemia, UTI, malignancy.   - CTA with incidental finding of aneurysm, neurosurgery consulted who recommended no acute neurosurgical intervention.  - Management per primary team

## 2024-11-27 NOTE — CONSULT NOTE ADULT - ASSESSMENT
68 y/o F w/pancreatic ca s/p resection '21, s/p chemo/rt then recurrence '22 s/p RT.  Presenting w/ AMS. GaP team consulted for complex medical decision making in the setting of serious illness and symptoms management.

## 2024-11-27 NOTE — SWALLOW BEDSIDE ASSESSMENT ADULT - SLP PERTINENT HISTORY OF CURRENT PROBLEM
69y F w/ PMHx HTN, hypothyroidism, and pancreatic carcinoma with s/p pancreatectomy/splenectomy prior chemotherapy currently on radiation therapy presenting for AMS. Code stroke called on arrival, NIHSS 2. Per neuro, "Worsening confusion and perseverating words in the setting of recent fall without focal neurologic deficits concerning for toxic metabolic etiology vs less likely AIS vs metastatic disease". Incidental finding of ICA aneurysm; no acute neurosx intervention. Overnight Pt w/ worsening mental status and unable to tolerate PO. Per H&P, Pt mildly responsive to painful stimuli, otherwise she is not alert but is protecting her airway. Labs significant for UTI and hyperammonemia; tx w/ abx and lactulose. Heme/onc following Pt while in-house. Palliative consulted for GOC.

## 2024-11-27 NOTE — ED ADULT NURSE REASSESSMENT NOTE - NS ED NURSE REASSESS COMMENT FT1
LANCE Weinberg contacted via Teams and made aware of patient failed Dysphagia and inability to tolerate lactulose syrup. ACP also made aware patient is declining mentally, unable to cooperate and follow commands as she once did earlier. RN adamant that patient needs to be re-assessed by Team.

## 2024-11-27 NOTE — CONSULT NOTE ADULT - PROBLEM SELECTOR RECOMMENDATION 5
Palliative care consulted for r complex medical decision making in the setting of serious illness. Discussed with ACP - PPSV 30%.   - Supportive care

## 2024-11-27 NOTE — CONSULT NOTE ADULT - PROBLEM SELECTOR RECOMMENDATION 3
- PPSV 30%.   - Per sister patient since last hospital discharge has poor oral intake  - Supportive care - Advised IV ativan 0.25 mg q6hrs PRN   - Monitor for constipation, urinary retention, pain, hunger, thirst, etc.  Promote sleep wake cycle and reorientation as indicated.

## 2024-11-27 NOTE — SWALLOW BEDSIDE ASSESSMENT ADULT - COMMENTS
IMAGIN/26 CXR: IMPRESSION: Clear lungs.    ***Pt is not previously known to this service and no prior swallow studies in PACS.

## 2024-11-27 NOTE — PROGRESS NOTE ADULT - PROBLEM SELECTOR PLAN 3
- Ammonia of 145, uptrending to 308   - Likely contributing to AMS  - Lactulose given partially in ED  - Lactulose Enema in setting of lack of responsiveness  - F/u RUQ US to eval liver --> Pending   - S/P Lactulose enema, continue and monitor for BM   - Trend Ammonia level daily  - GI eval

## 2024-11-27 NOTE — CONSULT NOTE ADULT - ASSESSMENT
68 y/o F w/pancreatic ca s/p resection '21, s/p chemo/rt then recurrence '22 s/p RT.  Presenting w/ AMS.    #Pancreatic ca  --Follows with Dr. Bess of [facility] [place]  --S/p distal pancreatectomy and splenectomy (06/2021)  --S/p adjuvant tx w/ mFOLFIRINOX (08/21-01/21), Xeloda +RTx 25 fractions (02/22-03/22)-> POD (01/23)  --Most recent systemic tx w/ Gemcitabine and nab-paclitaxel (05/23-10/23, 02/24-07/24). Break in tx 2/2 jaundice and stent migration  --S/p RT x 10 fractions to abdominal wall mets 08/2024  --No plan for cancer treatment inpatient and/or rehab  --On recent admission managed for suspected intravascular hemolysis, s/p platelet transfusions, Dex, and bone marrow biopsy 11/12 - path w/ marginally adequate hypercellular bone marrow with erythroid hyperplasia and mildly decreased megakaryopoiesis. Platelets currently normal.     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 No neurosurgical intervention planned  --F/u cultures. Continues antibiotics.    Will continue to follow.    Chandler Johansen PA-C  Hematology/Oncology  [facility]  [phone] (office) 68 y/o F w/pancreatic ca s/p resection '21, s/p chemo/rt then recurrence '22 s/p RT.  Presenting w/ AMS.    #Pancreatic ca  --Follows with Dr. Bess of [facility]  --S/p distal pancreatectomy and splenectomy (06/2021)  --S/p adjuvant tx w/ mFOLFIRINOX (08/21-01/21), Xeloda +RTx 25 fractions (02/22-03/22)-> POD (01/23)  --Most recent systemic tx w/ Gemcitabine and nab-paclitaxel (05/23-10/23, 02/24-07/24). Break in tx 2/2 jaundice and stent migration  --S/p RT x 10 fractions to abdominal wall mets 08/2024  --No plan for cancer treatment inpatient and/or rehab  --On recent admission managed for suspected intravascular hemolysis, s/p platelet transfusions, Dex, and bone marrow biopsy 11/12 - path w/ marginally adequate hypercellular bone marrow with erythroid hyperplasia and mildly decreased megakaryopoiesis. Platelets currently normal.     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 No neurosurgical intervention planned  --F/u cultures. Continues antibiotics.  recommend neurology consult     Will continue to follow.    Chandler Johansen PA-C  Hematology/Oncology  [facility]  [phone] (office)

## 2024-11-27 NOTE — PROGRESS NOTE ADULT - PROBLEM SELECTOR PLAN 1
Patient with significant AMS, in setting of UTI and hyperammonemia  - Treatment as below  - CT: Incidental ICA 7mm L ICA aneurysm found, no surgical intervention per neurosurg  - Consider MR/MRA as recommended per Neuro and neurosurg if treating reversible causes per below doesn't improve mental status  - NPO w/ IVF   - F/u Swallow eval  - Aspiration, fall precautions  - Neurology/neurosurgery evals appreciated; F/u recs

## 2024-11-27 NOTE — CONSULT NOTE ADULT - ASSESSMENT
Luisa Arce   69F mult med hx p/f AMS/fall. CTH wnl. 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 Exam: JUANA, PERRL, blank stare. nonverbal, WDx4   -No acute neurosurgical intervention. Admitted med for toxo-metabolic AMS santacruz (+UTI, high ammonia lvl)    -Outpatient MRA (MRA head, non con), then f/u w/ Dr. Ellison in office once medically stable

## 2024-11-27 NOTE — H&P ADULT - PROBLEM SELECTOR PLAN 2
Patient with AMS and positive UA  - Sinus tachycardia likely in setting of infection  - Ceftriaxone 1g x 3 days

## 2024-11-27 NOTE — H&P ADULT - HISTORY OF PRESENT ILLNESS
Unable to obtain hx from patient due to AMS, unable to reach  overnight. Hx obtained via chart review and discussion with staff.    69F w/Hx of HTN, hypothyroidism, pancreatic carcinoma s/p chemo and radiation therapy (currently on disease modifying therapy), s/p pancreatectomy and splenectomy presenting due to worsening confusion and AMS. Patient had recent admission for chemotherapy-induced TTP presenting with confusion/AMS. Per  she was in her normal state of mind last night. This morning when they woke up, he noticed she was confused, when she attempted to put on her shirt as pants. her confusion seems to have worsened over time. Patient failed dysphagia screen in ED. On my exam, patient is only mildly responsive to painful stimuli, otherwise she is not alert but she is protecting her airway.

## 2024-11-27 NOTE — CONSULT NOTE ADULT - PROBLEM SELECTOR RECOMMENDATION 2
Patient diagnosed with pancreatic cancer 2021 s/p resection, chemo/RT and now with recurrent in 2022 s/p RT.   - Follows with  Dr. Bess ([facility] [place])   - Heme-onc following; who states that marrow biopsy 11/12 showed marginally adequate hypercellular bone marrow with erythroid hyperplasia and mildly decreased megakaryopoiesis.   - Management per primary team

## 2024-11-27 NOTE — CHART NOTE - NSCHARTNOTEFT_GEN_A_CORE
Medicine ACP Event Note  Date of Service: 11-27-24 @ 03:29    Subjective: Notified by RN patient not able to tolerate meds, failed dysphagia, and change in mental status. Patient seen and examined at bedside A&Ox1 since presenting to ED as a code stroke, neurology following.        Objective Findings:  T(C): 36.4 (11-27-24 @ 02:33), Max: 36.8 (11-26-24 @ 18:07)  HR: 115 (11-27-24 @ 02:33) (94 - 119)  BP: 99/61 (11-27-24 @ 02:33) (93/56 - 173/103)  RR: 18 (11-27-24 @ 02:33) (15 - 19)  SpO2: 96% (11-27-24 @ 02:33) (94% - 98%)  Wt(kg): --  Daily Height in cm: 154.94 (26 Nov 2024 17:27)    Daily       Physical Exam:  Gen: Patient A&Ox1   HEENT: EOMI, Normocephalic/ atraumatic  CV: RRR, normal S1 + S2, no m/r/g  Lungs:  Normal respiratory effort; clear to auscultation bilaterally  Abd: soft, non-tender; bowel sounds present  Ext: No edema; warm and well perfused    Telemetry:     Laboratory Data:                        8.5    6.98  )-----------( 183      ( 26 Nov 2024 18:11 )             25.2     11-26    135  |  103  |  30[H]  ----------------------------<  239[H]  4.1   |  20[L]  |  0.75    Ca    8.2[L]      26 Nov 2024 18:11    TPro  6.0  /  Alb  2.8[L]  /  TBili  0.4  /  DBili  x   /  AST  43[H]  /  ALT  22  /  AlkPhos  83  11-26    PT/INR - ( 26 Nov 2024 18:11 )   PT: 12.6 sec;   INR: 1.11 ratio         PTT - ( 26 Nov 2024 18:11 )  PTT:26.8 sec          Inpatient Medications:  MEDICATIONS  (STANDING):  cefTRIAXone   IVPB 1000 milliGRAM(s) IV Intermittent every 24 hours  enoxaparin Injectable 40 milliGRAM(s) SubCutaneous every 24 hours  lactulose Syrup 10 Gram(s) Oral three times a day      Assessment/Plan of Care: 69y (1955) woman with a PMHx significant history of HTN, hypothyroidism, pancreatic carcinoma with prior chemotherapy currently on radiation therapy last dose in August 2024 s/p pancreatectomy and splenectomy with recent admission workup concerning for chemotherapy-induced TTP presenting with confusion.  Per  she was in her normal state last night 11/25 2100. This morning when they woke up he immediately knew she was confused when she attempted to put on her shirt as pants.  She then went to her [facility] appointment and was persistently confused and so they transferred here for further evaluation.      # AMS  - s/p code stroke in ED  - IV hydralazine x 1 for elevated BP  - c/w fluids   - Neuro recommending MRI head, vEEG  - H&P pending, will discuss with hospitalist   - Will monitor patient closely overnight  - Will endorse to day team in AM           Taiwo Weinberg PA-C  Medicine ACP

## 2024-11-27 NOTE — H&P ADULT - PROBLEM SELECTOR PLAN 6
- Levothyroxine 50mcg IV  - Can restart PO when no longer NPO - Hold home PO meds  - Mod ISS  - F/u POCT, A1c

## 2024-11-27 NOTE — PROGRESS NOTE ADULT - PROBLEM SELECTOR PLAN 2
Patient with AMS and positive UA  - Sinus tachycardia likely in setting of infection  - Ceftriaxone 1g x 3 days  - IVF for hydration  - F/u UCx and BCx2--> In lab   - Trend CBC, temp curve, VS and monitor patient

## 2024-11-27 NOTE — CONSULT NOTE ADULT - SUBJECTIVE AND OBJECTIVE BOX
Reason for consult: Pancreatic cancer    HPI: Unable to obtain hx from patient due to AMS, unable to reach  overnight. Hx obtained via chart review and discussion with staff.    69F w/Hx of HTN, hypothyroidism, pancreatic carcinoma s/p chemo and radiation therapy (currently on disease modifying therapy), s/p pancreatectomy and splenectomy presenting due to worsening confusion and AMS. Patient had recent admission for chemotherapy-induced TTP presenting with confusion/AMS. Per  she was in her normal state of mind last night. This morning when they woke up, he noticed she was confused, when she attempted to put on her shirt as pants. her confusion seems to have worsened over time. Patient failed dysphagia screen in ED. On my exam, patient is only mildly responsive to painful stimuli, otherwise she is not alert but she is protecting her airway. Hematology/oncology consulted on this patient w/ pancreatic cancer, [place] [facility] [place]     PAST MEDICAL & SURGICAL HISTORY:  Pancreatic cancer      Hypertension      Hypothyroid      Seasonal asthma      History of pancreatectomy          FAMILY HISTORY:      Alochol: Denied  Smoking: Nonsmoker  Drug Use: Denied  Marital Status:         Allergies    Dilantin (Unknown)  ibuprofen (Other)  phenytoin (Rash)    Intolerances    platelet rash- will need pre-medication (Rash)      MEDICATIONS  (STANDING):  cefTRIAXone   IVPB 1000 milliGRAM(s) IV Intermittent every 24 hours  dextrose 5%. 1000 milliLiter(s) (50 mL/Hr) IV Continuous <Continuous>  dextrose 5%. 1000 milliLiter(s) (100 mL/Hr) IV Continuous <Continuous>  dextrose 50% Injectable 25 Gram(s) IV Push once  dextrose 50% Injectable 12.5 Gram(s) IV Push once  dextrose 50% Injectable 25 Gram(s) IV Push once  enoxaparin Injectable 40 milliGRAM(s) SubCutaneous every 24 hours  glucagon  Injectable 1 milliGRAM(s) IntraMuscular once  insulin lispro (ADMELOG) corrective regimen sliding scale   SubCutaneous three times a day before meals  lactulose Syrup 10 Gram(s) Oral three times a day  levothyroxine Injectable 50 MICROGram(s) IV Push at bedtime  pantoprazole  Injectable 40 milliGRAM(s) IV Push daily  thiamine Injectable 100 milliGRAM(s) IV Push daily    MEDICATIONS  (PRN):  acetaminophen     Tablet .. 650 milliGRAM(s) Oral every 6 hours PRN Temp greater or equal to 38C (100.4F), Mild Pain (1 - 3)  dextrose Oral Gel 15 Gram(s) Oral once PRN Blood Glucose LESS THAN 70 milliGRAM(s)/deciliter  melatonin 3 milliGRAM(s) Oral at bedtime PRN Insomnia    ROS  Unable to obtain due to mental status.    T(C): 36.5 (11-27-24 @ 06:00), Max: 36.8 (11-26-24 @ 18:07)  HR: 120 (11-27-24 @ 06:00) (94 - 120)  BP: 102/65 (11-27-24 @ 06:00) (93/56 - 173/103)  RR: 18 (11-27-24 @ 06:00) (14 - 19)  SpO2: 97% (11-27-24 @ 06:00) (94% - 98%)  Wt(kg): --    PE  NAD  Resting, not easily arousable  MMM  No c/c/e  No rash grossly                          8.5    6.98  )-----------( 183      ( 26 Nov 2024 18:11 )             25.2       11-26    135  |  103  |  30[H]  ----------------------------<  239[H]  4.1   |  20[L]  |  0.75    Ca    8.2[L]      26 Nov 2024 18:11    TPro  6.0  /  Alb  2.8[L]  /  TBili  0.4  /  DBili  x   /  AST  43[H]  /  ALT  22  /  AlkPhos  83  11-26  
Neurology - Consult Note    -  Spectra: [id number] ([facility]), [id number] ([facility])  -    HPI: Patient DANUTA CALDERON is a 69y (1955) woman with a PMHx significant history of HTN, hypothyroidism, pancreatic carcinoma with prior chemotherapy currently on radiation therapy last dose in August 2024 s/p pancreatectomy and splenectomy with recent admission workup concerning for chemotherapy-induced TTP presenting with confusion.  Per  she was in her normal state last night 11/25 2100. This morning when they woke up he immediately knew she was confused when she attempted to put on her shirt as pants.  She then went to her [facility] appointment and was persistently confused and so they transferred here for further evaluation.  Patient also had a fall which reportedly occurred after the confusion onset.  Denies fevers, chest pain, difficulty breathing, abdominal pain, vomiting, diarrhea, leg swelling.    On examination.  Patient is very cachectic appearing, mildly tachycardic, afebrile but hemodynamically stable with no hypotension or tachycardia.  ANO x 1, perseverating on her own birthday and her pancreatic cancer when asked questions unrelated to this.  She has equal strength in her upper and lower extremities bilaterally.  Pupils are equal round and reactive to light.  Unable to test extraocular eye movements as patient is unable to follow directions or track finger to the right.  No facial asymmetry, sensation intact bilaterally in the face, no pronator drift.  At this time differential is broad for altered mental status.  Code stroke was called given acute change within 24-hour window.  Neuro to bedside.  Other differentials include metabolic derangement secondary to cancer, TTP infection, hypoglycemia, hepatic encephalopathy, stroke, ICH, others.  Plan CT imaging of head, CBC, CMP, VBG, UA, troponin, EKG and reassess.    NIHSS 2  MRS 1  LKW 11/25 2100    Not a candidate for tenecteplase as pt is outside treatment window  Not a candidate for mechanical thrombectomy as no LVO on vessel imaging.     Review of Systems:    Unable to assess due to AMS    Allergies:  Dilantin (Unknown)  platelet rash- will need pre-medication (Rash)  ibuprofen (Other)  phenytoin (Rash)      PMHx/PSHx/Family Hx: As above, otherwise see below   Pancreatic cancer    Hypertension    Hypothyroid    Seasonal asthma        Social Hx:  No current use of tobacco, alcohol, or illicit drugs  Lives with     Medications:  MEDICATIONS  (STANDING):    MEDICATIONS  (PRN):      Vitals:  T(C): 36.8 (11-26-24 @ 18:07), Max: 36.8 (11-26-24 @ 18:07)  HR: 105 (11-26-24 @ 18:07) (105 - 110)  BP: 142/81 (11-26-24 @ 18:07) (132/81 - 142/81)  RR: 18 (11-26-24 @ 18:07) (16 - 19)  SpO2: 94% (11-26-24 @ 18:07) (94% - 98%)    Physical Examination:    Constitutional: cachetic  Cardiovascular: no swelling, warm-to-touch    Neurological Examination:    - Mental Status: Eyes open, intermittently attends to examiner; oriented to person ONLY, speech is fluent and intermittently follows 1-step commands;     - Cranial Nerves:  II: Visual fields are full to confrontation; pupils are equal, round, and reactive to light   III, IV, VI: Extraocular movements are intact without nystagmus  V: Facial sensation is intact in the V1-V3 distribution bilaterally  VII: Face is symmetric with normal eye closure and smile  VIII: Hearing is intact to conversation      - Motor/Strength Testing:  - No drifts x 4 extremities.  Moves all four extremities spontaneously        - There is no pronator drift.   - Normal muscle bulk and tone throughout.      - Sensory: Intact throughout to light touch x 4 extremities.  -Gait: Unable to assess due to patient safety.     Labs:                        8.5    6.98  )-----------( 183      ( 26 Nov 2024 18:11 )             25.2     11-26    135  |  103  |  30[H]  ----------------------------<  239[H]  4.1   |  20[L]  |  0.75    Ca    8.2[L]      26 Nov 2024 18:11    TPro  6.0  /  Alb  2.8[L]  /  TBili  0.4  /  DBili  x   /  AST  43[H]  /  ALT  22  /  AlkPhos  83  11-26    CAPILLARY BLOOD GLUCOSE  252 (26 Nov 2024 18:40)      POCT Blood Glucose.: 252 mg/dL (26 Nov 2024 18:06)    LIVER FUNCTIONS - ( 26 Nov 2024 18:11 )  Alb: 2.8 g/dL / Pro: 6.0 g/dL / ALK PHOS: 83 U/L / ALT: 22 U/L / AST: 43 U/L / GGT: x             PT/INR - ( 26 Nov 2024 18:11 )   PT: 12.6 sec;   INR: 1.11 ratio         PTT - ( 26 Nov 2024 18:11 )  PTT:26.8 sec  CSF:                  Radiology:    IMPRESSION:    CT PERFUSION:  Technical limitations: None.    Core infarction: 0 ml  Penumbra / tissue at risk for active ischemia: 0 ml    CTA NECK:  Left mid ICA 7 mm aneurysm.    CTA HEAD:  No large vessel occlusion, significant stenosis or vascular abnormality   identified.  
p (1480)     69F mult med hx p/f AMS/fall. CTH wnl. 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 Exam: JUANA, PERRL, blank stare. nonverbal, WDx4    --Anticoagulation:  enoxaparin Injectable 40 milliGRAM(s) SubCutaneous every 24 hours    =====================  PAST MEDICAL HISTORY   Pancreatic cancer    Hypertension    Hypothyroid    Seasonal asthma      PAST SURGICAL HISTORY   History of pancreatectomy      Dilantin (Unknown)  platelet rash- will need pre-medication (Rash)  ibuprofen (Other)  phenytoin (Rash)      MEDICATIONS:  Antibiotics:  cefTRIAXone   IVPB 1000 milliGRAM(s) IV Intermittent every 24 hours    Neuro:  acetaminophen     Tablet .. 650 milliGRAM(s) Oral every 6 hours PRN  melatonin 3 milliGRAM(s) Oral at bedtime PRN    Other:  lactulose Retention Enema 200 Gram(s) Rectal once  lactulose Syrup 10 Gram(s) Oral three times a day      SOCIAL HISTORY:   Occupation:   Marital Status:     FAMILY HISTORY:  No pertinent family history in first degree relatives        ROS: Negative except per HPI    LABS:  PT/INR - ( 26 Nov 2024 18:11 )   PT: 12.6 sec;   INR: 1.11 ratio         PTT - ( 26 Nov 2024 18:11 )  PTT:26.8 sec                        8.5    6.98  )-----------( 183      ( 26 Nov 2024 18:11 )             25.2     11-26    135  |  103  |  30[H]  ----------------------------<  239[H]  4.1   |  20[L]  |  0.75    Ca    8.2[L]      26 Nov 2024 18:11    TPro  6.0  /  Alb  2.8[L]  /  TBili  0.4  /  DBili  x   /  AST  43[H]  /  ALT  22  /  AlkPhos  83  11-26      
Date of Service: 11-27-24 @ 12:03    HPI:  Unable to obtain hx from patient due to AMS, unable to reach  overnight. Hx obtained via chart review and discussion with staff.  69F w/Hx of HTN, hypothyroidism, pancreatic carcinoma s/p chemo and radiation therapy (currently on disease modifying therapy), s/p pancreatectomy and splenectomy presenting due to worsening confusion and AMS. Patient had recent admission for chemotherapy-induced TTP presenting with confusion/AMS. Per  she was in her normal state of mind last night. This morning when they woke up, he noticed she was confused, when she attempted to put on her shirt as pants. her confusion seems to have worsened over time. Patient failed dysphagia screen in ED. On my exam, patient is only mildly responsive to painful stimuli, otherwise she is not alert but she is protecting her airway.    (27 Nov 2024 04:43)    GaP team consulted for complex medical decision making in the setting of serious illness and symptoms management.  Patient seen and examined at bedside. Lethargic, unarousable to voice. Two sisters at bedside, who reported that patient since last hospital admission has progressively declining due to poor oral intake. They showed a good understanding regarding patient's current medical condition, however  is having a hard time.     PERTINENT PM/SXH:   Pancreatic cancer    Hypertension    Hypothyroid    Seasonal asthma      History of pancreatectomy      FAMILY HISTORY:      SOCIAL HISTORY:   Significant other/partner[ ]  Children[ ]  Christianity/Spirituality:  Substance hx:  [ ]   Tobacco hx:  [ ]   Alcohol hx: [ ]   Home Opioid hx:  [x ] I-Stop Reference No:Mona Fidencio atwood | Reference #: [id number]    Practitioner Count: 2  Pharmacy Count: 1  Current Opioid Prescriptions: 1  Current Benzodiazepine Prescriptions: 0  Current Stimulant Prescriptions: 0      Patient Demographic Information (PDI)       PDI	First Name	Last Name	Birth Date	Gender	Street Address	[facility]	Zip Code  STEPHEN Arce	1955	Female	[address] [place]	[id number]    Prescription Information      PDI Filter:    PDI	My Rx	Current Rx	Drug Type	Rx Written	Rx Dispensed	Drug	Quantity	Days Supply	Prescriber Name	Prescriber JUAN M #	Payment Method	Dispenser  A	N	Y	O	10/28/2024	11/01/2024	buprenorphine 5 mcg/hr patch	4	28	Laura Haywood)	[id number]	[facility] #[id number]  A	N	N	O	10/02/2024	10/05/2024	oxycodone hcl (ir) 5 mg tablet	10	5	O'Selena Hernandez MD	[id number]	Insurance	[facility] #[id number]  A	N	N	S	07/25/2024	07/28/2024	methylphenidate 5 mg tablet	60	30	Nathan Loyd	[id number]	Insurance	[facility] #[id number]  A	N	N	S	06/24/2024	06/25/2024	methylphenidate 5 mg tablet	30	30	Nathan Loyd	[id number]	Insurance	[facility] #[id number]      Living Situation: [x ]Home  [ ]Long term care  [ ]Rehab [ ]Other    ADVANCE DIRECTIVES:    DNR/MOLST  [ ]  Living Will  [ ]   DECISION MAKER(s):  [ ] Health Care Proxy(s)  [x ] Surrogate(s)  [ ] Guardian           Name(s): Phone Number(s):  Claude Black [phone]    BASELINE (I)ADL(s) (prior to admission):  Coke: [ ]Total  [ x] Moderate [ ]Dependent    Allergies    Dilantin (Unknown)  ibuprofen (Other)  phenytoin (Rash)    Intolerances    platelet rash- will need pre-medication (Rash)  MEDICATIONS  (STANDING):  cefTRIAXone   IVPB 1000 milliGRAM(s) IV Intermittent every 24 hours  dextrose 5%. 1000 milliLiter(s) (50 mL/Hr) IV Continuous <Continuous>  dextrose 5%. 1000 milliLiter(s) (100 mL/Hr) IV Continuous <Continuous>  dextrose 50% Injectable 25 Gram(s) IV Push once  dextrose 50% Injectable 12.5 Gram(s) IV Push once  dextrose 50% Injectable 25 Gram(s) IV Push once  enoxaparin Injectable 40 milliGRAM(s) SubCutaneous every 24 hours  glucagon  Injectable 1 milliGRAM(s) IntraMuscular once  insulin lispro (ADMELOG) corrective regimen sliding scale   SubCutaneous three times a day before meals  lactulose Syrup 10 Gram(s) Oral three times a day  levothyroxine Injectable 50 MICROGram(s) IV Push at bedtime  pantoprazole  Injectable 40 milliGRAM(s) IV Push daily  sodium chloride 0.9%. 1000 milliLiter(s) (60 mL/Hr) IV Continuous <Continuous>  thiamine Injectable 100 milliGRAM(s) IV Push daily    MEDICATIONS  (PRN):  acetaminophen     Tablet .. 650 milliGRAM(s) Oral every 6 hours PRN Temp greater or equal to 38C (100.4F), Mild Pain (1 - 3)  dextrose Oral Gel 15 Gram(s) Oral once PRN Blood Glucose LESS THAN 70 milliGRAM(s)/deciliter  melatonin 3 milliGRAM(s) Oral at bedtime PRN Insomnia      ITEMS NOT CHECKED ARE NOT PRESENT  PRESENT SYMPTOMS: [x ]Unable to self-report see CPOT, PAINADs, RDOS  Source if other than patient:  [ ]Family   [ ]Team     Pain: [ ]yes [ ]no  QOL impact -   Location -                    Aggravating factors -  Quality -  Radiation -  Timing-  Severity (0-10 scale):  Minimal acceptable level (0-10 scale):       Dyspnea:                           [ ]Mild [ ]Moderate [ ]Severe  Anxiety:                             [ ]Mild [ ]Moderate [ ]Severe  Fatigue:                             [ ]Mild [ ]Moderate [ ]Severe  Nausea:                             [ ]Mild [ ]Moderate [ ]Severe  Loss of appetite:              [ ]Mild [ ]Moderate [ ]Severe  Constipation:                    [ ]Mild [ ]Moderate [ ]Severe    PCSSQ [Palliative Care Spiritual Screening Question]   Severity (0-10):  Score of 4 or > indicate consideration of Chaplaincy referral.  Chaplaincy Referral: [ ] yes [ ] refused [ ] following [ x] deferred  Caregiver [place]? : [ ] yes [ ] no [x ] deferred:  Social work referral [ ] Patient & Family Centered Care Referral [ ]   Anticipatory Grief Present?: [ ] yes [ ] no  [x ] deferred: Palliative Social work referral [ ]  Patient & Family Centered Care Referral [ ]       Other Symptoms:  [ ]All other review of systems negative   [x ] Unable to obtain due to poor mentation    PHYSICAL EXAM:  Vital Signs Last 24 Hrs  T(C): 36.5 (27 Nov 2024 06:00), Max: 36.8 (26 Nov 2024 18:07)  T(F): 97.7 (27 Nov 2024 06:00), Max: 98.3 (26 Nov 2024 18:07)  HR: 120 (27 Nov 2024 06:00) (94 - 120)  BP: 102/65 (27 Nov 2024 06:00) (93/56 - 173/103)  BP(mean): 122 (27 Nov 2024 00:06) (99 - 122)  RR: 18 (27 Nov 2024 06:00) (14 - 19)  SpO2: 97% (27 Nov 2024 06:00) (94% - 98%)    Parameters below as of 27 Nov 2024 06:00  Patient On (Oxygen Delivery Method): room air     I&O's Summary    26 Nov 2024 07:01  -  27 Nov 2024 07:00  --------------------------------------------------------  IN: 0 mL / OUT: 400 mL / NET: -400 mL        GENERAL:  []Alert  []Oriented   [x ]Lethargic  [ ]Cachexia  [ ]Unarousable  []Verbal  [ x]Non-Verbal  Behavioral:   [ ]Anxiety  [ ]Delirium [ ]Agitation [ ]  HEENT:  [x]Normal   [x ]Dry mouth   [ ]ET Tube/Trach  [ ]Oral lesions  PULMONARY:   []Clear [ ]Tachypnea  [ ]Audible excessive secretions   [ ]Rhonchi        [ ]Right [ ]Left [ ]Bilateral  [ ]Crackles        [ ]Right [ ]Left [ ]Bilateral  [ ]Wheezing     [ ]Right [ ]Left [ ]Bilateral  [x ]Diminished BS [ ] Right [ ]Left [x ]Bilateral  CARDIOVASCULAR:    []Regular [ ]Irregular [ x]Tachy  [ ]Rober [ ]Murmur [ ]Other  GASTROINTESTINAL:  [x]Soft  [ ]Distended   [x]+BS  []Non tender [ ]Tender  [ ]PEG [ ]OGT/ NGT   Last BM:    GENITOURINARY:  [x]Normal [ ]Incontinent   [ ]Oliguria/Anuria   [x ][place]  MUSCULOSKELETAL:   [ ]Normal   [x]Weakness  [ ]Bed/Wheelchair bound [ ]Edema  NEUROLOGIC:   []No focal deficits  [ ] Cognitive impairment  [ ] Dysphagia [ ]Dysarthria [ ] Paresis [ ]Other   SKIN:   [x]Normal  [ ]Rash   [ ]Pressure ulcer(s) [ ]y [ ]n present on admission    CRITICAL CARE:  [ ] Shock Present  [ ]Septic [ ]Cardiogenic [ ]Neurologic [ ]Hypovolemic  [ ]  Vasopressors [ ]  Inotropes   [ ]Respiratory failure present [ ]Mechanical ventilation [ ]Non-invasive ventilatory support [ ]High flow    [ ]Acute  [ ]Chronic [ ]Hypoxic  [ ]Hypercarbic [ ]Other  [ ]Other organ failure     LABS:                        8.5    6.98  )-----------( 183      ( 26 Nov 2024 18:11 )             25.2   11-26    135  |  103  |  30[H]  ----------------------------<  239[H]  4.1   |  20[L]  |  0.75    Ca    8.2[L]      26 Nov 2024 18:11    TPro  6.0  /  Alb  2.8[L]  /  TBili  0.4  /  DBili  x   /  AST  43[H]  /  ALT  22  /  AlkPhos  83  11-26  PT/INR - ( 26 Nov 2024 18:11 )   PT: 12.6 sec;   INR: 1.11 ratio         PTT - ( 26 Nov 2024 18:11 )  PTT:26.8 sec    Urinalysis Basic - ( 26 Nov 2024 22:53 )    Color: Yellow / Appearance: Cloudy / SG: >1.030 / pH: x  Gluc: x / Ketone: Negative mg/dL  / Bili: Negative / Urobili: 0.2 mg/dL   Blood: x / Protein: 30 mg/dL / Nitrite: Negative   Leuk Esterase: Small / RBC: 0 /HPF / WBC 20 /HPF   Sq Epi: x / Non Sq Epi: 1 /HPF / Bacteria: Too Numerous to count /HPF      RADIOLOGY & ADDITIONAL STUDIES:    < from: CT Angio Neck Stroke Protocol w/ IV Cont (11.26.24 @ 18:26) >  ISCELLANEOUS: No other vascular abnormality is seen.      IMPRESSION:    CT PERFUSION:  Technical limitations: None.    Core infarction: 0 ml  Penumbra / tissue at risk for active ischemia: 0 ml    CTA NECK:  Left mid ICA 7 mm aneurysm.    CTA HEAD:  No large vessel occlusion, significant stenosis or vascular abnormality   identified.    Findings were discussed with Dr. Rosas 11/26/2024 6:41 PM by Dr. Milton with read back confirmation.    --- End of Report ---      < end of copied text >  < from: CT Brain Stroke Protocol (11.26.24 @ 18:22) >  No acute intracranial hemorrhage, mass effect, or midline shift.    < end of copied text >      PROTEIN CALORIE MALNUTRITION PRESENT: [ ]mild [ ]moderate [ x]severe [ ]underweight [ ]morbid obesity  https://www.andeal.org/vault/2440/web/files/ONC/Table_Clinical%20Characteristics%20to%20Document%20Malnutrition-White%20JV%20et%20al%202012.pdf    Height (cm): 154.9 (11-26-24 @ 17:27), 154.9 (11-12-24 @ 09:31), 154.9 (12-08-23 @ 15:52)  Weight (kg): 43.1 (11-26-24 @ 17:27), 36 (11-12-24 @ 09:31), 44.5 (12-08-23 @ 15:52)  BMI (kg/m2): 18 (11-26-24 @ 17:27), 15 (11-12-24 @ 09:31), 18.5 (12-08-23 @ 15:52)    [ x]PPSV2 < or = to 30% [ ]significant weight loss  [ x]poor nutritional intake  [ ]anasarca[ ]Artificial Nutrition      Other REFERRALS:  [ ]Hospice  [ ]Child Life  [ ]Social Work  [x ]Case management [ ]Holistic Therapy

## 2024-11-28 NOTE — PROGRESS NOTE ADULT - SUBJECTIVE AND OBJECTIVE BOX
Name of Patient : DANUTA CALDERON  MRN: [id number]  Date of visit: 11-28-24 @ 13:27      Subjective: Patient seen and examined. No new events except as noted.   Patient seen earlier this AM. Remains unarousable.  Now on comfort measures. Pending PCU transfer once bed available     REVIEW OF SYSTEMS:  Unable to obtain ROS        MEDICATIONS:  MEDICATIONS  (STANDING):  chlorhexidine 2% Cloths 1 Application(s) Topical daily  levothyroxine Injectable 50 MICROGram(s) IV Push at bedtime      PHYSICAL EXAM:  T(C): 36.9 (11-28-24 @ 04:55), Max: 36.9 (11-28-24 @ 04:55)  HR: --  BP: 129/80 (11-28-24 @ 04:55) (129/80 - 129/80)  RR: 18 (11-28-24 @ 04:55) (18 - 18)  SpO2: 100% (11-28-24 @ 04:55) (100% - 100%)  Wt(kg): --  I&O's Summary    27 Nov 2024 07:01  -  28 Nov 2024 07:00  --------------------------------------------------------  IN: 0 mL / OUT: 600 mL / NET: -600 mL    28 Nov 2024 07:01  -  28 Nov 2024 13:27  --------------------------------------------------------  IN: 0 mL / OUT: 0 mL / NET: 0 mL        Appearance: Weak appearing, frail, lethargic, minimally responsive 	  HEENT:  Eyes are closed   Lymphatic: No lymphadenopathy grossly   Cardiovascular: Tachycardic   Respiratory: Increased effort   Gastrointestinal:  Soft   Skin: No rashes grossly   Psychiatry: Unable to assess   Musculoskeletal: No edema       11-27-24 @ 07:01  -  11-28-24 @ 07:00  --------------------------------------------------------  IN: 0 mL / OUT: 600 mL / NET: -600 mL    11-28-24 @ 07:01  -  11-28-24 @ 13:27  --------------------------------------------------------  IN: 0 mL / OUT: 0 mL / NET: 0 mL                                8.5    6.98  )-----------( 183      ( 26 Nov 2024 18:11 )             25.2               11-26    135  |  103  |  30[H]  ----------------------------<  239[H]  4.1   |  20[L]  |  0.75    Ca    8.2[L]      26 Nov 2024 18:11    TPro  6.0  /  Alb  2.8[L]  /  TBili  0.4  /  DBili  x   /  AST  43[H]  /  ALT  22  /  AlkPhos  83  11-26    PT/INR - ( 26 Nov 2024 18:11 )   PT: 12.6 sec;   INR: 1.11 ratio         PTT - ( 26 Nov 2024 18:11 )  PTT:26.8 sec                   Urinalysis Basic - ( 26 Nov 2024 22:53 )    Color: Yellow / Appearance: Cloudy / SG: >1.030 / pH: x  Gluc: x / Ketone: Negative mg/dL  / Bili: Negative / Urobili: 0.2 mg/dL   Blood: x / Protein: 30 mg/dL / Nitrite: Negative   Leuk Esterase: Small / RBC: 0 /HPF / WBC 20 /HPF   Sq Epi: x / Non Sq Epi: 1 /HPF / Bacteria: Too Numerous to count /HPF

## 2024-11-28 NOTE — PROGRESS NOTE ADULT - PROBLEM SELECTOR PLAN 7
- Levothyroxine 50mcg IV  - Can restart PO when no longer NPO
- Levothyroxine 50mcg IV  - Can restart PO when no longer NPO

## 2024-11-28 NOTE — PROGRESS NOTE ADULT - SUBJECTIVE AND OBJECTIVE BOX
Patient seen and examined at bedside.   remains un unarousable    MEDICATIONS  (STANDING):  chlorhexidine 2% Cloths 1 Application(s) Topical daily  levothyroxine Injectable 50 MICROGram(s) IV Push at bedtime    MEDICATIONS  (PRN):  acetaminophen  Suppository .. 650 milliGRAM(s) Rectal every 6 hours PRN Temp greater or equal to 38C (100.4F), Mild Pain (1 - 3)  bisacodyl Suppository 10 milliGRAM(s) Rectal daily PRN Constipation  glycopyrrolate Injectable 0.4 milliGRAM(s) IV Push every 6 hours PRN excessive oral secretions  LORazepam   Injectable 0.25 milliGRAM(s) IV Push every 6 hours PRN Agitation  morphine  - Injectable 2 milliGRAM(s) IV Push every 2 hours PRN Severe Pain (7 - 10)  morphine  - Injectable 2 milliGRAM(s) IV Push every 2 hours PRN dyspnea  morphine  - Injectable 1 milliGRAM(s) IV Push every 2 hours PRN Moderate Pain (4 - 6)        Vital Signs Last 24 Hrs  T(C): 36.9 (28 Nov 2024 04:55), Max: 36.9 (28 Nov 2024 04:55)  T(F): 98.5 (28 Nov 2024 04:55), Max: 98.5 (28 Nov 2024 04:55)  HR: --  BP: 129/80 (28 Nov 2024 04:55) (129/80 - 129/80)  BP(mean): --  RR: 18 (28 Nov 2024 04:55) (18 - 18)  SpO2: 100% (28 Nov 2024 04:55) (100% - 100%)    Parameters below as of 28 Nov 2024 04:55  Patient On (Oxygen Delivery Method): room air          PHYSICAL EXAM:     GENERAL: remains un arousable   HEENT:  NC/AT,  conjunctivae clear and pink  CHEST:  CTA b/l  HEART:  S1 s2+   ABDOMEN:  Soft, non-tender, non-distended                              8.5    6.98  )-----------( 183      ( 26 Nov 2024 18:11 )             25.2       11-26    135  |  103  |  30[H]  ----------------------------<  239[H]  4.1   |  20[L]  |  0.75    Ca    8.2[L]      26 Nov 2024 18:11    TPro  6.0  /  Alb  2.8[L]  /  TBili  0.4  /  DBili  x   /  AST  43[H]  /  ALT  22  /  AlkPhos  83  11-26

## 2024-11-28 NOTE — PROGRESS NOTE ADULT - PROBLEM SELECTOR PLAN 2
Patient with AMS and positive UA  - Sinus tachycardia likely in setting of infection  - Ceftriaxone 1g x 3 days --> DC'd   - IVF for hydration  - F/u UCx and BCx2   - Trend CBC, temp curve, VS and monitor patient    - Patient now on comfort measures and pending PCU transfer once bed available

## 2024-11-28 NOTE — PROGRESS NOTE ADULT - PROBLEM SELECTOR PLAN 3
- Ammonia of 145, uptrending to 308   - Likely contributing to AMS  - Lactulose given partially in ED  - Lactulose Enema in setting of lack of responsiveness  - RUQ US as noted   - S/P Lactulose enema, continue and monitor for BM   - Trend Ammonia level daily  - GI eval    - Patient now on comfort measures and pending PCU transfer once bed available

## 2024-11-28 NOTE — PROGRESS NOTE ADULT - PROBLEM SELECTOR PLAN 1
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

## 2024-11-29 NOTE — PROGRESS NOTE ADULT - TIME BILLING
minutes spent on total encounter. The necessity of the time spent during the encounter on this date of service was due to:     Total time spent including the following  [ x] Physical chart review and documentation   An extensive review of the physical chart, electronic health record, and documentation was conducted to obtain collateral information including but not limited to:   - Current inpatient records (ED, H&P, primary team, and consultants [IR])   - Inpatient values/results (CBC, CMP, CT)   - Prior inpatient records   - Current or proposed treatment plans   - Pharmacotherapy review   [x ]discussion with the interdisciplinary palliative care team -RN, , clinicians, trainees,   [x ]discussion with the patient/family/decision maker  [x ]Physical Exam and/or review of systems   [x ]Formulation of assessment and plan   [ x]Evaluating for response to treatment and side effects of opioids or benzodiazepines.  [ x]Review of care coordination documentation.

## 2024-11-29 NOTE — DIETITIAN INITIAL EVALUATION ADULT - NSFNSPHYEXAMSKINFT_GEN_A_CORE
Pressure Injury 1: sacrum, Suspected deep tissue injury  Pressure Injury 2: none, none  Pressure Injury 3: none, none  Pressure Injury 4: none, none  Pressure Injury 5: none, none  Pressure Injury 6: none, none  Pressure Injury 7: none, none  Pressure Injury 8: none, none  Pressure Injury 9: none, none  Pressure Injury 10: none, none  Pressure Injury 11: none, none
I completed an independent physical examination.   I have signed out the follow up of any pending tests (i.e. labs, radiological studies) to the PA/NP.  I have discussed the patient’s plan of care and disposition with the PA/NP    Patient with laceration. Will close, provide wound care instructions and dc.

## 2024-11-29 NOTE — DIETITIAN INITIAL EVALUATION ADULT - REASON INDICATOR FOR ASSESSMENT
Consult for MST Score 2 or >. However, pt NPO and comfort measures only; nutrition assessment not appropriate at this time.

## 2024-11-29 NOTE — DIETITIAN INITIAL EVALUATION ADULT - PROBLEM SELECTOR PLAN 1
Patient with significant AMS, in setting of UTI and hyperammonemia  - Treatment as below  - Neurology/neurosurgery following  - CT: Incidental ICA aneurysm found, no surg intervention per neurosurg  - Consider MR/MRA as recommended per Neuro and neurosurg if treating reversible causes per below doesn't improve mental status  - NPO  - F/u Swallow eval  - Aspiration, fall precautions

## 2024-11-29 NOTE — PROGRESS NOTE ADULT - PROBLEM SELECTOR PLAN 2
LORazepam   Injectable 0.25 milliGRAM(s) IV Push every 6 hours PRN Agitation  required x 0 past 24 hours. continue to monitor    Monitor for constipation, urinary retention, pain, hunger, thirst, etc.  Promote sleep wake cycle and reorientation as indicated.

## 2024-11-29 NOTE — PROGRESS NOTE ADULT - SUBJECTIVE AND OBJECTIVE BOX
Patient seen at bedside.   remains unarousable    MEDICATIONS  (STANDING):  chlorhexidine 2% Cloths 1 Application(s) Topical daily  levothyroxine Injectable 50 MICROGram(s) IV Push at bedtime    MEDICATIONS  (PRN):  acetaminophen  Suppository .. 650 milliGRAM(s) Rectal every 6 hours PRN Temp greater or equal to 38C (100.4F), Mild Pain (1 - 3)  bisacodyl Suppository 10 milliGRAM(s) Rectal daily PRN Constipation  glycopyrrolate Injectable 0.4 milliGRAM(s) IV Push every 6 hours PRN excessive oral secretions  LORazepam   Injectable 0.25 milliGRAM(s) IV Push every 6 hours PRN Agitation  morphine  - Injectable 2 milliGRAM(s) IV Push every 2 hours PRN Severe Pain (7 - 10)  morphine  - Injectable 2 milliGRAM(s) IV Push every 2 hours PRN dyspnea  morphine  - Injectable 1 milliGRAM(s) IV Push every 2 hours PRN Moderate Pain (4 - 6)        Vital Signs Last 24 Hrs  T(C): 36.9 (28 Nov 2024 04:55), Max: 36.9 (28 Nov 2024 04:55)  T(F): 98.5 (28 Nov 2024 04:55), Max: 98.5 (28 Nov 2024 04:55)  HR: --  BP: 129/80 (28 Nov 2024 04:55) (129/80 - 129/80)  BP(mean): --  RR: 18 (28 Nov 2024 04:55) (18 - 18)  SpO2: 100% (28 Nov 2024 04:55) (100% - 100%)    Parameters below as of 28 Nov 2024 04:55  Patient On (Oxygen Delivery Method): O2 NC4L          PHYSICAL EXAM:     GENERAL: remains unarousable   CHEST:  CTA b/l  HEART:  S1 s2+                             8.5    6.98  )-----------( 183      ( 26 Nov 2024 18:11 )             25.2       11-26    135  |  103  |  30[H]  ----------------------------<  239[H]  4.1   |  20[L]  |  0.75    Ca    8.2[L]      26 Nov 2024 18:11    TPro  6.0  /  Alb  2.8[L]  /  TBili  0.4  /  DBili  x   /  AST  43[H]  /  ALT  22  /  AlkPhos  83  11-26

## 2024-11-29 NOTE — PROGRESS NOTE ADULT - PROBLEM SELECTOR PLAN 1
morphine  - Injectable 2 milliGRAM(s) IV Push every 2 hours PRN Severe Pain (7 - 10) required x 1 past 24 hours  morphine  - Injectable 1 milliGRAM(s) IV Push every 2 hours PRN Moderate Pain (4 - 6)  required x 2 past 24 hours    Bowel regimen while on opioids.  Monitor for constipation.   Continue to monitor

## 2024-11-29 NOTE — DIETITIAN INITIAL EVALUATION ADULT - PERTINENT MEDS FT
MEDICATIONS  (STANDING):  chlorhexidine 2% Cloths 1 Application(s) Topical daily  levothyroxine Injectable 50 MICROGram(s) IV Push at bedtime    MEDICATIONS  (PRN):  acetaminophen  Suppository .. 650 milliGRAM(s) Rectal every 6 hours PRN Temp greater or equal to 38C (100.4F), Mild Pain (1 - 3)  bisacodyl Suppository 10 milliGRAM(s) Rectal daily PRN Constipation  glycopyrrolate Injectable 0.4 milliGRAM(s) IV Push every 6 hours PRN excessive oral secretions  LORazepam   Injectable 0.25 milliGRAM(s) IV Push every 6 hours PRN Agitation  morphine  - Injectable 2 milliGRAM(s) IV Push every 2 hours PRN Severe Pain (7 - 10)  morphine  - Injectable 2 milliGRAM(s) IV Push every 2 hours PRN dyspnea  morphine  - Injectable 1 milliGRAM(s) IV Push every 2 hours PRN Moderate Pain (4 - 6)

## 2024-11-29 NOTE — DIETITIAN INITIAL EVALUATION ADULT - PROBLEM SELECTOR PLAN 3
- Ammonia of 145  - Likely contributing to AMS  - Lactulose given partially in ED  - Lactulose Enema in setting of lack of responsiveness  - F/u RUQ US to eval liver

## 2024-11-29 NOTE — PROGRESS NOTE ADULT - NS ATTEND AMEND GEN_ALL_CORE FT
Agree with above assessment and plan.
Patient seen and examined by me. patient care and plan discussed and reviewed with PA. Plan as outlined above edited by me to reflect our discussion. Advanced care planning/advanced directives discussed with patient/family. DNR status including forceful chest compressions to attempt to restart the heart, ventilator support/artificial breathing, electric shock, artificial nutrition, health care proxy, Molst form all discussed with pt. More than 50% of the visit was spent counseling and/or coordinating care by the attending physician.
Patient seen and examined by me. patient care and plan discussed and reviewed with PA. Plan as outlined above edited by me to reflect our discussion. Advanced care planning/advanced directives discussed with patient/family. DNR status including forceful chest compressions to attempt to restart the heart, ventilator support/artificial breathing, electric shock, artificial nutrition, health care proxy, Molst form all discussed with pt. Fifty seven minutes of total time dedicated to this patient visit today including preparing to see the patient (eg. review of tests), obtaining and/or reviewing separately obtained history, obtaining/reviewing vitals, performing a medically appropriate examination and/or evaluation, counseling and educating the patient/family/caregiver, reviewing previous notes and test results, and procedures, communicating with other health professionals (when not separately reported), and documenting clinical information in the electronic health record.

## 2024-11-29 NOTE — DIETITIAN INITIAL EVALUATION ADULT - PERTINENT LABORATORY DATA
A1C with Estimated Average Glucose Result: 6.5 % (11-07-24 @ 03:48)  A1C with Estimated Average Glucose Result: 6.4 % (11-02-24 @ 06:50)

## 2024-11-29 NOTE — PROGRESS NOTE ADULT - SUBJECTIVE AND OBJECTIVE BOX
GAP TEAM PALLIATIVE CARE UNIT PROGRESS NOTE:      [  ] Patient on hospice program.    INDICATION FOR PALLIATIVE CARE UNIT SERVICES/Interval HPI:  69 year old female with hx DM, Hypothyroidism, HTN,  pancreatic cancer, s/p resection and now with recurrence, admitted with altered MS thought to be due to UTI and hyperammonemia.  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  Given overall performance and clinical status family has opted for comfort measures only and patient tx to PCU for management of symptoms and end of life care.      OVERNIGHT EVENTS:  Patient required morphine x 1 for MP and x 1 for SP.  Low grade temp noted.      DNR on chart: Yes  DNI    Allergies    Dilantin (Unknown)  ibuprofen (Other)  phenytoin (Rash)    Intolerances    platelet rash- will need pre-medication (Rash)  MEDICATIONS  (STANDING):  chlorhexidine 2% Cloths 1 Application(s) Topical daily  levothyroxine Injectable 50 MICROGram(s) IV Push at bedtime    MEDICATIONS  (PRN):  acetaminophen  Suppository .. 650 milliGRAM(s) Rectal every 6 hours PRN Temp greater or equal to 38C (100.4F), Mild Pain (1 - 3)  bisacodyl Suppository 10 milliGRAM(s) Rectal daily PRN Constipation  glycopyrrolate Injectable 0.4 milliGRAM(s) IV Push every 6 hours PRN excessive oral secretions  LORazepam   Injectable 0.25 milliGRAM(s) IV Push every 6 hours PRN Agitation  morphine  - Injectable 2 milliGRAM(s) IV Push every 2 hours PRN Severe Pain (7 - 10)  morphine  - Injectable 2 milliGRAM(s) IV Push every 2 hours PRN dyspnea  morphine  - Injectable 1 milliGRAM(s) IV Push every 2 hours PRN Moderate Pain (4 - 6)    ITEMS UNCHECKED ARE NOT PRESENT    PRESENT SYMPTOMS: [x ]Unable to self-report see PAINAD, RDOS below  Source if other than patient:  [ ]Family   [ ]Team     Pain: [ ] yes [ ] no  QOL impact -   Location -                    Aggravating factors -  Quality -  Radiation -  Timing-  Severity (0-10 scale):  Minimal acceptable level (0-10 scale):       PCSSQ [Palliative Care Spiritual Screening Question]   Severity (0-10):  Score of 4 or > indicate consideration of Chaplaincy referral.  Chaplaincy Referral: [ ] yes [ ] refused [ ] following [x ] deferred    Caregiver [place]? : [ ] yes [ x] no [ ] deferred:  Social work referral [ ] Patient & Family Centered Care Referral [ ]   Anticipatory Grief present?:  [x ] yes [ ] no [ ] deferred:  Social work referral [ ] Patient & Family Centered Care Referral [ ]  	  Other Symptoms:  [ ]All other review of systems negative  The patient is unable to self-report.     PHYSICAL EXAM:   Vital Signs Last 24 Hrs  T(C): 37.7 (29 Nov 2024 08:32), Max: 37.7 (29 Nov 2024 08:32)  T(F): 99.9 (29 Nov 2024 08:32), Max: 99.9 (29 Nov 2024 08:32)  HR: 127 (29 Nov 2024 08:32) (120 - 132)  BP: 131/82 (29 Nov 2024 08:32) (124/67 - 155/94)  BP(mean): --  RR: 22 (29 Nov 2024 08:32) (18 - 22)  SpO2: 92% (29 Nov 2024 08:32) (90% - 100%)    Parameters below as of 29 Nov 2024 08:32  Patient On (Oxygen Delivery Method): nasal cannula     I&O's Summary    28 Nov 2024 07:01  -  29 Nov 2024 07:00  --------------------------------------------------------  IN: 0 mL / OUT: 450 mL / NET: -450 mL      GENERAL: [ ] Cachexia  [ ]Alert  [ ]Oriented x   [ x]Lethargic  [ ]Unarousable  [ ]Verbal  [ x]Non-Verbal  Behavioral:   [ ] Anxiety  [ ] Delirium [ ] Agitation [ ] Other  HEENT:  [x ]Normal   [ x]Dry mouth   [ ]ET Tube/Trach  [ ]Oral lesions  PULMONARY:   x[ ]Clear [x ]Tachypnea  [ ]Audible excessive secretions   [ ]Rhonchi        [ ]Right [ ]Left [ ]Bilateral  [ ]Crackles        [ ]Right [ ]Left [ ]Bilateral  [ ]Wheezing     [ ]Right [ ]Left [ ]Bilateral  [ ]Diminished BS [ ]Right [ ]Left [ ]Bilateral    CARDIOVASCULAR:    [ x]Regular [ ]Irregular [x ]Tachy  [ ]Rober [ ]Murmur [ ]Other  GASTROINTESTINAL:  [x]Soft  [ ]Distended   [x ]+BS  [x ]Non tender [ ]Tender  [ ]Other [ ]PEG [ ]OGT/ NGT   Last BM:  11/27/2024  GENITOURINARY:  [x ]Normal [ x] Incontinent   [ ]Oliguria/Anuria   [x ][place]  MUSCULOSKELETAL:   [ ]Normal   [ ]Weakness  [x ]Bed/Wheelchair bound [ ]Edema  NEUROLOGIC:   [x ]No focal deficits  [ ] Cognitive impairment  [ ] Dysphagia [ ]Dysarthria [ ] Paresis [ ]Other   SKIN:   [ ]Normal  [ ]Rash  [ x]Other skin tear left elbow.  Sacral DTI  [ ]Pressure ulcer(s)  [ ]y [ ]n  Present on admission      CRITICAL CARE:  [ ] Shock Present  [ ]Septic [ ]Cardiogenic [ ]Neurologic [ ]Hypovolemic  [ ]  Vasopressors [ ]  Inotropes   [ ] Respiratory failure present [ ] Mechanical Ventilation [ ] Non-invasive ventilatory support [ ] High-Flow    [ ] Acute  [ ] Chronic [ ] Hypoxic  [ ] Hypercarbic [ ] Other  [ ] Other organ failure     LABS:                        8.5    6.98  )-----------( 183      ( 26 Nov 2024 18:11 )             25.2   11-26    135  |  103  |  30[H]  ----------------------------<  239[H]  4.1   |  20[L]  |  0.75    RADIOLOGY & ADDITIONAL STUDIES:  < from: US Abdomen Limited (11.27.24 @ 12:51) >  IMPRESSION:    Increased liver echogenicity, compatible with steatosis.    Small-to-moderate amount of ascites, most prominent in the right upper   quadrant.    Left-sided intrahepatic pneumobilia, similar to prior CT and likely   related to stent.        --- End of Report ---          ISABEL LIRA MD; Resident Radiologist  This document has been electronically signed.  EVERARDO VAZQUEZ MD; Attending Radiologist  This document has been electronically signed. Nov 27 2024  2:37PM    < end of copied text >  < from: Xray Chest 1 View- PORTABLE-Urgent (11.26.24 @ 19:16) >  FINDINGS:    Right chest wall MediPort with tip in place.    The heart is normal in size.  The lungs are clear.  There is no pneumothorax or pleural effusion.    IMPRESSION:  Clear lungs.    --- End of Report ---          TRUPTI WATTS MD; Resident Radiologist  This document has been electronically signed.  CECILIA BEE MD; Attending Radiologist  This document has been electronically signed. Nov 27 2024  8:33AM    < end of copied text >  < from: CT Brain Perfusion Maps Stroke (11.26.24 @ 18:25) >  IMPRESSION:    CT PERFUSION:  Technical limitations: None.    Core infarction: 0 ml  Penumbra / tissue at risk for active ischemia: 0 ml    CTA NECK:  Left mid ICA 7 mm aneurysm.    CTA HEAD:  No large vessel occlusion, significant stenosis or vascular abnormality   identified.    Findings were discussed with Dr. Rosas 11/26/2024 6:41 PM by Dr. Milton with read back confirmation.    --- End of Report ---           COLE MILTON MD; Resident Radiologist  This document has been electronically signed.  JERRY EDWARDS MD; Attending Radiologist    < end of copied text >  < from: CT Brain Stroke Protocol (11.26.24 @ 18:22) >  IMPRESSION:  No acute intracranial hemorrhage, mass effect, or midline shift.    Findings were discussed with Dr. Rosas at 11/26/2024 6:22 PM by Dr. Milton with read back confirmation.    --- End of Report ---           COLE MILTON MD; Resident Radiologist  This document has been electronically signed.  JERRY EDWARDS MD; Attending Radiologist  This document has been electronically signed. Nov 26 2024  6:28PM    < end of copied text >  < from: CT Abdomen and Pelvis w/ IV Cont (11.11.24 @ 11:12) >  IMPRESSION:  Infiltrative pancreatic head mass again demonstrated encasing common   hepatic artery with associated severe narrowing/occlusion portosplenic   confluence.  Small ascites.  Resolution gastric outlet obstruction status post placement gastrojejunal   stent.  Minimal residual intrahepatic biliary duct dilatation        --- End of Report ---            BANDAR MCCLOUD MD; Attending Radiologist  This document has been electronically signed. Nov 11 2024 12:33PM    < end of copied text >      PROTEIN CALORIE MALNUTRITION: [ ] mild [ ] moderate [ ] severe  [ ] underweight [ ] morbid obesity    https://www.andeal.org/vault/5790/web/files/ONC/Table_Clinical%20Characteristics%20to%20Document%20Malnutrition-White%20JV%20et%20al%395667.pdf        [ ] PPSV2 < or = 30% [ ] significant weight loss [ ] poor nutritional intake [ ] anasarca Prealbumin, Serum: 13 mg/dL (11-13-24 @ 07:14)    Artificial Nutrition [ ]     Other REFERRALS:    [ ] Hospice  [ ]Child Life  [ ]Social Work  [ ]Case management [ ]Holistic Therapy [ ] Physical Therapy [ ] Dietary                                 Care Coordination Assessment 201    COGNITIVE/LEARNING 201  Mental Status    Answers: Unable to assess    Ability to make needs known    Answers: Unable to understand (unrelated to language)    Notes: Ability to make needs known    Notes: pt alert/oriented x0    ADMISSION HISTORY 201  Admitted From    Answers: Home    Functional Status Prior to Admission    Answers: Independent    Notes: Functional Status Prior to Admission    Notes: per  at bedside    Services Present on Admission    Answers: DME; specify owns rolling walker    FINANCIAL 201  Does patient have financial concerns for discharge?    Answers: None    LIVING ARRANGEMENTS/SUPPORT 201  Housing Environment    Answers: House,  Answers: Stairs, number of steps multiple steps per     Living Arrangements    Answers: Lives with spouse, significant other    Sources of support/caregivers    Answers: Spouse/Significant Other,  Answers: Sibling    CAREGIVER CONTACT 201  Does the patient wish to identify a Caregiver?    Answers: Unable to assess    EMERGENCY CONTACTS OUTSIDE HOME 201  Emergency Contact    Answers: Emergency Contact Name Wayne Arce,  Answers: Emergency Contact Phone # [phone],  Answers: Emergency Contact Relationship spouse    DISCHARGE PLANNING 201  Potential Discharge Plan and Services    Answers: Hospice    SCREENING 201  Social Work Screen and Referral    Answers: Advanced Illness,  Answers: Hospice Placement    SUMMARY 201  Initial Clinical Summary    Notes: Chart reviewed and case management referral received. 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 Met with patient and introduced self.  Patient not alert/oriented and /family at bedside conducted interview.  Due to patient status, spouse at bedside reluctant to provide information.  Confirmed demographics. Discussed role of , medical plan of care  and discharge needs. SpouseWayne verbalized understanding. Patient lives  with spouse in private home with steps. Patient owns RW but does not use it  daily as per . Spouse denies any services in the home. States  independent in ADL prior to hospitalization.  Case management will continue to  collaborate with interdisciplinary team to assess needs    Anticipated Discharge Plan and Services    Notes: Discharge needs unclear at present, pending hospital course with  possible transfer to PCU.  PCP confirmed as : Oncologist, Dr. Taylor Norwood ([facility])       Electronically signed by:  Myra Beckwith RN-BSN  Electronically signed on:  2024-11-27  14:24      Palliative Performance Scale:  http://npcrc.org/files/news/palliative_performance_scale_ppsv2.pdf  (Ctrl +  left click to view)  Respiratory Distress Observation Tool:  https://homecareinformation.net/handouts/hen/Respiratory_Distress_Observation_Scale.pdf (Ctrl +  left click to view)  PAINAD Score:  http://geriatrictoolkit.[employer].edu/cog/painad.pdf (Ctrl +  left click to view)

## 2024-11-30 NOTE — PROGRESS NOTE ADULT - PROBLEM SELECTOR PLAN 1
- no PRNs req  - Continue Morphine 1mg IV PRN moderate pain  - Continue Morphine 2mg IV PRN severe pain  - bowel regimen while on opioids  - last BM: 11/29

## 2024-11-30 NOTE — PROGRESS NOTE ADULT - SUBJECTIVE AND OBJECTIVE BOX
GAP TEAM PALLIATIVE CARE UNIT PROGRESS NOTE:      [  ] Patient on hospice program.    INDICATION FOR PALLIATIVE CARE UNIT SERVICES/Interval HPI:  Symptom management    OVERNIGHT EVENTS:  Patient seen and examined at bedside, unarousable does not appear to be in acute distress.  VS sig for tachycardia  Chart review, in 24hrs 7AM-7AM, patient did not req PRNs  Last BM 11/29    DNR on chart:  DNI  DNI        Allergies    Dilantin (Unknown)  ibuprofen (Other)  phenytoin (Rash)    Intolerances    platelet rash- will need pre-medication (Rash)  MEDICATIONS  (STANDING):  chlorhexidine 2% Cloths 1 Application(s) Topical daily  levothyroxine Injectable 50 MICROGram(s) IV Push at bedtime    MEDICATIONS  (PRN):  acetaminophen  Suppository .. 650 milliGRAM(s) Rectal every 6 hours PRN Temp greater or equal to 38C (100.4F), Mild Pain (1 - 3)  bisacodyl Suppository 10 milliGRAM(s) Rectal daily PRN Constipation  glycopyrrolate Injectable 0.4 milliGRAM(s) IV Push every 6 hours PRN excessive oral secretions  LORazepam   Injectable 0.25 milliGRAM(s) IV Push every 1 hour PRN Agitation  morphine  - Injectable 2 milliGRAM(s) IV Push every 1 hour PRN Severe Pain (7 - 10)  morphine  - Injectable 2 milliGRAM(s) IV Push every 1 hour PRN dyspnea  morphine  - Injectable 1 milliGRAM(s) IV Push every 1 hour PRN Moderate Pain (4 - 6)    ITEMS UNCHECKED ARE NOT PRESENT    PRESENT SYMPTOMS: [x]Unable to self-report see PAINAD, RDOS below  Source if other than patient:  [ ]Family   [ ]Team     Pain: [ ] yes [ ] no [x]Unable to self-report  QOL impact -   Location -                    Aggravating factors -  Quality -  Radiation -  Timing-  Severity (0-10 scale):  Minimal acceptable level (0-10 scale):       PCSSQ [Palliative Care Spiritual Screening Question]   Severity (0-10):  Score of 4 or > indicate consideration of Chaplaincy referral.  Chaplaincy Referral: [ ] yes [ ] refused [ ] following [x ] deferred    Caregiver [place]? : [ ] yes [x ] no [ ] deferred:  Social work referral [ ] Patient & Family Centered Care Referral [ ]   Anticipatory Grief present?:  [x ] yes [ ] no [ ] deferred:  Social work referral [ ] Patient & Family Centered Care Referral [ ]  	  Other Symptoms:  [ ]All other review of systems negative   [x]Unable to self-report    PHYSICAL EXAM:   Vital Signs Last 24 Hrs  T(C): 37.5 (30 Nov 2024 08:21), Max: 37.5 (30 Nov 2024 08:21)  T(F): 99.5 (30 Nov 2024 08:21), Max: 99.5 (30 Nov 2024 08:21)  HR: 121 (30 Nov 2024 08:21) (121 - 121)  BP: 128/87 (30 Nov 2024 08:21) (128/87 - 128/87)  BP(mean): --  RR: 22 (30 Nov 2024 08:21) (22 - 22)  SpO2: 95% (30 Nov 2024 08:21) (95% - 95%)    Parameters below as of 30 Nov 2024 08:21  Patient On (Oxygen Delivery Method): nasal cannula     I&O's Summary    29 Nov 2024 07:01  -  30 Nov 2024 07:00  --------------------------------------------------------  IN: 0 mL / OUT: 700 mL / NET: -700 mL      GENERAL: [ ] Cachexia  [ ]Alert  [ ]Oriented x   [ ]Lethargic  [x ]Unarousable  [ ]Verbal  [x ]Non-Verbal  Behavioral:   [ ] Anxiety  [ ] Delirium [ ] Agitation [ ] Other  HEENT:  [x ]Normal   [ ]Dry mouth   [ ]ET Tube/Trach  [ ]Oral lesions  PULMONARY:   [x ]Clear [ ]Tachypnea  [ ]Audible excessive secretions   [ ]Rhonchi        [ ]Right [ ]Left [ ]Bilateral  [ ]Crackles        [ ]Right [ ]Left [ ]Bilateral  [ ]Wheezing     [ ]Right [ ]Left [ ]Bilateral  [ ]Diminished BS [ ]Right [ ]Left [ ]Bilateral    CARDIOVASCULAR:    [x ]Regular [ ]Irregular [ ]Tachy  [ ]Rober [ ]Murmur [ ]Other  GASTROINTESTINAL:  [x ]Soft  [ ]Distended   [ x]+BS  [ ]Non tender [ ]Tender  [ ]Other [ ]PEG [ ]OGT/ NGT   Last BM:    GENITOURINARY:  [ ]Normal [ ] Incontinent   [ ]Oliguria/Anuria   [x ]Payton  MUSCULOSKELETAL:   [ ]Normal   [ ]Weakness  [x ]Bed/Wheelchair bound [ ]Edema  NEUROLOGIC:   [ ]No focal deficits  [x ] Cognitive impairment  [ ] Dysphagia [ ]Dysarthria [ ] Paresis [ ]Other   SKIN:   [ ]Normal  [ ]Rash  [ ]Other  [ ]Pressure ulcer(s)  [ ]y [ ]n  Present on admission      CRITICAL CARE:  [ ] Shock Present  [ ]Septic [ ]Cardiogenic [ ]Neurologic [ ]Hypovolemic  [ ]  Vasopressors [ ]  Inotropes   [ ] Respiratory failure present [ ] Mechanical Ventilation [ ] Non-invasive ventilatory support [ ] High-Flow    [ ] Acute  [ ] Chronic [ ] Hypoxic  [ ] Hypercarbic [ ] Other  [ ] Other organ failure     LABS:            RADIOLOGY & ADDITIONAL STUDIES:    PROTEIN CALORIE MALNUTRITION: [ ] mild [ ] moderate [ ] severe  [ ] underweight [ ] morbid obesity    https://www.andeal.org/vault/2440/web/files/ONC/Table_Clinical%20Characteristics%20to%20Document%20Malnutrition-White%20JV%20et%20al%882978.pdf        [x ] PPSV2 < or = 30% [ ] significant weight loss [ ] poor nutritional intake [ ] anasarca Prealbumin, Serum: 13 mg/dL (11-13-24 @ 07:14)    Artificial Nutrition [ ]     Other REFERRALS:    [ ] Hospice  [ ]Child Life  [ ]Social Work  [ ]Case management [ ]Holistic Therapy [ ] Physical Therapy [ ] Dietary         Palliative Performance Scale:  http://npcrc.org/files/news/palliative_performance_scale_ppsv2.pdf  (Ctrl +  left click to view)  Respiratory Distress Observation Tool:  https://homecareinformation.net/handouts/hen/Respiratory_Distress_Observation_Scale.pdf (Ctrl +  left click to view)  PAINAD Score:  http://geriatrictoolkit.[place].[facility]/cog/painad.pdf (Ctrl +  left click to view)   GAP TEAM PALLIATIVE CARE UNIT PROGRESS NOTE:      [  ] Patient on hospice program.  Subjective: Patient seen and examined at bedside, unarousable does not appear to be in acute distress.  VS sig for tachycardia  INDICATION FOR PALLIATIVE CARE UNIT SERVICES/Interval HPI:  Symptom management    OVERNIGHT EVENTS:    Chart review, in 24hrs 7AM-7AM, patient did not req PRNs  Last BM 11/29    DNR on chart:  DNI  DNI        Allergies    Dilantin (Unknown)  ibuprofen (Other)  phenytoin (Rash)    Intolerances    platelet rash- will need pre-medication (Rash)  MEDICATIONS  (STANDING):  chlorhexidine 2% Cloths 1 Application(s) Topical daily  levothyroxine Injectable 50 MICROGram(s) IV Push at bedtime    MEDICATIONS  (PRN):  acetaminophen  Suppository .. 650 milliGRAM(s) Rectal every 6 hours PRN Temp greater or equal to 38C (100.4F), Mild Pain (1 - 3)  bisacodyl Suppository 10 milliGRAM(s) Rectal daily PRN Constipation  glycopyrrolate Injectable 0.4 milliGRAM(s) IV Push every 6 hours PRN excessive oral secretions  LORazepam   Injectable 0.25 milliGRAM(s) IV Push every 1 hour PRN Agitation  morphine  - Injectable 2 milliGRAM(s) IV Push every 1 hour PRN Severe Pain (7 - 10)  morphine  - Injectable 2 milliGRAM(s) IV Push every 1 hour PRN dyspnea  morphine  - Injectable 1 milliGRAM(s) IV Push every 1 hour PRN Moderate Pain (4 - 6)    ITEMS UNCHECKED ARE NOT PRESENT    PRESENT SYMPTOMS: [x]Unable to self-report see PAINAD, RDOS below  Source if other than patient:  [ ]Family   [ ]Team     Pain: [ ] yes [ ] no [x]Unable to self-report  QOL impact -   Location -                    Aggravating factors -  Quality -  Radiation -  Timing-  Severity (0-10 scale):  Minimal acceptable level (0-10 scale):       PCSSQ [Palliative Care Spiritual Screening Question]   Severity (0-10):  Score of 4 or > indicate consideration of Chaplaincy referral.  Chaplaincy Referral: [ ] yes [ ] refused [ ] following [x ] deferred    Caregiver [place]? : [ ] yes [x ] no [ ] deferred:  Social work referral [ ] Patient & Family Centered Care Referral [ ]   Anticipatory Grief present?:  [x ] yes [ ] no [ ] deferred:  Social work referral [ ] Patient & Family Centered Care Referral [ ]  	  Other Symptoms:  [ ]All other review of systems negative   [x]Unable to self-report    PHYSICAL EXAM:   Vital Signs Last 24 Hrs  T(C): 37.5 (30 Nov 2024 08:21), Max: 37.5 (30 Nov 2024 08:21)  T(F): 99.5 (30 Nov 2024 08:21), Max: 99.5 (30 Nov 2024 08:21)  HR: 121 (30 Nov 2024 08:21) (121 - 121)  BP: 128/87 (30 Nov 2024 08:21) (128/87 - 128/87)  BP(mean): --  RR: 22 (30 Nov 2024 08:21) (22 - 22)  SpO2: 95% (30 Nov 2024 08:21) (95% - 95%)    Parameters below as of 30 Nov 2024 08:21  Patient On (Oxygen Delivery Method): nasal cannula     I&O's Summary    29 Nov 2024 07:01  -  30 Nov 2024 07:00  --------------------------------------------------------  IN: 0 mL / OUT: 700 mL / NET: -700 mL      GENERAL: [ ] Cachexia  [ ]Alert  [ ]Oriented x   [ ]Lethargic  [x ]Unarousable  [ ]Verbal  [x ]Non-Verbal  Behavioral:   [ ] Anxiety  [ ] Delirium [ ] Agitation [ ] Other  HEENT:  [x ]Normal   [ ]Dry mouth   [ ]ET Tube/Trach  [ ]Oral lesions  PULMONARY:   [x ]Clear [ ]Tachypnea  [ ]Audible excessive secretions   [ ]Rhonchi        [ ]Right [ ]Left [ ]Bilateral  [ ]Crackles        [ ]Right [ ]Left [ ]Bilateral  [ ]Wheezing     [ ]Right [ ]Left [ ]Bilateral  [ ]Diminished BS [ ]Right [ ]Left [ ]Bilateral    CARDIOVASCULAR:    [x ]Regular [ ]Irregular [ ]Tachy  [ ]Rober [ ]Murmur [ ]Other  GASTROINTESTINAL:  [x ]Soft  [ ]Distended   [ x]+BS  [ ]Non tender [ ]Tender  [ ]Other [ ]PEG [ ]OGT/ NGT   Last BM:    GENITOURINARY:  [ ]Normal [ ] Incontinent   [ ]Oliguria/Anuria   [x ]Payton  MUSCULOSKELETAL:   [ ]Normal   [ ]Weakness  [x ]Bed/Wheelchair bound [ ]Edema  NEUROLOGIC:   [ ]No focal deficits  [x ] Cognitive impairment  [ ] Dysphagia [ ]Dysarthria [ ] Paresis [ ]Other   SKIN:   [ ]Normal  [ ]Rash  [ ]Other  [ ]Pressure ulcer(s)  [ ]y [ ]n  Present on admission      CRITICAL CARE:  [ ] Shock Present  [ ]Septic [ ]Cardiogenic [ ]Neurologic [ ]Hypovolemic  [ ]  Vasopressors [ ]  Inotropes   [ ] Respiratory failure present [ ] Mechanical Ventilation [ ] Non-invasive ventilatory support [ ] High-Flow    [ ] Acute  [ ] Chronic [ ] Hypoxic  [ ] Hypercarbic [ ] Other  [ ] Other organ failure     LABS:            RADIOLOGY & ADDITIONAL STUDIES:    PROTEIN CALORIE MALNUTRITION: [ ] mild [ ] moderate [ ] severe  [ ] underweight [ ] morbid obesity    https://www.andeal.org/vault/2440/web/files/ONC/Table_Clinical%20Characteristics%20to%20Document%20Malnutrition-White%20JV%20et%20al%108966.pdf        [x ] PPSV2 < or = 30% [ ] significant weight loss [ ] poor nutritional intake [ ] anasarca Prealbumin, Serum: 13 mg/dL (11-13-24 @ 07:14)    Artificial Nutrition [ ]     Other REFERRALS:    [ ] Hospice  [ ]Child Life  [ ]Social Work  [ ]Case management [ ]Holistic Therapy [ ] Physical Therapy [ ] Dietary         Palliative Performance Scale:  http://npcrc.org/files/news/palliative_performance_scale_ppsv2.pdf  (Ctrl +  left click to view)  Respiratory Distress Observation Tool:  https://homecareinformation.net/handouts/hen/Respiratory_Distress_Observation_Scale.pdf (Ctrl +  left click to view)  PAINAD Score:  http://geriatrictoolkit.[place].[facility]/cog/painad.pdf (Ctrl +  left click to view)

## 2024-12-01 NOTE — PROGRESS NOTE ADULT - PROBLEM SELECTOR PROBLEM 1
Toxic metabolic encephalopathy
Cancer related pain
Cancer related pain
Toxic metabolic encephalopathy
Cancer related pain

## 2024-12-01 NOTE — PROGRESS NOTE ADULT - PROBLEM SELECTOR PLAN 5
- Known hx of pancreatic cancer  - S/P BM Bx on 11/12 - pathology w/ marginally adequate hypercellular bone marrow with erythroid hyperplasia and mildly decreased megakaryopoiesis. Platelets currently normal  - Anemia likely 2/2 CA, monitor H/H, transfuse for hgb < 7.0   - Follows with [facility]  - Heme/Onc eval and palliative care eval consulted; F/u recs    - Patient now on comfort measures and pending PCU transfer once bed available
- Known hx of pancreatic cancer  - S/P BM Bx on 11/12 - pathology w/ marginally adequate hypercellular bone marrow with erythroid hyperplasia and mildly decreased megakaryopoiesis. Platelets currently normal  - Anemia likely 2/2 CA, monitor H/H, transfuse for hgb < 7.0   - Follows with [facility]  - Heme/Onc eval and palliative care eval consulted; F/u recs
morphine  - Injectable 2 milliGRAM(s) IV Push every 2 hours PRN dyspnea and  glycopyrrolate Injectable 0.4 milliGRAM(s) IV Push every 6 hours PRN excessive oral secretions available.      I have reviewed all documentation from prior primary team and consultants, as well as relevant imaging and laboratory data as this patient is new to me.     Family at bedside, updated as to current clinical condition and plan of care.  Educated as to what to expect, questions answered and emotional support provided.     Patient assessment and plan discussed on interdisciplinary team rounds today.
The patient requires nursing assistance with ADLs,  - Supportive care.  - Turn and position.  - Continue with good skin care.  - PPS 10
The patient requires nursing assistance with ADLs,  - Supportive care.  - Turn and position.  - Continue with good skin care.  - PPS 10

## 2024-12-01 NOTE — PROGRESS NOTE ADULT - PROBLEM SELECTOR PROBLEM 5
History of pancreatic cancer
History of pancreatic cancer
Functional quadriplegia
Palliative care encounter
Functional quadriplegia

## 2024-12-01 NOTE — PROGRESS NOTE ADULT - ATTENDING COMMENTS
69 year old female with hx DM, Hypothyroidism, HTN,  pancreatic cancer, s/p resection and now with recurrence, admitted with altered MS thought to be due to UTI and hyperammonemia.  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  Given overall performance and clinical status family has opted for comfort measures only. The patient was transferred to PCU for advanced management of symptoms at the end of life care.      1) Cancer related pain.   Controlled  - Continue Morphine 1mg-2mg IV PRN moderate to severe pain  - bowel regimen while on opioids    2) Acute respiratory distress  Controlled.    - Continue Morphine 2mg IV PRN dyspnea  - bowel regimen while on opioids.    3) Agitation. Likely end of life Delirium   Mostly controlled   - Continue with Ativan 0.25mg q1h PRN agitation. X 1 dose given after my evaluation.     4) Palliative care encounter.   Met with the patient's  , sister, and brother in law that were by the patient's bedside. I updated them regarding current plan of care and prognosis.  Rest of the assessment and plan as per Dr. Lino's note above.   Will continue PCU care for advanced symptoms monitoring and treatment.         Xavier Hunter MD  Associate Chief Geriatrics and Palliative Care ([facility]) [facility]   [facility] Consult Service   , [employer] at [facility]      Please contact me via Teams from Monday through Friday between 9am-5pm. If not answering, please call the palliative care pager [phone]    After 5pm and on weekends, please see the contact information below:    In the event of newly developing, evolving, or worsening symptoms, please contact the Palliative Medicine team via pager (if the patient is at [facility] #[phone] or if the patient is at [facility] #[phone]) The Geriatric and Palliative Medicine service has coverage 24 hours a day/ 7 days a week to provide medical recommendations regarding symptom management needs via telephone
69 year old female with hx DM, Hypothyroidism, HTN,  pancreatic cancer, s/p resection and now with recurrence, admitted with altered MS thought to be due to UTI and hyperammonemia.  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  Given overall performance and clinical status family has opted for comfort measures only. The patient was transferred to PCU for advanced management of symptoms at the end of life care.      1) Cancer related pain.   Mostly Controlled  - Will add Morphine 1mg IV q 6 ATC   - Continue Morphine 1mg-2mg IV PRN moderate to severe pain  - bowel regimen while on opioids    2) Acute respiratory distress  Controlled.    - Continue Morphine 2mg IV PRN dyspnea  - bowel regimen while on opioids.    3) Agitation. Likely end of life Delirium   Controlled   - Continue with Ativan 0.25mg q1h PRN agitation.    4) Palliative care encounter.   Met with the patient's   and I updated him regarding current plan of care and prognosis.  Rest of the assessment and plan as per Dr. Lino's note above.   Will continue PCU care for advanced symptoms monitoring and treatment.         Xavier Hunter MD  Associate Chief Geriatrics and Palliative Care ([facility]) [facility] Director [place] Consult Service   , [employer] at [facility]      Please contact me via Teams from Monday through Friday between 9am-5pm. If not answering, please call the palliative care pager [phone]    After 5pm and on weekends, please see the contact information below:    In the event of newly developing, evolving, or worsening symptoms, please contact the Palliative Medicine team via pager (if the patient is at [facility] #[phone] or if the patient is at [facility] #[phone]) The Geriatric and Palliative Medicine service has coverage 24 hours a day/ 7 days a week to provide medical recommendations regarding symptom management needs via telephone

## 2024-12-01 NOTE — PROGRESS NOTE ADULT - PROBLEM SELECTOR PLAN 2
- req multiple PRNs for dyspnea  - Continue Morphine 2mg IV PRN dyspnea  - bowel regimen while on opioids - Start Morphine 1mg q6h ATC, req multiple PRNs for dyspnea  - Continue Morphine 2mg IV PRN dyspnea  - bowel regimen while on opioids

## 2024-12-01 NOTE — PROGRESS NOTE ADULT - SUBJECTIVE AND OBJECTIVE BOX
GAP TEAM PALLIATIVE CARE UNIT PROGRESS NOTE:      [  ] Patient on hospice program.    INDICATION FOR PALLIATIVE CARE UNIT SERVICES/Interval HPI:  Patient seen and examined at bedside, lethargic, non-verbal.  Chart review, in 24hrs 7AM-7AM, patient used morphine 2mg x3 for dyspnea, Ativan 0.25mg x2.  Last BM 11/29    OVERNIGHT EVENTS:    DNR on chart:  DNI  DNI        Allergies    Dilantin (Unknown)  ibuprofen (Other)  phenytoin (Rash)    Intolerances    platelet rash- will need pre-medication (Rash)  MEDICATIONS  (STANDING):  chlorhexidine 2% Cloths 1 Application(s) Topical daily  levothyroxine Injectable 50 MICROGram(s) IV Push at bedtime    MEDICATIONS  (PRN):  acetaminophen  Suppository .. 650 milliGRAM(s) Rectal every 6 hours PRN Temp greater or equal to 38C (100.4F), Mild Pain (1 - 3)  bisacodyl Suppository 10 milliGRAM(s) Rectal daily PRN Constipation  glycopyrrolate Injectable 0.4 milliGRAM(s) IV Push every 6 hours PRN excessive oral secretions  LORazepam   Injectable 0.25 milliGRAM(s) IV Push every 1 hour PRN Agitation  morphine  - Injectable 2 milliGRAM(s) IV Push every 1 hour PRN Severe Pain (7 - 10)  morphine  - Injectable 2 milliGRAM(s) IV Push every 1 hour PRN dyspnea  morphine  - Injectable 1 milliGRAM(s) IV Push every 1 hour PRN Moderate Pain (4 - 6)    ITEMS UNCHECKED ARE NOT PRESENT    PRESENT SYMPTOMS: [x]Unable to self-report see PAINAD, RDOS below  Source if other than patient:  [ ]Family   [ ]Team     Pain: [ ] yes [ ] no [x]Unable to self-report  QOL impact -   Location -                    Aggravating factors -  Quality -  Radiation -  Timing-  Severity (0-10 scale):  Minimal acceptable level (0-10 scale):       PCSSQ [Palliative Care Spiritual Screening Question]   Severity (0-10):  Score of 4 or > indicate consideration of Chaplaincy referral.  Chaplaincy Referral: [ ] yes [ ] refused [ ] following [ ] deferred    Caregiver [place]? : [ ] yes [ ] no [x ] deferred:  Social work referral [ ] Patient & Family Centered Care Referral [ ]   Anticipatory Grief present?:  [ ] yes [ ] no [x ] deferred:  Social work referral [ ] Patient & Family Centered Care Referral [ ]  	  Other Symptoms:  [ ]All other review of systems negative   [x]Unable to self-report  PHYSICAL EXAM:   Vital Signs Last 24 Hrs  T(C): 36.7 (01 Dec 2024 07:53), Max: 36.7 (01 Dec 2024 07:53)  T(F): 98.1 (01 Dec 2024 07:53), Max: 98.1 (01 Dec 2024 07:53)  HR: 116 (01 Dec 2024 07:53) (116 - 116)  BP: 94/68 (01 Dec 2024 07:53) (94/68 - 94/68)  BP(mean): --  RR: 20 (01 Dec 2024 07:53) (20 - 20)  SpO2: 94% (01 Dec 2024 07:53) (94% - 94%)    Parameters below as of 01 Dec 2024 07:53  Patient On (Oxygen Delivery Method): nasal cannula     I&O's Summary    30 Nov 2024 07:01  -  01 Dec 2024 07:00  --------------------------------------------------------  IN: 0 mL / OUT: 100 mL / NET: -100 mL      GENERAL: [ ] Cachexia  [ ]Alert  [ ]Oriented x   [x ]Lethargic  [ ]Unarousable  [ ]Verbal  [x ]Non-Verbal  Behavioral:   [ ] Anxiety  [ ] Delirium [ ] Agitation [ ] Other  HEENT:  [x ]Normal   [ ]Dry mouth   [ ]ET Tube/Trach  [ ]Oral lesions  PULMONARY:   [x ]Clear [ ]Tachypnea  [ ]Audible excessive secretions   [ ]Rhonchi        [ ]Right [ ]Left [ ]Bilateral  [ ]Crackles        [ ]Right [ ]Left [ ]Bilateral  [ ]Wheezing     [ ]Right [ ]Left [ ]Bilateral  [ ]Diminished BS [ ]Right [ ]Left [ ]Bilateral    CARDIOVASCULAR:    [x ]Regular [ ]Irregular [ ]Tachy  [ ]Rober [ ]Murmur [ ]Other  GASTROINTESTINAL:  [x ]Soft  [ ]Distended   [x ]+BS  [ ]Non tender [ ]Tender  [ ]Other [ ]PEG [ ]OGT/ NGT   Last BM:    GENITOURINARY:  [ ]Normal [ ] Incontinent   [ ]Oliguria/Anuria   [ x]Payton  MUSCULOSKELETAL:   [ ]Normal   [ ]Weakness  [ x]Bed/Wheelchair bound [ ]Edema  NEUROLOGIC:   [ ]No focal deficits  [x ] Cognitive impairment  [ ] Dysphagia [ ]Dysarthria [ ] Paresis [ ]Other   SKIN:   [ ]Normal  [ ]Rash  [x ]Other: Sacral DTI  [ ]Pressure ulcer(s)  [ ]y [ ]n  Present on admission      CRITICAL CARE:  [ ] Shock Present  [ ]Septic [ ]Cardiogenic [ ]Neurologic [ ]Hypovolemic  [ ]  Vasopressors [ ]  Inotropes   [ ] Respiratory failure present [ ] Mechanical Ventilation [ ] Non-invasive ventilatory support [ ] High-Flow    [ ] Acute  [ ] Chronic [ ] Hypoxic  [ ] Hypercarbic [ ] Other  [ ] Other organ failure     LABS:            RADIOLOGY & ADDITIONAL STUDIES:    PROTEIN CALORIE MALNUTRITION: [ ] mild [ ] moderate [ ] severe  [ ] underweight [ ] morbid obesity    https://www.andeal.org/vault/2440/web/files/ONC/Table_Clinical%20Characteristics%20to%20Document%20Malnutrition-White%20JV%20et%20al%076029.pdf        [x ] PPSV2 < or = 30% [ ] significant weight loss [ ] poor nutritional intake [ ] anasarca Prealbumin, Serum: 13 mg/dL (11-13-24 @ 07:14)    Artificial Nutrition [ ]     Other REFERRALS:    [ ] Hospice  [ ]Child Life  [ ]Social Work  [ ]Case management [ ]Holistic Therapy [ ] Physical Therapy [ ] Dietary         Palliative Performance Scale:  http://npcrc.org/files/news/palliative_performance_scale_ppsv2.pdf  (Ctrl +  left click to view)  Respiratory Distress Observation Tool:  https://homecareinformation.net/handouts/hen/Respiratory_Distress_Observation_Scale.pdf (Ctrl +  left click to view)  PAINAD Score:  http://geriatrictoolkit.[place].[facility]/cog/painad.pdf (Ctrl +  left click to view)

## 2024-12-01 NOTE — PROGRESS NOTE ADULT - NS ATTEST RISK PROBLEM GEN_ALL_CORE FT
1. Number and complexity of problems addressed for this patient:    1.1 Moderate (At least 1)  [ ] 1 or more chronic illnesses with exacerbation, progression, or side effects of treatment  [ ] 2 or more stable chronic illnesses  [ ] 1 undiagnosed new problem with uncertain prognosis  [ ] 1 acute illness with systemic symptoms  [ ] 1 acute complicated injury  1.2 High (At least 1)   [ x] 1 or more chronic illnesses with severe exacerbation, progression, or side effects of treatment  [x ] 1 acute or chronic illnesses or injuries that may pose a threat to life or bodily function    2. Amount and/or Complexity of Data that was Reviewed and Analyzed for this case:       Moderate (1 out of 3)       High (2 out of 3)  2.1. (Any combination of 3 of the following)   [ ] Prior External notes were reviewed  [ ] Each test result was reviewed (see "LABS" and "RADIOLOGY & ADDITIONAL STUDIES" above)  [ ] The following tests were ordered and/or reviewed (Only count 1 point for ordering or reviewing a unique test):  	[ ]CBC  	[ ] Chemistry   	[ ] Imaging   	[ ] Other:   [ ] Assessment requiring an independent historian   		Name of historian and relationship:   2.2  [ ] Personally review and interpretation of  image or testing   2.3  [ ] Discussion of management or test interpretation with external physician/other qualified health care professional\appropriate source (not separately reported)    3. Risk of Complications and/or Morbidity or Mortality of for this Patient’s Management:  3.1 Moderate risk of morbidity from additional diagnostic testing or treatment (At least 1):   [ ] Prescription drug management   [ ] Decision regarding minor surgery, treatment, or procedure with identified patient or procedure risk factors  [ ] Decision regarding elective major surgery, treatment, or procedure without identified patient or procedure risk factors   [ ] Diagnosis or treatment significantly limited by social determinants of health   [ ] Other:   3.2 High risk of morbidity from additional diagnostic testing or treatment (At least 1):   [x ] Drug therapy requiring intensive monitoring for toxicity   [ ] Decision regarding elective major surgery, treatment, or procedure with identified patient or procedure risk factors   [ ] Decision regarding emergency major surgery, treatment, or procedure   [ ] Decision regarding hospitalization or escalation of hospital-level of care  [ ] Decision not to resuscitate, not to intubate, or to de-escalate care because of poor prognosis   [ ] Decision to proceed or not with artificial nutrition   [ x] Parenteral controlled substance  [ ] Other:
1. Number and complexity of problems addressed for this patient:    1.1 Moderate (At least 1)  [ ] 1 or more chronic illnesses with exacerbation, progression, or side effects of treatment  [ ] 2 or more stable chronic illnesses  [ ] 1 undiagnosed new problem with uncertain prognosis  [ ] 1 acute illness with systemic symptoms  [ ] 1 acute complicated injury  1.2 High (At least 1)   [ x] 1 or more chronic illnesses with severe exacerbation, progression, or side effects of treatment  [x ] 1 acute or chronic illnesses or injuries that may pose a threat to life or bodily function    2. Amount and/or Complexity of Data that was Reviewed and Analyzed for this case:       Moderate (1 out of 3)       High (2 out of 3)  2.1. (Any combination of 3 of the following)   [ ] Prior External notes were reviewed  [ ] Each test result was reviewed (see "LABS" and "RADIOLOGY & ADDITIONAL STUDIES" above)  [ ] The following tests were ordered and/or reviewed (Only count 1 point for ordering or reviewing a unique test):  	[ ]CBC  	[ ] Chemistry   	[ ] Imaging   	[ ] Other:   [ ] Assessment requiring an independent historian   		Name of historian and relationship:   2.2  [ ] Personally review and interpretation of  image or testing   2.3  [ ] Discussion of management or test interpretation with external physician/other qualified health care professional\appropriate source (not separately reported)    3. Risk of Complications and/or Morbidity or Mortality of for this Patient’s Management:  3.1 Moderate risk of morbidity from additional diagnostic testing or treatment (At least 1):   [ ] Prescription drug management   [ ] Decision regarding minor surgery, treatment, or procedure with identified patient or procedure risk factors  [ ] Decision regarding elective major surgery, treatment, or procedure without identified patient or procedure risk factors   [ ] Diagnosis or treatment significantly limited by social determinants of health   [ ] Other:   3.2 High risk of morbidity from additional diagnostic testing or treatment (At least 1):   [x ] Drug therapy requiring intensive monitoring for toxicity   [ ] Decision regarding elective major surgery, treatment, or procedure with identified patient or procedure risk factors   [ ] Decision regarding emergency major surgery, treatment, or procedure   [ ] Decision regarding hospitalization or escalation of hospital-level of care  [ ] Decision not to resuscitate, not to intubate, or to de-escalate care because of poor prognosis   [ ] Decision to proceed or not with artificial nutrition   [ x] Parenteral controlled substance  [ ] Other:

## 2024-12-01 NOTE — PROGRESS NOTE ADULT - PROBLEM SELECTOR PLAN 3
- no PRNs required  - Continue with Ativan 0.25mg q1h PRN agitation - Continue with Ativan 0.25mg q1h PRN agitation

## 2024-12-01 NOTE — PROGRESS NOTE ADULT - PROBLEM SELECTOR PLAN 1
- required multiple PRNs for pain  - Continue Morphine 1mg IV PRN moderate pain  - Continue Morphine 2mg IV PRN severe pain  - bowel regimen while on opioids  - last BM: 11/29 - Start Morphine 1mg q6h ATC; required multiple PRNs  - Continue Morphine 1mg IV PRN moderate pain  - Continue Morphine 2mg IV PRN severe pain  - bowel regimen while on opioids  - last BM: 11/29

## 2024-12-01 NOTE — PROGRESS NOTE ADULT - PROBLEM SELECTOR PLAN 6
- Hold home PO meds  - Mod ISS  - F/u POCT, A1c
- Symptom management as above  - Remain in PCU for symptom monitoring and management, EOL care  - Dispo will be guided by clinical course
- Symptom management as above  - Remain in PCU for symptom monitoring and management, EOL care  - Dispo will be guided by clinical course
- Hold home PO meds  - Mod ISS  - F/u POCT, A1c

## 2024-12-01 NOTE — PROGRESS NOTE ADULT - ASSESSMENT
68 y/o F w/pancreatic ca s/p resection '21, s/p chemo/rt then recurrence '22 s/p RT.  Presenting w/ AMS.    #Pancreatic ca  --Follows with Dr. Bess of [facility] [place]  --S/p distal pancreatectomy and splenectomy (06/2021)  --S/p adjuvant tx w/ mFOLFIRINOX (08/21-01/21), Xeloda +RTx 25 fractions (02/22-03/22)-> POD (01/23)  --Most recent systemic tx w/ Gemcitabine and nab-paclitaxel (05/23-10/23, 02/24-07/24). Break in tx 2/2 jaundice and stent migration  --S/p RT x 10 fractions to abdominal wall mets 08/2024  --No plan for cancer treatment inpatient and/or rehab  --On recent admission managed for suspected intravascular hemolysis, s/p platelet transfusions, Dex, and bone marrow biopsy 11/12 - path w/ marginally adequate hypercellular bone marrow with erythroid hyperplasia and mildly decreased megakaryopoiesis. Platelets currently normal.   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 No neurosurgical intervention planned    will follow,    Kirsten Kim NP  Hematolog yOncology   [facility]  O:    Evenings and weekends, please call MD on call or office      
69F w/Hx of HTN, hypothyroidism, pancreatic carcinoma s/p chemo and radiation therapy (currently on disease modifying therapy), s/p pancreatectomy and splenectomy presenting due to worsening confusion and AMS.
70 y/o F w/pancreatic ca s/p resection '21, s/p chemo/rt then recurrence '22 s/p RT.  Presenting w/ AMS.    #Pancreatic ca  --Follow[place] with Dr. Bess of [facility] [place]  --S/p distal pancreatectomy and splenectomy (06/2021)  --S/p adjuvant tx w/ mFOLFIRINOX (08/21-01/21), Xeloda +RTx 25 fractions (02/22-03/22)-> POD (01/23)  --Most recent systemic tx w/ Gemcitabine and nab-paclitaxel (05/23-10/23, 02/24-07/24). Break in tx 2/2 jaundice and stent migration  --S/p RT x 10 fractions to abdominal wall mets 08/2024  --No plan for cancer treatment inpatient and/or rehab  --On recent admission managed for suspected intravascular hemolysis, s/p platelet transfusions, Dex, and bone marrow biopsy 11/12 - path w/ marginally adequate hypercellular bone marrow with erythroid hyperplasia and mildly decreased megakaryopoiesis. Platelets currently normal.     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 No neurosurgical intervention planned  --F/u cultures. Continues antibiotics.  -- neuro follow up       Michi Spain MD  HematologyOncology   O: [phone]     
69 year old female with hx DM, Hypothyroidism, HTN,  pancreatic cancer, s/p resection and now with recurrence, admitted with altered MS thought to be due to UTI and hyperammonemia.  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  Given overall performance and clinical status family has opted for comfort measures only and patient tx to PCU for management of symptoms and end of life care.    
69F w/Hx of HTN, hypothyroidism, pancreatic carcinoma s/p chemo and radiation therapy (currently on disease modifying therapy), s/p pancreatectomy and splenectomy presenting due to worsening confusion and AMS.

## 2024-12-01 NOTE — PROGRESS NOTE ADULT - PROBLEM SELECTOR PLAN 4
Not a candidate for DDT at this time.  Supportive care
Not a candidate for DMT at this time.  Supportive care.
Not a candidate for DMT at this time.  Supportive care.
- TTE  - Limited study, with tachycardia, EF > 70%, hyperdynamic RVSF, small pericardial effusion, abdominal ascites   - Unclear hx of murmur
- TTE  - Limited study, with tachycardia, EF > 70%, hyperdynamic RVSF, small pericardial effusion, abdominal ascites   - Unclear hx of murmur

## 2024-12-01 NOTE — PROGRESS NOTE ADULT - NSPROGADDITIONALINFOA_GEN_ALL_CORE
Kirk Lino MD  Hospice and Palliative Care Fellow  Geriatrics and Palliative Care Team  This note and recommendations are not finalized until signed by attending physician.
Discussed with Attending, ACP and family at the bedside.   Palliative care to follow up with  Wayne.
Discussed with Attending, ACP and family at the bedside.   Palliative care to follow up with  Wayne.
Kirk Lino MD  Hospice and Palliative Care Fellow  Geriatrics and Palliative Care Team  This note and recommendations are not finalized until signed by attending physician.

## 2024-12-01 NOTE — PROGRESS NOTE ADULT - PROVIDER SPECIALTY LIST ADULT
Heme/Onc
Heme/Onc
Internal Medicine
Palliative Care
Palliative Care
Internal Medicine
Palliative Care

## 2024-12-02 NOTE — PROVIDER CONTACT NOTE (OTHER) - ASSESSMENT
No response to external stimuli, no spontaneous respirations, no apical heart rate, negative papillary response to light
Patient admitted to [facility]. Patient A&Ox0, nonverbal. Pt hypotensive and Tachycardic.

## 2024-12-02 NOTE — DISCHARGE NOTE FOR THE EXPIRED PATIENT - SECONDARY DIAGNOSIS.
Pancreatic cancer Agitation Advance care planning Cancer related pain Palliative care encounter Toxic metabolic encephalopathy Functional quadriplegia Acute respiratory distress

## 2024-12-02 NOTE — PROVIDER CONTACT NOTE (OTHER) - RECOMMENDATIONS
Pt pronounced dead at 0052, spouse Wayne called and made aware Pt pronounced dead at 0052, spouse Wayne called and made aware ()

## 2024-12-02 NOTE — DISCHARGE NOTE FOR THE EXPIRED PATIENT - HOSPITAL COURSE
69 year old female with hx DM, Hypothyroidism, HTN,  pancreatic cancer, s/p resection and now with recurrence, admitted with altered MS thought to be due to UTI and hyperammonemia.  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  Given overall performance and clinical status family has opted for comfort measures only and patient tx to PCU for management of symptoms and end of life care.  The patient  on 24 at 00:52.
